# Patient Record
Sex: FEMALE | Race: WHITE | NOT HISPANIC OR LATINO | Employment: FULL TIME | URBAN - METROPOLITAN AREA
[De-identification: names, ages, dates, MRNs, and addresses within clinical notes are randomized per-mention and may not be internally consistent; named-entity substitution may affect disease eponyms.]

---

## 2017-01-26 ENCOUNTER — OFFICE VISIT (OUTPATIENT)
Dept: OBGYN | Facility: CLINIC | Age: 27
End: 2017-01-26
Payer: COMMERCIAL

## 2017-01-26 VITALS
BODY MASS INDEX: 32.01 KG/M2 | RESPIRATION RATE: 18 BRPM | HEART RATE: 95 BPM | OXYGEN SATURATION: 97 % | WEIGHT: 211.2 LBS | SYSTOLIC BLOOD PRESSURE: 116 MMHG | DIASTOLIC BLOOD PRESSURE: 80 MMHG | HEIGHT: 68 IN

## 2017-01-26 DIAGNOSIS — R39.15 URINARY URGENCY: ICD-10-CM

## 2017-01-26 DIAGNOSIS — Z83.438 FAMILY HISTORY OF HYPERLIPIDEMIA: ICD-10-CM

## 2017-01-26 DIAGNOSIS — E06.3 HYPOTHYROIDISM DUE TO HASHIMOTO'S THYROIDITIS: ICD-10-CM

## 2017-01-26 DIAGNOSIS — E03.8 OTHER SPECIFIED HYPOTHYROIDISM: ICD-10-CM

## 2017-01-26 DIAGNOSIS — E03.8 SECONDARY HYPOTHYROIDISM: Primary | ICD-10-CM

## 2017-01-26 DIAGNOSIS — Z30.40 ENCOUNTER FOR SURVEILLANCE OF CONTRACEPTIVES: ICD-10-CM

## 2017-01-26 DIAGNOSIS — Z83.42 FAMILY HISTORY OF HIGH CHOLESTEROL: ICD-10-CM

## 2017-01-26 DIAGNOSIS — R82.90 NONSPECIFIC FINDING ON EXAMINATION OF URINE: ICD-10-CM

## 2017-01-26 DIAGNOSIS — Z01.419 ENCOUNTER FOR GYNECOLOGICAL EXAMINATION WITHOUT ABNORMAL FINDING: Primary | ICD-10-CM

## 2017-01-26 DIAGNOSIS — R87.610 PAPANICOLAOU SMEAR OF CERVIX WITH ATYPICAL SQUAMOUS CELLS OF UNDETERMINED SIGNIFICANCE (ASC-US): ICD-10-CM

## 2017-01-26 LAB
ALBUMIN UR-MCNC: 30 MG/DL
APPEARANCE UR: ABNORMAL
BACTERIA #/AREA URNS HPF: ABNORMAL /HPF
BILIRUB UR QL STRIP: NEGATIVE
COLOR UR AUTO: YELLOW
GLUCOSE UR STRIP-MCNC: NEGATIVE MG/DL
HGB UR QL STRIP: ABNORMAL
KETONES UR STRIP-MCNC: NEGATIVE MG/DL
LEUKOCYTE ESTERASE UR QL STRIP: ABNORMAL
NITRATE UR QL: NEGATIVE
NON-SQ EPI CELLS #/AREA URNS LPF: ABNORMAL /LPF
PH UR STRIP: 5.5 PH (ref 5–7)
RBC #/AREA URNS AUTO: ABNORMAL /HPF (ref 0–2)
SP GR UR STRIP: 1.01 (ref 1–1.03)
URN SPEC COLLECT METH UR: ABNORMAL
UROBILINOGEN UR STRIP-MCNC: NORMAL MG/DL (ref 0–2)
WBC #/AREA URNS AUTO: ABNORMAL /HPF (ref 0–2)
YEAST #/AREA URNS HPF: ABNORMAL /HPF

## 2017-01-26 PROCEDURE — 81001 URINALYSIS AUTO W/SCOPE: CPT | Performed by: OBSTETRICS & GYNECOLOGY

## 2017-01-26 PROCEDURE — 88141 CYTOPATH C/V INTERPRET: CPT | Performed by: OBSTETRICS & GYNECOLOGY

## 2017-01-26 PROCEDURE — 99395 PREV VISIT EST AGE 18-39: CPT | Performed by: OBSTETRICS & GYNECOLOGY

## 2017-01-26 PROCEDURE — 87086 URINE CULTURE/COLONY COUNT: CPT | Performed by: OBSTETRICS & GYNECOLOGY

## 2017-01-26 PROCEDURE — 88175 CYTOPATH C/V AUTO FLUID REDO: CPT | Performed by: OBSTETRICS & GYNECOLOGY

## 2017-01-26 PROCEDURE — 87624 HPV HI-RISK TYP POOLED RSLT: CPT | Performed by: OBSTETRICS & GYNECOLOGY

## 2017-01-26 RX ORDER — LEVOTHYROXINE SODIUM 100 UG/1
100 TABLET ORAL DAILY
Qty: 90 TABLET | Refills: 4 | Status: SHIPPED | OUTPATIENT
Start: 2017-01-26 | End: 2018-03-30

## 2017-01-26 NOTE — MR AVS SNAPSHOT
After Visit Summary   1/26/2017    Analia Lyons    MRN: [id number]           Patient Information     Date Of Birth          1990        Visit Information        Provider Department      1/26/2017 10:00 AM Rupesh Winslow MD [facility]        Today's Diagnoses     Encounter for gynecological examination without abnormal finding    -  1     Encounter for surveillance of contraceptives         Urinary urgency         Nonspecific finding on examination of urine         Papanicolaou smear of cervix with atypical squamous cells of undetermined significance (ASC-US)         Hypothyroidism due to Hashimoto's thyroiditis         Other specified hypothyroidism         Family history of high cholesterol            Follow-ups after your visit        Who to contact     If you have questions or need follow up information about today's clinic visit or your schedule please contact [facility] directly at [phone].  Normal or non-critical lab and imaging results will be communicated to you by MyChart, letter or phone within 4 business days after the clinic has received the results. If you do not hear from us within 7 days, please contact the clinic through MyChart or phone. If you have a critical or abnormal lab result, we will notify you by phone as soon as possible.  Submit refill requests through [employer] or call your pharmacy and they will forward the refill request to us. Please allow 3 business days for your refill to be completed.          Additional Information About Your Visit        Urban [employer] Information     SW[employer] gives you secure access to your electronic health record. If you see a primary care provider, you can also send messages to your care team and make appointments. If you have questions, please call your primary care clinic.  If you do not have a primary care provider, please call [phone] and they will assist you.        Care EveryWhere ID      "This is your Care EveryWhere ID. This could be used by other organizations to access your [facility] medical records  [id number]        Your Vitals Were     Pulse Respirations Height BMI (Body Mass Index) Pulse Oximetry Breastfeeding?    95 18 5' 7.5\" (1.715 m) 32.57 kg/m2 97% No       Blood Pressure from Last 3 Encounters:   01/26/17 116/80   06/15/16 114/72   06/02/16 109/73    Weight from Last 3 Encounters:   01/26/17 211 lb 3.2 oz (95.8 kg)   06/15/16 198 lb 6.4 oz (89.994 kg)   06/02/16 192 lb (87.091 kg)              We Performed the Following     Glucose     HPV High Risk Types DNA Cervical     Lipid panel reflex to direct LDL     Pap imaged thin layer diagnostic with HPV (select HPV order below)     TSH with free T4 reflex     UA with Microscopic reflex to Culture ([place])     Urine Culture Aerobic Bacterial          Where to get your medicines      These medications were sent to [facility] [id number] [place] [address], [address]  [address] [address], [facility] [address]     Phone:  [phone]    - levothyroxine 100 MCG tablet  - norethindrone-ethinyl estradiol 1-35 MG-MCG per tablet       Primary Care Provider Office Phone # Fax #    Babs Malone [phone]       [facility] [address] [facility] [address]        Thank you!     Thank you for choosing [facility]  for your care. Our goal is always to provide you with excellent care. Hearing back from our patients is one way we can continue to improve our services. Please take a few minutes to complete the written survey that you may receive in the mail after your visit with us. Thank you!             Your Updated Medication List - Protect others around you: Learn how to safely use, store and throw away your medicines at www.disposemymeds.org.          This list is accurate as of: 1/26/17 10:54 AM.  Always use your most recent med list.                   Brand Name " Dispense Instructions for use    escitalopram 20 MG tablet    LEXAPRO    90 tablet    Take 1 tablet (20 mg) by mouth daily       levothyroxine 100 MCG tablet    SYNTHROID/LEVOTHROID    90 tablet    Take 1 tablet (100 mcg) by mouth daily       norethindrone-ethinyl estradiol 1-35 MG-MCG per tablet    ORTHO-NOVUM 1/35 (28)    112 tablet    Take one active tablet a day for 21 days.  Discard the inactive pills and start new pack on day 22.

## 2017-01-26 NOTE — LETTER
[address]  [facility] [address] [phone]      February 17, 2017    Analia Lyons  [address]     [facility] [address]    Dear Analia,      We are contacting you in writing because we have been unable to reach you by phone number: [phone], due to male voice on answering machine and no patient identifier.    We have recently received your PAP smear result. The result shows ASCUS or Atypical Squamous Cells of Undetermined Significance. This is now the second year you have had an ASCUS pap. Therefor, Dr. Winslow is recommending that we have you follow up in clinic for a procedure called a colposcopy.   The Colposcopy is a procedure that is similar to your pap smear and is done in the clinic. This test test allows the provider to take a closer look at the cells of the cervix. During this test a biopsy of the cervix may be taken for further lab testing. The exam and test will help determine the reason for your abnormal pap smear.  Please call [phone] to schedule this procedure. If you have additional questions regarding this result, please call me at [phone].    Sincerely,  Elaina Christian, RN, BSN, Pap Tracking Nurse on behalf of Dr. Winslow.

## 2017-01-26 NOTE — PROGRESS NOTES
Analia Lyons is a 26 year old female , who presents for annual exam.   No unusual bleeding, no incontinence, or unusual discharge.   She is currently using OCPs for contraception.  She does not have any apparent contraindications to use.  She has not had any apparent complications with it's use.  She has noticed that she has had some urinary urgency and cloudy urine.  No dysuria.      Last cholesterol:   Recent Labs   Lab Test  11   1010   CHOL  180   HDL  35*   LDL  116   TRIG  144   CHOLHDLRATIO  5.1*     Past Medical History   Diagnosis Date     Depression      Anxiety      Headache(784.0)      Hypothyroidism      Papanicolaou smear of cervix with atypical squamous cells of undetermined significance (ASC-US) 12/3/15       Past Surgical History   Procedure Laterality Date     No history of surgery       Colonoscopy N/A 2015     Procedure: COLONOSCOPY;  Surgeon: Duane, William Charles, MD;  Location: [facility] OR     Colonoscopy with co2 insufflation N/A 2015     Procedure: COLONOSCOPY WITH CO2 INSUFFLATION;  Surgeon: Duane, William Charles, MD;  Location: [facility] OR       Obstetric History       T0      TAB0   SAB0   E0   M0   L0           Gyn History:  Gynecological History         No LMP recorded. Patient is not currently having periods (Reason: Birth Control).     no STD/no PID/no IUD      history of abnormal pap smear:  Last pap ASCUS pap  Last pap: PAP   ASC-US   12/3/2015        Current Outpatient Prescriptions   Medication Sig Dispense Refill     norethindrone-ethinyl estradiol (ORTHO-NOVUM , ,) 1-35 MG-MCG per tablet Take one active tablet a day for 21 days.  Discard the inactive pills and start new pack on day 22. 56 tablet 0     escitalopram (LEXAPRO) 20 MG tablet Take 1 tablet (20 mg) by mouth daily 90 tablet 1     levothyroxine (SYNTHROID,LEVOTHROID) 100 MCG tablet Take 1 tablet (100 mcg) by mouth daily 90 tablet 3       Allergies   Allergen Reactions     Nkda [No Known  "Drug Allergies]        Social History     Social History     Marital Status: Single     Spouse Name: N/A     Number of Children: 0     Years of Education: 16     Occupational History           [employer]     Social History Main Topics     Smoking status: Never Smoker      Smokeless tobacco: Never Used     Alcohol Use: Yes      Comment: socially     Drug Use: No     Sexual Activity:     Partners: Male     Birth Control/ Protection: Pill     Other Topics Concern     Not on file     Social History Narrative    Parents declaring bankruptcy; losing house due to mother's credit card debt.        Brother deployed to [place] for 16 months startin 4/1/09        Parents  but ''.       Family History   Problem Relation Age of Onset     Depression Brother      depression     C.A.D. No family hx of      DIABETES No family hx of      Breast Cancer No family hx of      Cancer - colorectal No family hx of      Anesthesia Reaction No family hx of      Blood Disease No family hx of          ROS:  All negative except as above.      EXAM:  /80 mmHg  Pulse 95  Resp 18  Ht 5' 7.5\" (1.715 m)  Wt 211 lb 3.2 oz (95.8 kg)  BMI 32.57 kg/m2  SpO2 97%  Breastfeeding? No  General:  WNWD female, NAD  Alert  Oriented x 3  Gait:  Normal  Skin:  Normal skin turgor  Neurologic:  CN grossly intact, good sensation.    HEENT:  NC/AT, EOMI  Neck:  No masses palpated, symmetrical, carotids +2/4, no bruits heard  Heart:  RRR  Lungs:  Clear   Breasts:  Symmetrical, no dimpling noted, no masses palpated, no discharge expressed  Abdomen:  Non-tender, non-distended.  Vulva: No external lesions, normal hair distribution, no adenopathy  BUS:  Normal, no masses noted  Urethra:  No hypermobility noted  Urethral meatus:  No masses noted  Vagina: Moist, pink, no abnormal discharge, well rugated, no lesions  Cervix: Smooth, pink, no visible lesions  Uterus: Normal size, anteverted, non-tender, mobile  Ovaries: No mass, " non-tender, mobile  Perianal:  No masses noted.   Extremities:  No clubbing, cyanosis, or edema      ASSESSMENT/PLAN   Annual examination   Urinary urgency, U/A ordered  History of hypothyroidism associated with Hashimoto's.  TSH ordered and refill of synthroid ordered.    ASCUS pap smear.  Repeat pap smear and HPV ordered.   Lipids and glucose ordered  Low fat diet, weight bearing exercises and self breast exams on a monthly basis have been recommended.  I have discussed with patient the risks, benefits, medications, treatment options and modalities.   I have instructed the patient to call or schedule a follow-up appointment if any problems.

## 2017-01-26 NOTE — NURSING NOTE
".  Chief Complaint   Patient presents with     Physical     Annual Female       Initial /80 mmHg  Pulse 95  Resp 18  Ht 5' 7.5\" (1.715 m)  Wt 211 lb 3.2 oz (95.8 kg)  BMI 32.57 kg/m2  SpO2 97%  Breastfeeding? No Estimated body mass index is 32.57 kg/(m^2) as calculated from the following:    Height as of this encounter: 5' 7.5\" (1.715 m).    Weight as of this encounter: 211 lb 3.2 oz (95.8 kg).  BP completed using cuff size: viviana Colin MA 1/26/2017         "

## 2017-01-27 LAB
BACTERIA SPEC CULT: NO GROWTH
MICRO REPORT STATUS: NORMAL
SPECIMEN SOURCE: NORMAL

## 2017-01-27 ASSESSMENT — PATIENT HEALTH QUESTIONNAIRE - PHQ9: SUM OF ALL RESPONSES TO PHQ QUESTIONS 1-9: 4

## 2017-01-31 LAB
COPATH REPORT: ABNORMAL
PAP: ABNORMAL

## 2017-02-01 LAB
FINAL DIAGNOSIS: NORMAL
HPV HR 12 DNA CVX QL NAA+PROBE: NEGATIVE
HPV16 DNA SPEC QL NAA+PROBE: NEGATIVE
HPV18 DNA SPEC QL NAA+PROBE: NEGATIVE
SPECIMEN DESCRIPTION: NORMAL

## 2017-02-02 PROBLEM — R87.610 ASCUS OF CERVIX WITH NEGATIVE HIGH RISK HPV: Status: ACTIVE | Noted: 2017-01-26

## 2017-03-23 ENCOUNTER — OFFICE VISIT (OUTPATIENT)
Dept: OBGYN | Facility: CLINIC | Age: 27
End: 2017-03-23
Payer: COMMERCIAL

## 2017-03-23 VITALS
HEART RATE: 84 BPM | OXYGEN SATURATION: 97 % | DIASTOLIC BLOOD PRESSURE: 72 MMHG | SYSTOLIC BLOOD PRESSURE: 112 MMHG | BODY MASS INDEX: 30.97 KG/M2 | WEIGHT: 200.7 LBS

## 2017-03-23 DIAGNOSIS — R87.610 PAPANICOLAOU SMEAR OF CERVIX WITH ATYPICAL SQUAMOUS CELLS OF UNDETERMINED SIGNIFICANCE (ASC-US): Primary | ICD-10-CM

## 2017-03-23 PROCEDURE — 88305 TISSUE EXAM BY PATHOLOGIST: CPT | Performed by: OBSTETRICS & GYNECOLOGY

## 2017-03-23 PROCEDURE — 57454 BX/CURETT OF CERVIX W/SCOPE: CPT | Performed by: OBSTETRICS & GYNECOLOGY

## 2017-03-23 PROCEDURE — 99214 OFFICE O/P EST MOD 30 MIN: CPT | Mod: 25 | Performed by: OBSTETRICS & GYNECOLOGY

## 2017-03-23 NOTE — PROGRESS NOTES
Patient Name: Analia Lyons              Date: 3/23/2017   YOB: 1990                         Age: 26 year old   Phone: [phone] (home)   ________________________________________________________________________  Analia presents today  to discuss the pap smear, findings and possible further evaluation.  The patient's pap smear history is as noted:  4/1/11 (approx) normal - patient reported.   6/11/13 normal - patient reported  12/3/15 - ASCUS pap. Plan: per provider, repeat pap in 1 year.  1/26/17 ASCUS/neg HR HPV.  I attempted to ensure that the patient was educated regarding the nature of her findings and implications to date.  We reviewed the role of HPV, incidence in the population and the natural history of the infection, and its transmission.  We also reviewed ways to minimize her future risk, the effect of HPV on the cervix and treatment options available, should they be indicated.    The pathophysiology of the cervix, including a discussion of the squamous and columnar cells, metaplasia and dysplasia have been reviewed, drawings, sketches and the pamphlets were reviewed with her.      No LMP recorded. Patient is not currently having periods (Reason: Birth Control).  Current Birth Control Method: oral contraceptive  Age at first sexual intercourse: 15 years old  Number of sexual partners (lifetime): 4  History of veneral diseases: : No  History of genital warts:  No  Visible warts now?:  No  Family History of  Cervical, Uterine or Vaginal Cancer?: No    Past Medical History:   Diagnosis Date     Anxiety      ASCUS of cervix with negative high risk HPV 1/26/17    ASCUS/neg HR HPV.     Depression      Headache(784.0)      Hypothyroidism      Papanicolaou smear of cervix with atypical squamous cells of undetermined significance (ASC-US) 12/3/15       Past Surgical History:   Procedure Laterality Date     COLONOSCOPY N/A 12/16/2015    Procedure: COLONOSCOPY;  Surgeon: Duane, William Charles, MD;   Location: [facility] OR     COLONOSCOPY WITH CO2 INSUFFLATION N/A 12/16/2015    Procedure: COLONOSCOPY WITH CO2 INSUFFLATION;  Surgeon: Duane, William Charles, MD;  Location: [facility] OR     NO HISTORY OF SURGERY          Outpatient Encounter Prescriptions as of 3/23/2017   Medication Sig Dispense Refill     norethindrone-ethinyl estradiol (ORTHO-NOVUM 1/35, 28,) 1-35 MG-MCG per tablet Take one active tablet a day for 21 days.  Discard the inactive pills and start new pack on day 22. 112 tablet 4     levothyroxine (SYNTHROID/LEVOTHROID) 100 MCG tablet Take 1 tablet (100 mcg) by mouth daily 90 tablet 4     escitalopram (LEXAPRO) 20 MG tablet Take 1 tablet (20 mg) by mouth daily 90 tablet 1     No facility-administered encounter medications on file as of 3/23/2017.         Allergies as of 03/23/2017 - Carlos as Reviewed 03/23/2017   Allergen Reaction Noted     Nkda [no known drug allergies]  11/13/2009       Social History     Social History     Marital status: Single     Spouse name: N/A     Number of children: 0     Years of education: 16     Occupational History           [employer]     Social History Main Topics     Smoking status: Never Smoker     Smokeless tobacco: Never Used     Alcohol use Yes      Comment: socially     Drug use: No     Sexual activity: Yes     Partners: Male     Birth control/ protection: Pill     Other Topics Concern     None     Social History Narrative    Parents declaring bankruptcy; losing house due to mother's credit card debt.        Brother deployed to [place] for 16 months startin 4/1/09        Parents  but ''.        Family History   Problem Relation Age of Onset     Depression Brother      depression     C.A.D. No family hx of      DIABETES No family hx of      Breast Cancer No family hx of      Cancer - colorectal No family hx of      Anesthesia Reaction No family hx of      Blood Disease No family hx of      Hyperlipidemia Mother          Review Of Systems  Skin:  negative  Eyes: negative  Ears/Nose/Throat: negative  Respiratory: negative  Cardiovascular: negative  Gastrointestinal: negative  Genitourinary: as above  Musculoskeletal: negative  Neurologic: negative  Psychiatric: negative  Hematologic/Lymphatic/Immunologic: negative  Endocrine: negative     Exam:   /72 (BP Location: Left arm, Cuff Size: Adult Regular)  Pulse 84  Wt 200 lb 11.2 oz (91 kg)  SpO2 97%  Breastfeeding? No  BMI 30.97 kg/m2  GENERAL:  WNWD female NAD  HEENT: NC/AT, EOMI  SKIN: normal skin turgor  GAIT: Normal  NECK: Symmetrical, no masses noted   VULVA: Normal Genitalia  BUS: Normal  URETHRA:  No hypermobility noted  URETHRAL MEATUS:  No masses noted  VAGINA: Normal mucosa, no discharge  CERVIX: Closed, mobile, no discharge  PERIANAL:  No masses or lesions seen  EXTREMITIES: no clubbing, cyanosis, or edema    Assessment:  ASCUS pap smear    Plan:  Recommend to Proceed with Colpo  The details of the colposcopic procedure were reviewed, the risks of missed diagnoses, pain, infection, and bleeding.      Rupesh Winslow MD        Procedure:  Procedure for colposcopy and biopsy has been explained to the patient and consent obtained.    Before the procedure, it was ensured that the patient was educated regarding the nature of her findings and implications to date.  We reviewed the role of HPV and the natural history of the infection.  We also reviewed ways to minimize her future risk, the effect of HPV on the cervix and treatment options available, should they be indicated.    The pathophysiology of the cervix, including a discussion of the squamous and columnar cells, metaplasia and dysplasia have been reviewed, drawings, sketches and the pamphlets were reviewed with her.  The details of the colposcopic procedure were reviewed, the risks of missed diagnoses, pain, infection, and bleeding.  Questions seemed to be answered before proceeding and the patient then consented to the procedure.      Speculum placed in vagina and excellent visualization of cervix achieved, cervix swabbed  with acetic acid solution.    biopsies taken (including ECC): 2   Hemostasis effected with Silver Nitrate    Findings:  Cervix: acetowhitening noted between the 1 and 3 o'clock position  Vaginal inspection: normal without visible lesions.  Procedure Summary: Patient tolerated procedure well and colposcopy adequate.      Assessment:   ASCUS pap smear     Plan:  Specimens labelled and sent to pathology.  Will base further treatment on pathology findings.  Post biopsy instructions given to patient and call to discuss Pathology results.    Rupesh Winslow MD

## 2017-03-23 NOTE — MR AVS SNAPSHOT
After Visit Summary   3/23/2017    Analia Lyons    MRN: [id number]           Patient Information     Date Of Birth          1990        Visit Information        Provider Department      3/23/2017 1:00 PM Rupesh Winslow MD; PROC RM 1 WOMENS [facility]        Today's Diagnoses     Papanicolaou smear of cervix with atypical squamous cells of undetermined significance (ASC-US)    -  1       Follow-ups after your visit        Who to contact     If you have questions or need follow up information about today's clinic visit or your schedule please contact [facility] directly at [phone].  Normal or non-critical lab and imaging results will be communicated to you by MyChart, letter or phone within 4 business days after the clinic has received the results. If you do not hear from us within 7 days, please contact the clinic through Balluunhart or phone. If you have a critical or abnormal lab result, we will notify you by phone as soon as possible.  Submit refill requests through [employer] or call your pharmacy and they will forward the refill request to us. Please allow 3 business days for your refill to be completed.          Additional Information About Your Visit        MyChart Information     [employer] gives you secure access to your electronic health record. If you see a primary care provider, you can also send messages to your care team and make appointments. If you have questions, please call your primary care clinic.  If you do not have a primary care provider, please call [phone] and they will assist you.        Care EveryWhere ID     This is your Care EveryWhere ID. This could be used by other organizations to access your [facility] medical records  [id number]        Your Vitals Were     Pulse Pulse Oximetry Breastfeeding? BMI (Body Mass Index)          84 97% No 30.97 kg/m2         Blood Pressure from Last 3 Encounters:   03/23/17 112/72   01/26/17  116/80   06/15/16 114/72    Weight from Last 3 Encounters:   03/23/17 200 lb 11.2 oz (91 kg)   01/26/17 211 lb 3.2 oz (95.8 kg)   06/15/16 198 lb 6.4 oz (90 kg)              We Performed the Following     COLP CERVIX/UPPER VAGINA     Surgical pathology exam        Primary Care Provider Office Phone # Fax #    Babs Malone [phone]       [facility] [address] [facility] [address]        Thank you!     Thank you for choosing [facility]  for your care. Our goal is always to provide you with excellent care. Hearing back from our patients is one way we can continue to improve our services. Please take a few minutes to complete the written survey that you may receive in the mail after your visit with us. Thank you!             Your Updated Medication List - Protect others around you: Learn how to safely use, store and throw away your medicines at www.disposemymeds.org.          This list is accurate as of: 3/23/17  2:31 PM.  Always use your most recent med list.                   Brand Name Dispense Instructions for use    escitalopram 20 MG tablet    LEXAPRO    90 tablet    Take 1 tablet (20 mg) by mouth daily       levothyroxine 100 MCG tablet    SYNTHROID/LEVOTHROID    90 tablet    Take 1 tablet (100 mcg) by mouth daily       norethindrone-ethinyl estradiol 1-35 MG-MCG per tablet    ORTHO-NOVUM 1/35 (28)    112 tablet    Take one active tablet a day for 21 days.  Discard the inactive pills and start new pack on day 22.

## 2017-03-23 NOTE — NURSING NOTE
"Chief Complaint   Patient presents with     Colposcopy     ASC-US papsmear       Initial /72 (BP Location: Left arm, Cuff Size: Adult Regular)  Pulse 84  Wt 200 lb 11.2 oz (91 kg)  SpO2 97%  Breastfeeding? No  BMI 30.97 kg/m2 Estimated body mass index is 30.97 kg/(m^2) as calculated from the following:    Height as of 1/26/17: 5' 7.5\" (1.715 m).    Weight as of this encounter: 200 lb 11.2 oz (91 kg).  Medication Reconciliation: complete   BHARAT Colin 3/23/2017         "

## 2017-03-27 LAB — COPATH REPORT: NORMAL

## 2017-05-03 DIAGNOSIS — F41.8 DEPRESSION WITH ANXIETY: ICD-10-CM

## 2017-05-04 RX ORDER — ESCITALOPRAM OXALATE 20 MG/1
TABLET ORAL
Qty: 90 TABLET | Refills: 0 | Status: SHIPPED | OUTPATIENT
Start: 2017-05-04 | End: 2017-08-20

## 2017-06-26 ENCOUNTER — OFFICE VISIT (OUTPATIENT)
Dept: FAMILY MEDICINE | Facility: CLINIC | Age: 27
End: 2017-06-26
Payer: COMMERCIAL

## 2017-06-26 ENCOUNTER — RADIANT APPOINTMENT (OUTPATIENT)
Dept: GENERAL RADIOLOGY | Facility: CLINIC | Age: 27
End: 2017-06-26
Attending: PHYSICIAN ASSISTANT
Payer: COMMERCIAL

## 2017-06-26 VITALS
TEMPERATURE: 99.5 F | OXYGEN SATURATION: 99 % | HEART RATE: 95 BPM | BODY MASS INDEX: 31.98 KG/M2 | HEIGHT: 68 IN | DIASTOLIC BLOOD PRESSURE: 87 MMHG | SYSTOLIC BLOOD PRESSURE: 127 MMHG | WEIGHT: 211 LBS

## 2017-06-26 DIAGNOSIS — M54.50 BILATERAL LOW BACK PAIN WITHOUT SCIATICA, UNSPECIFIED CHRONICITY: ICD-10-CM

## 2017-06-26 DIAGNOSIS — M54.50 BILATERAL LOW BACK PAIN WITHOUT SCIATICA, UNSPECIFIED CHRONICITY: Primary | ICD-10-CM

## 2017-06-26 DIAGNOSIS — F41.8 DEPRESSION WITH ANXIETY: ICD-10-CM

## 2017-06-26 PROCEDURE — 72100 X-RAY EXAM L-S SPINE 2/3 VWS: CPT

## 2017-06-26 PROCEDURE — 99214 OFFICE O/P EST MOD 30 MIN: CPT | Performed by: PHYSICIAN ASSISTANT

## 2017-06-26 RX ORDER — CYCLOBENZAPRINE HCL 10 MG
5-10 TABLET ORAL 3 TIMES DAILY PRN
Qty: 30 TABLET | Refills: 1 | Status: SHIPPED | OUTPATIENT
Start: 2017-06-26 | End: 2017-12-21

## 2017-06-26 RX ORDER — IBUPROFEN 800 MG/1
800 TABLET, FILM COATED ORAL EVERY 8 HOURS PRN
Qty: 60 TABLET | Refills: 1 | Status: SHIPPED | OUTPATIENT
Start: 2017-06-26 | End: 2017-12-21

## 2017-06-26 ASSESSMENT — ANXIETY QUESTIONNAIRES
5. BEING SO RESTLESS THAT IT IS HARD TO SIT STILL: SEVERAL DAYS
2. NOT BEING ABLE TO STOP OR CONTROL WORRYING: MORE THAN HALF THE DAYS
3. WORRYING TOO MUCH ABOUT DIFFERENT THINGS: MORE THAN HALF THE DAYS
GAD7 TOTAL SCORE: 13
IF YOU CHECKED OFF ANY PROBLEMS ON THIS QUESTIONNAIRE, HOW DIFFICULT HAVE THESE PROBLEMS MADE IT FOR YOU TO DO YOUR WORK, TAKE CARE OF THINGS AT HOME, OR GET ALONG WITH OTHER PEOPLE: SOMEWHAT DIFFICULT
7. FEELING AFRAID AS IF SOMETHING AWFUL MIGHT HAPPEN: NOT AT ALL
1. FEELING NERVOUS, ANXIOUS, OR ON EDGE: MORE THAN HALF THE DAYS
6. BECOMING EASILY ANNOYED OR IRRITABLE: NEARLY EVERY DAY

## 2017-06-26 ASSESSMENT — PATIENT HEALTH QUESTIONNAIRE - PHQ9: 5. POOR APPETITE OR OVEREATING: NEARLY EVERY DAY

## 2017-06-26 NOTE — PROGRESS NOTES
"  SUBJECTIVE:                                                    Analia Lyons is a 26 year old female who presents to clinic today for the following health issues:        Back Pain       Duration: on and off since may        Specific cause: fall, slipped on mud    Description:   Location of pain: low back bilateral  Character of pain: sharp, dull ache, stabbing and burning  Pain radiation:none  New numbness or weakness in legs, not attributed to pain:  no     Intensity: moderate    History:   Pain interferes with job: YES  History of back problems: recurrent self limited episodes of low back pain in the past  Any previous MRI or X-rays: None  Sees a specialist for back pain:  No  Therapies tried without relief: cold    Alleviating factors:   Improved by: none      Precipitating factors:  Worsened by: Lifting, Bending, Standing, Sitting and Walking    Functional and Psychosocial Screen (Gregorio STarT Back):      Not performed today    During a rainfall in middle of may she fell on back (slipped on mud), had back pain right away then.  Was not seen as it got better quickly. then Yesterday then was getting up from bending over a tub and felt sharp pain, had to have  help move. This morning felt a little better.   Did not fall again yesterday.  Pain Does not shoot down either leg.  More a shooting pain in her low back only.  Feels like a \"band\" straight across her back.  Took some ibuprofen for the pain and ice.  Could not sleep from pain . Neither one seemed to help much. Today is the same not better or worse.     Before  Has had pain off and on. No recent imaging. Did do physical therapy for her back pain years ago which helped, still has these exercises.  This is too painful right now for her to do.     Has used muscle relaxants in the past, was helpful.     No loss of b/b continence or red flags.     Is obese. Nonsmoker.       Accompanying Signs & Symptoms:  Risk of Fracture:  None  Risk of Cauda Equina:  " None  Risk of Infection:  None  Risk of Cancer:  None  Risk of Ankylosing Spondylitis:  Onset at age <35, male, AND morning back stiffness. no           Depression history-has been stable on medications. Denies suicidal or homicidal thoughts.  Patient instructed to go to the emergency room or call 911 if these occur.        NO POSITION FEELS BETTER per patient.     [place] REGIST[place] F[place]S.        Problem list and histories reviewed & adjusted, as indicated.  Additional history: as documented    Patient Active Problem List   Diagnosis     Hashimoto's disease     Familial hematuria     CARDIOVASCULAR SCREENING; LDL GOAL LESS THAN 160     Hypothyroidism     Mechanical low back pain     Keloid scar     Depression with anxiety     ASCUS of cervix with negative high risk HPV     Common wart     Hypothyroidism due to Hashimoto's thyroiditis     Past Surgical History:   Procedure Laterality Date     COLONOSCOPY N/A 12/16/2015    Procedure: COLONOSCOPY;  Surgeon: Duane, William Charles, MD;  Location: [facility] OR     COLONOSCOPY WITH CO2 INSUFFLATION N/A 12/16/2015    Procedure: COLONOSCOPY WITH CO2 INSUFFLATION;  Surgeon: Duane, William Charles, MD;  Location: [facility] OR     NO HISTORY OF SURGERY         Social History   Substance Use Topics     Smoking status: Never Smoker     Smokeless tobacco: Never Used     Alcohol use Yes      Comment: socially     Family History   Problem Relation Age of Onset     Depression Brother      depression     Hyperlipidemia Mother      C.A.D. No family hx of      DIABETES No family hx of      Breast Cancer No family hx of      Cancer - colorectal No family hx of      Anesthesia Reaction No family hx of      Blood Disease No family hx of          Current Outpatient Prescriptions   Medication Sig Dispense Refill     cyclobenzaprine (FLEXERIL) 10 MG tablet Take 0.5-1 tablets (5-10 mg) by mouth 3 times daily as needed for muscle spasms 30 tablet 1     ibuprofen (ADVIL/MOTRIN) 800 MG tablet Take 1 tablet (800  "mg) by mouth every 8 hours as needed for moderate pain With food. Take for 3 days then as needed for pain. 60 tablet 1     escitalopram (LEXAPRO) 20 MG tablet TAKE ONE TABLET BY MOUTH EVERY DAY 90 tablet 0     norethindrone-ethinyl estradiol (ORTHO-NOVUM 1/35, 28,) 1-35 MG-MCG per tablet Take one active tablet a day for 21 days.  Discard the inactive pills and start new pack on day 22. 112 tablet 4     levothyroxine (SYNTHROID/LEVOTHROID) 100 MCG tablet Take 1 tablet (100 mcg) by mouth daily 90 tablet 4     Allergies   Allergen Reactions     Nkda [No Known Drug Allergies]        ROS:  Constitutional, HEENT, cardiovascular, pulmonary, GI, , musculoskeletal, neuro, skin, endocrine and psych systems are negative, except as otherwise noted.    OBJECTIVE:     /87  Pulse 95  Temp 99.5  F (37.5  C) (Oral)  Ht 5' 8\" (1.727 m)  Wt 211 lb (95.7 kg)  SpO2 99%  Breastfeeding? No  BMI 32.08 kg/m2  Body mass index is 32.08 kg/(m^2).  GENERAL:  No acute distress.  Interacts and answers questions appropriately.  Alert and oriented. Obese. Some kyphosis of upper shoulder/spine noted.   HEENT:   Oral mucosa moist.  Posterior pharynx non-erythematous.  NECK:  Soft and supple.  without tenderness.    Normal range of motion.    CARDIAC:   Regular rate and rhythm.  No murmurs, rubs, or gallops.   PULMONARY: Clear to auscultation bilaterally.  No  wheezes, crackles, or rhonchi.  Normal air exchange/breath sounds.   MUSCULOSKELETAL:  Low  over midline and in band-like distribution of low back.  Is tender over lumbar vertebrae.    No erythema, ecchymosis, edema, or warmth.  Range of motion decreased due to pain but per patient flexion and extension hurt the most.   Hip, knee, and ankle strength 5/5 and equal bilaterally.  Distal sensation and pulses intact.   Straight leg raise neg.  NEUROLOGICAL:  Alert and oriented.  Gait normal.  Cranial nerves II-XII grossly intact.  Patellar reflexes 2+ and equal " bilaterally.  PSYCH: Normal affect.  SKIN: No rashes.        LUMBAR SPINE TWO TO THREE VIEWS  2017 12:02 PM      HISTORY: Low back pain.     COMPARISON: 2014.         IMPRESSION: Lumbar vertebrae appear normally aligned. Trace scoliotic  curvature of the lumbar spine, apex left, likely positional and not  present on previous exam. Alignment otherwise intact. No compression  deformity or acute fracture.    ASSESSMENT/PLAN:     ASSESSMENT / PLAN:  (M54.5) Bilateral low back pain without sciatica, unspecified chronicity  (primary encounter diagnosis)  Comment: muscle spasm verus herniated disc, arthritis or other. No evidence of infection. No red flags. See patient instructions below for plan.  Consider physical therapy in future also. She can start her home exercises from previous physical therapy once feeling better.      Plan: XR Lumbar Spine 2/3 Views, cyclobenzaprine         (FLEXERIL) 10 MG tablet, ibuprofen         (ADVIL/MOTRIN) 800 MG tablet            (F41.8) Depression with anxiety  Comment: stable, continue with PCP  Plan:     Patient Instructions   Xray is negative. I will follow up with radiologist read and we will call you if anything else is seen on x-ray.     Do not mix flexeril with alcohol or drive after taking if it makes you sleepy    Let me know if symptoms worsen or do not improve over the next few days, then we will have you see spine specialist and consider MRI scan of lumbar spine      Billin min spent face-to-face with patient. 15 min on history, 5 on exam, 5 on discussing diagnosis and treatment plan.       Michelle Purcell PA-C  [facility]

## 2017-06-26 NOTE — PATIENT INSTRUCTIONS
Xray is negative. I will follow up with radiologist read and we will call you if anything else is seen on x-ray.     Do not mix flexeril with alcohol or drive after taking if it makes you sleepy    Let me know if symptoms worsen or do not improve over the next few days, then we will have you see spine specialist and consider MRI scan of lumbar spine

## 2017-06-26 NOTE — MR AVS SNAPSHOT
After Visit Summary   6/26/2017    Analia Lyons    MRN: [id number]           Patient Information     Date Of Birth          1990        Visit Information        Provider Department      6/26/2017 11:20 AM Michelle Purcell PA-C [facility]        Today's Diagnoses     Bilateral low back pain without sciatica, unspecified chronicity    -  1    Depression with anxiety          Care Instructions    Xray is negative. I will follow up with radiologist read and we will call you if anything else is seen on x-ray.     Do not mix flexeril with alcohol or drive after taking if it makes you sleepy    Let me know if symptoms worsen or do not improve over the next few days, then we will have you see spine specialist and consider MRI scan of lumbar spine            Follow-ups after your visit        Who to contact     If you have questions or need follow up information about today's clinic visit or your schedule please contact [facility] directly at [phone].  Normal or non-critical lab and imaging results will be communicated to you by Socialbakershart, letter or phone within 4 business days after the clinic has received the results. If you do not hear from us within 7 days, please contact the clinic through Goodoct or phone. If you have a critical or abnormal lab result, we will notify you by phone as soon as possible.  Submit refill requests through MixRank or call your pharmacy and they will forward the refill request to us. Please allow 3 business days for your refill to be completed.          Additional Information About Your Visit        MyChart Information     MixRank gives you secure access to your electronic health record. If you see a primary care provider, you can also send messages to your care team and make appointments. If you have questions, please call your primary care clinic.  If you do not have a primary care provider, please call [phone] and they will  "assist you.        Care EveryWhere ID     This is your Care EveryWhere ID. This could be used by other organizations to access your [facility] medical records  [id number]        Your Vitals Were     Pulse Temperature Height Pulse Oximetry Breastfeeding? BMI (Body Mass Index)    95 99.5  F (37.5  C) (Oral) 5' 8\" (1.727 m) 99% No 32.08 kg/m2       Blood Pressure from Last 3 Encounters:   06/26/17 127/87   03/23/17 112/72   01/26/17 116/80    Weight from Last 3 Encounters:   06/26/17 211 lb (95.7 kg)   03/23/17 200 lb 11.2 oz (91 kg)   01/26/17 211 lb 3.2 oz (95.8 kg)              We Performed the Following     DEPRESSION ACTION PLAN (DAP)          Today's Medication Changes          These changes are accurate as of: 6/26/17 12:20 PM.  If you have any questions, ask your nurse or doctor.               Start taking these medicines.        Dose/Directions    cyclobenzaprine 10 MG tablet   Commonly known as:  FLEXERIL   Used for:  Bilateral low back pain without sciatica, unspecified chronicity   Started by:  Michelle Purcell PA-C        Dose:  5-10 mg   Take 0.5-1 tablets (5-10 mg) by mouth 3 times daily as needed for muscle spasms   Quantity:  30 tablet   Refills:  1       ibuprofen 800 MG tablet   Commonly known as:  ADVIL/MOTRIN   Used for:  Bilateral low back pain without sciatica, unspecified chronicity   Started by:  Michelle Purcell PA-C        Dose:  800 mg   Take 1 tablet (800 mg) by mouth every 8 hours as needed for moderate pain With food. Take for 3 days then as needed for pain.   Quantity:  60 tablet   Refills:  1            Where to get your medicines      These medications were sent to [facility] [address] [facility], [facility] [address]  [address] [address] [address]     Phone:  [phone]     cyclobenzaprine 10 MG tablet    ibuprofen 800 MG tablet                Primary Care Provider Office Phone # Fax #    Babs Malone [phone] "       [facility] [address] [facility] [address]        Equal Access to Services     MEME BARBER : Hadii rossy evans hadmalinao Soniyaali, waaxda luqadaha, qaybta kaalmada adetaisha, casimiro rueda leonciolesly jamesstuart melaniearsalanalexander nicole. So [facility] [phone].    ATENCIÓN: Si habla español, tiene a gates disposición servicios gratuitos de asistencia lingüística. Llame al [phone].    We comply with applicable federal civil rights laws and [place] laws. We do not discriminate on the basis of race, color, national origin, age, disability sex, sexual orientation or gender identity.            Thank you!     Thank you for choosing [facility]  for your care. Our goal is always to provide you with excellent care. Hearing back from our patients is one way we can continue to improve our services. Please take a few minutes to complete the written survey that you may receive in the mail after your visit with us. Thank you!             Your Updated Medication List - Protect others around you: Learn how to safely use, store and throw away your medicines at www.disposemymeds.org.          This list is accurate as of: 6/26/17 12:20 PM.  Always use your most recent med list.                   Brand Name Dispense Instructions for use Diagnosis    cyclobenzaprine 10 MG tablet    FLEXERIL    30 tablet    Take 0.5-1 tablets (5-10 mg) by mouth 3 times daily as needed for muscle spasms    Bilateral low back pain without sciatica, unspecified chronicity       escitalopram 20 MG tablet    LEXAPRO    90 tablet    TAKE ONE TABLET BY MOUTH EVERY DAY    Depression with anxiety       ibuprofen 800 MG tablet    ADVIL/MOTRIN    60 tablet    Take 1 tablet (800 mg) by mouth every 8 hours as needed for moderate pain With food. Take for 3 days then as needed for pain.    Bilateral low back pain without sciatica, unspecified chronicity       levothyroxine 100 MCG tablet    SYNTHROID/LEVOTHROID    90 tablet    Take 1 tablet (100 mcg)  by mouth daily    Other specified hypothyroidism       norethindrone-ethinyl estradiol 1-35 MG-MCG per tablet    ORTHO-NOVUM 1/35 (28)    112 tablet    Take one active tablet a day for 21 days.  Discard the inactive pills and start new pack on day 22.    Encounter for surveillance of contraceptives

## 2017-06-26 NOTE — NURSING NOTE
"Chief Complaint   Patient presents with     Back Pain     back pain per pt x 1 month ago slipped on mud and landed on back.       Initial /87  Pulse 95  Temp 99.5  F (37.5  C) (Oral)  Ht 5' 8\" (1.727 m)  Wt 211 lb (95.7 kg)  SpO2 99%  Breastfeeding? No  BMI 32.08 kg/m2 Estimated body mass index is 32.08 kg/(m^2) as calculated from the following:    Height as of this encounter: 5' 8\" (1.727 m).    Weight as of this encounter: 211 lb (95.7 kg).  Medication Reconciliation: complete      Libby Souza MA    "

## 2017-06-27 ASSESSMENT — PATIENT HEALTH QUESTIONNAIRE - PHQ9: SUM OF ALL RESPONSES TO PHQ QUESTIONS 1-9: 7

## 2017-06-27 ASSESSMENT — ANXIETY QUESTIONNAIRES: GAD7 TOTAL SCORE: 13

## 2017-08-20 DIAGNOSIS — F41.8 DEPRESSION WITH ANXIETY: ICD-10-CM

## 2017-08-21 RX ORDER — ESCITALOPRAM OXALATE 20 MG/1
TABLET ORAL
Qty: 90 TABLET | Refills: 0 | Status: SHIPPED | OUTPATIENT
Start: 2017-08-21 | End: 2017-12-18

## 2017-12-09 DIAGNOSIS — F41.8 DEPRESSION WITH ANXIETY: ICD-10-CM

## 2017-12-11 NOTE — TELEPHONE ENCOUNTER
PHQ-9 SCORE 10/20/2016 1/26/2017 6/26/2017   Total Score - - -   Total Score MyChart 3 (Minimal depression) - -   Total Score - 4 7

## 2017-12-13 RX ORDER — ESCITALOPRAM OXALATE 20 MG/1
TABLET ORAL
Qty: 30 TABLET | Refills: 0 | OUTPATIENT
Start: 2017-12-13

## 2017-12-13 NOTE — TELEPHONE ENCOUNTER
"I have not seen this patient in over a year. It looks like the medication has been filled as an \"add on\" concern at her appointments.   She needs to be seen. I am fine with 30 day supply after an appointment is made. Kristen Kehr PA-C    "

## 2017-12-15 NOTE — PROGRESS NOTES
SUBJECTIVE:                                                    Analia Lyons is a 27 year old female who presents to clinic today for the following health issues:    Depression and Anxiety Follow-Up    Status since last visit: Worsened     Other associated symptoms: Hard time focusing, eating more and sleeping more than usual    Complicating factors:     Significant life event: No     Current substance abuse: None    PHQ-9 Score and MyChart F/U Questions 1/26/2017 6/26/2017 12/11/2017   Total Score 4 7 9   Q9: Suicide Ideation Not at all Not at all Not at all     MAXIM-7 SCORE 3/23/2016 10/20/2016 6/26/2017   Total Score - - -   Total Score - 9 (mild anxiety) -   Total Score 6 - 13       PHQ-9  English  PHQ-9   Any Language  GAD7  Suicide Assessment Five-step Evaluation and Treatment (SAFE-T)      Amount of exercise or physical activity: 2-3 days/week for an average of 45-60 minutes    Problems taking medications regularly: No    Medication side effects: none    Diet: regular (no restrictions)    Has been on lexapro for years.  Worked previously.  Feels like it hasnt been working well the past two months or more. Is on max dose.   Previously tried wellbutrin and celexa. Doesn't remember side effects.  No history of seizures.   Anxiety has been worse.  Denies suicidal or homicidal thoughts.  Patient instructed to go to the emergency room or call 911 if these occur.  Sees a therapist weekly.   No drug use/abuse.       Was on effexor previously, went fine previously. Was under 18 years old. No issues.  Could consider again in the future also.   Doesn't want something that causes weight gain as she is already overweight.          Problem list and histories reviewed & adjusted, as indicated.  Additional history: as documented    Patient Active Problem List   Diagnosis     Hashimoto's disease     Familial hematuria     CARDIOVASCULAR SCREENING; LDL GOAL LESS THAN 160     Hypothyroidism     Mechanical low back pain      Keloid scar     Depression with anxiety     ASCUS of cervix with negative high risk HPV     Common wart     Hypothyroidism due to Hashimoto's thyroiditis     Past Surgical History:   Procedure Laterality Date     COLONOSCOPY N/A 12/16/2015    Procedure: COLONOSCOPY;  Surgeon: Duane, William Charles, MD;  Location: [facility] OR     COLONOSCOPY WITH CO2 INSUFFLATION N/A 12/16/2015    Procedure: COLONOSCOPY WITH CO2 INSUFFLATION;  Surgeon: Duane, William Charles, MD;  Location: [facility] OR     NO HISTORY OF SURGERY         Social History   Substance Use Topics     Smoking status: Never Smoker     Smokeless tobacco: Never Used     Alcohol use Yes      Comment: socially     Family History   Problem Relation Age of Onset     Depression Brother      depression     Hyperlipidemia Mother      C.A.D. No family hx of      DIABETES No family hx of      Breast Cancer No family hx of      Cancer - colorectal No family hx of      Anesthesia Reaction No family hx of      Blood Disease No family hx of          Current Outpatient Prescriptions   Medication Sig Dispense Refill     escitalopram (LEXAPRO) 20 MG tablet Take 1 tablet (20 mg) by mouth daily 90 tablet 0     buPROPion (WELLBUTRIN XL) 150 MG 24 hr tablet Take 1 tablet (150 mg) by mouth every morning 30 tablet 1     cyclobenzaprine (FLEXERIL) 10 MG tablet Take 0.5-1 tablets (5-10 mg) by mouth 3 times daily as needed for muscle spasms 30 tablet 1     ibuprofen (ADVIL/MOTRIN) 800 MG tablet Take 1 tablet (800 mg) by mouth every 8 hours as needed for moderate pain With food. Take for 3 days then as needed for pain. 60 tablet 1     norethindrone-ethinyl estradiol (ORTHO-NOVUM 1/35, 28,) 1-35 MG-MCG per tablet Take one active tablet a day for 21 days.  Discard the inactive pills and start new pack on day 22. 112 tablet 4     levothyroxine (SYNTHROID/LEVOTHROID) 100 MCG tablet Take 1 tablet (100 mcg) by mouth daily 90 tablet 4     [DISCONTINUED] escitalopram (LEXAPRO) 20 MG tablet TAKE ONE TABLET  BY MOUTH EVERY DAY 90 tablet 0     Allergies   Allergen Reactions     Nkda [No Known Drug Allergies]          ROS:  Constitutional, HEENT, cardiovascular, pulmonary, GI, , musculoskeletal, neuro, skin, endocrine and psych systems are negative, except as otherwise noted.      OBJECTIVE:   /86  Pulse 90  Temp 98.5  F (36.9  C) (Oral)  Wt 220 lb (99.8 kg)  SpO2 96%  Breastfeeding? No  BMI 33.45 kg/m2  Body mass index is 33.45 kg/(m^2).  GENERAL: healthy, alert and no distress  RESP: lungs clear to auscultation - no rales, rhonchi or wheezes  CV: regular rate and rhythm, normal S1 S2, no S3 or S4, no murmur, click or rub, no peripheral edema and peripheral pulses strong  MS: no gross musculoskeletal defects noted, no edema  Psych: normal affect    ASSESSMENT / PLAN:  (F41.8) Depression with anxiety  Comment: worsening, See patient instructions below for more plan.    Plan: escitalopram (LEXAPRO) 20 MG tablet, buPROPion         (WELLBUTRIN XL) 150 MG 24 hr tablet          Next step if this does not work well would be to taper her off these medications and try effexor    Patient Instructions   Add wellbutrin in the morning. Continue lexapro and seeing therapist  Re-check with me in 3-4 weeks.  If doing really well, this can be via phone or mychart.  Otherwise re-check in clinic.     Call with side effects or concerns.     Medication should start to work within 1-2 weeks but will not have full effect for about 6 weeks.     If medication makes you feel worse, stop right away and recheck with me.     If suicidal thoughts or plan occur, call 911 or go to emergency room.               Michelle Purcell PA-C  [facility]

## 2017-12-18 ENCOUNTER — OFFICE VISIT (OUTPATIENT)
Dept: FAMILY MEDICINE | Facility: CLINIC | Age: 27
End: 2017-12-18
Payer: COMMERCIAL

## 2017-12-18 VITALS
TEMPERATURE: 98.5 F | BODY MASS INDEX: 33.45 KG/M2 | OXYGEN SATURATION: 96 % | HEART RATE: 90 BPM | DIASTOLIC BLOOD PRESSURE: 86 MMHG | SYSTOLIC BLOOD PRESSURE: 133 MMHG | WEIGHT: 220 LBS

## 2017-12-18 DIAGNOSIS — F41.8 DEPRESSION WITH ANXIETY: ICD-10-CM

## 2017-12-18 PROCEDURE — 99214 OFFICE O/P EST MOD 30 MIN: CPT | Performed by: PHYSICIAN ASSISTANT

## 2017-12-18 RX ORDER — ESCITALOPRAM OXALATE 20 MG/1
20 TABLET ORAL DAILY
Qty: 90 TABLET | Refills: 0 | Status: SHIPPED | OUTPATIENT
Start: 2017-12-18 | End: 2018-03-22

## 2017-12-18 RX ORDER — BUPROPION HYDROCHLORIDE 150 MG/1
150 TABLET ORAL EVERY MORNING
Qty: 30 TABLET | Refills: 1 | Status: SHIPPED | OUTPATIENT
Start: 2017-12-18 | End: 2018-01-31

## 2017-12-18 NOTE — MR AVS SNAPSHOT
After Visit Summary   12/18/2017    Analia Lyons    MRN: [id number]           Patient Information     Date Of Birth          1990        Visit Information        Provider Department      12/18/2017 2:00 PM Michelle Purcell PA-C [facility]        Today's Diagnoses     Depression with anxiety          Care Instructions    Add wellbutrin in the morning. Continue lexapro and seeing therapist  Re-check with me in 3-4 weeks.  If doing really well, this can be via phone or mychart.  Otherwise re-check in clinic.     Call with side effects or concerns.     Medication should start to work within 1-2 weeks but will not have full effect for about 6 weeks.     If medication makes you feel worse, stop right away and recheck with me.     If suicidal thoughts or plan occur, call 911 or go to emergency room.                 Follow-ups after your visit        Who to contact     If you have questions or need follow up information about today's clinic visit or your schedule please contact [facility] directly at [phone].  Normal or non-critical lab and imaging results will be communicated to you by MyChart, letter or phone within 4 business days after the clinic has received the results. If you do not hear from us within 7 days, please contact the clinic through [employer] or phone. If you have a critical or abnormal lab result, we will notify you by phone as soon as possible.  Submit refill requests through [employer] or call your pharmacy and they will forward the refill request to us. Please allow 3 business days for your refill to be completed.          Additional Information About Your Visit        MyChart Information     [employer] gives you secure access to your electronic health record. If you see a primary care provider, you can also send messages to your care team and make appointments. If you have questions, please call your primary care clinic.  If you do not have a  primary care provider, please call [phone] and they will assist you.        Care EveryWhere ID     This is your Care EveryWhere ID. This could be used by other organizations to access your [facility] medical records  [id number]        Your Vitals Were     Pulse Temperature Pulse Oximetry Breastfeeding? BMI (Body Mass Index)       109 98.5  F (36.9  C) (Oral) 96% No 33.45 kg/m2        Blood Pressure from Last 3 Encounters:   12/18/17 133/86   06/26/17 127/87   03/23/17 112/72    Weight from Last 3 Encounters:   12/18/17 220 lb (99.8 kg)   06/26/17 211 lb (95.7 kg)   03/23/17 200 lb 11.2 oz (91 kg)              Today, you had the following     No orders found for display         Today's Medication Changes          These changes are accurate as of: 12/18/17  2:57 PM.  If you have any questions, ask your nurse or doctor.               Start taking these medicines.        Dose/Directions    buPROPion 150 MG 24 hr tablet   Commonly known as:  WELLBUTRIN XL   Used for:  Depression with anxiety   Started by:  Michelle Purcell PA-C        Dose:  150 mg   Take 1 tablet (150 mg) by mouth every morning   Quantity:  30 tablet   Refills:  1         These medicines have changed or have updated prescriptions.        Dose/Directions    escitalopram 20 MG tablet   Commonly known as:  LEXAPRO   This may have changed:  See the new instructions.   Used for:  Depression with anxiety   Changed by:  Michelle Purcell PA-C        Dose:  20 mg   Take 1 tablet (20 mg) by mouth daily   Quantity:  90 tablet   Refills:  0            Where to get your medicines      These medications were sent to [facility] [address], [facility] [address]  [address] [place] [address]     Phone:  [phone]     buPROPion 150 MG 24 hr tablet    escitalopram 20 MG tablet                Primary Care Provider Office Phone # Fax #    Michelle Purcell PA-C [phone]        [address] [facility] [address]        Equal Access to Services     RADHAJOHN ELISABETH : Hadii rossy ku hadmicky Soniyaali, waaxda luqadaha, qaybta kaalmada jacobtaisha, waxhenny marybeth leonciolesly garcia jefersonalexander nicole. So [facility] [phone].    ATENCIÓN: Si habla español, tiene a gates disposición servicios gratuitos de asistencia lingüística. [facility] [phone].    We comply with applicable federal civil rights laws and [place] laws. We do not discriminate on the basis of race, color, national origin, age, disability, sex, sexual orientation, or gender identity.            Thank you!     Thank you for choosing [facility]  for your care. Our goal is always to provide you with excellent care. Hearing back from our patients is one way we can continue to improve our services. Please take a few minutes to complete the written survey that you may receive in the mail after your visit with us. Thank you!             Your Updated Medication List - Protect others around you: Learn how to safely use, store and throw away your medicines at www.disposemymeds.org.          This list is accurate as of: 12/18/17  2:57 PM.  Always use your most recent med list.                   Brand Name Dispense Instructions for use Diagnosis    buPROPion 150 MG 24 hr tablet    WELLBUTRIN XL    30 tablet    Take 1 tablet (150 mg) by mouth every morning    Depression with anxiety       cyclobenzaprine 10 MG tablet    FLEXERIL    30 tablet    Take 0.5-1 tablets (5-10 mg) by mouth 3 times daily as needed for muscle spasms    Bilateral low back pain without sciatica, unspecified chronicity       escitalopram 20 MG tablet    LEXAPRO    90 tablet    Take 1 tablet (20 mg) by mouth daily    Depression with anxiety       ibuprofen 800 MG tablet    ADVIL/MOTRIN    60 tablet    Take 1 tablet (800 mg) by mouth every 8 hours as needed for moderate pain With food. Take for 3 days then as needed for pain.    Bilateral low back pain without sciatica,  unspecified chronicity       levothyroxine 100 MCG tablet    SYNTHROID/LEVOTHROID    90 tablet    Take 1 tablet (100 mcg) by mouth daily    Other specified hypothyroidism       norethindrone-ethinyl estradiol 1-35 MG-MCG per tablet    ORTHO-NOVUM 1/35 (28)    112 tablet    Take one active tablet a day for 21 days.  Discard the inactive pills and start new pack on day 22.    Encounter for surveillance of contraceptives

## 2017-12-18 NOTE — NURSING NOTE
"Chief Complaint   Patient presents with     Depression     depression med check and refill        Initial /86  Pulse 109  Temp 98.5  F (36.9  C) (Oral)  Wt 220 lb (99.8 kg)  SpO2 96%  Breastfeeding? No  BMI 33.45 kg/m2 Estimated body mass index is 33.45 kg/(m^2) as calculated from the following:    Height as of 6/26/17: 5' 8\" (1.727 m).    Weight as of this encounter: 220 lb (99.8 kg).  Medication Reconciliation: complete      Libby Souza MA    "

## 2017-12-18 NOTE — PATIENT INSTRUCTIONS
Add wellbutrin in the morning. Continue lexapro and seeing therapist  Re-check with me in 3-4 weeks.  If doing really well, this can be via phone or mychart.  Otherwise re-check in clinic.     Call with side effects or concerns.     Medication should start to work within 1-2 weeks but will not have full effect for about 6 weeks.     If medication makes you feel worse, stop right away and recheck with me.     If suicidal thoughts or plan occur, call 911 or go to emergency room.

## 2017-12-20 NOTE — PROGRESS NOTES
SUBJECTIVE:                                                    Analia Lyons is a 27 year old female who presents to clinic today for the following health issues:    Back Pain       Duration: x 2-3 weeks        Specific cause: none    Description:   Location of pain: low back bilateral  Character of pain: dull ache and constant  Pain radiation:radiates into the right buttocks and radiates into the right leg  New numbness or weakness in legs, not attributed to pain:  no     Intensity: mild    History:   Pain interferes with job: No  History of back problems: recurrent self limited episodes of low back pain in the past  Any previous MRI or X-rays: None  Sees a specialist for back pain:  No  Therapies tried without relief: Ibuprofen, cold and heat    Alleviating factors:   Improved by: none      Precipitating factors:  Worsened by: Bending, Standing, Sitting and Walking    Functional and Psychosocial Screen (Gregorio STarT Back):      Not performed today        Accompanying Signs & Symptoms:  Risk of Fracture:  None  Risk of Cauda Equina:  None  Risk of Infection:  None  Risk of Cancer:  None  Risk of Ankylosing Spondylitis:  Onset at age <35, male, AND morning back stiffness. no     Hurt after playing hockey on 12-4.  Was mild. Took ibuprofen got better. Hard to get up and down out of a chair.  No injury in hockey.  Flexeril did not help this time.  Has had back pain on off and for years.  Usually better about 2 weeks after the flare. Now its getting worse. Sometimes bilateral. Tingling in right gluteal. Does not radiate below knee. Has does physical therapy for her back.  Has been doing these stretches now but it is too painful.     No red flags.     ibuprofen helps a little bit.   Has been resting and using heat on back, not really helping.     Admits she does not eat very well lately. Has gained weight. Could be contributing. Discussed diet and gave handout on portions. Consider referral in future.     No urinary  symptoms.         Problem list and histories reviewed & adjusted, as indicated.  Additional history: as documented    Patient Active Problem List   Diagnosis     Hashimoto's disease     Familial hematuria     CARDIOVASCULAR SCREENING; LDL GOAL LESS THAN 160     Hypothyroidism     Mechanical low back pain     Keloid scar     Depression with anxiety     ASCUS of cervix with negative high risk HPV     Common wart     Hypothyroidism due to Hashimoto's thyroiditis     BMI 33.0-33.9,adult     Past Surgical History:   Procedure Laterality Date     COLONOSCOPY N/A 12/16/2015    Procedure: COLONOSCOPY;  Surgeon: Duane, William Charles, MD;  Location: [facility] OR     COLONOSCOPY WITH CO2 INSUFFLATION N/A 12/16/2015    Procedure: COLONOSCOPY WITH CO2 INSUFFLATION;  Surgeon: Duane, William Charles, MD;  Location: [facility] OR     NO HISTORY OF SURGERY         Social History   Substance Use Topics     Smoking status: Never Smoker     Smokeless tobacco: Never Used     Alcohol use Yes      Comment: socially     Family History   Problem Relation Age of Onset     Depression Brother      depression     Hyperlipidemia Mother      C.A.D. No family hx of      DIABETES No family hx of      Breast Cancer No family hx of      Cancer - colorectal No family hx of      Anesthesia Reaction No family hx of      Blood Disease No family hx of          Current Outpatient Prescriptions   Medication Sig Dispense Refill     HYDROcodone-acetaminophen (NORCO) 5-325 MG per tablet Take 1 tablet by mouth every 6 hours as needed for pain 10 tablet 0     cyclobenzaprine (FLEXERIL) 10 MG tablet Take 0.5-1 tablets (5-10 mg) by mouth 3 times daily as needed for muscle spasms 30 tablet 1     ibuprofen (ADVIL/MOTRIN) 800 MG tablet Take 1 tablet (800 mg) by mouth every 8 hours as needed for moderate pain With food. Take for 3 days then as needed for pain. 60 tablet 1     escitalopram (LEXAPRO) 20 MG tablet Take 1 tablet (20 mg) by mouth daily 90 tablet 0     buPROPion  (WELLBUTRIN XL) 150 MG 24 hr tablet Take 1 tablet (150 mg) by mouth every morning 30 tablet 1     norethindrone-ethinyl estradiol (ORTHO-NOVUM 1/35, 28,) 1-35 MG-MCG per tablet Take one active tablet a day for 21 days.  Discard the inactive pills and start new pack on day 22. 112 tablet 4     levothyroxine (SYNTHROID/LEVOTHROID) 100 MCG tablet Take 1 tablet (100 mcg) by mouth daily 90 tablet 4     Allergies   Allergen Reactions     Nkda [No Known Drug Allergies]          ROS:  Constitutional, HEENT, cardiovascular, pulmonary, GI, , musculoskeletal, neuro, skin, endocrine and psych systems are negative, except as otherwise noted.      OBJECTIVE:   /87  Pulse 106  Temp 97.9  F (36.6  C) (Oral)  Wt 220 lb (99.8 kg)  SpO2 97%  Breastfeeding? No  BMI 33.45 kg/m2  Body mass index is 33.45 kg/(m^2).  GENERAL:  No acute distress.  Interacts and answers questions appropriately.  Alert and oriented.  HEENT:   Oral mucosa moist.  Posterior pharynx non-erythematous.  NECK:  Soft and supple.  without tenderness.    Normal range of motion.    CARDIAC:   Regular rate and rhythm.  No murmurs, rubs, or gallops.   PULMONARY: Clear to auscultation bilaterally.  No  wheezes, crackles, or rhonchi.  Normal air exchange/breath sounds.   MUSCULOSKELETAL:  Low  over right paraspinous and right upper gluteal region.  Not tender over lumbar vertebrae.    No erythema, ecchymosis, edema, or warmth.  Range of motion normal not able to be assessed due to pain.   Hip, knee, and ankle strength 5/5 and equal bilaterally.  Distal sensation and pulses intact.   Straight leg raise negative.  NEUROLOGICAL:  Alert and oriented.  Gait normal.  Cranial nerves II-XII grossly intact.  Patellar reflexes 2+ and equal bilaterally.  PSYCH: Normal affect.  SKIN: No rashes.          ASSESSMENT/PLAN:     ASSESSMENT / PLAN:  (M54.41) Bilateral low back pain with right-sided sciatica, unspecified chronicity  (primary encounter  diagnosis)  Comment:   Plan: HYDROcodone-acetaminophen (NORCO) 5-325 MG per         tablet        -Narcotic medications can be addicting and therefore are used for short term pain control only.  They are not intended for long-term use.    -Take them only for severe pain as needed.    -Never mix with alcohol.    -Do not drive after taking this medication.    -No refills without an office visit. No replacements will be given.    -Keep these medications kept in a safe location.  You are responsible for them.  -Do not give them to anyone else.  They are prescribed to you only.       (M54.5) Bilateral low back pain without sciatica, unspecified chronicity  Comment:   Plan: cyclobenzaprine (FLEXERIL) 10 MG tablet,         ibuprofen (ADVIL/MOTRIN) 800 MG tablet, ORTHO          REFERRAL          Schedule with specialist  Discussed MRI, she prefers to meet with them first  She has already done physical therapy   To emergency room with severe worsening symptoms    (Z68.33) BMI 33.0-33.9,adult  Comment:   Plan: see hpi, discussed and gave handout on plate/portion control          Michelle Purcell PA-C  [facility] [place]

## 2017-12-21 ENCOUNTER — OFFICE VISIT (OUTPATIENT)
Dept: FAMILY MEDICINE | Facility: CLINIC | Age: 27
End: 2017-12-21
Payer: COMMERCIAL

## 2017-12-21 VITALS
WEIGHT: 220 LBS | DIASTOLIC BLOOD PRESSURE: 87 MMHG | BODY MASS INDEX: 33.45 KG/M2 | TEMPERATURE: 97.9 F | SYSTOLIC BLOOD PRESSURE: 125 MMHG | OXYGEN SATURATION: 97 % | HEART RATE: 106 BPM

## 2017-12-21 DIAGNOSIS — M54.50 BILATERAL LOW BACK PAIN WITHOUT SCIATICA, UNSPECIFIED CHRONICITY: ICD-10-CM

## 2017-12-21 DIAGNOSIS — M54.41 BILATERAL LOW BACK PAIN WITH RIGHT-SIDED SCIATICA, UNSPECIFIED CHRONICITY: Primary | ICD-10-CM

## 2017-12-21 PROCEDURE — 99214 OFFICE O/P EST MOD 30 MIN: CPT | Performed by: PHYSICIAN ASSISTANT

## 2017-12-21 RX ORDER — HYDROCODONE BITARTRATE AND ACETAMINOPHEN 5; 325 MG/1; MG/1
1 TABLET ORAL EVERY 6 HOURS PRN
Qty: 10 TABLET | Refills: 0 | Status: SHIPPED | OUTPATIENT
Start: 2017-12-21 | End: 2018-08-21

## 2017-12-21 RX ORDER — IBUPROFEN 800 MG/1
800 TABLET, FILM COATED ORAL EVERY 8 HOURS PRN
Qty: 60 TABLET | Refills: 1 | Status: SHIPPED | OUTPATIENT
Start: 2017-12-21 | End: 2018-03-08

## 2017-12-21 RX ORDER — CYCLOBENZAPRINE HCL 10 MG
5-10 TABLET ORAL 3 TIMES DAILY PRN
Qty: 30 TABLET | Refills: 1 | Status: SHIPPED | OUTPATIENT
Start: 2017-12-21 | End: 2018-08-21

## 2017-12-21 NOTE — NURSING NOTE
"Chief Complaint   Patient presents with     Back Pain     back pain per pt x 2-3 weeks now no known injury       Initial /87  Pulse 106  Temp 97.9  F (36.6  C) (Oral)  Wt 220 lb (99.8 kg)  SpO2 97%  Breastfeeding? No  BMI 33.45 kg/m2 Estimated body mass index is 33.45 kg/(m^2) as calculated from the following:    Height as of 6/26/17: 5' 8\" (1.727 m).    Weight as of this encounter: 220 lb (99.8 kg).  Medication Reconciliation: complete      Libby Souza MA    "

## 2017-12-21 NOTE — MR AVS SNAPSHOT
After Visit Summary   12/21/2017    Analia Lyons    MRN: [id number]           Patient Information     Date Of Birth          1990        Visit Information        Provider Department      12/21/2017 1:20 PM Michelle Purcell PA-C [facility]        Today's Diagnoses     Bilateral low back pain with right-sided sciatica, unspecified chronicity    -  1    Bilateral low back pain without sciatica, unspecified chronicity           Follow-ups after your visit        Additional Services     ORTHO  REFERRAL       [facility] is referring you to the Orthopedic  Services at [facility].       The  Representative will assist you in the coordination of your Orthopedic and Musculoskeletal Care as prescribed by your physician.    The  Representative will call you within 1 business day to help schedule your appointment, or you may contact the  Representative at:    All areas ~ [phone]     Type of Referral : Spine: Lumbar  **Choose Medical Spine Specialist (unless patient was seen by a Medical Spine Specialist within the past 6 months).**  Surgical Evaluation is advised if the patient presents with one or more of the following red flags: Evidence of Spinal Tumor, Infection or Fracture, Cauda Equina Syndrome, Sudden or Progressive Weakness, Loss of Bowel or Bladder Control, or any other documented emergent neurological condition resulting from a Lumbar Spinal Condition. Medical Spine Specialist        Timeframe requested: Routine    Coverage of these services is subject to the terms and limitations of your health insurance plan.  Please call member services at your health plan with any benefit or coverage questions.      If X-rays, CT or MRI's have been performed, please contact the facility where they were done to arrange for , prior to your scheduled appointment.  Please bring this referral  request to your appointment and present it to your specialist.                  Who to contact     If you have questions or need follow up information about today's clinic visit or your schedule please contact [facility] directly at [phone].  Normal or non-critical lab and imaging results will be communicated to you by MyChart, letter or phone within 4 business days after the clinic has received the results. If you do not hear from us within 7 days, please contact the clinic through [employer] or phone. If you have a critical or abnormal lab result, we will notify you by phone as soon as possible.  Submit refill requests through [employer] or call your pharmacy and they will forward the refill request to us. Please allow 3 business days for your refill to be completed.          Additional Information About Your Visit        [employer] Information     [employer] gives you secure access to your electronic health record. If you see a primary care provider, you can also send messages to your care team and make appointments. If you have questions, please call your primary care clinic.  If you do not have a primary care provider, please call [phone] and they will assist you.        Care EveryWhere ID     This is your Care EveryWhere ID. This could be used by other organizations to access your [facility] records  [id number]        Your Vitals Were     Pulse Temperature Pulse Oximetry Breastfeeding? BMI (Body Mass Index)       106 97.9  F (36.6  C) (Oral) 97% No 33.45 kg/m2        Blood Pressure from Last 3 Encounters:   12/21/17 125/87   12/18/17 133/86   06/26/17 127/87    Weight from Last 3 Encounters:   12/21/17 220 lb (99.8 kg)   12/18/17 220 lb (99.8 kg)   06/26/17 211 lb (95.7 kg)              We Performed the Following     ORTHO  REFERRAL          Today's Medication Changes          These changes are accurate as of: 12/21/17  1:56 PM.  If you have any questions, ask your nurse or doctor.                Start taking these medicines.        Dose/Directions    HYDROcodone-acetaminophen 5-325 MG per tablet   Commonly known as:  NORCO   Used for:  Bilateral low back pain with right-sided sciatica, unspecified chronicity   Started by:  Michelle Purcell PA-C        Dose:  1 tablet   Take 1 tablet by mouth every 6 hours as needed for pain   Quantity:  10 tablet   Refills:  0            Where to get your medicines      These medications were sent to [facility] [address] [facility], [address]  [address] [facility] [address], [facility] [address]     Phone:  [phone]     cyclobenzaprine 10 MG tablet    ibuprofen 800 MG tablet         Some of these will need a paper prescription and others can be bought over the counter.  Ask your nurse if you have questions.     Bring a paper prescription for each of these medications     HYDROcodone-acetaminophen 5-325 MG per tablet                Primary Care Provider Office Phone # Fax #    Michelle Purcell PA-C [phone] [address] [facility] [address]        Equal Access to Services     JOHN [facility] : Hadii rossy evans hadasho Soomaali, waaxda luqadaha, qaybta kaalmada adeegyada, waxay zackeryin hayteodoro patel . So [facility] [phone].    ATENCIÓN: Si habla español, tiene a gates disposición servicios gratuitos de asistencia lingüística. Llame al [phone].    We comply with applicable federal civil rights laws and [place] laws. We do not discriminate on the basis of race, color, national origin, age, disability, sex, sexual orientation, or gender identity.            Thank you!     Thank you for choosing [facility]  for your care. Our goal is always to provide you with excellent care. Hearing back from our patients is one way we can continue to improve our services. Please take a few minutes to complete the written survey that you may receive in the mail after your visit with us.  Thank you!             Your Updated Medication List - Protect others around you: Learn how to safely use, store and throw away your medicines at www.disposemymeds.org.          This list is accurate as of: 12/21/17  1:56 PM.  Always use your most recent med list.                   Brand Name Dispense Instructions for use Diagnosis    buPROPion 150 MG 24 hr tablet    WELLBUTRIN XL    30 tablet    Take 1 tablet (150 mg) by mouth every morning    Depression with anxiety       cyclobenzaprine 10 MG tablet    FLEXERIL    30 tablet    Take 0.5-1 tablets (5-10 mg) by mouth 3 times daily as needed for muscle spasms    Bilateral low back pain without sciatica, unspecified chronicity       escitalopram 20 MG tablet    LEXAPRO    90 tablet    Take 1 tablet (20 mg) by mouth daily    Depression with anxiety       HYDROcodone-acetaminophen 5-325 MG per tablet    NORCO    10 tablet    Take 1 tablet by mouth every 6 hours as needed for pain    Bilateral low back pain with right-sided sciatica, unspecified chronicity       ibuprofen 800 MG tablet    ADVIL/MOTRIN    60 tablet    Take 1 tablet (800 mg) by mouth every 8 hours as needed for moderate pain With food. Take for 3 days then as needed for pain.    Bilateral low back pain without sciatica, unspecified chronicity       levothyroxine 100 MCG tablet    SYNTHROID/LEVOTHROID    90 tablet    Take 1 tablet (100 mcg) by mouth daily    Other specified hypothyroidism       norethindrone-ethinyl estradiol 1-35 MG-MCG per tablet    ORTHO-NOVUM 1/35 (28)    112 tablet    Take one active tablet a day for 21 days.  Discard the inactive pills and start new pack on day 22.    Encounter for surveillance of contraceptives

## 2018-01-03 ENCOUNTER — TELEPHONE (OUTPATIENT)
Dept: ORTHOPEDICS | Facility: CLINIC | Age: 28
End: 2018-01-03

## 2018-01-03 NOTE — TELEPHONE ENCOUNTER
Called patient and left VM to call back. Per provider schedule change pt will need to reschedule appointment.   Left call back number to scheduling line.

## 2018-01-29 ENCOUNTER — OFFICE VISIT (OUTPATIENT)
Dept: ORTHOPEDICS | Facility: CLINIC | Age: 28
End: 2018-01-29
Payer: COMMERCIAL

## 2018-01-29 VITALS
BODY MASS INDEX: 33.97 KG/M2 | HEIGHT: 67 IN | DIASTOLIC BLOOD PRESSURE: 79 MMHG | WEIGHT: 216.4 LBS | SYSTOLIC BLOOD PRESSURE: 122 MMHG | HEART RATE: 93 BPM | OXYGEN SATURATION: 98 %

## 2018-01-29 DIAGNOSIS — M54.16 LUMBAR RADICULOPATHY: Primary | ICD-10-CM

## 2018-01-29 PROCEDURE — 99213 OFFICE O/P EST LOW 20 MIN: CPT | Performed by: FAMILY MEDICINE

## 2018-01-29 ASSESSMENT — PAIN SCALES - GENERAL: PAINLEVEL: NO PAIN (0)

## 2018-01-29 NOTE — PATIENT INSTRUCTIONS
Thanks for coming today.  Ortho/Sports Medicine Clinic  [address] [place] [address]    To schedule future appointments in Ortho Clinic, you may call [phone].    To schedule ordered imaging by your provider:   Call [facility] [phone]    To schedule an injection ordered by your provider:  Call [facility] Injection scheduling line: [phone]  [employer] available online at:  [email or web address]    Please call if any further questions or concerns ([phone]).  Clinic hours 8 am to 5 pm.    Return to clinic (call) if symptoms worsen or fail to improve.

## 2018-01-29 NOTE — NURSING NOTE
"Analia Lyons's goals for this visit include: Evaluate for Bilateral low back pain  She requests these members of her care team be copied on today's visit information: Yes    PCP: Michelle Purcell    Referring Provider:  No referring provider defined for this encounter.    Chief Complaint   Patient presents with     Consult     Bilateral low back pain with right-sided sciatica. Pt stated that the pain has been bathering her for last few months. Pt stated having a fall last year and landed in her back.       Initial /79  Pulse 93  Ht 1.702 m (5' 7\")  Wt 98.2 kg (216 lb 6.4 oz)  SpO2 98%  BMI 33.89 kg/m2 Estimated body mass index is 33.89 kg/(m^2) as calculated from the following:    Height as of this encounter: 1.702 m (5' 7\").    Weight as of this encounter: 98.2 kg (216 lb 6.4 oz).  Medication Reconciliation: complete  "

## 2018-01-29 NOTE — PROGRESS NOTES
CHIEF COMPLAINT:  Consult (Bilateral low back pain with right-sided sciatica. Pt stated that the pain has been bathering her for last few months.)       HISTORY OF PRESENT ILLNESS  Ms. Lyons is a pleasant 27 year old year old female who presents to clinic today with low back pain with sciatic features.  Analia is seen at the request of Dr. Purcell.    Analia has had low back pain for the past few weeks.  This progressed to tightness and the inability to get out of her chair without extreme difficulty.  She reports low back issues on and off for years, usually her pain would flare and subside.  Unfortunately this episode hasn't gone away.  Described as a dull ache, constant, radiating to both buttocks and down her right proximal leg posteriorly.    Analia does report a couple of falls in the past few years, one on a set of stairs in 2013, one in 2014 on a flat surface.    She's tried NSAIDS, heat, physical therapy in the past.      Additional history: as documented    MEDICAL HISTORY  Patient Active Problem List   Diagnosis     Hashimoto's disease     Familial hematuria     CARDIOVASCULAR SCREENING; LDL GOAL LESS THAN 160     Hypothyroidism     Mechanical low back pain     Keloid scar     Depression with anxiety     ASCUS of cervix with negative high risk HPV     Common wart     Hypothyroidism due to Hashimoto's thyroiditis     BMI 33.0-33.9,adult       Current Outpatient Prescriptions   Medication Sig Dispense Refill     HYDROcodone-acetaminophen (NORCO) 5-325 MG per tablet Take 1 tablet by mouth every 6 hours as needed for pain 10 tablet 0     cyclobenzaprine (FLEXERIL) 10 MG tablet Take 0.5-1 tablets (5-10 mg) by mouth 3 times daily as needed for muscle spasms 30 tablet 1     ibuprofen (ADVIL/MOTRIN) 800 MG tablet Take 1 tablet (800 mg) by mouth every 8 hours as needed for moderate pain With food. Take for 3 days then as needed for pain. 60 tablet 1     escitalopram (LEXAPRO) 20 MG tablet Take 1 tablet (20 mg) by  "mouth daily 90 tablet 0     buPROPion (WELLBUTRIN XL) 150 MG 24 hr tablet Take 1 tablet (150 mg) by mouth every morning 30 tablet 1     norethindrone-ethinyl estradiol (ORTHO-NOVUM 1/35, 28,) 1-35 MG-MCG per tablet Take one active tablet a day for 21 days.  Discard the inactive pills and start new pack on day 22. 112 tablet 4     levothyroxine (SYNTHROID/LEVOTHROID) 100 MCG tablet Take 1 tablet (100 mcg) by mouth daily 90 tablet 4       Allergies   Allergen Reactions     Nkda [No Known Drug Allergies]        Family History   Problem Relation Age of Onset     Depression Brother      depression     Hyperlipidemia Mother      C.A.D. No family hx of      DIABETES No family hx of      Breast Cancer No family hx of      Cancer - colorectal No family hx of      Anesthesia Reaction No family hx of      Blood Disease No family hx of        Additional medical/Social/Surgical histories reviewed in [facility] and updated as appropriate.     REVIEW OF SYSTEMS (1/29/2018)  CONSTITUTIONAL: Denies fever and weight loss  EYES: Denies acute vision changes  ENT: Denies hearing changes or difficulty swallowing  CARDIAC: Denies chest pain or edema  RESPIRATORY: Denies dyspnea, cough or wheeze  GASTROINTESTINAL: Denies abdominal pain, nausea, vomiting  MUSCULOSKELETAL: See HPI  SKIN: Denies any recent rash or lesion  NEUROLOGICAL: Denies numbness or focal weakness  PSYCHIATRIC: No history of psychiatric symptoms or problems  ENDOCRINE: Denies current diagnosis of diabetes  HEMATOLOGY: Denies episodes of easy bleeding      PHYSICAL EXAM  Vitals:    01/29/18 1313   BP: 122/79   Pulse: 93   SpO2: 98%   Weight: 98.2 kg (216 lb 6.4 oz)   Height: 1.702 m (5' 7\")     General  - normal appearance, in no obvious distress  CV  - normal peripheral perfusion  Pulm  - normal respiratory pattern, non-labored  Musculoskeletal - lumbar spine  - stance: normal gait without limp  - inspection: normal bone and joint alignment, no obvious scoliosis  - palpation: " no paravertebral or bony tenderness  - ROM: flexion and extension exacerbate pain  - strength: lower extremities 5/5 in all planes  - special tests:  (-) straight leg raise although causes back pain  Neuro  - patellar and Achilles DTRs 2+ bilaterally, grossly normal coordination, normal muscle tone  Skin  - no ecchymosis, erythema, warmth, or induration, no obvious rash  Psych  - interactive, appropriate, normal mood and affect           ASSESSMENT & PLAN  Ms. Lyons is a 27 year old year old female who presents to clinic today with low back pain with radicular features.    I reviewed her xray in the room with her:    IMPRESSION: Lumbar vertebrae appear normally aligned. Trace scoliotic  curvature of the lumbar spine, apex left, likely positional and not  present on previous exam. Alignment otherwise intact. No compression  deformity or acute fracture.    I do believe her symptoms are radicular in nature.    I'm glad she's somewhat improved.  For now I do think it's of best value to continue conservative care with muscle relaxers as needed and home exercise.  We discussed the utility of advanced imaging, which we could perform in the future if she worsens.    Thank you for allowing me to participate in Analia's care.    Stewart Vazquez DO, CAQSM  Primary Care Sports Medicine

## 2018-01-29 NOTE — MR AVS SNAPSHOT
After Visit Summary   2018    Analia Lyons    MRN: [id number]           Patient Information     Date Of Birth          1990        Visit Information        Provider Department      2018 1:00 PM Stewart Vazquez, DO [facility]        Today's Diagnoses     Lumbar radiculopathy    -  1      Care Instructions    Thanks for coming today.  Ortho/Sports Medicine Clinic  [address] [place] [address]    To schedule future appointments in Ortho Clinic, you may call [phone].    To schedule ordered imaging by your provider:   Call Central Imaging [phone]    To schedule an injection ordered by your provider:  Call Central Imaging Injection scheduling line: [phone]  Coshared available online at:  [email or web address]    Please call if any further questions or concerns ([phone]).  Clinic hours 8 am to 5 pm.    Return to clinic (call) if symptoms worsen or fail to improve.            Follow-ups after your visit        Who to contact     If you have questions or need follow up information about today's clinic visit or your schedule please contact [facility] directly at [phone].  Normal or non-critical lab and imaging results will be communicated to you by Trovhart, letter or phone within 4 business days after the clinic has received the results. If you do not hear from us within 7 days, please contact the clinic through Trovhart or phone. If you have a critical or abnormal lab result, we will notify you by phone as soon as possible.  Submit refill requests through Coshared or call your pharmacy and they will forward the refill request to us. Please allow 3 business days for your refill to be completed.          Additional Information About Your Visit        MyChart Information     Coshared gives you secure access to your electronic health record. If you see a primary care provider, you can also send messages to your  "care team and make appointments. If you have questions, please call your primary care clinic.  If you do not have a primary care provider, please call [phone] and they will assist you.      Omnidrone is an electronic gateway that provides easy, online access to your medical records. With Omnidrone, you can request a clinic appointment, read your test results, renew a prescription or communicate with your care team.     To access your existing account, please contact your [facility] or call [phone] for assistance.        Care EveryWhere ID     This is your Care EveryWhere ID. This could be used by other organizations to access your [facility] medical records  [id number]        Your Vitals Were     Pulse Height Pulse Oximetry BMI (Body Mass Index)          93 1.702 m (5' 7\") 98% 33.89 kg/m2         Blood Pressure from Last 3 Encounters:   01/29/18 122/79   12/21/17 125/87   12/18/17 133/86    Weight from Last 3 Encounters:   01/29/18 98.2 kg (216 lb 6.4 oz)   12/21/17 99.8 kg (220 lb)   12/18/17 99.8 kg (220 lb)              Today, you had the following     No orders found for display       Primary Care Provider Office Phone # Fax #    Michelle Purcell PA-C [phone] [address] [facility] [address]        Equal Access to Services     [facility] : Hadii aad ku hadasho Soomaali, waaxda luqadaha, qaybta kaalmada adeegyada, casimiro patel . So [facility] [phone].    ATENCIÓN: Si habla español, tiene a gates disposición servicios gratuitos de asistencia lingüística. Llame al [phone].    We comply with applicable federal civil rights laws and [place] laws. We do not discriminate on the basis of race, color, national origin, age, disability, sex, sexual orientation, or gender identity.            Thank you!     Thank you for choosing [facility]  for your care. Our goal is always to provide you with " excellent care. Hearing back from our patients is one way we can continue to improve our services. Please take a few minutes to complete the written survey that you may receive in the mail after your visit with us. Thank you!             Your Updated Medication List - Protect others around you: Learn how to safely use, store and throw away your medicines at www.disposemymeds.org.          This list is accurate as of 1/29/18  1:43 PM.  Always use your most recent med list.                   Brand Name Dispense Instructions for use Diagnosis    buPROPion 150 MG 24 hr tablet    WELLBUTRIN XL    30 tablet    Take 1 tablet (150 mg) by mouth every morning    Depression with anxiety       cyclobenzaprine 10 MG tablet    FLEXERIL    30 tablet    Take 0.5-1 tablets (5-10 mg) by mouth 3 times daily as needed for muscle spasms    Bilateral low back pain without sciatica, unspecified chronicity       escitalopram 20 MG tablet    LEXAPRO    90 tablet    Take 1 tablet (20 mg) by mouth daily    Depression with anxiety       HYDROcodone-acetaminophen 5-325 MG per tablet    NORCO    10 tablet    Take 1 tablet by mouth every 6 hours as needed for pain    Bilateral low back pain with right-sided sciatica, unspecified chronicity       ibuprofen 800 MG tablet    ADVIL/MOTRIN    60 tablet    Take 1 tablet (800 mg) by mouth every 8 hours as needed for moderate pain With food. Take for 3 days then as needed for pain.    Bilateral low back pain without sciatica, unspecified chronicity       levothyroxine 100 MCG tablet    SYNTHROID/LEVOTHROID    90 tablet    Take 1 tablet (100 mcg) by mouth daily    Other specified hypothyroidism       norethindrone-ethinyl estradiol 1-35 MG-MCG per tablet    ORTHO-NOVUM 1/35 (28)    112 tablet    Take one active tablet a day for 21 days.  Discard the inactive pills and start new pack on day 22.    Encounter for surveillance of contraceptives

## 2018-01-29 NOTE — LETTER
1/29/2018         RE: Analia Lyons  [address] [place]   [facility] [address]        Dear Colleague,    Thank you for referring your patient, Analia Lyons, to the [facility]. Please see a copy of my visit note below.    CHIEF COMPLAINT:  Consult (Bilateral low back pain with right-sided sciatica. Pt stated that the pain has been bathering her for last few months.)       HISTORY OF PRESENT ILLNESS  Ms. Lyons is a pleasant 27 year old year old female who presents to clinic today with low back pain with sciatic features.  Analia is seen at the request of Dr. Purcell.    Analia has had low back pain for the past few weeks.  This progressed to tightness and the inability to get out of her chair without extreme difficulty.  She reports low back issues on and off for years, usually her pain would flare and subside.  Unfortunately this episode hasn't gone away.  Described as a dull ache, constant, radiating to both buttocks and down her right proximal leg posteriorly.    Analia does report a couple of falls in the past few years, one on a set of stairs in 2013, one in 2014 on a flat surface.    She's tried NSAIDS, heat, physical therapy in the past.      Additional history: as documented    MEDICAL HISTORY  Patient Active Problem List   Diagnosis     Hashimoto's disease     Familial hematuria     CARDIOVASCULAR SCREENING; LDL GOAL LESS THAN 160     Hypothyroidism     Mechanical low back pain     Keloid scar     Depression with anxiety     ASCUS of cervix with negative high risk HPV     Common wart     Hypothyroidism due to Hashimoto's thyroiditis     BMI 33.0-33.9,adult       Current Outpatient Prescriptions   Medication Sig Dispense Refill     HYDROcodone-acetaminophen (NORCO) 5-325 MG per tablet Take 1 tablet by mouth every 6 hours as needed for pain 10 tablet 0     cyclobenzaprine (FLEXERIL) 10 MG tablet Take 0.5-1 tablets (5-10 mg) by mouth 3 times daily as needed for muscle spasms 30  "tablet 1     ibuprofen (ADVIL/MOTRIN) 800 MG tablet Take 1 tablet (800 mg) by mouth every 8 hours as needed for moderate pain With food. Take for 3 days then as needed for pain. 60 tablet 1     escitalopram (LEXAPRO) 20 MG tablet Take 1 tablet (20 mg) by mouth daily 90 tablet 0     buPROPion (WELLBUTRIN XL) 150 MG 24 hr tablet Take 1 tablet (150 mg) by mouth every morning 30 tablet 1     norethindrone-ethinyl estradiol (ORTHO-NOVUM 1/35, 28,) 1-35 MG-MCG per tablet Take one active tablet a day for 21 days.  Discard the inactive pills and start new pack on day 22. 112 tablet 4     levothyroxine (SYNTHROID/LEVOTHROID) 100 MCG tablet Take 1 tablet (100 mcg) by mouth daily 90 tablet 4       Allergies   Allergen Reactions     Nkda [No Known Drug Allergies]        Family History   Problem Relation Age of Onset     Depression Brother      depression     Hyperlipidemia Mother      C.A.D. No family hx of      DIABETES No family hx of      Breast Cancer No family hx of      Cancer - colorectal No family hx of      Anesthesia Reaction No family hx of      Blood Disease No family hx of        Additional medical/Social/Surgical histories reviewed in [facility] and updated as appropriate.     REVIEW OF SYSTEMS (1/29/2018)  CONSTITUTIONAL: Denies fever and weight loss  EYES: Denies acute vision changes  ENT: Denies hearing changes or difficulty swallowing  CARDIAC: Denies chest pain or edema  RESPIRATORY: Denies dyspnea, cough or wheeze  GASTROINTESTINAL: Denies abdominal pain, nausea, vomiting  MUSCULOSKELETAL: See HPI  SKIN: Denies any recent rash or lesion  NEUROLOGICAL: Denies numbness or focal weakness  PSYCHIATRIC: No history of psychiatric symptoms or problems  ENDOCRINE: Denies current diagnosis of diabetes  HEMATOLOGY: Denies episodes of easy bleeding      PHYSICAL EXAM  Vitals:    01/29/18 1313   BP: 122/79   Pulse: 93   SpO2: 98%   Weight: 98.2 kg (216 lb 6.4 oz)   Height: 1.702 m (5' 7\")     General  - normal appearance, in " no obvious distress  CV  - normal peripheral perfusion  Pulm  - normal respiratory pattern, non-labored  Musculoskeletal - lumbar spine  - stance: normal gait without limp  - inspection: normal bone and joint alignment, no obvious scoliosis  - palpation: no paravertebral or bony tenderness  - ROM: flexion and extension exacerbate pain  - strength: lower extremities 5/5 in all planes  - special tests:  (-) straight leg raise although causes back pain  Neuro  - patellar and Achilles DTRs 2+ bilaterally, grossly normal coordination, normal muscle tone  Skin  - no ecchymosis, erythema, warmth, or induration, no obvious rash  Psych  - interactive, appropriate, normal mood and affect           ASSESSMENT & PLAN  Ms. Lyons is a 27 year old year old female who presents to clinic today with low back pain with radicular features.    I reviewed her xray in the room with her:    IMPRESSION: Lumbar vertebrae appear normally aligned. Trace scoliotic  curvature of the lumbar spine, apex left, likely positional and not  present on previous exam. Alignment otherwise intact. No compression  deformity or acute fracture.    I do believe her symptoms are radicular in nature.    I'm glad she's somewhat improved.  For now I do think it's of best value to continue conservative care with muscle relaxers as needed and home exercise.  We discussed the utility of advanced imaging, which we could perform in the future if she worsens.    Thank you for allowing me to participate in Analia's care.    Stewart Vazquez DO, [facility]  Primary Care Sports Medicine           Again, thank you for allowing me to participate in the care of your patient.        Sincerely,        Stewart Vazquez DO

## 2018-02-19 ENCOUNTER — VIRTUAL VISIT (OUTPATIENT)
Dept: FAMILY MEDICINE | Facility: OTHER | Age: 28
End: 2018-02-19

## 2018-02-19 NOTE — PROGRESS NOTES
"Date:   Clinician: Vira Luis  Clinician NPI: [id number]  Patient: Analia Lyons  Patient : 1990  Patient Address: [facility] [address], [place] [address]  Patient Phone: [phone]  Visit Protocol: UTI  Patient Summary:  Analia is a 27 year old ( : 1990 ) female who initiated a Visit for a presumed bladder infection. When asked the question \"Please sign me up to receive news, health information and promotions. \", Patient responded \"No\".    Her symptoms began yesterday and consist of urgency, foul smelling urine, urinary frequency, dysuria, and urinary incontinence.   Symptom Details   Urinary Frequency: Several times each hour    She denies hesitation, loss of appetite, chills, vaginal discharge, flank pain, feeling feverish, nausea, recent antibiotic use, abdominal pain, vomiting, and hematuria. Analia has never had kidney stones. She has not been hospitalized, been a patient in a nursing home, or had a catheter in the past two weeks. She denies risk factors for sexually transmitted infections.   Analia has not had any UTIs in the past 12 months. Her current symptoms are similar to the previous UTI symptoms. She took an antibiotic for her last infection but does not remember which one.    Analia typically gets yeast infections when she takes antibiotics.  She denies pregnancy and denies breastfeeding. She has menstruated in the past month.   She does NOT smoke or use smokeless tobacco.  MEDICATIONS:  No current medications , ALLERGIES:  NKDA   Clinician Response:  Dear Analia,  Based on the information you have provided, you likely have a bladder infection, also called an acute urinary tract infection (UTI).   To treat your infection, I am prescribing:   Bactrim DS. Swallow one (1) tablet twice a day for 3 days to treat your bladder infection. Continue taking the tablets even if you feel better before all the medication is gone. There is no refill with this prescription.   Some people " develop allergies to antibiotics. If you notice a new rash, significant swelling, or difficulty breathing, stop the medication immediately and go into a clinic for physical evaluation.   To help treat your current UTI and prevent future occurrences, remember to:     Drink 8-10, 8-ounce glasses of water daily.    Urinate after sexual intercourse.    Wipe front to back after using the bathroom.     Some women may develop a yeast infection as a side effect of taking antibiotics. Because you noted that you typically get yeast infections when you are taking antibiotics, I am also prescribing:   Fluconazole (Diflucan) 150 mg oral tablet. Take this medication only if you notice symptoms of a yeast infection (vaginal discharge that is white, thick, and odorless). There are no refills with this prescription.   You should visit a clinic for a follow-up visit if your symptoms do not improve in 1-2 days or if you experience another urinary tract infection soon after completing this treatment.  If you become pregnant during this course of treatment, stop taking the medication and contact your primary care provider.   Diagnosis: Acute Uncomplicated Bladder Infection  Diagnosis ICD: N39.0  Prescription: sulfamethoxazole-TMP DS (Bactrim DS) 800-160mg oral tablet 6 tablets, 3 days supply. Take one tablet by mouth two times a day for 3 days. Refills: 0, Refill as needed: no, Allow substitutions: yes  Prescription: fluconazole (Diflucan) 150mg oral tablet 1 tablet, 1 days supply. Take one tablet by mouth one time a day for 1 day. Refills: 0, Refill as needed: no, Allow substitutions: yes  Pharmacy: [id number] - [phone] - [address] [place] [address]

## 2018-02-20 ENCOUNTER — VIRTUAL VISIT (OUTPATIENT)
Dept: FAMILY MEDICINE | Facility: OTHER | Age: 28
End: 2018-02-20

## 2018-02-21 NOTE — PROGRESS NOTES
"Date:   Clinician: Krystal Simpson  Clinician NPI: [id number]  Patient: Analia Lyons  Patient : 1990  Patient Address: [address] [place], [place] [address], [place] [address]  Patient Phone: [phone]  Visit Protocol: UTI  Patient Summary:  Analia is a 27 year old ( : 1990 ) female who initiated a Visit for a presumed bladder infection. When asked the question \"Please sign me up to receive news, health information and promotions. \", Analia responded \"No\".    Her symptoms began yesterday and consist of hesitation, urgency, foul smelling urine, urinary frequency, dysuria, and urinary incontinence.   Symptom Details   Urinary Frequency: Several times each hour    She denies loss of appetite, chills, vaginal discharge, flank pain, feeling feverish, nausea, recent antibiotic use, abdominal pain, vomiting, and hematuria. Analia has never had kidney stones. She has not been hospitalized, been a patient in a nursing home, or had a catheter in the past two weeks. She denies risk factors for sexually transmitted infections.   Analia has had one (1) UTI in the past 12 months. Her most recent bladder infection was not within the last 4 weeks. Her current symptoms are similar to the previous UTI symptoms. She took TMP/Sulfa for her last infection and found it to NOT be effective. Antibiotics were not effective for her last UTI.    Analia typically gets yeast infections when she takes antibiotics.  She denies pregnancy and denies breastfeeding. She has menstruated in the past month.   She does NOT smoke or use smokeless tobacco.   Additional information as reported by the patient (free text): Bactrim does not work for my bladder infections.   MEDICATIONS:  No current medications , ALLERGIES:  NKDA   Clinician Response:  Dear Analia,  Based on the information you have provided, you likely have a bladder infection, also called an acute urinary tract infection (UTI).   To treat your infection, I am prescribing:   Nitrofurantoin " (Macrobid). Swallow one (1) tablet twice a day for 5 days. Take the tablet with food. Continue taking the tablets even if you feel better before all the medication is gone. There is no refill with this prescription.   Antibiotic selections by the provider are based on safety and effectiveness. You may or may not be prescribed the same medication that you took for your last bladder infection.   Some people develop allergies to antibiotics. If you notice a new rash, significant swelling, or difficulty breathing, stop the medication immediately and go into a clinic for physical evaluation.   To help treat your current UTI and prevent future occurrences, remember to:     Drink 8-10, 8-ounce glasses of water daily.    Urinate after sexual intercourse.    Wipe front to back after using the bathroom.     Some women may develop a yeast infection as a side effect of taking antibiotics. Because you noted that you typically get yeast infections when you are taking antibiotics, I am also prescribing:   Fluconazole (Diflucan) 150 mg oral tablet. Take this medication only if you notice symptoms of a yeast infection (vaginal discharge that is white, thick, and odorless). There are no refills with this prescription.   You should visit a clinic for a follow-up visit if your symptoms do not improve in 1-2 days or if you experience another urinary tract infection soon after completing this treatment.  If you become pregnant during this course of treatment, stop taking the medication and contact your primary care provider.   Diagnosis: Acute Uncomplicated Bladder Infection  Diagnosis ICD: N39.0  Prescription: nitrofurantoin (Macrobid) 100mg oral tablet 10 tablets, 5 days supply. Take one tablet by mouth two times a day for 5 days. Refills: 0, Refill as needed: no, Allow substitutions: yes  Prescription: fluconazole (Diflucan) 150mg oral tablet 1 tablet, 1 days supply. Take one tablet by mouth one time a day for 1 day. Refills: 0,  Refill as needed: no, Allow substitutions: yes  Pharmacy: [id number] - [phone] - [address] [place] [address]

## 2018-03-08 ENCOUNTER — OFFICE VISIT (OUTPATIENT)
Dept: OBGYN | Facility: CLINIC | Age: 28
End: 2018-03-08
Payer: COMMERCIAL

## 2018-03-08 VITALS
OXYGEN SATURATION: 98 % | WEIGHT: 217.6 LBS | HEART RATE: 97 BPM | DIASTOLIC BLOOD PRESSURE: 75 MMHG | SYSTOLIC BLOOD PRESSURE: 120 MMHG | HEIGHT: 68 IN | BODY MASS INDEX: 32.98 KG/M2

## 2018-03-08 DIAGNOSIS — Z13.1 SCREENING FOR DIABETES MELLITUS: ICD-10-CM

## 2018-03-08 DIAGNOSIS — R87.610 PAPANICOLAOU SMEAR OF CERVIX WITH ATYPICAL SQUAMOUS CELLS OF UNDETERMINED SIGNIFICANCE (ASC-US): Primary | ICD-10-CM

## 2018-03-08 DIAGNOSIS — E06.3 HASHIMOTO'S DISEASE: ICD-10-CM

## 2018-03-08 DIAGNOSIS — Z30.41 ENCOUNTER FOR SURVEILLANCE OF CONTRACEPTIVE PILLS: ICD-10-CM

## 2018-03-08 DIAGNOSIS — Z13.220 SCREENING CHOLESTEROL LEVEL: ICD-10-CM

## 2018-03-08 PROCEDURE — 99395 PREV VISIT EST AGE 18-39: CPT | Performed by: OBSTETRICS & GYNECOLOGY

## 2018-03-08 PROCEDURE — 87624 HPV HI-RISK TYP POOLED RSLT: CPT | Performed by: OBSTETRICS & GYNECOLOGY

## 2018-03-08 PROCEDURE — 88175 CYTOPATH C/V AUTO FLUID REDO: CPT | Performed by: OBSTETRICS & GYNECOLOGY

## 2018-03-08 PROCEDURE — 88141 CYTOPATH C/V INTERPRET: CPT | Performed by: OBSTETRICS & GYNECOLOGY

## 2018-03-08 NOTE — PROGRESS NOTES
Analia Lyons is a 27 year old female , who presents for annual exam.   No unusual bleeding, no incontinence, or unusual discharge.   She is currently using OCPs for contraception.  She does not have any apparent contraindications to use.  She has not had any apparent complications with it's use.  Last cholesterol:   Recent Labs   Lab Test  11   1010   CHOL  180   HDL  35*   LDL  116   TRIG  144   CHOLHDLRATIO  5.1*     Past Medical History:   Diagnosis Date     Anxiety      ASCUS of cervix with negative high risk HPV 17    ASCUS/neg HR HPV.     Depression      H/O colposcopy with cervical biopsy 2017    Bx & ECC - negative     Headache(784.0)      Hypothyroidism      Papanicolaou smear of cervix with atypical squamous cells of undetermined significance (ASC-US) 2015       Past Surgical History:   Procedure Laterality Date     COLONOSCOPY N/A 2015    Procedure: COLONOSCOPY;  Surgeon: Duane, William Charles, MD;  Location: [facility] OR     COLONOSCOPY WITH CO2 INSUFFLATION N/A 2015    Procedure: COLONOSCOPY WITH CO2 INSUFFLATION;  Surgeon: Duane, William Charles, MD;  Location: [facility] OR     NO HISTORY OF SURGERY         Obstetric History       T0      L0     SAB0   TAB0   Ectopic0   Multiple0   Live Births0           Gyn History:  Gynecological History         No LMP recorded. Patient is not currently having periods (Reason: Birth Control).     no STD /no PID/no IUD      history of abnormal pap smear:  no  Last pap:   Lab Results   Component Value Date    PAP ASC-US 2017           Current Outpatient Prescriptions   Medication Sig Dispense Refill     norethindrone-ethinyl estradiol (ORTHO-NOVUM , ,) 1-35 MG-MCG per tablet Take one active tablet a day for 21 days.  Discard the inactive pills and start new pack on day 22. 112 tablet 4     buPROPion (WELLBUTRIN XL) 150 MG 24 hr tablet Take 1 tablet (150 mg) by mouth every morning 90 tablet 0      "HYDROcodone-acetaminophen (NORCO) 5-325 MG per tablet Take 1 tablet by mouth every 6 hours as needed for pain 10 tablet 0     cyclobenzaprine (FLEXERIL) 10 MG tablet Take 0.5-1 tablets (5-10 mg) by mouth 3 times daily as needed for muscle spasms 30 tablet 1     escitalopram (LEXAPRO) 20 MG tablet Take 1 tablet (20 mg) by mouth daily 90 tablet 0     levothyroxine (SYNTHROID/LEVOTHROID) 100 MCG tablet Take 1 tablet (100 mcg) by mouth daily 90 tablet 4     [DISCONTINUED] norethindrone-ethinyl estradiol (ORTHO-NOVUM 1/35, 28,) 1-35 MG-MCG per tablet Take one active tablet a day for 21 days.  Discard the inactive pills and start new pack on day 22. 112 tablet 4       Allergies   Allergen Reactions     Nkda [No Known Drug Allergies]        Social History     Social History     Marital status: Single     Spouse name: N/A     Number of children: 0     Years of education: 16     Occupational History           [employer]     Social History Main Topics     Smoking status: Never Smoker     Smokeless tobacco: Never Used     Alcohol use Yes      Comment: socially     Drug use: No     Sexual activity: Yes     Partners: Male     Birth control/ protection: Pill     Other Topics Concern     Not on file     Social History Narrative    Parents declaring bankruptcy; losing house due to mother's credit card debt.        Brother deployed to [place] for 16 months startin 4/1/09        Parents  but ''.       Family History   Problem Relation Age of Onset     Depression Brother      depression     Hyperlipidemia Mother      C.A.D. No family hx of      DIABETES No family hx of      Breast Cancer No family hx of      Cancer - colorectal No family hx of      Anesthesia Reaction No family hx of      Blood Disease No family hx of          ROS:  All negative except as above.      EXAM:  /75 (BP Location: Right arm, Cuff Size: Adult Regular)  Pulse 97  Ht 5' 7.65\" (1.718 m)  Wt 217 lb 9.6 oz (98.7 kg)  SpO2 98% "  BMI 33.43 kg/m2  General:  WNWD female, NAD  Alert  Oriented x 3  Gait:  Normal  Skin:  Normal skin turgor  Neurologic:  CN grossly intact, good sensation.    HEENT:  NC/AT, EOMI  Neck:  No masses palpated, symmetrical, carotids +2/4, no bruits heard  Heart:  RRR  Lungs:  Clear   Breasts:  Symmetrical, no dimpling noted, no masses palpated, no discharge expressed  Abdomen:  Non-tender, non-distended.  Vulva: No external lesions, normal hair distribution, no adenopathy  BUS:  Normal, no masses noted  Urethra:  No hypermobility noted  Urethral meatus:  No masses noted  Vagina: Moist, pink, no abnormal discharge, well rugated, no lesions  Cervix: Smooth, pink, no visible lesions  Uterus: Normal size, anteverted, non-tender, mobile  Ovaries: No mass, non-tender, mobile  Perianal:  No masses noted.    Extremities:  No clubbing, cyanosis, or edema      ASSESSMENT/PLAN   Annual examination with pap smear  OCP prescription sent to pharmacy  Fasting labs advised.   Low fat diet, weight bearing exercises and self breast exams on a monthly basis have been recommended.  I have discussed with patient the risks, benefits, medications, treatment options and modalities.   I have instructed the patient to call or schedule a follow-up appointment if any problems.

## 2018-03-08 NOTE — NURSING NOTE
"Chief Complaint   Patient presents with     Physical     Annual Female       Initial /75 (BP Location: Right arm, Cuff Size: Adult Regular)  Pulse 97  Ht 5' 7.65\" (1.718 m)  Wt 217 lb 9.6 oz (98.7 kg)  SpO2 98%  BMI 33.43 kg/m2 Estimated body mass index is 33.43 kg/(m^2) as calculated from the following:    Height as of this encounter: 5' 7.65\" (1.718 m).    Weight as of this encounter: 217 lb 9.6 oz (98.7 kg).  Medication Reconciliation: complete   BHARAT Colin 3/8/2018         "

## 2018-03-08 NOTE — MR AVS SNAPSHOT
After Visit Summary   3/8/2018    Analia Lyons    MRN: [id number]           Patient Information     Date Of Birth          1990        Visit Information        Provider Department      3/8/2018 2:15 PM Rupesh Winslow MD [facility]        Today's Diagnoses     Papanicolaou smear of cervix with atypical squamous cells of undetermined significance (ASC-US)    -  1    Encounter for surveillance of contraceptive pills        Screening cholesterol level        Screening for diabetes mellitus        Hashimoto's disease           Follow-ups after your visit        Follow-up notes from your care team     Return in about 1 year (around 3/8/2019) for Physical Exam.      Future tests that were ordered for you today     Open Future Orders        Priority Expected Expires Ordered    Lipid panel reflex to direct LDL Fasting Routine  3/8/2019 3/8/2018    Glucose Routine  9/4/2018 3/8/2018    TSH with free T4 reflex Routine  6/6/2018 3/8/2018            Who to contact     If you have questions or need follow up information about today's clinic visit or your schedule please contact [facility] directly at [phone].  Normal or non-critical lab and imaging results will be communicated to you by [email or web address], letter or phone within 4 business days after the clinic has received the results. If you do not hear from us within 7 days, please contact the clinic through pushdt or phone. If you have a critical or abnormal lab result, we will notify you by phone as soon as possible.  Submit refill requests through [facility] or call your pharmacy and they will forward the refill request to us. Please allow 3 business days for your refill to be completed.          Additional Information About Your Visit        MyChart Information     [facility] gives you secure access to your electronic health record. If you see a primary care provider, you can also send messages to your care team and  "make appointments. If you have questions, please call your primary care clinic.  If you do not have a primary care provider, please call [phone] and they will assist you.        Care EveryWhere ID     This is your Care EveryWhere ID. This could be used by other organizations to access your [facility] medical records  [id number]        Your Vitals Were     Pulse Height Pulse Oximetry BMI (Body Mass Index)          97 5' 7.65\" (1.718 m) 98% 33.43 kg/m2         Blood Pressure from Last 3 Encounters:   03/08/18 120/75   01/29/18 122/79   12/21/17 125/87    Weight from Last 3 Encounters:   03/08/18 217 lb 9.6 oz (98.7 kg)   01/29/18 216 lb 6.4 oz (98.2 kg)   12/21/17 220 lb (99.8 kg)              We Performed the Following     HPV High Risk Types DNA Cervical     Pap imaged thin layer diagnostic with HPV (select HPV order below)          Where to get your medicines      These medications were sent to [id number] - [facility] [address]  [address] [facility] [address]     Phone:  [phone]     norethindrone-ethinyl estradiol 1-35 MG-MCG per tablet          Primary Care Provider Office Phone # Fax #    Michelle Purcell PA-C [phone] [address] [place] [facility] [address]        Equal Access to Services     MEME BARBER AH: Hadshameka Chávez, waaxda lulloyd, qaybta kaalgerard arrieta, casimior nicole. So [facility] [phone].    ATENCIÓN: Si habla kavithaañol, tiene a gates disposición servicios gratuitos de asistencia lingüística. Llame al [phone].    We comply with applicable federal civil rights laws and [place] laws. We do not discriminate on the basis of race, color, national origin, age, disability, sex, sexual orientation, or gender identity.            Thank you!     Thank you for choosing [facility]  for your care. Our goal is always to provide you with excellent care. Hearing back from our patients " is one way we can continue to improve our services. Please take a few minutes to complete the written survey that you may receive in the mail after your visit with us. Thank you!             Your Updated Medication List - Protect others around you: Learn how to safely use, store and throw away your medicines at www.disposemymeds.org.          This list is accurate as of 3/8/18  5:40 PM.  Always use your most recent med list.                   Brand Name Dispense Instructions for use Diagnosis    buPROPion 150 MG 24 hr tablet    WELLBUTRIN XL    90 tablet    Take 1 tablet (150 mg) by mouth every morning    Depression with anxiety       cyclobenzaprine 10 MG tablet    FLEXERIL    30 tablet    Take 0.5-1 tablets (5-10 mg) by mouth 3 times daily as needed for muscle spasms    Bilateral low back pain without sciatica, unspecified chronicity       escitalopram 20 MG tablet    LEXAPRO    90 tablet    Take 1 tablet (20 mg) by mouth daily    Depression with anxiety       HYDROcodone-acetaminophen 5-325 MG per tablet    NORCO    10 tablet    Take 1 tablet by mouth every 6 hours as needed for pain    Bilateral low back pain with right-sided sciatica, unspecified chronicity       levothyroxine 100 MCG tablet    SYNTHROID/LEVOTHROID    90 tablet    Take 1 tablet (100 mcg) by mouth daily    Other specified hypothyroidism       norethindrone-ethinyl estradiol 1-35 MG-MCG per tablet    ORTHO-NOVUM 1/35 (28)    112 tablet    Take one active tablet a day for 21 days.  Discard the inactive pills and start new pack on day 22.    Encounter for surveillance of contraceptive pills

## 2018-03-13 LAB
COPATH REPORT: ABNORMAL
PAP: ABNORMAL

## 2018-03-14 LAB
FINAL DIAGNOSIS: NORMAL
HPV HR 12 DNA CVX QL NAA+PROBE: NEGATIVE
HPV16 DNA SPEC QL NAA+PROBE: NEGATIVE
HPV18 DNA SPEC QL NAA+PROBE: NEGATIVE
SPECIMEN DESCRIPTION: NORMAL
SPECIMEN SOURCE CVX/VAG CYTO: NORMAL

## 2018-03-19 NOTE — PROGRESS NOTES
SUBJECTIVE:                                                    Analia Lyons is a 27 year old female who presents to clinic today for the following health issues:    Anxiety Follow-Up    Status since last visit: Improved     Other associated symptoms:None    Complicating factors:   Significant life event: No   Current substance abuse: None  Depression symptoms: No  MAXIM-7 SCORE 3/23/2016 10/20/2016 6/26/2017   Total Score - - -   Total Score - 9 (mild anxiety) -   Total Score 6 - 13         Likes wellbutrin. That was added at last appointment.  Still on lexapro also.   Doing well. Denies suicidal or homicidal thoughts.  Patient instructed to go to the emergency room or call 911 if these occur.      MAXIM-7    Hypothyroidism Follow-up      Since last visit, patient describes the following symptoms: weight gain, fatigue and hair loss      Amount of exercise or physical activity: 4-5 days/week for an average of 30-45 minutes    Problems taking medications regularly: No    Medication side effects: none    Diet: regular (no restrictions)        Hypothyroidism:   Just had labs through OBGYN yesterday.   TSH very elevated, T4 normal.  Has been feeling more tired, mood has been more down.  Having some hair loss also.   Denies suicidal or homicidal thoughts.  Patient instructed to go to the emergency room or call 911 if these occur.    Had TGs over 400 and HDL low also on labs. We discussed this today.  Also see plan.     No abdominal pain.     Problem list and histories reviewed & adjusted, as indicated.  Additional history: as documented    Patient Active Problem List   Diagnosis     Hashimoto's disease     Familial hematuria     CARDIOVASCULAR SCREENING; LDL GOAL LESS THAN 160     Hypothyroidism     Mechanical low back pain     Keloid scar     Depression with anxiety     ASCUS of cervix with negative high risk HPV     Common wart     Hypothyroidism due to Hashimoto's thyroiditis     BMI 33.0-33.9,adult     Past Surgical  History:   Procedure Laterality Date     COLONOSCOPY N/A 12/16/2015    Procedure: COLONOSCOPY;  Surgeon: Duane, William Charles, MD;  Location: [facility] OR     COLONOSCOPY WITH CO2 INSUFFLATION N/A 12/16/2015    Procedure: COLONOSCOPY WITH CO2 INSUFFLATION;  Surgeon: Duane, William Charles, MD;  Location: [facility] OR     NO HISTORY OF SURGERY         Social History   Substance Use Topics     Smoking status: Never Smoker     Smokeless tobacco: Never Used     Alcohol use Yes      Comment: socially     Family History   Problem Relation Age of Onset     Depression Brother      depression     Hyperlipidemia Mother      C.A.D. No family hx of      DIABETES No family hx of      Breast Cancer No family hx of      Cancer - colorectal No family hx of      Anesthesia Reaction No family hx of      Blood Disease No family hx of          Current Outpatient Prescriptions   Medication Sig Dispense Refill     escitalopram (LEXAPRO) 20 MG tablet Take 1 tablet (20 mg) by mouth daily 90 tablet 1     buPROPion (WELLBUTRIN XL) 150 MG 24 hr tablet Take 1 tablet (150 mg) by mouth every morning 90 tablet 1     levothyroxine (SYNTHROID/LEVOTHROID) 112 MCG tablet Take 1 tablet (112 mcg) by mouth daily Note increase in dose 90 tablet 1     norethindrone-ethinyl estradiol (ORTHO-NOVUM 1/35, 28,) 1-35 MG-MCG per tablet Take one active tablet a day for 21 days.  Discard the inactive pills and start new pack on day 22. 112 tablet 4     HYDROcodone-acetaminophen (NORCO) 5-325 MG per tablet Take 1 tablet by mouth every 6 hours as needed for pain 10 tablet 0     cyclobenzaprine (FLEXERIL) 10 MG tablet Take 0.5-1 tablets (5-10 mg) by mouth 3 times daily as needed for muscle spasms 30 tablet 1     [DISCONTINUED] escitalopram (LEXAPRO) 20 MG tablet Take 1 tablet (20 mg) by mouth daily 90 tablet 0     [DISCONTINUED] buPROPion (WELLBUTRIN XL) 150 MG 24 hr tablet Take 1 tablet (150 mg) by mouth every morning 90 tablet 0     [DISCONTINUED] levothyroxine  (SYNTHROID/LEVOTHROID) 100 MCG tablet Take 1 tablet (100 mcg) by mouth daily 90 tablet 4     Allergies   Allergen Reactions     Nkda [No Known Drug Allergies]        ROS:  Constitutional, HEENT, cardiovascular, pulmonary, GI, , musculoskeletal, neuro, skin, endocrine and psych systems are negative, except as otherwise noted.    OBJECTIVE:     /88  Pulse 114  Temp 97.8  F (36.6  C) (Oral)  Wt 216 lb (98 kg)  SpO2 99%  Breastfeeding? No  BMI 33.18 kg/m2  Body mass index is 33.18 kg/(m^2).  GENERAL: healthy, alert and no distress  NECK: no adenopathy, no asymmetry, masses, or scars and thyroid normal to palpation  RESP: lungs clear to auscultation - no rales, rhonchi or wheezes  CV: regular rate and rhythm, normal S1 S2, no S3 or S4, no murmur, click or rub, no peripheral edema and peripheral pulses strong  MS: no gross musculoskeletal defects noted, no edema  NEURO: Normal strength and tone, mentation intact and speech normal  BACK: no CVA tenderness, no paralumbar tenderness  PSYCH: mentation appears normal, affect normal/bright    TSH   Date Value Ref Range Status   03/26/2018 11.18 (H) 0.40 - 4.00 mU/L Final   ]      ASSESSMENT/PLAN:     ASSESSMENT / PLAN:  (E03.8,  E06.3) Hypothyroidism due to Hashimoto's thyroiditis  (primary encounter diagnosis)  Comment:   Plan: levothyroxine (SYNTHROID/LEVOTHROID) 112 MCG         tablet          Increase dose   See below    (F41.8) Depression with anxiety  Comment:   Plan: escitalopram (LEXAPRO) 20 MG tablet, buPROPion         (WELLBUTRIN XL) 150 MG 24 hr tablet            (E78.2) Elevated triglycerides with high cholesterol  Comment:   Plan: TSH with free T4 reflex, Lipid panel reflex to         direct LDL Fasting            Patient Instructions   Increase thyroid dose  Call with side effects  Recheck fasting labs in 2 months then schedule f/u at least 2 days after that  If triglycerides are still high we can talk about options  Consider fish oil 1,000 (omega 3  ) units twice daily  For triglycerides  Avoid alcohol in excess  To emergency room with severe left upper abdominal pain            Michelle Purcell PA-C  [facility]

## 2018-03-22 ENCOUNTER — MYC MEDICAL ADVICE (OUTPATIENT)
Dept: FAMILY MEDICINE | Facility: CLINIC | Age: 28
End: 2018-03-22

## 2018-03-22 DIAGNOSIS — F41.8 DEPRESSION WITH ANXIETY: ICD-10-CM

## 2018-03-22 RX ORDER — ESCITALOPRAM OXALATE 20 MG/1
20 TABLET ORAL DAILY
Qty: 90 TABLET | Refills: 0 | Status: SHIPPED | OUTPATIENT
Start: 2018-03-22 | End: 2018-03-30

## 2018-03-26 DIAGNOSIS — Z13.1 SCREENING FOR DIABETES MELLITUS: ICD-10-CM

## 2018-03-26 DIAGNOSIS — Z13.220 SCREENING CHOLESTEROL LEVEL: ICD-10-CM

## 2018-03-26 DIAGNOSIS — E06.3 HASHIMOTO'S DISEASE: ICD-10-CM

## 2018-03-26 LAB
CHOLEST SERPL-MCNC: 206 MG/DL
GLUCOSE SERPL-MCNC: 85 MG/DL (ref 70–99)
HDLC SERPL-MCNC: 38 MG/DL
LDLC SERPL CALC-MCNC: ABNORMAL MG/DL
LDLC SERPL DIRECT ASSAY-MCNC: 109 MG/DL
NONHDLC SERPL-MCNC: 168 MG/DL
T4 FREE SERPL-MCNC: 0.85 NG/DL (ref 0.76–1.46)
TRIGL SERPL-MCNC: 412 MG/DL
TSH SERPL DL<=0.005 MIU/L-ACNC: 11.18 MU/L (ref 0.4–4)

## 2018-03-26 PROCEDURE — 80061 LIPID PANEL: CPT | Performed by: OBSTETRICS & GYNECOLOGY

## 2018-03-26 PROCEDURE — 84443 ASSAY THYROID STIM HORMONE: CPT | Performed by: OBSTETRICS & GYNECOLOGY

## 2018-03-26 PROCEDURE — 82947 ASSAY GLUCOSE BLOOD QUANT: CPT | Performed by: OBSTETRICS & GYNECOLOGY

## 2018-03-26 PROCEDURE — 83721 ASSAY OF BLOOD LIPOPROTEIN: CPT | Performed by: OBSTETRICS & GYNECOLOGY

## 2018-03-26 PROCEDURE — 36415 COLL VENOUS BLD VENIPUNCTURE: CPT | Performed by: OBSTETRICS & GYNECOLOGY

## 2018-03-26 PROCEDURE — 84439 ASSAY OF FREE THYROXINE: CPT | Performed by: OBSTETRICS & GYNECOLOGY

## 2018-03-30 ENCOUNTER — OFFICE VISIT (OUTPATIENT)
Dept: FAMILY MEDICINE | Facility: CLINIC | Age: 28
End: 2018-03-30
Payer: COMMERCIAL

## 2018-03-30 VITALS
DIASTOLIC BLOOD PRESSURE: 88 MMHG | TEMPERATURE: 97.8 F | SYSTOLIC BLOOD PRESSURE: 126 MMHG | WEIGHT: 216 LBS | HEART RATE: 114 BPM | OXYGEN SATURATION: 99 % | BODY MASS INDEX: 33.18 KG/M2

## 2018-03-30 DIAGNOSIS — E06.3 HYPOTHYROIDISM DUE TO HASHIMOTO'S THYROIDITIS: Primary | ICD-10-CM

## 2018-03-30 DIAGNOSIS — F41.8 DEPRESSION WITH ANXIETY: ICD-10-CM

## 2018-03-30 DIAGNOSIS — E78.2 ELEVATED TRIGLYCERIDES WITH HIGH CHOLESTEROL: ICD-10-CM

## 2018-03-30 PROCEDURE — 99214 OFFICE O/P EST MOD 30 MIN: CPT | Performed by: PHYSICIAN ASSISTANT

## 2018-03-30 RX ORDER — BUPROPION HYDROCHLORIDE 150 MG/1
150 TABLET ORAL EVERY MORNING
Qty: 90 TABLET | Refills: 1 | Status: SHIPPED | OUTPATIENT
Start: 2018-03-30 | End: 2018-11-03

## 2018-03-30 RX ORDER — LEVOTHYROXINE SODIUM 112 UG/1
112 TABLET ORAL DAILY
Qty: 90 TABLET | Refills: 1 | Status: SHIPPED | OUTPATIENT
Start: 2018-03-30 | End: 2018-06-07

## 2018-03-30 RX ORDER — ESCITALOPRAM OXALATE 20 MG/1
20 TABLET ORAL DAILY
Qty: 90 TABLET | Refills: 1 | Status: SHIPPED | OUTPATIENT
Start: 2018-03-30 | End: 2018-12-15

## 2018-03-30 NOTE — PATIENT INSTRUCTIONS
Increase thyroid dose  Call with side effects  Recheck fasting labs in 2 months then schedule f/u at least 2 days after that  If triglycerides are still high we can talk about options  Consider fish oil 1,000 (omega 3 ) units twice daily  For triglycerides  Avoid alcohol in excess  To emergency room with severe left upper abdominal pain

## 2018-03-30 NOTE — NURSING NOTE
"Chief Complaint   Patient presents with     Thyroid Problem     thyroid and anxiety med check     Anxiety       Initial /88  Pulse 114  Temp 97.8  F (36.6  C) (Oral)  Wt 216 lb (98 kg)  SpO2 99%  Breastfeeding? No  BMI 33.18 kg/m2 Estimated body mass index is 33.18 kg/(m^2) as calculated from the following:    Height as of 3/8/18: 5' 7.65\" (1.718 m).    Weight as of this encounter: 216 lb (98 kg).  Medication Reconciliation: complete      Libby Souza MA    "

## 2018-03-30 NOTE — MR AVS SNAPSHOT
After Visit Summary   3/30/2018    Analia Lyons    MRN: [id number]           Patient Information     Date Of Birth          1990        Visit Information        Provider Department      3/30/2018 3:00 PM Michelle Purcell PA-C [facility]        Today's Diagnoses     Hypothyroidism due to Hashimoto's thyroiditis    -  1    Depression with anxiety        Elevated triglycerides with high cholesterol          Care Instructions    Increase thyroid dose  Call with side effects  Recheck fasting labs in 2 months then schedule f/u at least 2 days after that  If triglycerides are still high we can talk about options  Consider fish oil 1,000 (omega 3 ) units twice daily  For triglycerides  Avoid alcohol in excess  To emergency room with severe left upper abdominal pain              Follow-ups after your visit        Future tests that were ordered for you today     Open Future Orders        Priority Expected Expires Ordered    Lipid panel reflex to direct LDL Fasting Routine  3/30/2019 3/30/2018            Who to contact     If you have questions or need follow up information about today's clinic visit or your schedule please contact [facility] directly at [phone].  Normal or non-critical lab and imaging results will be communicated to you by [employer], letter or phone within 4 business days after the clinic has received the results. If you do not hear from us within 7 days, please contact the clinic through [employer] or phone. If you have a critical or abnormal lab result, we will notify you by phone as soon as possible.  Submit refill requests through [employer] or call your pharmacy and they will forward the refill request to us. Please allow 3 business days for your refill to be completed.          Additional Information About Your Visit        MyChart Information     Belly gives you secure access to your electronic health record. If you see a primary care provider,  you can also send messages to your care team and make appointments. If you have questions, please call your primary care clinic.  If you do not have a primary care provider, please call [phone] and they will assist you.        Care EveryWhere ID     This is your Care EveryWhere ID. This could be used by other organizations to access your [facility] medical records  [id number]        Your Vitals Were     Pulse Temperature Pulse Oximetry Breastfeeding? BMI (Body Mass Index)       114 97.8  F (36.6  C) (Oral) 99% No 33.18 kg/m2        Blood Pressure from Last 3 Encounters:   03/30/18 126/88   03/08/18 120/75   01/29/18 122/79    Weight from Last 3 Encounters:   03/30/18 216 lb (98 kg)   03/08/18 217 lb 9.6 oz (98.7 kg)   01/29/18 216 lb 6.4 oz (98.2 kg)              We Performed the Following     TSH with free T4 reflex          Today's Medication Changes          These changes are accurate as of 3/30/18  3:23 PM.  If you have any questions, ask your nurse or doctor.               These medicines have changed or have updated prescriptions.        Dose/Directions    levothyroxine 112 MCG tablet   Commonly known as:  SYNTHROID/LEVOTHROID   This may have changed:    - medication strength  - how much to take  - additional instructions   Used for:  Hypothyroidism due to Hashimoto's thyroiditis   Changed by:  Michelle Purcell PA-C        Dose:  112 mcg   Take 1 tablet (112 mcg) by mouth daily Note increase in dose   Quantity:  90 tablet   Refills:  1            Where to get your medicines      These medications were sent to [id number] - [place], [place] - [address]  [address] [facility] [address]     Phone:  [phone]     buPROPion 150 MG 24 hr tablet    escitalopram 20 MG tablet    levothyroxine 112 MCG tablet                Primary Care Provider Office Phone # Fax #    Michelle Purcell PA-C [phone] [address] [facility] [address]        Equal  Access to Services     [facility]: Hadii rossy evans lonnie Chávez, wasuzanneda luqadaha, qaybta kayvancasimiro robbins. So [facility] [phone].    ATENCIÓN: Si habla enma, tiene a gates disposición servicios gratuitos de asistencia lingüística. Llame al [phone].    We comply with applicable federal civil rights laws and [place] laws. We do not discriminate on the basis of race, color, national origin, age, disability, sex, sexual orientation, or gender identity.            Thank you!     Thank you for choosing [facility]  for your [facility]. Our goal is always to provide you with excellent care. Hearing back from our patients is one way we can continue to improve our services. Please take a few minutes to complete the written survey that you may receive in the mail after your visit with us. Thank you!             Your Updated Medication List - Protect others around you: Learn how to safely use, store and throw away your medicines at www.disposemymeds.org.          This list is accurate as of 3/30/18  3:23 PM.  Always use your most recent med list.                   Brand Name Dispense Instructions for use Diagnosis    buPROPion 150 MG 24 hr tablet    WELLBUTRIN XL    90 tablet    Take 1 tablet (150 mg) by mouth every morning    Depression with anxiety       cyclobenzaprine 10 MG tablet    FLEXERIL    30 tablet    Take 0.5-1 tablets (5-10 mg) by mouth 3 times daily as needed for muscle spasms    Bilateral low back pain without sciatica, unspecified chronicity       escitalopram 20 MG tablet    LEXAPRO    90 tablet    Take 1 tablet (20 mg) by mouth daily    Depression with anxiety       HYDROcodone-acetaminophen 5-325 MG per tablet    NORCO    10 tablet    Take 1 tablet by mouth every 6 hours as needed for pain    Bilateral low back pain with right-sided sciatica, unspecified chronicity       levothyroxine 112 MCG tablet    SYNTHROID/LEVOTHROID    90 tablet    Take 1  tablet (112 mcg) by mouth daily Note increase in dose    Hypothyroidism due to Hashimoto's thyroiditis       norethindrone-ethinyl estradiol 1-35 MG-MCG per tablet    ORTHO-NOVUM 1/35 (28)    112 tablet    Take one active tablet a day for 21 days.  Discard the inactive pills and start new pack on day 22.    Encounter for surveillance of contraceptive pills

## 2018-04-03 PROBLEM — E78.1 HIGH TRIGLYCERIDES: Status: ACTIVE | Noted: 2018-04-03

## 2018-04-24 ENCOUNTER — TELEPHONE (OUTPATIENT)
Dept: OBGYN | Facility: CLINIC | Age: 28
End: 2018-04-24

## 2018-04-25 NOTE — TELEPHONE ENCOUNTER
PT called back and an appointment was made. PT was informed nothing vaginally 24 hrs prior, and to take 400 mg IBU 1 hr prior to procedure.  Yaritza South CMA

## 2018-05-10 ENCOUNTER — OFFICE VISIT (OUTPATIENT)
Dept: OBGYN | Facility: CLINIC | Age: 28
End: 2018-05-10
Payer: COMMERCIAL

## 2018-05-10 VITALS
HEART RATE: 98 BPM | BODY MASS INDEX: 32.85 KG/M2 | SYSTOLIC BLOOD PRESSURE: 122 MMHG | WEIGHT: 213.8 LBS | DIASTOLIC BLOOD PRESSURE: 81 MMHG | OXYGEN SATURATION: 99 %

## 2018-05-10 DIAGNOSIS — R87.610 ASCUS OF CERVIX WITH NEGATIVE HIGH RISK HPV: Primary | ICD-10-CM

## 2018-05-10 PROCEDURE — 99213 OFFICE O/P EST LOW 20 MIN: CPT | Mod: 25 | Performed by: OBSTETRICS & GYNECOLOGY

## 2018-05-10 PROCEDURE — 57454 BX/CURETT OF CERVIX W/SCOPE: CPT | Performed by: OBSTETRICS & GYNECOLOGY

## 2018-05-10 PROCEDURE — 88305 TISSUE EXAM BY PATHOLOGIST: CPT | Performed by: OBSTETRICS & GYNECOLOGY

## 2018-05-10 NOTE — MR AVS SNAPSHOT
After Visit Summary   5/10/2018    Analia Lyons    MRN: [id number]           Patient Information     Date Of Birth          1990        Visit Information        Provider Department      5/10/2018 10:00 AM Rupesh Winslow MD; PROC [facility]        Today's Diagnoses     ASCUS of cervix with negative high risk HPV    -  1       Follow-ups after your visit        Your next 10 appointments already scheduled     Jun 04, 2018  8:40 AM CDT   Lor Newell with Michelle Purcell PA-C   [facility]    [address] [facility] [address] [phone]              Who to contact     If you have questions or need follow up information about today's clinic visit or your schedule please contact [facility] directly at [phone].  Normal or non-critical lab and imaging results will be communicated to you by MyChart, letter or phone within 4 business days after the clinic has received the results. If you do not hear from us within 7 days, please contact the clinic through [employer] or phone. If you have a critical or abnormal lab result, we will notify you by phone as soon as possible.  Submit refill requests through [employer] or call your pharmacy and they will forward the refill request to us. Please allow 3 business days for your refill to be completed.          Additional Information About Your Visit        MyChart Information     [employer] gives you secure access to your electronic health record. If you see a primary care provider, you can also send messages to your care team and make appointments. If you have questions, please call your primary care clinic.  If you do not have a primary care provider, please call [phone] and they will assist you.        Care EveryWhere ID     This is your Care EveryWhere ID. This could be used by other organizations to access your [facility]  records  [id number]        Your Vitals Were     Pulse Pulse Oximetry BMI (Body Mass Index)             98 99% 32.85 kg/m2          Blood Pressure from Last 3 Encounters:   05/10/18 122/81   03/30/18 126/88   03/08/18 120/75    Weight from Last 3 Encounters:   05/10/18 213 lb 12.8 oz (97 kg)   03/30/18 216 lb (98 kg)   03/08/18 217 lb 9.6 oz (98.7 kg)              We Performed the Following     COLP CERVIX/UPPER VAGINA     Surgical pathology exam        Primary Care Provider Office Phone # Fax #    Michelle Purcell PA-C [phone] [address] [facility] [address]        Equal Access to Services     [employer] : Hadii aad ku hadasho Soomaali, waaxda luqadaha, qaybta kaalmada adeegyada, casimiro patel . So [facility] [phone].    ATENCIÓN: Si habla español, tiene a gates disposición servicios gratuitos de asistencia lingüística. [facility] [phone].    We comply with applicable federal civil rights laws and [place] laws. We do not discriminate on the basis of race, color, national origin, age, disability, sex, sexual orientation, or gender identity.            Thank you!     Thank you for choosing [facility]  for your care. Our goal is always to provide you with excellent care. Hearing back from our patients is one way we can continue to improve our services. Please take a few minutes to complete the written survey that you may receive in the mail after your visit with us. Thank you!             Your Updated Medication List - Protect others around you: Learn how to safely use, store and throw away your medicines at www.disposemymeds.org.          This list is accurate as of 5/10/18 12:09 PM.  Always use your most recent med list.                   Brand Name Dispense Instructions for use Diagnosis    buPROPion 150 MG 24 hr tablet    WELLBUTRIN XL    90 tablet    Take 1 tablet (150 mg) by mouth every morning    Depression with anxiety        cyclobenzaprine 10 MG tablet    FLEXERIL    30 tablet    Take 0.5-1 tablets (5-10 mg) by mouth 3 times daily as needed for muscle spasms    Bilateral low back pain without sciatica, unspecified chronicity       escitalopram 20 MG tablet    LEXAPRO    90 tablet    Take 1 tablet (20 mg) by mouth daily    Depression with anxiety       HYDROcodone-acetaminophen 5-325 MG per tablet    NORCO    10 tablet    Take 1 tablet by mouth every 6 hours as needed for pain    Bilateral low back pain with right-sided sciatica, unspecified chronicity       levothyroxine 112 MCG tablet    SYNTHROID/LEVOTHROID    90 tablet    Take 1 tablet (112 mcg) by mouth daily Note increase in dose    Hypothyroidism due to Hashimoto's thyroiditis       norethindrone-ethinyl estradiol 1-35 MG-MCG per tablet    ORTHO-NOVUM 1/35 (28)    112 tablet    Take one active tablet a day for 21 days.  Discard the inactive pills and start new pack on day 22.    Encounter for surveillance of contraceptive pills

## 2018-05-10 NOTE — PROGRESS NOTES
Patient Name: Analia Lyons              Date: 5/10/2018   YOB: 1990                         Age: 27 year old   Phone: [phone] (home)   ________________________________________________________________________  Analia presents to discuss the pap smear, findings and possible further evaluation.  The patient's pap smear history is as noted:    4/1/11 (approx) normal - patient reported.   6/11/13 normal - patient reported  12/3/15 - ASCUS pap. Plan: per provider, repeat pap in 1 year.  1/26/17 ASCUS/neg HR HPV. Plan: colp bef 4/26/17  3/23/17: [place] Bx & ECC - negative. Plan cotest in 1 year.   3/8/18 ASCUS pap, neg HPV.  I attempted to ensure that the patient was educated regarding the nature of her findings and implications to date.  We reviewed the role of HPV, incidence in the population and the natural history of the infection, and its transmission.  We also reviewed ways to minimize her future risk, the effect of HPV on the cervix and treatment options available, should they be indicated.    The pathophysiology of the cervix, including a discussion of the squamous and columnar cells, metaplasia and dysplasia have been reviewed, drawings, sketches and the pamphlets were reviewed with her.      No LMP recorded. Patient is not currently having periods (Reason: Birth Control).      Past Medical History:   Diagnosis Date     Anxiety      ASCUS of cervix with negative high risk HPV 1/26/17    ASCUS/neg HR HPV.     Depression      H/O colposcopy with cervical biopsy 03/23/2017    Bx & ECC - negative     Headache(784.0)      Hypothyroidism      Papanicolaou smear of cervix with atypical squamous cells of undetermined significance (ASC-US) 12/03/2015       Past Surgical History:   Procedure Laterality Date     COLONOSCOPY N/A 12/16/2015    Procedure: COLONOSCOPY;  Surgeon: Duane, William Charles, MD;  Location: [facility] OR     COLONOSCOPY WITH CO2 INSUFFLATION N/A 12/16/2015    Procedure: COLONOSCOPY WITH CO2  INSUFFLATION;  Surgeon: Duane, William Charles, MD;  Location: [facility] OR     NO HISTORY OF SURGERY          Outpatient Encounter Prescriptions as of 5/10/2018   Medication Sig Dispense Refill     buPROPion (WELLBUTRIN XL) 150 MG 24 hr tablet Take 1 tablet (150 mg) by mouth every morning 90 tablet 1     cyclobenzaprine (FLEXERIL) 10 MG tablet Take 0.5-1 tablets (5-10 mg) by mouth 3 times daily as needed for muscle spasms 30 tablet 1     escitalopram (LEXAPRO) 20 MG tablet Take 1 tablet (20 mg) by mouth daily 90 tablet 1     HYDROcodone-acetaminophen (NORCO) 5-325 MG per tablet Take 1 tablet by mouth every 6 hours as needed for pain 10 tablet 0     levothyroxine (SYNTHROID/LEVOTHROID) 112 MCG tablet Take 1 tablet (112 mcg) by mouth daily Note increase in dose 90 tablet 1     norethindrone-ethinyl estradiol (ORTHO-NOVUM 1/35, 28,) 1-35 MG-MCG per tablet Take one active tablet a day for 21 days.  Discard the inactive pills and start new pack on day 22. 112 tablet 4     No facility-administered encounter medications on file as of 5/10/2018.         Allergies as of 05/10/2018 - Carlos as Reviewed 03/30/2018   Allergen Reaction Noted     Nkda [no known drug allergies]  11/13/2009       Social History     Social History     Marital status: Single     Spouse name: N/A     Number of children: 0     Years of education: 16     Occupational History           [employer]     Social History Main Topics     Smoking status: Never Smoker     Smokeless tobacco: Never Used     Alcohol use Yes      Comment: socially     Drug use: No     Sexual activity: Yes     Partners: Male     Birth control/ protection: Pill     Other Topics Concern     None     Social History Narrative    Parents declaring bankruptcy; losing house due to mother's credit card debt.        Brother deployed to [place] for 16 months startin 4/1/09        Parents  but ''.        Family History   Problem Relation Age of Onset     Depression Brother       depression     Hyperlipidemia Mother      C.A.D. No family hx of      DIABETES No family hx of      Breast Cancer No family hx of      Cancer - colorectal No family hx of      Anesthesia Reaction No family hx of      Blood Disease No family hx of          Review Of Systems  10 point ROS of systems including Constitutional, Eyes, Respiratory, Cardiovascular, Gastroenterology, Genitourinary, Integumentary, Muscularskeletal, Psychiatric were all negative except for pertinent positives noted in my HPI and in the PMH.      Exam:   /81 (BP Location: Left arm, Cuff Size: Adult Regular)  Pulse 98  Wt 213 lb 12.8 oz (97 kg)  SpO2 99%  BMI 32.85 kg/m2  GENERAL:  WNWD female NAD  HEENT: NC/AT, EOMI  Lungs:  Good respiratory effort   SKIN: normal skin turgor  GAIT: Normal  NECK: Symmetrical, no masses noted   VULVA: Normal Genitalia  BUS: Normal  URETHRA:  No hypermobility noted  URETHRAL MEATUS:  No masses noted  VAGINA: Normal mucosa, no discharge  CERVIX: Closed, mobile, no discharge  PERIANAL:  No masses or lesions seen  EXTREMITIES: no clubbing, cyanosis, or edema    Assessment:  ASCUS pap smear     Plan:  Recommend to Proceed with Colpo  The details of the colposcopic procedure were reviewed, the risks of missed diagnoses, pain, infection, and bleeding.    TT 20 min, in addition to the time for the procedure  CT greater than 50%, as noted above in the HPI and in the Plan.     Rupesh Winslow MD        Procedure:  Procedure for colposcopy and biopsy has been explained to the patient and consent obtained.    Before the procedure, it was ensured that the patient was educated regarding the nature of her findings and implications to date.  We reviewed the role of HPV and the natural history of the infection.  We also reviewed ways to minimize her future risk, the effect of HPV on the cervix and treatment options available, should they be indicated.    The pathophysiology of the cervix, including a discussion of  the squamous and columnar cells, metaplasia and dysplasia have been reviewed, drawings, sketches and the pamphlets were reviewed with her.  The details of the colposcopic procedure were reviewed, the risks of missed diagnoses, pain, infection, and bleeding.  Questions seemed to be answered before proceeding and the patient then consented to the procedure.     Speculum placed in vagina and excellent visualization of cervix achieved, cervix swabbed  with acetic acid solution.    biopsies taken (including ECC):  3  Hemostasis effected with Monsels solution      Findings:  Cervix: faint to mild AWE seen along the anterior TZ   Vaginal inspection: no visible lesions.  Procedure Summary: Patient tolerated procedure well and colposcopy adequate.      Assessment:   ASCUS pap smear     Plan:  Specimens labelled and sent to pathology.  Will base further treatment on pathology findings.  Post biopsy instructions given to patient and call to discuss Pathology results.    Rupesh Winslow MD

## 2018-05-14 ENCOUNTER — MYC MEDICAL ADVICE (OUTPATIENT)
Dept: FAMILY MEDICINE | Facility: CLINIC | Age: 28
End: 2018-05-14

## 2018-05-14 LAB — COPATH REPORT: NORMAL

## 2018-05-15 NOTE — PROGRESS NOTES
SUBJECTIVE:                                                    Analia Lyons is a 27 year old female who presents to clinic today for the following health issues:    Hypothyroidism Follow-up      Since last visit, patient describes the following symptoms: fatigue and hair loss      Amount of exercise or physical activity: None    Problems taking medications regularly: No    Medication side effects: none    Diet: regular (no restrictions)      Labs-TSH still high and T4 on lower end despite increase in levothyroxine 3 months ago.  Mom had graves and thyroid removed.  Will refer to endocrine and get ultrasound and also increase her dosage as she is not responding as expected.         Problem list and histories reviewed & adjusted, as indicated.  Additional history: as documented    Patient Active Problem List   Diagnosis     Hashimoto's disease     Familial hematuria     CARDIOVASCULAR SCREENING; LDL GOAL LESS THAN 160     Hypothyroidism     Mechanical low back pain     Keloid scar     Depression with anxiety     ASCUS of cervix with negative high risk HPV     Common wart     Hypothyroidism due to Hashimoto's thyroiditis     BMI 33.0-33.9,adult     High triglycerides     Past Surgical History:   Procedure Laterality Date     COLONOSCOPY N/A 12/16/2015    Procedure: COLONOSCOPY;  Surgeon: Duane, William Charles, MD;  Location: [facility] OR     COLONOSCOPY WITH CO2 INSUFFLATION N/A 12/16/2015    Procedure: COLONOSCOPY WITH CO2 INSUFFLATION;  Surgeon: Duane, William Charles, MD;  Location: [facility] OR     NO HISTORY OF SURGERY         Social History   Substance Use Topics     Smoking status: Never Smoker     Smokeless tobacco: Never Used     Alcohol use Yes      Comment: socially     Family History   Problem Relation Age of Onset     Depression Brother      depression     Hyperlipidemia Mother      Thyroid Disease Mother      Hyperlipidemia Father      Breast Cancer Cousin      Depression Brother      Substance Abuse Sister       Substance Abuse Sister      C.A.D. No family hx of      DIABETES No family hx of      Breast Cancer No family hx of      Cancer - colorectal No family hx of      Anesthesia Reaction No family hx of      Blood Disease No family hx of          Current Outpatient Prescriptions   Medication Sig Dispense Refill     buPROPion (WELLBUTRIN XL) 150 MG 24 hr tablet Take 1 tablet (150 mg) by mouth every morning 90 tablet 1     cyclobenzaprine (FLEXERIL) 10 MG tablet Take 0.5-1 tablets (5-10 mg) by mouth 3 times daily as needed for muscle spasms 30 tablet 1     escitalopram (LEXAPRO) 20 MG tablet Take 1 tablet (20 mg) by mouth daily 90 tablet 1     HYDROcodone-acetaminophen (NORCO) 5-325 MG per tablet Take 1 tablet by mouth every 6 hours as needed for pain 10 tablet 0     levothyroxine (SYNTHROID/LEVOTHROID) 137 MCG tablet Take 1 tablet (137 mcg) by mouth daily Note increase in dose 90 tablet 0     norethindrone-ethinyl estradiol (ORTHO-NOVUM 1/35, 28,) 1-35 MG-MCG per tablet Take one active tablet a day for 21 days.  Discard the inactive pills and start new pack on day 22. 112 tablet 4     [DISCONTINUED] levothyroxine (SYNTHROID/LEVOTHROID) 112 MCG tablet Take 1 tablet (112 mcg) by mouth daily Note increase in dose 90 tablet 1     Allergies   Allergen Reactions     Nkda [No Known Drug Allergies]        ROS:  Constitutional, HEENT, cardiovascular, pulmonary, gi and gu systems are negative, except as otherwise noted.    OBJECTIVE:     /89  Pulse 97  Temp 99.2  F (37.3  C) (Oral)  Resp 18  Wt 220 lb (99.8 kg)  SpO2 100%  Breastfeeding? No  BMI 33.8 kg/m2  Body mass index is 33.8 kg/(m^2).  GENERAL: healthy, alert and no distress  NECK: no adenopathy, no asymmetry, masses, or scars and thyroid normal to palpation  RESP: lungs clear to auscultation - no rales, rhonchi or wheezes  CV: regular rate and rhythm, normal S1 S2, no S3 or S4, no murmur, click or rub, no peripheral edema and peripheral pulses strong  MS: no  gross musculoskeletal defects noted, no edema  PSYCH: mentation appears normal, affect normal/bright      TSH   Date Value Ref Range Status   05/29/2018 12.12 (H) 0.40 - 4.00 mU/L Final   ]        ASSESSMENT/PLAN:     ASSESSMENT / PLAN:  (E06.3) Hashimoto's disease  (primary encounter diagnosis)  Comment:   Plan: US Thyroid, levothyroxine         (SYNTHROID/LEVOTHROID) 137 MCG tablet, TSH with        free T4 reflex, ENDOCRINOLOGY ADULT REFERRAL        See below      Patient Instructions   Recheck thyroid lab in about 10 weeks, this can be lab only  Schedule f/u with endocrine  Call [facility] to schedule ultrasound [phone]   Increase thyroid dose and monitor for side effects (insomnia, palpitations, anxiety)          Michelle Purcell PA-C  [facility]

## 2018-05-29 DIAGNOSIS — E78.2 ELEVATED TRIGLYCERIDES WITH HIGH CHOLESTEROL: ICD-10-CM

## 2018-05-29 LAB
CHOLEST SERPL-MCNC: 190 MG/DL
HDLC SERPL-MCNC: 38 MG/DL
LDLC SERPL CALC-MCNC: 89 MG/DL
NONHDLC SERPL-MCNC: 152 MG/DL
T4 FREE SERPL-MCNC: 0.84 NG/DL (ref 0.76–1.46)
TRIGL SERPL-MCNC: 314 MG/DL
TSH SERPL DL<=0.005 MIU/L-ACNC: 12.12 MU/L (ref 0.4–4)

## 2018-05-29 PROCEDURE — 36415 COLL VENOUS BLD VENIPUNCTURE: CPT | Performed by: PHYSICIAN ASSISTANT

## 2018-05-29 PROCEDURE — 80061 LIPID PANEL: CPT | Performed by: PHYSICIAN ASSISTANT

## 2018-05-29 PROCEDURE — 84443 ASSAY THYROID STIM HORMONE: CPT | Performed by: PHYSICIAN ASSISTANT

## 2018-05-29 PROCEDURE — 84439 ASSAY OF FREE THYROXINE: CPT | Performed by: PHYSICIAN ASSISTANT

## 2018-05-29 NOTE — PROGRESS NOTES
Alexa Helms,       Your recent test results are attached, if you have any questions or concerns please feel free to contact me via e-mail or call [phone].  We will discuss at your upcoming visit.     Sincerely,  Michelle Purcell PA-C

## 2018-06-07 ENCOUNTER — OFFICE VISIT (OUTPATIENT)
Dept: FAMILY MEDICINE | Facility: CLINIC | Age: 28
End: 2018-06-07
Payer: COMMERCIAL

## 2018-06-07 VITALS
WEIGHT: 220 LBS | TEMPERATURE: 99.2 F | SYSTOLIC BLOOD PRESSURE: 117 MMHG | RESPIRATION RATE: 18 BRPM | BODY MASS INDEX: 33.8 KG/M2 | OXYGEN SATURATION: 100 % | HEART RATE: 97 BPM | DIASTOLIC BLOOD PRESSURE: 89 MMHG

## 2018-06-07 DIAGNOSIS — E06.3 HASHIMOTO'S DISEASE: Primary | ICD-10-CM

## 2018-06-07 PROCEDURE — 99213 OFFICE O/P EST LOW 20 MIN: CPT | Performed by: PHYSICIAN ASSISTANT

## 2018-06-07 RX ORDER — LEVOTHYROXINE SODIUM 137 UG/1
137 TABLET ORAL DAILY
Qty: 90 TABLET | Refills: 0 | Status: SHIPPED | OUTPATIENT
Start: 2018-06-07 | End: 2018-09-15

## 2018-06-07 ASSESSMENT — ANXIETY QUESTIONNAIRES
5. BEING SO RESTLESS THAT IT IS HARD TO SIT STILL: MORE THAN HALF THE DAYS
6. BECOMING EASILY ANNOYED OR IRRITABLE: NEARLY EVERY DAY
GAD7 TOTAL SCORE: 11
3. WORRYING TOO MUCH ABOUT DIFFERENT THINGS: MORE THAN HALF THE DAYS
2. NOT BEING ABLE TO STOP OR CONTROL WORRYING: SEVERAL DAYS
1. FEELING NERVOUS, ANXIOUS, OR ON EDGE: SEVERAL DAYS
7. FEELING AFRAID AS IF SOMETHING AWFUL MIGHT HAPPEN: NOT AT ALL
IF YOU CHECKED OFF ANY PROBLEMS ON THIS QUESTIONNAIRE, HOW DIFFICULT HAVE THESE PROBLEMS MADE IT FOR YOU TO DO YOUR WORK, TAKE CARE OF THINGS AT HOME, OR GET ALONG WITH OTHER PEOPLE: SOMEWHAT DIFFICULT

## 2018-06-07 ASSESSMENT — PATIENT HEALTH QUESTIONNAIRE - PHQ9: 5. POOR APPETITE OR OVEREATING: MORE THAN HALF THE DAYS

## 2018-06-07 NOTE — MR AVS SNAPSHOT
After Visit Summary   6/7/2018    Analia Lyons    MRN: [id number]           Patient Information     Date Of Birth          1990        Visit Information        Provider Department      6/7/2018 8:00 AM Michelle Purcell PA-C [facility]        Today's Diagnoses     Hashimoto's disease    -  1      Care Instructions    Recheck thyroid lab in about 10 weeks, this can be lab only  Schedule f/u with endocrine  Call [facility] to schedule ultrasound [phone]   Increase thyroid dose and monitor for side effects (insomnia, palpitations, anxiety)            Follow-ups after your visit        Additional Services     ENDOCRINOLOGY ADULT REFERRAL       Your provider has referred you to: [facility]:  [facility] [phone]  [email or web address]  [place]: [facility] [place] [phone]   [email or web address]      Please be aware that coverage of these services is subject to the terms and limitations of your health insurance plan.  Call member services at your health plan with any benefit or coverage questions.      Please bring the following to your appointment:    >>   Any x-rays, CTs or MRIs which have been performed.  Contact the facility where they were done to arrange for  prior to your scheduled appointment.    >>   List of current medications   >>   This referral request   >>   Any documents/labs given to you for this referral                  Future tests that were ordered for you today     Open Future Orders        Priority Expected Expires Ordered    TSH with free T4 reflex Routine 8/16/2018 6/7/2019 6/7/2018    US Thyroid Routine  6/7/2019 6/7/2018            Who to contact     If you have questions or need follow up information about today's clinic visit or your schedule please contact [facility] directly at  [phone].  Normal or non-critical lab and imaging results will be communicated to you by Webcrunchhart, letter or phone within 4 business days after the clinic has received the results. If you do not hear from us within 7 days, please contact the clinic through HipGeot or phone. If you have a critical or abnormal lab result, we will notify you by phone as soon as possible.  Submit refill requests through [employer] or call your pharmacy and they will forward the refill request to us. Please allow 3 business days for your refill to be completed.          Additional Information About Your Visit        Webcrunchhart Information     [employer] gives you secure access to your electronic health record. If you see a primary care provider, you can also send messages to your care team and make appointments. If you have questions, please call your primary care clinic.  If you do not have a primary care provider, please call [phone] and they will assist you.        Care EveryWhere ID     This is your Care EveryWhere ID. This could be used by other organizations to access your [place] [facility] medical records  [id number]        Your Vitals Were     Pulse Temperature Respirations Pulse Oximetry Breastfeeding? BMI (Body Mass Index)    97 99.2  F (37.3  C) (Oral) 18 100% No 33.8 kg/m2       Blood Pressure from Last 3 Encounters:   06/07/18 117/89   05/10/18 122/81   03/30/18 126/88    Weight from Last 3 Encounters:   06/07/18 220 lb (99.8 kg)   05/10/18 213 lb 12.8 oz (97 kg)   03/30/18 216 lb (98 kg)              We Performed the Following     ENDOCRINOLOGY ADULT REFERRAL          Today's Medication Changes          These changes are accurate as of 6/7/18  8:43 AM.  If you have any questions, ask your nurse or doctor.               These medicines have changed or have updated prescriptions.        Dose/Directions    levothyroxine 137 MCG tablet   Commonly known as:  SYNTHROID/LEVOTHROID   This may have changed:    - medication strength  - how  much to take   Used for:  Hashimoto's disease   Changed by:  Michelle Purcell PA-C        Dose:  137 mcg   Take 1 tablet (137 mcg) by mouth daily Note increase in dose   Quantity:  90 tablet   Refills:  0            Where to get your medicines      These medications were sent to [id number] - [place], [place] - [address]  [address], [facility] [address]     Phone:  [phone]     levothyroxine 137 MCG tablet                Primary Care Provider Office Phone # Fax #    Michelle Purcell PA-C [phone]       [address] [facility] [address]        Equal Access to Services     MEME BARBER : John garciao [place], waaxda luqadaha, qaybta kaalmada adeegyada, casimiro nicole. So [facility] [phone].    ATENCIÓN: Si habla español, tiene a gates disposición servicios gratuitos de asistencia lingüística. Llame al [phone].    We comply with applicable federal civil rights laws and [place] laws. We do not discriminate on the basis of race, color, national origin, age, disability, sex, sexual orientation, or gender identity.            Thank you!     Thank you for choosing [facility]  for your care. Our goal is always to provide you with excellent care. Hearing back from our patients is one way we can continue to improve our services. Please take a few minutes to complete the written survey that you may receive in the mail after your visit with us. Thank you!             Your Updated Medication List - Protect others around you: Learn how to safely use, store and throw away your medicines at www.disposemymeds.org.          This list is accurate as of 6/7/18  8:43 AM.  Always use your most recent med list.                   Brand Name Dispense Instructions for use Diagnosis    buPROPion 150 MG 24 hr tablet    WELLBUTRIN XL    90 tablet    Take 1 tablet (150 mg) by mouth every morning    Depression with anxiety        cyclobenzaprine 10 MG tablet    FLEXERIL    30 tablet    Take 0.5-1 tablets (5-10 mg) by mouth 3 times daily as needed for muscle spasms    Bilateral low back pain without sciatica, unspecified chronicity       escitalopram 20 MG tablet    LEXAPRO    90 tablet    Take 1 tablet (20 mg) by mouth daily    Depression with anxiety       HYDROcodone-acetaminophen 5-325 MG per tablet    NORCO    10 tablet    Take 1 tablet by mouth every 6 hours as needed for pain    Bilateral low back pain with right-sided sciatica, unspecified chronicity       levothyroxine 137 MCG tablet    SYNTHROID/LEVOTHROID    90 tablet    Take 1 tablet (137 mcg) by mouth daily Note increase in dose    Hashimoto's disease       norethindrone-ethinyl estradiol 1-35 MG-MCG per tablet    ORTHO-NOVUM 1/35 (28)    112 tablet    Take one active tablet a day for 21 days.  Discard the inactive pills and start new pack on day 22.    Encounter for surveillance of contraceptive pills

## 2018-06-07 NOTE — PATIENT INSTRUCTIONS
Recheck thyroid lab in about 10 weeks, this can be lab only  Schedule f/u with endocrine  Call [facility] to schedule ultrasound [phone]   Increase thyroid dose and monitor for side effects (insomnia, palpitations, anxiety)

## 2018-06-08 ASSESSMENT — ANXIETY QUESTIONNAIRES: GAD7 TOTAL SCORE: 11

## 2018-06-08 ASSESSMENT — PATIENT HEALTH QUESTIONNAIRE - PHQ9: SUM OF ALL RESPONSES TO PHQ QUESTIONS 1-9: 10

## 2018-06-14 ENCOUNTER — RADIANT APPOINTMENT (OUTPATIENT)
Dept: ULTRASOUND IMAGING | Facility: CLINIC | Age: 28
End: 2018-06-14
Attending: PHYSICIAN ASSISTANT
Payer: COMMERCIAL

## 2018-06-14 DIAGNOSIS — E06.3 HASHIMOTO'S DISEASE: ICD-10-CM

## 2018-06-14 PROCEDURE — 76536 US EXAM OF HEAD AND NECK: CPT

## 2018-06-15 NOTE — PROGRESS NOTES
Alexa Helms,       Your recent test results are attached, if you have any questions or concerns please feel free to contact me via e-mail or call [phone].  Ultrasound of your thyroid is consistent with hashiomotos disease, no nodule or mass was seen which is good. I still would like you to see endocrine as you are not responding as I would like to treatment.        It was a pleasure to see you at your recent office visit.      Sincerely,  Michelle Purcell PA-C

## 2018-07-01 ENCOUNTER — VIRTUAL VISIT (OUTPATIENT)
Dept: FAMILY MEDICINE | Facility: OTHER | Age: 28
End: 2018-07-01

## 2018-07-01 NOTE — PROGRESS NOTES
"Date:   Clinician: Carlos Diaz  Clinician NPI: [id number]  Patient: Analia Lyons  Patient : 1990  Patient Address: [address] [place], [place] [address], [place] [address]  Patient Phone: [phone]  Visit Protocol: UTI  Patient Summary:  Analia is a 27 year old ( : 1990 ) female who initiated a Visit for a presumed bladder infection. When asked the question \"Please sign me up to receive news, health information and promotions from "[employer].\", Analia responded \"No\".    Her symptoms began yesterday and consist of urinary frequency, dysuria, foul smelling urine, hesitation, and urgency.   Symptom Details   Urinary Frequency: Several times each hour    She denies nausea, hematuria, urinary incontinence, vaginal discharge, abdominal pain, vomiting, loss of appetite, feeling feverish, flank pain, recent antibiotic use, and chills. Analia has never had kidney stones. She has not been hospitalized, been a patient in a nursing home, or had a catheter in the past two weeks. She denies risk factors for sexually transmitted infections.   Analia has not had any UTIs in the past 12 months. Her current symptoms are similar to the previous UTI symptoms. She took an antibiotic for her last infection but does not remember which one.   She has experienced side effects (upset stomach, vomiting, or diarrhea) from taking Bactrim DS (trimethoprim/sulfamethoxazole).  Analia typically gets yeast infections when she takes antibiotics.  She denies pregnancy and denies breastfeeding. She does not menstruate.   Additional information as reported by the patient (free text): Bactrim does not work for me.    MEDICATIONS: No current medications, ALLERGIES: NKDA  Clinician Response:  Dear Analia,  Based on the information you have provided, you likely have a bladder infection, also called acute urinary tract infection (UTI).   To treat your infection, I am prescribing:    Nitrofurantoin monohyd/m-cryst (Macrobid) 100 mg oral capsule. Take 1 " capsule by mouth every 12 hours for 5 days. Take the medication with food. Continue taking the capsules even if you feel better before all of the medication is gone. There are no refills with this prescription.  Some women may develop a yeast infection as a side effect of taking antibiotics. If you notice symptoms of a yeast infection, [facility] can help treat that condition as well. Simply log in and complete another Visit, which will cover all of the necessary questions to determine the best treatment for you.   Some people develop allergies to antibiotics. If you notice a new rash, significant swelling, or difficulty breathing, stop the medication immediately and go into a clinic for physical evaluation.   If you become pregnant during this course of treatment, stop taking the medication and contact your primary care provider.   To help treat your current UTI and prevent future occurrences, remember to:     Drink 8-10, 8-ounce glasses of water daily.    Urinate after sexual intercourse.    Wipe front to back after using the bathroom.     You should visit a clinic for a follow-up visit if your symptoms do not improve in 1-2 days or if you experience another urinary tract infection soon after completing this treatment.   Diagnosis: Acute uncomplicated bladder infection  Diagnosis ICD: N39.0  Prescription: nitrofurantoin monohyd/m-cryst (Macrobid) 100 mg oral capsule 10 capsule, 5 days supply. Take 1 capsule by mouth every 12 hours for 5 days. Refills: 0, Refill as needed: no, Allow substitutions: yes  Pharmacy: [facility]/pharmacy #[id number] - [phone] - [address] [address]

## 2018-08-21 ENCOUNTER — TELEPHONE (OUTPATIENT)
Dept: FAMILY MEDICINE | Facility: CLINIC | Age: 28
End: 2018-08-21

## 2018-08-21 ENCOUNTER — OFFICE VISIT (OUTPATIENT)
Dept: FAMILY MEDICINE | Facility: CLINIC | Age: 28
End: 2018-08-21
Payer: COMMERCIAL

## 2018-08-21 VITALS
WEIGHT: 228 LBS | SYSTOLIC BLOOD PRESSURE: 129 MMHG | HEART RATE: 121 BPM | BODY MASS INDEX: 35.79 KG/M2 | OXYGEN SATURATION: 99 % | TEMPERATURE: 98.8 F | DIASTOLIC BLOOD PRESSURE: 80 MMHG | HEIGHT: 67 IN

## 2018-08-21 DIAGNOSIS — R06.02 SOB (SHORTNESS OF BREATH): Primary | ICD-10-CM

## 2018-08-21 DIAGNOSIS — J30.2 SEASONAL ALLERGIC RHINITIS, UNSPECIFIED CHRONICITY, UNSPECIFIED TRIGGER: ICD-10-CM

## 2018-08-21 PROCEDURE — 99213 OFFICE O/P EST LOW 20 MIN: CPT | Performed by: INTERNAL MEDICINE

## 2018-08-21 RX ORDER — ALBUTEROL SULFATE 90 UG/1
2 AEROSOL, METERED RESPIRATORY (INHALATION) EVERY 6 HOURS PRN
Qty: 1 INHALER | Refills: 0 | Status: SHIPPED | OUTPATIENT
Start: 2018-08-21 | End: 2021-08-03

## 2018-08-21 NOTE — PROGRESS NOTES
SUBJECTIVE:  Analia Lyons is an 27 year old female who presents for allergies.  [facility] and told had [facility].  Is using zyrtec and flonase which help with ears and nose sxs.  Feels like breathing is a little hard on some days.  Sometimes breathing feels tight over the past month or two.   No fevers.  Sometimes if exerts herself will cough, non-productive.  tight breathing is worse when outside and sometimes when lies down.  No leg swelling.   No recent travel.  Non-smoker.         has a past medical history of Anxiety; ASCUS of cervix with negative high risk HPV (1/26/17); Depression; Depressive disorder; H/O colposcopy with cervical biopsy (03/23/2017); Hashimoto's disease; Headache(784.0); Hypothyroidism; Hypothyroidism due to Hashimoto's thyroiditis; and Papanicolaou smear of cervix with atypical squamous cells of undetermined significance (ASC-US) (12/03/2015).  Social History     Social History     Marital status: Single     Spouse name: N/A     Number of children: 0     Years of education: 16     Occupational History           [employer]     Social History Main Topics     Smoking status: Never Smoker     Smokeless tobacco: Never Used     Alcohol use Yes      Comment: socially     Drug use: No     Sexual activity: Yes     Partners: Male     Birth control/ protection: Pill     Other Topics Concern     Parent/Sibling W/ Cabg, Mi Or Angioplasty Before 65f 55m? No     Social History Narrative    Parents declaring bankruptcy; losing house due to mother's credit card debt.        Brother deployed to [place] for 16 months startin 4/1/09        Parents  but ''.     Family History   Problem Relation Age of Onset     Depression Brother      depression     Hyperlipidemia Mother      Thyroid Disease Mother      Hyperlipidemia Father      Breast Cancer Cousin      Depression Brother      Substance Abuse Sister      Substance Abuse Sister      C.A.D. No family hx of       "Diabetes No family hx of      Breast Cancer No family hx of      Cancer - colorectal No family hx of      Anesthesia Reaction No family hx of      Blood Disease No family hx of    brother with seasonal allegies.  No fh of asthma.      ALLERGIES:  Nkda [no known drug allergies]    Current Outpatient Prescriptions   Medication     buPROPion (WELLBUTRIN XL) 150 MG 24 hr tablet     escitalopram (LEXAPRO) 20 MG tablet     levothyroxine (SYNTHROID/LEVOTHROID) 137 MCG tablet     norethindrone-ethinyl estradiol (ORTHO-NOVUM 1/35, 28,) 1-35 MG-MCG per tablet     No current facility-administered medications for this visit.          ROS:  ROS is done and is negative for general/constitutional, eye, ENT, Respiratory, cardiovascular, GI, , Skin, musculoskeletal except as noted elsewhere.  All other review of systems negative except as noted elsewhere.      OBJECTIVE:  /80  Pulse 121  Temp 98.8  F (37.1  C) (Oral)  Ht 5' 6.75\" (1.695 m)  Wt 228 lb (103.4 kg)  SpO2 99%  BMI 35.98 kg/m2  GENERAL APPEARANCE: Alert, in no acute distress  EYES: normal  EARS: External ears normal. Canals clear. TM's normal.  NOSE:normal  OROPHARYNX:normal  NECK:No adenopathy,masses or thyromegaly  RESP: CTA.  Mild exp prolongation with forced expiration  CV:regular rate and rhythm and no murmurs, clicks, or gallops  ABDOMEN: Abdomen soft, non-tender. BS normal. No masses, organomegaly  SKIN: no ulcers, lesions or rash  MUSCULOSKELETAL:Musculoskeletal normal      RESULTS  .  No results found for this or any previous visit (from the past 48 hour(s)).    ASSESSMENT/PLAN:    ASSESSMENT / PLAN:  (R06.02) SOB (shortness of breath)  (primary encounter diagnosis)  Comment: suspect possible bronchospasm as cause based on pt's hx. Possibly mild viral infection or allergies.  May also be irritant trigger with recent smoke in area from [place]. Currently not c/w cardiac or PE or pneumonia etiologies  Plan: albuterol (PROAIR HFA/PROVENTIL " HFA/VENTOLIN         HFA) 108 (90 Base) MCG/ACT inhaler        Will trial albuterol inhaler.  Reviewed medication instructions and side effects. Follow up if experiences side effects. I reviewed supportive care, expected course, and signs of concern.  Follow up with pcp as needed if she does not improve within 7 day(s) or if worsens in any way.  Also f/u with pcp if still needing the inhaler in 3-4 weeks as if persists, may need assessment for asthma. Reviewed red flag symptoms and is to go to the ER if experiences any of these.    (J30.2) Seasonal allergic rhinitis, unspecified chronicity, unspecified trigger  Comment:   Plan: continue flonase and zyrtec as has been.  I reviewed supportive care, expected course, and signs of concern.  Follow up as needed or if worsens in any way.  Reviewed red flag symptoms and is to go to the ER if experiences any of these.      See [facility] for orders, medications, letters, patient instructions    Michelle Bridges M.D.

## 2018-08-21 NOTE — TELEPHONE ENCOUNTER
Left message on patient's voice mail need to be seen for prescription for inhaler, Michelle Purcell PA-C out of office and needs appointment for breathing issues.    Anastasia BECKMANN, RN, CPN

## 2018-08-21 NOTE — TELEPHONE ENCOUNTER
Patient is calling to ask pcp if she would send a script for an inhauler to pharmacy stated she did have one a year ago through a different provider but now is having some issues with breathing with seasonal allergies.    Please call to advice  Thank you

## 2018-08-21 NOTE — MR AVS SNAPSHOT
After Visit Summary   8/21/2018    Analia Lyons    MRN: [id number]           Patient Information     Date Of Birth          1990        Visit Information        Provider Department      8/21/2018 3:40 PM Michelle Bridges MD [facility]        Today's Diagnoses     SOB (shortness of breath)    -  1    Seasonal allergic rhinitis, unspecified chronicity, unspecified trigger           Follow-ups after your visit        Follow-up notes from your care team     Return in about 1 week (around 8/28/2018), or if symptoms worsen or fail to improve, for follow up with primary doctor.      Your next 10 appointments already scheduled     Sep 26, 2018  1:30 PM CDT   New Visit with Amish Schroeder MD   [facility]    [address] [facility] [address] [phone]              Who to contact     If you have questions or need follow up information about today's clinic visit or your schedule please contact [facility] directly at [phone].  Normal or non-critical lab and imaging results will be communicated to you by MyChart, letter or phone within 4 business days after the clinic has received the results. If you do not hear from us within 7 days, please contact the clinic through MyChart or phone. If you have a critical or abnormal lab result, we will notify you by phone as soon as possible.  Submit refill requests through [employer] or call your pharmacy and they will forward the refill request to us. Please allow 3 business days for your refill to be completed.          Additional Information About Your Visit        MyChart Information     [employer] gives you secure access to your electronic health record. If you see a primary care provider, you can also send messages to your care team and make appointments. If you have questions, please call your primary care clinic.  If you do not have a primary care provider, please call  "[phone] and they will assist you.        Care EveryWhere ID     This is your Care EveryWhere ID. This could be used by other organizations to access your [facility] medical records  [id number]        Your Vitals Were     Pulse Temperature Height Pulse Oximetry BMI (Body Mass Index)       121 98.8  F (37.1  C) (Oral) 5' 6.75\" (1.695 m) 99% 35.98 kg/m2        Blood Pressure from Last 3 Encounters:   08/21/18 129/80   06/07/18 117/89   05/10/18 122/81    Weight from Last 3 Encounters:   08/21/18 228 lb (103.4 kg)   06/07/18 220 lb (99.8 kg)   05/10/18 213 lb 12.8 oz (97 kg)              Today, you had the following     No orders found for display         Today's Medication Changes          These changes are accurate as of 8/21/18  4:49 PM.  If you have any questions, ask your nurse or doctor.               Start taking these medicines.        Dose/Directions    albuterol 108 (90 Base) MCG/ACT inhaler   Commonly known as:  PROAIR HFA/PROVENTIL HFA/VENTOLIN HFA   Used for:  SOB (shortness of breath)   Started by:  Michelle Bridges MD        Dose:  2 puff   Inhale 2 puffs into the lungs every 6 hours as needed for shortness of breath / dyspnea or wheezing   Quantity:  1 Inhaler   Refills:  0         Stop taking these medicines if you haven't already. Please contact your care team if you have questions.     cyclobenzaprine 10 MG tablet   Commonly known as:  FLEXERIL   Stopped by:  Michelle Bridges MD           HYDROcodone-acetaminophen 5-325 MG per tablet   Commonly known as:  NORCO   Stopped by:  Michelle Bridges MD                Where to get your medicines      These medications were sent to [facility] [address], [address]  [address] [place] [address] [address]     Phone:  [phone]     albuterol 108 (90 Base) MCG/ACT inhaler                Primary Care Provider Office Phone # Fax #    Michelle Purcell PA-C [phone] [address] [place] [facility] [address]        Equal Access to Services     [facility] ELISABETH : Hadii aad ku hadmalinaluis manuel Chávez, wasuzanneda luqtariqha, qaybta parthyvancasimiro robbins. So [facility] [phone].    ATENCIÓN: Si habla español, tiene a gates disposición servicios gratuitos de asistencia lingüística. [facility] [phone].    We comply with applicable federal civil rights laws and [place] laws. We do not discriminate on the basis of race, color, national origin, age, disability, sex, sexual orientation, or gender identity.            Thank you!     Thank you for choosing [facility]  for your care. Our goal is always to provide you with excellent care. Hearing back from our patients is one way we can continue to improve our services. Please take a few minutes to complete the written survey that you may receive in the mail after your visit with us. Thank you!             Your Updated Medication List - Protect others around you: Learn how to safely use, store and throw away your medicines at www.disposemymeds.org.          This list is accurate as of 8/21/18  4:49 PM.  Always use your most recent med list.                   Brand Name Dispense Instructions for use Diagnosis    albuterol 108 (90 Base) MCG/ACT inhaler    PROAIR HFA/PROVENTIL HFA/VENTOLIN HFA    1 Inhaler    Inhale 2 puffs into the lungs every 6 hours as needed for shortness of breath / dyspnea or wheezing    SOB (shortness of breath)       buPROPion 150 MG 24 hr tablet    WELLBUTRIN XL    90 tablet    Take 1 tablet (150 mg) by mouth every morning    Depression with anxiety       escitalopram 20 MG tablet    LEXAPRO    90 tablet    Take 1 tablet (20 mg) by mouth daily    Depression with anxiety       levothyroxine 137 MCG tablet    SYNTHROID/LEVOTHROID    90 tablet    Take 1 tablet (137 mcg) by mouth daily Note increase in dose    Hashimoto's disease       norethindrone-ethinyl estradiol 1-35 MG-MCG per tablet     ORTHO-NOVUM 1/35 (28)    112 tablet    Take one active tablet a day for 21 days.  Discard the inactive pills and start new pack on day 22.    Encounter for surveillance of contraceptive pills

## 2018-09-13 DIAGNOSIS — E06.3 HASHIMOTO'S DISEASE: ICD-10-CM

## 2018-09-13 LAB
T4 FREE SERPL-MCNC: 0.89 NG/DL (ref 0.76–1.46)
TSH SERPL DL<=0.005 MIU/L-ACNC: 5.75 MU/L (ref 0.4–4)

## 2018-09-13 PROCEDURE — 84439 ASSAY OF FREE THYROXINE: CPT | Performed by: PHYSICIAN ASSISTANT

## 2018-09-13 PROCEDURE — 36415 COLL VENOUS BLD VENIPUNCTURE: CPT | Performed by: PHYSICIAN ASSISTANT

## 2018-09-13 PROCEDURE — 84443 ASSAY THYROID STIM HORMONE: CPT | Performed by: PHYSICIAN ASSISTANT

## 2018-09-14 NOTE — PROGRESS NOTES
These results were reviewed and may wait for the return of the ordering provider on 9.17.18. Dr Caitlyn Pedraza MD.

## 2018-09-15 DIAGNOSIS — E06.3 HASHIMOTO'S DISEASE: ICD-10-CM

## 2018-09-17 RX ORDER — LEVOTHYROXINE SODIUM 137 UG/1
TABLET ORAL
Qty: 90 TABLET | Refills: 1 | Status: SHIPPED | OUTPATIENT
Start: 2018-09-17 | End: 2018-09-26

## 2018-09-17 NOTE — PROGRESS NOTES
Alexa Helms,       Your recent test results are attached, if you have any questions or concerns please feel free to contact me via e-mail or call [phone].  Thyroid looks better. How are you feeling?          Sincerely,  Michelle Purcell PA-C

## 2018-09-20 ENCOUNTER — MYC MEDICAL ADVICE (OUTPATIENT)
Dept: ORTHOPEDICS | Facility: CLINIC | Age: 28
End: 2018-09-20

## 2018-09-26 ENCOUNTER — OFFICE VISIT (OUTPATIENT)
Dept: ENDOCRINOLOGY | Facility: CLINIC | Age: 28
End: 2018-09-26
Attending: PHYSICIAN ASSISTANT
Payer: COMMERCIAL

## 2018-09-26 VITALS
HEART RATE: 96 BPM | WEIGHT: 215 LBS | SYSTOLIC BLOOD PRESSURE: 121 MMHG | BODY MASS INDEX: 33.74 KG/M2 | DIASTOLIC BLOOD PRESSURE: 84 MMHG | HEIGHT: 67 IN

## 2018-09-26 DIAGNOSIS — E06.3 HASHIMOTO'S DISEASE: ICD-10-CM

## 2018-09-26 DIAGNOSIS — E06.3 HYPOTHYROIDISM DUE TO HASHIMOTO'S THYROIDITIS: Primary | ICD-10-CM

## 2018-09-26 PROCEDURE — 99244 OFF/OP CNSLTJ NEW/EST MOD 40: CPT | Performed by: INTERNAL MEDICINE

## 2018-09-26 RX ORDER — LEVOTHYROXINE SODIUM 150 UG/1
TABLET ORAL
Qty: 30 TABLET | Refills: 11 | Status: SHIPPED | OUTPATIENT
Start: 2018-09-26 | End: 2018-11-11

## 2018-09-26 NOTE — PROGRESS NOTES
Name: Analia Lyons is a 27 year old woman, seen at the request of HIRA Rene for evaluation of    Chief Complaint   Patient presents with     Thyroid Problem     Hashimoto's Disease     HPI:  Recent issues:  Here for thyroid evaluation, with her fiance Luciano today  She has concerns about recent symptoms, overall thyroid management        2008. Concerns about wt gain, depression  Medical evaluation and lab testing reportedly showed low thyroid function  Started levothyroxine medication  Previous [facility] thyroid labs include:  Lab Results   Component Value Date    TSH 5.75 (H) 09/13/2018    TSH 12.12 (H) 05/29/2018    TSH 11.18 (H) 03/26/2018    TSH 3.17 03/23/2016    TSH 3.14 03/12/2015    T4 0.89 09/13/2018    T4 0.84 05/29/2018    T4 0.85 03/26/2018    T4 1.12 10/22/2014    T4 1.01 09/17/2012     (H) 04/01/2010     Recent [facility] labs include:  Lab Results   Component Value Date    TSH 5.75 (H) 09/13/2018    T4 0.89 09/13/2018     (H) 04/01/2010     Fam Hx thyroid disease   Hyperthyroidism and postablative hypothyroidism- mother  ~7/2018. Dose increase levothyroxine  Recent symptoms:     Wt gain nearly 100#/2 yrs- gradual, some fatigue- intermittent, some back pains- mid and lower back, daytime   No menses with use of continuous OCP med  Current dose:  Levothyroxine 0.137 mg daily      Single, engaged (Luciano), works for [employer]  Sees A. [facility] for general medicine evaluations.    PMH/PSH:  Past Medical History:   Diagnosis Date     Anxiety      ASCUS of cervix with negative high risk HPV 1/26/17    ASCUS/neg HR HPV.     Depressive disorder      H/O colposcopy with cervical biopsy 03/23/2017    Bx & ECC - negative     Hashimoto's disease      Headache(784.0)      Hypothyroidism due to Hashimoto's thyroiditis      Papanicolaou smear of cervix with atypical squamous cells of undetermined significance (ASC-US) 12/03/2015     Past Surgical History:   Procedure  Laterality Date     COLONOSCOPY N/A 12/16/2015    Procedure: COLONOSCOPY;  Surgeon: Duane, William Charles, MD;  Location: [facility] OR     COLONOSCOPY WITH CO2 INSUFFLATION N/A 12/16/2015    Procedure: COLONOSCOPY WITH CO2 INSUFFLATION;  Surgeon: Duane, William Charles, MD;  Location: [facility] OR     NO HISTORY OF SURGERY         Family Hx:  Family History   Problem Relation Age of Onset     Depression Brother      depression     Hyperlipidemia Mother      Thyroid Disease Mother      Hyperlipidemia Father      Breast Cancer Cousin      Depression Brother      Substance Abuse Sister      Substance Abuse Sister      C.A.D. No family hx of      Diabetes No family hx of      Breast Cancer No family hx of      Cancer - colorectal No family hx of      Anesthesia Reaction No family hx of      Blood Disease No family hx of          Social Hx:  Social History     Social History     Marital status: Single     Spouse name: N/A     Number of children: 0     Years of education: 16     Occupational History           [employer]     Social History Main Topics     Smoking status: Never Smoker     Smokeless tobacco: Never Used     Alcohol use Yes      Comment: socially     Drug use: No     Sexual activity: Yes     Partners: Male     Birth control/ protection: Pill     Other Topics Concern     Parent/Sibling W/ Cabg, Mi Or Angioplasty Before 65f 55m? No     Social History Narrative    Parents declaring bankruptcy; losing house due to mother's credit card debt.        Brother deployed to [place] for 16 months startin 4/1/09        Parents  but ''.          MEDICATIONS:  has a current medication list which includes the following prescription(s): albuterol, bupropion, escitalopram, levothyroxine, and norethindrone-ethinyl estradiol.    ROS:     ROS: 10 point ROS neg other than the symptoms noted above in the HPI.    GENERAL: mild fatigue, wt gain; denies fevers, chills, night sweats.   HEENT: no dysphagia, odonophagia,  "diplopia, neck pain  THYROID:  no apparent hyper or hypothyroid symptoms  CV: no chest pain, pressure, palpitations  LUNGS: no SOB, LOPEZ, cough, wheezing   ABDOMEN: no diarrhea, constipation, abdominal pain  EXTREMITIES: no rashes, ulcers, edema  NEUROLOGY: no headaches, denies changes in vision, tingling, extremitiy numbness   MSK:  Occasional back pains; denies muscle weakness  SKIN: no rashes or lesions  : no menses on continuous OCP medication  PSYCH:  stable mood, no significant anxiety or depression  ENDOCRINE: no heat or cold intolerance    Physical Exam   VS: /84  Pulse 96  Ht 1.695 m (5' 6.75\")  Wt 97.5 kg (215 lb)  BMI 33.93 kg/m2  GENERAL: AXOX3, NAD, well dressed, answering questions appropriately, appears stated age.  THYROID:  normal gland, no apparent nodules or goiter  HEENT: neck non-tender, no exopthalmous, no proptosis, EOMI  CV: RRR, no rubs, gallops, no murmurs   LUNGS: CTAB, no wheezes, rales, or ronchi  ABDOMEN: mildly obese, soft, nontender, nondistended  EXTREMITIES: no edema, +pedal pulses, no lesions  NEUROLOGY: CN grossly intact, no tremors  MSK: grossly intact  SKIN: no rashes, no lesions    LABS:    All pertinent notes, labs, and images personally reviewed by me.     A/P:  Encounter Diagnoses   Name Primary?     Hypothyroidism due to Hashimoto's thyroiditis Yes     Hashimoto's disease      Comments:  Reviewed health history and hypothyroidism issues.    Plan:  Discussed general issues with the hypothyroidism diagnosis and management  Reviewed thyroid gland anatomy and hormone physiology  Discussed lab tests used to assess patient thyroid hormone levels  We reviewed diagnosis and pathophysiology of Hashimoto's thyroiditis and hypothyroidism  Reviewed treatment options including levothyroxine medication, ideal dosing    Recommend:  Advised additional dose increase with levothyroxine medication   Raise dose as levothyroxine 0.15 mg daily, updated Rx sent to her pharmacy  Monitor " for symptom changes  Keep focus on diet, exercise, wt management  Plan lab appt in approx 6 weeks at her local  [facility]    Addressed patient questions today    Future labs ordered today:   Orders Placed This Encounter   Procedures     TSH     T4 free     Radiology/Consults ordered today: None    More than 50% of the time spent with Ms. Lyons on counseling / coordinating her care.  Total appointment time was 30 minutes.    Follow-up:  YAMILETH Schroeder MD  Endocrinology  [facility]  CC: Michelle Purcell

## 2018-11-03 DIAGNOSIS — F41.8 DEPRESSION WITH ANXIETY: ICD-10-CM

## 2018-11-05 ENCOUNTER — MYC MEDICAL ADVICE (OUTPATIENT)
Dept: NURSING | Facility: CLINIC | Age: 28
End: 2018-11-05

## 2018-11-05 RX ORDER — BUPROPION HYDROCHLORIDE 150 MG/1
TABLET ORAL
Qty: 90 TABLET | Refills: 1 | Status: SHIPPED | OUTPATIENT
Start: 2018-11-05 | End: 2019-04-09

## 2018-11-05 ASSESSMENT — PATIENT HEALTH QUESTIONNAIRE - PHQ9
SUM OF ALL RESPONSES TO PHQ QUESTIONS 1-9: 2
SUM OF ALL RESPONSES TO PHQ QUESTIONS 1-9: 2
10. IF YOU CHECKED OFF ANY PROBLEMS, HOW DIFFICULT HAVE THESE PROBLEMS MADE IT FOR YOU TO DO YOUR WORK, TAKE CARE OF THINGS AT HOME, OR GET ALONG WITH OTHER PEOPLE: NOT DIFFICULT AT ALL

## 2018-11-05 NOTE — TELEPHONE ENCOUNTER
[employer] message sent to pt to update PHQ-9.  Rosaura BECKMANN, RN

## 2018-11-06 DIAGNOSIS — E06.3 HASHIMOTO'S DISEASE: ICD-10-CM

## 2018-11-06 DIAGNOSIS — E06.3 HYPOTHYROIDISM DUE TO HASHIMOTO'S THYROIDITIS: ICD-10-CM

## 2018-11-06 LAB
T4 FREE SERPL-MCNC: 0.94 NG/DL (ref 0.76–1.46)
TSH SERPL DL<=0.005 MIU/L-ACNC: 6.34 MU/L (ref 0.4–4)

## 2018-11-06 PROCEDURE — 84439 ASSAY OF FREE THYROXINE: CPT | Performed by: INTERNAL MEDICINE

## 2018-11-06 PROCEDURE — 84443 ASSAY THYROID STIM HORMONE: CPT | Performed by: INTERNAL MEDICINE

## 2018-11-06 PROCEDURE — 36415 COLL VENOUS BLD VENIPUNCTURE: CPT | Performed by: INTERNAL MEDICINE

## 2018-11-06 ASSESSMENT — PATIENT HEALTH QUESTIONNAIRE - PHQ9: SUM OF ALL RESPONSES TO PHQ QUESTIONS 1-9: 2

## 2018-11-11 DIAGNOSIS — E06.3 HYPOTHYROIDISM DUE TO HASHIMOTO'S THYROIDITIS: ICD-10-CM

## 2018-11-11 RX ORDER — LEVOTHYROXINE SODIUM 175 UG/1
TABLET ORAL
Qty: 30 TABLET | Refills: 11 | Status: SHIPPED | OUTPATIENT
Start: 2018-11-11 | End: 2019-12-06

## 2018-12-15 DIAGNOSIS — F41.8 DEPRESSION WITH ANXIETY: ICD-10-CM

## 2018-12-19 RX ORDER — ESCITALOPRAM OXALATE 20 MG/1
TABLET ORAL
Qty: 90 TABLET | Refills: 0 | Status: SHIPPED | OUTPATIENT
Start: 2018-12-19 | End: 2019-03-16

## 2018-12-19 NOTE — TELEPHONE ENCOUNTER
PHQ-9 SCORE 6/7/2018 11/5/2018 12/18/2018   PHQ-9 Total Score - - -   PHQ-9 Total Score MyChart - 2 (Minimal depression) 2 (Minimal depression)   PHQ-9 Total Score 10 2 2     MAXIM-7 SCORE 6/26/2017 6/7/2018 12/18/2018   Total Score - - -   Total Score - - 2 (minimal anxiety)   Total Score 13 11 2     Prescription approved per [facility] Refill Protocol.    Noemi Biswas RN

## 2019-01-15 ENCOUNTER — DOCUMENTATION ONLY (OUTPATIENT)
Dept: LAB | Facility: CLINIC | Age: 29
End: 2019-01-15

## 2019-01-15 DIAGNOSIS — E03.9 HYPOTHYROIDISM, UNSPECIFIED TYPE: Primary | ICD-10-CM

## 2019-01-15 NOTE — PROGRESS NOTES
Patient has a lab appointment on 1/23/2019. Per the lab schedule scrubbing protocol they are not due for anything. Please review chart and send orders or let patient know appointment is not needed.    Thank you,  Kenisha Fuller

## 2019-01-15 NOTE — PROGRESS NOTES
Message from lab noted.  This patient has an evaluation appt with me on 1/30/19 (per plan).  She can have the thyroid blood tests done at the time of that appt... Or have a separate lab appt a few days beforehand if she wishes.  I have created the thyroid lab orders in her chart if she wishes the pre-appt lab testing option.  Please relay message to patient.    JABARI Schroeder MD  Endocrinology   [facility]

## 2019-01-23 DIAGNOSIS — E03.9 HYPOTHYROIDISM, UNSPECIFIED TYPE: ICD-10-CM

## 2019-01-23 LAB
T4 FREE SERPL-MCNC: 1.18 NG/DL (ref 0.76–1.46)
TSH SERPL DL<=0.005 MIU/L-ACNC: 0.4 MU/L (ref 0.4–4)

## 2019-01-23 PROCEDURE — 36415 COLL VENOUS BLD VENIPUNCTURE: CPT | Performed by: INTERNAL MEDICINE

## 2019-01-23 PROCEDURE — 84439 ASSAY OF FREE THYROXINE: CPT | Performed by: INTERNAL MEDICINE

## 2019-01-23 PROCEDURE — 84443 ASSAY THYROID STIM HORMONE: CPT | Performed by: INTERNAL MEDICINE

## 2019-01-28 ENCOUNTER — MYC MEDICAL ADVICE (OUTPATIENT)
Dept: ENDOCRINOLOGY | Facility: CLINIC | Age: 29
End: 2019-01-28

## 2019-01-28 DIAGNOSIS — E06.3 HYPOTHYROIDISM DUE TO HASHIMOTO'S THYROIDITIS: Primary | ICD-10-CM

## 2019-03-16 DIAGNOSIS — F41.8 DEPRESSION WITH ANXIETY: ICD-10-CM

## 2019-03-19 RX ORDER — ESCITALOPRAM OXALATE 20 MG/1
TABLET ORAL
Qty: 90 TABLET | Refills: 0 | Status: SHIPPED | OUTPATIENT
Start: 2019-03-19 | End: 2019-04-09

## 2019-03-19 NOTE — TELEPHONE ENCOUNTER
Haven't seen this patient for mood disorder in over a year. Will give 1 month then needs f/u.     sarmad

## 2019-03-21 ENCOUNTER — OFFICE VISIT (OUTPATIENT)
Dept: ORTHOPEDICS | Facility: CLINIC | Age: 29
End: 2019-03-21
Payer: COMMERCIAL

## 2019-03-21 VITALS
WEIGHT: 205 LBS | SYSTOLIC BLOOD PRESSURE: 126 MMHG | HEIGHT: 67 IN | DIASTOLIC BLOOD PRESSURE: 88 MMHG | HEART RATE: 98 BPM | BODY MASS INDEX: 32.18 KG/M2

## 2019-03-21 DIAGNOSIS — M54.42 CHRONIC BILATERAL LOW BACK PAIN WITH LEFT-SIDED SCIATICA: Primary | ICD-10-CM

## 2019-03-21 DIAGNOSIS — G89.29 CHRONIC BILATERAL LOW BACK PAIN WITH LEFT-SIDED SCIATICA: Primary | ICD-10-CM

## 2019-03-21 PROCEDURE — 99214 OFFICE O/P EST MOD 30 MIN: CPT | Performed by: FAMILY MEDICINE

## 2019-03-21 RX ORDER — CYCLOBENZAPRINE HCL 10 MG
10 TABLET ORAL 3 TIMES DAILY PRN
Qty: 30 TABLET | Refills: 1 | Status: SHIPPED | OUTPATIENT
Start: 2019-03-21 | End: 2021-12-22

## 2019-03-21 RX ORDER — PREDNISONE 20 MG/1
40 TABLET ORAL DAILY
Qty: 10 TABLET | Refills: 0 | Status: SHIPPED | OUTPATIENT
Start: 2019-03-21 | End: 2019-04-09

## 2019-03-21 ASSESSMENT — PAIN SCALES - GENERAL: PAINLEVEL: SEVERE PAIN (6)

## 2019-03-21 ASSESSMENT — MIFFLIN-ST. JEOR: SCORE: 1683.53

## 2019-03-21 NOTE — PROGRESS NOTES
"      [facility] FOLLOW-UP VISIT 3/21/2019    Analia Lyons's chief complaint for this visit includes:  Chief Complaint   Patient presents with     RECHECK     f/u back pain, no new injury      PCP: Michelle Purcell    Referring Provider:  No referring provider defined for this encounter.    Ht 1.695 m (5' 6.75\")   Wt 93 kg (205 lb)   BMI 32.35 kg/m    Severe Pain (6)       Interval History:     Follow up reason: increased back pain    Following Therapeutic Plan: Yes     Pain: Worsening    Function: Worsening      Medical History:    Any recent changes to your medical history? No    Any new medication prescribed since last visit? No    Review of Systems:    Do you have fever, chills, weight loss? No    Do you have any vision problems? No    Do you have any chest pain or edema? No    Do you have any shortness of breath or wheezing?  No    Do you have stomach problems? No    Do you have any numbness or focal weakness? No    Do you have diabetes? No    Do you have problems with bleeding or clotting? No    Do you have an rashes or other skin lesions? No    "

## 2019-03-21 NOTE — PATIENT INSTRUCTIONS
Thanks for coming today.  Ortho/Sports Medicine Clinic  [address] [place] [address]    To schedule future appointments in Ortho Clinic, you may call [phone].    To schedule ordered imaging by your Provider: Call [facility] at [phone]    [employer] available online at:   [email or web address]    Please call if any further questions or concerns [phone] and ask for the Orthopedic Department. Clinic hours 8 am to 5 pm.    Return to clinic if symptoms worsen.

## 2019-03-21 NOTE — PROGRESS NOTES
"HISTORY OF PRESENT ILLNESS  Ms. Lyons is a pleasant 28 year old year old female following up with back pain.  Analia initially saw me a couple months ago for intermittent low back pain.  This did resolve, unfortunately her symptoms returned about a week ago with no clear incident.  Left buttock and mid left thigh posteriorly.  This is worse in certain positions, worse the more she is on her feet, worse at the end of a long she does have some numbness and tingling in her thigh.  Additional history: as documented      REVIEW OF SYSTEMS (3/21/2019)  10 point ROS of systems including Constitutional, Eyes, Respiratory, Cardiovascular, Gastroenterology, Genitourinary, Integumentary, Musculoskeletal, Psychiatric were all negative except for pertinent positives noted in my HPI.     PHYSICAL EXAM  Vitals:    03/21/19 1445   BP: 126/88   BP Location: Right arm   Patient Position: Sitting   Pulse: 98   Weight: 93 kg (205 lb)   Height: 1.695 m (5' 6.75\")     General  - normal appearance, in no obvious distress  CV  - normal peripheral perfusion  Pulm  - normal respiratory pattern, non-labored  Musculoskeletal - lumbar spine  - stance: slow to rise and sit  - inspection: normal bone and joint alignment, no obvious scoliosis  - palpation: bilateral lumbar paravertebral spasm, left paraspinal tenderness  - ROM: pain with bilateral rotation, flexion past 30 deg, extension  - strength: lower extremities 5/5 in all planes  - special tests:  (+) slump test on left   Neuro  - patellar and Achilles DTRs 2+ bilaterally, no sensory or motor deficit, grossly normal coordination, normal muscle tone  Skin  - no ecchymosis, erythema, warmth, or induration, no obvious rash  Psych  - interactive, appropriate, normal mood and affect        ASSESSMENT & PLAN  Ms. Lyons is a 28 year old year old female following up with low back pain.    Analia has acute on chronic low back pain, now with radicular features.    After some discussion I did prescribe " her prednisone, she should start this in the morning.  I also prescribed her Flexeril for nighttime use.  In addition I am referring her to physical therapy.    We did discuss the utility of an MRI, this is something that we may perform in the future, if not resolving with medicine and physical therapy.    It was a pleasure seeing Analia.        Stewart Vazquez DO, [facility]

## 2019-03-21 NOTE — LETTER
"    3/21/2019         RE: Analia Lyons  [address]  [place] [id number]        Dear Colleague,    Thank you for referring your patient, Analia Lyons, to the [email or web address] Please see a copy of my visit note below.          [facility] FOLLOW-UP VISIT 3/21/2019    Analia Lyons's chief complaint for this visit includes:  Chief Complaint   Patient presents with     RECHECK     f/u back pain, no new injury      PCP: Michelle Purcell    Referring Provider:  No referring provider defined for this encounter.    Ht 1.695 m (5' 6.75\")   Wt 93 kg (205 lb)   BMI 32.35 kg/m     Severe Pain (6)       Interval History:     Follow up reason: increased back pain    Following Therapeutic Plan: Yes     Pain: Worsening    Function: Worsening      Medical History:    Any recent changes to your medical history? No    Any new medication prescribed since last visit? No    Review of Systems:    Do you have fever, chills, weight loss? No    Do you have any vision problems? No    Do you have any chest pain or edema? No    Do you have any shortness of breath or wheezing?  No    Do you have stomach problems? No    Do you have any numbness or focal weakness? No    Do you have diabetes? No    Do you have problems with bleeding or clotting? No    Do you have an rashes or other skin lesions? No      HISTORY OF PRESENT ILLNESS  Ms. Lyons is a pleasant 28 year old year old female following up with back pain.  Analia initially saw me a couple months ago for intermittent low back pain.  This did resolve, unfortunately her symptoms returned about a week ago with no clear incident.  Left buttock and mid left thigh posteriorly.  This is worse in certain positions, worse the more she is on her feet, worse at the end of a long she does have some numbness and tingling in her thigh.  Additional history: as documented      REVIEW OF SYSTEMS (3/21/2019)  10 point ROS of systems including " "Constitutional, Eyes, Respiratory, Cardiovascular, Gastroenterology, Genitourinary, Integumentary, Musculoskeletal, Psychiatric were all negative except for pertinent positives noted in my HPI.     PHYSICAL EXAM  Vitals:    03/21/19 1445   BP: 126/88   BP Location: Right arm   Patient Position: Sitting   Pulse: 98   Weight: 93 kg (205 lb)   Height: 1.695 m (5' 6.75\")     General  - normal appearance, in no obvious distress  CV  - normal peripheral perfusion  Pulm  - normal respiratory pattern, non-labored  Musculoskeletal - lumbar spine  - stance: slow to rise and sit  - inspection: normal bone and joint alignment, no obvious scoliosis  - palpation: bilateral lumbar paravertebral spasm, left paraspinal tenderness  - ROM: pain with bilateral rotation, flexion past 30 deg, extension  - strength: lower extremities 5/5 in all planes  - special tests:  (+) slump test on left   Neuro  - patellar and Achilles DTRs 2+ bilaterally, no sensory or motor deficit, grossly normal coordination, normal muscle tone  Skin  - no ecchymosis, erythema, warmth, or induration, no obvious rash  Psych  - interactive, appropriate, normal mood and affect        ASSESSMENT & PLAN  Ms. Lyons is a 28 year old year old female following up with low back pain.    Analia has acute on chronic low back pain, now with radicular features.    After some discussion I did prescribe her prednisone, she should start this in the morning.  I also prescribed her Flexeril for nighttime use.  In addition I am referring her to physical therapy.    We did discuss the utility of an MRI, this is something that we may perform in the future, if not resolving with medicine and physical therapy.    It was a pleasure seeing Analia.        Stewart Vazquez DO, CAQSM          Again, thank you for allowing me to participate in the care of your patient.        Sincerely,        Stewart Vazquez DO    "

## 2019-03-22 ENCOUNTER — THERAPY VISIT (OUTPATIENT)
Dept: PHYSICAL THERAPY | Facility: CLINIC | Age: 29
End: 2019-03-22
Payer: COMMERCIAL

## 2019-03-22 DIAGNOSIS — G89.29 CHRONIC BILATERAL LOW BACK PAIN WITH LEFT-SIDED SCIATICA: ICD-10-CM

## 2019-03-22 DIAGNOSIS — M54.42 CHRONIC BILATERAL LOW BACK PAIN WITH LEFT-SIDED SCIATICA: ICD-10-CM

## 2019-03-22 DIAGNOSIS — M54.42 ACUTE LEFT-SIDED LOW BACK PAIN WITH LEFT-SIDED SCIATICA: ICD-10-CM

## 2019-03-22 PROCEDURE — 97110 THERAPEUTIC EXERCISES: CPT | Mod: GP | Performed by: PHYSICAL THERAPIST

## 2019-03-22 PROCEDURE — 97112 NEUROMUSCULAR REEDUCATION: CPT | Mod: GP | Performed by: PHYSICAL THERAPIST

## 2019-03-22 PROCEDURE — 97161 PT EVAL LOW COMPLEX 20 MIN: CPT | Mod: GP | Performed by: PHYSICAL THERAPIST

## 2019-03-22 NOTE — PROGRESS NOTES
[facility] Initial Evaluation -- Lumbar    Date: March 22, 2019  Analia Lyons is a 28 year old female with a low left leg pain condition.   Referral: Dr. Vazquez  Work mechanical stresses:  Sitting, driving  Employment status:  Working with no restriction  Leisure mechanical stresses: emptying , cleaning, leaning forward when going to bathroom. Usually will go on daily walks with dogs.   Functional disability score (GUERLINE/STarT Back):  See chart  VAS score (0-10): 8/10  Patient goals/expectations:  Get exercises to help her get back to her normal level activity.    HISTORY:    Present symptoms: Left low back, gluteal down to mid thigh  Pain quality (sharp/shooting/stabbing/aching/burning/cramping):  Aching, shooting intermittent pain   Paresthesia (yes/no):  Yes, lateral thigh    Present since (onset date): 3/19/19.     Symptoms (improving/unchanging/worsening):  unchanging.     Symptoms commenced as a result of: unknown   Condition occurred in the following environment:   unknown     Symptoms at onset (back/thigh/leg): left low back  Constant symptoms (back/thigh/leg): left low back / thigh  Intermittent symptoms (back/thigh/leg): shooting pain into right low back.     Symptoms are made worse with the following: Always Sitting, Always Standing and Other - getting out of the [place] side of her car.    Symptoms are made better with the following: Sometimes Standing and Sometimes Lying    Disturbed sleep (yes/no):  yes Sleeping postures (prone/sup/side R/L): supine    Previous episodes (0/1-5/6-10/11+): 1-2 / year with them becoming more frequent. Year of first episode: 5 + years ago    Previous history: Physical therapy after having back pain when falling.   Previous treatments: prednisone started this morning so not sure.       Specific Questions:  Cough/Sneeze/Strain (pos/neg): neg  Bowel/Bladder (normal/abnormal): normal  Gait (normal/abnormal): normal  Medications  (nil/NSAIDS/analg/steroids/anticoag/other):  Steroids and Other - Thyroid and Anti-depressants  Medical allergies:  none  General health (excellent/good/fair/poor):  good  Pertinent medical history:  Asthma, Depression, Mental illness, Overweight and Thyroid problems  Imaging (None/Xray/MRI/Other):  X-ray in past, about a year ago.   Recent or major surgery (yes/no):  no  Night pain (yes/no): no  Accidents (yes/no): no  Unexplained weight loss (yes/no): no  Barriers at home: no  Other red flags: none    EXAMINATION    Posture:   Sitting (good/fair/poor): fair  Standing (good/fair/poor) good  Lordosis (red/acc/normal): normal  Correction of posture (better/worse/no effect): worse in left low back    Lateral Shift (right/left/nil): right  Relevant (yes/no):  yes  Other Observations: none    Neurological:    Motor deficit:  normal  Reflexes:  normal  Sensory deficit:  normal  Dural signs:  +slump on left.     Movement Loss:   Kingsley Mod Min Nil Pain   Flexion   x  Return to stand left low back   Extension  x   Left low back / gluteal   Side Gliding R    x none   Side Gliding L  x   Left low back / gluteal     Test Movements:   During: produces, abolishes, increases, decreases, no effect, centralizing, peripheralizing   After: better, worse, no better, no worse, no effect, centralized, peripheralized    Pretest symptoms standing: left low back / tingling down left leg. 4/10 PL   Symptoms During Symptoms After ROM increased ROM decreased No Effect   FIS Increases    No Worse         Rep FIS Increases    Worse         EIS Increases    Worse     x    Rep EIS Increases    Worse         Pretest symptoms lyin/10 low back / left leg pain    Symptoms During Symptoms After ROM increased ROM decreased No Effect   GARRETT           Rep GARRETT           EIL decrease No Better         Rep EIL Decreases    Better             Other Tests:     Provisional Classification:  Derangement - Asymmetrical, unilateral, symptoms above  knee    Principle of Management:  Education:  posture   Equipment provided:  Lumbar roll  Mechanical therapy (Y/N):  y   Extension principle:  pressup x 10 reps every 2 hours 6-8x /day.       ASSESSMENT/PLAN:    Patient is a 28 year old female with lumbar complaints.    Patient has the following significant findings with corresponding treatment plan.                Diagnosis 1:  Acute left low back pain  Pain -  hot/cold therapy, US, manual therapy, self management, education, directional preference exercise and home program  Decreased ROM/flexibility - manual therapy, therapeutic exercise, therapeutic activity and home program  Decreased function - therapeutic activities and home program  Impaired posture - neuro re-education, therapeutic activities and home program    Therapy Evaluation Codes:   1) History comprised of:   Personal factors that impact the plan of care:      None.    Comorbidity factors that impact the plan of care are:      Depression, Mental illness and Overweight.     Medications impacting care: Anti-depressant, Anti-inflammatory and Muscle relaxant.  2) Examination of Body Systems comprised of:   Body structures and functions that impact the plan of care:      Lumbar spine.   Activity limitations that impact the plan of care are:      Bending and Sitting.  3) Clinical presentation characteristics are:   Stable/Uncomplicated.  4) Decision-Making    Low complexity using standardized patient assessment instrument and/or measureable assessment of functional outcome.  Cumulative Therapy Evaluation is: Low complexity.    Previous and current functional limitations:  (See Goal Flow Sheet for this information)    Short term and Long term goals: (See Goal Flow Sheet for this information)     Communication ability:  Patient appears to be able to clearly communicate and understand verbal and written communication and follow directions correctly.  Treatment Explanation - The following has been discussed  with the patient:   RX ordered/plan of care  Anticipated outcomes  Possible risks and side effects  This patient would benefit from PT intervention to resume normal activities.   Rehab potential is good.    Frequency:  1 X week, once daily  Duration:  for 6 weeks  Discharge Plan:  Achieve all LTG.  Independent in home treatment program.  Reach maximal therapeutic benefit.    Please refer to the daily flowsheet for treatment today, total treatment time and time spent performing 1:1 timed codes.

## 2019-04-05 ENCOUNTER — TELEPHONE (OUTPATIENT)
Dept: OBGYN | Facility: CLINIC | Age: 29
End: 2019-04-05

## 2019-04-05 DIAGNOSIS — Z30.41 ENCOUNTER FOR SURVEILLANCE OF CONTRACEPTIVE PILLS: ICD-10-CM

## 2019-04-05 NOTE — TELEPHONE ENCOUNTER
[facility] Call Center    Phone Message    May a detailed message be left on voicemail: NA    Reason for Call: Pharmacy requesting a refill for norethindrone-ethinyl estradiol (ORTHO-NOVUM 1/35, 28,) 1-35 MG-MCG per tablet.  Patient will be out of medication today.  Thank you.    Action Taken: [id number]

## 2019-04-09 ENCOUNTER — OFFICE VISIT (OUTPATIENT)
Dept: FAMILY MEDICINE | Facility: CLINIC | Age: 29
End: 2019-04-09
Payer: COMMERCIAL

## 2019-04-09 VITALS
RESPIRATION RATE: 16 BRPM | OXYGEN SATURATION: 97 % | WEIGHT: 204 LBS | HEART RATE: 96 BPM | TEMPERATURE: 98.2 F | SYSTOLIC BLOOD PRESSURE: 112 MMHG | BODY MASS INDEX: 32.02 KG/M2 | HEIGHT: 67 IN | DIASTOLIC BLOOD PRESSURE: 80 MMHG

## 2019-04-09 DIAGNOSIS — F41.8 DEPRESSION WITH ANXIETY: ICD-10-CM

## 2019-04-09 PROCEDURE — 99213 OFFICE O/P EST LOW 20 MIN: CPT | Performed by: NURSE PRACTITIONER

## 2019-04-09 RX ORDER — BUPROPION HYDROCHLORIDE 150 MG/1
150 TABLET ORAL EVERY MORNING
Qty: 90 TABLET | Refills: 1 | Status: SHIPPED | OUTPATIENT
Start: 2019-04-09 | End: 2019-11-01

## 2019-04-09 RX ORDER — ESCITALOPRAM OXALATE 20 MG/1
20 TABLET ORAL DAILY
Qty: 90 TABLET | Refills: 1 | Status: SHIPPED | OUTPATIENT
Start: 2019-04-09 | End: 2019-12-17

## 2019-04-09 ASSESSMENT — ANXIETY QUESTIONNAIRES
3. WORRYING TOO MUCH ABOUT DIFFERENT THINGS: NOT AT ALL
6. BECOMING EASILY ANNOYED OR IRRITABLE: SEVERAL DAYS
GAD7 TOTAL SCORE: 1
1. FEELING NERVOUS, ANXIOUS, OR ON EDGE: NOT AT ALL
2. NOT BEING ABLE TO STOP OR CONTROL WORRYING: NOT AT ALL
5. BEING SO RESTLESS THAT IT IS HARD TO SIT STILL: NOT AT ALL
IF YOU CHECKED OFF ANY PROBLEMS ON THIS QUESTIONNAIRE, HOW DIFFICULT HAVE THESE PROBLEMS MADE IT FOR YOU TO DO YOUR WORK, TAKE CARE OF THINGS AT HOME, OR GET ALONG WITH OTHER PEOPLE: NOT DIFFICULT AT ALL
7. FEELING AFRAID AS IF SOMETHING AWFUL MIGHT HAPPEN: NOT AT ALL

## 2019-04-09 ASSESSMENT — MIFFLIN-ST. JEOR: SCORE: 1679

## 2019-04-09 ASSESSMENT — PATIENT HEALTH QUESTIONNAIRE - PHQ9
5. POOR APPETITE OR OVEREATING: NOT AT ALL
SUM OF ALL RESPONSES TO PHQ QUESTIONS 1-9: 2

## 2019-04-09 ASSESSMENT — PAIN SCALES - GENERAL: PAINLEVEL: NO PAIN (0)

## 2019-04-09 NOTE — PROGRESS NOTES
SUBJECTIVE:                                                    Analia Lyons is a 28 year old female who presents to clinic today for the following health issues:      New Patient/Transfer of Care  Depression Followup    Status since last visit: Stable     See PHQ-9 for current symptoms.  Other associated symptoms: None    Complicating factors:   Significant life event:  No   Current substance abuse:  None  Anxiety or Panic symptoms:  No    PHQ 11/5/2018 12/18/2018 4/9/2019   PHQ-9 Total Score 2 2 2   Q9: Thoughts of better off dead/self-harm past 2 weeks Not at all Not at all Not at all     In the past two weeks have you had thoughts of suicide or self-harm?  No.    Do you have concerns about your personal safety or the safety of others?   No  PHQ-9  English  PHQ-9   Any Language  Suicide Assessment Five-step Evaluation and Treatment (SAFE-T)    Amount of exercise or physical activity: 4-5 days/week for an average of 30-45 minutes    Problems taking medications regularly: No    Medication side effects: none    Diet: regular (no restrictions)    Doing well on wellbutrin and lexapro. Denies thoughts to harm self/others. Feels safe.    Has follow up with OBGYN for pap on 4/18.    Problem list and histories reviewed & adjusted, as indicated.  Additional history: as documented    Patient Active Problem List   Diagnosis     Hashimoto's disease     Familial hematuria     CARDIOVASCULAR SCREENING; LDL GOAL LESS THAN 160     Hypothyroidism     Mechanical low back pain     Keloid scar     Depression with anxiety     ASCUS of cervix with negative high risk HPV     Common wart     Hypothyroidism due to Hashimoto's thyroiditis     BMI 33.0-33.9,adult     High triglycerides     Acute left-sided low back pain with left-sided sciatica     Past Surgical History:   Procedure Laterality Date     COLONOSCOPY N/A 12/16/2015    Procedure: COLONOSCOPY;  Surgeon: Duane, William Charles, MD;  Location: [facility]     COLONOSCOPY WITH CO2  INSUFFLATION N/A 12/16/2015    Procedure: COLONOSCOPY WITH CO2 INSUFFLATION;  Surgeon: Duane, William Charles, MD;  Location: [facility]     NO HISTORY OF SURGERY         Social History     Tobacco Use     Smoking status: Never Smoker     Smokeless tobacco: Never Used   Substance Use Topics     Alcohol use: Yes     Comment: socially     Family History   Problem Relation Age of Onset     Depression Brother         depression     Hyperlipidemia Mother      Thyroid Disease Mother      Hyperlipidemia Father      Breast Cancer Cousin      Depression Brother      Substance Abuse Sister      Substance Abuse Sister      C.A.D. No family hx of      Diabetes No family hx of      Breast Cancer No family hx of      Cancer - colorectal No family hx of      Anesthesia Reaction No family hx of      Blood Disease No family hx of          Current Outpatient Medications   Medication Sig Dispense Refill     albuterol (PROAIR HFA/PROVENTIL HFA/VENTOLIN HFA) 108 (90 Base) MCG/ACT inhaler Inhale 2 puffs into the lungs every 6 hours as needed for shortness of breath / dyspnea or wheezing 1 Inhaler 0     buPROPion (WELLBUTRIN XL) 150 MG 24 hr tablet Take 1 tablet (150 mg) by mouth every morning 90 tablet 1     cyclobenzaprine (FLEXERIL) 10 MG tablet Take 1 tablet (10 mg) by mouth 3 times daily as needed for muscle spasms 30 tablet 1     escitalopram (LEXAPRO) 20 MG tablet Take 1 tablet (20 mg) by mouth daily 90 tablet 1     levothyroxine (SYNTHROID/LEVOTHROID) 175 MCG tablet Take 1 tablet by mouth daily as directed 30 tablet 11     norethindrone-ethinyl estradiol (ORTHO-NOVUM 1/35, 28,) 1-35 MG-MCG tablet Take one active tablet a day for 21 days.  Discard the inactive pills and start new pack on day 22. 112 tablet 0     Allergies   Allergen Reactions     Nkda [No Known Drug Allergies]      BP Readings from Last 3 Encounters:   04/09/19 112/80   03/21/19 126/88   09/26/18 121/84    Wt Readings from Last 3 Encounters:   04/09/19 92.5 kg (204 lb)  "  03/21/19 93 kg (205 lb)   09/26/18 97.5 kg (215 lb)                  Labs reviewed in EPIC    ROS:  Constitutional, HEENT, cardiovascular, pulmonary, GI, , musculoskeletal, neuro, skin, endocrine and psych systems are negative, except as otherwise noted.    OBJECTIVE:     /80 (BP Location: Right arm, Patient Position: Chair, Cuff Size: Adult Regular)   Pulse 96   Temp 98.2  F (36.8  C) (Oral)   Resp 16   Ht 1.695 m (5' 6.75\")   Wt 92.5 kg (204 lb)   LMP  (LMP Unknown)   SpO2 97%   Breastfeeding? No   BMI 32.19 kg/m    Body mass index is 32.19 kg/m .  GENERAL: healthy, alert and no distress  RESP: lungs clear to auscultation - no rales, rhonchi or wheezes  CV: regular rate and rhythm, normal S1 S2, no S3 or S4, no murmur, click or rub, no peripheral edema and peripheral pulses strong  MS: no gross musculoskeletal defects noted, no edema  PSYCH: mentation appears normal, affect normal/bright    Diagnostic Test Results:  none     ASSESSMENT/PLAN:     1. Depression with anxiety  Doing well on current dosing. Refilled.   - buPROPion (WELLBUTRIN XL) 150 MG 24 hr tablet; Take 1 tablet (150 mg) by mouth every morning  Dispense: 90 tablet; Refill: 1  - escitalopram (LEXAPRO) 20 MG tablet; Take 1 tablet (20 mg) by mouth daily  Dispense: 90 tablet; Refill: 1    See Patient Instructions    The benefits, risks and potential side effects were discussed in detail. Black box warnings discussed as relevant. All patient questions were answered. The patient was instructed to follow up immediately if any adverse reactions develop.    Return precautions discussed, including when to seek urgent/emergent care.    Patient verbalizes understanding and agrees with plan of care. Patient stable for discharge.      MERRY Phillips [facility]      "

## 2019-04-09 NOTE — PATIENT INSTRUCTIONS
At [facility], we strive to deliver an exceptional experience to you, every time we see you.  If you receive a survey in the mail, please send us back your thoughts. We really do value your feedback.    Based on your medical history, these are the current health maintenance/preventive care services that you are due for (some may have been done at this visit.)  Health Maintenance Due   Topic Date Due     HIV SCREEN (SYSTEM ASSIGNED)  12/06/2007     PREVENTIVE CARE VISIT  03/08/2019     HPV Q1 Year  03/08/2019         Suggested websites for health information:  Www.[employer].org : Up to date and easily searchable information on multiple topics.  Www.[employer].gov : medication info, interactive tutorials, watch real surgeries online  Www.familydoctor.org : good info from the Academy of Family Physicians  Www.cdc.gov : public health info, travel advisories, epidemics (H1N1)  Www.aap.org : children's health info, normal development, vaccinations  Www.health.[facility].[place].us : [place] dept of health, public health issues in [place], N1N1    Your care team:                            Family Medicine Internal Medicine   MD Fletcher Otero MD Shantel Branch-Fleming, MD Katya Georgiev PA-C Nam Ho, MD Pediatrics   CUONG See, MD Rosie Hidalgo CNP, MD Deborah Mielke, MD Kim Thein, APRN CNP      Clinic hours: Monday - Thursday 7 am-7 pm; Fridays 7 am-5 pm.   Urgent care: Monday - Friday 11 am-9 pm; Saturday and Sunday 9 am-5 pm.  Pharmacy : Monday -Thursday 8 am-8 pm; Friday 8 am-6 pm; Saturday and Sunday 9 am-5 pm.     Clinic: [phone]   Pharmacy: [phone]

## 2019-04-10 ASSESSMENT — ANXIETY QUESTIONNAIRES: GAD7 TOTAL SCORE: 1

## 2019-05-16 ENCOUNTER — HEALTH MAINTENANCE LETTER (OUTPATIENT)
Age: 29
End: 2019-05-16

## 2019-06-06 ENCOUNTER — OFFICE VISIT (OUTPATIENT)
Dept: OBGYN | Facility: CLINIC | Age: 29
End: 2019-06-06
Payer: COMMERCIAL

## 2019-06-06 VITALS
HEART RATE: 83 BPM | BODY MASS INDEX: 33.64 KG/M2 | OXYGEN SATURATION: 99 % | DIASTOLIC BLOOD PRESSURE: 80 MMHG | SYSTOLIC BLOOD PRESSURE: 118 MMHG | WEIGHT: 213.2 LBS

## 2019-06-06 DIAGNOSIS — Z01.419 ENCOUNTER FOR GYNECOLOGICAL EXAMINATION WITHOUT ABNORMAL FINDING: Primary | ICD-10-CM

## 2019-06-06 DIAGNOSIS — Z30.41 ENCOUNTER FOR SURVEILLANCE OF CONTRACEPTIVE PILLS: ICD-10-CM

## 2019-06-06 DIAGNOSIS — R87.610 ASCUS OF CERVIX WITH NEGATIVE HIGH RISK HPV: ICD-10-CM

## 2019-06-06 PROCEDURE — 99395 PREV VISIT EST AGE 18-39: CPT | Performed by: OBSTETRICS & GYNECOLOGY

## 2019-06-06 PROCEDURE — 87624 HPV HI-RISK TYP POOLED RSLT: CPT | Performed by: OBSTETRICS & GYNECOLOGY

## 2019-06-06 PROCEDURE — 88175 CYTOPATH C/V AUTO FLUID REDO: CPT | Performed by: OBSTETRICS & GYNECOLOGY

## 2019-06-06 NOTE — PROGRESS NOTES
Analia Lyons is a 28 year old female , who presents for annual exam.   No unusual bleeding, no incontinence, or unusual discharge.   She is currently using OCPs for menstrual regulation and for contraception.  She does not have any apparent contraindications to use.  She has not had any apparent complications with it's use.  Last cholesterol:   Recent Labs   Lab Test 18  0757 18  0944   CHOL 190 206*   HDL 38* 38*   LDL 89 Cannot estimate LDL when triglyceride exceeds 400 mg/dL  109*   TRIG 314* 412*     Past Medical History:   Diagnosis Date     Anxiety      ASCUS of cervix with negative high risk HPV 17    ASCUS/neg HR HPV.     Depressive disorder      H/O colposcopy with cervical biopsy 2017    Bx & ECC - negative     Hashimoto's disease      Headache(784.0)      Hypothyroidism due to Hashimoto's thyroiditis      Papanicolaou smear of cervix with atypical squamous cells of undetermined significance (ASC-US) 2015       Past Surgical History:   Procedure Laterality Date     COLONOSCOPY N/A 2015    Procedure: COLONOSCOPY;  Surgeon: Duane, William Charles, MD;  Location: [facility]     COLONOSCOPY WITH CO2 INSUFFLATION N/A 2015    Procedure: COLONOSCOPY WITH CO2 INSUFFLATION;  Surgeon: Duane, William Charles, MD;  Location: [facility]     NO HISTORY OF SURGERY         OB History    Para Term  AB Living   0 0 0 0 0 0   SAB TAB Ectopic Multiple Live Births   0 0 0 0 0       Gyn History:  Gynecological History         No LMP recorded. (Menstrual status: Birth Control).     STD HPV/no PID/no IUD      history of abnormal pap smear:  yes  Last pap:   Lab Results   Component Value Date    PAP ASC-US 2018           Current Outpatient Medications   Medication Sig Dispense Refill     buPROPion (WELLBUTRIN XL) 150 MG 24 hr tablet Take 1 tablet (150 mg) by mouth every morning 90 tablet 1     cyclobenzaprine (FLEXERIL) 10 MG tablet Take 1 tablet (10 mg) by mouth 3 times  daily as needed for muscle spasms 30 tablet 1     escitalopram (LEXAPRO) 20 MG tablet Take 1 tablet (20 mg) by mouth daily 90 tablet 1     levothyroxine (SYNTHROID/LEVOTHROID) 175 MCG tablet Take 1 tablet by mouth daily as directed 30 tablet 11     norethindrone-ethinyl estradiol (ORTHO-NOVUM 1/35, 28,) 1-35 MG-MCG tablet Take one active tablet a day for 21 days.  Discard the inactive pills and start new pack on day 22. 112 tablet 0     albuterol (PROAIR HFA/PROVENTIL HFA/VENTOLIN HFA) 108 (90 Base) MCG/ACT inhaler Inhale 2 puffs into the lungs every 6 hours as needed for shortness of breath / dyspnea or wheezing 1 Inhaler 0       Allergies   Allergen Reactions     Nkda [No Known Drug Allergies]        Social History     Socioeconomic History     Marital status: Single     Spouse name: Not on file     Number of children: 0     Years of education: 16     Highest education level: Not on file   Occupational History     Occupation:      Comment: [employer]   Social Needs     Financial resource strain: Not on file     Food insecurity:     Worry: Not on file     Inability: Not on file     Transportation needs:     Medical: Not on file     Non-medical: Not on file   Tobacco Use     Smoking status: Never Smoker     Smokeless tobacco: Never Used   Substance and Sexual Activity     Alcohol use: Yes     Comment: socially     Drug use: No     Sexual activity: Yes     Partners: Male     Birth control/protection: Pill   Lifestyle     Physical activity:     Days per week: Not on file     Minutes per session: Not on file     Stress: Not on file   Relationships     Social connections:     Talks on phone: Not on file     Gets together: Not on file     Attends Confucianist service: Not on file     Active member of club or organization: Not on file     Attends meetings of clubs or organizations: Not on file     Relationship status: Not on file     Intimate partner violence:     Fear of current or ex partner: Not on file      Emotionally abused: Not on file     Physically abused: Not on file     Forced sexual activity: Not on file   Other Topics Concern     Parent/sibling w/ CABG, MI or angioplasty before 65F 55M? No   Social History Narrative    Parents declaring bankruptcy; losing house due to mother's credit card debt.        Brother deployed to [place] for 16 months startin 4/1/09        Parents  but ''.       Family History   Problem Relation Age of Onset     Depression Brother         depression     Hyperlipidemia Mother      Thyroid Disease Mother      Hyperlipidemia Father      Breast Cancer Cousin      Depression Brother      Substance Abuse Sister      Substance Abuse Sister      C.A.D. No family hx of      Diabetes No family hx of      Breast Cancer No family hx of      Cancer - colorectal No family hx of      Anesthesia Reaction No family hx of      Blood Disease No family hx of          ROS:  All negative except as above.      EXAM:  /80 (BP Location: Right arm, Cuff Size: Adult Regular)   Pulse 83   Wt 96.7 kg (213 lb 3.2 oz)   SpO2 99%   BMI 33.64 kg/m    General:  WNWD female, NAD  Alert  Oriented x 3  Gait:  Normal  Skin:  Normal skin turgor  Neurologic:  CN grossly intact, good sensation.    HEENT:  NC/AT, EOMI  Neck:  No masses palpated, symmetrical, carotids +2/4, no bruits heard  Heart:  RRR  Lungs:  Clear   Breasts:  Symmetrical, no dimpling noted, no masses palpated, no discharge expressed  Abdomen:  Non-tender, non-distended.  Vulva: No external lesions, normal hair distribution, no adenopathy  BUS:  Normal, no masses noted  Urethra:  No hypermobility noted  Urethral meatus:  No masses noted  Vagina: Moist, pink, no abnormal discharge, well rugated, no lesions  Cervix: Smooth, pink, no visible lesions  Uterus: Normal size, anteverted, non-tender, mobile  Ovaries: No mass, non-tender, mobile  Perianal:  No masses noted.    Extremities:  No clubbing, cyanosis, or edema      ASSESSMENT/PLAN    Annual examination   ASCUS pap smear.  Repeat pap smear and HPV co-testing today.   Prescription for the OCPs in continuous fashion sent to pharmacy.   Low fat diet, weight bearing exercises and self breast exams on a monthly basis have been recommended.  I have discussed with patient the risks, benefits, medications, treatment options and modalities.   I have instructed the patient to call or schedule a follow-up appointment if any problems.

## 2019-06-11 LAB
COPATH REPORT: NORMAL
PAP: NORMAL

## 2019-07-09 PROBLEM — M54.42 ACUTE LEFT-SIDED LOW BACK PAIN WITH LEFT-SIDED SCIATICA: Status: RESOLVED | Noted: 2019-03-22 | Resolved: 2019-07-09

## 2019-07-10 DIAGNOSIS — E06.3 HYPOTHYROIDISM DUE TO HASHIMOTO'S THYROIDITIS: ICD-10-CM

## 2019-07-10 LAB
T4 FREE SERPL-MCNC: 0.94 NG/DL (ref 0.76–1.46)
TSH SERPL DL<=0.005 MIU/L-ACNC: 1.28 MU/L (ref 0.4–4)

## 2019-07-10 PROCEDURE — 84443 ASSAY THYROID STIM HORMONE: CPT | Performed by: INTERNAL MEDICINE

## 2019-07-10 PROCEDURE — 84439 ASSAY OF FREE THYROXINE: CPT | Performed by: INTERNAL MEDICINE

## 2019-07-10 PROCEDURE — 36415 COLL VENOUS BLD VENIPUNCTURE: CPT | Performed by: INTERNAL MEDICINE

## 2019-07-19 ENCOUNTER — VIRTUAL VISIT (OUTPATIENT)
Dept: FAMILY MEDICINE | Facility: OTHER | Age: 29
End: 2019-07-19

## 2019-07-20 NOTE — PROGRESS NOTES
"Date:   Clinician: Krystal Simpson  Clinician NPI: [id number]  Patient: Analia Lyons  Patient : 1990  Patient Address: [place] [address] [address]  Patient Phone: [phone]  Visit Protocol: Yeast infection  Patient Summary:  Analia is a 28 year old ( : 1990 ) female who initiated a Visit for a presumed vaginal yeast infection. When asked the question \"Please sign me up to receive news, health information and promotions. \", Analia responded \"No\".    Analia began noticing vaginal burning, vaginal irritation, vaginal discharge, and vaginal pruritus 1-3 days ago.   Symptom details   Vaginal discharge: She has a more than normal amount of white, chunky (like cottage cheese) discharge. The discharge does not have a fishy smell.    She denies having abdominal pain, blisters, and open sores. She also denies feeling feverish.   She has not tried to treat her current symptoms with any medication.   Precipitating events  Analia denies having a sexually transmitted disease.   Pertinent medical history  Analia has a history of vaginal yeast infections. She has had zero (0) occurrences in the past year and the current symptoms are similar to previous yeast infections. She has used fluconazole (Diflucan) to treat previous yeast infections. 2 doses of fluconazole (Diflucan) has typically been needed for symptoms to resolve in the past.  She prefers a pill. She denies taking antibiotics in the past 2 weeks.   She does NOT smoke or use smokeless tobacco.   She denies pregnancy and denies breastfeeding. She has menstruated in the past month.      MEDICATIONS: No current medications, ALLERGIES: NKDA  Clinician Response:  Dear Analia,  Based on the information you have provided, you likely have a vaginal yeast infection which is a common infection of the vagina caused by a fungus. Yeast are a type of fungus.  The most common symptom of a yeast infection is itching in and around the vagina. Other " signs and symptoms include burning, redness, or a thick, white vaginal discharge that looks like cottage cheese and does not have a bad smell.  Medication information  I am prescribing:     Fluconazole (Diflucan) 150 mg oral tablet. Take 1 tablet by mouth in a single dose. Repeat dose in 3 days if symptoms are still present. There are no refills with this prescription.   Self care  Steps you can take to prevent symptoms of future vaginal yeast infection:     Avoid irritants such as scented bath products, tampons, pads, or vaginal sprays and powders    Avoid douching    Wear cotton underwear and if you are comfortable doing so, do not wear underwear to bed    Avoid hot tubs and whirlpool spas     When to seek care  Most women notice improvement in their symptoms within 1-2 days after starting treatment with complete clearing in 5-7 days.  Please make an appointment to be seen in a clinic or urgent care if any of the following occur:     Your symptoms have not improved in 3 days or not resolved in 10 days    You develop new symptoms or your symptoms become worse    If you think you may be at risk for an STD      Diagnosis: Candida vulvovaginitis  Diagnosis ICD: B37.3  Prescription: fluconazole (Diflucan) 150 mg oral tablet 2 tablet, 4 days supply. Take 1 tablet by mouth in a single dose, repeat dose in 3 days if symptoms are still present. Refills: 0, Refill as needed: no, Allow substitutions: yes  Pharmacy: [id number] - [phone] - [address] [place] [address]

## 2019-08-05 ENCOUNTER — ANCILLARY PROCEDURE (OUTPATIENT)
Dept: GENERAL RADIOLOGY | Facility: CLINIC | Age: 29
End: 2019-08-05
Attending: NURSE PRACTITIONER
Payer: COMMERCIAL

## 2019-08-05 ENCOUNTER — OFFICE VISIT (OUTPATIENT)
Dept: FAMILY MEDICINE | Facility: CLINIC | Age: 29
End: 2019-08-05
Payer: COMMERCIAL

## 2019-08-05 VITALS
SYSTOLIC BLOOD PRESSURE: 123 MMHG | BODY MASS INDEX: 35.31 KG/M2 | TEMPERATURE: 98.2 F | OXYGEN SATURATION: 98 % | HEART RATE: 110 BPM | DIASTOLIC BLOOD PRESSURE: 81 MMHG | WEIGHT: 225 LBS | HEIGHT: 67 IN | RESPIRATION RATE: 16 BRPM

## 2019-08-05 DIAGNOSIS — M79.675 GREAT TOE PAIN, LEFT: Primary | ICD-10-CM

## 2019-08-05 DIAGNOSIS — E66.01 MORBID OBESITY (H): ICD-10-CM

## 2019-08-05 DIAGNOSIS — M79.675 GREAT TOE PAIN, LEFT: ICD-10-CM

## 2019-08-05 LAB
ERYTHROCYTE [DISTWIDTH] IN BLOOD BY AUTOMATED COUNT: 12.7 % (ref 10–15)
HCT VFR BLD AUTO: 34.1 % (ref 35–47)
HGB BLD-MCNC: 11.8 G/DL (ref 11.7–15.7)
MCH RBC QN AUTO: 31 PG (ref 26.5–33)
MCHC RBC AUTO-ENTMCNC: 34.6 G/DL (ref 31.5–36.5)
MCV RBC AUTO: 90 FL (ref 78–100)
PLATELET # BLD AUTO: 300 10E9/L (ref 150–450)
RBC # BLD AUTO: 3.81 10E12/L (ref 3.8–5.2)
URATE SERPL-MCNC: 7.1 MG/DL (ref 2.6–6)
WBC # BLD AUTO: 8.6 10E9/L (ref 4–11)

## 2019-08-05 PROCEDURE — 36415 COLL VENOUS BLD VENIPUNCTURE: CPT | Performed by: NURSE PRACTITIONER

## 2019-08-05 PROCEDURE — 85027 COMPLETE CBC AUTOMATED: CPT | Performed by: NURSE PRACTITIONER

## 2019-08-05 PROCEDURE — 84550 ASSAY OF BLOOD/URIC ACID: CPT | Performed by: NURSE PRACTITIONER

## 2019-08-05 PROCEDURE — 73630 X-RAY EXAM OF FOOT: CPT | Mod: LT

## 2019-08-05 PROCEDURE — 99213 OFFICE O/P EST LOW 20 MIN: CPT | Performed by: NURSE PRACTITIONER

## 2019-08-05 RX ORDER — PREDNISONE 10 MG/1
TABLET ORAL
Qty: 14 TABLET | Refills: 0 | Status: SHIPPED | OUTPATIENT
Start: 2019-08-05 | End: 2019-08-20

## 2019-08-05 ASSESSMENT — PAIN SCALES - GENERAL: PAINLEVEL: MILD PAIN (3)

## 2019-08-05 ASSESSMENT — MIFFLIN-ST. JEOR: SCORE: 1774.25

## 2019-08-05 NOTE — LETTER
August 5, 2019        Analia Lyons  [address]   [place] [address]        Analia,  Your uric acid is elevated which is diagnostic of gout. Please take the prednisone as we talked about. You can also take tylenol but no advil/aleve/ibuprofen. Hope you feel better soon!    Sincerely,    Kaylene Byrd, CNP/ymv        Resulted Orders   Uric acid   Result Value Ref Range    Uric Acid 7.1 (H) 2.6 - 6.0 mg/dL   CBC with platelets   Result Value Ref Range    WBC 8.6 4.0 - 11.0 10e9/L    RBC Count 3.81 3.8 - 5.2 10e12/L    Hemoglobin 11.8 11.7 - 15.7 g/dL    Hematocrit 34.1 (L) 35.0 - 47.0 %    MCV 90 78 - 100 fl    MCH 31.0 26.5 - 33.0 pg    MCHC 34.6 31.5 - 36.5 g/dL    RDW 12.7 10.0 - 15.0 %    Platelet Count 300 150 - 450 10e9/L

## 2019-08-05 NOTE — PROGRESS NOTES
Subjective     Analia Lyons is a 28 year old female who presents to clinic today for the following health issues:    HPI   Joint Pain    Onset: 1-2 weeks ago    Description:   Location: left foot  Character: throbbing    Intensity: moderate    Progression of Symptoms: better    Accompanying Signs & Symptoms:  Other symptoms: warmth and swelling    History:   Previous similar pain: no       Precipitating factors:   Trauma or overuse: no     Alleviating factors:  Improved by: NSAID - Advil    Therapies Tried and outcome: Ice, elevation, Advil and Tylenol: temporary relief      Trigger is cran-grape juice which she had last weekend. Not much alcohol and none in the last few weeks. No known injury. Foot is swollen and warm over great toe. Gait altered due to pain in toe. Taking advil 400-600 mg q4h for last few days. Not pregnant or breastfeeding. On lexapro.    Patient Active Problem List   Diagnosis     Hashimoto's disease     Familial hematuria     CARDIOVASCULAR SCREENING; LDL GOAL LESS THAN 160     Hypothyroidism     Mechanical low back pain     Keloid scar     Depression with anxiety     ASCUS of cervix with negative high risk HPV     Common wart     Hypothyroidism due to Hashimoto's thyroiditis     BMI 33.0-33.9,adult     High triglycerides     Obesity (BMI 35.0-39.9) with comorbidity (H)     Past Surgical History:   Procedure Laterality Date     COLONOSCOPY N/A 12/16/2015    Procedure: COLONOSCOPY;  Surgeon: Duane, William Charles, MD;  Location: [facility]     COLONOSCOPY WITH CO2 INSUFFLATION N/A 12/16/2015    Procedure: COLONOSCOPY WITH CO2 INSUFFLATION;  Surgeon: Duane, William Charles, MD;  Location: [facility]     NO HISTORY OF SURGERY         Social History     Tobacco Use     Smoking status: Never Smoker     Smokeless tobacco: Never Used   Substance Use Topics     Alcohol use: Yes     Comment: socially     Family History   Problem Relation Age of Onset     Depression Brother         depression     Hyperlipidemia  Mother      Thyroid Disease Mother      Hyperlipidemia Father      Breast Cancer Cousin      Depression Brother      Substance Abuse Sister      Substance Abuse Sister      C.A.D. No family hx of      Diabetes No family hx of      Breast Cancer No family hx of      Cancer - colorectal No family hx of      Anesthesia Reaction No family hx of      Blood Disease No family hx of          Current Outpatient Medications   Medication Sig Dispense Refill     albuterol (PROAIR HFA/PROVENTIL HFA/VENTOLIN HFA) 108 (90 Base) MCG/ACT inhaler Inhale 2 puffs into the lungs every 6 hours as needed for shortness of breath / dyspnea or wheezing 1 Inhaler 0     buPROPion (WELLBUTRIN XL) 150 MG 24 hr tablet Take 1 tablet (150 mg) by mouth every morning 90 tablet 1     cyclobenzaprine (FLEXERIL) 10 MG tablet Take 1 tablet (10 mg) by mouth 3 times daily as needed for muscle spasms 30 tablet 1     escitalopram (LEXAPRO) 20 MG tablet Take 1 tablet (20 mg) by mouth daily 90 tablet 1     levothyroxine (SYNTHROID/LEVOTHROID) 175 MCG tablet Take 1 tablet by mouth daily as directed 30 tablet 11     norethindrone-ethinyl estradiol (ORTHO-NOVUM 1/35, 28,) 1-35 MG-MCG tablet Take one active tablet a day for 21 days.  Discard the inactive pills and start new pack on day 22. 112 tablet 4     predniSONE (DELTASONE) 10 MG tablet Take 40 mg daily x2 days then 20 mg daily x2 days then 10 mg x2 14 tablet 0     Allergies   Allergen Reactions     Nkda [No Known Drug Allergies]      BP Readings from Last 3 Encounters:   08/05/19 123/81   06/06/19 118/80   04/09/19 112/80    Wt Readings from Last 3 Encounters:   08/05/19 102.1 kg (225 lb)   06/06/19 96.7 kg (213 lb 3.2 oz)   04/09/19 92.5 kg (204 lb)                      Reviewed and updated as needed this visit by Provider  Tobacco  Allergies  Meds  Problems  Med Hx  Surg Hx  Fam Hx         Review of Systems   ROS COMP: Constitutional, HEENT, cardiovascular, pulmonary, gi and gu systems are negative,  "except as otherwise noted.      Objective    /81 (BP Location: Right arm, Patient Position: Chair, Cuff Size: Adult Large)   Pulse 110   Temp 98.2  F (36.8  C) (Oral)   Resp 16   Ht 1.695 m (5' 6.75\")   Wt 102.1 kg (225 lb)   LMP  (LMP Unknown)   SpO2 98%   Breastfeeding? No   BMI 35.50 kg/m    Body mass index is 35.5 kg/m .  Physical Exam   GENERAL: healthy, alert and no distress  MS: no gross musculoskeletal defects noted. Pain with palpation to left great toe. No redness. Minimal swelling. DP and DP 2+. Cap refill <2 seconds. CMS intact.  SKIN: no suspicious lesions or rashes  PSYCH: mentation appears normal, affect normal/bright    Diagnostic Test Results:  Results for orders placed or performed in visit on 08/05/19 (from the past 24 hour(s))   CBC with platelets   Result Value Ref Range    WBC 8.6 4.0 - 11.0 10e9/L    RBC Count 3.81 3.8 - 5.2 10e12/L    Hemoglobin 11.8 11.7 - 15.7 g/dL    Hematocrit 34.1 (L) 35.0 - 47.0 %    MCV 90 78 - 100 fl    MCH 31.0 26.5 - 33.0 pg    MCHC 34.6 31.5 - 36.5 g/dL    RDW 12.7 10.0 - 15.0 %    Platelet Count 300 150 - 450 10e9/L       x-ray: normal    Assessment & Plan       ICD-10-CM    1. Great toe pain, left M79.675 Uric acid     CBC with platelets     XR Foot Left G/E 3 Views     predniSONE (DELTASONE) 10 MG tablet   2. Morbid obesity (H) E66.01      Will treat for suspected gout. X-ray and CBC normal. Uric acid pending. Will use prednisone as significant risk of bleeding with NSAIDs and lexapro according to [employer] and [employer].    BMI:   Estimated body mass index is 35.5 kg/m  as calculated from the following:    Height as of this encounter: 1.695 m (5' 6.75\").    Weight as of this encounter: 102.1 kg (225 lb).           See Patient Instructions    Return in about 1 week (around 8/12/2019), or if symptoms worsen or fail to improve.     The benefits, risks and potential side effects were discussed in detail. Black box warnings discussed as relevant. All " patient questions were answered. The patient was instructed to follow up immediately if any adverse reactions develop.    Return precautions discussed, including when to seek urgent/emergent care.    Patient verbalizes understanding and agrees with plan of care. Patient stable for discharge.      MERRY Phillips [facility]

## 2019-08-05 NOTE — RESULT ENCOUNTER NOTE
Analia,  Your x-ray was normal. I have sent the prednisone for suspected gout to the pharmacy. If your uric acid comes back normal I will have you stop it. You can also take acetaminophen but should avoid ibuprofen or aleve.  Please let me know if you have questions,  MERRY Phillips CNP

## 2019-08-05 NOTE — RESULT ENCOUNTER NOTE
Analia,  Your uric acid is elevated which is diagnostic of gout. Please take the prednisone as we talked about. You can also take tylenol but no advil/aleve/ibuprofen. Hope you feel better soon!  Kayelne

## 2019-08-09 ENCOUNTER — OFFICE VISIT (OUTPATIENT)
Dept: URGENT CARE | Facility: URGENT CARE | Age: 29
End: 2019-08-09
Payer: COMMERCIAL

## 2019-08-09 ENCOUNTER — ANCILLARY PROCEDURE (OUTPATIENT)
Dept: GENERAL RADIOLOGY | Facility: CLINIC | Age: 29
End: 2019-08-09
Attending: PHYSICIAN ASSISTANT
Payer: COMMERCIAL

## 2019-08-09 VITALS
HEART RATE: 105 BPM | WEIGHT: 226 LBS | SYSTOLIC BLOOD PRESSURE: 114 MMHG | BODY MASS INDEX: 35.47 KG/M2 | DIASTOLIC BLOOD PRESSURE: 84 MMHG | TEMPERATURE: 98.4 F | OXYGEN SATURATION: 97 % | HEIGHT: 67 IN | RESPIRATION RATE: 16 BRPM

## 2019-08-09 DIAGNOSIS — R07.9 CHEST PAIN, UNSPECIFIED TYPE: ICD-10-CM

## 2019-08-09 DIAGNOSIS — M94.0 COSTOCHONDRITIS: Primary | ICD-10-CM

## 2019-08-09 PROCEDURE — 71046 X-RAY EXAM CHEST 2 VIEWS: CPT

## 2019-08-09 PROCEDURE — 99214 OFFICE O/P EST MOD 30 MIN: CPT | Performed by: PHYSICIAN ASSISTANT

## 2019-08-09 ASSESSMENT — ENCOUNTER SYMPTOMS
VOMITING: 0
WOUND: 0
DIZZINESS: 0
HEADACHES: 0
PALPITATIONS: 0
NECK STIFFNESS: 0
LIGHT-HEADEDNESS: 0
ALLERGIC/IMMUNOLOGIC NEGATIVE: 1
NAUSEA: 0
CHILLS: 0
ARTHRALGIAS: 1
BRUISES/BLEEDS EASILY: 0
RESPIRATORY NEGATIVE: 1
SORE THROAT: 0
HEMATOLOGIC/LYMPHATIC NEGATIVE: 1
EYES NEGATIVE: 1
JOINT SWELLING: 0
DIARRHEA: 0
COUGH: 0
MYALGIAS: 0
ENDOCRINE NEGATIVE: 1
NECK PAIN: 0
WEAKNESS: 0
SHORTNESS OF BREATH: 0
RHINORRHEA: 0
FEVER: 0
BACK PAIN: 0

## 2019-08-09 ASSESSMENT — MIFFLIN-ST. JEOR: SCORE: 1778.79

## 2019-08-09 ASSESSMENT — PAIN SCALES - GENERAL: PAINLEVEL: MODERATE PAIN (4)

## 2019-08-09 NOTE — PROGRESS NOTES
"Chief Complaint:    Chief Complaint   Patient presents with     Shoulder Pain     left shoulder/chest pain when breathing, worse with deeper breaths x one week       HPI: Analia Lyons is an 28 year old female who presents for evaluation and treatment of L shoulder and chest pain.  Symptoms started 7 days ago and comes and goes.  The pain has been more constant in nature the past 24 hours.  Breathing in deeply makes the pain worse.  The pain is sharp in nature in the L side of the chest and is more achy in nature in the L shoulder area.  She has not tried anything for the pain.  She denies any shortness of breath. No nausea or vomiting.  No recent illness, fever, or cough.  She does not recall any recent injury.    Patient has a complicated medical Hx \"see problem list below.\"    ROS:      Review of Systems   Constitutional: Negative for chills and fever.   HENT: Negative for congestion, ear pain, rhinorrhea and sore throat.    Eyes: Negative.    Respiratory: Negative.  Negative for cough and shortness of breath.    Cardiovascular: Positive for chest pain. Negative for palpitations.   Gastrointestinal: Negative for diarrhea, nausea and vomiting.   Endocrine: Negative.    Genitourinary: Negative.    Musculoskeletal: Positive for arthralgias. Negative for back pain, joint swelling, myalgias, neck pain and neck stiffness.   Skin: Negative.  Negative for rash and wound.   Allergic/Immunologic: Negative.  Negative for immunocompromised state.   Neurological: Negative for dizziness, weakness, light-headedness and headaches.   Hematological: Negative.  Does not bruise/bleed easily.        Family History   Family History   Problem Relation Age of Onset     Depression Brother         depression     Hyperlipidemia Mother      Thyroid Disease Mother      Hyperlipidemia Father      Breast Cancer Cousin      Depression Brother      Substance Abuse Sister      Substance Abuse Sister      C.A.D. No family hx of      Diabetes No " family hx of      Breast Cancer No family hx of      Cancer - colorectal No family hx of      Anesthesia Reaction No family hx of      Blood Disease No family hx of        Social History  Social History     Socioeconomic History     Marital status: Single     Spouse name: Not on file     Number of children: 0     Years of education: 16     Highest education level: Not on file   Occupational History     Occupation:      Comment: [employer]   Social Needs     Financial resource strain: Not on file     Food insecurity:     Worry: Not on file     Inability: Not on file     Transportation needs:     Medical: Not on file     Non-medical: Not on file   Tobacco Use     Smoking status: Never Smoker     Smokeless tobacco: Never Used   Substance and Sexual Activity     Alcohol use: Yes     Comment: socially     Drug use: No     Sexual activity: Yes     Partners: Male     Birth control/protection: Pill   Lifestyle     Physical activity:     Days per week: Not on file     Minutes per session: Not on file     Stress: Not on file   Relationships     Social connections:     Talks on phone: Not on file     Gets together: Not on file     Attends [employer] service: Not on file     Active member of club or organization: Not on file     Attends meetings of clubs or organizations: Not on file     Relationship status: Not on file     Intimate partner violence:     Fear of current or ex partner: Not on file     Emotionally abused: Not on file     Physically abused: Not on file     Forced sexual activity: Not on file   Other Topics Concern     Parent/sibling w/ CABG, MI or angioplasty before 65F 55M? No   Social History Narrative    Parents declaring bankruptcy; losing house due to mother's credit card debt.        Brother deployed to [place] for 16 months startin 4/1/09        Parents  but ''.        Surgical History:  Past Surgical History:   Procedure Laterality Date     COLONOSCOPY N/A 12/16/2015     Procedure: COLONOSCOPY;  Surgeon: Duane, William Charles, MD;  Location: [facility]     COLONOSCOPY WITH CO2 INSUFFLATION N/A 12/16/2015    Procedure: COLONOSCOPY WITH CO2 INSUFFLATION;  Surgeon: Duane, William Charles, MD;  Location: [facility]     NO HISTORY OF SURGERY          Problem List:  Patient Active Problem List   Diagnosis     Hashimoto's disease     Familial hematuria     CARDIOVASCULAR SCREENING; LDL GOAL LESS THAN 160     Hypothyroidism     Mechanical low back pain     Keloid scar     Depression with anxiety     ASCUS of cervix with negative high risk HPV     Common wart     Hypothyroidism due to Hashimoto's thyroiditis     BMI 33.0-33.9,adult     High triglycerides     Obesity (BMI 35.0-39.9) with comorbidity (H)        Allergies:  Allergies   Allergen Reactions     Nkda [No Known Drug Allergies]         Current Meds:    Current Outpatient Medications:      albuterol (PROAIR HFA/PROVENTIL HFA/VENTOLIN HFA) 108 (90 Base) MCG/ACT inhaler, Inhale 2 puffs into the lungs every 6 hours as needed for shortness of breath / dyspnea or wheezing, Disp: 1 Inhaler, Rfl: 0     buPROPion (WELLBUTRIN XL) 150 MG 24 hr tablet, Take 1 tablet (150 mg) by mouth every morning, Disp: 90 tablet, Rfl: 1     cyclobenzaprine (FLEXERIL) 10 MG tablet, Take 1 tablet (10 mg) by mouth 3 times daily as needed for muscle spasms, Disp: 30 tablet, Rfl: 1     escitalopram (LEXAPRO) 20 MG tablet, Take 1 tablet (20 mg) by mouth daily, Disp: 90 tablet, Rfl: 1     levothyroxine (SYNTHROID/LEVOTHROID) 175 MCG tablet, Take 1 tablet by mouth daily as directed, Disp: 30 tablet, Rfl: 11     norethindrone-ethinyl estradiol (ORTHO-NOVUM 1/35, 28,) 1-35 MG-MCG tablet, Take one active tablet a day for 21 days.  Discard the inactive pills and start new pack on day 22., Disp: 112 tablet, Rfl: 4     predniSONE (DELTASONE) 10 MG tablet, Take 40 mg daily x2 days then 20 mg daily x2 days then 10 mg x2, Disp: 14 tablet, Rfl: 0     PHYSICAL EXAM:     Vital signs noted  "and reviewed by Narayan Bee  /84 (BP Location: Left arm, Patient Position: Sitting, Cuff Size: Adult Large)   Pulse 105   Temp 98.4  F (36.9  C) (Oral)   Resp 16   Ht 1.695 m (5' 6.75\")   Wt 102.5 kg (226 lb)   LMP  (LMP Unknown)   SpO2 97%   BMI 35.66 kg/m       PEFR:    Physical Exam   Constitutional: She is oriented to person, place, and time. She appears well-developed and well-nourished. She is cooperative.  Non-toxic appearance. She does not have a sickly appearance. She does not appear ill. No distress.   HENT:   Head: Normocephalic and atraumatic.   Right Ear: Hearing, tympanic membrane, external ear and ear canal normal. Tympanic membrane is not perforated, not erythematous, not retracted and not bulging.   Left Ear: Hearing, tympanic membrane, external ear and ear canal normal. Tympanic membrane is not perforated, not erythematous, not retracted and not bulging.   Nose: No mucosal edema or rhinorrhea. Right sinus exhibits no maxillary sinus tenderness and no frontal sinus tenderness. Left sinus exhibits no maxillary sinus tenderness and no frontal sinus tenderness.   Mouth/Throat: Mucous membranes are normal. No oropharyngeal exudate, posterior oropharyngeal edema, posterior oropharyngeal erythema or tonsillar abscesses. Tonsils are 0 on the right. Tonsils are 0 on the left. No tonsillar exudate.   Eyes: Pupils are equal, round, and reactive to light. EOM are normal. Right eye exhibits no discharge. Left eye exhibits no discharge.   Neck: Normal range of motion. Neck supple.   Cardiovascular: Normal rate, regular rhythm, normal heart sounds and intact distal pulses. Exam reveals no gallop and no friction rub.   No murmur heard.  Pulmonary/Chest: Effort normal and breath sounds normal. No stridor. No respiratory distress. She has no decreased breath sounds. She has no wheezes. She has no rhonchi. She has no rales. She exhibits no tenderness.   Pain with palpation just superior of the L " "breast \"see diagram for location.\"       Abdominal: Soft. Bowel sounds are normal. She exhibits no distension and no mass. There is no tenderness. There is no guarding.   Lymphadenopathy:     She has no cervical adenopathy.   Neurological: She is alert and oriented to person, place, and time. She has normal reflexes. No cranial nerve deficit.   Skin: Skin is warm and dry. She is not diaphoretic.   Psychiatric: She has a normal mood and affect. Her behavior is normal. Judgment and thought content normal.   Nursing note and vitals reviewed.       Labs:       Medical Decision Making:    Differential Diagnosis:  MI, Pneumonia, PE, costchondritis.     ASSESSMENT:     1. Costochondritis    2. Chest pain, unspecified type           PLAN:     Patient presents for 1 week of on and of chest and L shoulder pain.  Patient is in no acute distress.  She is afebrile with stable vital signs.  Lung sounds were clear and O2 sats at 97% on RA.  CXR was negative for any acute infiltrates, consolidations, or pneumothorax per my read.  Pain was able to be reproduced with palpation of the L upper chest area.  Patient instructed to follow up with PCP in 1 week if symptoms are not improving.  Sooner if symptoms worsen.  Worrisome symptoms discussed with instructions to go to the ED.  Patient verbalized understanding and agreed with this plan.     Narayan Bee  8/9/2019, 1:16 PM    "

## 2019-08-20 ENCOUNTER — OFFICE VISIT (OUTPATIENT)
Dept: FAMILY MEDICINE | Facility: CLINIC | Age: 29
End: 2019-08-20
Payer: COMMERCIAL

## 2019-08-20 VITALS
TEMPERATURE: 98.3 F | BODY MASS INDEX: 36.1 KG/M2 | RESPIRATION RATE: 16 BRPM | OXYGEN SATURATION: 96 % | HEIGHT: 67 IN | DIASTOLIC BLOOD PRESSURE: 76 MMHG | HEART RATE: 122 BPM | SYSTOLIC BLOOD PRESSURE: 108 MMHG | WEIGHT: 230 LBS

## 2019-08-20 DIAGNOSIS — M10.9 ACUTE GOUT INVOLVING TOE OF LEFT FOOT, UNSPECIFIED CAUSE: Primary | ICD-10-CM

## 2019-08-20 LAB
ANION GAP SERPL CALCULATED.3IONS-SCNC: 6 MMOL/L (ref 3–14)
BUN SERPL-MCNC: 15 MG/DL (ref 7–30)
CALCIUM SERPL-MCNC: 9.1 MG/DL (ref 8.5–10.1)
CHLORIDE SERPL-SCNC: 107 MMOL/L (ref 94–109)
CO2 SERPL-SCNC: 27 MMOL/L (ref 20–32)
CREAT SERPL-MCNC: 0.78 MG/DL (ref 0.52–1.04)
GFR SERPL CREATININE-BSD FRML MDRD: >90 ML/MIN/{1.73_M2}
GLUCOSE SERPL-MCNC: 91 MG/DL (ref 70–99)
POTASSIUM SERPL-SCNC: 4.2 MMOL/L (ref 3.4–5.3)
SODIUM SERPL-SCNC: 140 MMOL/L (ref 133–144)
URATE SERPL-MCNC: 8.3 MG/DL (ref 2.6–6)

## 2019-08-20 PROCEDURE — 84550 ASSAY OF BLOOD/URIC ACID: CPT | Performed by: NURSE PRACTITIONER

## 2019-08-20 PROCEDURE — 99213 OFFICE O/P EST LOW 20 MIN: CPT | Performed by: NURSE PRACTITIONER

## 2019-08-20 PROCEDURE — 80048 BASIC METABOLIC PNL TOTAL CA: CPT | Performed by: NURSE PRACTITIONER

## 2019-08-20 PROCEDURE — 36415 COLL VENOUS BLD VENIPUNCTURE: CPT | Performed by: NURSE PRACTITIONER

## 2019-08-20 RX ORDER — COLCHICINE 0.6 MG/1
TABLET ORAL
Qty: 3 TABLET | Refills: 0 | Status: SHIPPED | OUTPATIENT
Start: 2019-08-20 | End: 2019-10-18

## 2019-08-20 ASSESSMENT — MIFFLIN-ST. JEOR: SCORE: 1796.93

## 2019-08-20 ASSESSMENT — PAIN SCALES - GENERAL: PAINLEVEL: MILD PAIN (3)

## 2019-08-20 NOTE — RESULT ENCOUNTER NOTE
Analia,  Your kidney function is normal. Your uric acid is actually higher than before. Let me know how the colchicine works. If not better in a couple of days, let's do prednisone again.  MERRY Phillips CNP

## 2019-08-20 NOTE — PROGRESS NOTES
Subjective     Analia Lyons is a 28 year old female who presents to clinic today for the following health issues:    HPI   Gout/ single inflamed joint   Onset: 1 week     Description:   Location: big toe and foot - left  Joint Swelling: YES  Redness: no   Pain: YES    Intensity: mild    Progression of Symptoms:  worsening    Accompanying Signs & Symptoms:  Fevers: no     History:   Trauma to the area: no   Previous history of gout: YES   Recent illness:  no     Precipitating factors:   Diet-rich in purine: no  Alcohol use: YES  Diuretic use: no     Alleviating factors:  None     Therapies Tried and outcome: prednisone with some relief       Treated with prednisone on 8/9 because can't use NSAIDs due to lexapro. Left great toe swollen, red and painful. Took prednisone and felt good for couple of days and then symptoms returned. Both parents with gout and on prophylactic treatment.     Not pregnant or breastfeeding. On continuous OCP.    Patient Active Problem List   Diagnosis     Hashimoto's disease     Familial hematuria     CARDIOVASCULAR SCREENING; LDL GOAL LESS THAN 160     Hypothyroidism     Mechanical low back pain     Keloid scar     Depression with anxiety     ASCUS of cervix with negative high risk HPV     Common wart     Hypothyroidism due to Hashimoto's thyroiditis     BMI 33.0-33.9,adult     High triglycerides     Obesity (BMI 35.0-39.9) with comorbidity (H)     Past Surgical History:   Procedure Laterality Date     COLONOSCOPY N/A 12/16/2015    Procedure: COLONOSCOPY;  Surgeon: Duane, William Charles, MD;  Location: [facility]     COLONOSCOPY WITH CO2 INSUFFLATION N/A 12/16/2015    Procedure: COLONOSCOPY WITH CO2 INSUFFLATION;  Surgeon: Duane, William Charles, MD;  Location: [facility]     NO HISTORY OF SURGERY         Social History     Tobacco Use     Smoking status: Never Smoker     Smokeless tobacco: Never Used   Substance Use Topics     Alcohol use: Yes     Comment: socially     Family History   Problem  Relation Age of Onset     Depression Brother         depression     Hyperlipidemia Mother      Thyroid Disease Mother      Hyperlipidemia Father      Breast Cancer Cousin      Depression Brother      Substance Abuse Sister      Substance Abuse Sister      C.A.D. No family hx of      Diabetes No family hx of      Breast Cancer No family hx of      Cancer - colorectal No family hx of      Anesthesia Reaction No family hx of      Blood Disease No family hx of          Current Outpatient Medications   Medication Sig Dispense Refill     albuterol (PROAIR HFA/PROVENTIL HFA/VENTOLIN HFA) 108 (90 Base) MCG/ACT inhaler Inhale 2 puffs into the lungs every 6 hours as needed for shortness of breath / dyspnea or wheezing 1 Inhaler 0     buPROPion (WELLBUTRIN XL) 150 MG 24 hr tablet Take 1 tablet (150 mg) by mouth every morning 90 tablet 1     colchicine (COLCYRS) 0.6 MG tablet Take 1.2 mg (2 tablets) now and then 0.6 mg (1 tablet) in 1 hour 3 tablet 0     cyclobenzaprine (FLEXERIL) 10 MG tablet Take 1 tablet (10 mg) by mouth 3 times daily as needed for muscle spasms 30 tablet 1     escitalopram (LEXAPRO) 20 MG tablet Take 1 tablet (20 mg) by mouth daily 90 tablet 1     levothyroxine (SYNTHROID/LEVOTHROID) 175 MCG tablet Take 1 tablet by mouth daily as directed 30 tablet 11     norethindrone-ethinyl estradiol (ORTHO-NOVUM 1/35, 28,) 1-35 MG-MCG tablet Take one active tablet a day for 21 days.  Discard the inactive pills and start new pack on day 22. 112 tablet 4     predniSONE (DELTASONE) 10 MG tablet Take 40 mg daily x2 days then 20 mg daily x2 days then 10 mg x2 14 tablet 0     Allergies   Allergen Reactions     Nkda [No Known Drug Allergies]      BP Readings from Last 3 Encounters:   08/20/19 108/76   08/09/19 114/84   08/05/19 123/81    Wt Readings from Last 3 Encounters:   08/20/19 104.3 kg (230 lb)   08/09/19 102.5 kg (226 lb)   08/05/19 102.1 kg (225 lb)                      Reviewed and updated as needed this visit by  "Provider         Review of Systems   ROS COMP: Constitutional, HEENT, cardiovascular, pulmonary, gi and gu systems are negative, except as otherwise noted.      Objective    /76 (BP Location: Right arm, Patient Position: Chair, Cuff Size: Adult Regular)   Pulse 122   Temp 98.3  F (36.8  C) (Oral)   Resp 16   Ht 1.695 m (5' 6.75\")   Wt 104.3 kg (230 lb)   LMP  (LMP Unknown)   SpO2 96%   BMI 36.29 kg/m    Body mass index is 36.29 kg/m .  Physical Exam   GENERAL: healthy, alert and no distress  RESP: lungs clear to auscultation - no rales, rhonchi or wheezes  CV: regular rate and rhythm, normal S1 S2, no S3 or S4, no murmur, click or rub, no peripheral edema and peripheral pulses strong  MS: no gross musculoskeletal defects noted, no edema. DP and PD 2+, normal. Tenderness to left great toe  SKIN: no suspicious lesions or rashes  PSYCH: mentation appears normal, affect normal/bright    Diagnostic Test Results:  No results found for this or any previous visit (from the past 24 hour(s)).        Assessment & Plan     1. Acute gout involving toe of left foot, unspecified cause  Start colchicine - take 1.2 mg (2 tablets) x1 and then 0.6 mg (1 tablet) 1 hour later   Avoid alcohol and foods high in purine (red meat, turkey and wild game, organ meats, seafood) as well as drinks/foods with high fructose corn syrup. Could consider another course of prednisone if needed. Could also consider prophylactic treatment if needed in the future.   - Basic metabolic panel  (Ca, Cl, CO2, Creat, Gluc, K, Na, BUN)  - Uric acid  - colchicine (COLCYRS) 0.6 MG tablet; Take 1.2 mg (2 tablets) now and then 0.6 mg (1 tablet) in 1 hour  Dispense: 3 tablet; Refill: 0     BMI:   Estimated body mass index is 36.29 kg/m  as calculated from the following:    Height as of this encounter: 1.695 m (5' 6.75\").    Weight as of this encounter: 104.3 kg (230 lb).           See Patient Instructions    Return in about 1 week (around 8/27/2019), or " if symptoms worsen or fail to improve.    The benefits, risks and potential side effects were discussed in detail. Black box warnings discussed as relevant. All patient questions were answered. The patient was instructed to follow up immediately if any adverse reactions develop.    Return precautions discussed, including when to seek urgent/emergent care.    Patient verbalizes understanding and agrees with plan of care. Patient stable for discharge.      MERRY Phillips [facility]

## 2019-08-20 NOTE — PATIENT INSTRUCTIONS
Start colchicine - take 1.2 mg (2 tablets) x1 and then 0.6 mg (1 tablet) 1 hour later   Avoid alcohol and foods high in purine (red meat, turkey and wild game, organ meats, seafood) as well as drinks/foods with high fructose corn syrup.       Patient Education     Gout    Gout is an inflammation of a joint due to a build-up of gout crystals in the joint fluid. This occurs when there is an excess of uric acid (a normal waste product) in the body. Uric acid builds up in the body when the kidneys are unable to filter enough of it from the blood. This may occur with age. It is also associated with kidney disease. Gout occurs more often in people with obesity, diabetes, high blood pressure, or high levels of fats in the blood. It may run in families. Gout tends to come and go. A flare up of gout is called an attack. Drinking alcohol or eating certain foods (such as shellfish or foods with additives such as high-fructose corn syrup) may increase uric acid levels in the blood and cause a gout attack.  During a gout attack, the affected joint may become a hot, red, swollen and painful. If you have had one attack of gout, you are likely to have another. An attack of gout can be treated with medicine. If these attacks become frequent, a daily medicine may be prescribed to help the kidneys remove uric acid from the body.  Home care  During a gout attack:    Rest painful joints. If gout affects the joints of your foot or leg, you may want to use crutches for the first few days to keep from bearing weight on the affected joint.    When sitting or lying down, raise the painful joint to a level higher than your heart.    Apply an ice pack (ice cubes in a plastic bag wrapped in a thin towel) over the injured area for 20 minutes every 1 to 2 hours the first day for pain relief. Continue this 3 to 4 times a day for swelling and pain.    Avoid alcohol and foods listed below (see Preventing attacks) during a gout attack. Drink extra  fluid to help flush the uric acid through your kidneys.    If you were prescribed a medicine to treat gout, take it as your healthcare provider has instructed. Don't skip doses.    Take anti-inflammatory medicine as directed.     If pain medicines have been prescribed, take them exactly as directed.    Preventing attacks    Minimize or avoid alcohol use. Excess alcohol intake can cause a gout attack.    Limit these foods and beverages:  ? Organ meats, such as kidneys and liver  ? Certain seafoods (anchovies, sardines, shrimp, scallops, herring, mackerel)  ? Wild game, meat extracts and meat gravies  ? Foods and beverages sweetened with high-fructose corn syrup, such as sodas    Eat a healthy diet including low-fat and nonfat dairy, whole grains, and vegetables.    If you are overweight, talk to your healthcare provider about a weight reduction plan. Avoid fasting or extreme low calorie diets (less than 900 calories per day). This will increase uric acid levels in the body.    If you have diabetes or high blood pressure, work with your doctor to manage these conditions.    Protect the joint from injury. Trauma can trigger a gout attack.  Follow-up care  Follow up with your healthcare provider, or as advised.   When to seek medical advice  Call your healthcare provider if you have any of the following:    Fever over 100.4 F (38. C) with worsening joint pain    Increasing redness around the joint    Pain developing in another joint    Repeated vomiting, abdominal pain, or blood in the vomit or stool (black or red color)  Date Last Reviewed: 3/1/2017    6497-4691 [employer]. [address] [place] [address], [place] [address]. All rights reserved. This information is not intended as a substitute for professional medical care. Always follow your healthcare professional's instructions.           Patient Education     Treating Gout Attacks     Raising the joint above the level of your heart can help reduce gout  symptoms.     Gout is a disease that affects the joints. It is caused by excess uric acid in your blood that may lead to crystals forming in your joints. Left untreated, it can lead to painful foot and joint deformities and even kidney problems. But, by treating gout early, you can relieve pain and help prevent future problems. Gout can usually be treated with medicine and proper diet. In severe cases, surgery may be needed.  Gout attacks are painful and often happen more than once. Taking medicines may reduce pain and prevent attacks in the future. There are also some things you can do at home to relieve symptoms.  Medicines for gout  Your healthcare provider may prescribe a daily medicine to reduce levels of uric acid. Reducing your uric acid levels may help prevent gout attacks. Allopurinol is one commonly used medicine taken daily to reduce uric acid levels. Other daily medicines used to reduce uric acid levels include febuxostat, lesinurad, and probencid. Other medicines can help relieve pain and swelling during an acute attack. Medicines such as NSAIDs (nonsteroidal anti-inflammatory medicines), steroids, and colchicine may be prescribed for intermittent use to relieve an acute gout attack. Be sure to take your medicine as directed.  What you can do  Below are some things you can do at home to relieve gout symptoms. Your healthcare provider may have other tips.    Rest the painful joint as much as you can.    Raise the painful joint so it is at a level higher than your heart.    Use ice for 10 minutes every 1 to 2 hours as possible.  How can I prevent gout?  With a little effort, you may be able to prevent gout attacks in the future. Here are some things you can do:    Don't eat foods high in purines  ? Certain meats (red meat, processed meat, turkey)  ? Organ meats (kidney, liver, sweetbread)  ? Shellfish (lobster, crab, shrimp, scallop, mussel)  ? Certain fish (anchovy, sardine, herring, mackerel)    Take  any medicines prescribed by your healthcare provider.    Lose weight if you need to.    Reduce high fructose corn syrup in meals and drinks.    Reduce or cut out alcohol, particularly beer, but also red wine and spirits.    Control blood pressure, diabetes, and cholesterol.    Drink plenty of water to help flush uric acid from your body.  Date Last Reviewed: 4/1/2018 2000-2018 [employer]. [address] [place] [address], [place] [address]. All rights reserved. This information is not intended as a substitute for professional medical care. Always follow your healthcare professional's instructions.           Patient Education     Gout Diet  Gout is a painful condition caused by an excess of uric acid, a waste product made by the body. Uric acid forms crystals that collect in the joints. The immune response to these crystals brings on symptoms of joint pain and swelling. This is called a gout attack. Often, medications and diet changes are combined to manage gout. Below are some guidelines for changing your diet to help you manage gout and prevent attacks. Your healthcare provider will help you determine the best eating plan for you.  Eating to manage gout  Weight loss for those who are overweight may help reduce gout attacks.  Eat less of these foods  Eating too many foods containing purines may raise the levels of uric acid in your body. This raises your risk for a gout attack. Try to limit these foods and drinks:    Alcohol, such as beer and red wine. You may be told to avoid alcohol completely.    Soft drinks that contain sugar or high fructose corn syrup    Certain fish, including anchovies, sardines, fish eggs, and herring    Shellfish    Certain meats, such as red meat, hot dogs, luncheon meats, and turkey    Organ meats, such as liver, kidneys, and sweetbreads    Legumes, such as dried beans and peas    Other high fat foods such as gravy, whole milk, and high fat cheeses    Vegetables such as  asparagus, cauliflower, spinach, and mushrooms used to be thought to contribute to an increased risk for a gout attack, but recent studies show that high purine vegetables don't increase the risk for a gout attack.  Eat more of these foods  Other foods may be helpful for people with gout. Add some of these foods to your diet:    Cherries contain chemicals that may lower uric acid.    Omega fatty acids. These are found in some fatty fish such as salmon, certain oils (flax, olive, or nut), and nuts themselves. Omega fatty acids may help prevent inflammation due to gout.    Dairy products that are low-fat or fat-free, such as cheese and yogurt    Complex carbohydrate foods, including whole grains, brown rice, oats, and beans    Coffee, in moderation    Water, approximately 64 ounces per day  Follow-up care  Follow up with your healthcare provider as advised.  When to seek medical advice  Call your healthcare provider right away if any of these occur:    Return of gout symptoms, usually at night:    Severe pain, swelling, and heat in a joint, especially the base of the big toe    Affected joint is hard to move    Skin of the affected joint is purple or red    Fever of 100.4 F (38 C) or higher    Pain that doesn't get better even with prescribed medicine   Date Last Reviewed: 6/1/2018 2000-2018 [employer]. [address] [place] [address], [place] [address]. All rights reserved. This information is not intended as a substitute for professional medical care. Always follow your healthcare professional's instructions.           Patient Education     Eating to Prevent Gout  Gout is a painful form of arthritis caused by an excess of uric acid. This is a waste product made by the body. It builds up in the body and forms crystals that collect in the joints, causing a gout attack. Alcohol and certain foods can trigger a gout attack. Below are some guidelines for changing your diet to help you manage gout. Your healthcare  provider can work with you to determine the best eating plan for you. Know that diet is only one part of managing gout. Take your medicines as prescribed and follow the other guidelines your healthcare provider has given you.  Foods to limit  Eating too many foods containing purines may increase the levels of uric acid in your body and increase your risk for a gout attack. It may be best to limit these high-purine foods:    Alcohol (beer and red wine). You may be told to avoid alcohol completely.    Certain fish (anchovies, sardines, fish roes, herring, tuna, mussels, codfish, scallops, trout, and elvira)    Certain meats (red meat, processed meat, gonzalez, turkey, wild game, and goose)    Sauces and gravies made with meat    Organ meats (such as liver, kidneys, sweetbreads, and tripe)    Legumes (such as dried beans and peas)    Mushrooms, spinach, asparagus, and cauliflower    Yeast and yeast extract supplements  Foods to try  Some foods may be helpful for people with gout. You may want to try adding some of the following foods to your diet:    Dark berries. These include blueberries, blackberries, and cherries. These berries contain chemicals that may lower uric acid.    Tofu. Tofu, which is made from soy, is a good source of protein. Studies have shown that it may be a better choice than meat for people with gout.    Omega fatty acids. These acids are found in fatty fish (such as salmon), certain oils (such as flax, olive, or nut oils), or nuts. They may help prevent inflammation due to gout.  The following guidelines are recommended by the American Medical Association for people with gout. Your diet should be:    High in fiber, whole grains, fruits, and vegetables.    Low in protein (15% of calories should come from protein. Choose lean sources, such as soy, lean meats, and poultry).    Low in fat (no more than 30% of calories should come from fat, with only 10% coming from animal, or saturated, fat).   Date  Last Reviewed: 11/1/2017 2000-2018 [employer]. [address] [place] [address], [place] [address]. All rights reserved. This information is not intended as a substitute for professional medical care. Always follow your healthcare professional's instructions.           Patient Education     What is Gout?  Gout is a disease that affects the joints. Left untreated, it can lead to painful foot and joint deformities and even kidney problems. But, by treating gout early, you can relieve pain and help prevent future problems. Gout can usually be treated with medicine and proper diet. In severe cases, surgery may be needed.  What causes gout?  Gout is caused by an excess of uric acid (a waste product made by the body). Uric acid is excreted by the kidneys. If the uric acid level in your blood rises too high, the uric acid may form crystals that collect in the joints, bringing on a gout attack. If you have many gout attacks, crystals may form large deposits called tophi. Tophi can damage joints and cause deformity.  Who is at risk for gout?  Men are more likely to have gout than women. But women can also be affected, mostly after menopause. Some health problems, such as obesity and high cholesterol, make gout more likely. And some medicines, such as diuretics ( water pills ), can trigger a gout attack. People who drink a lot of alcohol are at high risk for gout. Certain foods can also trigger a gout attack.  Substances that may trigger a gout attack  To help prevent a gout attack, avoid these foods:    Alcohol (particularly beer, but also red wine and spirits)    Certain meats (red meat, processed meat, turkey)    Organ meats (kidney, liver, sweetbread)    Shellfish (lobster, crab, shrimp, scallop, mussel)    Certain fish (anchovy, sardine, herring, mackerel)   Treatment    Lifestyle changes, including weight loss, exercise, and quitting tobacco use    Reducing consumption of the food groups above as well as high  "fructose corn syrup, found in many foods including sodas and energy drinks    Changing non-essential medicines that may contribute to gout (such as thiazide diuretics--\"water pills\")    Medicines to reduce the amount of uric acid in the blood, such as allopurinol, probencid, febuxostat, and lesinurad.    Medicines to treat acute gout attacks, including NSAIDs (nonsteroidal anti-inflammatory medicines), steroids, and colchicine  Date Last Reviewed: 4/1/2018 2000-2018 [employer]. [address] [place] [address], [place] [id number]. All rights reserved. This information is not intended as a substitute for professional medical care. Always follow your healthcare professional's instructions.           "

## 2019-08-22 ENCOUNTER — MYC MEDICAL ADVICE (OUTPATIENT)
Dept: FAMILY MEDICINE | Facility: CLINIC | Age: 29
End: 2019-08-22

## 2019-08-22 DIAGNOSIS — M10.9 ACUTE GOUT INVOLVING TOE OF LEFT FOOT, UNSPECIFIED CAUSE: Primary | ICD-10-CM

## 2019-08-23 RX ORDER — PREDNISONE 10 MG/1
TABLET ORAL
Qty: 27 TABLET | Refills: 0 | Status: SHIPPED | OUTPATIENT
Start: 2019-08-23 | End: 2019-12-02

## 2019-10-18 ENCOUNTER — OFFICE VISIT (OUTPATIENT)
Dept: FAMILY MEDICINE | Facility: CLINIC | Age: 29
End: 2019-10-18
Payer: COMMERCIAL

## 2019-10-18 VITALS
SYSTOLIC BLOOD PRESSURE: 119 MMHG | WEIGHT: 211.7 LBS | TEMPERATURE: 98.5 F | BODY MASS INDEX: 33.23 KG/M2 | RESPIRATION RATE: 16 BRPM | OXYGEN SATURATION: 99 % | HEART RATE: 86 BPM | DIASTOLIC BLOOD PRESSURE: 81 MMHG | HEIGHT: 67 IN

## 2019-10-18 DIAGNOSIS — B37.31 YEAST INFECTION OF THE VAGINA: ICD-10-CM

## 2019-10-18 DIAGNOSIS — L60.0 INGROWN TOENAIL: Primary | ICD-10-CM

## 2019-10-18 PROCEDURE — 99213 OFFICE O/P EST LOW 20 MIN: CPT | Performed by: NURSE PRACTITIONER

## 2019-10-18 RX ORDER — CEPHALEXIN 500 MG/1
500 CAPSULE ORAL 2 TIMES DAILY
Qty: 20 CAPSULE | Refills: 0 | Status: SHIPPED | OUTPATIENT
Start: 2019-10-18 | End: 2019-12-02

## 2019-10-18 RX ORDER — FLUCONAZOLE 150 MG/1
150 TABLET ORAL ONCE
Qty: 1 TABLET | Refills: 0 | Status: SHIPPED | OUTPATIENT
Start: 2019-10-18 | End: 2019-12-02

## 2019-10-18 ASSESSMENT — PAIN SCALES - GENERAL: PAINLEVEL: MILD PAIN (2)

## 2019-10-18 ASSESSMENT — MIFFLIN-ST. JEOR: SCORE: 1713.92

## 2019-10-18 NOTE — PROGRESS NOTES
Subjective     Analia Lyons is a 28 year old female who presents to clinic today for the following health issues:    HPI     Concern - foot infection  Onset: 1 week    Description:   R big toe  Red, painful, pus, swollen  Pt unsure how it occurred     Intensity: 2/10    Progression of Symptoms:  worsening    Accompanying Signs & Symptoms:  None    Previous history of similar problem:   None    Precipitating factors:   Worsened by: touch    Alleviating factors:  Improved by: None    Therapies Tried and outcome: antibiotic cream/bandaid      Patient states the same thing is occurring on both big toes.  It is tender and not improving    Patient Active Problem List   Diagnosis     Hashimoto's disease     Familial hematuria     CARDIOVASCULAR SCREENING; LDL GOAL LESS THAN 160     Hypothyroidism     Mechanical low back pain     Keloid scar     Depression with anxiety     ASCUS of cervix with negative high risk HPV     Common wart     Hypothyroidism due to Hashimoto's thyroiditis     BMI 33.0-33.9,adult     High triglycerides     Obesity (BMI 35.0-39.9) with comorbidity (H)     Past Surgical History:   Procedure Laterality Date     COLONOSCOPY N/A 12/16/2015    Procedure: COLONOSCOPY;  Surgeon: Duane, William Charles, MD;  Location: [facility] OR     COLONOSCOPY WITH CO2 INSUFFLATION N/A 12/16/2015    Procedure: COLONOSCOPY WITH CO2 INSUFFLATION;  Surgeon: Duane, William Charles, MD;  Location: [facility] OR     NO HISTORY OF SURGERY         Social History     Tobacco Use     Smoking status: Never Smoker     Smokeless tobacco: Never Used   Substance Use Topics     Alcohol use: Yes     Comment: socially     Family History   Problem Relation Age of Onset     Depression Brother         depression     Hyperlipidemia Mother      Thyroid Disease Mother      Hyperlipidemia Father      Breast Cancer Cousin      Depression Brother      Substance Abuse Sister      Substance Abuse Sister      C.A.D. No family hx of      Diabetes No family hx of   "    Breast Cancer No family hx of      Cancer - colorectal No family hx of      Anesthesia Reaction No family hx of      Blood Disease No family hx of            Reviewed and updated as needed this visit by Provider  Tobacco  Allergies  Meds  Problems  Med Hx  Surg Hx  Fam Hx         Review of Systems   ROS COMP: Skin as above      Objective    /81 (BP Location: Right arm, Patient Position: Sitting, Cuff Size: Adult Regular)   Pulse 86   Temp 98.5  F (36.9  C) (Oral)   Resp 16   Ht 1.695 m (5' 6.75\")   Wt 96 kg (211 lb 11.2 oz)   SpO2 99%   BMI 33.41 kg/m    Body mass index is 33.41 kg/m .  Physical Exam   GENERAL: healthy, alert and no distress  SKIN: Right big toe nail on the lateral side appears tender with a little bit of dried blood and swollen and red    Diagnostic Test Results:  Labs reviewed in Epic        Assessment & Plan     1. Yeast infection of the vagina  Patient gets yeast infections with antibiotics, will send in case she does  - fluconazole (DIFLUCAN) 150 MG tablet; Take 1 tablet (150 mg) by mouth once for 1 dose  Dispense: 1 tablet; Refill: 0    2. Ingrown toenail  Start treatment if not improving may need to consider follow-up with podiatry and potential removal of nail  - cephALEXin (KEFLEX) 500 MG capsule; Take 1 capsule (500 mg) by mouth 2 times daily for 10 days  Dispense: 20 capsule; Refill: 0     BMI:   Estimated body mass index is 33.41 kg/m  as calculated from the following:    Height as of this encounter: 1.695 m (5' 6.75\").    Weight as of this encounter: 96 kg (211 lb 11.2 oz).     Return in about 1 week (around 10/25/2019), or if symptoms worsen or fail to improve.    MERRY Floyd, NP-C  [facility]    This chart was documented by provider using a voice activated software called Dragon in addition to manual typing. There may be vocabulary errors or other grammatical errors due to this.         "

## 2019-10-20 NOTE — NURSING NOTE
"Chief Complaint   Patient presents with     Lab Result Notice     F/U Labs and discuss med change     Health Maintenance     orders pended       Initial /89  Pulse 97  Temp 99.2  F (37.3  C) (Oral)  Resp 18  Wt 220 lb (99.8 kg)  SpO2 100%  Breastfeeding? No  BMI 33.8 kg/m2 Estimated body mass index is 33.8 kg/(m^2) as calculated from the following:    Height as of 3/8/18: 5' 7.65\" (1.718 m).    Weight as of this encounter: 220 lb (99.8 kg).  Medication Reconciliation: complete      Libby Souza MA    " 20-Oct-2019

## 2019-10-30 DIAGNOSIS — F41.8 DEPRESSION WITH ANXIETY: ICD-10-CM

## 2019-10-30 NOTE — TELEPHONE ENCOUNTER
"Requested Prescriptions   Pending Prescriptions Disp Refills     buPROPion (WELLBUTRIN XL) 150 MG 24 hr tablet  Last Written Prescription Date:  04/09/19  Last Fill Quantity: 90,  # refills: 1   Last Office Visit with G, P or [facility] prescribing provider:  10/18/19-Linda   Future Office Visit:    90 tablet 1     Sig: Take 1 tablet (150 mg) by mouth every morning       SSRIs Protocol Failed - 10/30/2019  8:35 AM        Failed - PHQ-9 score less than 5 in past 6 months     Please review last PHQ-9 score.           Passed - Medication is Bupropion     If the medication is Bupropion (Wellbutrin), and the patient is taking for smoking cessation; OK to refill.          Passed - Medication is active on med list        Passed - Patient is age 18 or older        Passed - No active pregnancy on record        Passed - No positive pregnancy test in last 12 months        Passed - Recent (6 mo) or future (30 days) visit within the authorizing provider's specialty     Patient had office visit in the last 6 months or has a visit in the next 30 days with authorizing provider or within the authorizing provider's specialty.  See \"Patient Info\" tab in inbasket, or \"Choose Columns\" in Meds & Orders section of the refill encounter.              "

## 2019-10-31 ENCOUNTER — OFFICE VISIT (OUTPATIENT)
Dept: PODIATRY | Facility: CLINIC | Age: 29
End: 2019-10-31
Payer: COMMERCIAL

## 2019-10-31 VITALS — DIASTOLIC BLOOD PRESSURE: 80 MMHG | SYSTOLIC BLOOD PRESSURE: 126 MMHG | HEART RATE: 84 BPM

## 2019-10-31 DIAGNOSIS — L60.0 INGROWING NAIL: Primary | ICD-10-CM

## 2019-10-31 PROCEDURE — 99243 OFF/OP CNSLTJ NEW/EST LOW 30: CPT | Mod: 25 | Performed by: PODIATRIST

## 2019-10-31 PROCEDURE — 11730 AVULSION NAIL PLATE SIMPLE 1: CPT | Mod: T5 | Performed by: PODIATRIST

## 2019-10-31 NOTE — PATIENT INSTRUCTIONS
Weight management plan: Patient was referred to their PCP to discuss a diet and exercise plan.  We wish you continued good healing. If you have any questions or concerns, please do not hesitate to contact us at [phone]    Please remember to call and schedule a follow up appointment if one was recommended at your earliest convenience.   PODIATRY CLINIC HOURS  TELEPHONE NUMBER    Dr. Charles Stapleton D.P.M [facility]    Clinics:  [facility]    [facility]   Tuesday 1PM-6PM  [place]  Wednesday 7AM-2PM  [facility]  Thursday 10AM-6PM  [place]  Friday 7AM-3PM  [place]  Specialty schedulers:   [phone] to make an appointment with any Specialty Provider.        Urgent Care locations:    [facility] Monday-Friday 5 pm - 9 pm. Saturday-Sunday 9 am -5pm    Monday-Friday 11 am - 9 pm Saturday 9 am - 5 pm     Monday-Sunday 12 noon-8PM [phone]     If you need a medication refill, please contact us you may need lab work and/or a follow up visit prior to your refill (i.e. Antifungal medications).    [employer] (secure e-mail communication and access to your chart) to send a message or to make an appointment.    If MRI needed please call [place] [facility] at [phone]

## 2019-10-31 NOTE — PROGRESS NOTES
Subjective:    Pt is seen today in consult from Ladonna Mckeon w/ the c/c of a painful ingrown right great nail lateral border.  This has been problematic for 1 month(s).  positivehistory of drainage from the site. This is slowly getting worse.  Aggravated by activity and relieved by rest.  Has tried soaking which has not helped.   denies history of trauma to the area.  Denies fever and chill, denies numbness and tingling, denies erythema on dorsum of foot.  Recently on Keflex which helped somewhat.  She has never had this before    ROS:  A 10-point review of systems was performed and is positive for that noted in the HPI and as seen below.  All other areas are negative.     Past Medical History:   Diagnosis Date     Anxiety      ASCUS of cervix with negative high risk HPV 1/26/17    ASCUS/neg HR HPV.     Depressive disorder      H/O colposcopy with cervical biopsy 03/23/2017    Bx & ECC - negative     Hashimoto's disease      Headache(784.0)      Hypothyroidism due to Hashimoto's thyroiditis      Papanicolaou smear of cervix with atypical squamous cells of undetermined significance (ASC-US) 12/03/2015       Past Surgical History:   Procedure Laterality Date     COLONOSCOPY N/A 12/16/2015    Procedure: COLONOSCOPY;  Surgeon: Duane, William Charles, MD;  Location: [facility]     COLONOSCOPY WITH CO2 INSUFFLATION N/A 12/16/2015    Procedure: COLONOSCOPY WITH CO2 INSUFFLATION;  Surgeon: Duane, William Charles, MD;  Location: [facility]     NO HISTORY OF SURGERY         Family History   Problem Relation Age of Onset     Depression Brother         depression     Hyperlipidemia Mother      Thyroid Disease Mother      Hyperlipidemia Father      Breast Cancer Cousin      Depression Brother      Substance Abuse Sister      Substance Abuse Sister      C.A.D. No family hx of      Diabetes No family hx of      Breast Cancer No family hx of      Cancer - colorectal No family hx of      Anesthesia Reaction No family hx of      Blood Disease No  family hx of        Social History     Tobacco Use     Smoking status: Never Smoker     Smokeless tobacco: Never Used   Substance Use Topics     Alcohol use: Yes     Comment: socially         Current Outpatient Medications:      albuterol (PROAIR HFA/PROVENTIL HFA/VENTOLIN HFA) 108 (90 Base) MCG/ACT inhaler, Inhale 2 puffs into the lungs every 6 hours as needed for shortness of breath / dyspnea or wheezing, Disp: 1 Inhaler, Rfl: 0     buPROPion (WELLBUTRIN XL) 150 MG 24 hr tablet, Take 1 tablet (150 mg) by mouth every morning, Disp: 90 tablet, Rfl: 1     cyclobenzaprine (FLEXERIL) 10 MG tablet, Take 1 tablet (10 mg) by mouth 3 times daily as needed for muscle spasms, Disp: 30 tablet, Rfl: 1     escitalopram (LEXAPRO) 20 MG tablet, Take 1 tablet (20 mg) by mouth daily, Disp: 90 tablet, Rfl: 1     levothyroxine (SYNTHROID/LEVOTHROID) 175 MCG tablet, Take 1 tablet by mouth daily as directed, Disp: 30 tablet, Rfl: 11     norethindrone-ethinyl estradiol (ORTHO-NOVUM 1/35, 28,) 1-35 MG-MCG tablet, Take one active tablet a day for 21 days.  Discard the inactive pills and start new pack on day 22., Disp: 112 tablet, Rfl: 4     predniSONE (DELTASONE) 10 MG tablet, Take 40 mg daily x3 days then 30 mg daily x3 days then 20 mg daily x2 days then 10 mg x2, Disp: 27 tablet, Rfl: 0       Allergies   Allergen Reactions     Nkda [No Known Drug Allergies]        /80   Pulse 84 .      Constitutional/ general:  Pt is in no apparent distress, appears well-nourished.  Cooperative with history and physical exam.     Psych:  The patient answered questions appropriately.  Normal affect.  Seems to have reasonable expectations, in terms of treatment.     Eyes:  Visual scanning/ tracking without deficit.     Ears:  Response to auditory stimuli is normal.  No  hearing aid devices.  Auricles in proper alignment.     Lymphatic:  Popliteal lymph nodes not enlarged.     Lungs:  Non labored breathing, non labored speech. No cough.  No audible  wheezing. Even, quiet breathing.       Vascular:  Pedal pulses are palpable bilaterally for both the DP and PT arteries.  CFT < 3 sec.  No ankle varicosities or edema.  Pedal hair growth noted.     Neuro:  Alert and oriented x 3. Coordinated gait.  Light touch sensation is intact to the L4, L5, S1 distributions. No obvious deficits.  No evidence of neurological-based weakness, spasticity, or contracture in the lower extremities.     Derm: Normal texture and turgor.  No ecchymosis, or cyanosis.  Hair growth noted.        Musculoskeletal:     Patient is ambulatory without an assistive device or brace.  No gross deformities.  Normal arch with weightbearing.  No forefoot or rear foot deformities noted.  MS 5/5 all compartments.  Normal ROM all fore foot and rearfoot joints.  No equinus.  right great toe nail lateral border shows soft tissue impingement with localized erythema.   negative active drainage/purulence at this time.  No sinus tracts.  No nailbed masses or exostosis.  No pain with range of motion of IPJ or MTPJ.  No ascending cellulitis.    ASSESSMENT:    Onychocryptosis with paronychia right toe.    Discussed etiology and treatment options in detail w/ the pt.  The potential causes and nature of an ingrown toenail were discussed with the patient.  We reviewed the natural history/prognosis of the condition and potential risks if no treatment is provided.      Treatment options discussed included conservative management (oral antibiotics, soaking of foot, adequate width shoes)  as well as surgical management (partial or total nail removal).  The pros and cons of both forms of treatment were reviewed.  Handout given to patient.      After thorough discussion and answering all questions, the patient elected to have nail avulsion.  Obtained consent, used 3cc of 1% lidocaine plain to block right first toe.  Sterile prep, then avulsed the affected border(s).  No evidence of deep abscess noted.  Pt tolerated  procedure well.  Sterile bandage placed, gave wound care instruction.  Discussed permanent removal if this becomes chronic in the future.  Return to clinic prn.  Thank you for allowing me participate in the care of this patient.        Charles Stapleton, MICHELLE DPM, FACFAS

## 2019-10-31 NOTE — LETTER
10/31/2019         RE: Analia Lyons  [address]  [facility] [address]        Dear Colleague,    Thank you for referring your patient, Analia Lyons, to the [facility]. Please see a copy of my visit note below.    Subjective:    Pt is seen today in consult from Ladonna Mckeon w/ the c/c of a painful ingrown right great nail lateral border.  This has been problematic for 1 month(s).  positivehistory of drainage from the site. This is slowly getting worse.  Aggravated by activity and relieved by rest.  Has tried soaking which has not helped.   denies history of trauma to the area.  Denies fever and chill, denies numbness and tingling, denies erythema on dorsum of foot.  Recently on Keflex which helped somewhat.  She has never had this before    ROS:  A 10-point review of systems was performed and is positive for that noted in the HPI and as seen below.  All other areas are negative.     Past Medical History:   Diagnosis Date     Anxiety      ASCUS of cervix with negative high risk HPV 1/26/17    ASCUS/neg HR HPV.     Depressive disorder      H/O colposcopy with cervical biopsy 03/23/2017    Bx & ECC - negative     Hashimoto's disease      Headache(784.0)      Hypothyroidism due to Hashimoto's thyroiditis      Papanicolaou smear of cervix with atypical squamous cells of undetermined significance (ASC-US) 12/03/2015       Past Surgical History:   Procedure Laterality Date     COLONOSCOPY N/A 12/16/2015    Procedure: COLONOSCOPY;  Surgeon: Duane, William Charles, MD;  Location: [facility]     COLONOSCOPY WITH CO2 INSUFFLATION N/A 12/16/2015    Procedure: COLONOSCOPY WITH CO2 INSUFFLATION;  Surgeon: Duane, William Charles, MD;  Location: [facility]     NO HISTORY OF SURGERY         Family History   Problem Relation Age of Onset     Depression Brother         depression     Hyperlipidemia Mother      Thyroid Disease Mother      Hyperlipidemia Father      Breast Cancer Cousin      Depression Brother       Substance Abuse Sister      Substance Abuse Sister      C.A.D. No family hx of      Diabetes No family hx of      Breast Cancer No family hx of      Cancer - colorectal No family hx of      Anesthesia Reaction No family hx of      Blood Disease No family hx of        Social History     Tobacco Use     Smoking status: Never Smoker     Smokeless tobacco: Never Used   Substance Use Topics     Alcohol use: Yes     Comment: socially         Current Outpatient Medications:      albuterol (PROAIR HFA/PROVENTIL HFA/VENTOLIN HFA) 108 (90 Base) MCG/ACT inhaler, Inhale 2 puffs into the lungs every 6 hours as needed for shortness of breath / dyspnea or wheezing, Disp: 1 Inhaler, Rfl: 0     buPROPion (WELLBUTRIN XL) 150 MG 24 hr tablet, Take 1 tablet (150 mg) by mouth every morning, Disp: 90 tablet, Rfl: 1     cyclobenzaprine (FLEXERIL) 10 MG tablet, Take 1 tablet (10 mg) by mouth 3 times daily as needed for muscle spasms, Disp: 30 tablet, Rfl: 1     escitalopram (LEXAPRO) 20 MG tablet, Take 1 tablet (20 mg) by mouth daily, Disp: 90 tablet, Rfl: 1     levothyroxine (SYNTHROID/LEVOTHROID) 175 MCG tablet, Take 1 tablet by mouth daily as directed, Disp: 30 tablet, Rfl: 11     norethindrone-ethinyl estradiol (ORTHO-NOVUM 1/35, 28,) 1-35 MG-MCG tablet, Take one active tablet a day for 21 days.  Discard the inactive pills and start new pack on day 22., Disp: 112 tablet, Rfl: 4     predniSONE (DELTASONE) 10 MG tablet, Take 40 mg daily x3 days then 30 mg daily x3 days then 20 mg daily x2 days then 10 mg x2, Disp: 27 tablet, Rfl: 0       Allergies   Allergen Reactions     Nkda [No Known Drug Allergies]        /80   Pulse 84 .      Constitutional/ general:  Pt is in no apparent distress, appears well-nourished.  Cooperative with history and physical exam.     Psych:  The patient answered questions appropriately.  Normal affect.  Seems to have reasonable expectations, in terms of treatment.     Eyes:  Visual scanning/ tracking  without deficit.     Ears:  Response to auditory stimuli is normal.  No  hearing aid devices.  Auricles in proper alignment.     Lymphatic:  Popliteal lymph nodes not enlarged.     Lungs:  Non labored breathing, non labored speech. No cough.  No audible wheezing. Even, quiet breathing.       Vascular:  Pedal pulses are palpable bilaterally for both the DP and PT arteries.  CFT < 3 sec.  No ankle varicosities or edema.  Pedal hair growth noted.     Neuro:  Alert and oriented x 3. Coordinated gait.  Light touch sensation is intact to the L4, L5, S1 distributions. No obvious deficits.  No evidence of neurological-based weakness, spasticity, or contracture in the lower extremities.     Derm: Normal texture and turgor.  No ecchymosis, or cyanosis.  Hair growth noted.        Musculoskeletal:     Patient is ambulatory without an assistive device or brace.  No gross deformities.  Normal arch with weightbearing.  No forefoot or rear foot deformities noted.  MS 5/5 all compartments.  Normal ROM all fore foot and rearfoot joints.  No equinus.  right great toe nail lateral border shows soft tissue impingement with localized erythema.   negative active drainage/purulence at this time.  No sinus tracts.  No nailbed masses or exostosis.  No pain with range of motion of IPJ or MTPJ.  No ascending cellulitis.    ASSESSMENT:    Onychocryptosis with paronychia right toe.    Discussed etiology and treatment options in detail w/ the pt.  The potential causes and nature of an ingrown toenail were discussed with the patient.  We reviewed the natural history/prognosis of the condition and potential risks if no treatment is provided.      Treatment options discussed included conservative management (oral antibiotics, soaking of foot, adequate width shoes)  as well as surgical management (partial or total nail removal).  The pros and cons of both forms of treatment were reviewed.  Handout given to patient.      After thorough discussion and  answering all questions, the patient elected to have nail avulsion.  Obtained consent, used 3cc of 1% lidocaine plain to block right first toe.  Sterile prep, then avulsed the affected border(s).  No evidence of deep abscess noted.  Pt tolerated procedure well.  Sterile bandage placed, gave wound care instruction.  Discussed permanent removal if this becomes chronic in the future.  Return to clinic prn.  Thank you for allowing me participate in the care of this patient.        Charles Stapleton DPM DPM, FACFAS      Again, thank you for allowing me to participate in the care of your patient.        Sincerely,        Charles Stapleton DPM

## 2019-11-01 RX ORDER — BUPROPION HYDROCHLORIDE 150 MG/1
150 TABLET ORAL EVERY MORNING
Qty: 90 TABLET | Refills: 1 | Status: SHIPPED | OUTPATIENT
Start: 2019-11-01 | End: 2020-06-04

## 2019-11-01 NOTE — TELEPHONE ENCOUNTER
Routing refill request to provider for review/approval because:  A break in medication    Katheryn Johns RN

## 2019-11-04 NOTE — TELEPHONE ENCOUNTER
Signed Prescriptions:                        Disp   Refills    buPROPion (WELLBUTRIN XL) 150 MG 24 hr tab*90 tab*1        Sig: Take 1 tablet (150 mg) by mouth every morning  Authorizing Provider: SERENE NGO    Sent my chart message to notify patient of refill and to schedule an appointment.  Smooth Concepcion,  For [facility] ([facility])

## 2019-11-07 ENCOUNTER — MYC MEDICAL ADVICE (OUTPATIENT)
Dept: PODIATRY | Facility: CLINIC | Age: 29
End: 2019-11-07

## 2019-11-07 ENCOUNTER — TELEPHONE (OUTPATIENT)
Dept: ENDOCRINOLOGY | Facility: CLINIC | Age: 29
End: 2019-11-07

## 2019-11-07 DIAGNOSIS — E06.3 HYPOTHYROIDISM DUE TO HASHIMOTO'S THYROIDITIS: Primary | ICD-10-CM

## 2019-11-07 NOTE — TELEPHONE ENCOUNTER
Reason for Call:  Other appointment and call back    Detailed comments: Does patient need to get blood work done before appointment. Pt states Dr. Schroeder made her do it before her appointments.    Phone Number Patient can be reached at: Home number on file [phone] (home)    Best Time: any    Can we leave a detailed message on this number? YES    Call taken on 11/7/2019 at 2:25 PM by Isac Souza

## 2019-11-08 NOTE — TELEPHONE ENCOUNTER
Patient has hypothyroidism due to hashimoto's thyroiditis. She last saw Dr. Schroeder on 9/26/18 and had her TSH checked on 7/10/19. Would you like her to get any blood work done prior to seeing you on 12/3/19?    Anibal Scott RN....11/8/2019 8:26 AM

## 2019-11-19 ENCOUNTER — MYC MEDICAL ADVICE (OUTPATIENT)
Dept: FAMILY MEDICINE | Facility: CLINIC | Age: 29
End: 2019-11-19

## 2019-11-21 ENCOUNTER — OFFICE VISIT (OUTPATIENT)
Dept: PODIATRY | Facility: CLINIC | Age: 29
End: 2019-11-21
Payer: COMMERCIAL

## 2019-11-21 VITALS
WEIGHT: 202.6 LBS | SYSTOLIC BLOOD PRESSURE: 127 MMHG | DIASTOLIC BLOOD PRESSURE: 77 MMHG | OXYGEN SATURATION: 97 % | HEIGHT: 67 IN | HEART RATE: 90 BPM | BODY MASS INDEX: 31.8 KG/M2

## 2019-11-21 DIAGNOSIS — L60.0 INGROWING NAIL: Primary | ICD-10-CM

## 2019-11-21 PROCEDURE — 11750 EXCISION NAIL&NAIL MATRIX: CPT | Mod: TA | Performed by: PODIATRIST

## 2019-11-21 PROCEDURE — 99213 OFFICE O/P EST LOW 20 MIN: CPT | Mod: 25 | Performed by: PODIATRIST

## 2019-11-21 ASSESSMENT — MIFFLIN-ST. JEOR: SCORE: 1672.65

## 2019-11-21 ASSESSMENT — PAIN SCALES - GENERAL: PAINLEVEL: NO PAIN (1)

## 2019-11-21 NOTE — LETTER
11/21/2019         RE: Analia Lyons  [address]  [facility] [address]        Dear Colleague,    Thank you for referring your patient, Analia Lyons, to the [facility]. Please see a copy of my visit note below.    Subjective:    Pt is seen today w/ the c/c of a painful ingrown left great nail both border.  This has been problematic for 3 week(s). negativehistory of drainage from the site. This is slowly getting worse.  Aggravated by activity and relieved by rest.  Has tried soaking which has not helped.   denies history of trauma to the area.    She would like permanent removal of both borders of her left hallux    ROS:  A 10-point review of systems was performed and is positive for that noted in the HPI and as seen above.  All other areas are negative.          Allergies   Allergen Reactions     Nkda [No Known Drug Allergies]        Current Outpatient Medications   Medication Sig Dispense Refill     albuterol (PROAIR HFA/PROVENTIL HFA/VENTOLIN HFA) 108 (90 Base) MCG/ACT inhaler Inhale 2 puffs into the lungs every 6 hours as needed for shortness of breath / dyspnea or wheezing 1 Inhaler 0     buPROPion (WELLBUTRIN XL) 150 MG 24 hr tablet Take 1 tablet (150 mg) by mouth every morning 90 tablet 1     cyclobenzaprine (FLEXERIL) 10 MG tablet Take 1 tablet (10 mg) by mouth 3 times daily as needed for muscle spasms 30 tablet 1     escitalopram (LEXAPRO) 20 MG tablet Take 1 tablet (20 mg) by mouth daily 90 tablet 1     levothyroxine (SYNTHROID/LEVOTHROID) 175 MCG tablet Take 1 tablet by mouth daily as directed 30 tablet 11     norethindrone-ethinyl estradiol (ORTHO-NOVUM 1/35, 28,) 1-35 MG-MCG tablet Take one active tablet a day for 21 days.  Discard the inactive pills and start new pack on day 22. 112 tablet 4     predniSONE (DELTASONE) 10 MG tablet Take 40 mg daily x3 days then 30 mg daily x3 days then 20 mg daily x2 days then 10 mg x2 27 tablet 0       Patient Active Problem List    Diagnosis     Hashimoto's disease     Familial hematuria     CARDIOVASCULAR SCREENING; LDL GOAL LESS THAN 160     Hypothyroidism     Mechanical low back pain     Keloid scar     Depression with anxiety     ASCUS of cervix with negative high risk HPV     Common wart     Hypothyroidism due to Hashimoto's thyroiditis     BMI 33.0-33.9,adult     High triglycerides     Obesity (BMI 35.0-39.9) with comorbidity (H)       Past Medical History:   Diagnosis Date     Anxiety      ASCUS of cervix with negative high risk HPV 1/26/17    ASCUS/neg HR HPV.     Depressive disorder      H/O colposcopy with cervical biopsy 03/23/2017    Bx & ECC - negative     Hashimoto's disease      Hashimoto's disease      Headache(784.0)      Hypothyroidism due to Hashimoto's thyroiditis      Hypothyroidism due to Hashimoto's thyroiditis      Papanicolaou smear of cervix with atypical squamous cells of undetermined significance (ASC-US) 12/03/2015       Past Surgical History:   Procedure Laterality Date     COLONOSCOPY N/A 12/16/2015    Procedure: COLONOSCOPY;  Surgeon: Duane, William Charles, MD;  Location: [facility]     COLONOSCOPY WITH CO2 INSUFFLATION N/A 12/16/2015    Procedure: COLONOSCOPY WITH CO2 INSUFFLATION;  Surgeon: Duane, William Charles, MD;  Location: [facility]     NO HISTORY OF SURGERY         Family History   Problem Relation Age of Onset     Depression Brother         depression     Hyperlipidemia Mother      Thyroid Disease Mother      Hyperlipidemia Father      Breast Cancer Cousin      Depression Brother      Substance Abuse Sister      Substance Abuse Sister      C.A.D. No family hx of      Diabetes No family hx of      Breast Cancer No family hx of      Cancer - colorectal No family hx of      Anesthesia Reaction No family hx of      Blood Disease No family hx of        Social History     Tobacco Use     Smoking status: Never Smoker     Smokeless tobacco: Never Used   Substance Use Topics     Alcohol use: Yes     Comment: socially  "        Exam:    Vitals: /77   Pulse 90   Ht 1.695 m (5' 6.75\")   Wt 91.9 kg (202 lb 9.6 oz)   SpO2 97%   BMI 31.97 kg/m     BMI: Body mass index is 31.97 kg/m .  Height: 5' 6.75\"    Constitutional/ general:  Pt is in no apparent distress, appears well-nourished.  Cooperative with history and physical exam.     Psych:  The patient answered questions appropriately.  Normal affect.  Seems to have reasonable expectations, in terms of treatment.     Eyes:  Visual scanning/ tracking without deficit.     Ears:  Response to auditory stimuli is normal.  negative hearing aid devices.  Auricles in proper alignment.     Lymphatic:  Popliteal lymph nodes not enlarged.     Lungs:  Non labored breathing, non labored speech. No cough.  No audible wheezing. Even, quiet breathing.       Vascular:  positive pedal pulses bilaterally for both the DP and PT arteries.  CFT < 3 sec.  negative ankle edema.  positive pedal hair growth.    Neuro:  Alert and oriented x 3. Coordinated gait.  Light touch sensation is intact to the L4, L5, S1 distributions. No obvious deficits.  No evidence of neurological-based weakness, spasticity, or contracture in the lower extremities.     Derm: Normal texture and turgor.  No erythema, ecchymosis, or cyanosis.      Musculoskeletal:    Lower extremity muscle strength is normal.  Patient is ambulatory without an assistive device or brace.  Normal arch with weightbearing.  No forefoot or rear foot deformities noted.   Normal ROM all fore foot and rearfoot joints.  No equinus.  left great toe nail both border shows soft tissue impingement with localized erythema.   negative active drainage/purulence at this time.  No sinus tracts.  No nailbed masses or exostosis.  No pain with range of motion of IPJ or MTPJ.  No ascending cellulitis.    ASSESSMENT:    Onychocryptosis with paronychia left great both border.    Discussed etiology and treatment options in detail w/ the pt.  The potential causes and " nature of an ingrown toenail were discussed with the patient.  We reviewed the natural history/prognosis of the condition and potential risks if no treatment is provided.      Treatment options discussed included conservative management (oral antibiotics, soaking of foot, adequate width shoes)  as well as surgical management (partial or total nail removal).  The pros and cons of both forms of treatment were reviewed.  Handout given to patient.      After thorough discussion and answering all questions, the patient elected to have permanent removal of affected nail border.  Risk complications and efficacy discussed with patient.  Obtained consent, used 3cc of 1% lidocaine plain to block left great toe.  Sterile prep, then avulsed the affected borders.  No evidence of deep abscess noted.  Phenol was applied times 3 at 3o second intervals with curettage in between and then alcohol rinse.  Pt tolerated procedure well.  Sterile bandage placed, gave wound care instruction.  Return to clinic prn    Charles Stapleton DPM DPM, FACFAS      Again, thank you for allowing me to participate in the care of your patient.        Sincerely,        Charles Stapleton DPM

## 2019-11-22 NOTE — PROGRESS NOTES
Subjective:    Pt is seen today w/ the c/c of a painful ingrown left great nail both border.  This has been problematic for 3 week(s). negativehistory of drainage from the site. This is slowly getting worse.  Aggravated by activity and relieved by rest.  Has tried soaking which has not helped.   denies history of trauma to the area.    She would like permanent removal of both borders of her left hallux    ROS:  A 10-point review of systems was performed and is positive for that noted in the HPI and as seen above.  All other areas are negative.          Allergies   Allergen Reactions     Nkda [No Known Drug Allergies]        Current Outpatient Medications   Medication Sig Dispense Refill     albuterol (PROAIR HFA/PROVENTIL HFA/VENTOLIN HFA) 108 (90 Base) MCG/ACT inhaler Inhale 2 puffs into the lungs every 6 hours as needed for shortness of breath / dyspnea or wheezing 1 Inhaler 0     buPROPion (WELLBUTRIN XL) 150 MG 24 hr tablet Take 1 tablet (150 mg) by mouth every morning 90 tablet 1     cyclobenzaprine (FLEXERIL) 10 MG tablet Take 1 tablet (10 mg) by mouth 3 times daily as needed for muscle spasms 30 tablet 1     escitalopram (LEXAPRO) 20 MG tablet Take 1 tablet (20 mg) by mouth daily 90 tablet 1     levothyroxine (SYNTHROID/LEVOTHROID) 175 MCG tablet Take 1 tablet by mouth daily as directed 30 tablet 11     norethindrone-ethinyl estradiol (ORTHO-NOVUM 1/35, 28,) 1-35 MG-MCG tablet Take one active tablet a day for 21 days.  Discard the inactive pills and start new pack on day 22. 112 tablet 4     predniSONE (DELTASONE) 10 MG tablet Take 40 mg daily x3 days then 30 mg daily x3 days then 20 mg daily x2 days then 10 mg x2 27 tablet 0       Patient Active Problem List   Diagnosis     Hashimoto's disease     Familial hematuria     CARDIOVASCULAR SCREENING; LDL GOAL LESS THAN 160     Hypothyroidism     Mechanical low back pain     Keloid scar     Depression with anxiety     ASCUS of cervix with negative high risk HPV  "    Common wart     Hypothyroidism due to Hashimoto's thyroiditis     BMI 33.0-33.9,adult     High triglycerides     Obesity (BMI 35.0-39.9) with comorbidity (H)       Past Medical History:   Diagnosis Date     Anxiety      ASCUS of cervix with negative high risk HPV 1/26/17    ASCUS/neg HR HPV.     Depressive disorder      H/O colposcopy with cervical biopsy 03/23/2017    Bx & ECC - negative     Hashimoto's disease      Hashimoto's disease      Headache(784.0)      Hypothyroidism due to Hashimoto's thyroiditis      Hypothyroidism due to Hashimoto's thyroiditis      Papanicolaou smear of cervix with atypical squamous cells of undetermined significance (ASC-US) 12/03/2015       Past Surgical History:   Procedure Laterality Date     COLONOSCOPY N/A 12/16/2015    Procedure: COLONOSCOPY;  Surgeon: Duane, William Charles, MD;  Location: [facility]     COLONOSCOPY WITH CO2 INSUFFLATION N/A 12/16/2015    Procedure: COLONOSCOPY WITH CO2 INSUFFLATION;  Surgeon: Duane, William Charles, MD;  Location: [facility] OR     NO HISTORY OF SURGERY         Family History   Problem Relation Age of Onset     Depression Brother         depression     Hyperlipidemia Mother      Thyroid Disease Mother      Hyperlipidemia Father      Breast Cancer Cousin      Depression Brother      Substance Abuse Sister      Substance Abuse Sister      C.A.D. No family hx of      Diabetes No family hx of      Breast Cancer No family hx of      Cancer - colorectal No family hx of      Anesthesia Reaction No family hx of      Blood Disease No family hx of        Social History     Tobacco Use     Smoking status: Never Smoker     Smokeless tobacco: Never Used   Substance Use Topics     Alcohol use: Yes     Comment: socially         Exam:    Vitals: /77   Pulse 90   Ht 1.695 m (5' 6.75\")   Wt 91.9 kg (202 lb 9.6 oz)   SpO2 97%   BMI 31.97 kg/m    BMI: Body mass index is 31.97 kg/m .  Height: 5' 6.75\"    Constitutional/ general:  Pt is in no apparent distress, " appears well-nourished.  Cooperative with history and physical exam.     Psych:  The patient answered questions appropriately.  Normal affect.  Seems to have reasonable expectations, in terms of treatment.     Eyes:  Visual scanning/ tracking without deficit.     Ears:  Response to auditory stimuli is normal.  negative hearing aid devices.  Auricles in proper alignment.     Lymphatic:  Popliteal lymph nodes not enlarged.     Lungs:  Non labored breathing, non labored speech. No cough.  No audible wheezing. Even, quiet breathing.       Vascular:  positive pedal pulses bilaterally for both the DP and PT arteries.  CFT < 3 sec.  negative ankle edema.  positive pedal hair growth.    Neuro:  Alert and oriented x 3. Coordinated gait.  Light touch sensation is intact to the L4, L5, S1 distributions. No obvious deficits.  No evidence of neurological-based weakness, spasticity, or contracture in the lower extremities.     Derm: Normal texture and turgor.  No erythema, ecchymosis, or cyanosis.      Musculoskeletal:    Lower extremity muscle strength is normal.  Patient is ambulatory without an assistive device or brace.  Normal arch with weightbearing.  No forefoot or rear foot deformities noted.   Normal ROM all fore foot and rearfoot joints.  No equinus.  left great toe nail both border shows soft tissue impingement with localized erythema.   negative active drainage/purulence at this time.  No sinus tracts.  No nailbed masses or exostosis.  No pain with range of motion of IPJ or MTPJ.  No ascending cellulitis.    ASSESSMENT:    Onychocryptosis with paronychia left great both border.    Discussed etiology and treatment options in detail w/ the pt.  The potential causes and nature of an ingrown toenail were discussed with the patient.  We reviewed the natural history/prognosis of the condition and potential risks if no treatment is provided.      Treatment options discussed included conservative management (oral antibiotics,  soaking of foot, adequate width shoes)  as well as surgical management (partial or total nail removal).  The pros and cons of both forms of treatment were reviewed.  Handout given to patient.      After thorough discussion and answering all questions, the patient elected to have permanent removal of affected nail border.  Risk complications and efficacy discussed with patient.  Obtained consent, used 3cc of 1% lidocaine plain to block left great toe.  Sterile prep, then avulsed the affected borders.  No evidence of deep abscess noted.  Phenol was applied times 3 at 3o second intervals with curettage in between and then alcohol rinse.  Pt tolerated procedure well.  Sterile bandage placed, gave wound care instruction.  Return to clinic prn    Charles Stapleton DPM DPM, FACFAS

## 2019-11-22 NOTE — PATIENT INSTRUCTIONS
Weight management plan: Patient was referred to their PCP to discuss a diet and exercise plan.     We wish you continued good healing. If you have any questions or concerns, please do not hesitate to contact us at [phone]    Please remember to call and schedule a follow up appointment if one was recommended at your earliest convenience.   PODIATRY CLINIC HOURS  TELEPHONE NUMBER    Dr. Charles Stapleton D.P.M [facility]    Clinics:  [facility]    [facility] Bowman [facility]   Tuesday 1PM-6PM  [place]  Wednesday 7AM-2PM  [facility]  Thursday 10AM-6PM  [place]  Friday 7AM-3PM  [place]  Specialty schedulers:   [phone] to make an appointment with any Specialty Provider.        Urgent Care locations:    [facility] Monday-Friday 5 pm - 9 pm. Saturday-Sunday 9 am -5pm    Monday-Friday 11 am - 9 pm Saturday 9 am - 5 pm     Monday-Sunday 12 noon-8PM [phone]     If you need a medication refill, please contact us you may need lab work and/or a follow up visit prior to your refill (i.e. Antifungal medications).    [employer] (secure e-mail communication and access to your chart) to send a message or to make an appointment.    If MRI needed please call [facility] Hardy at [phone]

## 2019-12-02 ENCOUNTER — OFFICE VISIT (OUTPATIENT)
Dept: FAMILY MEDICINE | Facility: CLINIC | Age: 29
End: 2019-12-02
Payer: COMMERCIAL

## 2019-12-02 VITALS
SYSTOLIC BLOOD PRESSURE: 113 MMHG | BODY MASS INDEX: 31.55 KG/M2 | HEART RATE: 91 BPM | RESPIRATION RATE: 18 BRPM | OXYGEN SATURATION: 99 % | DIASTOLIC BLOOD PRESSURE: 74 MMHG | TEMPERATURE: 98.3 F | WEIGHT: 201 LBS | HEIGHT: 67 IN

## 2019-12-02 DIAGNOSIS — E06.3 HYPOTHYROIDISM DUE TO HASHIMOTO'S THYROIDITIS: ICD-10-CM

## 2019-12-02 DIAGNOSIS — F41.8 DEPRESSION WITH ANXIETY: ICD-10-CM

## 2019-12-02 LAB
T4 FREE SERPL-MCNC: 1.35 NG/DL (ref 0.76–1.46)
TSH SERPL DL<=0.005 MIU/L-ACNC: 0.26 MU/L (ref 0.4–4)

## 2019-12-02 PROCEDURE — 99213 OFFICE O/P EST LOW 20 MIN: CPT | Performed by: NURSE PRACTITIONER

## 2019-12-02 PROCEDURE — 84439 ASSAY OF FREE THYROXINE: CPT | Performed by: INTERNAL MEDICINE

## 2019-12-02 PROCEDURE — 84443 ASSAY THYROID STIM HORMONE: CPT | Performed by: INTERNAL MEDICINE

## 2019-12-02 PROCEDURE — 36415 COLL VENOUS BLD VENIPUNCTURE: CPT | Performed by: INTERNAL MEDICINE

## 2019-12-02 RX ORDER — ESCITALOPRAM OXALATE 20 MG/1
20 TABLET ORAL DAILY
Qty: 90 TABLET | Refills: 1 | Status: CANCELLED | OUTPATIENT
Start: 2019-12-02

## 2019-12-02 RX ORDER — BUPROPION HYDROCHLORIDE 150 MG/1
150 TABLET ORAL EVERY MORNING
Qty: 90 TABLET | Refills: 1 | Status: CANCELLED | OUTPATIENT
Start: 2019-12-02

## 2019-12-02 ASSESSMENT — PAIN SCALES - GENERAL: PAINLEVEL: NO PAIN (0)

## 2019-12-02 ASSESSMENT — MIFFLIN-ST. JEOR: SCORE: 1665.39

## 2019-12-02 NOTE — PROGRESS NOTES
Subjective     Analia Lyons is a 28 year old female who presents to clinic today for the following health issues:    HPI   Anxiety Follow-Up    How are you doing with your anxiety since your last visit? No change    Are you having other symptoms that might be associated with anxiety? No    Have you had a significant life event? No     Are you feeling depressed? No    Do you have any concerns with your use of alcohol or other drugs? No    Social History     Tobacco Use     Smoking status: Never Smoker     Smokeless tobacco: Never Used   Substance Use Topics     Alcohol use: Yes     Comment: socially     Drug use: No     MAXIM-7 SCORE 6/7/2018 12/18/2018 4/9/2019   Total Score - - -   Total Score - 2 (minimal anxiety) -   Total Score 11 2 1     PHQ 11/5/2018 12/18/2018 4/9/2019   PHQ-9 Total Score 2 2 2   Q9: Thoughts of better off dead/self-harm past 2 weeks Not at all Not at all Not at all     Last PHQ-9 4/9/2019   1.  Little interest or pleasure in doing things 0   2.  Feeling down, depressed, or hopeless 0   3.  Trouble falling or staying asleep, or sleeping too much 1   4.  Feeling tired or having little energy 1   5.  Poor appetite or overeating 0   6.  Feeling bad about yourself 0   7.  Trouble concentrating 0   8.  Moving slowly or restless 0   Q9: Thoughts of better off dead/self-harm past 2 weeks 0   PHQ-9 Total Score 2   Difficulty at work, home, or with people Not difficult at all     MAXIM-7  4/9/2019   1. Feeling nervous, anxious, or on edge 0   2. Not being able to stop or control worrying 0   3. Worrying too much about different things 0   4. Trouble relaxing 0   5. Being so restless that it is hard to sit still 0   6. Becoming easily annoyed or irritable 1   7. Feeling afraid, as if something awful might happen 0   MAXIM-7 Total Score 1   If you checked any problems, how difficult have they made it for you to do your work, take care of things at home, or get along with other people? Not difficult at all          How many servings of fruits and vegetables do you eat daily?  4 or more    On average, how many sweetened beverages do you drink each day (Examples: soda, juice, sweet tea, etc.  Do NOT count diet or artificially sweetened beverages)?   1    How many days per week do you miss taking your medication? 0    Pleasant 28 year old female presents to discuss her medication for anxiety/depression. She has been taking wellbutrin since 2012 and citalopram and then lexapro since 2009. She does not have a history of bipolar. She wants to discuss a plan for discontinuing these medications. She feels like she's in a good place and has talked with her family ( with 2 children) and has good support from her . Denies thoughts to harm self or others. Feels safe.    Patient Active Problem List   Diagnosis     Hashimoto's disease     Familial hematuria     CARDIOVASCULAR SCREENING; LDL GOAL LESS THAN 160     Hypothyroidism     Mechanical low back pain     Keloid scar     Depression with anxiety     ASCUS of cervix with negative high risk HPV     Common wart     Hypothyroidism due to Hashimoto's thyroiditis     BMI 33.0-33.9,adult     High triglycerides     Obesity (BMI 35.0-39.9) with comorbidity (H)     Past Surgical History:   Procedure Laterality Date     COLONOSCOPY N/A 12/16/2015    Procedure: COLONOSCOPY;  Surgeon: Duane, William Charles, MD;  Location: [facility]     COLONOSCOPY WITH CO2 INSUFFLATION N/A 12/16/2015    Procedure: COLONOSCOPY WITH CO2 INSUFFLATION;  Surgeon: Duane, William Charles, MD;  Location: [facility]     NO HISTORY OF SURGERY         Social History     Tobacco Use     Smoking status: Never Smoker     Smokeless tobacco: Never Used   Substance Use Topics     Alcohol use: Yes     Comment: socially     Family History   Problem Relation Age of Onset     Depression Brother         depression     Hyperlipidemia Mother      Thyroid Disease Mother      Hyperlipidemia Father      Breast Cancer Cousin       "Depression Brother      Substance Abuse Sister      Substance Abuse Sister      C.A.D. No family hx of      Diabetes No family hx of      Breast Cancer No family hx of      Cancer - colorectal No family hx of      Anesthesia Reaction No family hx of      Blood Disease No family hx of          Current Outpatient Medications   Medication Sig Dispense Refill     albuterol (PROAIR HFA/PROVENTIL HFA/VENTOLIN HFA) 108 (90 Base) MCG/ACT inhaler Inhale 2 puffs into the lungs every 6 hours as needed for shortness of breath / dyspnea or wheezing 1 Inhaler 0     buPROPion (WELLBUTRIN XL) 150 MG 24 hr tablet Take 1 tablet (150 mg) by mouth every morning 90 tablet 1     cyclobenzaprine (FLEXERIL) 10 MG tablet Take 1 tablet (10 mg) by mouth 3 times daily as needed for muscle spasms 30 tablet 1     escitalopram (LEXAPRO) 20 MG tablet Take 1 tablet (20 mg) by mouth daily 90 tablet 1     levothyroxine (SYNTHROID/LEVOTHROID) 175 MCG tablet Take 1 tablet by mouth daily as directed 30 tablet 11     norethindrone-ethinyl estradiol (ORTHO-NOVUM 1/35, 28,) 1-35 MG-MCG tablet Take one active tablet a day for 21 days.  Discard the inactive pills and start new pack on day 22. 112 tablet 4     Allergies   Allergen Reactions     Nkda [No Known Drug Allergies]          Reviewed and updated as needed this visit by Provider  Tobacco  Allergies  Meds  Problems  Med Hx  Surg Hx  Fam Hx         Review of Systems   ROS COMP: Constitutional, HEENT, cardiovascular, pulmonary, gi and gu systems are negative, except as otherwise noted.      Objective    /74 (BP Location: Right arm, Patient Position: Sitting, Cuff Size: Adult Large)   Pulse 91   Temp 98.3  F (36.8  C) (Oral)   Resp 18   Ht 1.695 m (5' 6.75\")   Wt 91.2 kg (201 lb)   LMP  (LMP Unknown)   SpO2 99%   Breastfeeding No   BMI 31.72 kg/m    Body mass index is 31.72 kg/m .  Physical Exam   GENERAL: healthy, alert and no distress  PSYCH: mentation appears normal, affect " "normal/bright    Diagnostic Test Results:  none         Assessment & Plan     1. Depression with anxiety  Doing well. Wants to wean off medications. She wants to get off wellbutrin now and is agreeable to weaning off lexapro over the next couple of months. Discussed winter can be a difficult time for anxiety/depression. She verbalizes understanding and wishes to proceed. She will continue to exercise, sleep well and eat a well rounded diet. We also discussed avoiding sugary foods and alcohol.        BMI:   Estimated body mass index is 31.72 kg/m  as calculated from the following:    Height as of this encounter: 1.695 m (5' 6.75\").    Weight as of this encounter: 91.2 kg (201 lb).           See Patient Instructions    Stop wellbutrin now  In 4 weeks may consider decreasing lexapro to 10 mg  4 weeks after that may consider stopping lexapro  Continue to exercise, rest and eat good diet    Return in about 4 weeks (around 12/30/2019).     The benefits, risks and potential side effects were discussed in detail. Black box warnings discussed as relevant. All patient questions were answered. The patient was instructed to follow up immediately if any adverse reactions develop.    Return precautions discussed, including when to seek urgent/emergent care.    Patient verbalizes understanding and agrees with plan of care. Patient stable for discharge.      MERRY Phillips [facility]      "

## 2019-12-02 NOTE — PATIENT INSTRUCTIONS
Stop wellbutrin now  In 4 weeks may consider decreasing lexapro to 10 mg  4 weeks after that may consider stopping lexapro  Continue to exercise, rest and eat good diet

## 2019-12-03 ASSESSMENT — PATIENT HEALTH QUESTIONNAIRE - PHQ9
5. POOR APPETITE OR OVEREATING: NOT AT ALL
SUM OF ALL RESPONSES TO PHQ QUESTIONS 1-9: 2

## 2019-12-03 ASSESSMENT — ANXIETY QUESTIONNAIRES
7. FEELING AFRAID AS IF SOMETHING AWFUL MIGHT HAPPEN: NOT AT ALL
GAD7 TOTAL SCORE: 1
5. BEING SO RESTLESS THAT IT IS HARD TO SIT STILL: NOT AT ALL
3. WORRYING TOO MUCH ABOUT DIFFERENT THINGS: NOT AT ALL
1. FEELING NERVOUS, ANXIOUS, OR ON EDGE: SEVERAL DAYS
IF YOU CHECKED OFF ANY PROBLEMS ON THIS QUESTIONNAIRE, HOW DIFFICULT HAVE THESE PROBLEMS MADE IT FOR YOU TO DO YOUR WORK, TAKE CARE OF THINGS AT HOME, OR GET ALONG WITH OTHER PEOPLE: NOT DIFFICULT AT ALL
6. BECOMING EASILY ANNOYED OR IRRITABLE: NOT AT ALL
2. NOT BEING ABLE TO STOP OR CONTROL WORRYING: NOT AT ALL

## 2019-12-04 ASSESSMENT — ANXIETY QUESTIONNAIRES: GAD7 TOTAL SCORE: 1

## 2019-12-06 DIAGNOSIS — E06.3 HYPOTHYROIDISM DUE TO HASHIMOTO'S THYROIDITIS: ICD-10-CM

## 2019-12-06 NOTE — TELEPHONE ENCOUNTER
"levothyroxine (SYNTHROID/LEVOTHROID) 175 MCG tablet 30 tablet 11 11/11/2018     Last Written Prescription Date:  11/11/18  Last Fill Quantity: 30,  # refills: 11   Last office visit: 12/2/2019 with prescribing provider:  Alexandre   Future Office Visit:   Next 5 appointments (look out 90 days)    Dec 10, 2019 11:30 AM CST  Return Visit with Deuce Edward MD  [facility] ([facility] [address] [address]  [phone]         Requested Prescriptions   Pending Prescriptions Disp Refills     levothyroxine (SYNTHROID/LEVOTHROID) 175 MCG tablet [Pharmacy Med Name: Levothyroxine Sodium Oral Tablet 175 MCG] 30 tablet 10     Sig: TAKE ONE TABLET BY MOUTH ONE TIME DAILY AS DIRECTED       Thyroid Protocol Failed - 12/6/2019  7:01 AM        Failed - Recent (12 mo) or future (30 days) visit within the authorizing provider's specialty     Patient has had an office visit with the authorizing provider or a provider within the authorizing providers department within the previous 12 mos or has a future within next 30 days. See \"Patient Info\" tab in inbasket, or \"Choose Columns\" in Meds & Orders section of the refill encounter.              Failed - Normal TSH on file in past 12 months     Recent Labs   Lab Test 12/02/19  1519   TSH 0.26*              Passed - Patient is 12 years or older        Passed - Medication is active on med list        Passed - No active pregnancy on record     If patient is pregnant or has had a positive pregnancy test, please check TSH.          Passed - No positive pregnancy test in past 12 months     If patient is pregnant or has had a positive pregnancy test, please check TSH.          [facility] Follow-up to PHQ 12/18/2018 4/9/2019 12/3/2019   PHQ-9 9. Suicide Ideation past 2 weeks Not at all Not at all Not at all       "

## 2019-12-09 RX ORDER — LEVOTHYROXINE SODIUM 175 UG/1
TABLET ORAL
Qty: 90 TABLET | Refills: 3 | Status: SHIPPED | OUTPATIENT
Start: 2019-12-09 | End: 2020-06-16

## 2019-12-09 NOTE — TELEPHONE ENCOUNTER
Next 5 appointments (look out 90 days)    Dec 10, 2019 11:30 AM CST  Return Visit with Deuce Edward MD  [facility] ([facility]) [address]  [place] [address] [phone]      Routing refill request to provider for review/approval because:  Labs out of range:  TSH, patient has upcoming OV tomorrow     Rosaura LUCERO RN

## 2019-12-10 ENCOUNTER — OFFICE VISIT (OUTPATIENT)
Dept: ENDOCRINOLOGY | Facility: CLINIC | Age: 29
End: 2019-12-10
Payer: COMMERCIAL

## 2019-12-10 VITALS
BODY MASS INDEX: 31.24 KG/M2 | DIASTOLIC BLOOD PRESSURE: 79 MMHG | WEIGHT: 198 LBS | OXYGEN SATURATION: 99 % | HEART RATE: 96 BPM | RESPIRATION RATE: 16 BRPM | SYSTOLIC BLOOD PRESSURE: 110 MMHG

## 2019-12-10 DIAGNOSIS — E06.3 HYPOTHYROIDISM DUE TO HASHIMOTO'S THYROIDITIS: Primary | ICD-10-CM

## 2019-12-10 PROCEDURE — 99214 OFFICE O/P EST MOD 30 MIN: CPT | Performed by: INTERNAL MEDICINE

## 2019-12-10 NOTE — LETTER
"    12/10/2019         RE: Analia Lyons  [address]  [place] [address]        Dear Colleague,    Thank you for referring your patient, Analia Lyons, to the [facility]. Please see a copy of my visit note below.    S: Pt being seen in f/u for hypothyroidism.  Previously seen by Dr Schroeder:  \"2008. Concerns about wt gain, depression  Medical evaluation and lab testing reportedly showed low thyroid function  Started levothyroxine medication  Previous [facility] thyroid labs include:        Lab Results   Component Value Date     TSH 5.75 (H) 09/13/2018     TSH 12.12 (H) 05/29/2018     TSH 11.18 (H) 03/26/2018     TSH 3.17 03/23/2016     TSH 3.14 03/12/2015     T4 0.89 09/13/2018     T4 0.84 05/29/2018     T4 0.85 03/26/2018     T4 1.12 10/22/2014     T4 1.01 09/17/2012      (H) 04/01/2010      Recent [facility] labs include:        Lab Results   Component Value Date     TSH 5.75 (H) 09/13/2018     T4 0.89 09/13/2018      (H) 04/01/2010      Fam Hx thyroid disease              Hyperthyroidism and postablative hypothyroidism- mother\"    She is taking 175 mcg levothyroxine.   No recent change to dose.     She is on a continuous OCP as she had heavy bleeding with menses prior.     Recently more fatigued at work.   This coincides with starting [employer] in 9/2019.   Caffeine intake has not changed.   She has lost 32 pounds since the summer.     She did stop her wellbutrin last week. She also has a history of SAD.   She continues on lexapro.    ROS: 10 point ROS neg other than the symptoms noted above in the HPI.    O:  Vital signs:      BP: 110/79 Pulse: 96   Resp: 16 SpO2: 99 %       Weight: 89.8 kg (198 lb)  Estimated body mass index is 31.24 kg/m  as calculated from the following:    Height as of 12/2/19: 1.695 m (5' 6.75\").    Weight as of this encounter: 89.8 kg (198 lb).  Gen: In NAD.   HEENT: no proptosis or lid lag, EOMI, firm thyroid w/o clear nodule.  Card: S1 S2 RRR no " m/r/g.  Pulm: CTA b/l.   Ext: no LE edema.   MSK: no gross deformities.  Neuro: no tremor, +2 DTR's.     A/P:   Hypothyroidism - Extensive discussion of thyroid hormone and normal physiology. Included was discussion of thyroid in  relation to weight and energy. Low TSH and normal free T4 in 12/2019. I suspect this change in because of her weight loss. She is not having any signs or symptoms of hyperthyroidism.   -No change to levothyroxine dose.   -Labs in 3 months to follow.   -If you begin to develop racing heart, tremors, restlessness, diarrhea, sweats, irritability, call me and we can check labs earlier.   -Contact me if you become pregnant as we will need to adjust your levothyroxine.       I spent 25 minutes with the patient. Greater than 50% of the time spent in . Risks/benefits/alternatives reviewed.     Deuce Edward MD on 12/10/2019 at 12:29 PM          Again, thank you for allowing me to participate in the care of your patient.        Sincerely,        Deuce Edward MD

## 2019-12-10 NOTE — PROGRESS NOTES
"S: Pt being seen in f/u for hypothyroidism.  Previously seen by Dr Schroeder:  \"2008. Concerns about wt gain, depression  Medical evaluation and lab testing reportedly showed low thyroid function  Started levothyroxine medication  Previous [facility] thyroid labs include:        Lab Results   Component Value Date     TSH 5.75 (H) 09/13/2018     TSH 12.12 (H) 05/29/2018     TSH 11.18 (H) 03/26/2018     TSH 3.17 03/23/2016     TSH 3.14 03/12/2015     T4 0.89 09/13/2018     T4 0.84 05/29/2018     T4 0.85 03/26/2018     T4 1.12 10/22/2014     T4 1.01 09/17/2012      (H) 04/01/2010      Recent [facility] labs include:        Lab Results   Component Value Date     TSH 5.75 (H) 09/13/2018     T4 0.89 09/13/2018      (H) 04/01/2010      Fam Hx thyroid disease              Hyperthyroidism and postablative hypothyroidism- mother\"    She is taking 175 mcg levothyroxine.   No recent change to dose.     She is on a continuous OCP as she had heavy bleeding with menses prior.     Recently more fatigued at work.   This coincides with starting [employer] in 9/2019.   Caffeine intake has not changed.   She has lost 32 pounds since the summer.     She did stop her wellbutrin last week. She also has a history of SAD.   She continues on lexapro.    ROS: 10 point ROS neg other than the symptoms noted above in the HPI.    O:  Vital signs:      BP: 110/79 Pulse: 96   Resp: 16 SpO2: 99 %       Weight: 89.8 kg (198 lb)  Estimated body mass index is 31.24 kg/m  as calculated from the following:    Height as of 12/2/19: 1.695 m (5' 6.75\").    Weight as of this encounter: 89.8 kg (198 lb).  Gen: In NAD.   HEENT: no proptosis or lid lag, EOMI, firm thyroid w/o clear nodule.  Card: S1 S2 RRR no m/r/g.  Pulm: CTA b/l.   Ext: no LE edema.   MSK: no gross deformities.  Neuro: no tremor, +2 DTR's.     A/P:   Hypothyroidism - Extensive discussion of thyroid hormone and normal physiology. Included was discussion of thyroid in  relation to weight and " energy. Low TSH and normal free T4 in 12/2019. I suspect this change in because of her weight loss. She is not having any signs or symptoms of hyperthyroidism.   -No change to levothyroxine dose.   -Labs in 3 months to follow.   -If you begin to develop racing heart, tremors, restlessness, diarrhea, sweats, irritability, call me and we can check labs earlier.   -Contact me if you become pregnant as we will need to adjust your levothyroxine.       I spent 25 minutes with the patient. Greater than 50% of the time spent in . Risks/benefits/alternatives reviewed.     Deuce Edward MD on 12/10/2019 at 12:29 PM

## 2019-12-10 NOTE — PATIENT INSTRUCTIONS
-No change to levothyroxine dose.   -Labs in 3 months to follow.   -If you begin to develop racing heart, tremors, restlessness, diarrhea, sweats, irritability, call me and we can check labs earlier.   -Contact me if you become pregnant as we will need to adjust your levothyroxine.

## 2019-12-15 DIAGNOSIS — F41.8 DEPRESSION WITH ANXIETY: ICD-10-CM

## 2019-12-15 NOTE — TELEPHONE ENCOUNTER
"Requested Prescriptions   Pending Prescriptions Disp Refills     escitalopram (LEXAPRO) 20 MG tablet  Last Written Prescription Date:  4/9/19  Last Fill Quantity: 90,  # refills: 1   Last Office Visit with G, P or [facility] prescribing provider:  12/2/19   Future Office Visit:      90 tablet 1     Sig: Take 1 tablet (20 mg) by mouth daily       SSRIs Protocol Passed - 12/15/2019 11:14 AM        Passed - PHQ-9 score less than 5 in past 6 months     Please review last PHQ-9 score.           Passed - Medication is active on med list        Passed - Patient is age 18 or older        Passed - No active pregnancy on record        Passed - No positive pregnancy test in last 12 months        Passed - Recent (6 mo) or future (30 days) visit within the authorizing provider's specialty     Patient had office visit in the last 6 months or has a visit in the next 30 days with authorizing provider or within the authorizing provider's specialty.  See \"Patient Info\" tab in inbasket, or \"Choose Columns\" in Meds & Orders section of the refill encounter.              "

## 2019-12-16 ENCOUNTER — OFFICE VISIT (OUTPATIENT)
Dept: FAMILY MEDICINE | Facility: CLINIC | Age: 29
End: 2019-12-16
Payer: COMMERCIAL

## 2019-12-16 VITALS
HEART RATE: 88 BPM | SYSTOLIC BLOOD PRESSURE: 107 MMHG | TEMPERATURE: 98 F | DIASTOLIC BLOOD PRESSURE: 71 MMHG | HEIGHT: 67 IN | BODY MASS INDEX: 30.92 KG/M2 | RESPIRATION RATE: 16 BRPM | WEIGHT: 197 LBS | OXYGEN SATURATION: 96 %

## 2019-12-16 DIAGNOSIS — L42 PITYRIASIS ROSEA: Primary | ICD-10-CM

## 2019-12-16 PROCEDURE — 99213 OFFICE O/P EST LOW 20 MIN: CPT | Performed by: NURSE PRACTITIONER

## 2019-12-16 RX ORDER — TRIAMCINOLONE ACETONIDE 1 MG/G
CREAM TOPICAL 2 TIMES DAILY
Qty: 80 G | Refills: 0 | Status: SHIPPED | OUTPATIENT
Start: 2019-12-16 | End: 2019-12-23

## 2019-12-16 ASSESSMENT — MIFFLIN-ST. JEOR: SCORE: 1642.25

## 2019-12-16 ASSESSMENT — PAIN SCALES - GENERAL: PAINLEVEL: NO PAIN (0)

## 2019-12-16 NOTE — PROGRESS NOTES
Subjective     Analia Lyons is a 29 year old female who presents to clinic today for the following health issues:    HPI   Rash  Onset: 2 weeks ago    Description:   Location: abdomen, chest and left armpit  Character: raised, flakey, red  Itching (Pruritis): YES    Progression of Symptoms:  same    Accompanying Signs & Symptoms:  Fever: no   Body aches or joint pain: no   Sore throat symptoms: no   Recent cold symptoms: no     History:   Previous similar rash: no     Precipitating factors:   Exposure to similar rash: no   New exposures: None   Recent travel: no     Alleviating factors:  none    Therapies Tried and outcome: Benadryl and Hydrocortisone: no relief    Pleasant 29-year-old female presents with the above concerns.  She is tried hydrocortisone and Benadryl without relief.  Itches quite a bit.  Not pregnant or breast-feeding.    Patient Active Problem List   Diagnosis     Hashimoto's disease     Familial hematuria     CARDIOVASCULAR SCREENING; LDL GOAL LESS THAN 160     Hypothyroidism     Mechanical low back pain     Keloid scar     Depression with anxiety     ASCUS of cervix with negative high risk HPV     Common wart     Hypothyroidism due to Hashimoto's thyroiditis     BMI 33.0-33.9,adult     High triglycerides     Obesity (BMI 35.0-39.9) with comorbidity (H)     Past Surgical History:   Procedure Laterality Date     COLONOSCOPY N/A 12/16/2015    Procedure: COLONOSCOPY;  Surgeon: Duane, William Charles, MD;  Location: [facility] OR     COLONOSCOPY WITH CO2 INSUFFLATION N/A 12/16/2015    Procedure: COLONOSCOPY WITH CO2 INSUFFLATION;  Surgeon: Duane, William Charles, MD;  Location: [facility] OR     NO HISTORY OF SURGERY         Social History     Tobacco Use     Smoking status: Never Smoker     Smokeless tobacco: Never Used   Substance Use Topics     Alcohol use: Yes     Comment: socially     Family History   Problem Relation Age of Onset     Depression Brother         depression     Hyperlipidemia Mother      Thyroid  "Disease Mother      Hyperlipidemia Father      Breast Cancer Cousin      Depression Brother      Substance Abuse Sister      Substance Abuse Sister      C.AStevensonD. No family hx of      Diabetes No family hx of      Breast Cancer No family hx of      Cancer - colorectal No family hx of      Anesthesia Reaction No family hx of      Blood Disease No family hx of          Current Outpatient Medications   Medication Sig Dispense Refill     cyclobenzaprine (FLEXERIL) 10 MG tablet Take 1 tablet (10 mg) by mouth 3 times daily as needed for muscle spasms 30 tablet 1     levothyroxine (SYNTHROID/LEVOTHROID) 175 MCG tablet TAKE ONE TABLET BY MOUTH ONE TIME DAILY AS DIRECTED 90 tablet 3     norethindrone-ethinyl estradiol (ORTHO-NOVUM 1/35, 28,) 1-35 MG-MCG tablet Take one active tablet a day for 21 days.  Discard the inactive pills and start new pack on day 22. 112 tablet 4     triamcinolone (KENALOG) 0.1 % external cream Apply topically 2 times daily for 7 days 80 g 0     albuterol (PROAIR HFA/PROVENTIL HFA/VENTOLIN HFA) 108 (90 Base) MCG/ACT inhaler Inhale 2 puffs into the lungs every 6 hours as needed for shortness of breath / dyspnea or wheezing (Patient not taking: Reported on 12/16/2019) 1 Inhaler 0     buPROPion (WELLBUTRIN XL) 150 MG 24 hr tablet Take 1 tablet (150 mg) by mouth every morning (Patient not taking: Reported on 12/16/2019) 90 tablet 1     escitalopram (LEXAPRO) 20 MG tablet Take 1 tablet (20 mg) by mouth daily 90 tablet 1     Allergies   Allergen Reactions     Nkda [No Known Drug Allergies]          Reviewed and updated as needed this visit by Provider         Review of Systems   ROS COMP: Constitutional, HEENT, cardiovascular, pulmonary, gi and gu systems are negative, except as otherwise noted.      Objective    /71 (BP Location: Right arm, Patient Position: Chair, Cuff Size: Adult Large)   Pulse 88   Temp 98  F (36.7  C) (Oral)   Resp 16   Ht 1.695 m (5' 6.75\")   Wt 89.4 kg (197 lb)   LMP  (LMP " Unknown)   SpO2 96%   Breastfeeding No   BMI 31.09 kg/m    Body mass index is 31.09 kg/m .  Physical Exam   GENERAL: healthy, alert and no distress  RESP: lungs clear to auscultation - no rales, rhonchi or wheezes  CV: regular rate and rhythm, normal S1 S2, no S3 or S4, no murmur, click or rub, no peripheral edema and peripheral pulses strong  MS: no gross musculoskeletal defects noted, no edema  SKIN: herald patch to LLQ abdomen/inguinal area with smaller papulosquamous ovalular lesions to chest  PSYCH: mentation appears normal, affect normal/bright    Diagnostic Test Results:  Labs reviewed in Epic        Assessment & Plan     1. Pityriasis rosea  Trial cetirizine (Zyrtec) 10 mg daily  Trial triamcinolone cream  - triamcinolone (KENALOG) 0.1 % external cream; Apply topically 2 times daily for 7 days  Dispense: 80 g; Refill: 0       See Patient Instructions    Return in about 4 weeks (around 1/13/2020), or if symptoms worsen or fail to improve.     The benefits, risks and potential side effects were discussed in detail. Black box warnings discussed as relevant. All patient questions were answered. The patient was instructed to follow up immediately if any adverse reactions develop.    Return precautions discussed, including when to seek urgent/emergent care.    Patient verbalizes understanding and agrees with plan of care. Patient stable for discharge.        MERRY Phillips [facility]

## 2019-12-16 NOTE — PATIENT INSTRUCTIONS
Trial cetirizine (Zyrtec) 10 mg daily  Trial triamcinolone cream      Patient Education       At [facility], we strive to deliver an exceptional experience to you, every time we see you.  If you receive a survey in the mail, please send us back your thoughts. We really do value your feedback.    Based on your medical history, these are the current health maintenance/preventive care services that you are due for (some may have been done at this visit.)  Health Maintenance Due   Topic Date Due     HIV SCREENING  12/06/2004     HPV  06/06/2020         Suggested websites for health information:  Www.7digital.[employer] : Up to date and easily searchable information on multiple topics.  Www.TrashOut.gov : medication info, interactive tutorials, watch real surgeries online  Www.familydoctor.org : good info from the Academy of Family Physicians  Www.cdc.gov : public health info, travel advisories, epidemics (H1N1)  Www.aap.org : children's health info, normal development, vaccinations  Www.health.[facility].[place].us : [place] dept of health, public health issues in [place], N1N1    Your care team:                            Family Medicine Internal Medicine   MD Fletcher Otero MD Shantel Branch-Fleming, MD Katya Georgiev PA-C Nam Ho, MD Pediatrics   CUONG See, MD Rosie Hidalgo CNP, MD Deborah Mielke, MD Kim Thein, APRN [facility]      Clinic hours: Monday - Thursday 7 am-7 pm; Fridays 7 am-5 pm.   Urgent care: Monday - Friday 11 am-9 pm; Saturday and Sunday 9 am-5 pm.  Pharmacy : Monday -Thursday 8 am-8 pm; Friday 8 am-6 pm; Saturday and Sunday 9 am-5 pm.     Clinic: [phone]   Pharmacy: [phone]    Patient Education     Understanding Pityriasis Rosea    Pityriasis rosea is a type of skin rash. It starts with one large round or oval scaly patch called the herald patch, and then causes many more small patches. The rash most  often appears on the chest, back, and belly. It can take 1 to 2 months to go away. But once it s gone, it doesn t come back.  How to say it  [id number]-EYE-ah-sis RO-zee-ah   What causes pityriasis rosea?  The cause is not yet known but experts think it may be from a virus. The rash happens most often in people ages 10 to 35, and in pregnant women. If you are pregnant, make sure to tell your healthcare provider about your rash.  Symptoms of pityriasis rosea  In some people, the rash shows up 1 to 2 weeks after symptoms such as headache, sore throat, nausea, stuffy nose, and fever. The rash often starts with one large scaly patch in the shape of a [place] or oval. The patch may be pink or red if you have pale skin. It may be purple, brown, or gray if you have darker skin. It can be 1 to 2 inches wide or larger. It usually appears on the chest or back. This is called a herald or mother patch.  Smaller patches then show up in 1 to 2 weeks on the chest, back, belly, arms, and legs. It can also show up on the neck and face. The rash can form the shape of a Elmer tree on your back. The patches may itch, especially if your skin gets warmer during exercise or a hot shower. You may also feel tired and achy.  Treatment for pityriasis rosea  The rash should go away without treatment, but it can take 4 to 8 weeks or longer. Once the rash goes away, it doesn t come back.  You can treat your itching with any of these:    Corticosteroid cream or ointment. You can apply this medicine to the rash 2 to 3 times a day, for up to 3 weeks.    Calamine lotion. This is a pink, watery lotion that can help stop itching.    Antihistamine. This medicine can help reduce itching. You can put it on the skin as a cream or take it by mouth as a pill.    Other anti-itch lotion or cream. Ask your healthcare provider about other anti-itch lotion or cream that can help relieve itching. He or she may prescribe a stronger medicine if drugstore medicine  isn t helping you.  If you have severe symptoms, your healthcare provider may treat you with any of the below:    Prednisone. This is an oral steroid medicine. It can help relieve severe itching if needed.    Acyclovir. This is a type of anti-virus medicine. It may help the rash go away sooner in some people.    Ultraviolet light treatment. Exposing the skin to ultraviolet light in the first week can help lessen symptoms.  When to call your healthcare provider  Call your healthcare provider right away if you have any of these:    New symptoms    Rash that lasts for more than 3 months    Symptoms that don t get better in 1 to 2 months, or get worse   Date Last Reviewed: 5/1/2016 2000-2018 [employer]. [address] [place] [address], [place] [address]. All rights reserved. This information is not intended as a substitute for professional medical care. Always follow your healthcare professional's instructions.           Patient Education     Pityriasis Rosea  This is a harmless non-contagious rash. The exact cause is unknown. It is not an allergic reaction, and does not seem to be caused by a viral or fungal infection. Although anyone can get it, it is most often seen in children and young adults ages 10 to 35.  Pityriasis usually resolves on its own without treatment in 2 weeks.  In some people it may take 6 to 8 weeks to clear up.   Home care    For dry skin, use a moisturizing cream. For itchiness, use 1% hydrocortisone cream (no prescription needed) or calamine lotion 2 to 3 times a day.    Exposure to natural sunlight helps some people. It may help fade the rash, but doesn't seem to help the itching. Don't overdo it in the sun, as your skin may be more sensitive than usual. You don t want to burn yourself. Artificial sun lamps, sun beds, and tanning salons are not recommended.    This condition is not contagious. Your child may attend  or school while the rash is present.  Medicines  Talk with your  healthcare provider before using any medicines. All medicines have side effects.    Medicines will not get rid of the rash.     Moisturizing skin creams may help.    Antihistamines may help with itching, but they can make you sleepy.    Topical steroids are sometimes used.  Follow-up care  Follow up with your healthcare provider, or as advised. Call your provider if the itching is not controlled by the above suggestions, or if the rash lasts longer than 3 months.  When to seek medical advice  Call your healthcare provider right away if any of these occur:    You develop a rash and are pregnant    Severe itching    Signs of infection in the skin (increasing redness, drainage of pus, yellow-brown scabs)    Fever or other symptoms of a new illness  Date Last Reviewed: 8/1/2016 2000-2018 [employer]. [address] [place] [address], [place] [address]. All rights reserved. This information is not intended as a substitute for professional medical care. Always follow your healthcare professional's instructions.

## 2019-12-17 RX ORDER — ESCITALOPRAM OXALATE 20 MG/1
20 TABLET ORAL DAILY
Qty: 90 TABLET | Refills: 1 | Status: SHIPPED | OUTPATIENT
Start: 2019-12-17 | End: 2020-06-22

## 2019-12-18 NOTE — TELEPHONE ENCOUNTER
PHQ-9 SCORE 12/18/2018 4/9/2019 12/3/2019   PHQ-9 Total Score - - -   PHQ-9 Total Score MyChart 2 (Minimal depression) - -   PHQ-9 Total Score 2 2 2     Prescription approved per [facility] Refill Protocol.      Lalo Umana RN, BSN, PHN

## 2020-01-03 ENCOUNTER — OFFICE VISIT (OUTPATIENT)
Dept: FAMILY MEDICINE | Facility: CLINIC | Age: 30
End: 2020-01-03
Payer: COMMERCIAL

## 2020-01-03 VITALS
OXYGEN SATURATION: 97 % | BODY MASS INDEX: 31.28 KG/M2 | WEIGHT: 198.2 LBS | RESPIRATION RATE: 20 BRPM | DIASTOLIC BLOOD PRESSURE: 76 MMHG | TEMPERATURE: 97.9 F | HEART RATE: 113 BPM | SYSTOLIC BLOOD PRESSURE: 112 MMHG

## 2020-01-03 DIAGNOSIS — E03.9 HYPOTHYROIDISM, UNSPECIFIED TYPE: ICD-10-CM

## 2020-01-03 DIAGNOSIS — F41.8 DEPRESSION WITH ANXIETY: ICD-10-CM

## 2020-01-03 DIAGNOSIS — K85.10 ACUTE GALLSTONE PANCREATITIS: Primary | ICD-10-CM

## 2020-01-03 DIAGNOSIS — E66.01 MORBID OBESITY (H): ICD-10-CM

## 2020-01-03 PROBLEM — R74.8 ELEVATED LIVER ENZYMES: Status: ACTIVE | Noted: 2019-12-28

## 2020-01-03 PROCEDURE — 99214 OFFICE O/P EST MOD 30 MIN: CPT | Performed by: NURSE PRACTITIONER

## 2020-01-03 ASSESSMENT — PAIN SCALES - GENERAL: PAINLEVEL: NO PAIN (0)

## 2020-01-03 NOTE — PROGRESS NOTES
Subjective     Analia Jerry is a 29 year old female who presents to clinic today for the following health issues:    HPI         Hospital Follow-up Visit:    Hospital/Nursing Home/IP Rehab Facility: [facility]  Date of Admission: 12/28/2019  Date of Discharge: 12/30/2019  Reason(s) for Admission: ABD pain , patient diagnosed with gallstone pancreatitis, cholelithiasis, elevated LFT's, and is s/p lap olesya 12/30/19.            Problems taking medications regularly:  None       Medication changes since discharge: None       Problems adhering to non-medication therapy:  None    Summary of hospitalization:  CareEverywhere information obtained and reviewed  Diagnostic Tests/Treatments reviewed.  Follow up needed: none  Other Healthcare Providers Involved in Patient s Care:         None  Update since discharge: improved.     Post Discharge Medication Reconciliation: discharge medications reconciled, continue medications without change.  Plan of care communicated with patient     Coding guidelines for this visit:  Type of Medical   Decision Making Face-to-Face Visit       within 7 Days of discharge Face-to-Face Visit        within 14 days of discharge   Moderate Complexity [id number]   High Complexity [id number]            Depression and Anxiety Follow-Up    How are you doing with your depression since your last visit? Improved     How are you doing with your anxiety since your last visit?  No change    Are you having other symptoms that might be associated with depression or anxiety? No    Have you had a significant life event? No     Do you have any concerns with your use of alcohol or other drugs? No    Social History     Tobacco Use     Smoking status: Never Smoker     Smokeless tobacco: Never Used   Substance Use Topics     Alcohol use: Yes     Comment: socially     Drug use: No     PHQ 12/18/2018 4/9/2019 12/3/2019   PHQ-9 Total Score 2 2 2   Q9: Thoughts of better off dead/self-harm past 2 weeks  Not at all Not at all Not at all     MAXIM-7 SCORE 12/18/2018 4/9/2019 12/3/2019   Total Score - - -   Total Score 2 (minimal anxiety) - -   Total Score 2 1 1   In the past two weeks have you had thoughts of suicide or self-harm?  No.    Do you have concerns about your personal safety or the safety of others?   No    Suicide Assessment Five-step Evaluation and Treatment (SAFE-T)    Asthma Follow-Up    Was ACT completed today?  No      Do you have a cough?  No    Are you experiencing any wheezing in your chest?  No    Do you have any shortness of breath?  No     How often are you using a short-acting (rescue) inhaler or nebulizer, such as Albuterol?  usually uses in the Spring- seasonal allergies are primary trigger    How many days per week do you miss taking your asthma controller medication?  0    Please describe any recent triggers for your asthma: pollens    Have you had any Emergency Room Visits, Urgent Care Visits, or Hospital Admissions since your last office visit? No    Hypothyroidism Follow-up      Since last visit, patient describes the following symptoms: Weight stable, no hair loss, no skin changes, no constipation, no loose stools      Patient Active Problem List   Diagnosis     Hashimoto's disease     Familial hematuria     CARDIOVASCULAR SCREENING; LDL GOAL LESS THAN 160     Hypothyroidism     Mechanical low back pain     Keloid scar     Depression with anxiety     ASCUS of cervix with negative high risk HPV     Common wart     Hypothyroidism due to Hashimoto's thyroiditis     BMI 33.0-33.9,adult     High triglycerides     Obesity (BMI 35.0-39.9) with comorbidity (H)     Past Surgical History:   Procedure Laterality Date     COLONOSCOPY N/A 12/16/2015    Procedure: COLONOSCOPY;  Surgeon: Duane, William Charles, MD;  Location: [facility]     COLONOSCOPY WITH CO2 INSUFFLATION N/A 12/16/2015    Procedure: COLONOSCOPY WITH CO2 INSUFFLATION;  Surgeon: Duane, William Charles, MD;  Location: [facility] OR     NO HISTORY  OF SURGERY         Social History     Tobacco Use     Smoking status: Never Smoker     Smokeless tobacco: Never Used   Substance Use Topics     Alcohol use: Yes     Comment: socially     Family History   Problem Relation Age of Onset     Depression Brother         depression     Hyperlipidemia Mother      Thyroid Disease Mother      Hyperlipidemia Father      Breast Cancer Cousin      Depression Brother      Substance Abuse Sister      Substance Abuse Sister      C.ASURESH. No family hx of      Diabetes No family hx of      Breast Cancer No family hx of      Cancer - colorectal No family hx of      Anesthesia Reaction No family hx of      Blood Disease No family hx of          Current Outpatient Medications   Medication Sig Dispense Refill     albuterol (PROAIR HFA/PROVENTIL HFA/VENTOLIN HFA) 108 (90 Base) MCG/ACT inhaler Inhale 2 puffs into the lungs every 6 hours as needed for shortness of breath / dyspnea or wheezing 1 Inhaler 0     buPROPion (WELLBUTRIN XL) 150 MG 24 hr tablet Take 1 tablet (150 mg) by mouth every morning 90 tablet 1     cyclobenzaprine (FLEXERIL) 10 MG tablet Take 1 tablet (10 mg) by mouth 3 times daily as needed for muscle spasms 30 tablet 1     escitalopram (LEXAPRO) 20 MG tablet Take 1 tablet (20 mg) by mouth daily 90 tablet 1     levothyroxine (SYNTHROID/LEVOTHROID) 175 MCG tablet TAKE ONE TABLET BY MOUTH ONE TIME DAILY AS DIRECTED 90 tablet 3     norethindrone-ethinyl estradiol (ORTHO-NOVUM 1/35, 28,) 1-35 MG-MCG tablet Take one active tablet a day for 21 days.  Discard the inactive pills and start new pack on day 22. 112 tablet 4     BP Readings from Last 3 Encounters:   01/03/20 112/76   12/16/19 107/71   12/10/19 110/79    Wt Readings from Last 3 Encounters:   01/03/20 89.9 kg (198 lb 3.2 oz)   12/16/19 89.4 kg (197 lb)   12/10/19 89.8 kg (198 lb)            Reviewed and updated as needed this visit by Provider         Review of Systems   ROS COMP: Constitutional, HEENT, cardiovascular,  pulmonary, gi and gu systems are negative, except as otherwise noted.      Objective    /76 (BP Location: Left arm, Patient Position: Chair, Cuff Size: Adult Regular)   Pulse 113   Temp 97.9  F (36.6  C) (Oral)   Resp 20   Wt 89.9 kg (198 lb 3.2 oz)   SpO2 97%   BMI 31.28 kg/m    Body mass index is 31.28 kg/m .  Physical Exam   GENERAL: healthy, alert and no distress  EYES: Eyes grossly normal to inspection, PERRL and conjunctivae and sclerae normal  HENT: ear canals and TM's normal, nose and mouth without ulcers or lesions  NECK: no adenopathy, no asymmetry, masses, or scars and thyroid normal to palpation  RESP: lungs clear to auscultation - no rales, rhonchi or wheezes  CV: regular rate and rhythm, normal S1 S2, no S3 or S4, no murmur, click or rub, no peripheral edema and peripheral pulses strong  ABDOMEN: mild tenderness RUQ (incisional), bowel sounds normal and well-healed incisions, steri strips intact, no discharge  MS: no gross musculoskeletal defects noted, no edema  SKIN: no suspicious lesions or rashes  NEURO: Normal strength and tone, mentation intact and speech normal  BACK: no CVA tenderness, no paralumbar tenderness  PSYCH: mentation appears normal, affect normal/bright  LYMPH: normal ant/post cervical, supraclavicular nodes    Diagnostic Test Results:  Labs reviewed in Epic  none         Assessment & Plan     1. Acute gallstone pancreatitis  S/p lap olesya on 12/30/19 and doing well,  Incisions are healing nicely- dry, steristrips intact, slight incisional pain but not taking pain medication at this time.      2. Hypothyroidism, unspecified type  Followed by endocrinology.      3. Obesity (BMI 35.0-39.9) with comorbidity (H)  Benefits of weight loss reviewed in detail, encouraged her to cut back on the carbohydrates in the diet, consume more fruits and vegetables, drink plenty of water, avoid fruit juices, sodas, get 150 min moderate exercise/week.  Recheck weight in 6 months.      4.  Depression with anxiety  Continuing on Lexapro 20 mg daily.  Consider counseling for new/worsening symptoms.         Work on weight loss  Regular exercise  See Patient Instructions    Return in about 6 months (around 7/3/2020).    MERRY Romo [facility]

## 2020-01-03 NOTE — PATIENT INSTRUCTIONS
Patient Education     After Gallbladder Surgery    You can usually go home the same day as your surgery. In some cases, you may need to stay overnight. After open laparoscopic surgery, you will usually need to stay in the hospital for 2 to 5 days. Once you re at home, be sure to follow all your healthcare provider s instructions.  In the hospital  Bandages will cover your incisions and you may have special boots on your legs to prevent blood clots. To aid recovery, you ll be asked to get up and move as soon as possible. You may also be asked to use a device that helps promote deep breathing to keep your lungs clear.  At home  You can get back to your normal routine as soon as you feel able. To speed healing:    Take any prescribed pain medicines as directed.    Follow your healthcare provider s instructions about bathing and caring for your incisions.    Walk and move around as often as possible, but follow your healthcare provider's instructions on how much weight you can lift.     Ask your healthcare provider about driving and going back to work. This is often about 5 to 10 days after surgery.  Eating normally again  Removing the gallbladder doesn t mean you have to be on a special diet. But you may want to start with light meals. It can also take a few weeks for your digestion to adjust. You may have indigestion, loose stools, or diarrhea. This is common and should go away in time.  Following up  Keep follow-up appointments during your recovery. These allow your healthcare provider to check your progress and answer any questions. Be sure to mention if you have any new symptoms. Also mention if you have diarrhea that doesn t go away.  Call your healthcare provider  Contact your healthcare provider if you have any of the following:    Fever of 100.4  F (38.0 C) or higher, or as directed by your healthcare provider    Chills    Increasing pain, redness, or drainage at an incision site    Vomiting or nausea that  lasts more than 12 hours    Prolonged diarrhea    Belly pain    Leg swelling or trouble breathing    Difficulty urinating    Bleeding from the rectum   Date Last Reviewed: 7/1/2016 2000-2019 [employer] [facility] [employer]. [address] [place] [address], [place] [address]. All rights reserved. This information is not intended as a substitute for professional medical care. Always follow your healthcare professional's instructions.

## 2020-02-14 DIAGNOSIS — E06.3 HYPOTHYROIDISM DUE TO HASHIMOTO'S THYROIDITIS: ICD-10-CM

## 2020-02-14 DIAGNOSIS — E06.3 HYPOTHYROIDISM DUE TO HASHIMOTO'S THYROIDITIS: Primary | ICD-10-CM

## 2020-02-14 LAB — TSH SERPL DL<=0.005 MIU/L-ACNC: 3.35 MU/L (ref 0.4–4)

## 2020-02-14 PROCEDURE — 36415 COLL VENOUS BLD VENIPUNCTURE: CPT | Performed by: INTERNAL MEDICINE

## 2020-02-14 PROCEDURE — 84443 ASSAY THYROID STIM HORMONE: CPT | Performed by: INTERNAL MEDICINE

## 2020-06-02 DIAGNOSIS — F41.8 DEPRESSION WITH ANXIETY: ICD-10-CM

## 2020-06-04 RX ORDER — BUPROPION HYDROCHLORIDE 150 MG/1
150 TABLET ORAL EVERY MORNING
Qty: 90 TABLET | Refills: 0 | Status: SHIPPED | OUTPATIENT
Start: 2020-06-04 | End: 2020-07-08

## 2020-06-04 NOTE — TELEPHONE ENCOUNTER
This writer attempted to contact patient on 06/04/20    Reason for call rx sent, need to be seen and left detailed message.  My chart message sent as well.    If patient calls back:   Schedule video visit appointment within 1 week with PCP, document that pt called and close encounter     Criss Arvizu MA

## 2020-06-04 NOTE — TELEPHONE ENCOUNTER
Routing refill request to provider for review/approval because:  Fails PHQ-9    Anastasia Velazco RN, [facility] Triage

## 2020-06-09 DIAGNOSIS — E06.3 HYPOTHYROIDISM DUE TO HASHIMOTO'S THYROIDITIS: ICD-10-CM

## 2020-06-09 LAB — TSH SERPL DL<=0.005 MIU/L-ACNC: 1.27 MU/L (ref 0.4–4)

## 2020-06-09 PROCEDURE — 36415 COLL VENOUS BLD VENIPUNCTURE: CPT | Performed by: INTERNAL MEDICINE

## 2020-06-09 PROCEDURE — 84443 ASSAY THYROID STIM HORMONE: CPT | Performed by: INTERNAL MEDICINE

## 2020-06-15 NOTE — PROGRESS NOTES
"Analia Jerry is a 29 year old female who is being evaluated via a billable telephone visit.      The patient has been notified of following:     \"This telephone visit will be conducted via a call between you and your physician/provider. We have found that certain health care needs can be provided without the need for a physical exam.  This service lets us provide the care you need with a short phone conversation.  If a prescription is necessary we can send it directly to your pharmacy.  If lab work is needed we can place an order for that and you can then stop by our lab to have the test done at a later time.    Telephone visits are billed at different rates depending on your insurance coverage. During this emergency period, for some insurers they may be billed the same as an in-person visit.  Please reach out to your insurance provider with any questions.    If during the course of the call the physician/provider feels a telephone visit is not appropriate, you will not be charged for this service.\"    Patient has given verbal consent for Telephone visit?  Yes    What phone number would you like to be contacted at? [phone]    How would you like to obtain your AVS? Mail a copy  S: Pt being seen in f/u for hypothyroidism.  Previously seen by Dr Schroeder:  \"2008. Concerns about wt gain, depression  Medical evaluation and lab testing reportedly showed low thyroid function  Started levothyroxine medication  Previous [facility] thyroid labs include:            Lab Results   Component Value Date     TSH 5.75 (H) 09/13/2018     TSH 12.12 (H) 05/29/2018     TSH 11.18 (H) 03/26/2018     TSH 3.17 03/23/2016     TSH 3.14 03/12/2015     T4 0.89 09/13/2018     T4 0.84 05/29/2018     T4 0.85 03/26/2018     T4 1.12 10/22/2014     T4 1.01 09/17/2012      (H) 04/01/2010      Recent [facility] labs include:            Lab Results   Component Value Date     TSH 5.75 (H) 09/13/2018     T4 0.89 09/13/2018      (H) 04/01/2010      Fam " "Hx thyroid disease              Hyperthyroidism and postablative hypothyroidism- mother\"     She is taking 175 mcg levothyroxine. .      She is on a continuous OCP as she had heavy bleeding with menses prior.   No plans for pregnancy in the near future.     She has lost 32 pounds since the summer to 12/2019.   Gained 10 pounds back since 12/2019. Low motivation since began working from home.   Less activity and more snacking.      She has a history of SAD.   She continues on lexapro.  Energy unchanged from prior. Remains fatigued.   Sleeps 8 hours a night. Wakes tired. She is struggling to complete her work.   She snores in her sleep. No prior sleep study.     ROS: 10 point ROS neg other than the symptoms noted above in the HPI.    A/P:   Hypothyroidism - Extensive discussion of thyroid hormone and normal physiology. Included was discussion of thyroid in relation to weight and energy. Low TSH and normal free T4 in 12/2019. I suspect this change in because of her weight loss. She is not having any signs or symptoms of hyperthyroidism.   In 6/2020, mood and energy unchanged.   -No change to levothyroxine dose.   -Labs in 6 months.   -Contact me if you become pregnant as we will need to adjust your levothyroxine.   -Referral to sleep medicine. [phone].      Due to the COVID 19 pandemic this visit was a telephone/video visit in order to help prevent spread of infection in this high risk patient and the general population. The patient gave verbal consent for the visit today.    Start time 0828  Stop time 0844  Total time 16  This visit would have been billed as 20090 as an E & M code    Deuce Edward MD on 6/16/2020 at 8:44 AM        "

## 2020-06-16 ENCOUNTER — VIRTUAL VISIT (OUTPATIENT)
Dept: ENDOCRINOLOGY | Facility: CLINIC | Age: 30
End: 2020-06-16
Payer: COMMERCIAL

## 2020-06-16 DIAGNOSIS — E06.3 HYPOTHYROIDISM DUE TO HASHIMOTO'S THYROIDITIS: Primary | ICD-10-CM

## 2020-06-16 DIAGNOSIS — R06.83 SNORING: ICD-10-CM

## 2020-06-16 PROCEDURE — 99213 OFFICE O/P EST LOW 20 MIN: CPT | Mod: 95 | Performed by: INTERNAL MEDICINE

## 2020-06-16 RX ORDER — LEVOTHYROXINE SODIUM 175 UG/1
TABLET ORAL
Qty: 90 TABLET | Refills: 3 | Status: SHIPPED | OUTPATIENT
Start: 2020-06-16 | End: 2020-12-01

## 2020-06-16 NOTE — LETTER
"    6/16/2020         RE: Analia Jerry  [address]  [facility] [address]        Dear Colleague,    Thank you for referring your patient, [facility]. Please see a copy of my visit note below.    Analia Jerry is a 29 year old female who is being evaluated via a billable telephone visit.      The patient has been notified of following:     \"This telephone visit will be conducted via a call between you and your physician/provider. We have found that certain health care needs can be provided without the need for a physical exam.  This service lets us provide the care you need with a short phone conversation.  If a prescription is necessary we can send it directly to your pharmacy.  If lab work is needed we can place an order for that and you can then stop by our lab to have the test done at a later time.    Telephone visits are billed at different rates depending on your insurance coverage. During this emergency period, for some insurers they may be billed the same as an in-person visit.  Please reach out to your insurance provider with any questions.    If during the course of the call the physician/provider feels a telephone visit is not appropriate, you will not be charged for this service.\"    Patient has given verbal consent for Telephone visit?  Yes    What phone number would you like to be contacted at? [phone]    How would you like to obtain your AVS? Mail a copy  S: Pt being seen in f/u for hypothyroidism.  Previously seen by Dr Schroeder:  \"2008. Concerns about wt gain, depression  Medical evaluation and lab testing reportedly showed low thyroid function  Started levothyroxine medication  Previous [facility] thyroid labs include:            Lab Results   Component Value Date     TSH 5.75 (H) 09/13/2018     TSH 12.12 (H) 05/29/2018     TSH 11.18 (H) 03/26/2018     TSH 3.17 03/23/2016     TSH 3.14 03/12/2015     T4 0.89 09/13/2018     T4 0.84 05/29/2018     T4 " "0.85 03/26/2018     T4 1.12 10/22/2014     T4 1.01 09/17/2012      (H) 04/01/2010      Recent [facility] labs include:            Lab Results   Component Value Date     TSH 5.75 (H) 09/13/2018     T4 0.89 09/13/2018      (H) 04/01/2010      Fam Hx thyroid disease              Hyperthyroidism and postablative hypothyroidism- mother\"     She is taking 175 mcg levothyroxine. .      She is on a continuous OCP as she had heavy bleeding with menses prior.   No plans for pregnancy in the near future.     She has lost 32 pounds since the summer to 12/2019.   Gained 10 pounds back since 12/2019. Low motivation since began working from home.   Less activity and more snacking.      She has a history of SAD.   She continues on lexapro.  Energy unchanged from prior. Remains fatigued.   Sleeps 8 hours a night. Wakes tired. She is struggling to complete her work.   She snores in her sleep. No prior sleep study.     ROS: 10 point ROS neg other than the symptoms noted above in the HPI.    A/P:   Hypothyroidism - Extensive discussion of thyroid hormone and normal physiology. Included was discussion of thyroid in relation to weight and energy. Low TSH and normal free T4 in 12/2019. I suspect this change in because of her weight loss. She is not having any signs or symptoms of hyperthyroidism.   In 6/2020, mood and energy unchanged.   -No change to levothyroxine dose.   -Labs in 6 months.   -Contact me if you become pregnant as we will need to adjust your levothyroxine.   -Referral to sleep medicine. [phone].      Due to the COVID 19 pandemic this visit was a telephone/video visit in order to help prevent spread of infection in this high risk patient and the general population. The patient gave verbal consent for the visit today.    Start time 0828  Stop time 0844  Total time 16  This visit would have been billed as 60537 as an E & M code    Deuce Edward MD on 6/16/2020 at 8:44 AM          Again, thank you for " allowing me to participate in the care of your patient.        Sincerely,        Deuce Edward MD

## 2020-06-18 DIAGNOSIS — F41.8 DEPRESSION WITH ANXIETY: ICD-10-CM

## 2020-06-21 ENCOUNTER — MYC MEDICAL ADVICE (OUTPATIENT)
Dept: FAMILY MEDICINE | Facility: CLINIC | Age: 30
End: 2020-06-21

## 2020-06-22 RX ORDER — ESCITALOPRAM OXALATE 20 MG/1
20 TABLET ORAL DAILY
Qty: 90 TABLET | Refills: 0 | Status: SHIPPED | OUTPATIENT
Start: 2020-06-22 | End: 2020-07-08

## 2020-06-22 NOTE — TELEPHONE ENCOUNTER
Routing refill request to provider for review/approval because:  PHQ-9 is overdue        Lalo Umana RN, BSN, PHN

## 2020-06-22 NOTE — TELEPHONE ENCOUNTER
"Requested Prescriptions   Pending Prescriptions Disp Refills     escitalopram (LEXAPRO) 20 MG tablet 90 tablet 1     Sig: Take 1 tablet (20 mg) by mouth daily       SSRIs Protocol Failed - 6/18/2020  2:50 PM        Failed - PHQ-9 score less than 5 in past 6 months     Please review last PHQ-9 score.           Passed - Medication is active on med list        Passed - Patient is age 18 or older        Passed - No active pregnancy on record        Passed - No positive pregnancy test in last 12 months        Passed - Recent (6 mo) or future (30 days) visit within the authorizing provider's specialty     Patient had office visit in the last 6 months or has a visit in the next 30 days with authorizing provider or within the authorizing provider's specialty.  See \"Patient Info\" tab in inbasket, or \"Choose Columns\" in Meds & Orders section of the refill encounter.               PHQ 12/18/2018 4/9/2019 12/3/2019   PHQ-9 Total Score 2 2 2   Q9: Thoughts of better off dead/self-harm past 2 weeks Not at all Not at all Not at all             "

## 2020-06-22 NOTE — TELEPHONE ENCOUNTER
This writer attempted to contact patient on 06/22/20      Reason for call informed Rx sent to pharmacy and left detailed message.      If patient calls back:   Relay message, (read verbatim), document that pt called and close encounter        Andreea Rodriguez MA

## 2020-06-24 ENCOUNTER — TELEPHONE (OUTPATIENT)
Dept: SLEEP MEDICINE | Facility: CLINIC | Age: 30
End: 2020-06-24

## 2020-07-01 PROBLEM — K85.10 ACUTE GALLSTONE PANCREATITIS: Status: RESOLVED | Noted: 2019-12-28 | Resolved: 2020-07-01

## 2020-07-01 PROBLEM — E66.01 MORBID OBESITY (H): Chronic | Status: ACTIVE | Noted: 2019-08-05

## 2020-07-01 PROBLEM — E06.3 HYPOTHYROIDISM DUE TO HASHIMOTO'S THYROIDITIS: Chronic | Status: ACTIVE | Noted: 2017-01-26

## 2020-07-02 ENCOUNTER — VIRTUAL VISIT (OUTPATIENT)
Dept: SLEEP MEDICINE | Facility: CLINIC | Age: 30
End: 2020-07-02
Attending: INTERNAL MEDICINE
Payer: COMMERCIAL

## 2020-07-02 VITALS — WEIGHT: 216 LBS | HEIGHT: 68 IN | BODY MASS INDEX: 32.74 KG/M2

## 2020-07-02 DIAGNOSIS — R06.89 DYSPNEA AND RESPIRATORY ABNORMALITY: Primary | ICD-10-CM

## 2020-07-02 DIAGNOSIS — R06.00 DYSPNEA AND RESPIRATORY ABNORMALITY: Primary | ICD-10-CM

## 2020-07-02 DIAGNOSIS — E66.811 CLASS 1 OBESITY DUE TO EXCESS CALORIES WITH BODY MASS INDEX (BMI) OF 32.0 TO 32.9 IN ADULT, UNSPECIFIED WHETHER SERIOUS COMORBIDITY PRESENT: ICD-10-CM

## 2020-07-02 DIAGNOSIS — R53.81 MALAISE AND FATIGUE: ICD-10-CM

## 2020-07-02 DIAGNOSIS — E66.09 CLASS 1 OBESITY DUE TO EXCESS CALORIES WITH BODY MASS INDEX (BMI) OF 32.0 TO 32.9 IN ADULT, UNSPECIFIED WHETHER SERIOUS COMORBIDITY PRESENT: ICD-10-CM

## 2020-07-02 DIAGNOSIS — G25.81 RESTLESS LEGS SYNDROME (RLS): ICD-10-CM

## 2020-07-02 DIAGNOSIS — R53.83 MALAISE AND FATIGUE: ICD-10-CM

## 2020-07-02 DIAGNOSIS — G47.61 PERIODIC LIMB MOVEMENT DISORDER: ICD-10-CM

## 2020-07-02 DIAGNOSIS — Z72.820 LACK OF ADEQUATE SLEEP: ICD-10-CM

## 2020-07-02 DIAGNOSIS — R06.83 SNORING: ICD-10-CM

## 2020-07-02 PROCEDURE — 99214 OFFICE O/P EST MOD 30 MIN: CPT | Mod: 95 | Performed by: PHYSICIAN ASSISTANT

## 2020-07-02 ASSESSMENT — MIFFLIN-ST. JEOR: SCORE: 1748.27

## 2020-07-02 NOTE — PATIENT INSTRUCTIONS
Your BMI is There is no height or weight on file to calculate BMI.  Weight management is a personal decision.  If you are interested in exploring weight loss strategies, the following discussion covers the approaches that may be successful. Body mass index (BMI) is one way to tell whether you are at a healthy weight, overweight, or obese. It measures your weight in relation to your height.  A BMI of 18.5 to 24.9 is in the healthy range. A person with a BMI of 25 to 29.9 is considered overweight, and someone with a BMI of 30 or greater is considered obese. More than two-thirds of [place] adults are considered overweight or obese.  Being overweight or obese increases the risk for further weight gain. Excess weight may lead to heart disease and diabetes.  Creating and following plans for healthy eating and physical activity may help you improve your health.  Weight control is part of healthy lifestyle and includes exercise, emotional health, and healthy eating habits. Careful eating habits lifelong are the mainstay of weight control. Though there are significant health benefits from weight loss, long-term weight loss with diet alone may be very difficult to achieve- studies show long-term success with dietary management in less than 10% of people. Attaining a healthy weight may be especially difficult to achieve in those with severe obesity. In some cases, medications, devices and surgical management might be considered.  What can you do?  If you are overweight or obese and are interested in methods for weight loss, you should discuss this with your provider.     Consider reducing daily calorie intake by 500 calories.     Keep a food journal.     Avoiding skipping meals, consider cutting portions instead.    Diet combined with exercise helps maintain muscle while optimizing fat loss. Strength training is particularly important for building and maintaining muscle mass. Exercise helps reduce stress, increase energy,  and improves fitness. Increasing exercise without diet control, however, may not burn enough calories to loose weight.       Start walking three days a week 10-20 minutes at a time    Work towards walking thirty minutes five days a week     Eventually, increase the speed of your walking for 1-2 minutes at time    In addition, we recommend that you review healthy lifestyles and methods for weight loss available through the National Institutes of Health patient information sites:  http://win.niddk.nih.gov/publications/index.htm    And look into health and wellness programs that may be available through your health insurance provider, employer, local community center, or [employer].    Weight management plan: Patient was referred to their PCP to discuss a diet and exercise plan.    MY CONTACT NUMBERS ARE:   DOCTOR : JUJU Trivedi  SLEEP CENTER :   CPAP EQUIPMENT :    IF I HAVE SLEEP APNEA.....  WHERE CAN I FIND MORE INFORMATION?    American Academy of Sleep Medicine Patient information on sleep disorders:  http://yoursleep.aasmnet.org    THINGS TO REMEMBER  In most situations, sleep apnea is a lifelong disease that must be managed with daily therapy. Untreated disease, when severe, may result in an increased risk for an array of problems from heart disease to mood changes, car accidents and shorter lifespan.    CPAP -  WHY AND HOW?  Continuous positive airway pressure, or CPAP, is the most effective treatment for obstructive sleep apnea. A decision to use CPAP is a major step forward in the pursuit of a healthier life. The successful use of CPAP will help you breathe easier, sleep better and live healthier. Using CPAP can be a positive experience if you keep these diego points in mind:  1. Commitment  CPAP is not a quick fix for your problem. It involves a long-term commitment to improve your sleep and your health.    2. Communication  Stay in close communication with both your sleep doctor and your CPAP supplier.  "Ask lots of questions and seek help when you need it.    3. Consistency  Use CPAP all night, every night and for every nap. You will receive the maximum health benefits from CPAP when you use it every time that you sleep. This will also make it easier for your body to adjust to the treatment.    4. Correction  The first machine and mask that you try may not be the best ones for you. Work with your sleep doctor and your CPAP supplier to make corrections to your equipment selection. Ask about trying a different type of machine or mask if you have ongoing problems. Make sure that your mask is a good fit and learn to use your equipment properly.    5. Challenge  Tell a family member or close friend to ask you each morning if you used your CPAP the previous night. Have someone to challenge you to give it your best effort.    6. Connection   Your adjustment to CPAP will be easier if you are able to connect with others who use the same treatment. Ask your sleep doctor if there is a support group in your area for people who have sleep apnea, or look for one on the Internet.    7. Comfort   Increase your level of comfort by using a saline spray, decongestant or heated humidifier if CPAP irritates your nose, mouth or throat. Use your unit's \"ramp\" setting to slowly get used to the air pressure level. There may be soft pads you can buy that will fit over your mask straps. Look on www.CPAP.com for accessories such as these straps, a pillow contoured for side-sleeping with CPAP, longer hoses, hose covers to reduce condensation, or stands to keep the hose out of your way.                                 8. Cleaning   Clean your mask, tubing and headgear on a regular basis. Put this time in your schedule so that you don't forget to do it. Check and replace the filters for your CPAP unit and humidifier.    9. Completion   Although you are never finished with CPAP therapy, you should reward yourself by celebrating the completion of " your first month of treatment. Expect this first month to be your hardest period of adjustment. It will involve some trial and error as you find the machine, mask and pressure settings that are right for you.    Continuation  After your first month of treatment, continue to make a daily commitment to use your CPAP all night, every night and for every nap.    CPAP-Tips to starting with success:  Begin using your CPAP for short periods of time during the day while you watch TV or read.    Use CPAP every night and for every nap. Using it less often reduces the health benefits and makes it harder for your body to get used to it.    Newer CPAP models are virtually silent; however, if you find the sound of your CPAP machine to be bothersome, place the unit under your bed to dampen the sound.     Make small adjustments to your mask, tubing, straps and headgear until you get the right fit. Tightening the mask may actually worsen the leak.  If it leaves significant marks on your face or irritates the bridge of your nose, it may not be the best mask for you.  Speak with the person who supplied the mask and consider trying other masks.    Use a saline nasal spray to ease mild nasal congestion. Neti-Pot or saline nasal rinses may also help. Nasal gel sprays can help reduce nasal dryness.  Biotene mouthwash can be helpful to protect your teeth if you experience frequent dry mouth.  Dry mouth may be a sign of air escaping out of your mouth or out of the mask in the case of a full face mask.  Speak with your provider if you expect that is the case.     Take a nasal decongestant to relieve more severe nasal or sinus congestion.  Do not use Afrin (oxymetazoline) nasal spray more than 3 days in a row.  Speak with your sleep doctor if your nasal congestion is chronic.    Use a heated humidifier that fits your CPAP model to enhance your breathing comfort. Adjust the heat setting up if you get a dry nose or throat, down if you get  condensation in the hose or mask.  Position the CPAP lower than you so that any condensation in the hose drains back into the machine rather than towards the mask.    Try a system that uses nasal pillows if traditional masks give you problems.    Clean your mask, tubing and headgear once a week. Make sure the equipment dries fully.    Regularly check and replace the filters for your CPAP unit and humidifier.    Work closely with your sleep doctor and your CPAP supplier to make sure that you have the machine, mask and air pressure setting that works best for you.    BESIDES CPAP, WHAT OTHER THERAPIES ARE THERE?    Postioning devices if you only have the problem on your back    Dental devices if your condition is mild    Nasal valves may be effective though experience is limited    Tongue Retaining Device if missing teeth precludes the use of a dental device    Weight loss if you are overweight    Surgery in limited cases where devices are not acceptable or there are problems with structures in the nose and throat  If treated with one of these alternative options, further evaluation is necessary to ensure that the therapy is effective. This may require some form of testing.     Healthy Lifestyle:  Healthy diet, exercise and Limit alcohol: Not only will excessive alcohol increase your weight over time, but it irritates the throat tissues and make them swell, shrinking the airway and causing snoring. Drinking alcohol should be limited and stopped within 3-4 hours before going to bed.   Stop smoking: (Red swollen throat, heat, nicotine), also irritates and swells the airway, among numerous other negative health consequences.    Positioning Device  This example shows a pillow that straps around the waist. It may be appropriate for those whose sleep study shows milder sleep apnea that occurs primarily when lying flat on one's back. Preliminary studies have shown benefit but effectiveness at home should be  verified.    Nasal Valves              Nasal valves may not be effective if you have frequent nasal congestion or have difficulty breathing through your nose. They may be an option for mild apnea if other options are not well tolerated. The efficacy of these devices is generally less than CPAP or oral appliances.  Oral Appliance  These are examples of two of many custom-made devices that are more likely to work in mild sleep apnea  Oral appliances are dental mouth pieces that fit very much like a sports mouth guards or removable orthodontic retainers. They are used to treat snoring  And obstructive sleep apnea . The device prevents the airway from collapsing by either holding the tongue or supporting the jaw in a forward position. Since oral appliances are non-invasive and easy to use, they may be considered as an early treatment option. Oral appliance therapy (OAT) involves the customization, selection, fabrication, fitting, adjustments and long-term follow-up care of specially designed oral devices, worn during sleep, which reposition the lower jaw and tongue base forward to maintain an open airway.  Custom made oral appliances are proven to be more effective than over-the-counter devices. Therefore, the over-the-counter devices are recommended not to be used as a screening tool nor as a therapeutic option.  Who gets a dental device?  Oral appliance therapy can be used as an alternative to  CPAP therapy for the treatment of mild to moderate sleep apnea and for those patients who prefer OAT to CPAP. Oral appliance therapy is a first line therapy for the treatment of primary snoring. Additionally, OAT is an option for those that cannot tolerate CPAP as therapy or who have experienced insufficient surgical results.    Possible side effects?  Frequent but minor side effects include: excessive salivation, dry mouth, discomfort of teeth and jaw and temporary changes in the patient s bite.  Potential complications  include: jaw pain, permanent occlusal changes and TMJ symptoms.  The above mentioned side effects and complications can be recognized and managed by dentists trained in dental sleep medicine.    Finding a dentist that practices dental sleep medicine  Specific training is available through the American Academy of Dental Sleep Medicine for dentists interested in working in the field of sleep. To find a dentist who is educated in the field of sleep and the use of oral appliances, near you, visit the Web site of the American Academy of Dental Sleep Medicine; also see http://www.accpstorage.[email or web address]/newOrganization/patients/oralAppliances.pdf   To search for a dentist certified in these practices:  Http://aadsm.org/FindADentist.aspx?1  [email or web address]/patients/[email or web address]    Tongue Retaining Device               Tongue Retaining Devices are devices that generally  suction cup  onto the tongue preventing it from falling back into the back of the throat during sleep.  They may be an option for people missing teeth, but can be uncomfortable. This particular device can be purchased online, but similar devices made by dentists fit more precisely and may be tolerated better. In general, they are rarely effective and often not very well tolerated.    Weight Loss:  Some patients may experience reduction or elimination of sleep apnea with weight loss.  Though there are significant health benefits from weight loss, long-term weight loss is very difficult to achieve- studies show success with dietary management in less that 10% of people.     If you are interested in dietary weight loss, you should review the options discussed at the National Institutes of Health patient information site:     Http:/www.health.nih.gov/topic/WeightLossDieting    Bariatric programs offer counseling in all methods of weight  "loss:    Http:/[email or web address]    Surgery:  There are a number of surgeries that have been attempted to treat apnea. In general, surgical options are usually reserved for cases in which there is a physical abnormality contributing to obstruction or other treatment options are ineffective or not tolerated. Most surgical options are either unreliable or quite invasive. One of the more common procedures is:  Uvulopalatopharyngoplasty: In this procedure, the uvula (the finger-like tissue that hangs in the back of the throat), part of the soft palate (the tissue that the uvula is attached to), and sometimes the tonsils or adenoids are removed. The efficacy of this surgery is around 30-50% .  After surgery, complications may include:  Sleepiness and sleep apnea related to post-surgery medication   Swelling, infection and bleeding   A sore throat and/or difficulty swallowing   Drainage of secretions into the nose and a nasal quality to the voice. English language speech does not seem to be affected by this surgery.   Narrowing of the airway in the nose and throat (hence constricting breathing) snoring and even iatrogenically caused sleep apnea. By cutting the tissues, excess scar tissue can \"tighten\" the airway and make it even smaller than it was before UPPP.  Patients who have had the uvula removed will become unable to correctly speak [place] or any other language that has a uvular 'r' phoneme.    Surgeries to help resolve nasal congestion may help reduce the severity of apnea slightly. Nasal congestion does not cause apnea on its own, so these surgeries are usually not performed just for FRANK.  They may be worth considering if the nasal congestion is significantly bothersome independent of apnea.    "

## 2020-07-02 NOTE — PROGRESS NOTES
"Analia Jerry is a 29 year old female who is being evaluated via a billable video visit.      The patient has been notified of following:     \"This video visit will be conducted via a call between you and your physician/provider. We have found that certain health care needs can be provided without the need for an in-person physical exam.  This service lets us provide the care you need with a video conversation.  If a prescription is necessary we can send it directly to your pharmacy.  If lab work is needed we can place an order for that and you can then stop by our lab to have the test done at a later time.    Video visits are billed at different rates depending on your insurance coverage.  Please reach out to your insurance provider with any questions.    If during the course of the call the physician/provider feels a video visit is not appropriate, you will not be charged for this service.\"    Patient has given verbal consent for Video visit? Yes  How would you like to obtain your AVS? Lor  Patient would like the video invitation sent by: Text to cell phone: [phone]  Will anyone else be joining your video visit? No      Video-Visit Details    Type of service:  Video Visit    Video Start Time: 1:47 PM  Video End Time: 3:04 PM    Originating Location (pt. Location): Home    Distant Location (provider location):  [facility]     Platform used for Video Visit: Well      Sleep Consultation:    Date on this visit: 7/2/2020    Analia Jerry is sent by Deuce Edward for a sleep consultation regarding snoring and daytime sleepiness.    Primary Physician: Michelle Purcell     Chief Complaint   Patient presents with     Consult     Fatigue and not sleeping well.       Analia goes to sleep between 10 and 11 PM during the week. She wakes up between  8-9 AM with an alarm. She falls asleep in 15 minutes when she takes Melatonin 5 mg. When she does not take Melatonin it takes her about " 60 minutes to fall asleep.  She wakes up 2-3 times a night for 5-60 minutes before falling back to sleep.  Analia wakes up to go to the bathroom and external stimuli (dogs).  On weekends, Analia goes to sleep at 10:00 PM.  She wakes up at 9:00 AM with an alarm. She falls asleep in 15 minutes.  Patient gets an average of 7-8 hours of sleep per night. She does not feel refreshed.     Patient does not use electronics in bed and watch TV in bed.     Analia does not do shift work.     Analia does snore every night and snoring is loud. Patient does have a regular bed partner. There is report of kicking and frequent leg motion during sleep.  She does not have witnessed apneas. They never sleep separately.  Patient sleeps on her side. She has occasional symptoms of restless legs syndrome.  Analia denies any bruxism, sleep walking, sleep talking, dream enactment, sleep paralysis, cataplexy and hypnogogic/hypnopompic hallucinations.    Analia denies difficulty breathing through her nose, claustrophobia and reflux at night.      Analia has gained 30 pounds in 5 years.  Patient describes themself as a night person.  She would prefer to go to sleep at 12:00 AM and wake up at 10:00 AM.  Patient's Holland Sleepiness score 13/24 consistent with some daytime sleepiness.      Analia naps 1-2 times per week for  minutes, feels unrefreshed after naps. She takes some inadvertant naps.  She denies falling asleep while driving. Patient was counseled on the importance of driving while alert, to pull over if drowsy, or nap before getting into the vehicle if sleepy.  She uses 2 sodas/day. Last caffeine intake is usually before 5  PM.    Allergies:    Allergies   Allergen Reactions     Nkda [No Known Drug Allergies]        Medications:    Current Outpatient Medications   Medication Sig Dispense Refill     albuterol (PROAIR HFA/PROVENTIL HFA/VENTOLIN HFA) 108 (90 Base) MCG/ACT inhaler Inhale 2 puffs into the lungs every 6 hours as needed for  shortness of breath / dyspnea or wheezing 1 Inhaler 0     buPROPion (WELLBUTRIN XL) 150 MG 24 hr tablet Take 1 tablet (150 mg) by mouth every morning 90 tablet 0     cyclobenzaprine (FLEXERIL) 10 MG tablet Take 1 tablet (10 mg) by mouth 3 times daily as needed for muscle spasms 30 tablet 1     escitalopram (LEXAPRO) 20 MG tablet Take 1 tablet (20 mg) by mouth daily 90 tablet 0     levothyroxine (SYNTHROID/LEVOTHROID) 175 MCG tablet TAKE ONE TABLET BY MOUTH ONE TIME DAILY AS DIRECTED 90 tablet 3     norethindrone-ethinyl estradiol (ORTHO-NOVUM 1/35, 28,) 1-35 MG-MCG tablet Take one active tablet a day for 21 days.  Discard the inactive pills and start new pack on day 22. 112 tablet 4       Problem List:  Patient Active Problem List    Diagnosis Date Noted     Elevated liver enzymes 12/28/2019     Priority: Medium     Obesity (BMI 35.0-39.9) with comorbidity (H) 08/05/2019     Priority: Medium     High triglycerides 04/03/2018     Priority: Medium     BMI 33.0-33.9,adult 12/21/2017     Priority: Medium     ASCUS of cervix with negative high risk HPV 01/26/2017     Priority: Medium     4/1/11 (approx) normal - patient reported.   6/11/13 normal - patient reported  12/3/15 - ASCUS pap. Plan: per provider, repeat pap in 1 year.  1/26/17 ASCUS/neg HR HPV. Plan: colp bef 4/26/17  3/23/17: [place] Bx & ECC - negative. Plan cotest in 1 year.   3/8/18 ASCUS pap, neg HPV. Plan: colp.   5/10/18 [place] Bx and ECC: negative. Plan: cotest in 1 yr.   6/6/19 Pap: NIL/neg HR HPV. 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          Past Medical/Surgical History:  Past Medical History:   Diagnosis Date     Acute gallstone pancreatitis 12/28/2019     Anxiety      ASCUS of cervix with negative high risk HPV 1/26/17    ASCUS/neg HR HPV.     Depressive disorder      Gallstones 12/28/2019    [facility]     H/O colposcopy with cervical biopsy 03/23/2017    Bx & ECC - negative     Hashimoto's disease      Hashimoto's disease      Headache(784.0)      Hypothyroidism due to Hashimoto's thyroiditis      Hypothyroidism due to Hashimoto's thyroiditis      Papanicolaou smear of cervix with atypical squamous cells of undetermined significance (ASC-US) 12/03/2015     Past Surgical History:   Procedure Laterality Date     COLONOSCOPY N/A 12/16/2015    Procedure: COLONOSCOPY;  Surgeon: Duane, William Charles, MD;  Location: [facility] OR     COLONOSCOPY WITH CO2 INSUFFLATION N/A 12/16/2015    Procedure: COLONOSCOPY WITH CO2 INSUFFLATION;  Surgeon: Duane, William Charles, MD;  Location: [facility] OR     NO HISTORY OF SURGERY         Social History:  Social History     Socioeconomic History     Marital status: Single     Spouse name: Not on file     Number of children: 0     Years of education: 16     Highest education level: Not on file   Occupational History     Occupation:      Comment: [employer]   Social Needs     Financial resource strain: Not on file     Food insecurity     Worry: Not on file     Inability: Not on file     Transportation needs     Medical: Not on file     Non-medical: Not on file   Tobacco Use     Smoking status: Never Smoker     Smokeless tobacco: Never Used   Substance and Sexual Activity     Alcohol use: Yes     Comment: socially     Drug use: No     Sexual activity: Yes     Partners: Male     Birth control/protection: Pill   Lifestyle     Physical activity     Days per week: Not on file     Minutes per session: Not on file     Stress: Not on file   Relationships     Social connections     Talks on  "phone: Not on file     Gets together: Not on file     Attends [employer] service: Not on file     Active member of club or organization: Not on file     Attends meetings of clubs or organizations: Not on file     Relationship status: Not on file     Intimate partner violence     Fear of current or ex partner: Not on file     Emotionally abused: Not on file     Physically abused: Not on file     Forced sexual activity: Not on file   Other Topics Concern     Parent/sibling w/ CABG, MI or angioplasty before 65F 55M? No   Social History Narrative    Parents declaring bankruptcy; losing house due to mother's credit card debt.        Brother deployed to [place] for 16 months startin 4/1/09        Parents  but ''.       Family History:  Family History   Problem Relation Age of Onset     Depression Brother         depression     Hyperlipidemia Mother      Thyroid Disease Mother      Hyperlipidemia Father      Breast Cancer Cousin      Depression Brother      Substance Abuse Sister      Substance Abuse Sister      C.A.D. No family hx of      Diabetes No family hx of      Breast Cancer No family hx of      Cancer - colorectal No family hx of      Anesthesia Reaction No family hx of      Blood Disease No family hx of        Review of Systems:  A complete review of systems reviewed by me is negative with the exeption of what has been mentioned in the history of present illness.    Physical Examination:  Vitals: Ht 1.727 m (5' 8\")   Wt 98 kg (216 lb)   BMI 32.84 kg/m    BMI= Body mass index is 32.84 kg/m .         Stickney Total Score 7/1/2020   Total score - Stickney 13            GENERAL APPEARANCE: alert and no distress  EYES: Eyes grossly normal to inspection, PERRL and conjunctivae and sclerae normal  NEURO: mentation intact and speech normal  PSYCH: mentation appears normal and affect normal/bright      Impression:  1. Patient has features and risk factors for possible obstructive sleep apnea including: " obesity, loud snoring, non-refreshing sleep, daytime sleepiness (ESS 13), difficulty maintaining sleep.The STOP-BANG score is 2/8.  The pathophysiology, diagnosis and treatment of FRANK was discussed.   2. Restless legs syndrome (RLS)  3. Probable periodic limb movement  Plan:    1.Recommend Polysomnogram (using 4% desaturation/Medicare/2012 AASM 1B scoring rules) to evaluate for obstructive sleep apnea and PLMs. Patient is a poor candidate for Home Sleep Testing due to symptoms of FRANK but low pre-test probability of FRANK  (STOPBANG 0-2) and suspected PLMD.  2. Restless legs syndrome (RLS), we will check her ferritin and treat with supplemental iron if it is low. Consider gabapentin or a dopamine agonist if ferritin is normal.  3. Advised her against drowsy driving.    4. Recommend weight management.     Literature provided regarding sleep apnea.      She will follow up with me in approximately two weeks after her sleep study has been competed to review the results and discuss plan of care.       Polysomnography reviewed.  Obstructive sleep apnea reviewed.  Complications of untreated sleep apnea were reviewed.    Juan J Rouse PA-C    CC: Deuce Edward

## 2020-07-08 ENCOUNTER — VIRTUAL VISIT (OUTPATIENT)
Dept: FAMILY MEDICINE | Facility: CLINIC | Age: 30
End: 2020-07-08
Payer: COMMERCIAL

## 2020-07-08 DIAGNOSIS — F41.8 DEPRESSION WITH ANXIETY: ICD-10-CM

## 2020-07-08 PROCEDURE — 99213 OFFICE O/P EST LOW 20 MIN: CPT | Mod: 95 | Performed by: NURSE PRACTITIONER

## 2020-07-08 RX ORDER — ESCITALOPRAM OXALATE 20 MG/1
20 TABLET ORAL DAILY
Qty: 90 TABLET | Refills: 1 | Status: SHIPPED | OUTPATIENT
Start: 2020-07-08 | End: 2021-03-04

## 2020-07-08 RX ORDER — BUPROPION HYDROCHLORIDE 300 MG/1
300 TABLET ORAL EVERY MORNING
Qty: 30 TABLET | Refills: 1 | Status: SHIPPED | OUTPATIENT
Start: 2020-07-08 | End: 2020-08-03

## 2020-07-08 ASSESSMENT — ANXIETY QUESTIONNAIRES
5. BEING SO RESTLESS THAT IT IS HARD TO SIT STILL: NOT AT ALL
2. NOT BEING ABLE TO STOP OR CONTROL WORRYING: NOT AT ALL
6. BECOMING EASILY ANNOYED OR IRRITABLE: SEVERAL DAYS
7. FEELING AFRAID AS IF SOMETHING AWFUL MIGHT HAPPEN: NOT AT ALL
3. WORRYING TOO MUCH ABOUT DIFFERENT THINGS: NOT AT ALL
IF YOU CHECKED OFF ANY PROBLEMS ON THIS QUESTIONNAIRE, HOW DIFFICULT HAVE THESE PROBLEMS MADE IT FOR YOU TO DO YOUR WORK, TAKE CARE OF THINGS AT HOME, OR GET ALONG WITH OTHER PEOPLE: VERY DIFFICULT
GAD7 TOTAL SCORE: 3
1. FEELING NERVOUS, ANXIOUS, OR ON EDGE: SEVERAL DAYS

## 2020-07-08 ASSESSMENT — PATIENT HEALTH QUESTIONNAIRE - PHQ9
5. POOR APPETITE OR OVEREATING: SEVERAL DAYS
SUM OF ALL RESPONSES TO PHQ QUESTIONS 1-9: 12

## 2020-07-08 ASSESSMENT — PAIN SCALES - GENERAL: PAINLEVEL: NO PAIN (0)

## 2020-07-08 NOTE — PATIENT INSTRUCTIONS
Continue lexapro 20 mg daily  Increase wellbutrin XL to 300 mg daily  Continue therapy, walks and loving on your dogs :)  Follow up with Dr. Muñoz (psychiatry) or me in 3-4 weeks    Info on serotonin syndrome that we talked about:  Serotonin syndrome symptoms usually occur within several hours of taking a new drug or increasing the dose of a drug you're already taking.   Signs and symptoms include:  Agitation or restlessness  Confusion  Rapid heart rate and high blood pressure  Dilated pupils  Loss of muscle coordination or twitching muscles  Muscle rigidity  Heavy sweating  Diarrhea  Headache  Shivering  Goose bumps    Severe serotonin syndrome can be life-threatening. Signs and symptoms include:  High fever  Seizures  Irregular heartbeat  Unconsciousness    If you suspect you might have serotonin syndrome after starting a new drug or increasing the dose of a drug you're already taking, call your doctor right away or go to the emergency room. If you have severe or rapidly worsening symptoms, seek emergency treatment immediately.

## 2020-07-08 NOTE — PROGRESS NOTES
"Analia Jerry is a 29 year old female who is being evaluated via a billable video visit.      The patient has been notified of following:     \"This video visit will be conducted via a call between you and your physician/provider. We have found that certain health care needs can be provided without the need for an in-person physical exam.  This service lets us provide the care you need with a video conversation.  If a prescription is necessary we can send it directly to your pharmacy.  If lab work is needed we can place an order for that and you can then stop by our lab to have the test done at a later time.    Video visits are billed at different rates depending on your insurance coverage.  Please reach out to your insurance provider with any questions.    If during the course of the call the physician/provider feels a video visit is not appropriate, you will not be charged for this service.\"    Patient has given verbal consent for Video visit? Yes  How would you like to obtain your AVS? [place]  Patient would like the video invitation sent by: Text to cell phone: [phone]  Will anyone else be joining your video visit? No      Subjective     Analia Jerry is a 29 year old female who presents today via video visit for the following health issues:    HPI   Depression and Anxiety Follow-Up    How are you doing with your depression since your last visit? Worsened     How are you doing with your anxiety since your last visit?  Worsened     Are you having other symptoms that might be associated with depression or anxiety? Yes:  sad, having trouble getting out of bed, decreased motivation    Have you had a significant life event? Yes work changes- working from home    Do you have any concerns with your use of alcohol or other drugs? No    Social History     Tobacco Use     Smoking status: Never Smoker     Smokeless tobacco: Never Used   Substance Use Topics     Alcohol use: Yes     Comment: socially     Drug use: No "     PHQ 4/9/2019 12/3/2019 7/8/2020   PHQ-9 Total Score 2 2 12   Q9: Thoughts of better off dead/self-harm past 2 weeks Not at all Not at all Not at all     MAXIM-7 SCORE 4/9/2019 12/3/2019 7/8/2020   Total Score - - -   Total Score - - -   Total Score 1 1 3     Last PHQ-9 7/8/2020   1.  Little interest or pleasure in doing things 1   2.  Feeling down, depressed, or hopeless 3   3.  Trouble falling or staying asleep, or sleeping too much 3   4.  Feeling tired or having little energy 3   5.  Poor appetite or overeating 1   6.  Feeling bad about yourself 0   7.  Trouble concentrating 1   8.  Moving slowly or restless 0   Q9: Thoughts of better off dead/self-harm past 2 weeks 0   PHQ-9 Total Score 12   Difficulty at work, home, or with people Very difficult     MAXIM-7  7/8/2020   1. Feeling nervous, anxious, or on edge 1   2. Not being able to stop or control worrying 0   3. Worrying too much about different things 0   4. Trouble relaxing 1   5. Being so restless that it is hard to sit still 0   6. Becoming easily annoyed or irritable 1   7. Feeling afraid, as if something awful might happen 0   MAXIM-7 Total Score 3   If you checked any problems, how difficult have they made it for you to do your work, take care of things at home, or get along with other people? Very difficult     In the past two weeks have you had thoughts of suicide or self-harm?  No.    Do you have concerns about your personal safety or the safety of others?   No    Suicide Assessment Five-step Evaluation and Treatment (SAFE-T)    Hypothyroidism Follow-up      Since last visit, patient describes the following symptoms: Weight stable, no hair loss, no skin changes, no constipation, no loose stools      How many servings of fruits and vegetables do you eat daily?  2-3    On average, how many sweetened beverages do you drink each day (Examples: soda, juice, sweet tea, etc.  Do NOT count diet or artificially sweetened beverages)?   1    How many days per  week do you exercise enough to make your heart beat faster? 3 or less    How many minutes a day do you exercise enough to make your heart beat faster? 9 or less    How many days per week do you miss taking your medication? 0    No thoughts to harm self or others  Feels safe             Video Start Time: 4:19pm        Patient Active Problem List   Diagnosis     Familial hematuria     CARDIOVASCULAR SCREENING; LDL GOAL LESS THAN 160     Mechanical low back pain     Keloid scar     Depression with anxiety     ASCUS of cervix with negative high risk HPV     Common wart     Hypothyroidism due to Hashimoto's thyroiditis     High triglycerides     Elevated liver enzymes     Class 1 obesity due to excess calories with body mass index (BMI) of 32.0 to 32.9 in adult, unspecified whether serious comorbidity present     Past Surgical History:   Procedure Laterality Date     COLONOSCOPY N/A 12/16/2015    Procedure: COLONOSCOPY;  Surgeon: Duane, William Charles, MD;  Location: [facility] OR     COLONOSCOPY WITH CO2 INSUFFLATION N/A 12/16/2015    Procedure: COLONOSCOPY WITH CO2 INSUFFLATION;  Surgeon: Duane, William Charles, MD;  Location: [facility] OR     NO HISTORY OF SURGERY         Social History     Tobacco Use     Smoking status: Never Smoker     Smokeless tobacco: Never Used   Substance Use Topics     Alcohol use: Yes     Comment: socially     Family History   Problem Relation Age of Onset     Depression Brother         depression     Hyperlipidemia Mother      Thyroid Disease Mother      Hyperlipidemia Father      Breast Cancer Cousin      Depression Brother      Substance Abuse Sister      Substance Abuse Sister      C.A.D. No family hx of      Diabetes No family hx of      Breast Cancer No family hx of      Cancer - colorectal No family hx of      Anesthesia Reaction No family hx of      Blood Disease No family hx of          Current Outpatient Medications   Medication Sig Dispense Refill     albuterol (PROAIR HFA/PROVENTIL  HFA/VENTOLIN HFA) 108 (90 Base) MCG/ACT inhaler Inhale 2 puffs into the lungs every 6 hours as needed for shortness of breath / dyspnea or wheezing 1 Inhaler 0     buPROPion (WELLBUTRIN XL) 300 MG 24 hr tablet Take 1 tablet (300 mg) by mouth every morning 30 tablet 1     cyclobenzaprine (FLEXERIL) 10 MG tablet Take 1 tablet (10 mg) by mouth 3 times daily as needed for muscle spasms 30 tablet 1     escitalopram (LEXAPRO) 20 MG tablet Take 1 tablet (20 mg) by mouth daily 90 tablet 1     levothyroxine (SYNTHROID/LEVOTHROID) 175 MCG tablet TAKE ONE TABLET BY MOUTH ONE TIME DAILY AS DIRECTED 90 tablet 3     norethindrone-ethinyl estradiol (ORTHO-NOVUM 1/35, 28,) 1-35 MG-MCG tablet Take one active tablet a day for 21 days.  Discard the inactive pills and start new pack on day 22. 112 tablet 4     Allergies   Allergen Reactions     Nkda [No Known Drug Allergies]        Reviewed and updated as needed this visit by Provider         Review of Systems   Constitutional, HEENT, cardiovascular, pulmonary, gi and gu systems are negative, except as otherwise noted.      Objective             Physical Exam     GENERAL: Healthy, alert and no distress  EYES: Eyes grossly normal to inspection.  No discharge or erythema, or obvious scleral/conjunctival abnormalities.  RESP: No audible wheeze, cough, or visible cyanosis.  No visible retractions or increased work of breathing.    SKIN: Visible skin clear. No significant rash, abnormal pigmentation or lesions.  NEURO: Cranial nerves grossly intact.  Mentation and speech appropriate for age.  PSYCH: Mentation appears normal, affect normal/bright, judgement and insight intact, normal speech and appearance well-groomed.      Diagnostic Test Results:  Labs reviewed in Epic        Assessment & Plan     1. Depression with anxiety  Depression worsening since being at home due to COVID 19. She has been doing therapy every 1-2 weeks and trying to get out of her house with her dogs. Will increase  wellbutrin to 300. If not improving, she will schedule with psychiatry. Otherwise, she will follow up with family practice in 3-4 weeks, sooner as needed.  - MENTAL HEALTH REFERRAL  - Adult; Psychiatry; Psychiatry; [facility]: Collaborative Care Psychiatry Service/Bridge to Long-Term Psychiatry as indicated ([phone]); Yes; Chronic Mental Health without improvement; Yes; We will contact you to schedule ...  - escitalopram (LEXAPRO) 20 MG tablet; Take 1 tablet (20 mg) by mouth daily  Dispense: 90 tablet; Refill: 1  - buPROPion (WELLBUTRIN XL) 300 MG 24 hr tablet; Take 1 tablet (300 mg) by mouth every morning  Dispense: 30 tablet; Refill: 1       See Patient Instructions    Return in about 4 weeks (around 8/5/2020).    MERRY Phillips CNP  [facility]      Video-Visit Details    Type of service:  Video Visit    Video End Time:4:27pm    Originating Location (pt. Location): Home    Distant Location (provider location):  [facility]     Platform used for Video Visit: Doximity (unable to hear on AmWel)    Return in about 4 weeks (around 8/5/2020).     The benefits, risks and potential side effects were discussed in detail. Black box warnings discussed as relevant. All patient questions were answered. The patient was instructed to follow up immediately if any adverse reactions develop.    Return precautions discussed, including when to seek urgent/emergent care.    Patient verbalizes understanding and agrees with plan of care.        MERRY Phillips CNP

## 2020-07-09 ASSESSMENT — ANXIETY QUESTIONNAIRES: GAD7 TOTAL SCORE: 3

## 2020-07-10 ENCOUNTER — TELEPHONE (OUTPATIENT)
Dept: FAMILY MEDICINE | Facility: CLINIC | Age: 30
End: 2020-07-10

## 2020-07-10 NOTE — TELEPHONE ENCOUNTER
We did not reach Analia Jerry, we left a message with our contact number [phone].  If we do not hear from them within the next 3 business days we will mail a letter offering our scheduling services.    If they do call to schedule, in the future, we will inform you of the outcome.    Thank you for your referral,  [facility]

## 2020-07-13 ENCOUNTER — TELEPHONE (OUTPATIENT)
Dept: SLEEP MEDICINE | Facility: CLINIC | Age: 30
End: 2020-07-13

## 2020-07-13 DIAGNOSIS — R06.83 SNORING: Primary | ICD-10-CM

## 2020-07-13 NOTE — TELEPHONE ENCOUNTER
Covid-19 testing orders needed for PS[facility] 8/3/2020    Have you previously had COVID-19?  no     If yes: over 14 days without symptoms? Na      If no, order placed to sleep provider for testing for COVID-19 prior to PS[facility].    HARRY Jones

## 2020-08-03 ENCOUNTER — VIRTUAL VISIT (OUTPATIENT)
Dept: FAMILY MEDICINE | Facility: CLINIC | Age: 30
End: 2020-08-03
Payer: COMMERCIAL

## 2020-08-03 DIAGNOSIS — F41.8 DEPRESSION WITH ANXIETY: ICD-10-CM

## 2020-08-03 PROCEDURE — 99213 OFFICE O/P EST LOW 20 MIN: CPT | Mod: 95 | Performed by: NURSE PRACTITIONER

## 2020-08-03 RX ORDER — BUPROPION HYDROCHLORIDE 300 MG/1
300 TABLET ORAL EVERY MORNING
Qty: 90 TABLET | Refills: 1 | Status: SHIPPED | OUTPATIENT
Start: 2020-08-03 | End: 2021-01-29

## 2020-08-03 ASSESSMENT — ANXIETY QUESTIONNAIRES
GAD7 TOTAL SCORE: 2
7. FEELING AFRAID AS IF SOMETHING AWFUL MIGHT HAPPEN: NOT AT ALL
6. BECOMING EASILY ANNOYED OR IRRITABLE: SEVERAL DAYS
5. BEING SO RESTLESS THAT IT IS HARD TO SIT STILL: NOT AT ALL
IF YOU CHECKED OFF ANY PROBLEMS ON THIS QUESTIONNAIRE, HOW DIFFICULT HAVE THESE PROBLEMS MADE IT FOR YOU TO DO YOUR WORK, TAKE CARE OF THINGS AT HOME, OR GET ALONG WITH OTHER PEOPLE: NOT DIFFICULT AT ALL
3. WORRYING TOO MUCH ABOUT DIFFERENT THINGS: NOT AT ALL
1. FEELING NERVOUS, ANXIOUS, OR ON EDGE: NOT AT ALL
2. NOT BEING ABLE TO STOP OR CONTROL WORRYING: NOT AT ALL

## 2020-08-03 ASSESSMENT — PATIENT HEALTH QUESTIONNAIRE - PHQ9
5. POOR APPETITE OR OVEREATING: SEVERAL DAYS
SUM OF ALL RESPONSES TO PHQ QUESTIONS 1-9: 2

## 2020-08-03 ASSESSMENT — PAIN SCALES - GENERAL: PAINLEVEL: NO PAIN (0)

## 2020-08-03 NOTE — PROGRESS NOTES
"Analia Jerry is a 29 year old female who is being evaluated via a billable video visit.      The patient has been notified of following:     \"This video visit will be conducted via a call between you and your physician/provider. We have found that certain health care needs can be provided without the need for an in-person physical exam.  This service lets us provide the care you need with a video conversation.  If a prescription is necessary we can send it directly to your pharmacy.  If lab work is needed we can place an order for that and you can then stop by our lab to have the test done at a later time.    Video visits are billed at different rates depending on your insurance coverage.  Please reach out to your insurance provider with any questions.    If during the course of the call the physician/provider feels a video visit is not appropriate, you will not be charged for this service.\"    Patient has given verbal consent for Video visit? Yes  How would you like to obtain your AVS? MyChart  If you are dropped from the video visit, the video invite should be resent to: Text to cell phone: [phone]  Will anyone else be joining your video visit? No      Subjective     Analia Jerry is a 29 year old female who presents today via video visit for the following health issues:    HPI    Depression and Anxiety Follow-Up    How are you doing with your depression since your last visit? Improved     How are you doing with your anxiety since your last visit?  Improved     Are you having other symptoms that might be associated with depression or anxiety? No    Have you had a significant life event? No     Do you have any concerns with your use of alcohol or other drugs? No    Social History     Tobacco Use     Smoking status: Never Smoker     Smokeless tobacco: Never Used   Substance Use Topics     Alcohol use: Yes     Comment: socially     Drug use: No     PHQ 12/3/2019 7/8/2020 8/3/2020   PHQ-9 Total Score 2 12 2 "   Q9: Thoughts of better off dead/self-harm past 2 weeks Not at all Not at all Not at all     MAXIM-7 SCORE 12/3/2019 7/8/2020 8/3/2020   Total Score - - -   Total Score - - -   Total Score 1 3 2     Last PHQ-9 8/3/2020   1.  Little interest or pleasure in doing things 0   2.  Feeling down, depressed, or hopeless 0   3.  Trouble falling or staying asleep, or sleeping too much 1   4.  Feeling tired or having little energy 1   5.  Poor appetite or overeating 0   6.  Feeling bad about yourself 0   7.  Trouble concentrating 0   8.  Moving slowly or restless 0   Q9: Thoughts of better off dead/self-harm past 2 weeks 0   PHQ-9 Total Score 2   Difficulty at work, home, or with people Not difficult at all     MAXIM-7  8/3/2020   1. Feeling nervous, anxious, or on edge 0   2. Not being able to stop or control worrying 0   3. Worrying too much about different things 0   4. Trouble relaxing 1   5. Being so restless that it is hard to sit still 0   6. Becoming easily annoyed or irritable 1   7. Feeling afraid, as if something awful might happen 0   MAXIM-7 Total Score 2   If you checked any problems, how difficult have they made it for you to do your work, take care of things at home, or get along with other people? Not difficult at all       Suicide Assessment Five-step Evaluation and Treatment (SAFE-T)      How many servings of fruits and vegetables do you eat daily?  2-3    On average, how many sweetened beverages do you drink each day (Examples: soda, juice, sweet tea, etc.  Do NOT count diet or artificially sweetened beverages)?   2    How many days per week do you exercise enough to make your heart beat faster? 7    How many minutes a day do you exercise enough to make your heart beat faster? 30 - 60    How many days per week do you miss taking your medication? 0         Video Start Time: 1:37pm    Doing much better with increasing Wellbutrin  Still taking lexapro  No thoughts to harm self or others  Good relationship with  . Feels safe  More energy to get out of bed now and doing things she likes again  Has sleep study next week for snoring  Will sleep for 8-10 hours per night and wake up feeling unrested    Patient Active Problem List   Diagnosis     Familial hematuria     CARDIOVASCULAR SCREENING; LDL GOAL LESS THAN 160     Mechanical low back pain     Keloid scar     Depression with anxiety     ASCUS of cervix with negative high risk HPV     Common wart     Hypothyroidism due to Hashimoto's thyroiditis     High triglycerides     Elevated liver enzymes     Class 1 obesity due to excess calories with body mass index (BMI) of 32.0 to 32.9 in adult, unspecified whether serious comorbidity present     Past Surgical History:   Procedure Laterality Date     COLONOSCOPY N/A 12/16/2015    Procedure: COLONOSCOPY;  Surgeon: Duane, William Charles, MD;  Location: [facility] OR     COLONOSCOPY WITH CO2 INSUFFLATION N/A 12/16/2015    Procedure: COLONOSCOPY WITH CO2 INSUFFLATION;  Surgeon: Duane, William Charles, MD;  Location: [facility] OR     NO HISTORY OF SURGERY         Social History     Tobacco Use     Smoking status: Never Smoker     Smokeless tobacco: Never Used   Substance Use Topics     Alcohol use: Yes     Comment: socially     Family History   Problem Relation Age of Onset     Depression Brother         depression     Hyperlipidemia Mother      Thyroid Disease Mother      Hyperlipidemia Father      Breast Cancer Cousin      Depression Brother      Substance Abuse Sister      Substance Abuse Sister      C.A.D. No family hx of      Diabetes No family hx of      Breast Cancer No family hx of      Cancer - colorectal No family hx of      Anesthesia Reaction No family hx of      Blood Disease No family hx of          Current Outpatient Medications   Medication Sig Dispense Refill     albuterol (PROAIR HFA/PROVENTIL HFA/VENTOLIN HFA) 108 (90 Base) MCG/ACT inhaler Inhale 2 puffs into the lungs every 6 hours as needed for shortness of breath /  dyspnea or wheezing 1 Inhaler 0     buPROPion (WELLBUTRIN XL) 300 MG 24 hr tablet Take 1 tablet (300 mg) by mouth every morning 90 tablet 1     cyclobenzaprine (FLEXERIL) 10 MG tablet Take 1 tablet (10 mg) by mouth 3 times daily as needed for muscle spasms 30 tablet 1     escitalopram (LEXAPRO) 20 MG tablet Take 1 tablet (20 mg) by mouth daily 90 tablet 1     levothyroxine (SYNTHROID/LEVOTHROID) 175 MCG tablet TAKE ONE TABLET BY MOUTH ONE TIME DAILY AS DIRECTED 90 tablet 3     norethindrone-ethinyl estradiol (ORTHO-NOVUM 1/35, 28,) 1-35 MG-MCG tablet Take one active tablet a day for 21 days.  Discard the inactive pills and start new pack on day 22. 112 tablet 4     Allergies   Allergen Reactions     Nkda [No Known Drug Allergies]        Reviewed and updated as needed this visit by Provider         Review of Systems   Constitutional, HEENT, cardiovascular, pulmonary, gi and gu systems are negative, except as otherwise noted.      Objective    Vitals - Patient Reported  Pain Score: No Pain (0)        Physical Exam     GENERAL: Healthy, alert and no distress  EYES: Eyes grossly normal to inspection.  No discharge or erythema, or obvious scleral/conjunctival abnormalities.  RESP: No audible wheeze, cough, or visible cyanosis.  No visible retractions or increased work of breathing.    SKIN: Visible skin clear. No significant rash, abnormal pigmentation or lesions.  NEURO: Cranial nerves grossly intact.  Mentation and speech appropriate for age.  PSYCH: Mentation appears normal, affect normal/bright, judgement and insight intact, normal speech and appearance well-groomed.      Diagnostic Test Results:  Labs reviewed in Epic        Assessment & Plan     1. Depression with anxiety  Improved with increasing wellbutrin in addition to lexapro. No s/sx serotonin syndrome. Refilled wellbutrin.   - buPROPion (WELLBUTRIN XL) 300 MG 24 hr tablet; Take 1 tablet (300 mg) by mouth every morning  Dispense: 90 tablet; Refill: 1        See Patient Instructions    The benefits, risks and potential side effects were discussed in detail. Black box warnings discussed as relevant. All patient questions were answered. The patient was instructed to follow up immediately if any adverse reactions develop.    Return precautions discussed, including when to seek urgent/emergent care.    Patient verbalizes understanding and agrees with plan of care.    Return in about 6 months (around 2/3/2021).    MERRY Phillips CNP  [facility]      Video-Visit Details    Type of service:  Video Visit    Video End Time:1:42pm    Originating Location (pt. Location): Home    Distant Location (provider location):  [facility]     Platform used for Video Visit: Amy    Return in about 6 months (around 2/3/2021).       MERRY Phillips CNP

## 2020-08-03 NOTE — PATIENT INSTRUCTIONS
Serotonin syndrome symptoms usually occur within several hours of taking a new drug or increasing the dose of a drug you're already taking.   Signs and symptoms include:  Agitation or restlessness  Confusion  Rapid heart rate and high blood pressure  Dilated pupils  Loss of muscle coordination or twitching muscles  Muscle rigidity  Heavy sweating  Diarrhea  Headache  Shivering  Goose bumps    Severe serotonin syndrome can be life-threatening. Signs and symptoms include:  High fever  Seizures  Irregular heartbeat  Unconsciousness    If you suspect you might have serotonin syndrome after starting a new drug or increasing the dose of a drug you're already taking, call your doctor right away or go to the emergency room. If you have severe or rapidly worsening symptoms, seek emergency treatment immediately.

## 2020-08-04 DIAGNOSIS — Z30.41 ENCOUNTER FOR SURVEILLANCE OF CONTRACEPTIVE PILLS: ICD-10-CM

## 2020-08-04 ASSESSMENT — ANXIETY QUESTIONNAIRES: GAD7 TOTAL SCORE: 2

## 2020-08-04 NOTE — TELEPHONE ENCOUNTER
"Contraceptives Protocol Passed     Rerun Protocol  (8/4/2020 1:50 PM)       Patient is not a current smoker if age is 35 or older         Recent (12 mo) or future (30 days) visit within the authorizing provider's specialty     Patient has had an office visit with the authorizing provider or a provider within the authorizing providers department within the previous 12 mos or has a future within next 30 days. See \"Patient Info\" tab in inbasket, or \"Choose Columns\" in Meds & Orders section of the refill encounter.              Medication is active on med list         No active pregnancy on record         No positive pregnancy test in past 12 months         Hormone Replacement Therapy Passed     Rerun Protocol  (8/4/2020 1:50 PM)       Blood pressure under 140/90 in past 12 months         BP Readings from Last 3 Encounters:   01/03/20 112/76   12/16/19 107/71   12/10/19 110/79                Recent (12 mo) or future (30 days) visit within the authorizing provider's specialty     Patient has had an office visit with the authorizing provider or a provider within the authorizing providers department within the previous 12 mos or has a future within next 30 days. See \"Patient Info\" tab in inbasket, or \"Choose Columns\" in Meds & Orders section of the refill encounter.              Medication is active on med list         Patient is 18 years of age or older         No active pregnancy on record         No positive pregnancy test on record in past 12 months        Pt last seen 6/6/2019 for annual exam.    Pt has appt scheduled for 8/10/2020.    Prescription approved per [facility].    RN sending mayela refill.    Soila Karimi RN on 8/4/2020 at 1:57 PM    "

## 2020-08-10 ENCOUNTER — THERAPY VISIT (OUTPATIENT)
Dept: SLEEP MEDICINE | Facility: CLINIC | Age: 30
End: 2020-08-10
Payer: COMMERCIAL

## 2020-08-10 DIAGNOSIS — E66.811 CLASS 1 OBESITY DUE TO EXCESS CALORIES WITH BODY MASS INDEX (BMI) OF 32.0 TO 32.9 IN ADULT, UNSPECIFIED WHETHER SERIOUS COMORBIDITY PRESENT: ICD-10-CM

## 2020-08-10 DIAGNOSIS — R53.81 MALAISE AND FATIGUE: ICD-10-CM

## 2020-08-10 DIAGNOSIS — Z72.820 LACK OF ADEQUATE SLEEP: ICD-10-CM

## 2020-08-10 DIAGNOSIS — R06.00 DYSPNEA AND RESPIRATORY ABNORMALITY: ICD-10-CM

## 2020-08-10 DIAGNOSIS — R53.83 MALAISE AND FATIGUE: ICD-10-CM

## 2020-08-10 DIAGNOSIS — E66.09 CLASS 1 OBESITY DUE TO EXCESS CALORIES WITH BODY MASS INDEX (BMI) OF 32.0 TO 32.9 IN ADULT, UNSPECIFIED WHETHER SERIOUS COMORBIDITY PRESENT: ICD-10-CM

## 2020-08-10 DIAGNOSIS — R06.89 DYSPNEA AND RESPIRATORY ABNORMALITY: ICD-10-CM

## 2020-08-10 DIAGNOSIS — G47.33 OSA (OBSTRUCTIVE SLEEP APNEA): Chronic | ICD-10-CM

## 2020-08-10 DIAGNOSIS — R06.83 SNORING: ICD-10-CM

## 2020-08-10 PROCEDURE — 95810 POLYSOM 6/> YRS 4/> PARAM: CPT | Performed by: INTERNAL MEDICINE

## 2020-08-10 NOTE — Clinical Note
Do you want 3% rules? : It was difficult to discern RERAs from PLMS. apnea/hypopnea index was 0.5 events per hour (central apnea/hypopnea index was 0 events per hour). The REM AHI was 0 events per hour. The supine AHI was 1.1 events per hour. The RERA index was 12.8 events per hour.  The RDI was 13.2 events per hour. Supine RDI 47 (105 minutes sleep time). Time spent less than or equal to 88% was 0 minutes. Time spent less than or equal to 89% was 0 minutes.  PLM index was 30.7 movements per hour. The PLM Arousal Index was 9.0 per hour.

## 2020-08-11 NOTE — PATIENT INSTRUCTIONS
[place] [facility]    1. Your sleep study will be reviewed by a sleep physician within the next few days.     2. Please follow up in the sleep clinic as scheduled, or, make an appointment with your sleep provider to be seen within two weeks to discuss the results of the sleep study.    3. If you have any questions or problems with your treatment plan, please contact your sleep clinic provider at [phone] to further manage your condition.    4. Please review your attached medication list, and, at your follow-up appointment advise your sleep clinic provider about any changes.    5. Go to [email or web address] for more information about your sleep problems.    Dewey Barth, RPSGT  August 11, 2020

## 2020-08-13 ENCOUNTER — ALLIED HEALTH/NURSE VISIT (OUTPATIENT)
Dept: NURSING | Facility: CLINIC | Age: 30
End: 2020-08-13
Payer: COMMERCIAL

## 2020-08-13 DIAGNOSIS — Z23 NEED FOR VACCINATION: Primary | ICD-10-CM

## 2020-08-13 DIAGNOSIS — G47.61 PERIODIC LIMB MOVEMENT DISORDER: ICD-10-CM

## 2020-08-13 DIAGNOSIS — G25.81 RESTLESS LEGS SYNDROME (RLS): ICD-10-CM

## 2020-08-13 LAB — FERRITIN SERPL-MCNC: 10 NG/ML (ref 12–150)

## 2020-08-13 PROCEDURE — 82728 ASSAY OF FERRITIN: CPT | Performed by: PHYSICIAN ASSISTANT

## 2020-08-13 PROCEDURE — 90715 TDAP VACCINE 7 YRS/> IM: CPT

## 2020-08-13 PROCEDURE — 99207 ZZC NO CHARGE LOS: CPT

## 2020-08-13 PROCEDURE — 36415 COLL VENOUS BLD VENIPUNCTURE: CPT | Performed by: PHYSICIAN ASSISTANT

## 2020-08-13 PROCEDURE — 90471 IMMUNIZATION ADMIN: CPT

## 2020-08-13 NOTE — NURSING NOTE
Prior to immunization administration, verified patients identity using patient s name and date of birth. Please see Immunization Activity for additional information.     Screening Questionnaire for Adult Immunization    Are you sick today?   No   Do you have allergies to medications, food, a vaccine component or latex?   No   Have you ever had a serious reaction after receiving a vaccination?   No   Do you have a long-term health problem with heart, lung, kidney, or metabolic disease (e.g., diabetes), asthma, a blood disorder, no spleen, complement component deficiency, a cochlear implant, or a spinal fluid leak?  Are you on long-term aspirin therapy?   No   Do you have cancer, leukemia, HIV/AIDS, or any other immune system problem?   No   Do you have a parent, brother, or sister with an immune system problem?   No   In the past 3 months, have you taken medications that affect  your immune system, such as prednisone, other steroids, or anticancer drugs; drugs for the treatment of rheumatoid arthritis, Crohn s disease, or psoriasis; or have you had radiation treatments?   No   Have you had a seizure, or a brain or other nervous system problem?   No   During the past year, have you received a transfusion of blood or blood    products, or been given immune (gamma) globulin or antiviral drug?   No   For women: Are you pregnant or is there a chance you could become       pregnant during the next month?   No   Have you received any vaccinations in the past 4 weeks?   No     Immunization questionnaire answers were all negative.         Patient instructed to remain in clinic for 15 minutes afterwards, and to report any adverse reaction to me immediately.       Screening performed by Andreea Rodriguez MA on 8/13/2020 at 12:53 PM.

## 2020-08-20 PROBLEM — E78.1 HIGH TRIGLYCERIDES: Chronic | Status: ACTIVE | Noted: 2018-04-03

## 2020-08-24 ENCOUNTER — VIRTUAL VISIT (OUTPATIENT)
Dept: SLEEP MEDICINE | Facility: CLINIC | Age: 30
End: 2020-08-24
Payer: COMMERCIAL

## 2020-08-24 VITALS — WEIGHT: 210 LBS | HEIGHT: 68 IN | BODY MASS INDEX: 31.83 KG/M2

## 2020-08-24 DIAGNOSIS — Z72.820 LACK OF ADEQUATE SLEEP: ICD-10-CM

## 2020-08-24 DIAGNOSIS — G47.33 OSA (OBSTRUCTIVE SLEEP APNEA): ICD-10-CM

## 2020-08-24 DIAGNOSIS — G47.61 PERIODIC LIMB MOVEMENT DISORDER: ICD-10-CM

## 2020-08-24 LAB — SLPCOMP: NORMAL

## 2020-08-24 PROCEDURE — 99213 OFFICE O/P EST LOW 20 MIN: CPT | Mod: 95 | Performed by: PHYSICIAN ASSISTANT

## 2020-08-24 ASSESSMENT — MIFFLIN-ST. JEOR: SCORE: 1721.05

## 2020-08-24 NOTE — PROGRESS NOTES
"Analia Jerry is a 29 year old female who is being evaluated via a billable video visit.      The patient has been notified of following:     \"This video visit will be conducted via a call between you and your physician/provider. We have found that certain health care needs can be provided without the need for an in-person physical exam.  This service lets us provide the care you need with a video conversation.  If a prescription is necessary we can send it directly to your pharmacy.  If lab work is needed we can place an order for that and you can then stop by our lab to have the test done at a later time.    Video visits are billed at different rates depending on your insurance coverage.  Please reach out to your insurance provider with any questions.    If during the course of the call the physician/provider feels a video visit is not appropriate, you will not be charged for this service.\"    Patient has given verbal consent for Video visit? Yes  How would you like to obtain your AVS? MyChart  If you are dropped from the video visit, the video invite should be resent to: Text to cell phone: [phone]  Will anyone else be joining your video visit? No        Video-Visit Details    Type of service:  Video Visit    Video Start Time: 2: 31 PM  Video End Time: 2:46 PM    Originating Location (pt. Location): Home    Distant Location (provider location):  [facility]     Platform used for Video Visit: [facility]        Sleep Study Follow-Up Visit:    Date on this visit: 8/24/2020    Analia Jerry is  following-up of her sleep study done on 8/10/2020 at the [facility] for A diagnostic polysomnogram was performed to evaluate for obesity, loud snoring, non-refreshing sleep, daytime sleepiness (ESS 13), difficulty maintaining sleep.    Polysomnogram interpreted by Dr. Jones:  Sleep Architecture:   The total recording time of the polysomnogram was 449.0 minutes. The total sleep time was " 394.5 minutes. Sleep latency was normal at 10.8 minutes without the use of a sleep aid. REM latency was 325.5 minutes. Arousal index was increased at 39.2 arousals per hour. Sleep efficiency was normal at 87.9%. Wake after sleep onset was 43.0 minutes. The patient spent 7.7% of total sleep time in Stage N1, 64.5% in Stage N2, 20.2% in Stage N3, and 7.6% in REM. Time in REM supine was 0 minutes.   Respiration: It was difficult to discern RERAs from PLMS, scored with 3% rules       Events ? The polysomnogram revealed a presence of 1 obstructive, 0 central, and 0 mixed apneas resulting in an apnea index of 0.2 events per hour. There were 46 obstructive hypopneas and 0 central hypopneas resulting in an obstructive hypopnea index of 7.0 and central hypopnea index of 0 events per hour. The combined apnea/hypopnea index was 7.1 events per hour (central apnea/hypopnea index was 0 events per hour). The REM AHI was 0 events per hour. The supine AHI was 22.9 events per hour. The RERA index was 0 events per hour. The RDI was 7.1 events per hour.       Supine RDI 47 (105 minutes sleep time)       Snoring - was reported as moderate and intermittent.       Respiratory rate and pattern - was notable for normal respiratory rate and pattern.       Sustained Sleep Associated Hypoventilation - Transcutaneous carbon dioxide monitoring was not used, however significant hypoventilation was not suggested by oximetry       Sleep Associated Hypoxemia - (Greater than 5 minutes O2 sat at or below 88%) was not present. Baseline oxygen saturation was 97.0%. Lowest oxygen saturation was 93.0%. Time spent less than or equal to 88% was 0 minutes. Time spent less than or equal to 89% was 0 minutes.     Movement Activity:       Periodic Limb Activity - There were 202 PLMs during the entire study. The PLM index was 30.7 movements per hour. The PLM Arousal Index was 9.0 per hour.       REM EMG Activity - Excessive transient/sustained muscle activity  was not present.       Nocturnal Behavior - Abnormal sleep related behaviors were not noted       Bruxism - None apparent.     Cardiac Summary:   The average pulse rate was 83.8 bpm. The minimum pulse rate was 63.0 bpm while the maximum pulse rate was 129.1 bpm. Arrhythmias were not noted.        Past medical/surgical history, family history, social history, medications and allergies were reviewed.      Problem List:  Patient Active Problem List    Diagnosis Date Noted     FRANK (obstructive sleep apnea) - mild (AHI 7) 08/24/2020     Priority: Medium     8/10/2020 [facility] Study (216.0 lbs) - scored with 3% rules -  AHI 7.1, RDI 7.1, Supine AHI 22.9, REM AHI -, Low O2 91.2%, Time Spent ?88% 0 minutes / Time Spent ?89% 0 minutes.       Class 1 obesity due to excess calories with body mass index (BMI) of 32.0 to 32.9 in adult, unspecified whether serious comorbidity present 07/02/2020     Priority: Medium     Elevated liver enzymes 12/28/2019     Priority: Medium     High triglycerides 04/03/2018     Priority: Medium     ASCUS of cervix with negative high risk HPV 01/26/2017     Priority: Medium     4/1/11 (approx) normal - patient reported.   6/11/13 normal - patient reported  12/3/15 - ASCUS pap. Plan: per provider, repeat pap in 1 year.  1/26/17 ASCUS/neg HR HPV. Plan: colp bef 4/26/17  3/23/17: [place] Bx & ECC - negative. Plan cotest in 1 year.   3/8/18 ASCUS pap, neg HPV. Plan: colp.   5/10/18 [place] Bx and ECC: negative. Plan: cotest in 1 yr.   6/6/19 Pap: NIL/neg HR HPV. Plan cotest in 3 years           Hypothyroidism due to Hashimoto's thyroiditis 01/26/2017     Priority: Medium     Common wart 06/02/2016     Priority: Medium     Depression with anxiety 11/06/2015     Priority: Medium     Keloid scar 09/20/2012     Priority: Medium     Mechanical low back pain 06/16/2012     Priority: Medium     CARDIOVASCULAR SCREENING; LDL GOAL LESS THAN 160 10/31/2010     Priority: Medium     Familial hematuria  05/14/2010     Priority: Medium     Benign.          Impression/Plan:    1. Mild obstructive sleep apnea without sleep related hypoxemia. Events principally confined to supine sleep-  2. Periodic limb movements of sleep    Polysomnogram was reviewed in detail today with the patient.  Counseled the patient that mild sleep apnea is not felt to significantly increase long-term cardiovascular risk, but can contribute to excessive daytime sleepiness.    Treatment options discussed today including  auto-CPAP oral appliance therapy or polysomnography with full night PAP titration.  Elected treatment of sleep apnea with positional therapy.      Patient had elevated periodic limp movements during the study. The majority of these were not associated with cortical arousal. Patient denies wakeful motor restlessness,but  reports constant motion of her legs during sleep. Recent Ferritin was low at 10 ng/ml. She started taking iron a day ago. Will check ferritin in 3 months.      She will follow up with me in about 3 months.    Juan J Rouse PA-C

## 2020-08-24 NOTE — PATIENT INSTRUCTIONS
Your BMI is Body mass index is 31.93 kg/m .  Weight management is a personal decision.  If you are interested in exploring weight loss strategies, the following discussion covers the approaches that may be successful. Body mass index (BMI) is one way to tell whether you are at a healthy weight, overweight, or obese. It measures your weight in relation to your height.  A BMI of 18.5 to 24.9 is in the healthy range. A person with a BMI of 25 to 29.9 is considered overweight, and someone with a BMI of 30 or greater is considered obese. More than two-thirds of American adults are considered overweight or obese.  Being overweight or obese increases the risk for further weight gain. Excess weight may lead to heart disease and diabetes.  Creating and following plans for healthy eating and physical activity may help you improve your health.  Weight control is part of healthy lifestyle and includes exercise, emotional health, and healthy eating habits. Careful eating habits lifelong are the mainstay of weight control. Though there are significant health benefits from weight loss, long-term weight loss with diet alone may be very difficult to achieve- studies show long-term success with dietary management in less than 10% of people. Attaining a healthy weight may be especially difficult to achieve in those with severe obesity. In some cases, medications, devices and surgical management might be considered.  What can you do?  If you are overweight or obese and are interested in methods for weight loss, you should discuss this with your provider.     Consider reducing daily calorie intake by 500 calories.     Keep a food journal.     Avoiding skipping meals, consider cutting portions instead.    Diet combined with exercise helps maintain muscle while optimizing fat loss. Strength training is particularly important for building and maintaining muscle mass. Exercise helps reduce stress, increase energy, and improves fitness.  Increasing exercise without diet control, however, may not burn enough calories to loose weight.       Start walking three days a week 10-20 minutes at a time    Work towards walking thirty minutes five days a week     Eventually, increase the speed of your walking for 1-2 minutes at time    In addition, we recommend that you review healthy lifestyles and methods for weight loss available through the National Institutes of Health patient information sites:  http://win.niddk.nih.gov/publications/index.htm    And look into health and wellness programs that may be available through your health insurance provider, employer, local community center, or [employer].    Weight management plan: Patient was referred to their PCP to discuss a diet and exercise plan.    To prevent from sleeping on your back:  1. Go to Amazon.com  2. Search for SlumberBump Anti Snore Sleep Belt.

## 2020-08-24 NOTE — PROCEDURES
" SLEEP STUDY INTERPRETATION  DIAGNOSTIC POLYSOMNOGRAPHY REPORT      Patient: TAYO CHEN  YOB: 1990  Study Date: 8/10/2020  MRN: [id number]  Referring Provider: -  Ordering Provider: Karson Jones MD    Indications for Polysomnography: The patient is a 29 year old Female who is 5' 8\" and weighs 216.0 lbs. Her BMI is 33.1, [place] sleepiness scale 13 and neck circumference is 38 cm. A diagnostic polysomnogram was performed to evaluate for obesity, loud snoring, non-refreshing sleep, daytime sleepiness (ESS 13), difficulty maintaining sleep.       Polysomnogram Data: A full night polysomnogram recorded the standard physiologic parameters including EEG, EOG, EMG, ECG, nasal and oral airflow. Respiratory parameters of chest and abdominal movements were recorded with respiratory inductance plethysmography. Oxygen saturation was recorded by pulse oximetry. Hypopnea scoring rule used: 1A 3%.      Sleep Architecture:   The total recording time of the polysomnogram was 449.0 minutes. The total sleep time was 394.5 minutes. Sleep latency was normal at 10.8 minutes without the use of a sleep aid. REM latency was 325.5 minutes. Arousal index was increased at 39.2 arousals per hour. Sleep efficiency was normal at 87.9%. Wake after sleep onset was 43.0 minutes. The patient spent 7.7% of total sleep time in Stage N1, 64.5% in Stage N2, 20.2% in Stage N3, and 7.6% in REM. Time in REM supine was 0 minutes.      Respiration: It was difficult to discern RERAs from PLMS, scored with 3% rules    Events ? The polysomnogram revealed a presence of 1 obstructive, 0 central, and 0 mixed apneas resulting in an apnea index of 0.2 events per hour. There were 46 obstructive hypopneas and 0 central hypopneas resulting in an obstructive hypopnea index of 7.0 and central hypopnea index of 0 events per hour. The combined apnea/hypopnea index was 7.1 events per hour (central apnea/hypopnea index was 0 events per hour). The REM AHI was 0 " events per hour. The supine AHI was 22.9 events per hour. The RERA index was 0 events per hour.  The RDI was 7.1 events per hour.    Supine RDI 47 (105 minutes sleep time)    Snoring - was reported as moderate and intermittent.    Respiratory rate and pattern - was notable for normal respiratory rate and pattern.    Sustained Sleep Associated Hypoventilation - Transcutaneous carbon dioxide monitoring was not used, however significant hypoventilation was not suggested by oximetry    Sleep Associated Hypoxemia - (Greater than 5 minutes O2 sat at or below 88%) was not present. Baseline oxygen saturation was 97.0%. Lowest oxygen saturation was 93.0%. Time spent less than or equal to 88% was 0 minutes. Time spent less than or equal to 89% was 0 minutes.    Movement Activity:     Periodic Limb Activity - There were 202 PLMs during the entire study. The PLM index was 30.7 movements per hour. The PLM Arousal Index was 9.0 per hour.    REM EMG Activity - Excessive transient/sustained muscle activity was not present.    Nocturnal Behavior - Abnormal sleep related behaviors were not noted    Bruxism - None apparent.      Cardiac Summary:   The average pulse rate was 83.8 bpm. The minimum pulse rate was 63.0 bpm while the maximum pulse rate was 129.1 bpm.  Arrhythmias were not noted.      Assessment:     Mild obstructive sleep apnea, exclusively supine positional    Periodic limb movements of sleep    Recommendations:    Consider repeat polysomnography with full night titration of positive airway pressure therapy for the control of sleep disordered breathing.    Based on the presence of mild/moderate obstructive sleep apnea and excessive daytime sleepiness, treatment could be empirically initiated with Auto?titrating PAP therapy with a range of 5 to 15 cmH2O. Recommend clinical follow up with sleep management team.    Patient may be a candidate for dental appliance through referral to Sleep Dentistry for the treatment of  obstructive sleep apnea and/or socially disruptive snoring.    Positional therapy could be considered    If devices are not acceptable or effective, patient may benefit from evaluation of possible surgical options. If she is interested, would recommend referral to specialized ENT-Sleep provider.    Advice regarding the risks of drowsy driving.    Suggest optimizing sleep schedule and avoiding sleep deprivation.    Weight management (if BMI > 30).    Pharmacologic therapy should be used for management of restless legs syndrome only if present and clinically indicated and not based on the presence of periodic limb movements alone.    Diagnostic Codes:   Obstructive Sleep Apnea G47.33       _____________________________________   Electronically Signed By: Karson Jones MD 8/24/20             Range(%) Time in range (min)   0.0 - 89.0 -   0.0 - 88.0 -         Stage Min(mm Hg) Max(mm Hg)   Wake - -   NREM(1+2+3) - -   REM - -       Range(mmHg) Time in range (min)   55.0 - 100.0 -   Excluded data <20.0 & >70.0 449.5

## 2020-09-03 ENCOUNTER — TELEPHONE (OUTPATIENT)
Dept: SLEEP MEDICINE | Facility: CLINIC | Age: 30
End: 2020-09-03

## 2020-09-03 NOTE — TELEPHONE ENCOUNTER
Pt called to schedule 3 month follow up after recheck of ferratin levels in 3 months, message was left for pt to call back to schedule.    HARRY Jones

## 2020-09-04 ENCOUNTER — OFFICE VISIT (OUTPATIENT)
Dept: PODIATRY | Facility: CLINIC | Age: 30
End: 2020-09-04
Payer: COMMERCIAL

## 2020-09-04 VITALS — SYSTOLIC BLOOD PRESSURE: 140 MMHG | BODY MASS INDEX: 31.93 KG/M2 | DIASTOLIC BLOOD PRESSURE: 80 MMHG | WEIGHT: 210 LBS

## 2020-09-04 DIAGNOSIS — L60.0 INGROWING NAIL: Primary | ICD-10-CM

## 2020-09-04 PROCEDURE — 11730 AVULSION NAIL PLATE SIMPLE 1: CPT | Mod: T5 | Performed by: PODIATRIST

## 2020-09-04 PROCEDURE — 99213 OFFICE O/P EST LOW 20 MIN: CPT | Mod: 25 | Performed by: PODIATRIST

## 2020-09-04 NOTE — PROGRESS NOTES
Subjective:    Pt is seen today w/ the c/c of a painful ingrown right great nail lateral border.  This has been problematic for 1 month(s).  negativehistory of drainage from the site. This is slowly getting worse.  Aggravated by activity and relieved by rest.  Has tried soaking which has not helped.   denies history of trauma to the area.  Had temporary removal done here 1 year ago.  denies fever and chill, denies numbness and tingling, denies erythema on dorsum of foot.  Also slight pain left hallux lateral border.  Had permanent done here last year.    ROS:  A 10-point review of systems was performed and is positive for that noted in the HPI and as seen below.  All other areas are negative.     Past Medical History:   Diagnosis Date     Acute gallstone pancreatitis 12/28/2019     Anxiety      ASCUS of cervix with negative high risk HPV 1/26/17    ASCUS/neg HR HPV.     Depressive disorder      Gallstones 12/28/2019    [facility]     H/O colposcopy with cervical biopsy 03/23/2017    Bx & ECC - negative     Hashimoto's disease      Headache(784.0)      Hypothyroidism due to Hashimoto's thyroiditis      Papanicolaou smear of cervix with atypical squamous cells of undetermined significance (ASC-US) 12/03/2015       Past Surgical History:   Procedure Laterality Date     COLONOSCOPY N/A 12/16/2015    Procedure: COLONOSCOPY;  Surgeon: Duane, William Charles, MD;  Location: [facility] OR     COLONOSCOPY WITH CO2 INSUFFLATION N/A 12/16/2015    Procedure: COLONOSCOPY WITH CO2 INSUFFLATION;  Surgeon: Duane, William Charles, MD;  Location: [facility] OR     NO HISTORY OF SURGERY         Family History   Problem Relation Age of Onset     Depression Brother         depression     Hyperlipidemia Mother      Thyroid Disease Mother      Hyperlipidemia Father      Breast Cancer Cousin      Depression Brother      Substance Abuse Sister      Substance Abuse Sister      C.A.D. No family hx of      Diabetes No family hx of       Breast Cancer No family hx of      Cancer - colorectal No family hx of      Anesthesia Reaction No family hx of      Blood Disease No family hx of        Social History     Tobacco Use     Smoking status: Never Smoker     Smokeless tobacco: Never Used   Substance Use Topics     Alcohol use: Yes     Comment: socially         Current Outpatient Medications:      albuterol (PROAIR HFA/PROVENTIL HFA/VENTOLIN HFA) 108 (90 Base) MCG/ACT inhaler, Inhale 2 puffs into the lungs every 6 hours as needed for shortness of breath / dyspnea or wheezing, Disp: 1 Inhaler, Rfl: 0     buPROPion (WELLBUTRIN XL) 300 MG 24 hr tablet, Take 1 tablet (300 mg) by mouth every morning, Disp: 90 tablet, Rfl: 1     cyclobenzaprine (FLEXERIL) 10 MG tablet, Take 1 tablet (10 mg) by mouth 3 times daily as needed for muscle spasms, Disp: 30 tablet, Rfl: 1     escitalopram (LEXAPRO) 20 MG tablet, Take 1 tablet (20 mg) by mouth daily, Disp: 90 tablet, Rfl: 1     levothyroxine (SYNTHROID/LEVOTHROID) 175 MCG tablet, TAKE ONE TABLET BY MOUTH ONE TIME DAILY AS DIRECTED, Disp: 90 tablet, Rfl: 3     norethindrone-ethinyl estradiol (ORTHO-NOVUM 1/35, 28,) 1-35 MG-MCG tablet, Take one active tablet a day for 21 days.  Discard the inactive pills and start new pack on day 22., Disp: 112 tablet, Rfl: 0       Allergies   Allergen Reactions     Nkda [No Known Drug Allergies]        BP (!) 140/80   Wt 95.3 kg (210 lb)   BMI 31.93 kg/m  .      Constitutional/ general:  Pt is in no apparent distress, appears well-nourished.  Cooperative with history and physical exam.     Psych:  The patient answered questions appropriately.  Normal affect.  Seems to have reasonable expectations, in terms of treatment.     Eyes:  Visual scanning/ tracking without deficit.     Ears:  Response to auditory stimuli is normal.  No  hearing aid devices.  Auricles in proper alignment.     Lymphatic:  Popliteal lymph nodes not enlarged.     Lungs:  Non labored breathing, non labored  speech. No cough.  No audible wheezing. Even, quiet breathing.       Vascular:  Pedal pulses are palpable bilaterally for both the DP and PT arteries.  CFT < 3 sec.  No ankle varicosities or edema.  Pedal hair growth noted.     Neuro:  Alert and oriented x 3. Coordinated gait.  Light touch sensation is intact to the L4, L5, S1 distributions. No obvious deficits.  No evidence of neurological-based weakness, spasticity, or contracture in the lower extremities.     Derm: Normal texture and turgor.  No ecchymosis, or cyanosis.  Hair growth noted.        Musculoskeletal:     Patient is ambulatory without an assistive device or brace.  No gross deformities.  Normal arch with weightbearing.  No forefoot or rear foot deformities noted.  MS 5/5 all compartments.  Normal ROM all fore foot and rearfoot joints.  No equinus.  right great toe nail lateral border shows soft tissue impingement with localized erythema.   negative active drainage/purulence at this time.  No sinus tracts.  No nailbed masses or exostosis.  No pain with range of motion of IPJ or MTPJ.  No ascending cellulitis.  Debris left hallux lateral border.  We remove this and underlying border healthy    ASSESSMENT:    Onychocryptosis with paronychia right toe.    Discussed etiology and treatment options in detail w/ the pt.  The potential causes and nature of an ingrown toenail were discussed with the patient.  We reviewed the natural history/prognosis of the condition and potential risks if no treatment is provided.      Treatment options discussed included conservative management (oral antibiotics, soaking of foot, adequate width shoes)  as well as surgical management (partial or total nail removal).  The pros and cons of both forms of treatment were reviewed.  Handout given to patient.      After thorough discussion and answering all questions, the patient elected to have nail avulsion.  Obtained consent, used 3cc of 1% lidocaine plain to block right first  toe.  Sterile prep, then avulsed the affected border(s).  No evidence of deep abscess noted.  Pt tolerated procedure well.  Sterile bandage placed, gave wound care instruction.  Discussed permanent removal if this reoccurs.  Remove debris from left hallux lateral border.  She will watch this and keep this clean.  Return to clinic prn.    Charles Stapleton, MICHELLE [place], FACFAS

## 2020-09-04 NOTE — PATIENT INSTRUCTIONS
We wish you continued good healing. If you have any questions or concerns, please do not hesitate to contact us at [phone]    Please remember to call and schedule a follow up appointment if one was recommended at your earliest convenience.   PODIATRY CLINIC HOURS  TELEPHONE NUMBER    Dr. Charles Stapleton D.P.M [facility]    Clinics:  [facility]    [facility] Bow[facility]   Tuesday 1PM-6PM  [place]  Wednesday 7AM-2PM  [facility]  Thursday 10AM-6PM  [place]  Friday 7AM-3PM  [place]  Specialty schedulers:   [phone] to make an appointment with any Specialty Provider.        Urgent Care locations:    [facility] Monday-Friday 5 pm - 9 pm. Saturday-Sunday 9 am -5pm    Monday-Friday 11 am - 9 pm Saturday 9 am - 5 pm     Monday-Sunday 12 noon-8PM [phone]     If you need a medication refill, please contact us you may need lab work and/or a follow up visit prior to your refill (i.e. Antifungal medications).    KOWNt (secure e-mail communication and access to your chart) to send a message or to make an appointment.    If MRI needed please call [facility] at [phone]

## 2020-09-04 NOTE — NURSING NOTE
Weight management plan: Patient was referred to their PCP to discuss a diet and exercise plan. Analia to follow up with Primary Care provider regarding elevated blood pressure.

## 2020-10-27 DIAGNOSIS — Z30.41 ENCOUNTER FOR SURVEILLANCE OF CONTRACEPTIVE PILLS: ICD-10-CM

## 2020-10-27 NOTE — TELEPHONE ENCOUNTER
"Hormone Replacement Therapy Failed    Rerun Protocol (10/27/2020 9:23 AM)     Blood pressure under 140/90 in past 12 months        BP Readings from Last 3 Encounters:   09/04/20 (!) 140/80   01/03/20 112/76   12/16/19 107/71               Recent (12 mo) or future (30 days) visit within the authorizing provider's specialty    Patient has had an office visit with the authorizing provider or a provider within the authorizing providers department within the previous 12 mos or has a future within next 30 days. See \"Patient Info\" tab in inbasket, or \"Choose Columns\" in Meds & Orders section of the refill encounter.             Medication is active on med list       Patient is 18 years of age or older       No active pregnancy on record       No positive pregnancy test on record in past 12 months        Contraceptives Protocol Passed    Rerun Protocol (10/27/2020 9:23 AM)     Patient is not a current smoker if age is 35 or older       Recent (12 mo) or future (30 days) visit within the authorizing provider's specialty    Patient has had an office visit with the authorizing provider or a provider within the authorizing providers department within the previous 12 mos or has a future within next 30 days. See \"Patient Info\" tab in inbasket, or \"Choose Columns\" in Meds & Orders section of the refill encounter.             Medication is active on med list       No active pregnancy on record       No positive pregnancy test in past 12 months       Medication:  norethindrone-ethinyl estradiol (ORTHO-NOVUM 1/35, 28,) 1-35 MG-MCG tablet  Last Written Prescription Date:  8/4/2020  Last Fill Quantity:112  # refills: 0  Last Office Visit: 6/6/2019  Future Office visit:    Next 5 appointments (look out 90 days)    Oct 28, 2020 11:30 AM  PHYSICAL with Rupesh Winslow MD  [facility] ([address]  [place] [id number] [phone]         RN routing to provider to fill at " appointment tomorrow.     Lisa Reynaga RN on 10/27/2020 at 9:26 AM

## 2020-10-28 ENCOUNTER — OFFICE VISIT (OUTPATIENT)
Dept: OBGYN | Facility: CLINIC | Age: 30
End: 2020-10-28
Payer: COMMERCIAL

## 2020-10-28 VITALS
BODY MASS INDEX: 37.13 KG/M2 | SYSTOLIC BLOOD PRESSURE: 119 MMHG | HEART RATE: 108 BPM | DIASTOLIC BLOOD PRESSURE: 86 MMHG | OXYGEN SATURATION: 97 % | HEIGHT: 67 IN | WEIGHT: 236.6 LBS

## 2020-10-28 DIAGNOSIS — Z01.419 ENCOUNTER FOR GYNECOLOGICAL EXAMINATION WITHOUT ABNORMAL FINDING: Primary | ICD-10-CM

## 2020-10-28 DIAGNOSIS — E66.01 MORBID OBESITY (H): ICD-10-CM

## 2020-10-28 DIAGNOSIS — Z30.41 ENCOUNTER FOR SURVEILLANCE OF CONTRACEPTIVE PILLS: ICD-10-CM

## 2020-10-28 PROCEDURE — 99395 PREV VISIT EST AGE 18-39: CPT | Performed by: OBSTETRICS & GYNECOLOGY

## 2020-10-28 ASSESSMENT — MIFFLIN-ST. JEOR: SCORE: 1821.87

## 2020-10-28 NOTE — PROGRESS NOTES
Analia Jerry is a 29 year old female , who presents for annual exam.   No unusual bleeding, no incontinence, or unusual discharge.   She is currently using OCPs in continuous fashion for contraception and menstrual regulation.  She does not have any apparent contraindications to use.  She has not had any apparent complications with it's use.  Last cholesterol:   Recent Labs   Lab Test 18  0757 18  0944   CHOL 190 206*   HDL 38* 38*   LDL 89 Cannot estimate LDL when triglyceride exceeds 400 mg/dL  109*   TRIG 314* 412*     Past Medical History:   Diagnosis Date     Acute gallstone pancreatitis 2019     Anxiety      ASCUS of cervix with negative high risk HPV 17    ASCUS/neg HR HPV.     Depressive disorder      Gallstones 2019    [facility]     H/O colposcopy with cervical biopsy 2017    Bx & ECC - negative     Hashimoto's disease      Headache(784.0)      Hypothyroidism due to Hashimoto's thyroiditis      Papanicolaou smear of cervix with atypical squamous cells of undetermined significance (ASC-US) 2015       Past Surgical History:   Procedure Laterality Date     COLONOSCOPY N/A 2015    Procedure: COLONOSCOPY;  Surgeon: Duane, William Charles, MD;  Location: [facility] OR     COLONOSCOPY WITH CO2 INSUFFLATION N/A 2015    Procedure: COLONOSCOPY WITH CO2 INSUFFLATION;  Surgeon: Duane, William Charles, MD;  Location: [facility] OR     NO HISTORY OF SURGERY         OB History    Para Term  AB Living   0 0 0 0 0 0   SAB TAB Ectopic Multiple Live Births   0 0 0 0 0       Gyn History:  Gynecological History         No LMP recorded (lmp unknown). (Menstrual status: Birth Control).     no STD/no PID/no IUD      history of abnormal pap smear:  no  Last pap:   Lab Results   Component Value Date    PAP NIL 2019           Current Outpatient Medications   Medication Sig Dispense Refill     albuterol (PROAIR HFA/PROVENTIL HFA/VENTOLIN HFA) 108 (90  Base) MCG/ACT inhaler Inhale 2 puffs into the lungs every 6 hours as needed for shortness of breath / dyspnea or wheezing 1 Inhaler 0     buPROPion (WELLBUTRIN XL) 300 MG 24 hr tablet Take 1 tablet (300 mg) by mouth every morning 90 tablet 1     cyclobenzaprine (FLEXERIL) 10 MG tablet Take 1 tablet (10 mg) by mouth 3 times daily as needed for muscle spasms 30 tablet 1     escitalopram (LEXAPRO) 20 MG tablet Take 1 tablet (20 mg) by mouth daily 90 tablet 1     levothyroxine (SYNTHROID/LEVOTHROID) 175 MCG tablet TAKE ONE TABLET BY MOUTH ONE TIME DAILY AS DIRECTED 90 tablet 3     norethindrone-ethinyl estradiol (ORTHO-NOVUM 1/35, 28,) 1-35 MG-MCG tablet Take one active tablet a day for 21 days.  Discard the inactive pills and start new pack on day 22. 112 tablet 4       Allergies   Allergen Reactions     Nkda [No Known Drug Allergies]        Social History     Socioeconomic History     Marital status: Single     Spouse name: Not on file     Number of children: 0     Years of education: 16     Highest education level: Not on file   Occupational History     Occupation:      Comment: [employer]   Social Needs     Financial resource strain: Not on file     Food insecurity     Worry: Not on file     Inability: Not on file     Transportation needs     Medical: Not on file     Non-medical: Not on file   Tobacco Use     Smoking status: Never Smoker     Smokeless tobacco: Never Used   Substance and Sexual Activity     Alcohol use: Yes     Comment: socially     Drug use: No     Sexual activity: Yes     Partners: Male     Birth control/protection: Pill   Lifestyle     Physical activity     Days per week: Not on file     Minutes per session: Not on file     Stress: Not on file   Relationships     Social connections     Talks on phone: Not on file     Gets together: Not on file     Attends Yarsanism service: Not on file     Active member of club or organization: Not on file     Attends meetings of clubs or  "organizations: Not on file     Relationship status: Not on file     Intimate partner violence     Fear of current or ex partner: Not on file     Emotionally abused: Not on file     Physically abused: Not on file     Forced sexual activity: Not on file   Other Topics Concern     Parent/sibling w/ CABG, MI or angioplasty before 65F 55M? No   Social History Narrative    Parents declaring bankruptcy; losing house due to mother's credit card debt.        Brother deployed to [place] for 16 months startin 4/1/09        Parents  but ''.       Family History   Problem Relation Age of Onset     Depression Brother         depression     Hyperlipidemia Mother      Thyroid Disease Mother      Hyperlipidemia Father      Breast Cancer Cousin      Depression Brother      Substance Abuse Sister      Substance Abuse Sister      C.A.D. No family hx of      Diabetes No family hx of      Breast Cancer No family hx of      Cancer - colorectal No family hx of      Anesthesia Reaction No family hx of      Blood Disease No family hx of          ROS:  All negative except as above.      EXAM:  /86 (BP Location: Right arm, Cuff Size: Adult Large)   Pulse 108   Ht 1.695 m (5' 6.75\")   Wt 107.3 kg (236 lb 9.6 oz)   LMP  (LMP Unknown)   SpO2 97%   BMI 37.33 kg/m    General:  WNWD female, NAD  Alert  Oriented x 3  Gait:  Normal  Skin:  Normal skin turgor  Neurologic:  CN grossly intact, good sensation.    HEENT:  NC/AT, EOMI  Neck:  No masses palpated, symmetrical, carotids +2/4, no bruits heard  Heart:  RRR  Lungs:  Clear   Breasts:  Symmetrical, no dimpling noted, no masses palpated, no discharge expressed  Abdomen:  Non-tender, non-distended.  Vulva: No external lesions, normal hair distribution, no adenopathy  BUS:  Normal, no masses noted  Urethra:  No hypermobility noted  Urethral meatus:  No masses noted  Vagina: Moist, pink, no abnormal discharge, well rugated, no lesions  Cervix: Smooth, pink, no visible " lesions  Uterus: Normal size, anteverted, non-tender, mobile  Ovaries: No mass, non-tender, mobile  Perianal:  No masses noted.    Extremities:  No clubbing, cyanosis, or edema      ASSESSMENT/PLAN   Annual examination   Low fat diet, weight bearing exercises and self breast exams on a monthly basis have been recommended.  I have discussed with patient the risks, benefits, medications, treatment options and modalities.   I have instructed the patient to call or schedule a follow-up appointment if any problems.

## 2020-11-27 DIAGNOSIS — Z72.820 LACK OF ADEQUATE SLEEP: ICD-10-CM

## 2020-11-27 DIAGNOSIS — G47.61 PERIODIC LIMB MOVEMENT DISORDER: ICD-10-CM

## 2020-11-27 DIAGNOSIS — E06.3 HYPOTHYROIDISM DUE TO HASHIMOTO'S THYROIDITIS: ICD-10-CM

## 2020-11-27 LAB
FERRITIN SERPL-MCNC: 19 NG/ML (ref 12–150)
T4 FREE SERPL-MCNC: 0.66 NG/DL (ref 0.76–1.46)
TSH SERPL DL<=0.005 MIU/L-ACNC: 18.3 MU/L (ref 0.4–4)

## 2020-11-27 PROCEDURE — 84443 ASSAY THYROID STIM HORMONE: CPT | Performed by: PHYSICIAN ASSISTANT

## 2020-11-27 PROCEDURE — 84439 ASSAY OF FREE THYROXINE: CPT | Performed by: PHYSICIAN ASSISTANT

## 2020-11-27 PROCEDURE — 82728 ASSAY OF FERRITIN: CPT | Performed by: PHYSICIAN ASSISTANT

## 2020-11-27 PROCEDURE — 36415 COLL VENOUS BLD VENIPUNCTURE: CPT | Performed by: PHYSICIAN ASSISTANT

## 2020-12-01 ENCOUNTER — VIRTUAL VISIT (OUTPATIENT)
Dept: ENDOCRINOLOGY | Facility: CLINIC | Age: 30
End: 2020-12-01
Payer: COMMERCIAL

## 2020-12-01 DIAGNOSIS — E06.3 HYPOTHYROIDISM DUE TO HASHIMOTO'S THYROIDITIS: Primary | ICD-10-CM

## 2020-12-01 PROCEDURE — 99213 OFFICE O/P EST LOW 20 MIN: CPT | Mod: 95 | Performed by: INTERNAL MEDICINE

## 2020-12-01 RX ORDER — LEVOTHYROXINE SODIUM 200 UG/1
200 TABLET ORAL DAILY
Qty: 30 TABLET | Refills: 11 | Status: SHIPPED | OUTPATIENT
Start: 2020-12-01 | End: 2021-04-05

## 2020-12-01 NOTE — LETTER
"    12/1/2020         RE: Analia Jerry  [address]  [facility] [address]        Dear Colleague,    Thank you for referring your patient, Analia Jerry, to the [facility]. Please see a copy of my visit note below.    Analia Jerry is a 29 year old female who is being evaluated via a billable telephone visit.      The patient has been notified of following:     \"This telephone visit will be conducted via a call between you and your physician/provider. We have found that certain health care needs can be provided without the need for a physical exam.  This service lets us provide the care you need with a short phone conversation.  If a prescription is necessary we can send it directly to your pharmacy.  If lab work is needed we can place an order for that and you can then stop by our lab to have the test done at a later time.    Telephone visits are billed at different rates depending on your insurance coverage. During this emergency period, for some insurers they may be billed the same as an in-person visit.  Please reach out to your insurance provider with any questions.    If during the course of the call the physician/provider feels a telephone visit is not appropriate, you will not be charged for this service.\"    Patient has given verbal consent for Telephone visit?  Yes    What phone number would you like to be contacted at? [phone]    How would you like to obtain your AVS? Mail a copy     S:   Pt being seen in f/u for hypothyroidism.  Previously seen by Dr Schroeder:  \"2008. Concerns about wt gain, depression  Medical evaluation and lab testing reportedly showed low thyroid function  Started levothyroxine medication  Previous [facility] thyroid labs include:            Lab Results   Component Value Date     TSH 5.75 (H) 09/13/2018     TSH 12.12 (H) 05/29/2018     TSH 11.18 (H) 03/26/2018     TSH 3.17 03/23/2016     TSH 3.14 03/12/2015     T4 0.89 09/13/2018     T4 0.84 " "05/29/2018     T4 0.85 03/26/2018     T4 1.12 10/22/2014     T4 1.01 09/17/2012      (H) 04/01/2010      Recent [facility] labs include:            Lab Results   Component Value Date     TSH 5.75 (H) 09/13/2018     T4 0.89 09/13/2018      (H) 04/01/2010      Fam Hx thyroid disease              Hyperthyroidism and postablative hypothyroidism- mother\"     She is taking 175 mcg levothyroxine.      She is on a continuous OCP as she had heavy bleeding with menses prior.   No plans for pregnancy in the near future.     She has lost 32 pounds since the summer to 12/2019.   Gained 10 pounds back since 12/2019. Low motivation since began working from home.   Less activity and more snacking.   In 12/2020, notes she has gained more weight. Her brother passed away and she has been stress eating.      She has a history of SAD.   She continues on lexapro.    Continues to be fatigued during the day time.   Sleeps 8 hours a night. Wakes tired. She is struggling to complete her work but not as much as when we last spoke.   Sleep study revealed periodic limb movements and mild FRANK.   She has been started on an iron supplement and trying to sleep on her side more.     Just finished a five day course of prednisone for back pain.     ROS: 10 point ROS neg other than the symptoms noted above in the HPI.    A/P:   Hypothyroidism - Extensive discussion of thyroid hormone and normal physiology. Included was discussion of thyroid in relation to weight and energy. Low TSH and normal free T4 in 12/2019. I suspect this change in because of her weight loss. She is not having any signs or symptoms of hyperthyroidism.   In 6/2020, mood and energy unchanged.   In 12/2020, TSH is up and free T4 is down. She has gained weight since last check and she is taking an iron supplement. She confirms she is taking the iron 4 hours apart from the levothyroxine. However, for the first two weeks, she did take the iron with her levothyroxine.   -Increase " levothyroxine from 175 to 200 mcg daily.   -Lab in 4 weeks.   -Contact me if you become pregnant as we will need to adjust your levothyroxine.      Due to the COVID 19 pandemic this visit was a telephone/video visit in order to help prevent spread of infection in this high risk patient and the general population. The patient gave verbal consent for the visit today.    Start time 0902  Stop time 0917   Total time 15  This visit would have been billed as [id number] as an E & M code     Deuce Edward MD on 12/1/2020 at 9:24 AM                Again, thank you for allowing me to participate in the care of your patient.        Sincerely,        Deuce Edward MD

## 2020-12-01 NOTE — PROGRESS NOTES
"Analia Jerry is a 29 year old female who is being evaluated via a billable telephone visit.      The patient has been notified of following:     \"This telephone visit will be conducted via a call between you and your physician/provider. We have found that certain health care needs can be provided without the need for a physical exam.  This service lets us provide the care you need with a short phone conversation.  If a prescription is necessary we can send it directly to your pharmacy.  If lab work is needed we can place an order for that and you can then stop by our lab to have the test done at a later time.    Telephone visits are billed at different rates depending on your insurance coverage. During this emergency period, for some insurers they may be billed the same as an in-person visit.  Please reach out to your insurance provider with any questions.    If during the course of the call the physician/provider feels a telephone visit is not appropriate, you will not be charged for this service.\"    Patient has given verbal consent for Telephone visit?  Yes    What phone number would you like to be contacted at? [phone]    How would you like to obtain your AVS? Mail a copy     S:   Pt being seen in f/u for hypothyroidism.  Previously seen by Dr Schroeder:  \"2008. Concerns about wt gain, depression  Medical evaluation and lab testing reportedly showed low thyroid function  Started levothyroxine medication  Previous [facility] thyroid labs include:            Lab Results   Component Value Date     TSH 5.75 (H) 09/13/2018     TSH 12.12 (H) 05/29/2018     TSH 11.18 (H) 03/26/2018     TSH 3.17 03/23/2016     TSH 3.14 03/12/2015     T4 0.89 09/13/2018     T4 0.84 05/29/2018     T4 0.85 03/26/2018     T4 1.12 10/22/2014     T4 1.01 09/17/2012      (H) 04/01/2010      Recent [facility] labs include:            Lab Results   Component Value Date     TSH 5.75 (H) 09/13/2018     T4 0.89 09/13/2018      (H) 04/01/2010 " "     Fam Hx thyroid disease              Hyperthyroidism and postablative hypothyroidism- mother\"     She is taking 175 mcg levothyroxine.      She is on a continuous OCP as she had heavy bleeding with menses prior.   No plans for pregnancy in the near future.     She has lost 32 pounds since the summer to 12/2019.   Gained 10 pounds back since 12/2019. Low motivation since began working from home.   Less activity and more snacking.   In 12/2020, notes she has gained more weight. Her brother passed away and she has been stress eating.      She has a history of SAD.   She continues on lexapro.    Continues to be fatigued during the day time.   Sleeps 8 hours a night. Wakes tired. She is struggling to complete her work but not as much as when we last spoke.   Sleep study revealed periodic limb movements and mild FRANK.   She has been started on an iron supplement and trying to sleep on her side more.     Just finished a five day course of prednisone for back pain.     ROS: 10 point ROS neg other than the symptoms noted above in the HPI.    A/P:   Hypothyroidism - Extensive discussion of thyroid hormone and normal physiology. Included was discussion of thyroid in relation to weight and energy. Low TSH and normal free T4 in 12/2019. I suspect this change in because of her weight loss. She is not having any signs or symptoms of hyperthyroidism.   In 6/2020, mood and energy unchanged.   In 12/2020, TSH is up and free T4 is down. She has gained weight since last check and she is taking an iron supplement. She confirms she is taking the iron 4 hours apart from the levothyroxine. However, for the first two weeks, she did take the iron with her levothyroxine.   -Increase levothyroxine from 175 to 200 mcg daily.   -Lab in 4 weeks.   -Contact me if you become pregnant as we will need to adjust your levothyroxine.      Due to the COVID 19 pandemic this visit was a telephone/video visit in order to help prevent spread of infection " in this high risk patient and the general population. The patient gave verbal consent for the visit today.    Start time 0902  Stop time 0917   Total time 15  This visit would have been billed as [id number] as an E & M code     Deuce Edward MD on 12/1/2020 at 9:24 AM

## 2020-12-13 ENCOUNTER — HEALTH MAINTENANCE LETTER (OUTPATIENT)
Age: 30
End: 2020-12-13

## 2021-01-03 ENCOUNTER — VIRTUAL VISIT (OUTPATIENT)
Dept: FAMILY MEDICINE | Facility: OTHER | Age: 31
End: 2021-01-03
Payer: COMMERCIAL

## 2021-01-03 PROCEDURE — 99421 OL DIG E/M SVC 5-10 MIN: CPT | Performed by: FAMILY MEDICINE

## 2021-01-03 NOTE — PROGRESS NOTES
"Date: 2021 10:20:45  Clinician: Donaldo Rashid  Clinician NPI: [id number]  Patient: Analia Lyons  Patient : 1990  Patient Address: [facility] [address], [place] [address]  Patient Phone: [phone]  Visit Protocol: UTI  Patient Summary:  Analia is a 30 year old ( : 1990 ) female who initiated a [facility] Visit for a presumed bladder infection. When asked the question \"Please sign me up to receive news, health information and promotions from [facility].\", Analia responded \"No\".   Her symptoms started 1-3 days ago and consist of urinary frequency, urgency, dysuria, and feeling as if the bladder is never empty.   Symptom details   Urine color: The color of her urine is yellow.    Denied symptoms include flank pain, abdominal pain, chills, urinary incontinence, vomiting, vaginal itching, foul-smelling urine, nausea, and vaginal discharge. She does not feel feverish.   Analia has not used any over-the-counter medications or home remedies to relieve her current symptoms.  Precipitating events  Analia denies having a sexually transmitted disease.  Pertinent medical history  Analia has had a bladder infection before but has not had any in the past 12 months. Her current symptoms are similar to her previous bladder infection symptoms.   Nitrofurantoin (Macrobid) has been effective in treating her past bladder infections.   She has experienced problems or side effects with the following antibiotics in the past: nitrofurantoin (Macrobid).  Problems or side effects as reported by the patient (free text): Really bad yeast infection. Usually given medication for yeast infection when prescribed this medication.   Analia typically gets a yeast infection when she takes antibiotics. She has used fluconazole (Diflucan) to treat previous yeast infections. 2 doses of fluconazole (Diflucan) has typically been needed for symptoms to resolve in the past.  Analia has not been prescribed antibiotics to prevent frequent or repeated " bladder infections in the past.   Analia does not have a history of kidney stones. She does not have any other urologic conditions. Her provider has not told her she has advanced kidney disease. She has not used a catheter or been a patient in a hospital or nursing home in the past 2 weeks.  She denies having immunosuppressive conditions (e.g., chemotherapy, HIV, organ transplant, long-term use of steroids or other immunosuppressive medications, splenectomy). She does not have diabetes.   Analia does not smoke or use smokeless tobacco.   She denies pregnancy and denies breastfeeding. She does not menstruate.   Reason for repeat visit for the same protocol within 24 hours:  I selected the wrong answer about back pain. I don't not have any new back pain concerns.  See the History of referred by protocol and completed visits section for details on previous visits (visits currently in queue to be diagnosed will not appear in this section).    MEDICATIONS: Wellbutrin XL oral, Lexapro oral, ALLERGIES: NKDA  Clinician Response:  Dear Analia,  Based on the information you have provided, you likely have an acute urinary tract infection, also called a bladder infection. Bladder infections occur when bacteria from the outside of the body enters the urinary tract. Any part of the urinary system can be infected, but the bladder is the most common.  Medication information  I am prescribing:     Nitrofurantoin monohyd/m-cryst (Macrobid) 100 mg oral capsule. Take 1 capsule by mouth every 12 hours for 5 days. Take this medication with food. There are no refills with this prescription.   The medication I prescribed for your bladder infection is an antibiotic. Continue taking the medication until it is gone even if you feel better.   Yeast infections can be a common side effect of antibiotics. The most common symptom of a yeast infection is itchiness in and around the vagina. Other signs and symptoms include burning, redness, or a thick,  white vaginal discharge that looks like cottage cheese and does not have a bad smell.  Self care  Urination helps to flush bacteria from the urinary tract. For this reason, drinking water and urinating often helps relieve some urinary symptoms and can decrease your risk of getting bladder infections in the future.  Other steps you can take to prevent future bladder infections include:     Wipe front to back after using the bathroom    Urinate after sexual intercourse    Avoid using deodorant sprays, douches, or powders in the vaginal area     When to seek care  Please make an appointment to be seen in a clinic or urgent care if any of the following occur:     You develop new symptoms or your symptoms become worse    You have medication side effects that make it difficult to take them as prescribed    Your symptoms do not improve within 1-2 days of starting treatment    You have symptoms of a bladder infection that return shortly after completing treatment     It is possible to have an allergic reaction to an antibiotic even if you have not had one in the past. If you notice a new rash, significant swelling, or difficulty breathing, stop taking this medication immediately and go to a clinic or urgent care.   Diagnosis: Acute uncomplicated bladder infection  Diagnosis ICD: N39.0  Prescription: nitrofurantoin monohyd/m-cryst (Macrobid) 100 mg oral capsule 10 capsule, 5 days supply. Take 1 capsule by mouth every 12 hours for 5 days. Refills: 0, Refill as needed: no, Allow substitutions: yes  Pharmacy: [id number] - [phone] - [address] [place] [address]

## 2021-01-27 DIAGNOSIS — E06.3 HYPOTHYROIDISM DUE TO HASHIMOTO'S THYROIDITIS: ICD-10-CM

## 2021-01-27 LAB
T4 FREE SERPL-MCNC: 0.77 NG/DL (ref 0.76–1.46)
TSH SERPL DL<=0.005 MIU/L-ACNC: 8.47 MU/L (ref 0.4–4)

## 2021-01-27 PROCEDURE — 36415 COLL VENOUS BLD VENIPUNCTURE: CPT | Performed by: INTERNAL MEDICINE

## 2021-01-27 PROCEDURE — 84443 ASSAY THYROID STIM HORMONE: CPT | Performed by: INTERNAL MEDICINE

## 2021-01-27 PROCEDURE — 84439 ASSAY OF FREE THYROXINE: CPT | Performed by: INTERNAL MEDICINE

## 2021-01-28 DIAGNOSIS — E06.3 HYPOTHYROIDISM DUE TO HASHIMOTO'S THYROIDITIS: Primary | ICD-10-CM

## 2021-01-28 RX ORDER — LEVOTHYROXINE SODIUM 112 UG/1
224 TABLET ORAL DAILY
Qty: 60 TABLET | Refills: 11 | Status: SHIPPED | OUTPATIENT
Start: 2021-01-28 | End: 2021-08-09

## 2021-01-29 DIAGNOSIS — F41.8 DEPRESSION WITH ANXIETY: ICD-10-CM

## 2021-01-29 RX ORDER — BUPROPION HYDROCHLORIDE 300 MG/1
300 TABLET ORAL EVERY MORNING
Qty: 90 TABLET | Refills: 1 | Status: SHIPPED | OUTPATIENT
Start: 2021-01-29 | End: 2021-06-16

## 2021-02-01 ASSESSMENT — ANXIETY QUESTIONNAIRES
3. WORRYING TOO MUCH ABOUT DIFFERENT THINGS: MORE THAN HALF THE DAYS
GAD7 TOTAL SCORE: 12
7. FEELING AFRAID AS IF SOMETHING AWFUL MIGHT HAPPEN: NOT AT ALL
7. FEELING AFRAID AS IF SOMETHING AWFUL MIGHT HAPPEN: NOT AT ALL
6. BECOMING EASILY ANNOYED OR IRRITABLE: NEARLY EVERY DAY
5. BEING SO RESTLESS THAT IT IS HARD TO SIT STILL: SEVERAL DAYS
1. FEELING NERVOUS, ANXIOUS, OR ON EDGE: MORE THAN HALF THE DAYS
GAD7 TOTAL SCORE: 12
4. TROUBLE RELAXING: MORE THAN HALF THE DAYS
GAD7 TOTAL SCORE: 12
2. NOT BEING ABLE TO STOP OR CONTROL WORRYING: MORE THAN HALF THE DAYS

## 2021-02-01 ASSESSMENT — PATIENT HEALTH QUESTIONNAIRE - PHQ9
SUM OF ALL RESPONSES TO PHQ QUESTIONS 1-9: 15
SUM OF ALL RESPONSES TO PHQ QUESTIONS 1-9: 15
10. IF YOU CHECKED OFF ANY PROBLEMS, HOW DIFFICULT HAVE THESE PROBLEMS MADE IT FOR YOU TO DO YOUR WORK, TAKE CARE OF THINGS AT HOME, OR GET ALONG WITH OTHER PEOPLE: VERY DIFFICULT

## 2021-02-02 ASSESSMENT — PATIENT HEALTH QUESTIONNAIRE - PHQ9: SUM OF ALL RESPONSES TO PHQ QUESTIONS 1-9: 15

## 2021-02-02 ASSESSMENT — ANXIETY QUESTIONNAIRES: GAD7 TOTAL SCORE: 12

## 2021-02-04 ENCOUNTER — TELEPHONE (OUTPATIENT)
Dept: PSYCHIATRY | Facility: CLINIC | Age: 31
End: 2021-02-04

## 2021-02-04 ENCOUNTER — VIRTUAL VISIT (OUTPATIENT)
Dept: PSYCHOLOGY | Facility: CLINIC | Age: 31
End: 2021-02-04
Payer: COMMERCIAL

## 2021-02-04 DIAGNOSIS — F33.1 MODERATE EPISODE OF RECURRENT MAJOR DEPRESSIVE DISORDER (H): Primary | ICD-10-CM

## 2021-02-04 PROCEDURE — 99204 OFFICE O/P NEW MOD 45 MIN: CPT | Mod: 95 | Performed by: NURSE PRACTITIONER

## 2021-02-04 RX ORDER — VILAZODONE HYDROCHLORIDE 40 MG/1
TABLET ORAL
Qty: 30 TABLET | Refills: 0 | Status: SHIPPED | OUTPATIENT
Start: 2021-02-04 | End: 2021-03-04 | Stop reason: SINTOL

## 2021-02-04 RX ORDER — VILAZODONE HYDROCHLORIDE 20 MG/1
20 TABLET ORAL DAILY
Qty: 7 TABLET | Refills: 0 | Status: SHIPPED | OUTPATIENT
Start: 2021-02-04 | End: 2021-03-04 | Stop reason: SINTOL

## 2021-02-04 NOTE — TELEPHONE ENCOUNTER
Prior Authorization Specialty Medication Request    Medication/Dose: Viibryd 20 mg titrated to 40 mg  ICD code (if different than what is on RX):  Major depression recurrent  Previously Tried and Failed:  Escitalopram, bupropion, citalopram, sertraline    Important Lab Values: Not applicable  Rationale: Multiple failed medications and current side effects from Escitalopram in addition to not being effective    Insurance Name: [employer] [place]  Insurance ID: [id number]  Insurance Phone Number: Not known    Pharmacy Information (if different than what is on RX)

## 2021-02-04 NOTE — PROGRESS NOTES
"    PSYCHIATRIC DIAGNOSTIC ASSESSMENT ADULT     Name:  Analia Jerry  : 1990    Analia Jerry is a 30 year old female who is being evaluated via a billable Phone visit.      How would you like to obtain your AVS? MyChart  If the video visit is dropped, the invitation should be resent by: Text to cell phone: [phone]  Will anyone else be joining your video visit? No  Location of patient:     [address] [place] [address]   [facility] [id number]     Telemedicine Visit: The patient's condition can be safely assessed and treated via synchronous audio and visual telemedicine encounter.      Reason for Telemedicine Visit: COVID 19 pandemic and the social and physical recommendations by the CDC and MD.      Originating Site (Patient Location): Patient's home    Distant Site (Provider Location): Provider Remote Setting    Consent:  The patient/guardian has verbally consented to: the potential risks and benefits of telemedicine (video visit or phone) versus in person care; bill my insurance or make self-payment for services provided; and responsibility for payment of non-covered services.     Mode of Communication:  Video Conference via [employer]    As the provider I attest to compliance with applicable laws and regulations related to telemedicine.    IDENTIFICATION   Analia Jerry is a 30 year old female who prefers to be called: \"Analia\"  Therapist: Tammy  PCP: Michelle Purcell  Other Providers: None      History was provided by patient who was good historian(s).    Patient is a 30 year old,  White American female  who presents for initial psychiatric evaluation. Referred by  their Primary Care Provider: Kaylene Byrd to the [facility] ([facility]) for evaluation of depression.  Our psychiatry providers act as a specialty service for Primary Care Providers in the [facility] who seek to optimize medications for unstable patients.  Once medications have been optimized, our " providers discharge the patient back to the referring Primary Care Provider for ongoing medication management.  This type of system allows our providers to serve a high volume of patients.     Patient attended the session alone.     RECORDS AVAILABLE FOR REVIEW: EHR records through [facility]                                                  CHIEF COMPLAINT   Referral for psychiatric medication consult in the context of Depression per patient report.      HISTORY OF PRESENT ILLNESS   Reports past reported diagnosis of Depression and Anxiety    First sought treatment as an adolescent in the context of Depression and sertraline was trialed at that time.  Currently on Escitalopram and bupropion XL for 2 to 3 years each.  Initially it was effective and now with reemerging depressive symptoms that are causing impact on work.    Duration: Long standing history since Adolescence  Timing:  Worsening in the last 8 months  Context:Adderall psychosocial stressors including COVID-19 pandemic and the loss of her brother from a suicide in October 2020  Severity of MH sxs:  moderate    PSYCHIATRIC HISTORY:   Previous psychiatry: None    Current Outpatient Supports:   - Therapist/Psychologist: reports current in therapy.  [facility]  - : denies  - Community Resources: denies    History of Interventions:  counseling and medication(s) from physician / PCP    History of Psychiatric Hospitalizations:   - Inpatient: None  - IOP/PHP/Day treatment: denies  History of Suicidal Ideation: in high school, none currently  History of Suicide Attempts:  No    Self-injurious Behavior: Cutting remote history  PharmacogenomicTesting (such as [email or web address]): No     PSYCHIATRIC REVIEW OF SYSTEMS:   Sleep:  .         Waking three or four times a night.  Was taking iron and also dx RLS. If she gets up and walks it will go away, but as soon as she stops moving it returns.  Has never taken medication for this  Depression:  Rates  depression a 7-8/10 on a ten point worsening scale.  Morning are difficult to get up and starting her day. This happens even on days she has to go to the office.     ENDORSES:  Decreased Interest (anhedonia)  Depressed Mood  Decreased energy (anergia)  Decreased concentration   Psychomotor slowing   Suicide: No  Irritability: Increase   Ruminations: Increase   Isolation  No motivation (avolition)  PHQ-9 SCORE 7/8/2020 8/3/2020 2/1/2021   PHQ-9 Total Score - - -   PHQ-9 Total Score MyChart - - 15 (Moderately severe depression)   PHQ-9 Total Score 12 2 15     PHQ9 score is 15 indicating moderately severe depression.  Suicidal ideation:  Denies  Anxiety:    Feeling nervous, anxious, or on edge,    Uncontrolled worrying    Worrying too much about different things    Trouble relaxing  Restlessness  Easily annoyed or irritable     MAXIM-7 SCORE 7/8/2020 8/3/2020 2/1/2021   Total Score - - -   Total Score - - 12 (moderate anxiety)   Total Score 3 2 12     GAD7 score is is 12 indicating moderate anxiety.  Panic: Will experience becoming overwhelmed with things and gets hard to breath.  This happens a couple times a week. Lasting between 5-20 minutes.  Not always an identified trigger.  Thinking of her brother's death can trigger.   Social anxiety: Denies   PTSD: Denies experiencing or witnessing an event considered traumatic.    OCD: Denies hx of obsessions or compulsions irresistible urges to do things repeatedly such as counting, washing hands, checking, etc. Denies hoarding.  No current symptoms  Specific fears: denies  Mood lability:  Could not elicit true manic symptoms, extended periods of decreased need for sleep, extreme high level of energy, or grandiosity. Denies any symptoms consistent with hypomania.    Psychosis: Denies thought disturbance symptoms or hx of AH, VH, TH, or OH. and Denies having periods of feeling others were plotting to harm them, people reading their mind, reading others mind, receiving special  messages from TV, computer, etc.   Eating Disorder:  Denies concerns with weight or body image beyond normal concern.  Denies restricting or purging behaviors or excessive exercise for weight control.    All other ROS negative.     A 12-item WHODAS 2.0 assessment was completed by the patient today and recorded in EPIC.    WHODAS 2.0 Total Score 2/1/2021   Total Score 12   Total Score MyChart 12         FAMILY, MEDICAL, SURGICAL HISTORY REVIEWED.  MEDICATION HAVE BEEN REVIEWED AND ARE CURRENT TO THE BEST OF MY KNOWLEDGE AND ABILITY.  Relationship status: .   Patient is currently employed full time.  with [employer]  Current living situation: with family including  and step son every weekend*     MEDICATIONS                                                                                              Per EHR:   Current Outpatient Medications   Medication Sig     albuterol (PROAIR HFA/PROVENTIL HFA/VENTOLIN HFA) 108 (90 Base) MCG/ACT inhaler Inhale 2 puffs into the lungs every 6 hours as needed for shortness of breath / dyspnea or wheezing     buPROPion (WELLBUTRIN XL) 300 MG 24 hr tablet Take 1 tablet (300 mg) by mouth every morning     cyclobenzaprine (FLEXERIL) 10 MG tablet Take 1 tablet (10 mg) by mouth 3 times daily as needed for muscle spasms     escitalopram (LEXAPRO) 20 MG tablet Take 1 tablet (20 mg) by mouth daily     levothyroxine (SYNTHROID/LEVOTHROID) 112 MCG tablet Take 2 tablets (224 mcg) by mouth daily     levothyroxine (SYNTHROID/LEVOTHROID) 200 MCG tablet Take 1 tablet (200 mcg) by mouth daily     norethindrone-ethinyl estradiol (ORTHO-NOVUM 1/35, 28,) 1-35 MG-MCG tablet Take one active tablet a day for 21 days.  Discard the inactive pills and start new pack on day 22.     No current facility-administered medications for this visit.      I have reviewed this patient's current medications    CURRENT MEDICATION SIDE EFFECTS REPORTED:  Sexual dysfunction     NOTES ABOUT CURRENT  "PSYCHOTROPIC MEDICATIONS:   Escitalopram 20 mg, 2-3 years, remembers to have more effect initially, 8/2019, started to taper escitalopram with PCP, reemergence of the depression.  Bupropion  mg longer than escitalopram, last 6-9 months starting to have increase in depression    Currently taking any prescribed or over-the-counter medications/health supplements, particularly those with CV effects?   No    PAST PSYCHOTROPIC MEDICATIONS:  Citalopram  Sertraline in HS      VITALS   There were no vitals taken for this visit.   LAST DOCUMENTED VITAL SIGN:  DATE:  October 20, 2020 blood pressure of 119/86 and a heart rate of 128     DEVELOPMENTAL / BIRTH HISTORY:   Pregnancy & Delivery: Full Term, Vaginal, no complications reported  Exposure to substances: nicotine  Developmental Milestones: no reported delays    MEDICAL / SURGICAL HISTORY    Past Medical Hx:  Past Medical History:   Diagnosis Date     Acute gallstone pancreatitis 12/28/2019     Anxiety      ASCUS of cervix with negative high risk HPV 1/26/17    ASCUS/neg HR HPV.     Depressive disorder      Gallstones 12/28/2019    [facility]     H/O colposcopy with cervical biopsy 03/23/2017    Bx & ECC - negative     Hashimoto's disease      Headache(784.0)      Hypothyroidism due to Hashimoto's thyroiditis      Papanicolaou smear of cervix with atypical squamous cells of undetermined significance (ASC-US) 12/03/2015       HEIGHT/WEIGHT:  Ht Readings from Last 2 Encounters:   10/28/20 1.695 m (5' 6.75\")   08/24/20 1.727 m (5' 8\")      Wt Readings from Last 2 Encounters:   10/28/20 107.3 kg (236 lb 9.6 oz)   09/04/20 95.3 kg (210 lb)    Estimated body mass index is 37.33 kg/m  as calculated from the following:    Height as of 10/28/20: 1.695 m (5' 6.75\").    Weight as of 10/28/20: 107.3 kg (236 lb 9.6 oz).     Seizures or Head Injury: Denies history of head injury. Denies history of seizures.  History of cardiac disease, rheumatic fever, fainting " or dizziness, especially with exercise, seizures, chest pain or shortness of breath with exercise, unexplained change in exercise tolerance, palpitations, high blood pressure, or heart murmur?   No    Past Surgical Hx:  Past Surgical History:   Procedure Laterality Date     COLONOSCOPY N/A 12/16/2015    Procedure: COLONOSCOPY;  Surgeon: Duane, William Charles, MD;  Location: [facility] OR     COLONOSCOPY WITH CO2 INSUFFLATION N/A 12/16/2015    Procedure: COLONOSCOPY WITH CO2 INSUFFLATION;  Surgeon: Duane, William Charles, MD;  Location: [facility] OR     NO HISTORY OF SURGERY        Surgery:   Past Surgical History:   Procedure Laterality Date     COLONOSCOPY N/A 12/16/2015    Procedure: COLONOSCOPY;  Surgeon: Duane, William Charles, MD;  Location: [facility] OR     COLONOSCOPY WITH CO2 INSUFFLATION N/A 12/16/2015    Procedure: COLONOSCOPY WITH CO2 INSUFFLATION;  Surgeon: Duane, William Charles, MD;  Location: [facility] OR     NO HISTORY OF SURGERY         Medical Hospitalizations: None  Serious Medical Illnesses: None    Diet: No Restrictions, currently using WW  Exercise: working from home and walking dogs  Supplements: Reviewed per Electronic Medical Record Today    LABS & IMAGING                                                                                                                  Recent Labs   Lab Test 08/05/19  1214 11/17/15  1537   WBC 8.6 7.9   HGB 11.8 12.6   HCT 34.1* 35.9   MCV 90 87    248   ANEU  --  4.0     Recent Labs   Lab Test 08/20/19  0901 04/23/15  1640 04/23/15  1640     --   --    POTASSIUM 4.2  --   --    CHLORIDE 107  --   --    CO2 27  --   --    GLC 91   < >  --    KIKI 9.1  --   --    BUN 15  --   --    CR 0.78  --   --    GFRESTIMATED >90  --   --    ALBUMIN  --   --  3.6   PROTTOTAL  --   --  7.7   AST  --   --  8   ALT  --   --  14   ALKPHOS  --   --  43   BILITOTAL  --   --  0.4    < > = values in this interval not displayed.     Recent Labs   Lab Test 05/29/18  0757   CHOL 190   LDL 89   HDL  38*   TRIG 314*     Recent Labs   Lab Test 21  1149   TSH 8.47*   T4 0.77       ALLERGY & IMMUNIZATIONS       Allergies   Allergen Reactions     Nkda [No Known Drug Allergies]          FAMILY MEDICAL HISTORY:     Family History   Problem Relation Age of Onset     Depression Brother         depression     Hyperlipidemia Mother      Thyroid Disease Mother      Hyperlipidemia Father      Breast Cancer Cousin      Depression Brother      Substance Abuse Sister      Substance Abuse Sister      C.A.D. No family hx of      Diabetes No family hx of      Breast Cancer No family hx of      Cancer - colorectal No family hx of      Anesthesia Reaction No family hx of      Blood Disease No family hx of        Family history of sudden or unexplained death or an event requiring resuscitation in children or young adults, cardiac arrhythmias (eg, Tiburcio-Parkinson-White syndrome), long QT syndrome, catecholaminergic paroxysmal ventricular tachycardia, Brugada syndrome, arrhythmogenic right ventricular dysplasia, hypertrophic cardiomyopathy, dilated cardiomyopathy, or Marfan syndrome?  No    FAMILY PSYCHIATRIC HISTORY:   Maternal: First Generation:  not diagnosed, Second Generation:  Denies and Third Generation:  Denies  Paternal: First Generation:  Yes: dad with alcoholism, Second Generation:  Denies and Third Generation:  Denies  Siblings: Yes: substance use, sister with depression and anxiety and monitoring for bipolar, brother depression and placed on commitment and sister with depression and anxiety  Substance use history in family:  Yes: sister with methamphetamine, cocaine, and weed. Brother with weed, opioids  Family suicide history: brother in 2020  Medications family responded to: Unknown     SIGNIFICANT SOCIAL/FAMILY HISTORY:                                           Born in [place], [place] and raised in [place], [place].  Parents not  but still live together  Siblings:  Two half sisters, full biobrother   in  October.  Childhood: Yes intact home with all current basic needs being met.  Highest education level was college graduate.    Service: No  Relationship status: .   Children: one step son    Trauma history: Denies}  ACES (Adverse Childhood Experiences):  None.  Grew up in an intact home with all basic needs being met  ACES score is 0  Issues of possible neglect are not present.     Current stressors include: Occupational, Mental health symptoms, Relationship, Grief & Loss and COVID 19 Pandemic and the social and physical distancing recommendations by the CDC and [facility]  Supports: brother was (who recently ), , friend, and mother. Feels enought supports  Coping mechanisms and supports include: bad habit of sleeping.  Nivia painting and crafting  Enjoys:  With dogs, walks, reading, spending time with sons, used to mini golfing.      STRENGTHS AND OPPORTUNITIES:   Analia Jerry identified the following strengths or resources that may help she succeed in counseling: commitment to health and well being, community involvement, exercise routine, friends / good social support, family support, insight, intelligence, positive work environment and strong social skills. Things that may interfere with the patient's success include:  none noted at this time.    LEGAL:  Denies       SUBSTANCE USE HISTORY    Tobacco use:   History   Smoking Status     Never Smoker   Smokeless Tobacco     Never Used     Caffeine:  Yes  2 sodas/day  Current alcohol:  Yes occasionally   Current substances:  denies  Past use alcohol/substance use: No   Based on the clinical interview, there  are not indications of drug or alcohol abuse.     MEDICAL REVIEW OF SYSTEMS:   Ten system review was completed with pertinent positives noted above      MENTAL STATUS EXAM:   Comprehensive Examination (limited due to virtual visit format, phone):  General/Constitutional:  Appearance: unable to assess  Attitude:  cooperative   Eye Contact:   unable to assess  Musculoskeletal:  Muscle Strength and Tone: unable to assess  Psychomotor Behavior:  unable to assess  Gait and Station: unable to assess  Psychiatric:  Speech:  clear, coherent, regular rate, rhythm, and volume  Associations:  no loose associations  Thought Process:  logical, linear and goal oriented  Thought Content:  no evidence of suicidal ideation or homicidal ideation, no evidence of psychotic thought, no auditory hallucinations present and no visual hallucinations present  Mood:  sad  and depressed  Affect:  Consistent with mood based on tone  Insight:  good  Judgment:  intact, adequate for safety  Impulse Control:  intact  Neurological:  Oriented to:  person, place, time, and situation  Attention Span and Concentration:  normal  Language: intact  Recent and Remote Memory:  Intact to interview. Not formally assessed. No amnesia.  Fund of Knowledge: appropriate    SAFETY   Feels safe in home: Yes   Suicidal ideation: Denies  History of suicide attempts:  No   Hx of impulsivity: No   Hope for the future: present   Hx of Command hallucinations or current psychosis: No  History of Self-injurious behaviors: Yes Remote as an adolescent Current:  No  Family member  by suicide:  Yes Brother  by suicide 2020    SAFETY ASSESSMENT:   Based on all available evidence including the factors cited above, overall Risk for harm is low and is appropriate for outpatient level of care.   Recommended that patient call 911 or go to the local ED should there be a change in any of these risk factors.     LANGUAGE OR COMMUNICATION BARRIERS   Are there language or communication issues or need for modification in treatment? No   Are there ethnic, cultural or Druze factors that may be relevant for therapy? No  Client identified their preferred language to be fluent English in conversational context  Does the client need the assistance of an  or other support involved in therapy? No    DSM  5 DIAGNOSIS:   296.32 (F33.1) Major Depressive Disorder, Recurrent Episode, Moderate _ and With anxious distress    MEETS CRITERIA PER DSM 5 AS FOLLOWS:   Meets criteria for Major Depressive Disorder per DSM5 as follows:   A. Five (or more) of the following symptoms have been present during the same 2-week period and represent a change from previous functioning: at least one of the symptoms is either (1) depressed mood or (2) loss of interest or pleasure.     *Depressed mood most of the day, nearly every day, as indicated by either subjective report (e.g., feels sad, empty, hopeless) or observation made by others (e.g., appears tearful).   *Markedly diminished interest or pleasure in all, or almost all, activities most of the day, nearly every day (as indicated by either subjective account or observation).    *Significant weight loss when not dieting or weight gain (e.g., a change of more than 5% of body weight in a month), or decrease or increase in appetite nearly every day.     *Insomnia or hypersomnia nearly every day. Fatigue or loss of energy nearly every day.   *Feelings of worthlessness or excessive or inappropriate guilt which may be delusional) nearly every day (not merely self-reproach or guilt about being sick).   *Diminished ability to think or concentrate, or indecisiveness, nearly every day (either by subjective account or as observed by others)  *Recurrent thoughts of death (not just fear of dying), recurrent suicidal ideation without a specific plan, or a suicide attempt or a specific plan for committing suicide.     B. The symptoms cause clinically significant distress or impairment in social, occupational, or other important areas of functioning.    MEDICAL COMORBIDITY IMPACTING CLINICAL PICTURE: None noted    ASSESSMENT    Analia Jerry is a 30 year old White American female presenting for psychiatric evaluation and medication management through the [facility].   Information is obtained from patient and available records.  Denies prior psychiatric hospitalizations. Hx of suicidal ideation, no suicide attempts. Hx of self-injurious behaviors in the form of Cutting briefly as an adolescent. Genetically loaded for  Depression, anxiety, substance use. Grew up in an intact home with all basic needs being met.    Patient is currently on an SSRI and a NDRI, specifically esctalopram and bupropion.  They were essentially effective and now with reemergence of depressive symptoms causing functional impairment in work and relationships.  In addition the Escitalopram is having sexual side effects.  At this time will attempt to transition to Viibryd with a prior off through the insurance company if needed.  Continue the bupropion at current dosing    TREATMENT PLAN                                                                                                  1. MEDICATIONS: Continue bupropion at current dose  Week 1  Escitalopram 10 mg  Viibryd 20 mg with food  Week 2  Discontinue Escitalopram  Increase Viibryd to 40 mg with food    Drug Monitoring:  [place] Prescription Monitoring Program evaluating controlled substances in the last year in [place], [place], [place], [place], or [place]:  [place] Prescription Monitoring Program [] was not checked today:  not using controlled substances..     2. CONTINGENCY PLAN:  Consider Augmenting with lamotrigine, however would like to stay away from adding a third medication at this time    3. CONSULTS/REFERRALS:   Continue therapy with Tammy  Coordinate care with therapist as needed    4. MEDICAL:   None at this time  Imaging/studies: none  Coordinate care with PCP (Michelle Purcell) as needed    5. COMMUNITY/PSYCHOSOCIAL INTERVENTIONS/OTHER:   Coordinate care with other community providers as needed    6. OTHER RECOMMENDED ASSESSMENTS:   - None    7. FOLLOW UP: Schedule an appointment with me in four weeks or sooner as needed. Call [phone] to  schedule  Follow up with primary care provider as planned or for acute medical concerns.  Call the psychiatric nurse line with medication questions or concerns at [phone].    8. PSYCHOEDUCATION:  Medication side effects and alternatives reviewed. Health promotion activities recommended and reviewed today. All questions addressed. Education and counseling completed regarding risks and benefits of medications and psychotherapy options.  Call the psychiatric nurse line with medication questions or concerns at [phone].  [employer] may be used to communicate with your provider, but this is not intended to be used for emergencies.    COMMUNITY RESOURCES:    CRISIS NUMBERS: Provided in AVS 2021  Crisis Connection - 939.890.6881  CRISIS TEXT LINE: Text 956976 from anywhere in [place], anytime, any crisis ;  OR SEE www.crisistextline.org  National Suicide Prevention Lifeline: 484.576.3923 (TTY: 363.917.8383). Call anytime for help.  (www.suicidepreventionlifeline.org)  National [place] on Mental Illness (www.irasema.org): 727.944.3647 or 863-795-8361.   Mental Health Association (www.mentalhealth.org): 411.477.8303 or 177-092-1014.  [place] Crisis Text Line: Text [place] to 920421  Suicide LifeLine Chat: suicidepreKwaabline.org/chat    ADMINISTRATIVE BILLIN  min spent interviewing patient, reviewing referral documents, obtaining and reviewing outside records, communication with other health specialists, and preparing this report.    Video/Phone Start Time: 2021 2907  Video/Phone End Time: 2021 4:32 PM    Greater than 50% of time was spent in counseling and coordination of care regarding above diagnoses and treatment plan.     Patient Status:  Our psychiatry providers act as a specialty service for Primary Care Providers in the [facility] that seek to optimize medications for unstable patients.  Once medications have been optimized, our providers discharge the patient back to  the referring Primary Care Provider for ongoing medication management.  This type of system allows our providers to serve a high volume of patients. At this time  Patient will continue to be seen for ongoing consultation and stabilization.    Signed:   Aida Collins MSN, APRN, [facility]   Chart documentation done in part with Dragon Voice Recognition software.  Although reviewed after completion, some word and grammatical errors may remain.

## 2021-02-04 NOTE — PATIENT INSTRUCTIONS
TREATMENT PLAN                                                                                                  1. MEDICATIONS: Continue bupropion at current dose  Week 1  Escitalopram 10 mg  Viibryd 20 mg with food  Week 2  Discontinue Escitalopram  Increase Viibryd to 40 mg with food    Drug Monitoring:  [place] Prescription Monitoring Program evaluating controlled substances in the last year in [place], [place], [place], [place], or [place]:  [place] Pres[place] Monitoring [place] [] was not checked today:  not using controlled substances..     2. CONTINGENCY PLAN:  Consider Augmenting with lamotrigine, however would like to stay away from adding a third medication at this time    3. CONSULTS/REFERRALS:   Continue therapy with Tammy  Coordinate care with therapist as needed    4. MEDICAL:   None at this time  Imaging/studies: none  Coordinate care with PCP (Michelle Purcell) as needed    5. COMMUNITY/PSYCHOSOCIAL INTERVENTIONS/OTHER:   Coordinate care with other community providers as needed    6. OTHER RECOMMENDED ASSESSMENTS:   - None    7. FOLLOW UP: Schedule an appointment with me in four weeks or sooner as needed. Call [phone] to schedule  Follow up with primary care provider as planned or for acute medical concerns.  Call the psychiatric nurse line with medication questions or concerns at [phone].    8. PSYCHOEDUCATION:  Medication side effects and alternatives reviewed. Health promotion activities recommended and reviewed today. All questions addressed. Education and counseling completed regarding risks and benefits of medications and psychotherapy options.  Call the psychiatric nurse line with medication questions or concerns at [phone].  MyChart may be used to communicate with your provider, but this is not intended to be used for emergencies.    COMMUNITY RESOURCES:    CRISIS NUMBERS: Provided in [facility] 2/4/2021  Crisis Connection - [phone]  CRISIS TEXT LINE: Text 236670 from anywhere in [place], anytime,  any crisis 24/7;  OR SEE www.crisistextline.org  National Suicide Prevention Lifeline: 775.656.1451 (TTY: 569.541.6750). Call anytime for help.  (www.suicidepreventionlifeline.org)  National [place] on Mental Illness (www.irasema.org): 415.489.9186 or 980-894-9589.   Mental Health Association (www.mentalhealth.org): 959.510.1909 or 896-704-5047.  [place] Crisis [place]: Text [place] to 619473  Suicide LifeLine Chat: suicidepreventionlifeline.org/chat

## 2021-02-04 NOTE — Clinical Note
Thank you for the Psychiatry referral to the [facility] ([facility]). Our psychiatry providers act as a specialty service for Primary Care Providers in the [facility] who seek to optimize medications for unstable patients.  Once medications have been optimized, our providers discharge the patient back to the referring Primary Care Provider for ongoing medication management.  This type of system allows our providers to serve a high volume of patients.     Please see my Impression and Plan. I will continue to work with them in stabilizing their mood.  If you have any questions or concerns, please let me know. Thank you again!  - Aida

## 2021-02-15 ENCOUNTER — MYC MEDICAL ADVICE (OUTPATIENT)
Dept: PSYCHOLOGY | Facility: CLINIC | Age: 31
End: 2021-02-15

## 2021-02-17 NOTE — TELEPHONE ENCOUNTER
Analia,   Are you taking the Viibryd in the morning or at night?  If in the morning, try at night.  We can also go slower, by cutting the Viibryd in half and let your body get used to it before increasing.      Aida Collins, MSN, APRN, CNP, FMHNP-BC,   [facility]

## 2021-03-01 ENCOUNTER — TELEPHONE (OUTPATIENT)
Dept: PSYCHIATRY | Facility: CLINIC | Age: 31
End: 2021-03-01

## 2021-03-01 NOTE — TELEPHONE ENCOUNTER
Reason for call:  Patient reporting a symptom. The patient stated experiencing side affects from Viibryd     Symptom or request: irritable, crying, shaking, really warm and cold, and nausea      Phone Number patient can be reached at:  [phone]    Best Time:  Anytime     Can we leave a detailed message on this number:  Yes     Call taken on 3/1/2021 at 7:13 AM by [employer]

## 2021-03-01 NOTE — TELEPHONE ENCOUNTER
Let us stop the medication due to those side effects.  It looks like Analia has an appointment with me on March 4.  This will give us the opportunity to be off for 3 days and evaluate if the symptoms resolve.  We can then look at other options if she is interested.  Aida Collins, MSN, APRN, CNP, FMHNP-BC,   [facility]

## 2021-03-01 NOTE — TELEPHONE ENCOUNTER
"Called patient. Patient reports the symptoms below and headache. Patient said that she was told by Aida to take half of the dose 2 weeks ago when she started having side effects. She said that this did not make a difference and that she is still having significant side effects. Patient said, \"I would just like to be off of the this medication.\"  "

## 2021-03-04 ENCOUNTER — VIRTUAL VISIT (OUTPATIENT)
Dept: PSYCHIATRY | Facility: CLINIC | Age: 31
End: 2021-03-04
Payer: COMMERCIAL

## 2021-03-04 DIAGNOSIS — F33.1 MODERATE EPISODE OF RECURRENT MAJOR DEPRESSIVE DISORDER (H): ICD-10-CM

## 2021-03-04 DIAGNOSIS — R11.0 NAUSEA: Primary | ICD-10-CM

## 2021-03-04 PROCEDURE — 99213 OFFICE O/P EST LOW 20 MIN: CPT | Mod: 95 | Performed by: NURSE PRACTITIONER

## 2021-03-04 RX ORDER — ONDANSETRON 8 MG/1
8 TABLET, FILM COATED ORAL EVERY 8 HOURS PRN
Qty: 30 TABLET | Refills: 0 | Status: SHIPPED | OUTPATIENT
Start: 2021-03-04 | End: 2021-08-03

## 2021-03-04 RX ORDER — ESCITALOPRAM OXALATE 20 MG/1
20 TABLET ORAL DAILY
Qty: 90 TABLET | Refills: 1
Start: 2021-03-04 | End: 2021-03-16

## 2021-03-04 ASSESSMENT — ANXIETY QUESTIONNAIRES
3. WORRYING TOO MUCH ABOUT DIFFERENT THINGS: NOT AT ALL
5. BEING SO RESTLESS THAT IT IS HARD TO SIT STILL: NEARLY EVERY DAY
GAD7 TOTAL SCORE: 11
6. BECOMING EASILY ANNOYED OR IRRITABLE: NEARLY EVERY DAY
2. NOT BEING ABLE TO STOP OR CONTROL WORRYING: NOT AT ALL
1. FEELING NERVOUS, ANXIOUS, OR ON EDGE: MORE THAN HALF THE DAYS
7. FEELING AFRAID AS IF SOMETHING AWFUL MIGHT HAPPEN: NOT AT ALL

## 2021-03-04 ASSESSMENT — PATIENT HEALTH QUESTIONNAIRE - PHQ9
5. POOR APPETITE OR OVEREATING: NEARLY EVERY DAY
SUM OF ALL RESPONSES TO PHQ QUESTIONS 1-9: 15

## 2021-03-04 NOTE — PATIENT INSTRUCTIONS
TREATMENT PLAN                                                                                                  1. MEDICATIONS:   Escitalopram 10 mg for 1 week then increase to 20 mg thereafter.  She has enough supply of her own at home.  Continue the bupropion    2. CONTINGENCY PLAN:  Consider Lamotrigine to augment for depression or buspirone to augment for anxiety once back on the combination of Escitalopram and bupropion    3. CONSULTS/REFERRALS:   Continue therapy with Current therapist  - Coordinate care with therapist as needed    4. MEDICAL:   None at this time  - Imaging/studies: none  - Coordinate care with PCP (Michelle Purcell) as needed    5. COMMUNITY/PSYCHOSOCIAL INTERVENTIONS/OTHER:   - Coordinate care with other community providers as needed    6. OTHER RECOMMENDED ASSESSMENTS:   - None    7. FOLLOW UP: Schedule an appointment with me in four weeks or sooner as needed. Call [phone] to schedule  Follow up with primary care provider as planned or for acute medical concerns.  Call the psychiatric nurse line with medication questions or concerns at [phone] or [phone]    8. PSYCHOEDUCATION:  Medication side effects and alternatives reviewed. Health promotion activities recommended and reviewed today. All questions addressed. Education and counseling completed regarding risks and benefits of medications and psychotherapy options.  Call the psychiatric nurse line with medication questions or concerns at [phone].  Groups[employer] may be used to communicate with your provider, but this is not intended to be used for emergencies.    COMMUNITY RESOURCES:    CRISIS NUMBERS: Provided in [facility] 3/4/2021  Crisis Connection - 482.566.1304  CRISIS TEXT LINE: Text 039298 from anywhere in [place], anytime, any crisis 24/7;  OR SEE www.crisistextline.org  National Suicide Prevention Lifeline: 100.170.8167 (TTY: 720.593.6531). Call anytime for help.  (www.suicidepreventionlifeline.org)  National [place]  on Mental Illness (www.irasema.org): 203.894.4123 or 013-342-8544.   Mental Health Association (www.mentalhealth.org): 898.837.2285 or 954-380-9595.  [place] Crisis [place]: Text [place] to 713657  Suicide LifeLine Chat: suicidepreventionlifeline.org/chat

## 2021-03-04 NOTE — PROGRESS NOTES
"   PSYCHIATRIC MEDICATION FOLLOW UP APPT: ADULT     Name:  Analia Jerry  : 1990    Analia is a 30 year old who is being evaluated via a billable video visit.      How would you like to obtain your AVS? [employer]  If the video visit is dropped, the invitation should be resent by: Text to cell phone: [phone]  Will anyone else be joining your video visit? No     Telemedicine Visit: The patient's condition can be safely assessed and treated via synchronous audio and visual telemedicine encounter.      Reason for Telemedicine Visit: COVID 19 pandemic and the social and physical recommendations by the CDC and [place].      Originating Site (Patient Location): Patient's home    Distant Site (Provider Location): Provider Remote Setting    Consent:  The patient/guardian has verbally consented to: the potential risks and benefits of telemedicine (video visit or phone) versus in person care; bill my insurance or make self-payment for services provided; and responsibility for payment of non-covered services.     Mode of Communication:  Video Conference via [employer]    As the provider I attest to compliance with applicable laws and regulations related to telemedicine.    IDENTIFICATION   Analia Jerry is a 30 year old female who prefers to be called: \"Analia\"  Therapist: Tammy  PCP: Michelle Purcell PA-C  Primary Care Clinic:  [facility]      Last seen for outpatient psychiatry Consultation on 2021.      FOLLOWING PLAN PUT INTO PLACE: Continue bupropion at current dose  Week 1  Escitalopram 10 mg  Viibryd 20 mg with food  Week 2  Discontinue Escitalopram  Increase Viibryd to 40 mg with food    INTERIM HISTORY     COMMUNICATIONS FROM PATIENT VIA:  [employer]     RECORDS AVAILABLE FOR REVIEW: EHR records through [employer]  and  previous psychiatric progress note    HISTORY OF PRESENT ILLNESS     Reports past reported diagnosis of Depression and Anxiety.  Denies prior psychiatric " hospitalizations. Hx of suicidal ideation, no suicide attempts. Hx of self-injurious behaviors in the form of Cutting briefly as an adolescent. Genetically loaded for  Depression, anxiety, substance use. Grew up in an intact home with all basic needs being met.     Patient is currently on an SSRI and a NDRI, specifically esctalopram and bupropion.  They were essentially effective and now with reemergence of depressive symptoms causing functional impairment in work and relationships.  In addition the Escitalopram is having sexual side effects.  At this time will attempt to transition to Viibryd with a prior off through the insurance company if needed.  Continue the bupropion at current dosing     First sought treatment as an adolescent in the context of Depression and sertraline was trialed at that time.  Currently on Escitalopram and bupropion XL for 2 to 3 years each.  Initially it was effective and now with reemerging depressive symptoms that are causing impact on work.     Duration: Long standing history since Adolescence  Timing:  Worsening in the last 8 months  Context:Adderall psychosocial stressors including COVID-19 pandemic and the loss of her brother from a suicide in October 2020  Severity of MH sxs:  moderate    FAMILY, MEDICAL, SURGICAL HISTORY REVIEWED.  MEDICATION HAVE BEEN REVIEWED AND ARE CURRENT TO THE BEST OF MY KNOWLEDGE AND ABILITY.  Relationship status: .   Patient is currently employed full time.  with [employer]  Current living situation: with family including  and step son every weekend    MEDICATIONS                                                                                              I have reviewed this patient's current medications  Per EHR:    Current Outpatient Medications:      albuterol (PROAIR HFA/PROVENTIL HFA/VENTOLIN HFA) 108 (90 Base) MCG/ACT inhaler, Inhale 2 puffs into the lungs every 6 hours as needed for shortness of breath / dyspnea or wheezing,  Disp: 1 Inhaler, Rfl: 0     buPROPion (WELLBUTRIN XL) 300 MG 24 hr tablet, Take 1 tablet (300 mg) by mouth every morning, Disp: 90 tablet, Rfl: 1     cyclobenzaprine (FLEXERIL) 10 MG tablet, Take 1 tablet (10 mg) by mouth 3 times daily as needed for muscle spasms, Disp: 30 tablet, Rfl: 1     levothyroxine (SYNTHROID/LEVOTHROID) 112 MCG tablet, Take 2 tablets (224 mcg) by mouth daily, Disp: 60 tablet, Rfl: 11     levothyroxine (SYNTHROID/LEVOTHROID) 200 MCG tablet, Take 1 tablet (200 mcg) by mouth daily, Disp: 30 tablet, Rfl: 11     norethindrone-ethinyl estradiol (ORTHO-NOVUM 1/35, 28,) 1-35 MG-MCG tablet, Take one active tablet a day for 21 days.  Discard the inactive pills and start new pack on day 22., Disp: 112 tablet, Rfl: 4    CURRENT MEDICATION SIDE EFFECTS REPORTED:       NOTES ABOUT CURRENT PSYCHOTROPIC MEDICATIONS:   Escitalopram 20 mg, 2-3 years, remembers to have more effect initially, 8/2019, started to taper escitalopram with PCP, reemergence of the depression.  Bupropion  mg longer than escitalopram, last 6-9 months starting to have increase in depression     Currently taking any prescribed or over-the-counter medications/health supplements, particularly those with CV effects?   No     PAST PSYCHOTROPIC MEDICATIONS:  Citalopram  Sertraline in HS       TODAY PATIENT REPORTS THE FOLLOWING PSYCHIATRIC ROS:     PROBLEM: DEPRESSION: Worsening Off the Escitalopram and ViibrydOff the Escitalopram and Viibryd.  Reports mood has been More irritable and quick to react to minimal triggers and labile mood.  She currently is taking time off work due to the side effects she experienced with the Viibryd and her current mood.  She feels she felt better on the Escitalopram and bupropion together and is requesting to return to this.  Morning are difficult to get up and starting her day. This happens even on days she has to go to the office.       Last PHQ-9 3/4/2021   1.  Little interest or pleasure in doing  things 2   2.  Feeling down, depressed, or hopeless 3   3.  Trouble falling or staying asleep, or sleeping too much 2   4.  Feeling tired or having little energy 3   5.  Poor appetite or overeating 0   6.  Feeling bad about yourself 1   7.  Trouble concentrating 1   8.  Moving slowly or restless 3   Q9: Thoughts of better off dead/self-harm past 2 weeks 0   PHQ-9 Total Score 15   Difficulty at work, home, or with people Very difficult     PHQ-9 SCORE 8/3/2020 2/1/2021 3/4/2021   PHQ-9 Total Score - - -   PHQ-9 Total Score MyChart - 15 (Moderately severe depression) -   PHQ-9 Total Score 2 15 15     PHQ9 score is 15 indicating moderately severe depression.  Suicidal ideation:  No     PROBLEM: ANXIETY: Worsening   GAD7 score is is 11 indicating moderate anxiety.  MAXIM-7 SCORE 8/3/2020 2/1/2021 3/4/2021   Total Score - - -   Total Score - 12 (moderate anxiety) -   Total Score 2 12 11       PROBLEM: SLEEP/INSOMNIA: No change.  Continues to be fatigued during the day time. Sleeps 8 hours a night. Wakes tired. She is struggling to complete her work but not as much as when we last spoke. Sleep study revealed periodic limb movements and mild FRANK. Waking three or four times a night.  Was taking iron and also dx RLS. If she gets up and walks it will go away, but as soon as she stops moving it returns.  Has never taken medication for this     CURRENT STRESSORS: Occupational, Mental health symptoms, Relationship and COVID 19 Pandemic and the social and physical distancing recommendations by the CDC and [place]  COPING MECHANISMS AND SUPPORTS: Family,, Friends and Therapist  DIET:  Adequate  EXERCISE: Adequate  SIDE EFFECTS: Sexual dysfunction presumably from the Escitalopram, but she did not tolerate tapering and discontinuing and requested to retrial   SUBSTANCE USE: Not discussed  COMPLIANCE:  states Adherent to medication regimen  REPORTS THE FOLLOWING NEW MEDICAL ISSUES: see above regarding nausea    PERTINENT PAST MEDICAL AND  "SURGICAL HISTORY     Past Medical History:   Diagnosis Date     Acute gallstone pancreatitis 12/28/2019     Anxiety      ASCUS of cervix with negative high risk HPV 1/26/17    ASCUS/neg HR HPV.     Depressive disorder      Gallstones 12/28/2019    [facility]     H/O colposcopy with cervical biopsy 03/23/2017    Bx & ECC - negative     Hashimoto's disease      Headache(784.0)      Hypothyroidism due to Hashimoto's thyroiditis      Papanicolaou smear of cervix with atypical squamous cells of undetermined significance (ASC-US) 12/03/2015       VITALS   There were no vitals taken for this visit.     HEIGHT/WEIGHT:  Ht Readings from Last 2 Encounters:   10/28/20 1.695 m (5' 6.75\")   08/24/20 1.727 m (5' 8\")      Wt Readings from Last 2 Encounters:   10/28/20 107.3 kg (236 lb 9.6 oz)   09/04/20 95.3 kg (210 lb)    Estimated body mass index is 37.33 kg/m  as calculated from the following:    Height as of 10/28/20: 1.695 m (5' 6.75\").    Weight as of 10/28/20: 107.3 kg (236 lb 9.6 oz).     LABS & IMAGING                                                                                                                  Recent Labs   Lab Test 08/05/19  1214 11/17/15  1537   WBC 8.6 7.9   HGB 11.8 12.6   HCT 34.1* 35.9   MCV 90 87    248   ANEU  --  4.0     Recent Labs   Lab Test 08/20/19  0901 04/23/15  1640 04/23/15  1640     --   --    POTASSIUM 4.2  --   --    CHLORIDE 107  --   --    CO2 27  --   --    GLC 91   < >  --    KIKI 9.1  --   --    BUN 15  --   --    CR 0.78  --   --    GFRESTIMATED >90  --   --    ALBUMIN  --   --  3.6   PROTTOTAL  --   --  7.7   AST  --   --  8   ALT  --   --  14   ALKPHOS  --   --  43   BILITOTAL  --   --  0.4    < > = values in this interval not displayed.     Recent Labs   Lab Test 05/29/18  0757   CHOL 190   LDL 89   HDL 38*   TRIG 314*     Recent Labs   Lab Test 01/27/21  1149   TSH 8.47*   T4 0.77       ALLERGY & IMMUNIZATIONS       Allergies   Allergen " Reactions     Nkda [No Known Drug Allergies]        MEDICAL REVIEW OF SYSTEMS:   Ten system review was completed with pertinent positives noted     MENTAL STATUS EXAM:   General/Constitutional:  Appearance: unable to assess  Attitude:  cooperative   Eye Contact:  unable to assess  Musculoskeletal:  Muscle Strength and Tone: unable to assess  Psychomotor Behavior:  unable to assess  Gait and Station: unable to assess  Psychiatric:  Speech:  clear, coherent, regular rate, rhythm, and volume  Associations:  no loose associations  Thought Process:  logical, linear and goal oriented  Thought Content:  no evidence of suicidal ideation or homicidal ideation, no evidence of psychotic thought, no auditory hallucinations present and no visual hallucinations present  Mood:  sad  and depressed  Affect:  Consistent with mood based on tone  Insight:  good  Judgment:  intact, adequate for safety  Impulse Control:  intact  Neurological:  Oriented to:  person, place, time, and situation  Attention Span and Concentration:  normal  Language: intact  Recent and Remote Memory:  Intact to interview. Not formally assessed. No amnesia.  Fund of Knowledge: appropriate    SAFETY   Feels safe in home: Yes   Suicidal ideation: Denies  History of suicide attempts:  No   Hx of impulsivity: No   Hope for the future: present   Hx of Command hallucinations or current psychosis: No  History of Self-injurious behaviors: Yes Remote as an adolescent Current:  No  Family member  by suicide:  Yes Brother  by suicide 2020     SAFETY ASSESSMENT:   Based on all available evidence including the factors cited above, overall Risk for harm is low and is appropriate for outpatient level of care.   Recommended that patient call 911 or go to the local ED should there be a change in any of these risk factors.    DSM 5 DIAGNOSIS:   296.32 (F33.1) Major Depressive Disorder, Recurrent Episode, Moderate _ and With anxious distress     MEDICAL  COMORBIDITY IMPACTING CLINICAL PICTURE: None noted    ASSESSMENT    Patient did not tolerate the Viibryd as she experienced significant nausea and vomiting with the 40 mg and when tapered back down to 20 mg the symptoms continued.  She is now on bupropion monotherapy.  Does continue to have nausea at night as she just recently stopped the Viibryd.  Will provide Zofran to target this as needed.  She would like to restart the Escitalopram as she felt much better on that.  We will continue the bupropion and get the Escitalopram back to 20 mg by taking 10 mg for 1 week and increase to 20 mg thereafter.  We will meet again in approximately 1 month to reevaluate.  At that time may consider augmenting with lamotrigine for depressive symptoms or BuSpar for anxiety/to mitigate sexual side effects    TREATMENT PLAN                                                                                                  1. MEDICATIONS:   Escitalopram 10 mg for 1 week then increase to 20 mg thereafter.  She has enough supply of her own at home.  Continue the bupropion    2. CONTINGENCY PLAN:  Consider Lamotrigine to augment for depression or buspirone to augment for anxiety once back on the combination of Escitalopram and bupropion    3. CONSULTS/REFERRALS:   Continue therapy with Current therapist  - Coordinate care with therapist as needed    4. MEDICAL:   None at this time  - Imaging/studies: none  - Coordinate care with PCP (Michelle Purcell) as needed    5. COMMUNITY/PSYCHOSOCIAL INTERVENTIONS/OTHER:   - Coordinate care with other community providers as needed    6. OTHER RECOMMENDED ASSESSMENTS:   - None    7. FOLLOW UP: Schedule an appointment with me in four weeks or sooner as needed. Call [phone] to schedule  Follow up with primary care provider as planned or for acute medical concerns.  Call the psychiatric nurse line with medication questions or concerns at [phone] or [phone]    8.  PSYCHOEDUCATION:  Medication side effects and alternatives reviewed. Health promotion activities recommended and reviewed today. All questions addressed. Education and counseling completed regarding risks and benefits of medications and psychotherapy options.  Call the psychiatric nurse line with medication questions or concerns at [phone].  [employer] may be used to communicate with your provider, but this is not intended to be used for emergencies.    COMMUNITY RESOURCES:    CRISIS NUMBERS: Provided in [facility] 3/4/2021  Crisis Connection - [phone]  CRISIS TEXT LINE: Text 817871 from anywhere in [place], anytime, any crisis ;  OR SEE www.crisistextline.org  National Suicide Prevention Lifeline: 168.249.9913 (TTY: 227.417.9568). Call anytime for help.  (www.suicidepreventionlifeline.org)  National [place] on Mental Illness (www.irasema.org): 761.418.7726 or 819-671-7865.   Mental Health Association (www.mentalhealth.org): 912.675.4064 or 343-041-9929.  [place] Crisis Text [place]: Text [place] to 391081  Suicide LifeLine Chat: suicideprezeroboundline.org/chat    ADMINISTRATIVE BILLIN min spent interviewing patient, reviewing referral documents, preparing this report.    Video/Phone Start Time: 2021 1245  Video/Phone End Time: 2021 1:09 PM    Greater than 50% of time was spent in counseling and coordination of care regarding above diagnoses and treatment plan.    Patient Status:  Patient will continue to be seen for ongoing consultation and stabilization.    Signed:   Aida Collins, MSN, APRN, FMHNP-[facility] ([facility])   Chart documentation done in part with Dragon Voice Recognition software.  Although reviewed after completion, some word and grammatical errors may remain.

## 2021-03-05 ASSESSMENT — ANXIETY QUESTIONNAIRES: GAD7 TOTAL SCORE: 11

## 2021-03-16 ENCOUNTER — TELEPHONE (OUTPATIENT)
Dept: FAMILY MEDICINE | Facility: CLINIC | Age: 31
End: 2021-03-16

## 2021-03-16 DIAGNOSIS — F41.8 DEPRESSION WITH ANXIETY: Primary | ICD-10-CM

## 2021-03-16 RX ORDER — ESCITALOPRAM OXALATE 20 MG/1
20 TABLET ORAL DAILY
Qty: 90 TABLET | Refills: 1 | Status: SHIPPED | OUTPATIENT
Start: 2021-03-16 | End: 2021-06-16

## 2021-03-16 NOTE — TELEPHONE ENCOUNTER
Please resend escitalopram (LEXAPRO) 20 MG tablet from 3/4/21. Pharmacy did not receive this Rx.            Will Faarax  [facility]

## 2021-03-16 NOTE — TELEPHONE ENCOUNTER
Looks like they received it this time.  E-Prescribing Status: Receipt confirmed by pharmacy (3/16/2021 12:29 PM CDT).  If not can you call it into the pharmacy?    Aida Collins, MSN, APRN, CNP, [facility],   [facility]

## 2021-04-05 ENCOUNTER — VIRTUAL VISIT (OUTPATIENT)
Dept: PSYCHIATRY | Facility: CLINIC | Age: 31
End: 2021-04-05
Payer: COMMERCIAL

## 2021-04-05 DIAGNOSIS — E06.3 HYPOTHYROIDISM DUE TO HASHIMOTO'S THYROIDITIS: Chronic | ICD-10-CM

## 2021-04-05 DIAGNOSIS — F33.1 MODERATE EPISODE OF RECURRENT MAJOR DEPRESSIVE DISORDER (H): Primary | ICD-10-CM

## 2021-04-05 DIAGNOSIS — R53.83 FATIGUE DUE TO DEPRESSION: ICD-10-CM

## 2021-04-05 DIAGNOSIS — F32.A FATIGUE DUE TO DEPRESSION: ICD-10-CM

## 2021-04-05 DIAGNOSIS — G47.33 OSA (OBSTRUCTIVE SLEEP APNEA): Chronic | ICD-10-CM

## 2021-04-05 PROCEDURE — 99213 OFFICE O/P EST LOW 20 MIN: CPT | Mod: 95 | Performed by: NURSE PRACTITIONER

## 2021-04-05 RX ORDER — BUPROPION HYDROCHLORIDE 150 MG/1
150 TABLET ORAL EVERY MORNING
Qty: 30 TABLET | Refills: 1 | Status: SHIPPED | OUTPATIENT
Start: 2021-04-05 | End: 2021-06-02

## 2021-04-05 ASSESSMENT — ANXIETY QUESTIONNAIRES
4. TROUBLE RELAXING: MORE THAN HALF THE DAYS
6. BECOMING EASILY ANNOYED OR IRRITABLE: MORE THAN HALF THE DAYS
7. FEELING AFRAID AS IF SOMETHING AWFUL MIGHT HAPPEN: NOT AT ALL
3. WORRYING TOO MUCH ABOUT DIFFERENT THINGS: NOT AT ALL
5. BEING SO RESTLESS THAT IT IS HARD TO SIT STILL: NOT AT ALL
1. FEELING NERVOUS, ANXIOUS, OR ON EDGE: MORE THAN HALF THE DAYS
GAD7 TOTAL SCORE: 6
7. FEELING AFRAID AS IF SOMETHING AWFUL MIGHT HAPPEN: NOT AT ALL
2. NOT BEING ABLE TO STOP OR CONTROL WORRYING: NOT AT ALL

## 2021-04-05 ASSESSMENT — PATIENT HEALTH QUESTIONNAIRE - PHQ9
SUM OF ALL RESPONSES TO PHQ QUESTIONS 1-9: 5
10. IF YOU CHECKED OFF ANY PROBLEMS, HOW DIFFICULT HAVE THESE PROBLEMS MADE IT FOR YOU TO DO YOUR WORK, TAKE CARE OF THINGS AT HOME, OR GET ALONG WITH OTHER PEOPLE: SOMEWHAT DIFFICULT
SUM OF ALL RESPONSES TO PHQ QUESTIONS 1-9: 5

## 2021-04-05 NOTE — ASSESSMENT & PLAN NOTE
PCP monitoring thyroid function.  Will consider the following labs at next visit as last Complete blood count was noted in 2015  Erythrocyte sedimentation rate   Chemistry panel   Vitamin D   Iron   Inflammatory state   Liver disease,  renal failure,

## 2021-04-05 NOTE — PROGRESS NOTES
"    PSYCHIATRIC MEDICATION FOLLOW UP APPT: ADULT     Name:  Analia Jerry  : 1990    Analia is a 30 year old who is being evaluated via a billable video visit.      How would you like to obtain your AVS? ChatStat  If the video visit is dropped, the invitation should be resent by: Text to cell phone: [phone]  Will anyone else be joining your video visit? No  Location of patient:     [address] [place] [address]   [facility] [id number]     Telemedicine Visit: The patient's condition can be safely assessed and treated via synchronous audio and visual telemedicine encounter.      Reason for Telemedicine Visit: COVID 19 pandemic and the social and physical recommendations by the CDC and MD.      Originating Site (Patient Location): Patient's home    Distant Site (Provider Location): Provider Remote Setting    Consent:  The patient/guardian has verbally consented to: the potential risks and benefits of telemedicine (video visit or phone) versus in person care; bill my insurance or make self-payment for services provided; and responsibility for payment of non-covered services.     Mode of Communication:  Video Conference via Senseg    As the provider I attest to compliance with applicable laws and regulations related to telemedicine.    IDENTIFICATION   Analia Jerry is a 30 year old female who prefers to be called: \"Analia\"  Therapist: Tammy  PCP: Michelle Purcell PA-C  Primary Care Clinic:  [facility]      Last seen for outpatient psychiatry Return Visit on 2021.      FOLLOWING PLAN PUT INTO PLACE: Escitalopram 10 mg for 1 week then increase to 20 mg thereafter.  She has enough supply of her own at home.  Continue the bupropion    INTERIM HISTORY     COMMUNICATIONS FROM PATIENT VIA:  ChatStat     RECORDS AVAILABLE FOR REVIEW: EHR records through [employer]  and  previous psychiatric progress note    HISTORY OF PRESENT ILLNESS   [facility]PS referral for psychiatric medication consult in " February 2021.  Reports past reported diagnosis of Depression and Anxiety.  Denies prior psychiatric hospitalizations. Hx of suicidal ideation, no suicide attempts. Hx of self-injurious behaviors in the form of Cutting briefly as an adolescent. Genetically loaded for  Depression, anxiety, substance use. Grew up in an intact home with all basic needs being met.    First sought treatment as an adolescent in the context of Depression and sertraline was trialed at that time.  Currently on Escitalopram and bupropion XL for 2 to 3 years each.  Initially it was effective and now with reemerging depressive symptoms that are causing impact on work.     Duration: Long standing history since Adolescence  Timing:  Worsening in the last 8 months  Context:Adderall psychosocial stressors including COVID-19 pandemic and the loss of her brother from a suicide in October 2020  Severity of MH sxs:  moderate    FAMILY, MEDICAL, SURGICAL HISTORY REVIEWED.  MEDICATION HAVE BEEN REVIEWED AND ARE CURRENT TO THE BEST OF MY KNOWLEDGE AND ABILITY.  Relationship status: .   Patient is currently employed full time.  with [employer]  Current living situation: with family including  and step son every weekend    MEDICATIONS                                                                                              I have reviewed this patient's current medications  Per EHR:    Current Outpatient Medications:      albuterol (PROAIR HFA/PROVENTIL HFA/VENTOLIN HFA) 108 (90 Base) MCG/ACT inhaler, Inhale 2 puffs into the lungs every 6 hours as needed for shortness of breath / dyspnea or wheezing, Disp: 1 Inhaler, Rfl: 0     buPROPion (WELLBUTRIN XL) 300 MG 24 hr tablet, Take 1 tablet (300 mg) by mouth every morning, Disp: 90 tablet, Rfl: 1     escitalopram (LEXAPRO) 20 MG tablet, Take 1 tablet (20 mg) by mouth daily, Disp: 90 tablet, Rfl: 1     levothyroxine (SYNTHROID/LEVOTHROID) 112 MCG tablet, Take 2 tablets (224 mcg) by mouth  daily, Disp: 60 tablet, Rfl: 11     norethindrone-ethinyl estradiol (ORTHO-NOVUM 1/35, 28,) 1-35 MG-MCG tablet, Take one active tablet a day for 21 days.  Discard the inactive pills and start new pack on day 22., Disp: 112 tablet, Rfl: 4     ondansetron (ZOFRAN) 8 MG tablet, Take 1 tablet (8 mg) by mouth every 8 hours as needed for nausea, Disp: 30 tablet, Rfl: 0     cyclobenzaprine (FLEXERIL) 10 MG tablet, Take 1 tablet (10 mg) by mouth 3 times daily as needed for muscle spasms, Disp: 30 tablet, Rfl: 1    CURRENT MEDICATION SIDE EFFECTS REPORTED:       NOTES ABOUT CURRENT PSYCHOTROPIC MEDICATIONS:   Escitalopram 20 mg, 2-3 years, remembers to have more effect initially, 8/2019, started to taper escitalopram with PCP, reemergence of the depression.  Bupropion  mg longer than escitalopram, last 6-9 months starting to have increase in depression     PAST PSYCHOTROPIC MEDICATIONS:  Citalopram  Sertraline in AT BEDTIME  Viibryd        TODAY PATIENT REPORTS THE FOLLOWING PSYCHIATRIC ROS:     PROBLEM: DEPRESSION: Improving now that she is back on the escitalopram.  Primary symptoms include lack of energy, no motivation, difficulty getting up in the am, and fatigue.   Last PHQ-9 4/5/2021   1.  Little interest or pleasure in doing things 0   2.  Feeling down, depressed, or hopeless 1   3.  Trouble falling or staying asleep, or sleeping too much 2   4.  Feeling tired or having little energy 2   5.  Poor appetite or overeating 0   6.  Feeling bad about yourself 0   7.  Trouble concentrating 0   8.  Moving slowly or restless 0   Q9: Thoughts of better off dead/self-harm past 2 weeks 0   PHQ-9 Total Score 5   Difficulty at work, home, or with people -     PHQ-9 SCORE 2/1/2021 3/4/2021 4/5/2021   PHQ-9 Total Score - - -   PHQ-9 Total Score MyChart 15 (Moderately severe depression) - 5 (Mild depression)   PHQ-9 Total Score 15 15 5     PHQ9 score is 5 indicating minimal depression.  Suicidal ideation:  No     PROBLEM:  ANXIETY: Improving   GAD7 score is is 6 indicating mild anxiety.   MAXIM-7 SCORE 2/1/2021 3/4/2021 4/5/2021   Total Score - - -   Total Score 12 (moderate anxiety) - 6 (mild anxiety)   Total Score 12 11 6       PROBLEM: SLEEP/INSOMNIA: No change.  Continues to be fatigued during the day time. Sleeps 8 hours a night. Wakes tired. She is struggling to complete her work but not as much as when we last spoke. Sleep study revealed periodic limb movements and mild FRANK. Waking three or four times a night.  Continues taking iron and also dx RLS. If she gets up and walks it will go away, but as soon as she stops moving it returns.  Has never taken medication for this     CURRENT STRESSORS: Occupational, Mental health symptoms, Relationship and COVID 19 Pandemic and the social and physical distancing recommendations by the CDC and [facility]  COPING MECHANISMS AND SUPPORTS: Family,, Friends and Therapist  DIET:  Adequate  EXERCISE: Adequate  SIDE EFFECTS: Sexual dysfunction presumably from the Escitalopram, but she did not tolerate tapering and discontinuing and requested to retrial   SUBSTANCE USE: Not discussed  COMPLIANCE:  states Adherent to medication regimen  REPORTS THE FOLLOWING NEW MEDICAL ISSUES: see above regarding nausea    PERTINENT PAST MEDICAL AND SURGICAL HISTORY     Past Medical History:   Diagnosis Date     Acute gallstone pancreatitis 12/28/2019     Anxiety      ASCUS of cervix with negative high risk HPV 1/26/17    ASCUS/neg HR HPV.     Depressive disorder      Gallstones 12/28/2019    [facility]     H/O colposcopy with cervical biopsy 03/23/2017    Bx & ECC - negative     Hashimoto's disease      Headache(784.0)      Hypothyroidism due to Hashimoto's thyroiditis      Papanicolaou smear of cervix with atypical squamous cells of undetermined significance (ASC-US) 12/03/2015       VITALS   There were no vitals taken for this visit.   BP Readings from Last 1 Encounters:   10/28/20 119/86     Pulse  "Readings from Last 1 Encounters:   10/28/20 108     Wt Readings from Last 1 Encounters:   10/28/20 107.3 kg (236 lb 9.6 oz)     Ht Readings from Last 1 Encounters:   10/28/20 1.695 m (5' 6.75\")     Estimated body mass index is 37.33 kg/m  as calculated from the following:    Height as of 10/28/20: 1.695 m (5' 6.75\").    Weight as of 10/28/20: 107.3 kg (236 lb 9.6 oz).    Temp Readings from Last 1 Encounters:   01/03/20 97.9  F (36.6  C) (Oral)       LABS & IMAGING                                                                                                                  Recent Labs   Lab Test 08/05/19  1214 11/17/15  1537   WBC 8.6 7.9   HGB 11.8 12.6   HCT 34.1* 35.9   MCV 90 87    248   ANEU  --  4.0     Recent Labs   Lab Test 08/20/19  0901 04/23/15  1640 04/23/15  1640     --   --    POTASSIUM 4.2  --   --    CHLORIDE 107  --   --    CO2 27  --   --    GLC 91   < >  --    KIKI 9.1  --   --    BUN 15  --   --    CR 0.78  --   --    GFRESTIMATED >90  --   --    ALBUMIN  --   --  3.6   PROTTOTAL  --   --  7.7   AST  --   --  8   ALT  --   --  14   ALKPHOS  --   --  43   BILITOTAL  --   --  0.4    < > = values in this interval not displayed.     Recent Labs   Lab Test 05/29/18  0757   CHOL 190   LDL 89   HDL 38*   TRIG 314*     Recent Labs   Lab Test 01/27/21  1149   TSH 8.47*   T4 0.77       ALLERGY & IMMUNIZATIONS       Allergies   Allergen Reactions     Nkda [No Known Drug Allergies]        MEDICAL REVIEW OF SYSTEMS:   Ten system review was completed with pertinent positives noted     MENTAL STATUS EXAM:   General/Constitutional:  Appearance: unable to assess  Attitude:  cooperative   Eye Contact:  unable to assess  Musculoskeletal:  Muscle Strength and Tone: unable to assess  Psychomotor Behavior:  unable to assess  Gait and Station: unable to assess  Psychiatric:  Speech:  clear, coherent, regular rate, rhythm, and volume  Associations:  no loose associations  Thought Process:  logical, linear " and goal oriented  Thought Content:  no evidence of suicidal ideation or homicidal ideation, no evidence of psychotic thought, no auditory hallucinations present and no visual hallucinations present  Mood:  improving   Affect:  Consistent with mood based on tone  Insight:  good  Judgment:  intact, adequate for safety  Impulse Control:  intact  Neurological:  Oriented to:  person, place, time, and situation  Attention Span and Concentration:  normal  Language: intact  Recent and Remote Memory:  Intact to interview. Not formally assessed. No amnesia.  Fund of Knowledge: appropriate    SAFETY   Feels safe in home: Yes   Suicidal ideation: Denies  History of suicide attempts:  No   Hx of impulsivity: No   Hope for the future: present   Hx of Command hallucinations or current psychosis: No  History of Self-injurious behaviors: Yes Remote as an adolescent Current:  No  Family member  by suicide:  Yes Brother  by suicide 2020     SAFETY ASSESSMENT:   Based on all available evidence including the factors cited above, overall Risk for harm is low and is appropriate for outpatient level of care.   Recommended that patient call 911 or go to the local ED should there be a change in any of these risk factors.    DSM 5 DIAGNOSIS:   296.32 (F33.1) Major Depressive Disorder, Recurrent Episode, Moderate _ and With anxious distress     MEDICAL COMORBIDITY IMPACTING CLINICAL PICTURE: None noted    ASSESSMENT AND PLAN      Problem List Items Addressed This Visit     Behavioral    Moderate episode of recurrent major depressive disorder (H) - Primary     Now back on previous medication regimen of escitalopram and bupropion.  Feels the depression is improved but continues with residual symptoms of no motivation, anergia, fatigue and difficulty waking in the AM.  Has been diagnosed with RLS and taking iron (no improvement). Discussed increase the bupropion to 450 mg XL  Vs targeting sleep and the RLS.  She would like to  increase the bupropion at this time.           Relevant Medications    buPROPion (WELLBUTRIN XL) 150 MG 24 hr tablet       Other    Fatigue due to depression     PCP monitoring thyroid function.  Will consider the following labs at next visit as last Complete blood count was noted in 2015  Erythrocyte sedimentation rate   Chemistry panel   Vitamin D   Iron   Inflammatory state   Liver disease,  renal failure,             ORDERS PLACED TODAY:  - buPROPion (WELLBUTRIN XL) 150 MG 24 hr tablet; Take 1 tablet (150 mg) by mouth every morning Take with the bupropion  mg for total daily dose of 450 mg  Dispense: 30 tablet; Refill: 1     CONTINGENCY PLAN:  Consider Lamotrigine to augment for depression or buspirone to augment for anxiety once back on the combination of Escitalopram and bupropion    CONSULTS/REFERRALS:   Continue therapy with Current therapist  - Coordinate care with therapist as needed    MEDICAL:   None at this time  - Imaging/studies: none  - Coordinate care with PCP (Michelle Purcell) as needed    COMMUNITY/PSYCHOSOCIAL INTERVENTIONS/OTHER:   - Coordinate care with other community providers as needed    OTHER RECOMMENDED ASSESSMENTS:   - None    FOLLOW UP: Schedule an appointment with me in six weeks or sooner as needed. Call [phone] to schedule  Follow up with primary care provider as planned or for acute medical concerns.  Call the psychiatric nurse line with medication questions or concerns at [phone] or [phone]    PSYCHOEDUCATION:  Medication side effects and alternatives reviewed. Health promotion activities recommended and reviewed today. All questions addressed. Education and counseling completed regarding risks and benefits of medications and psychotherapy options.  Call the psychiatric nurse line with medication questions or concerns at [phone].  MyChart may be used to communicate with your provider, but this is not intended to be used for  emergencies.    COMMUNITY RESOURCES:    CRISIS NUMBERS: Provided in AVS 3/4/2021  Crisis Connection - 277.323.5637  CRISIS TEXT LINE: Text 069970 from anywhere in [place], anytime, any crisis ;  OR SEE www.crisistextline.org  National Suicide Prevention Lifeline: 514.182.6196 (TTY: 295.708.6752). Call anytime for help.  (www.suicidepreventionlifeline.org)  National [place] on Mental Illness (www.irasema.org): 324.693.1498 or 047-074-4503.   Mental Health Association (www.mentalhealth.org): 487.414.5765 or 544-076-7186.  [place] Crisis Text Line: Text [place] to 294712  Suicide LifeLine Chat: suicideSatarii.org/chat    ADMINISTRATIVE BILLIN min spent interviewing patient, reviewing referral documents, preparing this report.    Video/Phone Start Time: 1517  Video/Phone End Time: 1540    Greater than 50% of time was spent in counseling and coordination of care regarding above diagnoses and treatment plan.    Patient Status:  Patient will continue to be seen for ongoing consultation and stabilization.    Signed:   Aida Collins, MSN, APRN, HNP-[facility] ([facility])   Chart documentation done in part with Dragon Voice Recognition software.  Although reviewed after completion, some word and grammatical errors may remain.

## 2021-04-05 NOTE — ASSESSMENT & PLAN NOTE
Now back on previous medication regimen of escitalopram and bupropion.  Feels the depression is improved but continues with residual symptoms of no motivation, anergia, fatigue and difficulty waking in the AM.  Has been diagnosed with RLS and taking iron (no improvement). Discussed increase the bupropion to 450 mg XL  Vs targeting sleep and the RLS.  She would like to increase the bupropion at this time.

## 2021-04-06 ASSESSMENT — PATIENT HEALTH QUESTIONNAIRE - PHQ9: SUM OF ALL RESPONSES TO PHQ QUESTIONS 1-9: 5

## 2021-04-06 ASSESSMENT — ANXIETY QUESTIONNAIRES: GAD7 TOTAL SCORE: 6

## 2021-04-14 DIAGNOSIS — E06.3 HYPOTHYROIDISM DUE TO HASHIMOTO'S THYROIDITIS: ICD-10-CM

## 2021-04-14 LAB — TSH SERPL DL<=0.005 MIU/L-ACNC: 0.44 MU/L (ref 0.4–4)

## 2021-04-14 PROCEDURE — 84443 ASSAY THYROID STIM HORMONE: CPT | Performed by: INTERNAL MEDICINE

## 2021-04-14 PROCEDURE — 36415 COLL VENOUS BLD VENIPUNCTURE: CPT | Performed by: INTERNAL MEDICINE

## 2021-04-15 DIAGNOSIS — E06.3 HYPOTHYROIDISM DUE TO HASHIMOTO'S THYROIDITIS: Primary | ICD-10-CM

## 2021-04-21 ENCOUNTER — TELEPHONE (OUTPATIENT)
Dept: UROLOGY | Facility: CLINIC | Age: 31
End: 2021-04-21

## 2021-04-21 ENCOUNTER — OFFICE VISIT (OUTPATIENT)
Dept: OBGYN | Facility: CLINIC | Age: 31
End: 2021-04-21
Payer: COMMERCIAL

## 2021-04-21 VITALS
SYSTOLIC BLOOD PRESSURE: 137 MMHG | DIASTOLIC BLOOD PRESSURE: 92 MMHG | WEIGHT: 235.5 LBS | TEMPERATURE: 98.6 F | HEART RATE: 121 BPM | BODY MASS INDEX: 37.16 KG/M2 | OXYGEN SATURATION: 99 %

## 2021-04-21 DIAGNOSIS — R82.90 ABNORMAL URINE FINDINGS: ICD-10-CM

## 2021-04-21 DIAGNOSIS — R35.0 URINARY FREQUENCY: ICD-10-CM

## 2021-04-21 DIAGNOSIS — N30.01 ACUTE CYSTITIS WITH HEMATURIA: Primary | ICD-10-CM

## 2021-04-21 DIAGNOSIS — B37.31 YEAST INFECTION OF THE VAGINA: ICD-10-CM

## 2021-04-21 LAB
ALBUMIN UR-MCNC: 100 MG/DL
APPEARANCE UR: ABNORMAL
BACTERIA #/AREA URNS HPF: ABNORMAL /HPF
BILIRUB UR QL STRIP: NEGATIVE
COLOR UR AUTO: ABNORMAL
GLUCOSE UR STRIP-MCNC: NEGATIVE MG/DL
HGB UR QL STRIP: ABNORMAL
KETONES UR STRIP-MCNC: NEGATIVE MG/DL
LEUKOCYTE ESTERASE UR QL STRIP: ABNORMAL
MUCOUS THREADS #/AREA URNS LPF: PRESENT /LPF
NITRATE UR QL: NEGATIVE
NON-SQ EPI CELLS #/AREA URNS LPF: ABNORMAL /LPF
PH UR STRIP: 6.5 PH (ref 5–7)
RBC #/AREA URNS AUTO: >100 /HPF
SOURCE: ABNORMAL
SP GR UR STRIP: 1.02 (ref 1–1.03)
UROBILINOGEN UR STRIP-MCNC: NORMAL MG/DL (ref 0–2)
WBC #/AREA URNS AUTO: ABNORMAL /HPF

## 2021-04-21 PROCEDURE — 99213 OFFICE O/P EST LOW 20 MIN: CPT | Performed by: OBSTETRICS & GYNECOLOGY

## 2021-04-21 PROCEDURE — 81001 URINALYSIS AUTO W/SCOPE: CPT | Performed by: OBSTETRICS & GYNECOLOGY

## 2021-04-21 PROCEDURE — 87086 URINE CULTURE/COLONY COUNT: CPT | Performed by: OBSTETRICS & GYNECOLOGY

## 2021-04-21 RX ORDER — FLUCONAZOLE 150 MG/1
150 TABLET ORAL ONCE
Qty: 1 TABLET | Refills: 0 | Status: SHIPPED | OUTPATIENT
Start: 2021-04-21 | End: 2021-04-21

## 2021-04-21 RX ORDER — PHENAZOPYRIDINE HYDROCHLORIDE 200 MG/1
200 TABLET, FILM COATED ORAL 3 TIMES DAILY PRN
Qty: 9 TABLET | Refills: 0 | Status: SHIPPED | OUTPATIENT
Start: 2021-04-21 | End: 2021-08-03

## 2021-04-21 RX ORDER — SULFAMETHOXAZOLE/TRIMETHOPRIM 800-160 MG
1 TABLET ORAL 2 TIMES DAILY
Qty: 14 TABLET | Refills: 0 | Status: SHIPPED | OUTPATIENT
Start: 2021-04-21 | End: 2021-04-28

## 2021-04-21 ASSESSMENT — PAIN SCALES - GENERAL: PAINLEVEL: NO PAIN (0)

## 2021-04-21 NOTE — PATIENT INSTRUCTIONS
If you have any questions regarding your visit, Please contact your care team.    [employer] [facility]: [phone]      [facility] CLINIC HOURS TELEPHONE NUMBER   Mary Grullon DO.    Yamilet -  Lidia -     MAREK Zamora RN     Monday, Wednesday, Thursday and [facility]  8:30a.m-5:00 p.m   [facility]  [address] [place] [address]  [phone] ask for [facility]    Imaging Nyipfrymoc-[phone]       Urgent Care locations:    [facility] Saturday and Sunday   9 am - 5 pm    Monday-Friday   11 pm - 7 pm  Saturday and Sunday   9 am - 5 pm   [phone]     [facility] Labor and Delivery:  [phone]    If you need a medication refill, please contact your pharmacy. Please allow 3 business days for your refill to be completed.  As always, Thank you for trusting us with your healthcare needs!

## 2021-04-21 NOTE — TELEPHONE ENCOUNTER
[facility] Call Center    Phone Message    May a detailed message be left on voicemail: yes     Reason for Call: Appointment Intake    Referring Provider Name: Mary Grullon DO  Diagnosis and/or Symptoms: Acute cystitis with hematuria (not specified)    Action Taken: Message routed to:  Adult Clinics: Urology p [id number]    Travel Screening: Not Applicable

## 2021-04-21 NOTE — TELEPHONE ENCOUNTER
1st attempt. Left message for patient to return call.     Patient needs a new video visit with Barbara Hadley.  Ok to schedule in one of the open return slots on 4/23/21 per clinic staff.    Number to clinic, please assist in scheduling once patient returns clinic call.     Call Center OKAY TO SCHEDULE.    Thanks,   Janeth Duvall  Surgical Specialties Procedure   [employer] [place]  4/21/2021 1:20 PM

## 2021-04-21 NOTE — TELEPHONE ENCOUNTER
Barbara Beal LPN [facility] Procedure Coordinator   Caller: Unspecified (Today, 12:14 PM)             Hey guys!     Can you help patient schedule in one of the open slots for 4/23/21 with Barbara Aguirre New?     Thanks!!!

## 2021-04-21 NOTE — PROGRESS NOTES
"This 29 y/o female, , taking serial ON  for contraception, presents c/o \"the worst UTI\" that she has ever had.  Her first UTI was diagnosed and treated at age 13 and she began to experience approximately 5 UTIs/year about 6-7 years ago.  She often times will self treat her UTIs so these have not all been reported in her medical records.  She denies any fever/chills or flank pain today and has no hx of pyelonephritis.  Yesterday, she began to note dysuria, urgency, a strong urine odor, with blood on the toilet tissue when she wipes.  She last had intercourse 3 days ago and feels that this often triggers a UTI.  Menarche was at age 11-12.  BP (!) 137/92 (BP Location: Right arm, Patient Position: Chair, Cuff Size: Adult Large)   Pulse 121   Temp 98.6  F (37  C) (Tympanic)   Wt 106.8 kg (235 lb 8 oz)   LMP  (LMP Unknown)   SpO2 99%   Breastfeeding No   BMI 37.16 kg/m    ROS:  10 systems were reviewed and the positives were listed under problems.  No CVAT bilaterally  Assessment - Symptomatic UTI with hematuria, history of frequent UTIs (approx 5 per year)  Plan - Due to her history of frequent UTIs and the severity of her current UTI symptoms, I advised a referral to Urology since she has never had this evaluation.  In the interim, will start her on Bactrim DS and Pyridium since she was unable to sleep last night because of symptomatic discomfort.  A urine culture is pending and I will update her with this result when it becomes available.  She was also instructed to increase her water intake and to drink cranberry juice.  She would like a script for Diflucan since she typically develops a vaginal yeast infection with the use of any antibiotic.  20 minutes were spent today in chart review, the patient visit, review of tests, and documentation in regards to the issues noted above.  "

## 2021-04-22 LAB
BACTERIA SPEC CULT: NORMAL
Lab: NORMAL
SPECIMEN SOURCE: NORMAL

## 2021-06-02 DIAGNOSIS — F33.1 MODERATE EPISODE OF RECURRENT MAJOR DEPRESSIVE DISORDER (H): ICD-10-CM

## 2021-06-02 RX ORDER — BUPROPION HYDROCHLORIDE 150 MG/1
150 TABLET ORAL EVERY MORNING
Qty: 30 TABLET | Refills: 1 | Status: SHIPPED | OUTPATIENT
Start: 2021-06-02 | End: 2021-06-16

## 2021-06-02 NOTE — TELEPHONE ENCOUNTER
Date of Last Office Visit: 4/5/21  Date of Next Office Visit: None - schedulers will attempt to contact  No shows since last visit: none  Cancellations since last visit: none    Medication requested: bupropion 150mg Date last ordered: 4/5/21 Qty: 30 Refills: 1     Lapse in medication adherence greater than 5 days?: no  If yes, call patient and gather details:    Medication refill request verified as identical to current order?: yes  Result of Last DAM, VPA, Li+ Level, CBC, or Carbamazepine Level (at or since last visit): N/A    []Medication refilled per  Medication Refill in Ambulatory Care  policy.  [x]Medication unable to be refilled by RN due to criteria not met as indicated below:    []Eligibility - not seen in the last year   [x]Supervision - no future appointment   []Compliance - no shows, cancellations or lapse in therapy   []Verification - order discrepancy   []Controlled medication   []Medication not included in policy   []90-day supply request   []Other

## 2021-06-16 ENCOUNTER — VIRTUAL VISIT (OUTPATIENT)
Dept: PSYCHIATRY | Facility: CLINIC | Age: 31
End: 2021-06-16
Payer: COMMERCIAL

## 2021-06-16 DIAGNOSIS — F33.1 MODERATE EPISODE OF RECURRENT MAJOR DEPRESSIVE DISORDER (H): ICD-10-CM

## 2021-06-16 DIAGNOSIS — F41.8 DEPRESSION WITH ANXIETY: ICD-10-CM

## 2021-06-16 DIAGNOSIS — G25.81 RESTLESS LEGS SYNDROME (RLS): Primary | ICD-10-CM

## 2021-06-16 PROCEDURE — 99213 OFFICE O/P EST LOW 20 MIN: CPT | Mod: 95 | Performed by: NURSE PRACTITIONER

## 2021-06-16 RX ORDER — ROPINIROLE 0.5 MG/1
TABLET, FILM COATED ORAL
Qty: 180 TABLET | Refills: 0 | Status: SHIPPED | OUTPATIENT
Start: 2021-06-16 | End: 2021-11-23

## 2021-06-16 RX ORDER — BUPROPION HYDROCHLORIDE 150 MG/1
150 TABLET ORAL EVERY MORNING
Qty: 90 TABLET | Refills: 0 | Status: SHIPPED | OUTPATIENT
Start: 2021-06-16 | End: 2021-10-07

## 2021-06-16 RX ORDER — BUPROPION HYDROCHLORIDE 300 MG/1
300 TABLET ORAL EVERY MORNING
Qty: 90 TABLET | Refills: 0 | Status: SHIPPED | OUTPATIENT
Start: 2021-06-16 | End: 2021-08-03

## 2021-06-16 RX ORDER — ESCITALOPRAM OXALATE 20 MG/1
20 TABLET ORAL DAILY
Qty: 90 TABLET | Refills: 0 | Status: SHIPPED | OUTPATIENT
Start: 2021-06-16 | End: 2021-08-03

## 2021-06-16 ASSESSMENT — ANXIETY QUESTIONNAIRES
4. TROUBLE RELAXING: SEVERAL DAYS
2. NOT BEING ABLE TO STOP OR CONTROL WORRYING: NOT AT ALL
1. FEELING NERVOUS, ANXIOUS, OR ON EDGE: SEVERAL DAYS
7. FEELING AFRAID AS IF SOMETHING AWFUL MIGHT HAPPEN: NOT AT ALL
5. BEING SO RESTLESS THAT IT IS HARD TO SIT STILL: SEVERAL DAYS
GAD7 TOTAL SCORE: 4
6. BECOMING EASILY ANNOYED OR IRRITABLE: SEVERAL DAYS
GAD7 TOTAL SCORE: 4
GAD7 TOTAL SCORE: 4
7. FEELING AFRAID AS IF SOMETHING AWFUL MIGHT HAPPEN: NOT AT ALL
3. WORRYING TOO MUCH ABOUT DIFFERENT THINGS: NOT AT ALL

## 2021-06-16 ASSESSMENT — PATIENT HEALTH QUESTIONNAIRE - PHQ9
10. IF YOU CHECKED OFF ANY PROBLEMS, HOW DIFFICULT HAVE THESE PROBLEMS MADE IT FOR YOU TO DO YOUR WORK, TAKE CARE OF THINGS AT HOME, OR GET ALONG WITH OTHER PEOPLE: SOMEWHAT DIFFICULT
SUM OF ALL RESPONSES TO PHQ QUESTIONS 1-9: 6
SUM OF ALL RESPONSES TO PHQ QUESTIONS 1-9: 6

## 2021-06-16 NOTE — ASSESSMENT & PLAN NOTE
Mood is improved on the current regimen of escitalopram and bupropion.  Restless legs are impacting sleep and subsequently feeling tired and fatigue during the day.  Will start ropinirole at this time and titrate to effective dose as follows:    0.25 mg (1/2 tablet) once daily 1 to 3 hours before bedtime. Dose may be increased after 2 days to 0.5 mg once daily, and after 7 days to 1 mg once daily (2 tablets). Dose may be further increased in 0.5 mg increments every week until reaching 3 mg once daily during week 6.    Follow-up in two months and if continues to present stable will transition care back to PCP

## 2021-06-16 NOTE — PROGRESS NOTES
"    PSYCHIATRIC MEDICATION FOLLOW UP APPT: ADULT     Name:  Analia Jerry  : 1990    Analia is a 30 year old who is being evaluated via a billable video visit.      How would you like to obtain your AVS? MyChart  If the video visit is dropped, the invitation should be resent by: Text to cell phone: [phone]  Will anyone else be joining your video visit? No  Location of patient:     [address] [place] [address]   [facility] [address]     Telemedicine Visit: The patient's condition can be safely assessed and treated via synchronous audio and visual telemedicine encounter.      Reason for Telemedicine Visit: COVID 19 pandemic and the social and physical recommendations by the CDC and MD.      Originating Site (Patient Location): Patient's home    Distant Site (Provider Location): Provider Remote Setting    Consent:  The patient/guardian has verbally consented to: the potential risks and benefits of telemedicine (video visit or phone) versus in person care; bill my insurance or make self-payment for services provided; and responsibility for payment of non-covered services.     Mode of Communication:  Video Conference via [employer]    As the provider I attest to compliance with applicable laws and regulations related to telemedicine.    IDENTIFICATION   Analia Jerry is a 30 year old female who prefers to be called: \"Analia\"  Therapist: Tammy  PCP: Michelle Purcell PA-C  Primary Care Clinic:  [facility]      Last seen for outpatient psychiatry Return Visit on 2021.      FOLLOWING PLAN PUT INTO PLACE: Now back on previous medication regimen of escitalopram and bupropion. Feels the depression is improved but continues with residual symptoms of no motivation, anergia, fatigue and difficulty waking in the AM. Has been diagnosed with RLS and taking iron (no improvement). Discussed increase the bupropion to 450 mg XL Vs targeting sleep and the RLS. She would like to increase the " bupropion at this time.       INTERIM HISTORY     COMMUNICATIONS FROM PATIENT VIA:  redIT     RECORDS AVAILABLE FOR REVIEW: EHR records through [employer]  and  previous psychiatric progress note    HISTORY OF PRESENT ILLNESS   [facility]PS referral for psychiatric medication consult in February 2021.  Reports past reported diagnosis of Depression and Anxiety.  Denies prior psychiatric hospitalizations. Hx of suicidal ideation, no suicide attempts. Hx of self-injurious behaviors in the form of Cutting briefly as an adolescent. Genetically loaded for  Depression, anxiety, substance use. Grew up in an intact home with all basic needs being met.    First sought treatment as an adolescent in the context of Depression and sertraline was trialed at that time.  Currently on Escitalopram and bupropion XL for 2 to 3 years each.  Initially it was effective and now with reemerging depressive symptoms that are causing impact on work.     Duration: Long standing history since Adolescence  Timing:  Worsening in the last 8 months  Context:Adderall psychosocial stressors including COVID-19 pandemic and the loss of her brother from a suicide in October 2020  Severity of MH sxs:  moderate    FAMILY, MEDICAL, SURGICAL HISTORY REVIEWED.  MEDICATION HAVE BEEN REVIEWED AND ARE CURRENT TO THE BEST OF MY KNOWLEDGE AND ABILITY.  Relationship status: .   Patient is currently employed full time.  with [employer]  Current living situation: with family including  and step son every weekend    MEDICATIONS                                                                                              I have reviewed this patient's current medications  Per EHR:    Current Outpatient Medications:      albuterol (PROAIR HFA/PROVENTIL HFA/VENTOLIN HFA) 108 (90 Base) MCG/ACT inhaler, Inhale 2 puffs into the lungs every 6 hours as needed for shortness of breath / dyspnea or wheezing, Disp: 1 Inhaler, Rfl: 0     buPROPion (WELLBUTRIN XL) 150 MG 24  hr tablet, Take 1 tablet (150 mg) by mouth every morning Take with the bupropion  mg for total daily dose of 450 mg, Disp: 30 tablet, Rfl: 1     buPROPion (WELLBUTRIN XL) 300 MG 24 hr tablet, Take 1 tablet (300 mg) by mouth every morning, Disp: 90 tablet, Rfl: 1     cyclobenzaprine (FLEXERIL) 10 MG tablet, Take 1 tablet (10 mg) by mouth 3 times daily as needed for muscle spasms, Disp: 30 tablet, Rfl: 1     escitalopram (LEXAPRO) 20 MG tablet, Take 1 tablet (20 mg) by mouth daily, Disp: 90 tablet, Rfl: 1     levothyroxine (SYNTHROID/LEVOTHROID) 112 MCG tablet, Take 2 tablets (224 mcg) by mouth daily, Disp: 60 tablet, Rfl: 11     norethindrone-ethinyl estradiol (ORTHO-NOVUM 1/35, 28,) 1-35 MG-MCG tablet, Take one active tablet a day for 21 days.  Discard the inactive pills and start new pack on day 22., Disp: 112 tablet, Rfl: 4     ondansetron (ZOFRAN) 8 MG tablet, Take 1 tablet (8 mg) by mouth every 8 hours as needed for nausea, Disp: 30 tablet, Rfl: 0     phenazopyridine (PYRIDIUM) 200 MG tablet, Take 1 tablet (200 mg) by mouth 3 times daily as needed for pain or irritation, Disp: 9 tablet, Rfl: 0    CURRENT MEDICATION SIDE EFFECTS REPORTED:       NOTES ABOUT CURRENT PSYCHOTROPIC MEDICATIONS:   Escitalopram 20 mg, 2-3 years, remembers to have more effect initially, 8/2019, started to taper escitalopram with PCP, reemergence of the depression.  Bupropion  mg longer than escitalopram, last 6-9 months  Iron supplements for restless leg syndrome       PAST PSYCHOTROPIC MEDICATIONS:  Citalopram  Sertraline in AT BEDTIME  Viibryd      TODAY PATIENT REPORTS THE FOLLOWING PSYCHIATRIC ROS:   Mood is significantly improved.    PROBLEM: DEPRESSION: Improving now that she is back on the escitalopram.  Primary symptoms include lack of energy and fatigue. Continues with restless legs and this may be impacting sleep and subsequently it is nonrestorative   Last PHQ-9 6/16/2021   1.  Little interest or pleasure in doing  things 0   2.  Feeling down, depressed, or hopeless 0   3.  Trouble falling or staying asleep, or sleeping too much 3   4.  Feeling tired or having little energy 3   5.  Poor appetite or overeating 0   6.  Feeling bad about yourself 0   7.  Trouble concentrating 0   8.  Moving slowly or restless 0   Q9: Thoughts of better off dead/self-harm past 2 weeks 0   PHQ-9 Total Score 6   Difficulty at work, home, or with people -     PHQ-9 SCORE 3/4/2021 4/5/2021 6/16/2021   PHQ-9 Total Score - - -   PHQ-9 Total Score MyChart - 5 (Mild depression) 6 (Mild depression)   PHQ-9 Total Score 15 5 6     PHQ9 score is 6 indicating mild depression.   Suicidal ideation:  No     PROBLEM: ANXIETY: Improving   MAXIM-7   [employer], 2002; Used with Permission) 12/3/2019 7/8/2020 8/3/2020 2/1/2021 3/4/2021 4/5/2021 6/16/2021   1. Feeling nervous, anxious, or on edge 1 1 0 2 2 2 1   2. Not being able to stop or control worrying 0 0 0 2 0 0 0   3. Worrying too much about different things 0 0 0 2 0 0 0   4. Trouble relaxing 0 1 1 2 3 2 1   5. Being so restless that it is hard to sit still 0 0 0 1 3 0 1   6. Becoming easily annoyed or irritable 0 1 1 3 3 2 1   7. Feeling afraid, as if something awful might happen 0 0 0 0 0 0 0   MAXIM-7 Total Score 1 3 2 12 11 6 4   If you checked any problems, how difficult have they made it for you to do your work, take care of things at home, or get along with other people? Not difficult at all Very difficult Not difficult at all - - - -     MAXIM-7 SCORE 3/4/2021 4/5/2021 6/16/2021   Total Score - - -   Total Score - 6 (mild anxiety) 4 (minimal anxiety)   Total Score 11 6 4       PROBLEM: SLEEP/INSOMNIA: No change.  Continues to be fatigued during the day time. Sleeps 8 hours a night. Wakes tired. Sleep study revealed periodic limb movements and mild FRANK. Waking three or four times a night.  Continues taking iron without improvement. If she gets up and walks it will go away, but as soon as she stops moving it  "returns.  Has never taken medication for this.   is woken frequently from her restless legs      CURRENT STRESSORS: None endorsed  COPING MECHANISMS AND SUPPORTS: Family,, Friends and Therapist  DIET:  Adequate  EXERCISE: Adequate  SIDE EFFECTS: Sexual dysfunction presumably from the Escitalopram, but she did not tolerate tapering and discontinuing and requested to retrial   SUBSTANCE USE: denies   COMPLIANCE:  states Adherent to medication regimen  REPORTS THE FOLLOWING NEW MEDICAL ISSUES: none noted     PERTINENT PAST MEDICAL AND SURGICAL HISTORY     Past Medical History:   Diagnosis Date     Acute gallstone pancreatitis 12/28/2019     Anxiety      ASCUS of cervix with negative high risk HPV 1/26/17    ASCUS/neg HR HPV.     Depressive disorder      Gallstones 12/28/2019    [facility]     H/O colposcopy with cervical biopsy 03/23/2017    Bx & ECC - negative     Hashimoto's disease      Headache(784.0)      Hypothyroidism due to Hashimoto's thyroiditis      Papanicolaou smear of cervix with atypical squamous cells of undetermined significance (ASC-US) 12/03/2015       VITALS   There were no vitals taken for this visit.   BP Readings from Last 1 Encounters:   04/21/21 (!) 137/92     Pulse Readings from Last 1 Encounters:   04/21/21 121     Wt Readings from Last 1 Encounters:   04/21/21 106.8 kg (235 lb 8 oz)     Ht Readings from Last 1 Encounters:   10/28/20 1.695 m (5' 6.75\")     Estimated body mass index is 37.16 kg/m  as calculated from the following:    Height as of 10/28/20: 1.695 m (5' 6.75\").    Weight as of 4/21/21: 106.8 kg (235 lb 8 oz).    Temp Readings from Last 1 Encounters:   04/21/21 98.6  F (37  C) (Tympanic)       LABS & IMAGING                                                                                                                  Recent Labs   Lab Test 08/05/19  1214 11/17/15  1537   WBC 8.6 7.9   HGB 11.8 12.6   HCT 34.1* 35.9   MCV 90 87    248   ANEU  --  " 4.0     Recent Labs   Lab Test 08/20/19  0901 04/23/15  1640 04/23/15  1640     --   --    POTASSIUM 4.2  --   --    CHLORIDE 107  --   --    CO2 27  --   --    GLC 91   < >  --    KIKI 9.1  --   --    BUN 15  --   --    CR 0.78  --   --    GFRESTIMATED >90  --   --    ALBUMIN  --   --  3.6   PROTTOTAL  --   --  7.7   AST  --   --  8   ALT  --   --  14   ALKPHOS  --   --  43   BILITOTAL  --   --  0.4    < > = values in this interval not displayed.     Recent Labs   Lab Test 05/29/18  0757   CHOL 190   LDL 89   HDL 38*   TRIG 314*     Recent Labs   Lab Test 04/14/21  1133 01/27/21  1149   TSH 0.44 8.47*   T4  --  0.77       ALLERGY & IMMUNIZATIONS       Allergies   Allergen Reactions     Nkda [No Known Drug Allergies]        MEDICAL REVIEW OF SYSTEMS:   Ten system review was completed with pertinent positives noted     MENTAL STATUS EXAM: video   General/Constitutional:  Appearance:   awake, alert, adequately groomed, appeared stated age and no apparent distress  Attitude:    cooperative   Eye Contact:  good  Musculoskeletal:  Psychomotor Behavior:  no evidence of tardive dyskinesia, dystonia, or tics from the head up  Psychiatric:  Speech:  clear, coherent, regular rate, rhythm, and volume,  No pressure speech noted.  Associations:  no loose associations  Thought Process:  logical, linear and goal oriented  Thought Content:   No evidence of suicidal ideation or homicidal ideation, no evidence of psychotic thought, no auditory hallucinations present and no visual hallucinations present  Mood:  good  Affect:  appropriate and in normal range  Insight:  good  Judgment:  intact, adequate for safety  Impulse Control:  intact  Neurological:  Oriented to:  person, place, time, and situation  Attention Span and Concentration:  normal    Language: intact     Recent and Remote Memory:  Intact to interview. Not formally assessed. No amnesia.    Fund of Knowledge: appropriate         SAFETY   Feels safe in home: Yes    Suicidal ideation: Denies  History of suicide attempts:  No   Hx of impulsivity: No   Hope for the future: present   Hx of Command hallucinations or current psychosis: No  History of Self-injurious behaviors: Yes Remote as an adolescent Current:  No  Family member  by suicide:  Yes Brother  by suicide 2020     SAFETY ASSESSMENT:   Based on all available evidence including the factors cited above, overall Risk for harm is low and is appropriate for outpatient level of care.   Recommended that patient call 911 or go to the local ED should there be a change in any of these risk factors.    DSM 5 DIAGNOSIS:   296.32 (F33.1) Major Depressive Disorder, Recurrent Episode, Moderate _ and With anxious distress     MEDICAL COMORBIDITY IMPACTING CLINICAL PICTURE: None noted    ASSESSMENT AND PLAN      Problem List Items Addressed This Visit        Behavioral     Mood is improved on the current regimen of escitalopram and bupropion.  Restless legs are impacting sleep and subsequently feeling tired and fatigue during the day.  Will start ropinirole at this time and titrate to effective dose as follows:    0.25 mg (1/2 tablet) once daily 1 to 3 hours before bedtime. Dose may be increased after 2 days to 0.5 mg once daily, and after 7 days to 1 mg once daily (2 tablets). Dose may be further increased in 0.5 mg increments every week until reaching 3 mg once daily during week 6.    Follow-up in two months and if continues to present stable will transition care back to PCP          Moderate episode of recurrent major depressive disorder (H)    Relevant Medications    buPROPion (WELLBUTRIN XL) 300 MG 24 hr tablet    escitalopram (LEXAPRO) 20 MG tablet    buPROPion (WELLBUTRIN XL) 150 MG 24 hr tablet      Other Visit Diagnoses     Restless legs syndrome (RLS)    -  Primary    Relevant Medications    rOPINIRole (REQUIP) 0.5 MG tablet    Depression with anxiety        Relevant Medications    buPROPion (WELLBUTRIN XL)  300 MG 24 hr tablet    escitalopram (LEXAPRO) 20 MG tablet    buPROPion (WELLBUTRIN XL) 150 MG 24 hr tablet         CONTINGENCY PLAN:  Consider Lamotrigine to augment for depression or buspirone to augment for anxiety once back on the combination of Escitalopram and bupropion    CONSULTS/REFERRALS:   Continue therapy with Current therapist  - Coordinate care with therapist as needed    MEDICAL:   None at this time  - Imaging/studies: none  - Coordinate care with PCP (Michelle Purcell) as needed    COMMUNITY/PSYCHOSOCIAL INTERVENTIONS/OTHER:   - Coordinate care with other community providers as needed    OTHER RECOMMENDED ASSESSMENTS:   - None    FOLLOW UP: Schedule an appointment with me in six weeks or sooner as needed. Call [phone] to schedule  Follow up with primary care provider as planned or for acute medical concerns.  Call the psychiatric nurse line with medication questions or concerns at [phone] or [phone]    PSYCHOEDUCATION:  Medication side effects and alternatives reviewed. Health promotion activities recommended and reviewed today. All questions addressed. Education and counseling completed regarding risks and benefits of medications and psychotherapy options.  Call the psychiatric nurse line with medication questions or concerns at [phone].  [employer] may be used to communicate with your provider, but this is not intended to be used for emergencies.    COMMUNITY RESOURCES:    CRISIS NUMBERS: Provided in [facility] 3/4/2021  Crisis Connection - 836.929.6110  CRISIS TEXT LINE: Text 524608 from anywhere in [place], anytime, any crisis 24/7;  OR SEE www.crisistextline.org  National Suicide Prevention Lifeline: 646.404.8409 (TTY: 689.790.2576). Call anytime for help.  (www.suicidepreventionlifeline.org)  National [employer] on Mental Illness (www.irasema.org): 207.522.8910 or 114-281-9527.   Mental Health Association (www.mentalhealth.org): 463.628.4794 or 315-215-4597.  [place] Crisis Text  Line: Text [place] to 452820  Suicide LifeLine Chat: suicidepreventionlifeline.org/chat    ADMINISTRATIVE BILLIN min spent interviewing patient, reviewing referral documents, obtaining and reviewing outside records, communication with other health specialists, and preparing this report.    Video/Phone Start Time:  10:39 AM  Video/Phone End Time:  10:39 AM    Greater than 50% of time was spent in counseling and coordination of care regarding above diagnoses and treatment plan.    Patient Status:  Patient will continue to be seen for ongoing consultation and stabilization.    Signed:   Aida Collins, MSN, APRN, FMHNP-[facility] ([facility])   Chart documentation done in part with Dragon Voice Recognition software.  Although reviewed after completion, some word and grammatical errors may remain.

## 2021-06-17 ASSESSMENT — PATIENT HEALTH QUESTIONNAIRE - PHQ9: SUM OF ALL RESPONSES TO PHQ QUESTIONS 1-9: 6

## 2021-06-17 ASSESSMENT — ANXIETY QUESTIONNAIRES: GAD7 TOTAL SCORE: 4

## 2021-07-07 DIAGNOSIS — E06.3 HYPOTHYROIDISM DUE TO HASHIMOTO'S THYROIDITIS: ICD-10-CM

## 2021-07-07 LAB
T4 FREE SERPL-MCNC: 1.26 NG/DL (ref 0.76–1.46)
TSH SERPL DL<=0.005 MIU/L-ACNC: 0.03 MU/L (ref 0.4–4)

## 2021-07-07 PROCEDURE — 84439 ASSAY OF FREE THYROXINE: CPT | Performed by: INTERNAL MEDICINE

## 2021-07-07 PROCEDURE — 84443 ASSAY THYROID STIM HORMONE: CPT | Performed by: INTERNAL MEDICINE

## 2021-07-07 PROCEDURE — 36415 COLL VENOUS BLD VENIPUNCTURE: CPT | Performed by: INTERNAL MEDICINE

## 2021-07-07 NOTE — PROGRESS NOTES
"  Name: Analia Jerry    MRN: [id number]   YOB: 1990                 Chief Complaint:   Recurrent UTI         Assessment and Plan:   30 year old female with a history of recurrent UTI and microscopic hematuria. Also with baseline symptoms of urinary urgency and feeling of incomplete bladder emptying. We discussed possible etiologies as well as further workup to include upper tract imaging, cystoscopy, and PVR. She is amenable and would like to proceed.  -Schedule CT urogram.  -Cystoscopy with Dr. Bar next available. PVR at the time of cystoscopy.  -For now, work on staying well hydrated and practice meticulous perineal hygiene.  -Consider a cranberry supplement such a Ellura or TheraCran.  -Additional recommendations pending results of the above.    Barbara Hadley PA-C  2021          History of Present Illness:   Analia Jerry is a 30 year old female seen today via virtual visit in consultation from Dr. Grullon for evaluation of recurrent UTI. Recent visit with OB/GYN on 21 for urinary symptoms. Relevant history reviewed as follows:     This 29 y/o female, , taking serial ON  for contraception, presents c/o \"the worst UTI\" that she has ever had.  Her first UTI was diagnosed and treated at age 13 and she began to experience approximately 5 UTIs/year about 6-7 years ago.  She often times will self treat her UTIs so these have not all been reported in her medical records.  She denies any fever/chills or flank pain today and has no hx of pyelonephritis.  Yesterday, she began to note dysuria, urgency, a strong urine odor, with blood on the toilet tissue when she wipes.  She last had intercourse 3 days ago and feels that this often triggers a UTI.  Menarche was at age 11-12.    On that date, she was started on Bactrim for UA demonstrating large blood, large LE,  WBC, >100 RBC, though UC ultimately returned 10-50K mixed  yahaira. Also referred to urology for recurrent UTI " workup.    Today, she feels well without UTI symptoms. She has not identified any obvious triggers for her infections. She is  in a monogamous relationship. She has never had a kidney infection to her knowledge nor been hospitalized for UTI. No history of kidney stones. She is . She does have baseline symptoms of urinary urgency and feeling of incomplete bladder emptying. She denies gross hematuria but has had microscopic hematuria in the past. Relates that her mother and grandfather also have a history of hematuria. Her mother also has CKD.           Past Medical History:     Past Medical History:   Diagnosis Date     Acute gallstone pancreatitis 2019     Anxiety      ASCUS of cervix with negative high risk HPV 17    ASCUS/neg HR HPV.     Depressive disorder      Gallstones 2019    [facility]     H/O colposcopy with cervical biopsy 2017    Bx & ECC - negative     Hashimoto's disease      Headache(784.0)      Hypothyroidism due to Hashimoto's thyroiditis      Papanicolaou smear of cervix with atypical squamous cells of undetermined significance (ASC-US) 2015            Past Surgical History:     Past Surgical History:   Procedure Laterality Date     COLONOSCOPY N/A 2015    Procedure: COLONOSCOPY;  Surgeon: Duane, William Charles, MD;  Location: [facility] OR     COLONOSCOPY WITH CO2 INSUFFLATION N/A 2015    Procedure: COLONOSCOPY WITH CO2 INSUFFLATION;  Surgeon: Duane, William Charles, MD;  Location: [facility] OR     NO HISTORY OF SURGERY              Social History:     Social History     Tobacco Use     Smoking status: Never Smoker     Smokeless tobacco: Never Used   Substance Use Topics     Alcohol use: Yes     Comment: socially            Family History:     Family History   Problem Relation Age of Onset     Depression Brother         depression     Hyperlipidemia Mother      Thyroid Disease Mother      Hyperlipidemia Father      Breast Cancer Cousin       Depression Brother      Substance Abuse Sister      Substance Abuse Sister      C.A.D. No family hx of      Diabetes No family hx of      Breast Cancer No family hx of      Cancer - colorectal No family hx of      Anesthesia Reaction No family hx of      Blood Disease No family hx of               Allergies:     Allergies   Allergen Reactions     Nkda [No Known Drug Allergies]             Medications:     Current Outpatient Medications   Medication Sig     albuterol (PROAIR HFA/PROVENTIL HFA/VENTOLIN HFA) 108 (90 Base) MCG/ACT inhaler Inhale 2 puffs into the lungs every 6 hours as needed for shortness of breath / dyspnea or wheezing     buPROPion (WELLBUTRIN XL) 150 MG 24 hr tablet Take 1 tablet (150 mg) by mouth every morning Take with the bupropion  mg for total daily dose of 450 mg     buPROPion (WELLBUTRIN XL) 300 MG 24 hr tablet Take 1 tablet (300 mg) by mouth every morning     cyclobenzaprine (FLEXERIL) 10 MG tablet Take 1 tablet (10 mg) by mouth 3 times daily as needed for muscle spasms     escitalopram (LEXAPRO) 20 MG tablet Take 1 tablet (20 mg) by mouth daily     levothyroxine (SYNTHROID/LEVOTHROID) 112 MCG tablet Take 2 tablets (224 mcg) by mouth daily     norethindrone-ethinyl estradiol (ORTHO-NOVUM 1/35, 28,) 1-35 MG-MCG tablet Take one active tablet a day for 21 days.  Discard the inactive pills and start new pack on day 22.     ondansetron (ZOFRAN) 8 MG tablet Take 1 tablet (8 mg) by mouth every 8 hours as needed for nausea     phenazopyridine (PYRIDIUM) 200 MG tablet Take 1 tablet (200 mg) by mouth 3 times daily as needed for pain or irritation     rOPINIRole (REQUIP) 0.5 MG tablet 0.25 mg (1/2 tablet) once daily 1 to 3 hours before bedtime. Dose may be increased after 2 days to 0.5 mg once daily, and after 7 days to 1 mg once daily (2 tablets). Dose may be further increased in 0.5 mg increments every week until reaching 3 mg once daily during week 6.     No current facility-administered  medications for this visit.              Review of Systems:    ROS: 14 point ROS neg other than the symptoms noted above in the HPI and PMH.          Physical Exam:   GENERAL: Healthy, alert and no distress  EYES: Eyes grossly normal to inspection.  No discharge or erythema, or obvious scleral/conjunctival abnormalities.  RESP: No audible wheeze, cough, or visible cyanosis.  No visible retractions or increased work of breathing.    SKIN: Visible skin clear. No significant rash, abnormal pigmentation or lesions.  NEURO: Cranial nerves grossly intact.  Mentation and speech appropriate for age.  PSYCH: Mentation appears normal, affect normal/bright, judgement and insight intact, normal speech and appearance well-groomed.       Labs:    4/21/21 - UA: large blood, 100 protein, large LE,  WBC, >100 RBC, moderate bacteria --> UC: 10-50K mixed  yahaira      Imaging:    No recent  imaging.          Video-Visit Details    Type of service:  Video Visit    Video Start Time: 3:00 PM    Video End Time: 3:12 PM    Originating Location (pt. Location): Home    Distant Location (provider location):  [facility]     Platform used for Video Visit: [email or web address]

## 2021-07-09 ENCOUNTER — VIRTUAL VISIT (OUTPATIENT)
Dept: UROLOGY | Facility: CLINIC | Age: 31
End: 2021-07-09
Payer: COMMERCIAL

## 2021-07-09 DIAGNOSIS — R31.29 MICROSCOPIC HEMATURIA: ICD-10-CM

## 2021-07-09 DIAGNOSIS — N39.0 RECURRENT UTI: Primary | ICD-10-CM

## 2021-07-09 DIAGNOSIS — R39.15 URINARY URGENCY: ICD-10-CM

## 2021-07-09 DIAGNOSIS — R39.14 FEELING OF INCOMPLETE BLADDER EMPTYING: ICD-10-CM

## 2021-07-09 PROCEDURE — 99203 OFFICE O/P NEW LOW 30 MIN: CPT | Mod: 95 | Performed by: PHYSICIAN ASSISTANT

## 2021-07-09 NOTE — PATIENT INSTRUCTIONS
UROLOGY CLINIC VISIT PATIENT INSTRUCTIONS    We will plan for a CT scan of the urinary system (CT urogram) and cystoscopy with Dr. Tsering Bar next available.    Pending results of these tests, we can discuss further treatment options for UTI prevention.    In the meantime, make sure to stay well hydrated, wipe front to back, urinate after sexual activity, etc.     You could also consider a cranberry tablet such as Ellura or TheraCran to help with UTI prevention.     CYSTOSCOPY    What is a Cystoscopy?  This is a procedure done to check for problems inside the bladder.  Problems may include polyps (growths), tumors, inflammation (swelling and redness) and other concerns.    The doctor inserts a thin tube (called a cystoscope) into the bladder.  The tube is about the size of a pencil.  We will give you numbing medicine to reduce the pain or discomfort you may feel.    The tube allows the doctor to:  The doctor will be able to see inside the bladder by filling the bladder with water.  The water makes it easier to see any problems that may be present.    If needed, the doctor may use the tube to:  The doctor is able to take tissue samples (biopsies).  Samples are sent to the lab for testing.  The doctor can also burn off any small growths or tumors that are found.  This is call fulguration.    How should I get ready for the exam?  To prepare, stop taking any medications as instructed. Ask whether you should avoid eating or drinking anything after midnight before the procedure. Follow any other instructions your doctor gives you.    If you are having this procedure done at the clinic, you will be there for up to an hour.  You will receive care before and after the procedure.    Please tell your doctor if:  - You have a history of urinary tract infections.  - You know that you have a tumor in your bladder.  - You have bleeding problems.  - You have any allergies.  - You are or may be pregnant.      What happens after  the exam?  You may go back to your normal diet and activity as you feel ready, unless your doctor tells you not to.    For the next two days, you may notice:  - Some blood in your urine.  - Some burning when you urinate (use the toilet).  - An urge to urinate more often.  - Bladder spasms.    These are normal after the procedure. They should go away on their own after a day or two.      - You can help to relieve the above listed symptoms by:  - Drinking 6 to 8 large glasses of water each day (includes drinks at meals).  This will help clear the urine.  - Take warm baths to relieve pain and bladder spasms.  Do not add anything to the bath water.  - Your doctor may prescribe pain medicine.  You may also take Tylenol (acetaminophen) for pain.    When should I call my doctor?  - A fever over 100.0 F (38 C) for more than a day.  (Before you call the doctor, check your temperature under your tongue.)  - Chills.  - Failure to urinate: No urine comes out when you try to use the toilet.  (Try soaking in a bathtub full of warm water.  If still no urine, call your doctor.)  - A lot of blood in the urine or blood clots larger than a nickel.  - Pain in the back or abdomen (belly / stomach area).  - Pain or spasms that are not relieved by warm tub baths and pain medicine.  - Severe pain, burning or other problems while passing urine.  - Pain that gets worse after two days.        If you have any issues, questions or concerns in the meantime, do not hesitate to contact us at [phone] or via [employer].     It was a pleasure meeting with you today.  Thank you for allowing me and my team the privilege of caring for you today.  YOU are the reason we are here, and I truly hope we provided you with the excellent service you deserve.  Please let us know if there is anything else we can do for you so that we can be sure you are leaving completely satisfied with your care experience.

## 2021-07-09 NOTE — PROGRESS NOTES
Analia Jerry  who is being evaluated via a billable video visit.      How would you like to obtain your AVS? MyChart  If the video visit is dropped, the invitation should be resent by: Text to cell phone: [phone]  Will anyone else be joining your video visit? No

## 2021-07-12 ENCOUNTER — MYC MEDICAL ADVICE (OUTPATIENT)
Dept: DERMATOLOGY | Facility: CLINIC | Age: 31
End: 2021-07-12

## 2021-07-12 DIAGNOSIS — R31.9 HEMATURIA: Primary | ICD-10-CM

## 2021-07-19 NOTE — TELEPHONE ENCOUNTER
2nd attempt, called patient and left message to schedule as noted below.    Per the last visit with Barbara Hadley patient should schedule the following appointments:      CT scan, next availabe, please call [phone] to schedule.    Cystoscopy with Dr Bar, next availale, please call [phone] to schedule    Call center please assist patient with scheduling these appointments.      Janeth Duvall  Surgical Specialties Procedure   [employer] [place]  7/19/2021 9:06 AM

## 2021-07-23 ENCOUNTER — ANCILLARY PROCEDURE (OUTPATIENT)
Dept: CT IMAGING | Facility: CLINIC | Age: 31
End: 2021-07-23
Attending: PHYSICIAN ASSISTANT
Payer: COMMERCIAL

## 2021-07-23 DIAGNOSIS — R31.9 HEMATURIA: ICD-10-CM

## 2021-07-23 PROCEDURE — 74178 CT ABD&PLV WO CNTR FLWD CNTR: CPT | Performed by: RADIOLOGY

## 2021-07-23 RX ORDER — IOPAMIDOL 755 MG/ML
120 INJECTION, SOLUTION INTRAVASCULAR ONCE
Status: COMPLETED | OUTPATIENT
Start: 2021-07-23 | End: 2021-07-23

## 2021-07-23 RX ADMIN — IOPAMIDOL 120 ML: 755 INJECTION, SOLUTION INTRAVASCULAR at 12:35

## 2021-07-30 NOTE — PROGRESS NOTES
[facility] ([facility])  Aug 3, 2021    Behavioral Health Clinician Progress Note    Patient Name: Analia Jerry      Telemedicine Visit: The patient's condition can be safely assessed and treated via synchronous audio and visual telemedicine encounter.      Reason for Telemedicine Visit: Services only offered telehealth    Originating Site (Patient Location): Patient's home    Distant Site (Provider Location): Provider Remote Setting- Home Office    Consent:  The patient/guardian has verbally consented to: the potential risks and benefits of telemedicine (video visit) versus in person care; bill my insurance or make self-payment for services provided; and responsibility for payment of non-covered services.     Mode of Communication:  Video Conference via [employer]    As the provider I attest to compliance with applicable laws and regulations related to telemedicine.         Service Type:  Individual      Service Location:   [employer] / Email (patient reached)     Session Start Time: 1030am  Session End Time: 1047am      Session Length: 16 - 37      Attendees: Patient    Visit Activities (Refresh list every visit): [facility] Only    Diagnostic Assessment Date: 02/04/2021  See Flowsheets for today's PHQ-9 and MAXIM-7 results  Previous PHQ-9:   PHQ-9 SCORE 4/5/2021 6/16/2021 8/3/2021   PHQ-9 Total Score - - -   PHQ-9 Total Score MyChart 5 (Mild depression) 6 (Mild depression) -   PHQ-9 Total Score 5 6 5       Previous MAXIM-7:   MAXIM-7 SCORE 4/5/2021 6/16/2021 8/3/2021   Total Score - - -   Total Score 6 (mild anxiety) 4 (minimal anxiety) -   Total Score 6 4 1       WHODAS  WHODAS 2.0 Total Score 2/1/2021   Total Score 12   Total Score MyChart 12        AUDIT  No flowsheet data found.    DATA  Extended Session (60+ minutes): No  Interactive Complexity: No  Crisis: No    Medication Compliance:  Yes      Chemical Use Review:   Substance Use: Chemical use reviewed, no active concerns identified      Tobacco  Use: No current tobacco use.      Current Stressors / Issues:  MH update: Things are going well, meds same except for starting medication for RLS working well, no side effects. Denies any major life changes or stresses. Sees therapist bi-weekly since Nov 2020. Encouraged to continue.  ERICA: no  Preg: no  Most important: time to return to PCP?      Progress on Treatment Objective(s) / Homework:  Satisfactory progress - ACTION (Actively working towards change); Intervened by reinforcing change plan / affirming steps taken    Motivational Interviewing    MI Intervention: Co-Developed Goal: continue seeing therapist, Expressed Empathy/Understanding, Supported Autonomy, Collaboration, Evocation, Permission to raise concern or advise, Open-ended questions, Reflections: simple and complex, Change talk (evoked) and Reframe     Change Talk Expressed by the Patient: Desire to change Ability to change Reasons to change Need to change Committment to change Activation Taking steps    Provider Response to Change Talk: E - Evoked more info from patient about behavior change, A - Affirmed patient's thoughts, decisions, or attempts at behavior change, R - Reflected patient's change talk and S - Summarized patient's change talk statements        Review of Symptoms per patient report:  Depression: Feeling sad, down, or depressed but improved  Richa:  No Symptoms  Psychosis: No Symptoms  Anxiety: Excessive worry  Panic:  No symptoms  Post Traumatic Stress Disorder:  No Symptoms   Eating Disorder: No Symptoms  ADD / ADHD:  No symptoms  Conduct Disorder: No symptoms  Autism Spectrum Disorder: No symptoms  Obsessive Compulsive Disorder: No Symptoms      Changes in Health Issues:   None reported    Assessment: Current Emotional / Mental Status (status of significant symptoms):  Risk status (Self / Other harm or suicidal ideation)  Patient has had a history of suicidal ideation: in the remote past and self-injurious behavior: in the remote  past  Patient denies current fears or concerns for personal safety.  Patient denies current or recent suicidal ideation or behaviors.  Patient denies current or recent homicidal ideation or behaviors.  Patient denies current or recent self injurious behavior or ideation.  Patient denies other safety concerns.  A safety and risk management plan has not been developed at this time, however patient was encouraged to call [place] [phone] should there be a change in any of these risk factors.    Appearance:   Appropriate   Eye Contact:   Good   Psychomotor Behavior: Normal   Attitude:   Cooperative   Orientation:   All  Speech   Rate / Production: Normal    Volume:  Normal   Mood:    Normal  Affect:    Appropriate   Thought Content:  Clear   Thought Form:  Coherent  Logical   Insight:    Good     Diagnoses:  1. Depression, major, recurrent, mild (H)    2. Generalized anxiety disorder        Collateral Reports Completed:  communicated with Aida Collins, MSN, APRN, CNP, DONALDOP-BC, [facility]    Plan: (Homework, other):  Patient was given information about behavioral services and encouraged to schedule a follow up appointment with the clinic [facility] [place] in conjunction with next [facility] appointment.  She was also given information about mental health symptoms and treatment options .  CD Recommendations: No indications of CD issues.  Jenna RAMIREZ [facility]      RICHARD Murdock  Aug 3, 2021

## 2021-08-03 ENCOUNTER — VIRTUAL VISIT (OUTPATIENT)
Dept: BEHAVIORAL HEALTH | Facility: CLINIC | Age: 31
End: 2021-08-03
Payer: COMMERCIAL

## 2021-08-03 ENCOUNTER — VIRTUAL VISIT (OUTPATIENT)
Dept: PSYCHIATRY | Facility: CLINIC | Age: 31
End: 2021-08-03
Payer: COMMERCIAL

## 2021-08-03 DIAGNOSIS — F41.8 DEPRESSION WITH ANXIETY: ICD-10-CM

## 2021-08-03 DIAGNOSIS — F33.1 MODERATE EPISODE OF RECURRENT MAJOR DEPRESSIVE DISORDER (H): Primary | ICD-10-CM

## 2021-08-03 DIAGNOSIS — F33.0 DEPRESSION, MAJOR, RECURRENT, MILD (H): Primary | ICD-10-CM

## 2021-08-03 DIAGNOSIS — F41.1 GENERALIZED ANXIETY DISORDER: ICD-10-CM

## 2021-08-03 PROCEDURE — 90832 PSYTX W PT 30 MINUTES: CPT | Mod: 95 | Performed by: SOCIAL WORKER

## 2021-08-03 PROCEDURE — 99212 OFFICE O/P EST SF 10 MIN: CPT | Mod: 95 | Performed by: NURSE PRACTITIONER

## 2021-08-03 RX ORDER — ESCITALOPRAM OXALATE 20 MG/1
20 TABLET ORAL DAILY
Qty: 90 TABLET | Refills: 0 | Status: SHIPPED | OUTPATIENT
Start: 2021-08-03 | End: 2021-12-22

## 2021-08-03 RX ORDER — BUPROPION HYDROCHLORIDE 300 MG/1
300 TABLET ORAL EVERY MORNING
Qty: 90 TABLET | Refills: 0 | Status: SHIPPED | OUTPATIENT
Start: 2021-08-03 | End: 2021-12-22

## 2021-08-03 ASSESSMENT — ANXIETY QUESTIONNAIRES
2. NOT BEING ABLE TO STOP OR CONTROL WORRYING: NOT AT ALL
3. WORRYING TOO MUCH ABOUT DIFFERENT THINGS: NOT AT ALL
6. BECOMING EASILY ANNOYED OR IRRITABLE: NOT AT ALL
GAD7 TOTAL SCORE: 1
7. FEELING AFRAID AS IF SOMETHING AWFUL MIGHT HAPPEN: NOT AT ALL
5. BEING SO RESTLESS THAT IT IS HARD TO SIT STILL: NOT AT ALL
IF YOU CHECKED OFF ANY PROBLEMS ON THIS QUESTIONNAIRE, HOW DIFFICULT HAVE THESE PROBLEMS MADE IT FOR YOU TO DO YOUR WORK, TAKE CARE OF THINGS AT HOME, OR GET ALONG WITH OTHER PEOPLE: NOT DIFFICULT AT ALL
1. FEELING NERVOUS, ANXIOUS, OR ON EDGE: SEVERAL DAYS

## 2021-08-03 ASSESSMENT — PATIENT HEALTH QUESTIONNAIRE - PHQ9
SUM OF ALL RESPONSES TO PHQ QUESTIONS 1-9: 5
5. POOR APPETITE OR OVEREATING: NOT AT ALL

## 2021-08-03 NOTE — Clinical Note
Psychiatry Update/Consult. Patient has been stabilized and I am sending care back to you for ongoing care, medication prescribing and future medication refills.  I provided  3 months at most recent visit. I recommended that the patient follow up with you in about  3 months. More details about treatment plan are in my note. If you have any questions or concerns, please let me know. The patient can be re-referred back to this service for further consultation in the future if needed but a new referral will need to be placed.    Thanks for the referral! It was a pleasure working with Analia Jerry.       Aida Collins, MSN, APRN, CNP, FMHNP-BC,   [facility]

## 2021-08-03 NOTE — ASSESSMENT & PLAN NOTE
Patient has been stabilized and will be returned to PCP for ongoing care, medication prescribing and future medication refills.  3 months of medications fills provided.  Follow up with Kaylene Byrd in about months. Patient can be re-referred back to this service for further consultation in the future if needed but a new referral will need to be placed.

## 2021-08-03 NOTE — Clinical Note
The patient is being returned to the referring provider for ongoing care and medication prescribing.  The patient can be referred back to this service for further consultation as needed.  Please make a note should they call to schedule for follow-up.  Thank you!    Aida Collins, MSN, APRN, CNP, HNP-BC,   [facility]

## 2021-08-03 NOTE — PROGRESS NOTES
"    PSYCHIATRIC MEDICATION FOLLOW UP APPT: ADULT     Name:  Analia Jerry  : 1990    Analia Jerry is a 30 year old female who is being evaluated via a billable video visit.      How would you like to obtain your AVS? MyChart  If the video visit is dropped, the invitation should be resent by: Text to cell phone: [phone]  Will anyone else be joining your video visit? No    [place] If not at [place] address below, please ask where they are in case of an emergency situation arises during the appointment.  [address]   [place] [address]    Telemedicine Visit: The patient's condition can be safely assessed and treated via synchronous audio and visual telemedicine encounter.      Reason for Telemedicine Visit: COVID 19 pandemic and the social and physical recommendations by the CDC and MD.      Originating Site (Patient Location): Patient's home    Distant Site (Provider Location): Provider Remote Setting    Consent:  The patient/guardian has verbally consented to: the potential risks and benefits of telemedicine (video visit or phone) versus in person care; bill my insurance or make self-payment for services provided; and responsibility for payment of non-covered services.     Mode of Communication:  Video Conference via [employer]    As the provider I attest to compliance with applicable laws and regulations related to telemedicine.    IDENTIFICATION   Analia Jerry is a 30 year old female who prefers to be called: \"Analia\"  Therapist: Tammy  PCP: Michelle Purcell PA-C  Primary Care Clinic:  [facility]      Last seen for outpatient psychiatry Return Visit on 2021      FOLLOWING PLAN PUT INTO PLACE: Mood is improved on the current regimen of escitalopram and bupropion.  Restless legs are impacting sleep and subsequently feeling tired and fatigue during the day.  Will start ropinirole at this time and titrate to effective dose as follows:   "   0.25 mg (1/2 tablet) once daily 1 to 3 hours before bedtime. Dose may be increased after 2 days to 0.5 mg once daily, and after 7 days to 1 mg once daily (2 tablets). Dose may be further increased in 0.5 mg increments every week until reaching 3 mg once daily during week 6.     Follow-up in two months and if continues to present stable will transition care back to PCP     INTERIM HISTORY     COMMUNICATIONS FROM PATIENT VIA:  [employer]     RECORDS AVAILABLE FOR REVIEW: EHR records through [employer]  and  previous psychiatric progress note    HISTORY OF PRESENT ILLNESS   [facility]PS referral for psychiatric medication consult in February 2021.  Reports past reported diagnosis of Depression and Anxiety.  Denies prior psychiatric hospitalizations. Hx of suicidal ideation, no suicide attempts. Hx of self-injurious behaviors in the form of Cutting briefly as an adolescent. Genetically loaded for  Depression, anxiety, substance use. Grew up in an intact home with all basic needs being met.    First sought treatment as an adolescent in the context of Depression and sertraline was trialed at that time.  Currently on Escitalopram and bupropion XL for 2 to 3 years each.  Initially it was effective and now with reemerging depressive symptoms that are causing impact on work.     Duration: Long standing history since Adolescence  Timing:  Worsening in the last 8 months  Context:Adderall psychosocial stressors including COVID-19 pandemic and the loss of her brother from a suicide in October 2020  Severity of MH sxs:  moderate    FAMILY, MEDICAL, SURGICAL HISTORY REVIEWED.  MEDICATION HAVE BEEN REVIEWED AND ARE CURRENT TO THE BEST OF MY KNOWLEDGE AND ABILITY.  Relationship status: .   Patient is currently employed full time.  with [employer]  Current living situation: with family including  and step son every weekend    MEDICATIONS                                                                                               I have reviewed this patient's current medications  Per EHR:    Current Outpatient Medications:      buPROPion (WELLBUTRIN XL) 150 MG 24 hr tablet, Take 1 tablet (150 mg) by mouth every morning Take with the bupropion  mg for total daily dose of 450 mg, Disp: 90 tablet, Rfl: 0     buPROPion (WELLBUTRIN XL) 300 MG 24 hr tablet, Take 1 tablet (300 mg) by mouth every morning, Disp: 90 tablet, Rfl: 0     cyclobenzaprine (FLEXERIL) 10 MG tablet, Take 1 tablet (10 mg) by mouth 3 times daily as needed for muscle spasms, Disp: 30 tablet, Rfl: 1     escitalopram (LEXAPRO) 20 MG tablet, Take 1 tablet (20 mg) by mouth daily, Disp: 90 tablet, Rfl: 0     levothyroxine (SYNTHROID/LEVOTHROID) 112 MCG tablet, Take 2 tablets (224 mcg) by mouth daily, Disp: 60 tablet, Rfl: 11     norethindrone-ethinyl estradiol (ORTHO-NOVUM 1/35, 28,) 1-35 MG-MCG tablet, Take one active tablet a day for 21 days.  Discard the inactive pills and start new pack on day 22., Disp: 112 tablet, Rfl: 4     rOPINIRole (REQUIP) 0.5 MG tablet, 0.25 mg (1/2 tablet) once daily 1 to 3 hours before bedtime. Dose may be increased after 2 days to 0.5 mg once daily, and after 7 days to 1 mg once daily (2 tablets). Dose may be further increased in 0.5 mg increments every week until reaching 3 mg once daily during week 6., Disp: 180 tablet, Rfl: 0     albuterol (PROAIR HFA/PROVENTIL HFA/VENTOLIN HFA) 108 (90 Base) MCG/ACT inhaler, Inhale 2 puffs into the lungs every 6 hours as needed for shortness of breath / dyspnea or wheezing (Patient not taking: Reported on 8/3/2021), Disp: 1 Inhaler, Rfl: 0     ondansetron (ZOFRAN) 8 MG tablet, Take 1 tablet (8 mg) by mouth every 8 hours as needed for nausea, Disp: 30 tablet, Rfl: 0     phenazopyridine (PYRIDIUM) 200 MG tablet, Take 1 tablet (200 mg) by mouth 3 times daily as needed for pain or irritation (Patient not taking: Reported on 7/9/2021), Disp: 9 tablet, Rfl: 0       NOTES ABOUT CURRENT PSYCHOTROPIC MEDICATIONS:    Escitalopram 20 mg, 2-3 years, remembers to have more effect initially, 8/2019, started to taper escitalopram with PCP, reemergence of the depression.  Bupropion  mg longer than escitalopram, last 6-9 months  Iron supplements for restless leg syndrome   Ropinirole 0.5 mg has been robustly effective in targeting restless leg syndrome and now can fall asleep without issues     PAST PSYCHOTROPIC MEDICATIONS:  Citalopram  Sertraline   Viibryd      TODAY PATIENT REPORTS THE FOLLOWING PSYCHIATRIC ROS:   Mood is significantly improved.    PROBLEM: DEPRESSION: Feels the current medication regimen is effective.  Last PHQ-9 8/3/2021   1.  Little interest or pleasure in doing things 0   2.  Feeling down, depressed, or hopeless 1   3.  Trouble falling or staying asleep, or sleeping too much 0   4.  Feeling tired or having little energy 0   5.  Poor appetite or overeating 3   6.  Feeling bad about yourself 0   7.  Trouble concentrating 1   8.  Moving slowly or restless 0   Q9: Thoughts of better off dead/self-harm past 2 weeks 0   PHQ-9 Total Score 5   Difficulty at work, home, or with people Not difficult at all     PHQ-9 SCORE 4/5/2021 6/16/2021 8/3/2021   PHQ-9 Total Score - - -   PHQ-9 Total Score MyChart 5 (Mild depression) 6 (Mild depression) -   PHQ-9 Total Score 5 6 5     PHQ9 score is 5 indicating minimal depression.  Suicidal ideation:  No     PROBLEM: ANXIETY: Feels the current medication regimen is effective.    MAXIM-7 SCORE 4/5/2021 6/16/2021 8/3/2021   Total Score - - -   Total Score 6 (mild anxiety) 4 (minimal anxiety) -   Total Score 6 4 1     PROBLEM: SLEEP/INSOMNIA: Improving.  Sleep study revealed periodic limb movements and mild FRANK. Once the requip was started, robust improvement     CURRENT STRESSORS: None endorsed  COPING MECHANISMS AND SUPPORTS: Family,, Friends and Therapist  DIET:  Adequate  EXERCISE: Adequate  SIDE EFFECTS: Sexual dysfunction presumably from the Escitalopram, but she did not  "tolerate tapering and discontinuing and requested to retrial   SUBSTANCE USE: denies   COMPLIANCE:  states Adherent to medication regimen  REPORTS THE FOLLOWING NEW MEDICAL ISSUES: none noted     PERTINENT PAST MEDICAL AND SURGICAL HISTORY     Past Medical History:   Diagnosis Date     Acute gallstone pancreatitis 12/28/2019     Anxiety      ASCUS of cervix with negative high risk HPV 1/26/17    ASCUS/neg HR HPV.     Depressive disorder      Gallstones 12/28/2019    [facility]     H/O colposcopy with cervical biopsy 03/23/2017    Bx & ECC - negative     Hashimoto's disease      Headache(784.0)      Hypothyroidism due to Hashimoto's thyroiditis      Papanicolaou smear of cervix with atypical squamous cells of undetermined significance (ASC-US) 12/03/2015       VITALS   There were no vitals taken for this visit.   BP Readings from Last 1 Encounters:   04/21/21 (!) 137/92     Pulse Readings from Last 1 Encounters:   04/21/21 121     Wt Readings from Last 1 Encounters:   04/21/21 106.8 kg (235 lb 8 oz)     Ht Readings from Last 1 Encounters:   10/28/20 1.695 m (5' 6.75\")     Estimated body mass index is 37.16 kg/m  as calculated from the following:    Height as of 10/28/20: 1.695 m (5' 6.75\").    Weight as of 4/21/21: 106.8 kg (235 lb 8 oz).    Temp Readings from Last 1 Encounters:   04/21/21 98.6  F (37  C) (Tympanic)       LABS & IMAGING                                                                                                                  Recent Labs   Lab Test 08/05/19  1214 11/17/15  1537   WBC 8.6 7.9   HGB 11.8 12.6   HCT 34.1* 35.9   MCV 90 87    248   ANEU  --  4.0     Recent Labs   Lab Test 08/20/19  0901 04/23/15  1640     --    POTASSIUM 4.2  --    CHLORIDE 107  --    CO2 27  --    GLC 91  --    KIIK 9.1  --    BUN 15  --    CR 0.78  --    GFRESTIMATED >90  --    ALBUMIN  --  3.6   PROTTOTAL  --  7.7   AST  --  8   ALT  --  14   ALKPHOS  --  43   BILITOTAL  --  0.4 "     Recent Labs   Lab Test 18  0757   CHOL 190   LDL 89   HDL 38*   TRIG 314*     Recent Labs   Lab Test 21  1304   TSH 0.03*   T4 1.26       ALLERGY & IMMUNIZATIONS       Allergies   Allergen Reactions     Nkda [No Known Drug Allergies]        MEDICAL REVIEW OF SYSTEMS:   Ten system review was completed with pertinent positives noted     MENTAL STATUS EXAM: video   General/Constitutional:  Appearance:   awake, alert, adequately groomed, appeared stated age and no apparent distress  Attitude:    cooperative   Eye Contact:  good  Musculoskeletal:  Psychomotor Behavior:  no evidence of tardive dyskinesia, dystonia, or tics from the head up  Psychiatric:  Speech:  clear, coherent, regular rate, rhythm, and volume,  No pressure speech noted.  Associations:  no loose associations  Thought Process:  logical, linear and goal oriented  Thought Content:   No evidence of suicidal ideation or homicidal ideation, no evidence of psychotic thought, no auditory hallucinations present and no visual hallucinations present  Mood:  good  Affect:  appropriate and in normal range  Insight:  good  Judgment:  intact, adequate for safety  Impulse Control:  intact  Neurological:  Oriented to:  person, place, time, and situation  Attention Span and Concentration:  normal    Language: intact     Recent and Remote Memory:  Intact to interview. Not formally assessed. No amnesia.    Fund of Knowledge: appropriate         SAFETY   Feels safe in home: Yes   Suicidal ideation: Denies  History of suicide attempts:  No   Hx of impulsivity: No   Hope for the future: present   Hx of Command hallucinations or current psychosis: No  History of Self-injurious behaviors: Yes Remote as an adolescent Current:  No  Family member  by suicide:  Yes Brother  by suicide 2020     SAFETY ASSESSMENT:   Based on all available evidence including the factors cited above, overall Risk for harm is low and is appropriate for outpatient level of  care.   Recommended that patient call 911 or go to the local ED should there be a change in any of these risk factors.    DSM 5 DIAGNOSIS:   296.32 (F33.1) Major Depressive Disorder, Recurrent Episode, Moderate _ and With anxious distress     MEDICAL COMORBIDITY IMPACTING CLINICAL PICTURE: None noted    ASSESSMENT AND PLAN      Problem List Items Addressed This Visit        Behavioral     Patient has been stabilized and will be returned to PCP for ongoing care, medication prescribing and future medication refills.  3 months of medications fills provided.  Follow up with Kaylene Byrd in about months. Patient can be re-referred back to this service for further consultation in the future if needed but a new referral will need to be placed.         Moderate episode of recurrent major depressive disorder (H) - Primary    Relevant Medications    escitalopram (LEXAPRO) 20 MG tablet    buPROPion (WELLBUTRIN XL) 300 MG 24 hr tablet      Other Visit Diagnoses     Depression with anxiety        Relevant Medications    escitalopram (LEXAPRO) 20 MG tablet    buPROPion (WELLBUTRIN XL) 300 MG 24 hr tablet         CONSULTS/REFERRALS:   Continue therapy with Current therapist  - Coordinate care with therapist as needed    MEDICAL:   None at this time  - Imaging/studies: none  - Coordinate care with PCP (Michelle Purcell) as needed    COMMUNITY/PSYCHOSOCIAL INTERVENTIONS/OTHER:   - Coordinate care with other community providers as needed    OTHER RECOMMENDED ASSESSMENTS:   - None    FOLLOW UP: The patient is being returned to the referring provider for ongoing care and med   Follow up with primary care provider as planned or for acute medical concerns.  Call the psychiatric nurse line with medication questions or concerns at [phone] or [phone]    PSYCHOEDUCATION:  Medication side effects and alternatives reviewed. Health promotion activities recommended and reviewed today. All questions addressed. Education  and counseling completed regarding risks and benefits of medications and psychotherapy options.  Call the psychiatric nurse line with medication questions or concerns at [phone].  [employer] may be used to communicate with your provider, but this is not intended to be used for emergencies.    COMMUNITY RESOURCES:    CRISIS NUMBERS: Provided in AVS 3/4/2021  Crisis Connection - 265.316.6098  CRISIS TEXT LINE: Text 407411 from anywhere in [place], anytime, any crisis ;  OR SEE www.crisistextline.org  National Suicide Prevention Lifeline: 406.340.4941 (TTY: 435.595.3425). Call anytime for help.  (www.suicidepreventionlifeline.org)  National [place] on Mental Illness (www.irasema.org): 395.735.8852 or 123-235-0000.   Mental Health Association (www.mentalhealth.org): 497.595.9932 or 820-632-9198.  [place] Crisis Text Line: Text [place] to 103852  Suicide LifeLine Chat: suicidepreDinersGroupline.org/chat    ADMINISTRATIVE BILLIN min spent interviewing patient, reviewing referral documents, obtaining and reviewing outside records, communication with other health specialists, and preparing this report.    Video/Phone Start Time:  1049  Video/Phone End Time:  11:00 AM    Greater than 50% of time was spent in counseling and coordination of care regarding above diagnoses and treatment plan.    Patient Status:  Patient will continue to be seen for ongoing consultation and stabilization.    Signed:   Aida Collins, MSN, APRN, FMHNP-[facility] ([facility])   Chart documentation done in part with Dragon Voice Recognition software.  Although reviewed after completion, some word and grammatical errors may remain.

## 2021-08-04 ENCOUNTER — VIRTUAL VISIT (OUTPATIENT)
Dept: ENDOCRINOLOGY | Facility: CLINIC | Age: 31
End: 2021-08-04
Payer: COMMERCIAL

## 2021-08-04 DIAGNOSIS — E06.3 HYPOTHYROIDISM DUE TO HASHIMOTO'S THYROIDITIS: Primary | ICD-10-CM

## 2021-08-04 PROCEDURE — 99214 OFFICE O/P EST MOD 30 MIN: CPT | Mod: 95 | Performed by: INTERNAL MEDICINE

## 2021-08-04 ASSESSMENT — ANXIETY QUESTIONNAIRES: GAD7 TOTAL SCORE: 1

## 2021-08-04 NOTE — LETTER
"    8/4/2021         RE: Analia Jerry  [address]  [place] [address]        Dear Colleague,    Thank you for referring your patient, Analia Jerry, to the [facility]. Please see a copy of my visit note below.    Analia is a 30 year old who is being evaluated via a billable video visit.      How would you like to obtain your AVS? MyChart  If the video visit is dropped, the invitation should be resent by: Text to cell phone: [phone]  Will anyone else be joining your video visit? No      Video Start Time: 2:01 PM    S:   Pt being seen in f/u for hypothyroidism.  Previously seen by Dr Schroeder:  \"2008. Concerns about wt gain, depression  Medical evaluation and lab testing reportedly showed low thyroid function  Started levothyroxine medication  Previous [facility] thyroid labs include:            Lab Results   Component Value Date     TSH 5.75 (H) 09/13/2018     TSH 12.12 (H) 05/29/2018     TSH 11.18 (H) 03/26/2018     TSH 3.17 03/23/2016     TSH 3.14 03/12/2015     T4 0.89 09/13/2018     T4 0.84 05/29/2018     T4 0.85 03/26/2018     T4 1.12 10/22/2014     T4 1.01 09/17/2012      (H) 04/01/2010      Recent [facility] labs include:            Lab Results   Component Value Date     TSH 5.75 (H) 09/13/2018     T4 0.89 09/13/2018      (H) 04/01/2010      Fam Hx thyroid disease              Hyperthyroidism and postablative hypothyroidism- mother\"     She is taking 175 mcg levothyroxine.      She is on a continuous OCP as she had heavy bleeding with menses prior.   No plans for pregnancy in the near future.     She has lost 32 pounds since the summer to 12/2019.   Gained 10 pounds back since 12/2019. Low motivation since began working from home.   Less activity and more snacking.   In 12/2020, notes she has gained more weight. Her brother passed away and she has been stress eating.      She has a history of SAD.   She continues on lexapro.    Continues to be fatigued during the day " time.   Sleeps 8 hours a night. Wakes tired. She is struggling to complete her work but not as much as when we last spoke.   Sleep study revealed periodic limb movements and mild FRANK.   She has been started on an iron supplement and trying to sleep on her side more.     Just finished a five day course of prednisone for back pain.     In 8/2021, she is taking 224 mcg of levothyroxine daily.   Fatigue has improved. Sleep improved with starting requip to treat RLS.   Continues to take an iron supplement.   Mood has improved as well.   She has lost 25 pounds since we last spoke.   She is using No[employer] application. Goal weight 160 pounds.   No tremors, palpitations, anxiety, SOB.   Remains on continuous OCP.     ROS: 10 point ROS neg other than the symptoms noted above in the HPI.    Exam:  GENERAL: Healthy, alert and no distress  EYES: Eyes grossly normal to inspection.  No discharge or erythema, or obvious scleral/conjunctival abnormalities.  RESP: No audible wheeze, cough, or visible cyanosis.  No visible retractions or increased work of breathing.    SKIN: Visible skin clear. No significant rash, abnormal pigmentation or lesions.  NEURO: Cranial nerves grossly intact.  Mentation and speech appropriate for age.  PSYCH: Mentation appears normal, affect normal/bright, judgement and insight intact, normal speech and appearance well-groomed.     A/P:   Hypothyroidism - Extensive discussion of thyroid hormone and normal physiology. Included was discussion of thyroid in relation to weight and energy. Low TSH and normal free T4 in 12/2019. I suspect this change in because of her weight loss. She is not having any signs or symptoms of hyperthyroidism.   In 6/2020, mood and energy unchanged.   In 12/2020, TSH is up and free T4 is down. She has gained weight since last check and she is taking an iron supplement. She confirms she is taking the iron 4 hours apart from the levothyroxine. However, for the first two weeks, she did take  the iron with her levothyroxine.   In 8/2021, low TSH with normal free T4. Likely due to her recent weight loss. She is not having symptoms of hyperthyroidism.  -No change to medication today.   -Contact me if you develop increased heart rate, tremors, loose stools, heat intolerance, anxiety and we can check sooner.   -Labs in 3 months.   -Contact me if you become pregnant as we will need to adjust your levothyroxine.       Video-Visit Details    Type of service:  Video Visit    Video End Time:2:14 PM    Originating Location (pt. Location): Home    Distant Location (provider location):  [facility]     Platform used for Video Visit: [employer]      Again, thank you for allowing me to participate in the care of your patient.        Sincerely,        Deuce Edward MD

## 2021-08-04 NOTE — PROGRESS NOTES
"Analia is a 30 year old who is being evaluated via a billable video visit.      How would you like to obtain your AVS? MyChart  If the video visit is dropped, the invitation should be resent by: Text to cell phone: [phone]  Will anyone else be joining your video visit? No      Video Start Time: 2:01 PM    S:   Pt being seen in f/u for hypothyroidism.  Previously seen by Dr Schroeder:  \"2008. Concerns about wt gain, depression  Medical evaluation and lab testing reportedly showed low thyroid function  Started levothyroxine medication  Previous [facility] thyroid labs include:            Lab Results   Component Value Date     TSH 5.75 (H) 09/13/2018     TSH 12.12 (H) 05/29/2018     TSH 11.18 (H) 03/26/2018     TSH 3.17 03/23/2016     TSH 3.14 03/12/2015     T4 0.89 09/13/2018     T4 0.84 05/29/2018     T4 0.85 03/26/2018     T4 1.12 10/22/2014     T4 1.01 09/17/2012      (H) 04/01/2010      Recent [facility] labs include:            Lab Results   Component Value Date     TSH 5.75 (H) 09/13/2018     T4 0.89 09/13/2018      (H) 04/01/2010      Fam Hx thyroid disease              Hyperthyroidism and postablative hypothyroidism- mother\"     She is taking 175 mcg levothyroxine.      She is on a continuous OCP as she had heavy bleeding with menses prior.   No plans for pregnancy in the near future.     She has lost 32 pounds since the summer to 12/2019.   Gained 10 pounds back since 12/2019. Low motivation since began working from home.   Less activity and more snacking.   In 12/2020, notes she has gained more weight. Her brother passed away and she has been stress eating.      She has a history of SAD.   She continues on lexapro.    Continues to be fatigued during the day time.   Sleeps 8 hours a night. Wakes tired. She is struggling to complete her work but not as much as when we last spoke.   Sleep study revealed periodic limb movements and mild FRANK.   She has been started on an iron supplement and trying to sleep on her " side more.     Just finished a five day course of prednisone for back pain.     In 8/2021, she is taking 224 mcg of levothyroxine daily.   Fatigue has improved. Sleep improved with starting requip to treat RLS.   Continues to take an iron supplement.   Mood has improved as well.   She has lost 25 pounds since we last spoke.   She is using Noom application. Goal weight 160 pounds.   No tremors, palpitations, anxiety, SOB.   Remains on continuous OCP.     ROS: 10 point ROS neg other than the symptoms noted above in the HPI.    Exam:  GENERAL: Healthy, alert and no distress  EYES: Eyes grossly normal to inspection.  No discharge or erythema, or obvious scleral/conjunctival abnormalities.  RESP: No audible wheeze, cough, or visible cyanosis.  No visible retractions or increased work of breathing.    SKIN: Visible skin clear. No significant rash, abnormal pigmentation or lesions.  NEURO: Cranial nerves grossly intact.  Mentation and speech appropriate for age.  PSYCH: Mentation appears normal, affect normal/bright, judgement and insight intact, normal speech and appearance well-groomed.     A/P:   Hypothyroidism - Extensive discussion of thyroid hormone and normal physiology. Included was discussion of thyroid in relation to weight and energy. Low TSH and normal free T4 in 12/2019. I suspect this change in because of her weight loss. She is not having any signs or symptoms of hyperthyroidism.   In 6/2020, mood and energy unchanged.   In 12/2020, TSH is up and free T4 is down. She has gained weight since last check and she is taking an iron supplement. She confirms she is taking the iron 4 hours apart from the levothyroxine. However, for the first two weeks, she did take the iron with her levothyroxine.   In 8/2021, low TSH with normal free T4. Likely due to her recent weight loss. She is not having symptoms of hyperthyroidism.  -No change to medication today.   -Contact me if you develop increased heart rate, tremors,  loose stools, heat intolerance, anxiety and we can check sooner.   -Labs in 3 months.   -Contact me if you become pregnant as we will need to adjust your levothyroxine.       Video-Visit Details    Type of service:  Video Visit    Video End Time:2:14 PM    Originating Location (pt. Location): Home    Distant Location (provider location):  [facility]     Platform used for Video Visit: [employer]

## 2021-08-08 ENCOUNTER — MYC MEDICAL ADVICE (OUTPATIENT)
Dept: ENDOCRINOLOGY | Facility: CLINIC | Age: 31
End: 2021-08-08

## 2021-08-08 DIAGNOSIS — E06.3 HYPOTHYROIDISM DUE TO HASHIMOTO'S THYROIDITIS: ICD-10-CM

## 2021-08-09 RX ORDER — LEVOTHYROXINE SODIUM 200 UG/1
200 TABLET ORAL DAILY
Qty: 90 TABLET | Refills: 3 | Status: SHIPPED | OUTPATIENT
Start: 2021-08-09 | End: 2022-08-09

## 2021-08-09 NOTE — TELEPHONE ENCOUNTER
Per routing comment:    Please drop to 200 mcg levothyroxine daily then. TSH with reflex in 4-6 weeks.     Thank you,   Deuce Edward MD on 8/9/2021 at 11:56 AM

## 2021-09-21 ENCOUNTER — DOCUMENTATION ONLY (OUTPATIENT)
Dept: LAB | Facility: CLINIC | Age: 31
End: 2021-09-21
Payer: COMMERCIAL

## 2021-09-21 NOTE — PROGRESS NOTES
Analia Jerry has an upcoming lab appointment:    Future Appointments   Date Time Provider Department Center   9/23/2021  2:15 PM [facility]   10/11/2021 11:30 AM Tsering Bar MD [facility]     Patient is scheduled for the following lab(s): [facility]    Patient either has no future order, or has Health Maintenance labs due. Please review and place either future orders or HMPO (Review of Health Maintenance Protocol Orders), as appropriate.    Health Maintenance Due   Topic     ANNUAL REVIEW OF HM ORDERS      HIV SCREENING      HEPATITIS C SCREENING      Mandi Centeno

## 2021-09-21 NOTE — PROGRESS NOTES
Appears patient has TSH per endocrinology ordered. I have no additional labs I would like to add on at this time. I have not seen patient in >1 year

## 2021-09-26 ENCOUNTER — HEALTH MAINTENANCE LETTER (OUTPATIENT)
Age: 31
End: 2021-09-26

## 2021-10-02 ENCOUNTER — MYC REFILL (OUTPATIENT)
Dept: PSYCHIATRY | Facility: CLINIC | Age: 31
End: 2021-10-02

## 2021-10-02 DIAGNOSIS — F33.1 MODERATE EPISODE OF RECURRENT MAJOR DEPRESSIVE DISORDER (H): ICD-10-CM

## 2021-10-02 DIAGNOSIS — F41.8 DEPRESSION WITH ANXIETY: ICD-10-CM

## 2021-10-04 RX ORDER — BUPROPION HYDROCHLORIDE 150 MG/1
150 TABLET ORAL EVERY MORNING
Qty: 90 TABLET | Refills: 0 | OUTPATIENT
Start: 2021-10-04

## 2021-10-04 RX ORDER — ESCITALOPRAM OXALATE 20 MG/1
20 TABLET ORAL DAILY
Qty: 90 TABLET | Refills: 0 | OUTPATIENT
Start: 2021-10-04

## 2021-10-11 ENCOUNTER — TELEPHONE (OUTPATIENT)
Dept: UROLOGY | Facility: CLINIC | Age: 31
End: 2021-10-11

## 2021-10-11 ENCOUNTER — LAB (OUTPATIENT)
Dept: LAB | Facility: CLINIC | Age: 31
End: 2021-10-11
Payer: COMMERCIAL

## 2021-10-11 ENCOUNTER — OFFICE VISIT (OUTPATIENT)
Dept: UROLOGY | Facility: CLINIC | Age: 31
End: 2021-10-11
Payer: COMMERCIAL

## 2021-10-11 DIAGNOSIS — N30.01 ACUTE CYSTITIS WITH HEMATURIA: ICD-10-CM

## 2021-10-11 DIAGNOSIS — N39.0 RECURRENT UTI: Primary | ICD-10-CM

## 2021-10-11 DIAGNOSIS — E06.3 HYPOTHYROIDISM DUE TO HASHIMOTO'S THYROIDITIS: ICD-10-CM

## 2021-10-11 LAB
ALBUMIN UR-MCNC: 30 MG/DL
APPEARANCE UR: ABNORMAL
BACTERIA #/AREA URNS HPF: ABNORMAL /HPF
BILIRUB UR QL STRIP: NEGATIVE
COLOR UR AUTO: YELLOW
GLUCOSE UR STRIP-MCNC: NEGATIVE MG/DL
HGB UR QL STRIP: ABNORMAL
HOLD SPECIMEN: NORMAL
KETONES UR STRIP-MCNC: NEGATIVE MG/DL
LEUKOCYTE ESTERASE UR QL STRIP: ABNORMAL
NITRATE UR QL: NEGATIVE
PH UR STRIP: 5.5 [PH] (ref 5–7)
RBC #/AREA URNS AUTO: >100 /HPF
SKIP: ABNORMAL
SP GR UR STRIP: 1.02 (ref 1–1.03)
SQUAMOUS #/AREA URNS AUTO: ABNORMAL /LPF
TRANS CELLS #/AREA URNS HPF: ABNORMAL /HPF
TSH SERPL DL<=0.005 MIU/L-ACNC: 0.82 MU/L (ref 0.4–4)
UROBILINOGEN UR STRIP-MCNC: NORMAL MG/DL
WBC #/AREA URNS AUTO: ABNORMAL /HPF

## 2021-10-11 PROCEDURE — 36415 COLL VENOUS BLD VENIPUNCTURE: CPT

## 2021-10-11 PROCEDURE — 81001 URINALYSIS AUTO W/SCOPE: CPT | Performed by: UROLOGY

## 2021-10-11 PROCEDURE — 87086 URINE CULTURE/COLONY COUNT: CPT | Performed by: UROLOGY

## 2021-10-11 PROCEDURE — 84443 ASSAY THYROID STIM HORMONE: CPT

## 2021-10-11 PROCEDURE — 52000 CYSTOURETHROSCOPY: CPT | Performed by: UROLOGY

## 2021-10-11 RX ORDER — NITROFURANTOIN MACROCRYSTAL 100 MG
100 CAPSULE ORAL AT BEDTIME
Qty: 30 CAPSULE | Refills: 11 | Status: SHIPPED | OUTPATIENT
Start: 2021-10-11 | End: 2022-10-25

## 2021-10-11 RX ORDER — NITROFURANTOIN 25; 75 MG/1; MG/1
100 CAPSULE ORAL 2 TIMES DAILY
Qty: 14 CAPSULE | Refills: 0 | Status: SHIPPED | OUTPATIENT
Start: 2021-10-11 | End: 2021-12-22

## 2021-10-11 NOTE — NURSING NOTE
Analia Jerry's goals for this visit include:   Chief Complaint   Patient presents with     Cystoscopy     recurrent uti       She requests these members of her care team be copied on today's visit information:     PCP: Kaylene Byrd    Referring Provider:  No referring provider defined for this encounter.    There were no vitals taken for this visit.    Do you need any medication refills at today's visit?     Barbara Beal LPN on 10/11/2021 at 11:41 AM

## 2021-10-11 NOTE — TELEPHONE ENCOUNTER
Returned call to pharmacy and explained one script was for a current UTI and the other one is to be started after that course, and to be taken daily as a preventative.    Barbara Beal LPN on 10/11/2021 at 1:53 PM

## 2021-10-11 NOTE — PATIENT INSTRUCTIONS
"After Your Cystoscopy    What happens after the exam?    You may go back to your normal diet and activity as you feel ready, unless your doctor tells you not to.    For the next two days, you may notice:    Some blood in your urine  Some burning when you urinate (use the toilet)  An urge to urinate more often  Bladder spasms    These are normal after the procedure and should go away after a day or two.  To relieve these problems drink 6 to 8 large glasses of water each day (includes drinks at meals) as this will help clear the urine.  Take warm baths to relieve pain and bladder spasms.  Do not add anything to the bath water.  You may also take Tylenol (acetaminophen) for pain if needed.    When should I call my doctor?    A fever over 100F (38C) for more than a day. (Before you call the doctor, check your temperature under your tongue)  Chills  Failure to urinate: No urine comes out when you try to use the toilet. (Try soaking in a bathtub full of warm water. If still no urine, call your doctor)  A lot of blood in the urine, or blood clots larger than a nickel  Pain in the back or belly area (abdomen)  Pain or spasms that are not relieved by warm tub baths and pain medicine  Severe pain, burning or other problems while passing urine  Pain that gets worse after two days            AFTER YOUR CYSTOSCOPY        You have just completed a cystoscopy, or \"cysto\", which allowed your physician to learn more about your bladder (or to remove a stent placed after surgery). We suggest that you continue to avoid caffeine, fruit juice, and alcohol for the next 24 hours, however, you are encouraged to return to your normal activities.         A few things that are considered normal after your cystoscopy:     * Small amount of bleeding (or spotting) that clears within the next 24 hours     * Slight burning sensation with urination     * Sensation to of needing to avoid more frequently     * The feeling of \"air\" in your urine     * " Mild discomfort that is relieved with Tylenol        Please contact our office promptly if you:     * Develop a fever above 101 degrees     * Are unable to urinate     * Develop bright red blood that does not stop     * Severe pain or swelling         Please contact our office with any concerns or questions @[facility]    .-macrobid X 7 days  -nitrofurantoin 100 mg nightly for prophylaxis  -increase fluid intake  -f/u with Barbara in 3 months to reassess.  Thank you for allowing me to participate in the care of  Ms. Analia Jerry and I will keep you updated on her progress.

## 2021-10-11 NOTE — PROGRESS NOTES
Reason for Visit:  Cystoscopy    Clinical Data: Ms. Analia Jerry is a 30 year old female with a hx of recurrent uti's and microhemturia.  She underwent CT urogram and presents for Cystoscopy.    CT urogram 7/23/21:  No acute abnormalities of abdomen/pelvis  Hypodensity in the right hepatic lobe, indeterminate, possibly a hemangioma  Bulky uteruss suggestive of underlying fibroids.    Cystoscopy procedure:  Pt. Was consented and placed in the lithotomy position.  She was cleaned and preparred in the usual fashion.  Lidocain gel was inserted into the urethra and given time to take effect.  A 16 fr flexible cystoscope was then inserted through the urethra and into the bladder.  The urethra was wnl.  The bladder was with 1+ trabeculation.  No tumors, diverticulae, or stones.  There was some evidence of cystitis however.  Bilateral u/o's were effluxing clear urine.  The cystoscope was then withdrawn.  The pt. Tolerated the procedure well.    A/P:  30 year old female with recurrent ut's and microhematuria.  She also has evidence of some acute cystitis and feels like she has intermittent symptoms.  We discussed treatment and prophylaxis and she would like to proceed.  -macrobid X 7 days  -nitrofurantoin 100 mg nightly for prophylaxis  -increase fluid intake  -f/u with Barbara in 3 months to reassess.  Thank you for allowing me to participate in the care of  Ms. Analia Jerry and I will keep you updated on her progress.    Tsering Bar MD

## 2021-10-11 NOTE — TELEPHONE ENCOUNTER
[facility] Call Center    Phone Message    May a detailed message be left on voicemail: yes     Reason for Call: Medication Question or concern regarding medication   Prescription Clarification  Name of Medication: nitroFURantoin macrocrystal (MACRODANTIN) 100 MG capsule  Prescribing Provider: Dipika   [facility]   What on the order needs clarification? Please call Pharmacy back to clarify why we send over 2 prescriptions with 2 different sets of directions.  Thanks.    Action Taken: Message routed to:  Adult Clinics: Urology p [id number]    Travel Screening: Not Applicable

## 2021-10-12 LAB — BACTERIA UR CULT: NO GROWTH

## 2021-11-22 DIAGNOSIS — G25.81 RESTLESS LEGS SYNDROME (RLS): ICD-10-CM

## 2021-11-22 NOTE — TELEPHONE ENCOUNTER
Date of Last Office Visit: 8/3/2021  Date of Next Office Visit: none (scheduling, please connect with patient and schedule follow up appointment with provider)  No shows since last visit: none  Cancellations since last visit: none    Medication requested: Ropinirole 0.5 mg tablet Date last ordered: 6/16/2021 Qty: 180 Refills: 0     Review of [place] ?: n/a    Lapse in medication adherence greater than 5 days?: no  If yes, call patient and gather details: no  Medication refill request verified as identical to current order?: yes  Result of Last DAM, VPA, Li+ Level, CBC, or Carbamazepine Level (at or since last visit): N/A    Last visit treatment plan:         Behavioral       Patient has been stabilized and will be returned to PCP for ongoing care, medication prescribing and future medication refills.  3 months of medications fills provided.  Follow up with Kaylene Byrd in about months. Patient can be re-referred back to this service for further consultation in the future if needed but a new referral will need to be placed.           Moderate episode of recurrent major depressive disorder (H) - Primary     Relevant Medications     escitalopram (LEXAPRO) 20 MG tablet     buPROPion (WELLBUTRIN XL) 300 MG 24 hr tablet               Other Visit Diagnoses      Depression with anxiety         Relevant Medications     escitalopram (LEXAPRO) 20 MG tablet     buPROPion (WELLBUTRIN XL) 300 MG 24 hr tablet          CONSULTS/REFERRALS:   Continue therapy with Current therapist  - Coordinate care with therapist as needed     MEDICAL:   None at this time  - Imaging/studies: none  - Coordinate care with PCP (Michelle Purcell) as needed     COMMUNITY/PSYCHOSOCIAL INTERVENTIONS/OTHER:   - Coordinate care with other community providers as needed     OTHER RECOMMENDED ASSESSMENTS:   - None      []Medication refilled per  Medication Refill in Ambulatory Care  policy.  [x]Medication unable to be refilled by RN due to criteria  not met as indicated below:    []Eligibility - not seen in the last year   []Supervision - no future appointment   []Compliance - no shows, cancellations or lapse in therapy   []Verification - order discrepancy   []Controlled medication   [x]Medication not included in policy   []90-day supply request   []Other

## 2021-11-23 RX ORDER — ROPINIROLE 0.5 MG/1
TABLET, FILM COATED ORAL
Qty: 180 TABLET | Refills: 0 | Status: SHIPPED | OUTPATIENT
Start: 2021-11-23 | End: 2021-12-22

## 2021-11-23 NOTE — TELEPHONE ENCOUNTER
One refill provided.  Patient has been stabilized and returned to PCP for ongoing medication management as of 8/3/2021    Aida Collins, MSN, APRN, CNP, FMHNP-BC,

## 2021-11-23 NOTE — TELEPHONE ENCOUNTER
Patient has been referred back to PCP.  One refill sent.  Please update patient and remind of plan.  Thank you!     Aida Collins, MSN, APRN, CNP, FMHNP-BC,

## 2021-11-23 NOTE — TELEPHONE ENCOUNTER
Writer attempted to call patient but was unsuccessful. RN sent [employer] message regarding information.

## 2021-12-21 DIAGNOSIS — Z30.41 ENCOUNTER FOR SURVEILLANCE OF CONTRACEPTIVE PILLS: ICD-10-CM

## 2021-12-21 NOTE — TELEPHONE ENCOUNTER
Date last filled 10/1/21  Quantity 112    Last seen 10/28/20 for physical   No future appt.    BHARAT Colin 12/21/2021              labor/delivery

## 2021-12-21 NOTE — TELEPHONE ENCOUNTER
"Requested Prescriptions   Pending Prescriptions Disp Refills     norethindrone-ethinyl estradiol (ORTHO-NOVUM 1/35 (28)) 1-35 MG-MCG tablet 112 tablet 4     Sig: Take one active tablet a day for 21 days.  Discard the inactive pills and start new pack on day 22.       Hormone Replacement Therapy Failed - 12/21/2021  4:20 PM        Failed - Blood pressure under 140/90 in past 12 months     BP Readings from Last 3 Encounters:   04/21/21 (!) 137/92   10/28/20 119/86   09/04/20 (!) 140/80                 Passed - Recent (12 mo) or future (30 days) visit within the authorizing provider's specialty     Patient has had an office visit with the authorizing provider or a provider within the authorizing providers department within the previous 12 mos or has a future within next 30 days. See \"Patient Info\" tab in inbasket, or \"Choose Columns\" in Meds & Orders section of the refill encounter.              Passed - Medication is active on med list        Passed - Patient is 18 years of age or older        Passed - No active pregnancy on record        Passed - No positive pregnancy test on record in past 12 months       Contraceptives Protocol Passed - 12/21/2021  4:20 PM        Passed - Patient is not a current smoker if age is 35 or older        Passed - Recent (12 mo) or future (30 days) visit within the authorizing provider's specialty     Patient has had an office visit with the authorizing provider or a provider within the authorizing providers department within the previous 12 mos or has a future within next 30 days. See \"Patient Info\" tab in inbasket, or \"Choose Columns\" in Meds & Orders section of the refill encounter.              Passed - Medication is active on med list        Passed - No active pregnancy on record        Passed - No positive pregnancy test in past 12 months           Routing refill request to provider for review/approval because:  Last BP on file is >140/90.  Due for an annual physical, last one was " on 10/28/2020.    Sera Briseno RN

## 2021-12-22 ENCOUNTER — VIRTUAL VISIT (OUTPATIENT)
Dept: FAMILY MEDICINE | Facility: CLINIC | Age: 31
End: 2021-12-22
Payer: COMMERCIAL

## 2021-12-22 DIAGNOSIS — G25.81 RESTLESS LEGS SYNDROME (RLS): ICD-10-CM

## 2021-12-22 DIAGNOSIS — K21.00 GASTROESOPHAGEAL REFLUX DISEASE WITH ESOPHAGITIS, UNSPECIFIED WHETHER HEMORRHAGE: ICD-10-CM

## 2021-12-22 DIAGNOSIS — R39.9 UTI SYMPTOMS: Primary | ICD-10-CM

## 2021-12-22 DIAGNOSIS — F33.1 MODERATE EPISODE OF RECURRENT MAJOR DEPRESSIVE DISORDER (H): ICD-10-CM

## 2021-12-22 DIAGNOSIS — T36.95XA ANTIBIOTIC-INDUCED YEAST INFECTION: ICD-10-CM

## 2021-12-22 DIAGNOSIS — B37.9 ANTIBIOTIC-INDUCED YEAST INFECTION: ICD-10-CM

## 2021-12-22 DIAGNOSIS — E66.01 MORBID OBESITY (H): ICD-10-CM

## 2021-12-22 DIAGNOSIS — F41.8 DEPRESSION WITH ANXIETY: ICD-10-CM

## 2021-12-22 PROCEDURE — 99214 OFFICE O/P EST MOD 30 MIN: CPT | Mod: 95 | Performed by: NURSE PRACTITIONER

## 2021-12-22 RX ORDER — ROPINIROLE 0.5 MG/1
TABLET, FILM COATED ORAL
Qty: 180 TABLET | Refills: 1 | Status: SHIPPED | OUTPATIENT
Start: 2021-12-22 | End: 2022-05-02

## 2021-12-22 RX ORDER — BUPROPION HYDROCHLORIDE 300 MG/1
300 TABLET ORAL EVERY MORNING
Qty: 90 TABLET | Refills: 1 | Status: SHIPPED | OUTPATIENT
Start: 2021-12-22 | End: 2022-05-23

## 2021-12-22 RX ORDER — NITROFURANTOIN 25; 75 MG/1; MG/1
100 CAPSULE ORAL 2 TIMES DAILY
Qty: 10 CAPSULE | Refills: 0 | Status: SHIPPED | OUTPATIENT
Start: 2021-12-22 | End: 2021-12-27

## 2021-12-22 RX ORDER — ESCITALOPRAM OXALATE 20 MG/1
20 TABLET ORAL DAILY
Qty: 90 TABLET | Refills: 1 | Status: SHIPPED | OUTPATIENT
Start: 2021-12-22 | End: 2022-05-23

## 2021-12-22 RX ORDER — BUPROPION HYDROCHLORIDE 150 MG/1
150 TABLET ORAL EVERY MORNING
Qty: 90 TABLET | Refills: 1 | Status: SHIPPED | OUTPATIENT
Start: 2021-12-22 | End: 2022-05-23

## 2021-12-22 RX ORDER — FLUCONAZOLE 150 MG/1
150 TABLET ORAL ONCE
Qty: 2 TABLET | Refills: 0 | Status: SHIPPED | OUTPATIENT
Start: 2021-12-22 | End: 2021-12-22

## 2021-12-22 NOTE — PROGRESS NOTES
Analia is a 31 year old who is being evaluated via a billable video visit.      How would you like to obtain your AVS? MyChart  If the video visit is dropped, the invitation should be resent by: patient already online  Will anyone else be joining your video visit? No    Video Start Time: 11:03 AM    Assessment & Plan     UTI symptoms  Patient prefers to treat empirically. Push fluids.  - nitroFURantoin macrocrystal-monohydrate (MACROBID) 100 MG capsule; Take 1 capsule (100 mg) by mouth 2 times daily for 5 days    Antibiotic-induced yeast infection  Gets yeast infections with antibiotics.   - fluconazole (DIFLUCAN) 150 MG tablet; Take 1 tablet (150 mg) by mouth once for 1 dose May repeat in 3 days if needed    Moderate episode of recurrent major depressive disorder (H)  Doing well with current regimen. No thoughts of harm. Refilled.  - buPROPion (WELLBUTRIN XL) 150 MG 24 hr tablet; Take 1 tablet (150 mg) by mouth every morning Take with the bupropion  mg for total daily dose of 450 mg    Depression with anxiety  Refilled. As above.   - buPROPion (WELLBUTRIN XL) 300 MG 24 hr tablet; Take 1 tablet (300 mg) by mouth every morning  - escitalopram (LEXAPRO) 20 MG tablet; Take 1 tablet (20 mg) by mouth daily    Restless legs syndrome (RLS)  Doing well. Currently taking 2 tablets at bedtime. Refilled.  - rOPINIRole (REQUIP) 0.5 MG tablet; 0.25 mg (1/2 tablet) once daily 1 to 3 hours before bedtime. Dose may be increased after 2 days to 0.5 mg once daily, and after 7 days to 1 mg once daily (2 tablets). Dose may be further increased in 0.5 mg increments every week until reaching 3 mg once daily during week 6.    Gastroesophageal reflux disease with esophagitis, unspecified whether hemorrhage  Patient reports BM after each meal, bloating and constant reflux. Not currently taking medication.  - Helicobacter pylori Antigen Stool; Future    Morbid obesity (H)  Diet/exericse.         See Patient Instructions    Return in  about 6 months (around 6/22/2022).     The benefits, risks and potential side effects were discussed in detail. Black box warnings discussed as relevant. All patient questions were answered. The patient was instructed to follow up immediately if any adverse reactions develop.    Return precautions discussed, including when to seek urgent/emergent care.    Patient verbalizes understanding and agrees with plan of care. Patient stable for discharge.      [facility] ALEXANDRIA Helms is a 31 year old who presents for the following health issues     HPI     Depression and Anxiety Follow-Up    How are you doing with your depression since your last visit? No change    How are you doing with your anxiety since your last visit?  No change    Are you having other symptoms that might be associated with depression or anxiety? No    Have you had a significant life event? No     Do you have any concerns with your use of alcohol or other drugs? No    Social History     Tobacco Use     Smoking status: Never Smoker     Smokeless tobacco: Never Used   Substance Use Topics     Alcohol use: Yes     Comment: socially     Drug use: No     PHQ 6/16/2021 8/3/2021 12/22/2021   PHQ-9 Total Score 6 5 4   Q9: Thoughts of better off dead/self-harm past 2 weeks Not at all Not at all Not at all     MAXIM-7 SCORE 6/16/2021 8/3/2021 12/22/2021   Total Score - - -   Total Score 4 (minimal anxiety) - 6 (mild anxiety)   Total Score 4 1 6     Last PHQ-9 12/22/2021   1.  Little interest or pleasure in doing things 1   2.  Feeling down, depressed, or hopeless 1   3.  Trouble falling or staying asleep, or sleeping too much 1   4.  Feeling tired or having little energy 1   5.  Poor appetite or overeating 0   6.  Feeling bad about yourself 0   7.  Trouble concentrating 0   8.  Moving slowly or restless 0   Q9: Thoughts of better off dead/self-harm past 2 weeks 0   PHQ-9 Total Score 4   Difficulty at  work, home, or with people -     MAXIM-7  12/22/2021   1. Feeling nervous, anxious, or on edge 2   2. Not being able to stop or control worrying 1   3. Worrying too much about different things 1   4. Trouble relaxing 1   5. Being so restless that it is hard to sit still 0   6. Becoming easily annoyed or irritable 1   7. Feeling afraid, as if something awful might happen 0   MAXIM-7 Total Score 6   If you checked any problems, how difficult have they made it for you to do your work, take care of things at home, or get along with other people? -     No thoughts of harm. Feels safe.  Feels like current regimen is working.    Genitourinary - Female  Onset/Duration: 2 days  Description:   Painful urination (Dysuria): YES           Frequency: YES  Blood in urine (Hematuria): no  Delay in urine (Hesitency): no  Intensity: mild  Progression of Symptoms:  worsening  Accompanying Signs & Symptoms:  Fever/chills: no  Flank pain: no  Nausea and vomiting: no  Vaginal symptoms: none  Abdominal/Pelvic Pain: no  History:   History of frequent UTI s: YES  History of kidney stones: no  Sexually Active: YES  Possibility of pregnancy: No  Precipitating or alleviating factors: None  Therapies tried and outcome:  takes nightly macrobid for prophylaxis         Review of Systems   Constitutional, HEENT, cardiovascular, pulmonary, GI, , musculoskeletal, neuro, skin, endocrine and psych systems are negative, except as otherwise noted.      Objective           Vitals:  No vitals were obtained today due to virtual visit.    Physical Exam   GENERAL: Healthy, alert and no distress  EYES: Eyes grossly normal to inspection.  No discharge or erythema, or obvious scleral/conjunctival abnormalities.  RESP: No audible wheeze, cough, or visible cyanosis.  No visible retractions or increased work of breathing.    SKIN: Visible skin clear. No significant rash, abnormal pigmentation or lesions.  NEURO: Cranial nerves grossly intact.  Mentation and speech  appropriate for age.  PSYCH: Mentation appears normal, affect normal/bright, judgement and insight intact, normal speech and appearance well-groomed.                Video-Visit Details    Type of service:  Video Visit    Video End Time:11:12    Originating Location (pt. Location): Home    Distant Location (provider location):  [facility]     Platform used for Video Visit: [employer] [place]

## 2021-12-29 ENCOUNTER — LAB (OUTPATIENT)
Dept: LAB | Facility: CLINIC | Age: 31
End: 2021-12-29
Payer: COMMERCIAL

## 2021-12-29 DIAGNOSIS — R14.0 ABDOMINAL BLOATING: ICD-10-CM

## 2021-12-29 DIAGNOSIS — E06.3 HYPOTHYROIDISM DUE TO HASHIMOTO'S THYROIDITIS: ICD-10-CM

## 2021-12-29 LAB — TSH SERPL DL<=0.005 MIU/L-ACNC: 2.47 MU/L (ref 0.4–4)

## 2021-12-29 PROCEDURE — 84443 ASSAY THYROID STIM HORMONE: CPT

## 2021-12-29 PROCEDURE — 36415 COLL VENOUS BLD VENIPUNCTURE: CPT

## 2021-12-29 PROCEDURE — 83516 IMMUNOASSAY NONANTIBODY: CPT

## 2021-12-30 DIAGNOSIS — R14.0 ABDOMINAL BLOATING: Primary | ICD-10-CM

## 2021-12-30 LAB
TTG IGA SER-ACNC: <0.2 U/ML
TTG IGG SER-ACNC: <0.6 U/ML

## 2022-01-04 DIAGNOSIS — E06.3 HYPOTHYROIDISM DUE TO HASHIMOTO'S THYROIDITIS: Primary | ICD-10-CM

## 2022-01-16 ENCOUNTER — HEALTH MAINTENANCE LETTER (OUTPATIENT)
Age: 32
End: 2022-01-16

## 2022-02-06 ENCOUNTER — MYC MEDICAL ADVICE (OUTPATIENT)
Dept: FAMILY MEDICINE | Facility: CLINIC | Age: 32
End: 2022-02-06
Payer: COMMERCIAL

## 2022-02-06 DIAGNOSIS — F41.8 DEPRESSION WITH ANXIETY: ICD-10-CM

## 2022-02-06 DIAGNOSIS — F33.1 MODERATE EPISODE OF RECURRENT MAJOR DEPRESSIVE DISORDER (H): Primary | ICD-10-CM

## 2022-02-07 NOTE — TELEPHONE ENCOUNTER
Routing to provider to review and advise. Please seem mychart message below.     Yaritza Blackwell RN, BSN  [facility]

## 2022-02-10 NOTE — TELEPHONE ENCOUNTER
Routing to provider to review and advise.    She has not heard from psychiatry.    Zaria Gomez RN  [facility]

## 2022-03-14 ASSESSMENT — PATIENT HEALTH QUESTIONNAIRE - PHQ9
10. IF YOU CHECKED OFF ANY PROBLEMS, HOW DIFFICULT HAVE THESE PROBLEMS MADE IT FOR YOU TO DO YOUR WORK, TAKE CARE OF THINGS AT HOME, OR GET ALONG WITH OTHER PEOPLE: EXTREMELY DIFFICULT
SUM OF ALL RESPONSES TO PHQ QUESTIONS 1-9: 19
10. IF YOU CHECKED OFF ANY PROBLEMS, HOW DIFFICULT HAVE THESE PROBLEMS MADE IT FOR YOU TO DO YOUR WORK, TAKE CARE OF THINGS AT HOME, OR GET ALONG WITH OTHER PEOPLE: EXTREMELY DIFFICULT
SUM OF ALL RESPONSES TO PHQ QUESTIONS 1-9: 19

## 2022-03-15 ENCOUNTER — VIRTUAL VISIT (OUTPATIENT)
Dept: BEHAVIORAL HEALTH | Facility: CLINIC | Age: 32
End: 2022-03-15
Payer: COMMERCIAL

## 2022-03-15 ENCOUNTER — VIRTUAL VISIT (OUTPATIENT)
Dept: PSYCHIATRY | Facility: CLINIC | Age: 32
End: 2022-03-15
Payer: COMMERCIAL

## 2022-03-15 ENCOUNTER — MYC MEDICAL ADVICE (OUTPATIENT)
Dept: OBGYN | Facility: CLINIC | Age: 32
End: 2022-03-15
Payer: COMMERCIAL

## 2022-03-15 DIAGNOSIS — R53.83 FATIGUE, UNSPECIFIED TYPE: ICD-10-CM

## 2022-03-15 DIAGNOSIS — F33.0 DEPRESSION, MAJOR, RECURRENT, MILD (H): Primary | ICD-10-CM

## 2022-03-15 DIAGNOSIS — F41.1 GENERALIZED ANXIETY DISORDER: ICD-10-CM

## 2022-03-15 DIAGNOSIS — F33.2 SEVERE EPISODE OF RECURRENT MAJOR DEPRESSIVE DISORDER, WITHOUT PSYCHOTIC FEATURES (H): Primary | ICD-10-CM

## 2022-03-15 DIAGNOSIS — Z30.41 ENCOUNTER FOR SURVEILLANCE OF CONTRACEPTIVE PILLS: ICD-10-CM

## 2022-03-15 DIAGNOSIS — F43.10 POSTTRAUMATIC STRESS DISORDER: ICD-10-CM

## 2022-03-15 PROCEDURE — 90791 PSYCH DIAGNOSTIC EVALUATION: CPT | Mod: 52 | Performed by: SOCIAL WORKER

## 2022-03-15 PROCEDURE — 99215 OFFICE O/P EST HI 40 MIN: CPT | Mod: 95 | Performed by: PSYCHIATRY & NEUROLOGY

## 2022-03-15 PROCEDURE — 99417 PROLNG OP E/M EACH 15 MIN: CPT | Mod: 95 | Performed by: PSYCHIATRY & NEUROLOGY

## 2022-03-15 ASSESSMENT — COLUMBIA-SUICIDE SEVERITY RATING SCALE - C-SSRS
1. HAVE YOU WISHED YOU WERE DEAD OR WISHED YOU COULD GO TO SLEEP AND NOT WAKE UP?: YES
6. HAVE YOU EVER DONE ANYTHING, STARTED TO DO ANYTHING, OR PREPARED TO DO ANYTHING TO END YOUR LIFE?: YES
5. HAVE YOU STARTED TO WORK OUT OR WORKED OUT THE DETAILS OF HOW TO KILL YOURSELF? DO YOU INTEND TO CARRY OUT THIS PLAN?: NO
LETHALITY/MEDICAL DAMAGE CODE FIRST ACTUAL ATTEMPT: NO PHYSICAL DAMAGE OR VERY MINOR PHYSICAL DAMAGE
TOTAL  NUMBER OF INTERRUPTED ATTEMPTS LIFETIME: YES
ATTEMPT LIFETIME: YES
4. HAVE YOU HAD THESE THOUGHTS AND HAD SOME INTENTION OF ACTING ON THEM?: NO
LETHALITY/MEDICAL DAMAGE CODE MOST LETHAL ACTUAL ATTEMPT: NO PHYSICAL DAMAGE OR VERY MINOR PHYSICAL DAMAGE
TOTAL  NUMBER OF ACTUAL ATTEMPTS LIFETIME: 1
2. HAVE YOU ACTUALLY HAD ANY THOUGHTS OF KILLING YOURSELF?: YES
TOTAL  NUMBER OF INTERRUPTED ATTEMPTS PAST 3 MONTHS: NO
LETHALITY/MEDICAL DAMAGE CODE MOST RECENT ACTUAL ATTEMPT: NO PHYSICAL DAMAGE OR VERY MINOR PHYSICAL DAMAGE
1. IN THE PAST MONTH, HAVE YOU WISHED YOU WERE DEAD OR WISHED YOU COULD GO TO SLEEP AND NOT WAKE UP?: NO
6. HAVE YOU EVER DONE ANYTHING, STARTED TO DO ANYTHING, OR PREPARED TO DO ANYTHING TO END YOUR LIFE?: NO
4. HAVE YOU HAD THESE THOUGHTS AND HAD SOME INTENTION OF ACTING ON THEM?: YES
5. HAVE YOU STARTED TO WORK OUT OR WORKED OUT THE DETAILS OF HOW TO KILL YOURSELF? DO YOU INTEND TO CARRY OUT THIS PLAN?: YES
2. HAVE YOU ACTUALLY HAD ANY THOUGHTS OF KILLING YOURSELF?: NO
TOTAL  NUMBER OF ABORTED OR SELF INTERRUPTED ATTEMPTS LIFETIME: NO
REASONS FOR IDEATION LIFETIME: MOSTLY TO END OR STOP THE PAIN (YOU COULDN'T GO ON LIVING WITH THE PAIN OR HOW YOU WERE FEELING)
ATTEMPT PAST THREE MONTHS: NO
TOTAL  NUMBER OF INTERRUPTED ATTEMPTS LIFETIME: 1
3. HAVE YOU BEEN THINKING ABOUT HOW YOU MIGHT KILL YOURSELF?: YES

## 2022-03-15 ASSESSMENT — PATIENT HEALTH QUESTIONNAIRE - PHQ9
SUM OF ALL RESPONSES TO PHQ QUESTIONS 1-9: 19
SUM OF ALL RESPONSES TO PHQ QUESTIONS 1-9: 19

## 2022-03-15 NOTE — PROGRESS NOTES
"Collaborative Care Psychiatry Service  Provider Name: Azalea Moya, [facility]    PATIENT'S NAME: Analia Jerry  PREFERRED NAME: Analia  PREFERRED PRONOUNS:    MRN:   [id number]  :   1990   ACCT. NUMBER: [id number]  DATE OF SERVICE: 3/15/22  START TIME: 12:40p  END TIME: 1:19p    BRIEF ADULT DIAGNOSTIC ASSESSMENT    Telemedicine Visit: The patient's condition can be safely assessed and treated via synchronous audio and visual telemedicine encounter.      Reason for Telemedicine Visit: Services only offered telehealth    Originating Site (Patient Location): Patient's home    Distant Site (Provider Location): Provider Remote Setting- Home Office    Consent:  The patient/guardian has verbally consented to: the potential risks and benefits of telemedicine (video visit) versus in person care; bill my insurance or make self-payment for services provided; and responsibility for payment of non-covered services.     Mode of Communication:  Video Conference via [employer]    As the provider I attest to compliance with applicable laws and regulations related to telemedicine.    Identifying Information:  Patient is a 31 year old, .  The pronoun use throughout this assessment reflects the patient's chosen pronoun.  Patient was referred for an assessment by primary care provider.  Patient attended the session alone.     Chief Complaint:   The reason for seeking services at this time is: \" increased depression symptoms \"   The problem(s) began to worsen the past 1.5 years. Patient has attempted to resolve these concerns in the past through medication management and therapy.  Pt [facility] that [facility] ().  She transitioned back to her provider but continues to have symptoms despite being on medications.  She shares that if she is having anxiety and depression like this despite being on medications she would prefer to go off them.  Pt admits to depression off and on since she " was an adolescent.      Has noticed an increase over the past year or so.  Works as a [employer] worker which she reports is stressful.  Is working primarily remotely now which is hard in her role.  Pt shares that in Oct 2020 her brother committed suicide and pt was the one that found him.    Sees a therapist (Tammy) every other week and has been working through the trauma of her brothers death.  Lives at home with  and has 2 step-sons that are with them every weekend.   Pt reports primary symptoms she is notices include; low energy/motivation and irritability.  Finds it difficult to get up in the morning and that when she gets up and gets going feels that she often needs a nap.  Sleep is pretty good at night, although has restless legs.   Is now taking medications which has been somewhat helpful.  Wakes a couple of times and is usually able to fall back asleep.    Works primarily remotely and admits that she feels a little bit isolated without seeing co-workers to have conversations.  Believes that she will be working primarily remotely ongoing.    Denies any SI.  Has a hx of SI and one attempt about 10 years ago when she tried to cut her wrist but then passed out when she saw blood.  No history of in-pt admissions.  Very little alcohol use and no other substance use.      Does the client have any condition that is currently presenting as a potential to harm themselves or others (severe withdrawal, serious medical condition, severe emotional/behavioral problem)? No.  Proceed with assessment.    Review of Symptoms per patient report:  Depression: Change in sleep, Lack of interest, Excessive or inappropriate guilt, Change in energy level, Difficulties concentrating, Change in appetite, Feelings of hopelessness, Irritability and Feeling sad, down, or depressed  Richa:  No Symptoms.  When took vybrrid had rage  Psychosis: No Symptoms  Anxiety: Excessive worry, Nervousness and Irritability  Panic:  at times  hyperventilates a little  Post Traumatic Stress Disorder:  Experienced traumatic event found brother after he committed suicide   Eating Disorder: No Symptoms.  Has always struggled with weight.  Finds comfort in food.  ADD / ADHD:  No symptoms  Conduct Disorder: No symptoms  Autism Spectrum Disorder: No symptoms  Obsessive Compulsive Disorder: No Symptoms    Sleep:Falls asleep pretty good.  Wakes a couple of times a night and can often fall back asleep.    Caffeine: drinks some diet pop  Tobacco: none    Current alcohol use: rare since having gallbladder removed  Current drug use: n/a    Rating Scales:  PHQ-9:   [facility] [place] Follow-up to PHQ 3/14/2022 3/14/2022 3/14/2022   PHQ-9 9. Suicide Ideation past 2 weeks Not at all Not at all Not at all   Some encounter information is confidential and restricted. Go to Review TravelCLICK activity to see all data.      GAD7:    MAXIM-7 SCORE 8/3/2021 12/22/2021 3/14/2022   Total Score - - -   Total Score - 6 (mild anxiety) 11 (moderate anxiety)   Total Score 1 6 -   Some encounter information is confidential and restricted. Go to Review TravelCLICK activity to see all data.     CGI:  First:  No data recorded  Most recent:  No data recorded    WHODAS:   WHODAS 2.0 Total Score 3/14/2022 3/14/2022   Total Score 35 35   Total Score MyChart - 35   Some encounter information is confidential and restricted. Go to Review TravelCLICK activity to see all data.        CAGE:    CAGE-AID Flowsheet 3/14/2022 3/14/2022 3/14/2022 3/14/2022   Have you ever felt you should Cut down on your drinking or drug use? 0 0 0 0   Have people Annoyed you by criticizing your drinking or drug use? 0 0 0 0   Have you ever felt bad or Guilty about your drinking or drug use? 0 0 0 0   Have you ever had a drink or used drugs first thing in the morning to steady your nerves or to get rid of a hangover? (Eye opener) 0 0 0 0   CAGE-AID SCORE 0 0 0 0   1. Have you felt you ought to cut down on your drinking or drug use? - - -  No   2. Have people annoyed you by criticizing your drinking or drug use? - - - No   3. Have you felt bad or guilty about your drinking or drug use? - - - No   4. Have you ever had a drink or used drugs first thing in the morning to steady your nerves or to get rid of a hangover (eye opener)? - - - No   CAGE-AID SCORE - - - 0 (A total score of 2 or greater is considered clinically significant)         Personal Medical History:  Past Medical History:   Diagnosis Date     Acute gallstone pancreatitis 12/28/2019     Anxiety      ASCUS of cervix with negative high risk HPV 1/26/17    ASCUS/neg HR HPV.     Depressive disorder      Gallstones 12/28/2019    [facility]     H/O colposcopy with cervical biopsy 03/23/2017    Bx & ECC - negative     Hashimoto's disease      Headache(784.0)      Hypothyroidism due to Hashimoto's thyroiditis      Papanicolaou smear of cervix with atypical squamous cells of undetermined significance (ASC-US) 12/03/2015        Patient has received mental health services in the past: therapy with past providers and has [facility] .  Psychiatric Hospitalizations: None.  Patient denies a history of civil commitment. Currently, patient is receiving other mental health services.  These include psychotherapy with provider via telemedicie. can't recall name of the clinic.     Patient does not report a history of head injury / trauma / cognitive impairment / seizures.      Current Medications:  Current Outpatient Medications   Medication Sig Dispense Refill     buPROPion (WELLBUTRIN XL) 150 MG 24 hr tablet Take 1 tablet (150 mg) by mouth every morning Take with the bupropion  mg for total daily dose of 450 mg 90 tablet 1     buPROPion (WELLBUTRIN XL) 300 MG 24 hr tablet Take 1 tablet (300 mg) by mouth every morning 90 tablet 1     escitalopram (LEXAPRO) 20 MG tablet Take 1 tablet (20 mg) by mouth daily 90 tablet 1     levothyroxine  "(SYNTHROID/LEVOTHROID) 200 MCG tablet Take 1 tablet (200 mcg) by mouth daily 90 tablet 3     nitroFURantoin macrocrystal (MACRODANTIN) 100 MG capsule Take 1 capsule (100 mg) by mouth At Bedtime 30 capsule 11     norethindrone-ethinyl estradiol (ORTHO-NOVUM 1/35, 28,) 1-35 MG-MCG tablet Take one active tablet a day for 21 days.  Discard the inactive pills and start new pack on day 22. Please schedule for annual for further refills. 112 tablet 0     rOPINIRole (REQUIP) 0.5 MG tablet 0.25 mg (1/2 tablet) once daily 1 to 3 hours before bedtime. Dose may be increased after 2 days to 0.5 mg once daily, and after 7 days to 1 mg once daily (2 tablets). Dose may be further increased in 0.5 mg increments every week until reaching 3 mg once daily during week 6. 180 tablet 1        Allergies:  Allergies   Allergen Reactions     Nkda [No Known Drug Allergies]        Family Psychiatric History:  Patient did report a family history of mental health concerns.     Family History     Problem (# of Occurrences) Relation (Name,Age of Onset)    Breast Cancer (1) Cousin (Tati Liu)    Depression (2) Brother: depression, Brother (Josef Lyons)    Hyperlipidemia (2) Mother (Fatuma Julien), Father (Sharan Lyons)    Substance Abuse (2) Sister (Jessica Pepe), Sister (Candi Forrester)    Thyroid Disease (1) Mother (Fatuma Julien)       Negative family history of: C.A.D., Diabetes, Breast Cancer, Cancer - colorectal, Anesthesia Reaction, Blood Disease          Social/Family History:  Patient reported they grew up in [place], [place].  They were raised by biological parents. Patient reported that   childhood was good.  Patient denies experiencing childhood abuse/neglect. Patient described their current relationships with family of origin as good with parents.  Ok relationship with 1/2 siblings.      The patient has been  1 times and has 2 step-sons.   She described the relationship with   spouse as, \"supportive and helpful.\" " Patient reported having few good friends.     Cultural influences and impact on patient's life structure, values, norms, and healthcare: pt denies. Patient identified their preferred language to be English. Patient reported they does not need the assistance of an  or other support involved in treatment.       Educational/Occupational History:  Patient reported   highest education level was college graduate. The patient did not serve in the .  Patient is currently employed full time and reports they are able to function appropriately at work.. 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  Finds that she has a hard getting up and going most mornings.       Social History     Socioeconomic History     Marital status:      Spouse name: Not on file     Number of children: 0     Years of education: 16     Highest education level: Not on file   Occupational History     Occupation:      Comment: [employer]   Tobacco Use     Smoking status: Never Smoker     Smokeless tobacco: Never Used   Substance and Sexual Activity     Alcohol use: Yes     Comment: socially     Drug use: No     Sexual activity: Yes     Partners: Male     Birth control/protection: Pill   Other Topics Concern     Parent/sibling w/ CABG, MI or angioplasty before 65F 55M? No   Social History Narrative    Parents declaring bankruptcy; losing house due to mother's credit card debt.        Brother deployed to [place] for 16 months startin 4/1/09        Parents  but ''.     Social Determinants of Health     Financial Resource Strain: Not on file   Food Insecurity: Not on file   Transportation Needs: Not on file   Physical Activity: Not on file   Stress: Not on file   Social Connections: Not on file   Intimate Partner Violence: Not on file   Housing Stability: Not on file       Patient reported that they have not been involved with the legal system.   Patient denies being on probation / parole / under the jurisdiction of the  court.    Current Mental Status Exam:   Appearance:   Appropriate    Eye Contact:   Good   Psychomotor:   Normal   Attitude / Demeanor:  Cooperative   Speech      Rate / Production:  Normal/ Responsive      Volume:   Normal  volume      Language:   intact  Mood:    Normal  Affect:    Appropriate    Thought Content:  Clear   Thought Process:  Coherent       Associations:  No loosening of associations  Insight:    Good   Judgment:   Intact   Orientation:   All  Attention/concentration: Good      Safety Assessment:   Current Safety Concerns:  [place] Suicide Severity Rating Scale (Lifetime/Recent)  [place] Suicide Severity Rating (Lifetime/Recent) 3/15/2022   1. Wish to be Dead (Lifetime) 1   1. Wish to be Dead (Past 1 Month) 0   2. Non-Specific Active Suicidal Thoughts (Lifetime) 1   2. Non-Specific Active Suicidal Thoughts (Past 1 Month) 0   3. Active Suicidal Ideation with any Methods (Not Plan) Without Intent to Act (Lifetime) 1   3. Active Suicidal Ideation with any Methods (Not Plan) Without Intent to Act (Past 1 Month) 0   4. Active Suicidal Ideation with Some Intent to Act, Without Specific Plan (Lifetime) 1   4. Active Suicidal Ideation with Some Intent to Act, Without Specific Plan (Past 1 Month) 0   5. Active Suicidal Ideation with Specific Plan and Intent (Lifetime) 1   5. Active Suicidal Ideation with Specific Plan and Intent (Past 1 Month) 0   Most Severe Ideation Rating (Lifetime) 3   Description of Most Severe Ideation (Lifetime) In early 20's did have SI with thoughts of cutting her wrists and did attempt.   Frequency (Lifetime) 2   Duration (Lifetime) 3   Controllability (Lifetime) 2   Deterrents (Lifetime) 4   Reasons for Ideation (Lifetime) 4   Actual Attempt (Lifetime) 1   Total Number of Actual Attempts (Lifetime) 1   Actual Attempt Description (Lifetime) attempted to cut wrist about 10 years ago.  passed out when saw the blood   Actual Attempt (Past 3 Months) 0   Has subject engaged in  non-suicidal self-injurious behavior? (Lifetime) 0   Interrupted Attempts (Lifetime) 1   Total Number of Interrupted Attempts (Lifetime) 1   Interrupted Attempt Description (Lifetime) about 10 years ago attempted to cut wrist but passed out when saw blood   Interrupted Attempts (Past 3 Months) 0   Aborted or Self-Interrupted Attempt (Lifetime) 0   Preparatory Acts or Behavior (Lifetime) 1   Preparatory Acts or Behavior (Past 3 Months) 0   Actual Lethality/Medical Damage Code (Most Recent Attempt) 0   Actual Lethality/Medical Damage Code (Most Lethal Attempt) 0   Actual Lethality/Medical Damage Code (Initial/First Attempt) 0   Calculated C-SSRS Risk Score (Lifetime/Recent) Moderate Risk     Patient denies current homicidal ideation and behaviors.  Patient denies current self-injurious ideation and behaviors.    Patient denied risk behaviors associated with substance use.  Patient denies any high risk behaviors associated with mental health symptoms.  Patient reports the following current concerns for their personal safety: None.  Patient reports there no firearms in the house. n/a.     History of Safety Concerns:  Patient denied a history of homicidal ideation.     Patient denied a history of personal safety concerns.    Patient denied a history of assaultive behaviors.    Patient denied a history of sexual assault behaviors.     Patient denied a history of risk behaviors associated with substance use.  Patient denies any history of high risk behaviors associated with mental health symptoms.  Patient reports the following protective factors: positive relationships positive social network and positive family connections, forward/future oriented thinking, dedication to family/friends, safe and stable environment, adherence with prescribed medication, living with other people and daily obligations    Risk Plan:  See Preliminary Treatment Plan for Safety and Risk Management Plan    Diagnosis:  Diagnostic Criteria:    Generalized Anxiety Disorder  A. Excessive anxiety and worry about a number of events or activities (such as work or school performance).   B. The person finds it difficult to control the worry.  C. Select 3 or more symptoms (required for diagnosis). Only one item is required in children.   - Restlessness or feeling keyed up or on edge.    - Being easily fatigued.    - Difficulty concentrating or mind going blank.    - Irritability.   D. The focus of the anxiety and worry is not confined to features of an Axis I disorder.  E. The anxiety, worry, or physical symptoms cause clinically significant distress or impairment in social, occupational, or other important areas of functioning.   F. The disturbance is not due to the direct physiological effects of a substance (e.g., a drug of abuse, a medication) or a general medical condition (e.g., hyperthyroidism) and does not occur exclusively during a Mood Disorder, a Psychotic Disorder, or a Pervasive Developmental Disorder. Major Depressive Disorder  CRITERIA (A-C) REPRESENT A MAJOR DEPRESSIVE EPISODE - SELECT THESE CRITERIA  A) Recurrent episode(s) - symptoms have been present during the same 2-week period and represent a change from previous functioning 5 or more symptoms (required for diagnosis)   - Depressed mood. Note: In children and adolescents, can be irritable mood.     - Diminished interest or pleasure in all, or almost all, activities.    - Psychomotor activity retardation.    - Fatigue or loss of energy.    - Diminished ability to think or concentrate, or indecisiveness.   B) The symptoms cause clinically significant distress or impairment in social, occupational, or other important areas of functioning  C) The episode is not attributable to the physiological effects of a substance or to another medical condition  D) The occurence of major depressive episode is not better explained by other thought / psychotic disorders  E) There has never been a manic  episode or hypomanic episode    Diagnoses:  1. Depression, major, recurrent, mild (H)    2. Generalized anxiety disorder        Patient's Strengths and Limitations:  Patient identified the following strengths or resources that will help them succeed in treatment: friends / good social support, family support, insight and work ethic. Things that may interfere with the patient's success in treatment include: none identified.     Recommendations:     1. Plan for Safety and Risk Management:Recommended that patient call 911 or go to the local ED should there be a change in any of these risk factors..  Report to child / adult protection services was n/a.      2. Resources/Service Plan:       services are not indicated.     Modifications to assist communication are not indicated.     Additional disability accommodations are not indicated.      3. Collaboration:  Collaboration / coordination of treatment will be initiated with the following support professionals: psychiatry.      4.  Referrals:   The following referral(s) will be initiated: pt has an individual therapist..       Staff Name/Credentials:  JAMES Herrmann on 3/16/2022 at 3:01 PM    March 15, 2022

## 2022-03-15 NOTE — TELEPHONE ENCOUNTER
"Requested Prescriptions   Pending Prescriptions Disp Refills     norethindrone-ethinyl estradiol (ORTHO-NOVUM 1/35 (28)) 1-35 MG-MCG tablet 112 tablet 0     Sig: Take one active tablet a day for 21 days.  Discard the inactive pills and start new pack on day 22. Please schedule for annual for further refills.       Hormone Replacement Therapy Failed - 3/15/2022 10:38 AM        Failed - Blood pressure under 140/90 in past 12 months     BP Readings from Last 3 Encounters:   04/21/21 (!) 137/92   10/28/20 119/86   09/04/20 (!) 140/80                 Passed - Recent (12 mo) or future (30 days) visit within the authorizing provider's specialty     Patient has had an office visit with the authorizing provider or a provider within the authorizing providers department within the previous 12 mos or has a future within next 30 days. See \"Patient Info\" tab in inbasket, or \"Choose Columns\" in Meds & Orders section of the refill encounter.              Passed - Medication is active on med list        Passed - Patient is 18 years of age or older        Passed - No active pregnancy on record        Passed - No positive pregnancy test on record in past 12 months       Contraceptives Protocol Passed - 3/15/2022 10:38 AM        Passed - Patient is not a current smoker if age is 35 or older        Passed - Recent (12 mo) or future (30 days) visit within the authorizing provider's specialty     Patient has had an office visit with the authorizing provider or a provider within the authorizing providers department within the previous 12 mos or has a future within next 30 days. See \"Patient Info\" tab in inbasket, or \"Choose Columns\" in Meds & Orders section of the refill encounter.              Passed - Medication is active on med list        Passed - No active pregnancy on record        Passed - No positive pregnancy test in past 12 months           Pt's last yearly physical was on 10/28/2020. Next appt scheduled with Dr. Winslow on " 3/31.  Last BP on 4/21/21 was >140/90.    Routing to Dr. Winslow to approve a refill to get pt to her appt on 3/31.  RN is not able to as her BP is out of range    Sera Briseno RN
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
## 2022-03-15 NOTE — PATIENT INSTRUCTIONS
Continue bupropion  mg daily.    Continue Lexapro 20 mg daily.    Continue ropinirole 1 to 1.5 mg at bedtime as needed.    Follow-up with sleep medicine regarding periodic limb movements versus restless leg syndrome.    Please have labs done for evaluation of iron stores.  If they remain abnormal, may we may want to refer to a hematologist.    Continue all other medications per your primary care provider.    Schedule an appointment with me in 6 to 8 weeks or sooner as needed.  You may call [facility] at [phone] to schedule.    [place] Resources:      Go to the Emergency Department as needed or call after hours crisis line at [phone].      To schedule individual or family therapy, call [facility] at [phone].     Follow up with primary care provider as planned or sooner for acute medical concerns.    Call the psychiatric nurse line with medication questions or concerns at [phone].    Cryptopayhart may be used to communicate with your provider, but this is not intended to be used for emergencies.    Community Resources:      National Suicide Prevention Lifeline: 971.416.3962 (TTY: 823.517.3321). Call anytime for help.  (www.suicidepreventionlifeline.org)    National [place] on Mental Illness (www.irasema.org): 716.600.1778 or 247-405-1242.     Mental Health Association (www.mentalhealth.org): 401.640.5769 or 546-838-7788.    [place] Crisis Text Line: Text [place] to 683466    Suicide LifeLine Chat: suicideprePeap.[place]ine.org/chat

## 2022-03-16 NOTE — NURSING NOTE
Medications Phoned  to Pharmacy [] yes [x]no     Medications ordered this visit were e-scribed.  Verified by order class [] yes  [x] no  List medications sent to pharmacy:     Medication changes or discontinuations were communicated to patient's pharmacy: [] yes  [x] no     Dictation completed at time of chart check: [x] yes  [] no     I have checked the documentation for today s encounters and the above information has been reviewed and completed.        Inga Arvizu MA March 16, 2022 1:15 PM

## 2022-03-23 ENCOUNTER — LAB (OUTPATIENT)
Dept: LAB | Facility: CLINIC | Age: 32
End: 2022-03-23
Payer: COMMERCIAL

## 2022-03-23 DIAGNOSIS — E06.3 HYPOTHYROIDISM DUE TO HASHIMOTO'S THYROIDITIS: ICD-10-CM

## 2022-03-23 DIAGNOSIS — R53.83 FATIGUE, UNSPECIFIED TYPE: ICD-10-CM

## 2022-03-23 LAB
ERYTHROCYTE [DISTWIDTH] IN BLOOD BY AUTOMATED COUNT: 12.1 % (ref 10–15)
FERRITIN SERPL-MCNC: 19 NG/ML (ref 12–150)
HCT VFR BLD AUTO: 35.7 % (ref 35–47)
HGB BLD-MCNC: 12.4 G/DL (ref 11.7–15.7)
IRON SATN MFR SERPL: 32 % (ref 15–46)
IRON SERPL-MCNC: 131 UG/DL (ref 35–180)
MCH RBC QN AUTO: 31.3 PG (ref 26.5–33)
MCHC RBC AUTO-ENTMCNC: 34.7 G/DL (ref 31.5–36.5)
MCV RBC AUTO: 90 FL (ref 78–100)
PLATELET # BLD AUTO: 324 10E3/UL (ref 150–450)
RBC # BLD AUTO: 3.96 10E6/UL (ref 3.8–5.2)
TIBC SERPL-MCNC: 410 UG/DL (ref 240–430)
TSH SERPL DL<=0.005 MIU/L-ACNC: 3.68 MU/L (ref 0.4–4)
WBC # BLD AUTO: 7.5 10E3/UL (ref 4–11)

## 2022-03-23 PROCEDURE — 85027 COMPLETE CBC AUTOMATED: CPT

## 2022-03-23 PROCEDURE — 82728 ASSAY OF FERRITIN: CPT

## 2022-03-23 PROCEDURE — 84443 ASSAY THYROID STIM HORMONE: CPT

## 2022-03-23 PROCEDURE — 36415 COLL VENOUS BLD VENIPUNCTURE: CPT

## 2022-03-23 PROCEDURE — 83550 IRON BINDING TEST: CPT

## 2022-03-23 PROCEDURE — 82607 VITAMIN B-12: CPT

## 2022-03-24 PROBLEM — F33.1 MODERATE EPISODE OF RECURRENT MAJOR DEPRESSIVE DISORDER (H): Chronic | Status: ACTIVE | Noted: 2021-03-04

## 2022-03-24 LAB — VIT B12 SERPL-MCNC: 280 PG/ML (ref 193–986)

## 2022-03-25 ENCOUNTER — VIRTUAL VISIT (OUTPATIENT)
Dept: SLEEP MEDICINE | Facility: CLINIC | Age: 32
End: 2022-03-25
Payer: COMMERCIAL

## 2022-03-25 VITALS — WEIGHT: 235 LBS | BODY MASS INDEX: 36.88 KG/M2 | HEIGHT: 67 IN

## 2022-03-25 DIAGNOSIS — G47.61 PLMD (PERIODIC LIMB MOVEMENT DISORDER): ICD-10-CM

## 2022-03-25 DIAGNOSIS — G47.33 OSA (OBSTRUCTIVE SLEEP APNEA): Primary | ICD-10-CM

## 2022-03-25 DIAGNOSIS — G25.81 RESTLESS LEGS SYNDROME (RLS): ICD-10-CM

## 2022-03-25 PROCEDURE — 99214 OFFICE O/P EST MOD 30 MIN: CPT | Mod: 95 | Performed by: PHYSICIAN ASSISTANT

## 2022-03-25 ASSESSMENT — SLEEP AND FATIGUE QUESTIONNAIRES
HOW LIKELY ARE YOU TO NOD OFF OR FALL ASLEEP WHILE LYING DOWN TO REST IN THE AFTERNOON WHEN CIRCUMSTANCES PERMIT: HIGH CHANCE OF DOZING
HOW LIKELY ARE YOU TO NOD OFF OR FALL ASLEEP WHILE WATCHING TV: SLIGHT CHANCE OF DOZING
HOW LIKELY ARE YOU TO NOD OFF OR FALL ASLEEP WHILE SITTING INACTIVE IN A PUBLIC PLACE: WOULD NEVER DOZE
HOW LIKELY ARE YOU TO NOD OFF OR FALL ASLEEP WHILE SITTING AND TALKING TO SOMEONE: WOULD NEVER DOZE
HOW LIKELY ARE YOU TO NOD OFF OR FALL ASLEEP WHILE SITTING QUIETLY AFTER LUNCH WITHOUT ALCOHOL: SLIGHT CHANCE OF DOZING
HOW LIKELY ARE YOU TO NOD OFF OR FALL ASLEEP IN A CAR, WHILE STOPPED FOR A FEW MINUTES IN TRAFFIC: WOULD NEVER DOZE
HOW LIKELY ARE YOU TO NOD OFF OR FALL ASLEEP WHEN YOU ARE A PASSENGER IN A CAR FOR AN HOUR WITHOUT A BREAK: SLIGHT CHANCE OF DOZING
HOW LIKELY ARE YOU TO NOD OFF OR FALL ASLEEP WHILE SITTING AND READING: SLIGHT CHANCE OF DOZING

## 2022-03-25 ASSESSMENT — PATIENT HEALTH QUESTIONNAIRE - PHQ9
SUM OF ALL RESPONSES TO PHQ QUESTIONS 1-9: 16
SUM OF ALL RESPONSES TO PHQ QUESTIONS 1-9: 16
10. IF YOU CHECKED OFF ANY PROBLEMS, HOW DIFFICULT HAVE THESE PROBLEMS MADE IT FOR YOU TO DO YOUR WORK, TAKE CARE OF THINGS AT HOME, OR GET ALONG WITH OTHER PEOPLE: EXTREMELY DIFFICULT

## 2022-03-25 NOTE — PROGRESS NOTES
Analia is a 31 year old who is being evaluated via a billable video visit.      How would you like to obtain your AVS? MyChart  If the video visit is dropped, the invitation should be resent by: Send to e-mail at: [email or web address]  Will anyone else be joining your video visit? Donna Arvizu    Video Start Time: 10:31 AM  Video-Visit Details    Type of service:  Video Visit    Video End Time:10:49 AM    Originating Location (pt. Location): Home    Distant Location (provider location):  [facility]     Platform used for Video Visit: PocketMobile

## 2022-03-25 NOTE — PROGRESS NOTES
Obstructive Sleep Apnea - PAP Follow-Up Visit:    Chief Complaint   Patient presents with     Sleep Apnea     fatigue in the setting of depression       Analia Jerry comes in today for follow-up of their mild sleep apnea.    She initially presented with loud snoring, non-refreshing sleep, daytime sleepiness (ESS 13), difficulty maintaining sleep.    8/10/2020 [facility] [facility] Sleep Study (216.0 lbs) - scored with 3% rules - AHI 7.1, RDI 7.1, Supine AHI 22.9, REM AHI -, Low O2 91.2%, Time Spent less than 88% 0 minutes / Time Spent less than 89% 0 minutes. Periodic Limb Activity - There were 202 PLMs during the entire study. The PLM index was 30.7 movements per hour. The PLM Arousal Index was 9.0 per hour.     Elected treatment of sleep apnea with positional therapy.     Patient had elevated periodic limp movements during the study. The majority of these were not associated with cortical arousal. Patient denies wakeful motor restlessness, but  reported constant motion of her legs during sleep. Ferritin was low at 10 ng/ml.    Last Ferritin on 3/23/2022 was 19 ng/ml. She reports RLS symptoms now as well. She has been on Ropinirole 0.5 mg for about a year. She denies side effects.     Bedtime is typically 10:30 PM. Usually it takes about 30 minutes to fall asleep. Wake time is typically 9:30 AM.  The patient is usually getting 10 hours of sleep per night.    She does feel rested in the morning.    Total score - Elizabeth: 7 (3/25/2022 10:07 AM)    LEANNE Total Score: 23    She snores even though she wears a positional therapy device.     Weight now is 235 lbs.     Past medical/surgical history, family history, social history, medications and allergies were reviewed.      Problem List:  Patient Active Problem List    Diagnosis Date Noted     Posttraumatic stress disorder 03/15/2022     Priority: Medium     Fatigue, unspecified type 04/05/2021     Priority: Medium     Moderate episode of recurrent major depressive  "disorder (H) 03/04/2021     Priority: Medium     Morbid obesity (H) 10/28/2020     Priority: Medium     FRANK (obstructive sleep apnea) - mild (AHI 7) 08/24/2020     Priority: Medium     8/10/2020 [facility] Sleep Study (216.0 lbs) - scored with 3% rules -  AHI 7.1, RDI 7.1, Supine AHI 22.9, REM AHI -, Low O2 91.2%, Time Spent ?88% 0 minutes / Time Spent ?89% 0 minutes.       Class 1 obesity due to excess calories with body mass index (BMI) of 32.0 to 32.9 in adult, unspecified whether serious comorbidity present 07/02/2020     Priority: Medium     Elevated liver enzymes 12/28/2019     Priority: Medium     High triglycerides 04/03/2018     Priority: Medium     ASCUS of cervix with negative high risk HPV 01/26/2017     Priority: Medium     4/1/11 (approx) normal - patient reported.   6/11/13 normal - patient reported  12/3/15 - ASCUS pap. Plan: per provider, repeat pap in 1 year.  1/26/17 ASCUS/neg HR HPV. Plan: colp bef 4/26/17  3/23/17: Imbler Bx & ECC - negative. Plan cotest in 1 year.   3/8/18 ASCUS pap, neg HPV. Plan: colp.   5/10/18 Imbler Bx and ECC: negative. Plan: cotest in 1 yr.   6/6/19 Pap: NIL/neg HR HPV. Plan cotest in 3 years           Hypothyroidism due to Hashimoto's thyroiditis 01/26/2017     Priority: Medium     Common wart 06/02/2016     Priority: Medium     Keloid scar 09/20/2012     Priority: Medium     Mechanical low back pain 06/16/2012     Priority: Medium     CARDIOVASCULAR SCREENING; LDL GOAL LESS THAN 160 10/31/2010     Priority: Medium     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     Priority: Medium     Benign.          Ht 1.695 m (5' 6.75\")   Wt 106.6 kg (235 lb)   BMI 37.08 kg/m      Impression/Plan:    Mild obstructive sleep apnea (AHI of 7)  by a sleep study in 2020. Interim weight gain of ~19 lbs, so obstructive sleep apnea might be more severe.  She presents with snoring and fatigue even though she uses positional therapy device.  We reviewed treatment options.  She elected treatment " with a mandibular advancement device.  Sleep Dental referral placed.    Restless leg syndrome  periodic limb movement  --Continue iron supplementation and Ropinirole 0.5 mg at bedtime.     Analia Jerry will follow up as needed.     30 minutes spent on day of encounter doing chart review,  history and exam, counseling, coordinating plan of care, documentation and further activities as noted above.      Juan J Rouse PA-C

## 2022-03-26 ASSESSMENT — PATIENT HEALTH QUESTIONNAIRE - PHQ9: SUM OF ALL RESPONSES TO PHQ QUESTIONS 1-9: 16

## 2022-04-15 ENCOUNTER — OFFICE VISIT (OUTPATIENT)
Dept: OBGYN | Facility: CLINIC | Age: 32
End: 2022-04-15
Payer: COMMERCIAL

## 2022-04-15 VITALS
BODY MASS INDEX: 37.67 KG/M2 | DIASTOLIC BLOOD PRESSURE: 87 MMHG | WEIGHT: 240 LBS | SYSTOLIC BLOOD PRESSURE: 140 MMHG | OXYGEN SATURATION: 97 % | HEART RATE: 97 BPM | HEIGHT: 67 IN

## 2022-04-15 DIAGNOSIS — Z11.4 SCREENING FOR HIV (HUMAN IMMUNODEFICIENCY VIRUS): ICD-10-CM

## 2022-04-15 DIAGNOSIS — Z11.59 NEED FOR HEPATITIS C SCREENING TEST: ICD-10-CM

## 2022-04-15 DIAGNOSIS — Z30.41 ENCOUNTER FOR SURVEILLANCE OF CONTRACEPTIVE PILLS: ICD-10-CM

## 2022-04-15 DIAGNOSIS — Z01.419 ENCOUNTER FOR GYNECOLOGICAL EXAMINATION WITHOUT ABNORMAL FINDING: Primary | ICD-10-CM

## 2022-04-15 PROCEDURE — 36415 COLL VENOUS BLD VENIPUNCTURE: CPT | Performed by: NURSE PRACTITIONER

## 2022-04-15 PROCEDURE — 86803 HEPATITIS C AB TEST: CPT | Performed by: NURSE PRACTITIONER

## 2022-04-15 PROCEDURE — 99395 PREV VISIT EST AGE 18-39: CPT | Performed by: NURSE PRACTITIONER

## 2022-04-15 PROCEDURE — 87624 HPV HI-RISK TYP POOLED RSLT: CPT | Performed by: NURSE PRACTITIONER

## 2022-04-15 PROCEDURE — 87389 HIV-1 AG W/HIV-1&-2 AB AG IA: CPT | Performed by: NURSE PRACTITIONER

## 2022-04-15 PROCEDURE — G0145 SCR C/V CYTO,THINLAYER,RESCR: HCPCS | Performed by: NURSE PRACTITIONER

## 2022-04-15 ASSESSMENT — PAIN SCALES - GENERAL: PAINLEVEL: NO PAIN (0)

## 2022-04-15 NOTE — PATIENT INSTRUCTIONS
If you have any questions regarding your visit, Please contact your care team.     [employer]: [phone]  To Schedule an Appointment 24/7  Call: [phone] TELEPHONE NUM[facility]     Zuleyma Roberson-Medical Assistant    Soila-RN  Sera-RN  Kadie Woodard-  Lidia-         Monday 7:30 am-5:00 pm    Tuesday 8:00 am-4:00 pm    Wednesday 7:30 am-4:00 pm  [place]    Thursday 8:00 am-11:00 am    Friday 7:30 am-4:00 pm [facility]  [address] [place] [address] [phone] ask for [facility] [place]  [phone] Fax    Imaging Scheduling all locations  [phone]     [facility]  [address] [place] [address]   [place], [place] [id number] [phone]         Urgent Care locations:  [facility]   Monday-Friday  10 am - 8 pm  Saturday and Sunday   9 am - 5 pm     [phone]   If you need a medication refill, please contact your pharmacy. Please allow 3 business days for your refill to be completed.  As always, Thank you for trusting us with your healthcare needs!      see additional instructions from your care team below

## 2022-04-15 NOTE — PROGRESS NOTES
SUBJECTIVE:   CC: Analia Jerry is an 31 year old woman who presents for preventive health visit.     {Healthy Habits:     Getting at least 3 servings of Calcium per day:  NO    Bi-annual eye exam:  Yes    Dental care twice a year:  Yes    Sleep apnea or symptoms of sleep apnea:  Daytime drowsiness and Sleep apnea    Diet:  Regular (no restrictions) and Breakfast skipped    Frequency of exercise:  2-3 days/week    Duration of exercise:  15-30 minutes    Taking medications regularly:  Yes    Medication side effects:  None    PHQ-2 Total Score: 2    Additional concerns today:  No        Today's PHQ-2 Score:   PHQ-2 ( 1999 [employer]) 4/15/2022   Q1: Little interest or pleasure in doing things 1   Q2: Feeling down, depressed or hopeless 1   PHQ-2 Score 2   PHQ-2 Total Score (12-17 Years)- Positive if 3 or more points; Administer PHQ-A if positive -   Q1: Little interest or pleasure in doing things Several days   Q2: Feeling down, depressed or hopeless Several days   PHQ-2 Score 2       Abuse: Current or Past (Physical, Sexual or Emotional) - No  Do you feel safe in your environment? Yes        Social History     Tobacco Use     Smoking status: Never Smoker     Smokeless tobacco: Never Used   Substance Use Topics     Alcohol use: Yes     Comment: very rarely since having gallbladder out         Alcohol Use 4/15/2022   Prescreen: >3 drinks/day or >7 drinks/week? Not Applicable   No flowsheet data found.    Reviewed orders with patient.  Reviewed health maintenance and updated orders accordingly - Yes  Patient Active Problem List   Diagnosis     Familial hematuria     CARDIOVASCULAR SCREENING; LDL GOAL LESS THAN 160     Mechanical low back pain     Keloid scar     ASCUS of cervix with negative high risk HPV     Common wart     Hypothyroidism due to Hashimoto's thyroiditis     High triglycerides     Elevated liver enzymes     Class 1 obesity due to excess calories with body mass index (BMI) of 32.0 to 32.9 in adult,  unspecified whether serious comorbidity present     FRANK (obstructive sleep apnea) - mild (AHI 7)     Morbid obesity (H)     Moderate episode of recurrent major depressive disorder (H)     Fatigue, unspecified type     Posttraumatic stress disorder     Past Surgical History:   Procedure Laterality Date     COLONOSCOPY N/A 12/16/2015    Procedure: COLONOSCOPY;  Surgeon: Duane, William Charles, MD;  Location: [facility]     COLONOSCOPY WITH CO2 INSUFFLATION N/A 12/16/2015    Procedure: COLONOSCOPY WITH CO2 INSUFFLATION;  Surgeon: Duane, William Charles, MD;  Location: [facility]     NO HISTORY OF SURGERY         Social History     Tobacco Use     Smoking status: Never Smoker     Smokeless tobacco: Never Used   Substance Use Topics     Alcohol use: Yes     Comment: very rarely since having gallbladder out     Family History   Problem Relation Age of Onset     Depression Brother         depression     Hyperlipidemia Mother      Thyroid Disease Mother      Hyperlipidemia Father      Breast Cancer Cousin      Depression Brother      Substance Abuse Sister      Substance Abuse Sister      C.A.D. No family hx of      Diabetes No family hx of      Breast Cancer No family hx of      Cancer - colorectal No family hx of      Anesthesia Reaction No family hx of      Blood Disease No family hx of            Breast Cancer Screening:  Any new diagnosis of family breast, ovarian, or bowel cancer? No    Patient under 40 years of age: Routine Mammogram Screening not recommended.   Pertinent mammograms are reviewed under the imaging tab.    History of abnormal Pap smear: YES - updated in Problem List and Health Maintenance accordingly  PAP / HPV Latest Ref Rng & Units 6/6/2019 3/8/2018 1/26/2017   PAP (Historical) - NIL ASC-US(A) ASC-US(A)   HPV16 NEG:Negative Negative Negative Negative   HPV18 NEG:Negative Negative Negative Negative   HRHPV NEG:Negative Negative Negative Negative     Reviewed and updated as needed this visit by clinical staff    "Tobacco  Allergies  Meds   Med Hx  Surg Hx  Fam Hx  Soc Hx          Reviewed and updated as needed this visit by Provider                   Past Medical History:   Diagnosis Date     Acute gallstone pancreatitis 12/28/2019     Anxiety      ASCUS of cervix with negative high risk HPV 1/26/17    ASCUS/neg HR HPV.     Depressive disorder      Gallstones 12/28/2019    [facility]     H/O colposcopy with cervical biopsy 03/23/2017    Bx & ECC - negative     Hashimoto's disease      Headache(784.0)      Hypothyroidism due to Hashimoto's thyroiditis      Papanicolaou smear of cervix with atypical squamous cells of undetermined significance (ASC-US) 12/03/2015      Past Surgical History:   Procedure Laterality Date     COLONOSCOPY N/A 12/16/2015    Procedure: COLONOSCOPY;  Surgeon: Duane, William Charles, MD;  Location: [facility]     COLONOSCOPY WITH CO2 INSUFFLATION N/A 12/16/2015    Procedure: COLONOSCOPY WITH CO2 INSUFFLATION;  Surgeon: Duane, William Charles, MD;  Location: [facility]     NO HISTORY OF SURGERY         Review of Systems  CONSTITUTIONAL: NEGATIVE for fever, chills, change in weight  INTEGUMENTARU/SKIN: NEGATIVE for worrisome rashes, moles or lesions  EYES: NEGATIVE for vision changes or irritation  ENT: NEGATIVE for ear, mouth and throat problems  RESP: NEGATIVE for significant cough or SOB  BREAST: NEGATIVE for masses, tenderness or discharge  CV: NEGATIVE for chest pain, palpitations or peripheral edema  GI: NEGATIVE for nausea, abdominal pain, heartburn, or change in bowel habits  : NEGATIVE for unusual urinary or vaginal symptoms. Periods are regular.  MUSCULOSKELETAL: NEGATIVE for significant arthralgias or myalgia  NEURO: NEGATIVE for weakness, dizziness or paresthesias  PSYCHIATRIC: NEGATIVE for changes in mood or affect     OBJECTIVE:   BP (!) 140/87 (BP Location: Right arm, Patient Position: Sitting, Cuff Size: Adult Large)   Pulse 97   Ht 1.695 m (5' 6.75\")   Wt 108.9 kg (240 " lb)   SpO2 97%   BMI 37.87 kg/m    Physical Exam  GENERAL: healthy, alert and no distress  NECK: no adenopathy, no asymmetry, masses, or scars and thyroid normal to palpation  RESP: lungs clear to auscultation - no rales, rhonchi or wheezes  BREAST: normal without masses, tenderness or nipple discharge and no palpable axillary masses or adenopathy  CV: regular rate and rhythm, normal S1 S2, no S3 or S4, no murmur, click or rub, no peripheral edema and peripheral pulses strong  ABDOMEN: soft, nontender, no hepatosplenomegaly, no masses and bowel sounds normal   (female): normal female external genitalia, normal urethral meatus, vaginal mucosa pink, moist, well rugated, and normal cervix/adnexa/uterus without masses or discharge  MS: no gross musculoskeletal defects noted, no edema  SKIN: no suspicious lesions or rashes  NEURO: Normal strength and tone, mentation intact and speech normal  PSYCH: mentation appears normal, affect normal/bright    ASSESSMENT/PLAN:   (Z01.419) Encounter for gynecological examination without abnormal finding  (primary encounter diagnosis)  Comment: Follow up based on pap result  Plan: Pap imaged thin layer screen with HPV -         recommended age 30 - 65        (Z30.41) Encounter for surveillance of contraceptive pills  Comment: Continuously cycles, only takes break if she begins to have breakthrough bleeding. Refill x 1 year.  Plan: norethindrone-ethinyl estradiol (ORTHO-NOVUM         , ,) 1-35 MG-MCG tablet     (Z11.59) Need for hepatitis C screening test  Plan: Hepatitis C antibody         (Z11.4) Screening for HIV (human immunodeficiency virus)  Plan: HIV Antigen Antibody Combo         COUNSELING:  Reviewed preventive health counseling, as reflected in patient instructions  Special attention given to:        Regular exercise       Healthy diet/nutrition       Contraception       Consider Hep C screening for all patients one time for ages 18-79 years       HIV screeninx  "in teen years, 1x in adult years, and at intervals if high risk    Estimated body mass index is 37.87 kg/m  as calculated from the following:    Height as of this encounter: 1.695 m (5' 6.75\").    Weight as of this encounter: 108.9 kg (240 lb).    Weight management plan: Discussed healthy diet and exercise guidelines    She reports that she has never smoked. She has never used smokeless tobacco.      Counseling Resources:  ATP IV Guidelines  Pooled Cohorts Equation Calculator  Breast Cancer Risk Calculator  BRCA-Related Cancer Risk Assessment: FHS-7 Tool  FRAX Risk Assessment  ICSI Preventive Guidelines  Dietary Guidelines for Americans, 2010  USDA's MyPlate  ASA Prophylaxis  Lung CA Screening    MERRY Carrington CNP  [facility]  "

## 2022-04-18 LAB
HCV AB SERPL QL IA: NONREACTIVE
HIV 1+2 AB+HIV1 P24 AG SERPL QL IA: NONREACTIVE

## 2022-04-19 LAB
BKR LAB AP GYN ADEQUACY: NORMAL
BKR LAB AP GYN INTERPRETATION: NORMAL
BKR LAB AP HPV REFLEX: NORMAL
BKR LAB AP PREVIOUS ABNL DX: NORMAL
BKR LAB AP PREVIOUS ABNORMAL: NORMAL
PATH REPORT.COMMENTS IMP SPEC: NORMAL
PATH REPORT.COMMENTS IMP SPEC: NORMAL
PATH REPORT.RELEVANT HX SPEC: NORMAL

## 2022-04-21 LAB
HUMAN PAPILLOMA VIRUS 16 DNA: NEGATIVE
HUMAN PAPILLOMA VIRUS 18 DNA: NEGATIVE
HUMAN PAPILLOMA VIRUS FINAL DIAGNOSIS: NORMAL
HUMAN PAPILLOMA VIRUS OTHER HR: NEGATIVE

## 2022-04-30 DIAGNOSIS — G25.81 RESTLESS LEGS SYNDROME (RLS): ICD-10-CM

## 2022-05-02 RX ORDER — ROPINIROLE 0.5 MG/1
TABLET, FILM COATED ORAL
Qty: 180 TABLET | Refills: 1 | Status: SHIPPED | OUTPATIENT
Start: 2022-05-02 | End: 2022-10-25

## 2022-05-02 NOTE — TELEPHONE ENCOUNTER
"Requested Prescriptions   Pending Prescriptions Disp Refills    rOPINIRole (REQUIP) 0.5 MG tablet 180 tablet 1     Si.25 mg (1/2 tablet) once daily 1 to 3 hours before bedtime. Dose may be increased after 2 days to 0.5 mg once daily, and after 7 days to 1 mg once daily (2 tablets). Dose may be further increased in 0.5 mg increments every week until reaching 3 mg once daily during week 6.        Antiparkinson's Agents Protocol Failed - 2022  9:34 AM        Failed - Blood pressure under 140/90 in past 12 months       BP Readings from Last 3 Encounters:   04/15/22 (!) 140/87   21 (!) 137/92   10/28/20 119/86                 Failed - ALT on record in past 12 months         Recent Labs   Lab Test 04/23/15  1640   ALT 14               Failed - Serum Creatinine on file in past 12 months     Recent Labs   Lab Test 19  0901   CR 0.78       Ok to refill medication if creatinine is low          Passed - CBC on record in past 12 months     Recent Labs   Lab Test 22  1147   WBC 7.5   RBC 3.96   HGB 12.4   HCT 35.7                      Passed - Medication is active on med list        Passed - Patient is age 18 or older        Passed - No active pregnancy on record        Passed - No positive pregnancy test in the past 12 months        Passed - Recent (6 mo) or future (30 days) visit within the authorizing provider's specialty     Patient had office visit in the last 6 months or has a visit in the next 30 days with authorizing provider or within the authorizing provider's specialty.  See \"Patient Info\" tab in inbasket, or \"Choose Columns\" in Meds & Orders section of the refill encounter.                  "

## 2022-05-20 NOTE — TELEPHONE ENCOUNTER
Pt completed PHQ-9 with score of 9. To provider to advise.  Rosaura Lee RN     X Size Of Lesion In Cm (Optional): 0

## 2022-05-23 ENCOUNTER — MYC MEDICAL ADVICE (OUTPATIENT)
Dept: UROLOGY | Facility: CLINIC | Age: 32
End: 2022-05-23
Payer: COMMERCIAL

## 2022-05-23 ENCOUNTER — MYC MEDICAL ADVICE (OUTPATIENT)
Dept: SLEEP MEDICINE | Facility: CLINIC | Age: 32
End: 2022-05-23
Payer: COMMERCIAL

## 2022-05-23 ENCOUNTER — VIRTUAL VISIT (OUTPATIENT)
Dept: PSYCHIATRY | Facility: CLINIC | Age: 32
End: 2022-05-23
Payer: COMMERCIAL

## 2022-05-23 ENCOUNTER — VIRTUAL VISIT (OUTPATIENT)
Dept: BEHAVIORAL HEALTH | Facility: CLINIC | Age: 32
End: 2022-05-23
Payer: COMMERCIAL

## 2022-05-23 VITALS — BODY MASS INDEX: 37.87 KG/M2 | WEIGHT: 240 LBS

## 2022-05-23 DIAGNOSIS — F43.10 POSTTRAUMATIC STRESS DISORDER: ICD-10-CM

## 2022-05-23 DIAGNOSIS — F33.41 RECURRENT MAJOR DEPRESSION IN PARTIAL REMISSION (H): Primary | ICD-10-CM

## 2022-05-23 DIAGNOSIS — F33.0 DEPRESSION, MAJOR, RECURRENT, MILD (H): Primary | ICD-10-CM

## 2022-05-23 DIAGNOSIS — G47.33 OSA (OBSTRUCTIVE SLEEP APNEA): Primary | ICD-10-CM

## 2022-05-23 DIAGNOSIS — F41.1 GENERALIZED ANXIETY DISORDER: ICD-10-CM

## 2022-05-23 PROCEDURE — 99214 OFFICE O/P EST MOD 30 MIN: CPT | Mod: 95 | Performed by: PSYCHIATRY & NEUROLOGY

## 2022-05-23 PROCEDURE — 90832 PSYTX W PT 30 MINUTES: CPT | Mod: 95 | Performed by: SOCIAL WORKER

## 2022-05-23 RX ORDER — BUPROPION HYDROCHLORIDE 150 MG/1
150 TABLET ORAL EVERY MORNING
Qty: 90 TABLET | Refills: 1 | Status: SHIPPED | OUTPATIENT
Start: 2022-05-23 | End: 2022-12-13

## 2022-05-23 RX ORDER — ESCITALOPRAM OXALATE 10 MG/1
10 TABLET ORAL DAILY
Qty: 30 TABLET | Refills: 1 | Status: SHIPPED | OUTPATIENT
Start: 2022-05-23 | End: 2022-07-07

## 2022-05-23 RX ORDER — BUPROPION HYDROCHLORIDE 300 MG/1
300 TABLET ORAL EVERY MORNING
Qty: 90 TABLET | Refills: 1 | Status: SHIPPED | OUTPATIENT
Start: 2022-05-23 | End: 2022-12-13

## 2022-05-23 ASSESSMENT — ANXIETY QUESTIONNAIRES
GAD7 TOTAL SCORE: 7
7. FEELING AFRAID AS IF SOMETHING AWFUL MIGHT HAPPEN: NOT AT ALL
3. WORRYING TOO MUCH ABOUT DIFFERENT THINGS: SEVERAL DAYS
GAD7 TOTAL SCORE: 7
6. BECOMING EASILY ANNOYED OR IRRITABLE: MORE THAN HALF THE DAYS
2. NOT BEING ABLE TO STOP OR CONTROL WORRYING: SEVERAL DAYS
GAD7 TOTAL SCORE: 7
1. FEELING NERVOUS, ANXIOUS, OR ON EDGE: SEVERAL DAYS
8. IF YOU CHECKED OFF ANY PROBLEMS, HOW DIFFICULT HAVE THESE MADE IT FOR YOU TO DO YOUR WORK, TAKE CARE OF THINGS AT HOME, OR GET ALONG WITH OTHER PEOPLE?: SOMEWHAT DIFFICULT
7. FEELING AFRAID AS IF SOMETHING AWFUL MIGHT HAPPEN: NOT AT ALL
5. BEING SO RESTLESS THAT IT IS HARD TO SIT STILL: SEVERAL DAYS
4. TROUBLE RELAXING: SEVERAL DAYS

## 2022-05-23 ASSESSMENT — PATIENT HEALTH QUESTIONNAIRE - PHQ9
10. IF YOU CHECKED OFF ANY PROBLEMS, HOW DIFFICULT HAVE THESE PROBLEMS MADE IT FOR YOU TO DO YOUR WORK, TAKE CARE OF THINGS AT HOME, OR GET ALONG WITH OTHER PEOPLE: SOMEWHAT DIFFICULT
SUM OF ALL RESPONSES TO PHQ QUESTIONS 1-9: 10
SUM OF ALL RESPONSES TO PHQ QUESTIONS 1-9: 10

## 2022-05-23 NOTE — PROGRESS NOTES
Telemedicine Visit: The patient's condition can be safely assessed and treated via synchronous audio and visual telemedicine encounter.      Reason for Telemedicine Visit: Services only offered telehealth    Originating Site (Patient Location): Patient's home    Distant Site (Provider Location): Provider Remote Setting- Home Office    Consent:  The patient/guardian has verbally consented to: the potential risks and benefits of telemedicine (video visit) versus in person care; bill my insurance or make self-payment for services provided; and responsibility for payment of non-covered services.     Mode of Communication:  Video Conference via [employer]    As the provider I attest to compliance with applicable laws and regulations related to telemedicine.      Analia is a 31 year old who is being evaluated via a billable video visit.      How would you like to obtain your AVS? MyChart  If the video visit is dropped, the invitation should be resent by: Text to cell phone: [phone]  Will anyone else be joining your video visit? No         Outpatient Psychiatric Progress Note    Name: Analia Jerry   : 1990                    Primary Care Provider: MERRY Phillips CNP   Therapist: Tayler Munoz      PHQ-9 scores:  PHQ-9 SCORE 3/25/2022 2022 2022   PHQ-9 Total Score - - -   PHQ-9 Total Score MyChart 16 (Moderately severe depression) 5 (Mild depression) 10 (Moderate depression)   PHQ-9 Total Score 16 5 10   Some encounter information is confidential and restricted. Go to Review Flowsheets activity to see all data.       MAXIM-7 scores:  MAXIM-7 SCORE 2021 3/14/2022 2022   Total Score - - -   Total Score 6 (mild anxiety) 11 (moderate anxiety) 7 (mild anxiety)   Total Score 6 - 7   Some encounter information is confidential and restricted. Go to Review Flowsheets activity to see all data.       Patient Identification:  Analia is a 31 year old year old female  who presents for  return visit with me.  Patient attended the session alone.     Interim History:  Per Azalea Moya, [facility]:  Pt shares that she is doing ok. Has met with sleep specialist and that provider shares that Lexapro can cause restless legs and would like pt to talk with [facility] psychiatrist about it. Is looking into a possible mouth guard for sleep as well but is a little concerned as there is a possibility of her teeth shifting and she doesn't want to worry about that causing other issues.  Has switched jobs the past couple of months. 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  Things at home are going well and will have her step children much more over the summer months.  Is primarily working from home but goes out to do assessments and some training since she is in a new role.  Is unsure what her baseline is for mood given that she has been on medications for such a long time but feels she is in a better place and would be open to going off Lexapro to see if that helps her restless legs and sleep.  Continues to see a therapist regularly.    Analia and I reviewed her lab results as below.  She continues to take an iron supplement.  For the most part, her iron studies are in the normal range, although her iron binding capacity remains at the upper end of normal.  She did see her sleep specialist, who made adjustments to her ropinirole.  The sleep specialist recommended continuing with the iron supplement.  He was suspicious that Lexapro may be contributing to her restless legs.    Analia does not feel the Lexapro was particularly effective, we agreed to decrease it to 10 mg daily for 2 weeks then decrease it to 5 mg daily for 2 weeks, then discontinue it.  Risks and benefits of discontinuing the medication were reviewed.    Today, Analia rates her mood as 8 or 9 out of 10 with 10 being the best.  She changed positions at work and is  "enjoying her new job, although she has a lot to learn.  She reports that she is \"sleeping too much.\"  She presently goes to bed between 10:30 and 11 PM and wakes up around 7.  She naps both days on the weekends.  She also reports that she has a tendency to overeat in the evenings, but has been working on meal planning.    Vital Signs:   No vital signs taken for this encounter.    Labs:  TSH   Date Value Ref Range Status   03/23/2022 3.68 0.40 - 4.00 mU/L Final   07/07/2021 0.03 (L) 0.40 - 4.00 mU/L Final     Lab Results   Component Value Date    WBC 7.5 03/23/2022    WBC 8.6 08/05/2019     Lab Results   Component Value Date    RBC 3.96 03/23/2022    RBC 3.81 08/05/2019     Lab Results   Component Value Date    HGB 12.4 03/23/2022    HGB 11.8 08/05/2019     Lab Results   Component Value Date    HCT 35.7 03/23/2022    HCT 34.1 08/05/2019     No components found for: MCT  Lab Results   Component Value Date    MCV 90 03/23/2022    MCV 90 08/05/2019     Lab Results   Component Value Date    MCH 31.3 03/23/2022    MCH 31.0 08/05/2019     Lab Results   Component Value Date    MCHC 34.7 03/23/2022    MCHC 34.6 08/05/2019     Lab Results   Component Value Date    RDW 12.1 03/23/2022    RDW 12.7 08/05/2019     Lab Results   Component Value Date     03/23/2022     08/05/2019     Ref Range & Units 2 mo ago       Iron 35 - 180 ug/dL 131     Iron Binding Capacity 240 - 430 ug/dL 410     Iron Sat Index 15 - 46 % 32      Ref Range & Units 2 mo ago   (3/23/22) 1 yr ago   (11/27/20) 1 yr ago   (8/13/20)      Ferritin 12 - 150 ng/mL 19  19  10 Low           Current Medications:  Current Outpatient Medications   Medication     buPROPion (WELLBUTRIN XL) 150 MG 24 hr tablet     buPROPion (WELLBUTRIN XL) 300 MG 24 hr tablet     escitalopram (LEXAPRO) 10 MG tablet     levothyroxine (SYNTHROID/LEVOTHROID) 200 MCG tablet     nitroFURantoin macrocrystal (MACRODANTIN) 100 MG capsule     norethindrone-ethinyl estradiol " (ORTHO-NOVUM 1/35, 28,) 1-35 MG-MCG tablet     rOPINIRole (REQUIP) 0.5 MG tablet     No current facility-administered medications for this visit.      Mental Status Examination:  Analia is a 31-year-old woman in no acute distress.  She is neatly groomed in casual clothing.  Speech is clear and normal in rate and tone.  Eye contact is good over the video connection.  Affect is full.  Mood is good.  Thoughts are logical and spontaneous with no loose associations or flight of ideas.  Thought content shows no psychosis.  No suicidal thoughts.  She is alert and oriented x3.    Assessment and Plan:    ICD-10-CM    1. Recurrent major depression in partial remission (H)  F33.41 buPROPion (WELLBUTRIN XL) 150 MG 24 hr tablet   2. Posttraumatic stress disorder  F43.10        Medical comorbidities include:   Patient Active Problem List   Diagnosis     Familial hematuria     CARDIOVASCULAR SCREENING; LDL GOAL LESS THAN 160     Mechanical low back pain     Keloid scar     ASCUS of cervix with negative high risk HPV     Common wart     Hypothyroidism due to Hashimoto's thyroiditis     High triglycerides     Elevated liver enzymes     Class 1 obesity due to excess calories with body mass index (BMI) of 32.0 to 32.9 in adult, unspecified whether serious comorbidity present     FRANK (obstructive sleep apnea) - mild (AHI 7)     Morbid obesity (H)     Moderate episode of recurrent major depressive disorder (H)     Fatigue, unspecified type     Posttraumatic stress disorder       Treatment Plan:  Patient Instructions   Continue bupropion  mg daily.     Decrease Lexapro to 10 mg daily for two weeks, then decrease to 5 mg daily for two weeks, then stop.     Continue ropinirole per your sleep specialist.     Continue all other medications per your primary care provider.    Schedule an appointment with me in 8 weeks or sooner as needed.  You may call [facility] at [phone] to schedule.    [facility]  Resources:      Go to the Emergency Department as needed or call after hours crisis line at [phone].      To schedule individual or family therapy, call [facility] at [phone].     Follow up with primary care provider as planned or sooner for acute medical concerns.    Call the psychiatric nurse line with medication questions or concerns at [phone].    MyChart may be used to communicate with your provider, but this is not intended to be used for emergencies.    Community Resources:      National Suicide Prevention Lifeline: 908.114.2619 (TTY: 934.207.2909). Call anytime for help.  (www.suicidepreventionlifeline.org)    [place] on [place]ness (www.irasema.org): 534.248.7872 or 953-651-6322.     Mental Health Association (www.mentalhealth.org): 200.932.1268 or 945-584-0053.    [place] Crisis Text Line: Text [place] to 456776    Suicide LifeLine Chat: [employer].org/chat         Administrative Billing:   Video call duration: 19 minutes   Start: 2:17 PM   Stop: 2:35 PM  Total time spent, including chart review and documentation: 30 minutes    Patient Status:  Patient will continue to be seen for ongoing consultation and stabilization.

## 2022-05-23 NOTE — PROGRESS NOTES
[facility] Collaborative Care Psychiatry Service   May 23, 2022      Behavioral Health Clinician Progress Note    Patient Name: Analia Jerry           Service Type:  Individual      Service Location:   MyChart / Email (patient reached)     Session Start Time: 1:30p  Session End Time: 1:47p      Session Length: 16 - 37      Attendees: Client     Service Modality:  Video Visit:      Provider verified identity through the following two step process.  Patient provided:  Patient photo and Patient     Telemedicine Visit: The patient's condition can be safely assessed and treated via synchronous audio and visual telemedicine encounter.      Reason for Telemedicine Visit: Services only offered telehealth    Originating Site (Patient Location): Patient's home    Distant Site (Provider Location): Provider Remote Setting- Home Office    Consent:  The patient/guardian has verbally consented to: the potential risks and benefits of telemedicine (video visit) versus in person care; bill my insurance or make self-payment for services provided; and responsibility for payment of non-covered services.     Patient would like the video invitation sent by:  My Chart    Mode of Communication:  Video Conference via Amwell    As the provider I attest to compliance with applicable laws and regulations related to telemedicine.    Visit Activities (Refresh list every visit): [facility] Only    Diagnostic Assessment Date: 3/15/22  Treatment Plan Review Date: 22  See Flowsheets for today's PHQ-9 and MAXIM-7 results  Previous PHQ-9:   PHQ-9 SCORE 3/25/2022 2022 2022   PHQ-9 Total Score - - -   PHQ-9 Total Score [facility] 16 (Moderately severe depression) 5 (Mild depression) 10 (Moderate depression)   PHQ-9 Total Score 16 5 10   Some encounter information is confidential and restricted. Go to Review Flowsheets activity to see all data.     Previous MAXIM-7:   MAXIM-7 SCORE 2021 3/14/2022 2022   Total Score - - -   Total Score  6 (mild anxiety) 11 (moderate anxiety) 7 (mild anxiety)   Total Score 6 - 7   Some encounter information is confidential and restricted. Go to Review Flowsheets activity to see all data.       KADE LEVEL:  KADE Score (Last Two) 8/15/2011   KADE Raw Score 37   Activation Score 49.9   KADE Level 2       DATA  Extended Session (60+ minutes): No  Interactive Complexity: No  Crisis: No  [facility] Patient: No    Treatment Objective(s) Addressed in This Session:  Target Behavior(s): sleep, depression    Depressed Mood: Increase interest, engagement, and pleasure in doing things    Current Stressors / Issues:  Pt shares that she is doing ok.  Has met with sleep specialist and that provider shares that zoloft can cause restless legs and would like pt to talk with [facility] psychiatrist about it.  Is looking into a possible mouth guard for sleep as well but is a little concerned as there is a possibility of her teeth shifting and she doesn't want to worry about that causing other issues.    Has switched jobs the past couple of months.  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   Things at home are going well and will have her step children much more over the summer months.    Is primarily working from home but goes out to do assessments and some training since she is in a new role.  Is unsure what her baseline is for mood given that she has been on medications for such a long time but feels she is in a better place and would be open to going off zoloft to see if that helps her restless legs and sleep.    Continues to see a therapist regularly.      Progress on Treatment Objective(s) / Homework:  Minimal progress - ACTION (Actively working towards change); Intervened by reinforcing change plan / affirming steps taken    Motivational Interviewing    MI Intervention: Supported Autonomy, Collaboration, Evocation, Permission to raise concern or advise,  Open-ended questions and Reflections: simple and complex     Change Talk Expressed by the Patient: Activation Taking steps    Provider Response to Change Talk: E - Evoked more info from patient about behavior change, A - Affirmed patient's thoughts, decisions, or attempts at behavior change, R - Reflected patient's change talk and S - Summarized patient's change talk statements      Care Plan review completed: Yes    Medication Review:  Changes to psychiatric medications, see updated Medication List in EPIC.     Medication Compliance:  Yes    Changes in Health Issues:   None reported    Chemical Use Review:   Substance Use: Chemical use reviewed, no active concerns identified      Tobacco Use: No current tobacco use.      Assessment: Current Emotional / Mental Status (status of significant symptoms):  Risk status (Self / Other harm or suicidal ideation)  Patient has had a history of suicidal ideation: hx of SI several years ago.  denies current  Patient denies current fears or concerns for personal safety.  Patient denies current or recent suicidal ideation or behaviors.  Patient denies current or recent homicidal ideation or behaviors.  Patient denies current or recent self injurious behavior or ideation.  Patient denies other safety concerns.  A safety and risk management plan has not been developed at this time, however patient was encouraged to call [facility] should there be a change in any of these risk factors.    Appearance:   Appropriate   Eye Contact:   Good   Psychomotor Behavior: Normal   Attitude:   Cooperative   Orientation:   All  Speech   Rate / Production: Normal    Volume:  Normal   Mood:    Normal  Affect:    Appropriate   Thought Content:  Clear   Thought Form:  Coherent  Logical   Insight:    Good     Diagnoses:  1. Depression, major, recurrent, mild (H)    2. Generalized anxiety disorder        Collateral Reports Completed:  [facility]    Plan: (Homework,  other):  Patient was given information about behavioral services and encouraged to schedule a follow up appointment with the clinic [facility] as needed.  She was also given information about mental health symptoms and treatment options .  Has an individual therapist that sees regularly.  CD Recommendations: No indications of CD issues.  Azalea Moya, RICHARD, [facility]      ______________________________________________________________________    Integrated Primary Care Behavioral Health Treatment Plan    Patient's Name: Analia Jerry  YOB: 1990    Date of Creation: 5/23/22  Date Treatment Plan Last Reviewed/Revised: 5/23/22    DSM5 Diagnoses: 296.31 (F33.0) Major Depressive Disorder, Recurrent Episode, Mild _ and With mixed features or 300.02 (F41.1) Generalized Anxiety Disorder  Psychosocial / Contextual Factors: grief, job change  PROMIS (reviewed every 90 days):     Referral / Collaboration:  Referral to another professional/service is not indicated at this time. Pt has a long term therapist.    Anticipated number of session for this episode of care: 4-5  Anticipation frequency of session: Every 2 months  Anticipated Duration of each session: 16-37 minutes  Treatment plan will be reviewed in 90 days or when goals have been changed.       MeasurableTreatment Goal(s) related to diagnosis / functional impairment(s)  Goal 1: Patient will experience a reduction in depression symptom along with a corresponding increase in positive emotions and life satisfaction.      Objective #A (Patient Action)    Patient will Feel less tired and more energy during the day .  Status: New - Date: 5/23/22     Intervention(s)  Therapist will use cognitive-behavioral and acceptance and commitment therapy topics aimed to help reduce anxiety and depression.        Patient has reviewed and agreed to the above plan.      JAMES Herrmann, RICHARD, [facility]  May 23, 2022

## 2022-05-23 NOTE — PATIENT INSTRUCTIONS
Continue bupropion  mg daily.     Decrease Lexapro to 10 mg daily for two weeks, then decrease to 5 mg daily for two weeks, then stop.     Continue ropinirole per your sleep specialist.     Continue all other medications per your primary care provider.    Schedule an appointment with me in 8 weeks or sooner as needed.  You may call [facility] at [phone] to schedule.    [place] Resources:    Go to the Emergency Department as needed or call after hours crisis line at [phone].    To schedule individual or family therapy, call [facility] at [phone].   Follow up with primary care provider as planned or sooner for acute medical concerns.  Call the psychiatric nurse line with medication questions or concerns at [phone].  Movetis may be used to communicate with your provider, but this is not intended to be used for emergencies.    Community Resources:    National Suicide Prevention Lifeline: 182.492.5354 (TTY: 628.640.2634). Call anytime for help.  (www.suicidepreventionlifeline.org)  National [place] on Mental Illness (www.irasema.org): 846.783.4449 or 207-906-6039.   Mental Health Association (www.mentalhealth.org): 634.525.4772 or 812-108-6058.  [place] Crisis Text Line: Text [place] to 924510  Suicide LifeLine Chat: suicidelogtrustline.org/chat

## 2022-06-02 ENCOUNTER — TELEPHONE (OUTPATIENT)
Dept: SLEEP MEDICINE | Facility: CLINIC | Age: 32
End: 2022-06-02
Payer: COMMERCIAL

## 2022-06-02 DIAGNOSIS — G47.33 OSA (OBSTRUCTIVE SLEEP APNEA): Primary | ICD-10-CM

## 2022-06-02 NOTE — TELEPHONE ENCOUNTER
Patient called [facility]'s customer service line wanting to be put on the CPAP wait list to be setup in [facility] showroom.

## 2022-06-20 ENCOUNTER — TELEPHONE (OUTPATIENT)
Dept: TRANSPLANT | Facility: CLINIC | Age: 32
End: 2022-06-20

## 2022-06-20 NOTE — TELEPHONE ENCOUNTER
"SSN: [id number]  Voucher: Opted-In Registered As: Standard Voucher Donor  Donor Intake Start: 22 Donor Intake Complete: 22  Gender: Female Preferred Language: English  Full Name: Analia Jerry Is  Needed: [not answered]  E-mail: [email or web address] Phone Number: [phone]  Secondary Phone:  Contact Preference: [not answered] Best Contact Time: 10am - 4pm  Emergency Contact: Luciano Jerry Emergency Contact #: [phone]  Relationship to Contact: Contact is my spouse  : 88 Age: 31  Address[address]  City: [place]  State: [place] Postal Code: [id number]  Height: 5'8\" Weight: 230lbs  BMI: 35  Employment Status: Employed Has PTO for donation? Yes, using vacation  Occupation:  Requires Heavy Lifting? Yes  Education Level: Four Year Degree Marital Status:   Exercise Routine: 2-3/Week Health Insurance: Yes  Blood Type: Unknown Ethnicity/Race: White  Donor Type: Standard Voucher Donor  Prefer Remote Donation: [not answered]  Physician: Kaylene Byrd<br/>[place]  Donating for Recipient Transfer  Recipient's Name: Kristen Julien Recipient's : 54  Recipient's Status: Patient not on dialysis but  needs a transplant soon.  How Candidate Knows Recip: Child Of Patient  Candidate communicates w/  Recip: Every Day  Possible Interest In:  Motivation to donate:  I have always wanted to be considered to be a donor for my Mom and it is important for me to  help her in any way that I can. My supervisor from work talked about doing this for her   and she gave me the information to get started.  Living Donor Pre-Screening  Is In [place]  Will accept blood transfusions? Yes  Has been diagnosed with kidney disease? No  Has had a heart attack? No  Has Diabetes (High BGs)? No  Has had cancer? No  Has had kidney stones? No  Has ever been pregnant? No  Is Planning on Pregnancy? No  Is Taking Birth Control? Yes   - Birth Control Months? 108   - " BirthControlForm? pill daily   - Birth Control Complications? no   - Is Able To Stop Birth Control? Yes  Has Used Tobacco? No  Has HIV? No  Is Currently Incarcerated? No  Is Currently Residing in [place]  History Misc  Has Allergies? Yes  Allergy  seasonal allergies  Has had Surgeries? Yes  Surgery When  gall bladder removed December 2019  Takes Medication? No  Medical History  History of High BP? Never  History of CABG (bypass surgery)? No  History of blood clots? Never  History of coronary disease? Never  History of high cholesterol or triglycerides? Unknown  Has stents implanted? No  History of chest pain during exercise? No  History of chest pain at other times? No  Results of climbing 2 flights of stairs? No Problem  Had stress test in last year? No  Has had stroke? No  Has had leg bypass? No  History of lung disease? Never  History of COPD? Never  History of TB? Never  History of Pneumonia? Never  Has respiratory issues? No  Has HIV? No  Has Gastro Issues? No  History of Gallstones? Treated in past  History of Pancreatitis? Treated in past  History of Liver Disease? Never  History of Hepatitis B? Never  History of Hepatitis C? Never  History of bleeding problems? Never  History of UTIs? Yes   - UTI episodes: 5   - years since last UTI: 1 years  History of kidney damage? Never  History of Proteinuria? Never  History of Hematuria? Never  History of neuro disease? Never  History of seizure? Never  History of lupus? Never  History of paralysis? Never  History of arthritis? Never  History of neuropathy? Never  History of depression? Still being treated  History of anxiety? Still being treated  History of documented psychiatric illness? Never  History of Fibroid Uterus? Never  History of Endometriosis? Never  History of Polycystic Ovaries? Never  Has had Miscarriages? No  Has had abortions? No  Has had transfusions? No  History of Obesity? Yes  History of Fabry's Disease? No  History of Sickle Cell Disease?  No  History of Sickle Cell Trait? No  History of Sarcoidosis? No  Has auto-immune disease? No  Has had Physical Exam? Yes   - how many years ago: 1  Has had Mammogram? No  Has had Pap Smear? Yes   - how many years ago: 1  Colonoscopy? Yes   - how many years ago: 7  Medical history comments? [no comments]  Living Donor Family Medical History  Anyone with kidney disease? Yes   - which family members: Mom  Anyone with liver disease? No  Anyone with heart disease? No  Anyone with coronary artery disease? No  Anyone with high blood pressure? Unknown  Anyone with blood disorder? No  Anyone with cancer? No  Anyone with kidney cancer? No  Anyone with diabetes? No  Is mother alive? Yes  Mother's age? 67  Is father alive? Yes  Father's age? 67  How many siblings? 3  How many adult children? 0  How many children under 18? 0  Social History  Has Used Alcohol? Yes   - currently uses alcohol: No   - how much: 3/Yearly  Has Abused Alcohol? No  Has Used Drugs? Yes  Drugs Used Last Used Got Treatment?  Marijuana 9 years No  Has had legal issues w/ law enforcement? No  Traveled over 100 miles from home in last year? No  Has had suicidal thoughts or attempts in the last five years? No

## 2022-06-24 ENCOUNTER — TELEPHONE (OUTPATIENT)
Dept: TRANSPLANT | Facility: CLINIC | Age: 32
End: 2022-06-24

## 2022-06-24 NOTE — TELEPHONE ENCOUNTER
Left VM.  Pt was scheduled for 2 p.m. initial LEO call.  JAMES Liu, [facility]  Independent Living Donor Advocate  [facility], [facility], [facility]  Pager:  [phone]  Direct:  [phone] Cell Phone  E-Mail:  [email or web address]

## 2022-06-24 NOTE — TELEPHONE ENCOUNTER
Initial Independent Living Donor Advocate contact made with potential donor today.  I introduced myself and my role during the donation process, includin.  LEO ROLE   The federal government requires that all licensed transplant centers provide the living donor with an Independent Living Donor Advocate (LEO).  I do not meet recipients or attend meetings that discuss their care or decision to transplant them. My role is separate to avoid any conflict of interest.  My role is to ensure:  1) your rights are protected;  2) you get all the information you need from the transplant team to make a fully informed decision whether to donate;   3) that living donation is in your best interest.   4) that you have the right to decide NOT to go forward with living donation at any time during this process.  I am available to you throughout the workup, during surgery phase and follow-up at home.   2. WORKUP & PRIVACY     Your identity and workup are not shared with the recipient at any time.     There is a medical donor workup that consists of testing to determine if you are healthy enough to donate.  Workup tests include many blood draws, urine collection/ (kidney function testing), chest x-ray, EKG, CT scan. As you complete each step then you may move on to the next.  Workup can take as little or as long as you need and you can stop the process at any time.     Transplant is a treatment option, not a cure. A kidney from a living kidney donor can last 12-14 years.  Other treatment options are  donation and two types of dialysis.     This is major surgery and your estimated hospital stay is approximately 1-2 nights.  After surgery, there are driving and lifting restrictions - no driving for two weeks and no lifting over ten pounds for 6 - 8 weeks.  Donors are routinely off from work for 4 - 6 weeks after surgery, and potentially longer if they have a physical job.       If you anticipate lost wages due to donation,  donor wage reimbursement options may be available to you and will be reviewed with you during the evaluation process.      The recipient's insurance covers the medical expenses related to the donor evaluation and surgery.  However, it is important for you to carry your own health insurance to address any medical issues that are found and are NOT related to living donation.  3.  QUESTIONS  Have you received a packet from the transplant department?     Questions?    Have you discussed with anyone your potential decision to donate?   Yes.  Is anyone pressuring or coercing you to donate? No.  Have you discussed any financial arrangements with recipient around donating a kidney? No.  Are you aware that you can confidentially opt out at any time, up to and including day of donation? Yes.  At this time, would you like to proceed with the medical evaluation to see if you can be a kidney donor?  Yes.    If yes, the donor coordinator will be reaching out to you with next steps.     You can reach me or someone else on the LEO team by calling [phone] Option 3.    LEO NOTES: Analia wants to be a donor for her mom.  Analia is not sure that her mom wants to have a transplant.  Analia takes medications for her depression.  No psychiatric hospitalizations No suicidal ideations.    She is scheduled to talk with Emperatriz Hartman RN on 6/27/22 at 1:00 PM     Duration of call 35 minutes

## 2022-06-27 ENCOUNTER — TELEPHONE (OUTPATIENT)
Dept: TRANSPLANT | Facility: CLINIC | Age: 32
End: 2022-06-27

## 2022-06-27 NOTE — TELEPHONE ENCOUNTER
Contacted Analia Jerry to introduce myself and my role, review of medical/surgical/family history and next steps.     Analia Jerry  is aware She can stop donor evaluation at any time.     Analia Jerry is a 31 year old female  ABO unknown that would like to learn more about donation to her mom.     Concerns from medical/surgical/family history: BMI is 35 currently. Working on getting down to a BMI of 33 (217 pounds) before coming in for evaluation. Analia is also going to see a GI MD about possibly having IBS. She is going to call me once she has achieved her goal weight.      Reviewed NKR listing and transfer of care to KPD team if approved. Provided Analia with NKR website to review.     Briefly went over options if approved of NDD, SVP and FVP.     Confirmed that Analia reviewed Informed consent document and all questions answered.  Reviewed that they will receive Docusign to obtain electronic signature for the following: Informed consent, SRTR data, BERTRAND for medical information, Auth for Electronic communication and will need their signed consent back before proceeding with evaluation.      Encouraged sign up for MyChart and confirmed [facility] sign up.    Verified recipient status if not NDD.    Donor timeline: TBD

## 2022-07-01 ENCOUNTER — TELEPHONE (OUTPATIENT)
Dept: TRANSPLANT | Facility: CLINIC | Age: 32
End: 2022-07-01

## 2022-07-01 NOTE — TELEPHONE ENCOUNTER
Called Analia per request of donor coordinator, Emperatriz re: weight loss prior to coming in for eval. BMI last documented in chart was 37.9 (April 2022), wt of 240 lbs. Goal for BMI of 33 prior to coming in for full eval. LVM for Analia.

## 2022-07-06 ENCOUNTER — MYC MEDICAL ADVICE (OUTPATIENT)
Dept: PSYCHIATRY | Facility: CLINIC | Age: 32
End: 2022-07-06

## 2022-07-06 NOTE — TELEPHONE ENCOUNTER
I recommend visit for zofran request because we haven't seen her for this - virtual is ok. I don't have anything this week but I know there are still some spots open

## 2022-07-07 ENCOUNTER — OFFICE VISIT (OUTPATIENT)
Dept: GASTROENTEROLOGY | Facility: CLINIC | Age: 32
End: 2022-07-07
Attending: NURSE PRACTITIONER
Payer: COMMERCIAL

## 2022-07-07 VITALS
SYSTOLIC BLOOD PRESSURE: 131 MMHG | OXYGEN SATURATION: 98 % | HEART RATE: 103 BPM | WEIGHT: 237.3 LBS | BODY MASS INDEX: 37.45 KG/M2 | DIASTOLIC BLOOD PRESSURE: 84 MMHG

## 2022-07-07 DIAGNOSIS — R14.0 ABDOMINAL BLOATING: Primary | ICD-10-CM

## 2022-07-07 PROCEDURE — 99204 OFFICE O/P NEW MOD 45 MIN: CPT | Performed by: INTERNAL MEDICINE

## 2022-07-07 RX ORDER — METOCLOPRAMIDE 10 MG/1
10 TABLET ORAL
COMMUNITY
Start: 2022-07-06 | End: 2022-07-22

## 2022-07-07 NOTE — LETTER
7/7/2022         RE: Analia Jerry  [address]  [facility] [address]        Dear Colleague,    Thank you for referring your patient, Analia Jerry, to the [facility] [place]. Please see a copy of my visit note below.      GASTROENTEROLOGY CONSULTATION       Site of Visit:   [address] [facility]     Provider:   Charles Nix MD    PCP:   Kaylene Collier    Chief Complaint:    Loose stool     Assessment:    Recommendations:     History of Present Illness:   31 year old with chronic diarrhea, diarrhea comes quickly.  Recently weaned off lexapro 14 days now,  not sure what its doing but over all has had difficulty - severe headaches in the last 4 days.  .  She has taken things to not have as much loose stool.  Has oumbar puncture MRI showed empty sella, and some microvascular changes.     She has headaches off and on.  She can usually get them to stop.     Diarrhea occurs about 4-5 x a day minimum, the most she goes 12 x/day.    No change in her diet.   Abdominal pain across the umbilicus, cramping occasionally and somewhat sharp.  Goes away about 15 minutes after defecation.    Colonoscopy 2015 - for rectal bleeding, looked normal.  It was hemorrhoids after.     No bloody stool.     Ibuprofen consumption.  ETOH consumption- stomach cant handle it drinks less than a handful of times.     Gallbladder removed in 2019 had gallstone pancreatitis      Family History: No known GI illness, End stage renal disease in her mother, runs in the family.  Father has hypertension.                               Review of symptoms:  Dyspnea on exertion, sweats a lot, shaking chills frequent since stopping lexapro, no skin, no joints, blurred vision.    EXAMINATION: CT UROGRAM WO & W CONTRAST, 7/23/2021 1:07 PM     INDICATION: Hematuria, unknown cause; Hematuria     COMPARISON STUDY: none.     TECHNIQUE: CT scan of the abdomen and pelvis was performed on  multidetector CT scanner using  volumetric acquisition technique and  images were reconstructed in multiple planes with variable thickness  and reviewed on dedicated workstations.      CONTRAST: 120 ml isovue 370 injected IV with oral contrast     CT scan radiation dose is optimized to minimum requisite dose using  automated dose modulation techniques.     FINDINGS:     Lower thorax: Normal.     Liver: A 10 mm hypodensity in the right hepatic lobe shows a focus of  enhancement, though is indeterminate, possibly a hemangioma. No  intrahepatic biliary ductal dilation.     Biliary System: Gallbladder surgically absent.     Pancreas: No mass or pancreatic ductal dilation.     Adrenal glands: No mass or nodules     Spleen: Normal.     Kidneys: No suspicious mass, obstructing calculus or hydronephrosis.     Gastrointestinal tract :Normal appendix. Normal caliber small bowel.   Small hiatal hernia.    Mesentery/peritoneum/retroperitoneum: No mass. No free fluid or air.     Lymph nodes: No significant lymphadenopathy.     Vasculature: Patent major abdominal vasculature.     Pelvis: Urinary bladder is normal.  Bulky uterus suggests underlying  fibroids. No adnexal masses.     Osseous structures: No aggressive or acute osseous lesion.      Soft tissues: Small fat containing ventral/umbilical hernia.                                                                      IMPRESSION:   1.  No acute abnormalities of the abdomen and pelvis.  2.  Hypodensity in the right hepatic lobe is indeterminate, possibly a  hemangioma.  3.  Bulky uterus suggests underlying fibroids.     I have personally reviewed the examination and initial interpretation  and I agree with the findings.      COLONOSCOPY 12/16/2015 10:34 AM Mynor Rslts   [facility]   Endoscopy Department-[place]   _______________________________________________________________________________   Patient Name: Analia Lyons             Procedure Date: 12/16/2015 10:34 AM   MRN:  [id number]                       YOB: 1990   Admit Type: Outpatient                Age: 25   Gender: Female                        Note Status: Finalized   Attending MD: William C. Duane,         _______________________________________________________________________________       Procedure:                Colonoscopy   Indications:              Anal bleeding which has remitted in the past few                             weeks, Constipation   Providers:                William C. Duane, Jenna Richard, MAREK   Referring MD:             Michelle Purcell   Medicines:                Fentanyl 150 micrograms IV, Midazolam 3 mg IV   Complications:            No immediate complications.   _______________________________________________________________________________   Procedure:                Pre-Anesthesia Assessment:                             - Prior to the procedure, a History and Physical                             was performed, and patient medications and                             allergies were reviewed. The patient is competent.                             The risks and benefits of the procedure and the                             sedation options and risks were discussed with the                             patient. All questions were answered and informed                             consent was obtained. Patient identification and                             proposed procedure were verified by the physician                             and the nurse in the procedure room. Mental Status                             Examination: alert and oriented. Airway                             Examination: normal oropharyngeal airway and neck                             mobility. Respiratory Examination: clear to                             auscultation. CV Examination: normal. Prophylactic                             Antibiotics: The patient does not require                             prophylactic  antibiotics. Prior Anticoagulants: The                             patient has taken no previous anticoagulant or                             antiplatelet agents. ASA Grade Assessment: I - A                             normal, healthy patient. After reviewing the risks                             and benefits, the patient was deemed in                             satisfactory condition to undergo the procedure.                             The anesthesia plan was to use moderate sedation /                             analgesia (conscious sedation). Immediately prior                             to administration of medications, the patient was                             re-assessed for adequacy to receive sedatives. The                             heart rate, respiratory rate, oxygen saturations,                             blood pressure, adequacy of pulmonary ventilation,                             and response to care were monitored throughout the                             procedure. The physical status of the patient was                             re-assessed after the procedure.                             After obtaining informed consent, the colonoscope                             was passed under direct vision. Throughout the                             procedure, the patient's blood pressure, pulse, and                             oxygen saturations were monitored continuously. The                             Colonoscope was introduced through the anus and                             advanced to the cecum, identified by appendiceal                             orifice and ileocecal valve. The colonoscopy was                             somewhat difficult due to significant looping and a                             tortuous colon. The patient tolerated the procedure                             well. The quality of the bowel preparation was good.                                                                                      Findings:        The entire examined colon appeared normal.                                                                                     Impression:               - The entire examined colon is normal.                             - Bleeding may have been a result of anal                             irritation or a fissure that has healed.   Recommendation:           - Return to primary care physician.            Current Outpatient Medications   Medication Sig Dispense Refill     buPROPion (WELLBUTRIN XL) 150 MG 24 hr tablet Take 1 tablet (150 mg) by mouth every morning Take with the bupropion  mg for total daily dose of 450 mg 90 tablet 1     buPROPion (WELLBUTRIN XL) 300 MG 24 hr tablet Take 1 tablet (300 mg) by mouth every morning 90 tablet 1     levothyroxine (SYNTHROID/LEVOTHROID) 200 MCG tablet Take 1 tablet (200 mcg) by mouth daily 90 tablet 3     metoclopramide (REGLAN) 10 MG tablet Take 10 mg by mouth       nitroFURantoin macrocrystal (MACRODANTIN) 100 MG capsule Take 1 capsule (100 mg) by mouth At Bedtime 30 capsule 11     norethindrone-ethinyl estradiol (ORTHO-NOVUM 1/35, 28,) 1-35 MG-MCG tablet Continuously by skipping placebo pills 112 tablet 3     rOPINIRole (REQUIP) 0.5 MG tablet 0.25 mg (1/2 tablet) once daily 1 to 3 hours before bedtime. Dose may be increased after 2 days to 0.5 mg once daily, and after 7 days to 1 mg once daily (2 tablets). Dose may be further increased in 0.5 mg increments every week until reaching 3 mg once daily during week 6. 180 tablet 1       Past Surgical History:   Procedure Laterality Date     COLONOSCOPY N/A 12/16/2015    Procedure: COLONOSCOPY;  Surgeon: Duane, William Charles, MD;  Location: [facility] OR     COLONOSCOPY WITH CO2 INSUFFLATION N/A 12/16/2015    Procedure: COLONOSCOPY WITH CO2 INSUFFLATION;  Surgeon: Duane, William Charles, MD;  Location: [facility] OR       Past Medical History:   Diagnosis Date     Acute gallstone pancreatitis  12/28/2019     Anxiety      ASCUS of cervix with negative high risk HPV 1/26/17    ASCUS/neg HR HPV.     Depressive disorder      Gallstones 12/28/2019    [facility]     H/O colposcopy with cervical biopsy 03/23/2017    Bx & ECC - negative     Hashimoto's disease      Headache(784.0)      Hypothyroidism due to Hashimoto's thyroiditis      Papanicolaou smear of cervix with atypical squamous cells of undetermined significance (ASC-US) 12/03/2015       Family History   Problem Relation Age of Onset     Hyperlipidemia Mother      Thyroid Disease Mother      Kidney Disease Mother      Hyperlipidemia Father      Substance Abuse Sister      Substance Abuse Sister      Depression Brother      Myocardial Infarction Maternal Grandfather      No Known Problems Paternal Grandmother      No Known Problems Paternal Grandfather      Breast Cancer Cousin      C.A.D. No family hx of      Diabetes No family hx of      Breast Cancer No family hx of      Cancer - colorectal No family hx of      Anesthesia Reaction No family hx of      Blood Disease No family hx of         reports that she has never smoked. She has never used smokeless tobacco. She reports current alcohol use. She reports that she does not use drugs.         Physical Exam:   /84   Pulse 103   Wt 107.6 kg (237 lb 4.8 oz)   LMP  (LMP Unknown)   SpO2 98%   BMI 37.45 kg/m    Wt:   Wt Readings from Last 2 Encounters:   07/07/22 107.6 kg (237 lb 4.8 oz)   05/23/22 108.9 kg (240 lb)      Constitutional: cooperative, pleasant, not dyspneic/diaphoretic, no acute distress  Eyes: Sclera anicteric/injected  Ears/nose/mouth/throat: Normal oropharynx without ulcers or exudate, mucus membranes moist, hearing intact  Neck: supple, thyroid normal size  CV: No edema  Respiratory: Unlabored breathing  Lymph: No axillary, submandibular, supraclavicular or inguinal lymphadenopathy  Abd:  Nondistended, +bs, no hepatosplenomegaly, nontender, no peritoneal  signs  Skin: warm, perfused, no jaundice  Neuro: AAO x 3, No asterixis  Psych: Normal affect  MSK: Normal gait    Wt Readings from Last 2 Encounters:   07/07/22 107.6 kg (237 lb 4.8 oz)   05/23/22 108.9 kg (240 lb)             Again, thank you for allowing me to participate in the care of your patient.        Sincerely,        Charles Nix MD

## 2022-07-07 NOTE — PROGRESS NOTES
GASTROENTEROLOGY CONSULTATION       Site of Visit:   [address] [facility]     Provider:   Charles Nix MD    PCP:   Kaylene Collier    Chief Complaint:    Loose stool     Assessment:  This is a 31-year-old with chronic diarrhea   Periumbilical abdominal pain  Worsening symptoms after weaning off Lexapro    Recommendations:   Assessment for chronic diarrhea via laboratory and stool testing see orders  EGD and colonoscopy   CT enterography  Follow-up after tests      History of Present Illness:   31 year old with chronic diarrhea, diarrhea comes quickly.  Recently weaned off lexapro 14 days now,  not sure what its doing but over all has had difficulty - severe headaches in the last 4 days.  .  She has taken things to not have as much loose stool.  Has oumbar puncture MRI showed empty sella, and some microvascular changes.     She has headaches off and on.  She can usually get them to stop.     Diarrhea occurs about 4-5 x a day minimum, the most she goes 12 x/day.    No change in her diet.   Abdominal pain across the umbilicus, cramping occasionally and somewhat sharp.  Goes away about 15 minutes after defecation.    Colonoscopy 2015 - for rectal bleeding, looked normal.  It was hemorrhoids after.     No bloody stool.     Ibuprofen consumption.  ETOH consumption- stomach cant handle it drinks less than a handful of times.     Gallbladder removed in 2019 had gallstone pancreatitis      Family History: No known GI illness, End stage renal disease in her mother, runs in the family.  Father has hypertension.                               Review of symptoms:  Dyspnea on exertion, sweats a lot, shaking chills frequent since stopping lexapro, no skin, no joints, blurred vision.    EXAMINATION: CT UROGRAM WO & W CONTRAST, 7/23/2021 1:07 PM     INDICATION: Hematuria, unknown cause; Hematuria     COMPARISON STUDY: none.     TECHNIQUE: CT scan of the abdomen and pelvis was performed on  multidetector CT scanner using  volumetric acquisition technique and  images were reconstructed in multiple planes with variable thickness  and reviewed on dedicated workstations.      CONTRAST: 120 ml isovue 370 injected IV with oral contrast     CT scan radiation dose is optimized to minimum requisite dose using  automated dose modulation techniques.     FINDINGS:     Lower thorax: Normal.     Liver: A 10 mm hypodensity in the right hepatic lobe shows a focus of  enhancement, though is indeterminate, possibly a hemangioma. No  intrahepatic biliary ductal dilation.     Biliary System: Gallbladder surgically absent.     Pancreas: No mass or pancreatic ductal dilation.     Adrenal glands: No mass or nodules     Spleen: Normal.     Kidneys: No suspicious mass, obstructing calculus or hydronephrosis.     Gastrointestinal tract :Normal appendix. Normal caliber small bowel.   Small hiatal hernia.    Mesentery/peritoneum/retroperitoneum: No mass. No free fluid or air.     Lymph nodes: No significant lymphadenopathy.     Vasculature: Patent major abdominal vasculature.     Pelvis: Urinary bladder is normal.  Bulky uterus suggests underlying  fibroids. No adnexal masses.     Osseous structures: No aggressive or acute osseous lesion.      Soft tissues: Small fat containing ventral/umbilical hernia.                                                                      IMPRESSION:   1.  No acute abnormalities of the abdomen and pelvis.  2.  Hypodensity in the right hepatic lobe is indeterminate, possibly a  hemangioma.  3.  Bulky uterus suggests underlying fibroids.     I have personally reviewed the examination and initial interpretation  and I agree with the findings.      COLONOSCOPY 12/16/2015 10:34 AM Mynor Rslts   [facility]   Endoscopy Department-[place]   _______________________________________________________________________________   Patient Name: Analia Lyons             Procedure Date: 12/16/2015 10:34 AM   MRN:  [id number]                       YOB: 1990   Admit Type: Outpatient                Age: 25   Gender: Female                        Note Status: Finalized   Attending MD: William C. Duane,         _______________________________________________________________________________       Procedure:                Colonoscopy   Indications:              Anal bleeding which has remitted in the past few                             weeks, Constipation   Providers:                William C. Duane, Jenna Ricahrd, MAREK   Referring MD:             Michelle Purcell   Medicines:                Fentanyl 150 micrograms IV, Midazolam 3 mg IV   Complications:            No immediate complications.   _______________________________________________________________________________   Procedure:                Pre-Anesthesia Assessment:                             - Prior to the procedure, a History and Physical                             was performed, and patient medications and                             allergies were reviewed. The patient is competent.                             The risks and benefits of the procedure and the                             sedation options and risks were discussed with the                             patient. All questions were answered and informed                             consent was obtained. Patient identification and                             proposed procedure were verified by the physician                             and the nurse in the procedure room. Mental Status                             Examination: alert and oriented. Airway                             Examination: normal oropharyngeal airway and neck                             mobility. Respiratory Examination: clear to                             auscultation. CV Examination: normal. Prophylactic                             Antibiotics: The patient does not require                             prophylactic  antibiotics. Prior Anticoagulants: The                             patient has taken no previous anticoagulant or                             antiplatelet agents. ASA Grade Assessment: I - A                             normal, healthy patient. After reviewing the risks                             and benefits, the patient was deemed in                             satisfactory condition to undergo the procedure.                             The anesthesia plan was to use moderate sedation /                             analgesia (conscious sedation). Immediately prior                             to administration of medications, the patient was                             re-assessed for adequacy to receive sedatives. The                             heart rate, respiratory rate, oxygen saturations,                             blood pressure, adequacy of pulmonary ventilation,                             and response to care were monitored throughout the                             procedure. The physical status of the patient was                             re-assessed after the procedure.                             After obtaining informed consent, the colonoscope                             was passed under direct vision. Throughout the                             procedure, the patient's blood pressure, pulse, and                             oxygen saturations were monitored continuously. The                             Colonoscope was introduced through the anus and                             advanced to the cecum, identified by appendiceal                             orifice and ileocecal valve. The colonoscopy was                             somewhat difficult due to significant looping and a                             tortuous colon. The patient tolerated the procedure                             well. The quality of the bowel preparation was good.                                                                                      Findings:        The entire examined colon appeared normal.                                                                                     Impression:               - The entire examined colon is normal.                             - Bleeding may have been a result of anal                             irritation or a fissure that has healed.   Recommendation:           - Return to primary care physician.            Current Outpatient Medications   Medication Sig Dispense Refill     buPROPion (WELLBUTRIN XL) 150 MG 24 hr tablet Take 1 tablet (150 mg) by mouth every morning Take with the bupropion  mg for total daily dose of 450 mg 90 tablet 1     buPROPion (WELLBUTRIN XL) 300 MG 24 hr tablet Take 1 tablet (300 mg) by mouth every morning 90 tablet 1     levothyroxine (SYNTHROID/LEVOTHROID) 200 MCG tablet Take 1 tablet (200 mcg) by mouth daily 90 tablet 3     metoclopramide (REGLAN) 10 MG tablet Take 10 mg by mouth       nitroFURantoin macrocrystal (MACRODANTIN) 100 MG capsule Take 1 capsule (100 mg) by mouth At Bedtime 30 capsule 11     norethindrone-ethinyl estradiol (ORTHO-NOVUM 1/35, 28,) 1-35 MG-MCG tablet Continuously by skipping placebo pills 112 tablet 3     rOPINIRole (REQUIP) 0.5 MG tablet 0.25 mg (1/2 tablet) once daily 1 to 3 hours before bedtime. Dose may be increased after 2 days to 0.5 mg once daily, and after 7 days to 1 mg once daily (2 tablets). Dose may be further increased in 0.5 mg increments every week until reaching 3 mg once daily during week 6. 180 tablet 1       Past Surgical History:   Procedure Laterality Date     COLONOSCOPY N/A 12/16/2015    Procedure: COLONOSCOPY;  Surgeon: Duane, William Charles, MD;  Location: [facility] OR     COLONOSCOPY WITH CO2 INSUFFLATION N/A 12/16/2015    Procedure: COLONOSCOPY WITH CO2 INSUFFLATION;  Surgeon: Duane, William Charles, MD;  Location: [facility] OR       Past Medical History:   Diagnosis Date     Acute gallstone pancreatitis  12/28/2019     Anxiety      ASCUS of cervix with negative high risk HPV 1/26/17    ASCUS/neg HR HPV.     Depressive disorder      Gallstones 12/28/2019    [facility]     H/O colposcopy with cervical biopsy 03/23/2017    Bx & ECC - negative     Hashimoto's disease      Headache(784.0)      Hypothyroidism due to Hashimoto's thyroiditis      Papanicolaou smear of cervix with atypical squamous cells of undetermined significance (ASC-US) 12/03/2015       Family History   Problem Relation Age of Onset     Hyperlipidemia Mother      Thyroid Disease Mother      Kidney Disease Mother      Hyperlipidemia Father      Substance Abuse Sister      Substance Abuse Sister      Depression Brother      Myocardial Infarction Maternal Grandfather      No Known Problems Paternal Grandmother      No Known Problems Paternal Grandfather      Breast Cancer Cousin      C.A.D. No family hx of      Diabetes No family hx of      Breast Cancer No family hx of      Cancer - colorectal No family hx of      Anesthesia Reaction No family hx of      Blood Disease No family hx of         reports that she has never smoked. She has never used smokeless tobacco. She reports current alcohol use. She reports that she does not use drugs.         Physical Exam:   /84   Pulse 103   Wt 107.6 kg (237 lb 4.8 oz)   LMP  (LMP Unknown)   SpO2 98%   BMI 37.45 kg/m    Wt:   Wt Readings from Last 2 Encounters:   07/07/22 107.6 kg (237 lb 4.8 oz)   05/23/22 108.9 kg (240 lb)      Constitutional: cooperative, pleasant, not dyspneic/diaphoretic, no acute distress  Eyes: Sclera anicteric/injected  Ears/nose/mouth/throat: Normal oropharynx without ulcers or exudate, mucus membranes moist, hearing intact  Neck: supple, thyroid normal size  CV: No edema  Respiratory: Unlabored breathing  Lymph: No axillary, submandibular, supraclavicular or inguinal lymphadenopathy  Abd:  Nondistended, +bs, no hepatosplenomegaly, nontender, no peritoneal  signs  Skin: warm, perfused, no jaundice  Neuro: AAO x 3, No asterixis  Psych: Normal affect  MSK: Normal gait    Wt Readings from Last 2 Encounters:   07/07/22 107.6 kg (237 lb 4.8 oz)   05/23/22 108.9 kg (240 lb)

## 2022-07-07 NOTE — PATIENT INSTRUCTIONS
It was a pleasure taking care of you today. I've included a brief summary of our discussion and care plan from today's visit below.  Please review this information with your primary care provider.  _______________________________________________________________________    My recommendations are summarized as follows:  Upper Endoscopy and  colonoscopy  CT enterography  Blood work  Stool tests  Urine collection      Return to GI Clinic in 6 weeks to review your progress.     If you need any follow-up appointments, please use the following phone numbers below.    To schedule or reschedule a follow-up GI appointment, call [phone] option 1    To schedule your endoscopy procedure, call [phone] option 2    To schedule imaging, please call [phone]     To schedule your lab appointment at [address] floor lab call [phone]. Call your [place] lab directly if it is not [facility]. If you use a non-[place] lab, please let us know where to fax your lab order (call [facility] at [phone]).      _______________________________________________________________________    Please be in touch if there are any further questions that arise following today's visit.  There are multiple ways to contact your gastroenterology care team.      During business hours, you may reach your gastroenterology RN Care Coordinator, Flaca Garcia, at [phone].      You can always send a secure message through semanticlabs. semanticlabs messages are answered by your nurse or doctor typically within 24 hours. Please allow extra time on weekends and holidays.     What is semanticlabs?  semanticlabs is a secure way for you to access all of your healthcare records from the [facility].  It is a web based computer program, so you can sign on to it from any location.  It also allows you to send secure messages to your care team.  I recommend signing up for semanticlabs access if you have not already done so and are comfortable  with using a computer.     For urgent/emergent questions after business hours, you may reach the on-call GI Fellow by contacting the [facility]  at [phone].     How will I get the results of any tests ordered?    You will receive all of your results.  If you have signed up for [facility], any tests ordered at your visit will be available to you after your physician reviews them.  Typically this takes 1-2 weeks.  If there are urgent results that require a change in your care plan, your physician or nurse will call you to discuss the next steps.      Thank you for choosing [facility]!       Sincerely,    Charles Nix MD  [facility]  Division of Gastroenterology

## 2022-07-07 NOTE — PROGRESS NOTES
Chief Complaint   Patient presents with     Gastrointestinal Problem     Upset stomach, after 20 minutes of each meal.   Bloating is not consistent,usually comes and goes, Main concern of pt is having Diarrhea for about 5 months, most of the time the diarrhea is liquid pt stated.        Vitals:    07/07/22 1518   BP: 131/84   Pulse: 103   SpO2: 98%   Weight: 107.6 kg (237 lb 4.8 oz)       Body mass index is 37.45 kg/m .         Fredy Mosqueda MA

## 2022-07-11 ENCOUNTER — TELEPHONE (OUTPATIENT)
Dept: GASTROENTEROLOGY | Facility: CLINIC | Age: 32
End: 2022-07-11

## 2022-07-11 ASSESSMENT — PATIENT HEALTH QUESTIONNAIRE - PHQ9
10. IF YOU CHECKED OFF ANY PROBLEMS, HOW DIFFICULT HAVE THESE PROBLEMS MADE IT FOR YOU TO DO YOUR WORK, TAKE CARE OF THINGS AT HOME, OR GET ALONG WITH OTHER PEOPLE: NOT DIFFICULT AT ALL
SUM OF ALL RESPONSES TO PHQ QUESTIONS 1-9: 2
SUM OF ALL RESPONSES TO PHQ QUESTIONS 1-9: 2

## 2022-07-11 NOTE — TELEPHONE ENCOUNTER
Analia had GI visit with Dr. Nix on 7/7/22. He orders for her:    Upper Endoscopy and  colonoscopy  CT enterography  Blood work  Stool tests  Urine collection  Return to GI Clinic in 6 weeks     Sent patient a MyChart about scheduling of the above.

## 2022-07-11 NOTE — TELEPHONE ENCOUNTER
Screening Questions    BlueKIND OF PREP RedLOCATION [review exclusion criteria] GreenSEDATION TYPE      1. Are you active on mychart? y    2. What insurance is in the chart? BXBS     3.   Ordering/Referring Provider: Boyd    4. BMI   (If greater than 40 review exclusion criteria [PAC APPT IF [MAC] @ [facility])  37.4  [If yes, BMI OVER 40-EXTENDED PREP]      **(Sedation review/consideration needed)**  Do you have a legal guardian or Medical Power of    and/or are you able to give consent for your medical care?     y    5. Have you had a positive covid test in the last 90 days?   n - n    6.  Are you currently on dialysis?   n [ If yes, G-PREP & HOSPITAL setting ONLY]     7.  Do you have chronic kidney disease?  n [ If yes, G-PREP ]    8.   Do you have a diagnosis of diabetes?   n   [ If yes, G-PREP ]    9.  On a regular basis do you go 3-5 days between bowel movements?   n   [ If yes, EXTENDED PREP]    10.  Are you taking any prescription pain medications on a routine schedule?    n - n [ If yes, EXTENDED PREP] [If yes, MAC]      11.   Do you have any chemical dependencies such as alcohol, street drugs, or methadone?    n [If yes, MAC]    12.   Do you have any history of post-traumatic stress syndrome, severe anxiety or history of psychosis?    y  [If yes, MAC]    13.  [FEMALES] Are you currently pregnant? n    If yes, how many weeks?       Respiratory/Heart Screening:  [If yes to any of the following HOSPITAL setting only]     14. Do you have Pulmonary Hypertension [Lungs]?   n       15. Do you have UNCONTROLLED asthma?   n     16.  Do you use daily home oxygen?  n      17. Do you have mod to severe Obstructive Sleep Apnea?         (OKAY @ [facility]  [facility]  SH  PH  RI  [facility] - if pt is not on OXYGEN)  n      18.   Have you had a heart or lung transplant?   n      19.   Have you had a stroke or Transient ischemic attack (TIA - aka  mini stroke ) within 6 months?  (If yes, please review exclusion criteria)  n     20.    In the past 6 months, have you had any heart related issues including cardiomyopathy or heart attack?   n           If yes, did it require cardiac stenting or other implantable device?   n      21.   Do you have any implantable devices in your body (pacemaker, defib, LVAD)? (If yes, please review exclusion criteria)  n     22. Do you take nitroglycerin?   n           If yes, how often? n  (if yes, HOSPITAL setting ONLY)    23.  Are you currently taking any blood thinners?    [If yes, INFORM patient to follw up w/ ORDERING PROVIDER FOR BRIDGING INSTRUCTIONS]     n    24.   Do you transfer independently?                (If NO, please HOSPITAL setting ONLY)  y    25.   Preferred LOCAL Pharmacy for Pre Prescription:      [id number] [place], [place] - [address]    Scheduling Details  (Please ask for phone number if not scheduled by patient)      Caller : Analia Jerry    Date of Procedure: 8/11  Surgeon: Philippe  Location:         Sedation Type: CS l   Conscious Sedation- Needs  for 6 hours after the procedure  MAC/General-Needs  for 24 hours after procedure    n :[Pre-op Required] at [facility]    MG and OR for MAC sedation   (advise patient they will need a pre-op WITH IN 30 DAYS of procedure date)     Type of Procedure Scheduled:   Upper and Lower Endoscopy [EGD and Colonoscopy]    Which Colonoscopy Prep was Sent?:    Qing ORR CF PATIENTS & GROEN'S PATIENTS NEEDS EXTENDED PREP       Informed patient they will need an adult  y  Cannot take any type of public or medical transportation alone    Pre-Procedure Covid test to be completed at [facility] or Externally: y  **INFORMED OF HOME TESTING & LAB OPTION**        Confirmed Nurse will call to complete assessment y    Additional comments:

## 2022-07-13 NOTE — TELEPHONE ENCOUNTER
Recommended follow-up has been scheduled appropriately. Will follow-up, if needed, based on results of testing at direction of provider.

## 2022-07-17 PROCEDURE — 83993 ASSAY FOR CALPROTECTIN FECAL: CPT

## 2022-07-17 PROCEDURE — 82653 EL-1 FECAL QUANTITATIVE: CPT

## 2022-07-18 ENCOUNTER — VIRTUAL VISIT (OUTPATIENT)
Dept: FAMILY MEDICINE | Facility: CLINIC | Age: 32
End: 2022-07-18
Payer: COMMERCIAL

## 2022-07-18 DIAGNOSIS — G44.52 NEW DAILY PERSISTENT HEADACHE: Primary | ICD-10-CM

## 2022-07-18 PROCEDURE — 99213 OFFICE O/P EST LOW 20 MIN: CPT | Mod: 95 | Performed by: NURSE PRACTITIONER

## 2022-07-18 ASSESSMENT — PATIENT HEALTH QUESTIONNAIRE - PHQ9
10. IF YOU CHECKED OFF ANY PROBLEMS, HOW DIFFICULT HAVE THESE PROBLEMS MADE IT FOR YOU TO DO YOUR WORK, TAKE CARE OF THINGS AT HOME, OR GET ALONG WITH OTHER PEOPLE: NOT DIFFICULT AT ALL
SUM OF ALL RESPONSES TO PHQ QUESTIONS 1-9: 2

## 2022-07-18 NOTE — PROGRESS NOTES
"Analia is a 31 year old who is being evaluated via a billable video visit.      How would you like to obtain your AVS? Lor  If the video visit is dropped, the invitation should be resent by: Lor  Will anyone else be joining your video visit? No        Assessment & Plan     New daily persistent headache  Headaches for some time now but worsened when off lexapro. Requesting referral for daily persistent headaches.  - Adult Neurology  Referral; Future       See Patient Instructions    Return in about 4 weeks (around 8/15/2022), or if symptoms worsen or fail to improve.     Return precautions discussed, including when to seek urgent/emergent care.    Patient verbalizes understanding and agrees with plan of care.     MERRY WELLS [facility]    Betzy Helms is a 31 year old, presenting for the following health issues:  Headache (Referral for neurology )      History of Present Illness       Headaches:   Since the patient's last clinic visit, headaches are: worsened  The patient is getting headaches:  Weekly  She is not able to do normal daily activities when she has a migraine.  The patient is taking the following rescue/relief medications:  Ibuprofen (Advil, Motrin)   Patient states \"The relief is inconsistent\" from the rescue/relief medications.   The patient is taking the following medications to prevent migraines:  No medications to prevent migraines  In the past 4 weeks, the patient has gone to an Urgent Care or Emergency Room 2 times times due to headaches.    She eats 0-1 servings of fruits and vegetables daily.She consumes 2 sweetened beverage(s) daily.She exercises with enough effort to increase her heart rate 10 to 19 minutes per day.  She exercises with enough effort to increase her heart rate 4 days per week.   She is taking medications regularly.    Today's PHQ-9         PHQ-9 Total Score: 2    PHQ-9 Q9 Thoughts of better off dead/self-harm " past 2 weeks :   Not at all    How difficult have these problems made it for you to do your work, take care of things at home, or get along with other people: Not difficult at all         Went to emergency department 7/6 and had MRI and LP  Started tapering off lexapro and had headaches but had headaches prior to that as well  Prior to stopping lexapro, was getting headaches at least every other day. Sometimes would get one and wouldn't go away for couple of days  Trying not to take too much ibuprofen  Takes bath or cold wash cloth but comes back within couple hours  Has had some vision changes with headaches  Wears glasses for computer use  Nausea with headaches  Headaches don't wake her up at night  No N/T/W    Review of Systems   Constitutional, HEENT, cardiovascular, pulmonary, gi and gu systems are negative, except as otherwise noted.      Objective           Vitals:  No vitals were obtained today due to virtual visit.    Physical Exam   GENERAL: Healthy, alert and no distress  PSYCH: Mentation appears normal, affect normal/bright, judgement and insight intact, normal speech                 Switched to telephone visit: 6 minutes    Originating Location (pt. Location): switched to telephone    Distant Location (provider location):  [facility]     Platform used for Video Visit: switched to telephone    .  ..

## 2022-07-18 NOTE — PATIENT INSTRUCTIONS
At [facility], we strive to deliver an exceptional experience to you, every time we see you. If you receive a survey, please complete it as we do value your feedback.  If you have MyChart, you can expect to receive results automatically within 24 hours of their completion.  Your provider will send a note interpreting your results as well.   If you do not have MyChart, you should receive your results in about a week by mail.    Your care team:                            Family Medicine Internal Medicine   MD Fletcher Otero MD Shantel Branch-Fleming, MD Srinivasa Vaka, MD Katya Belousova, MERRY Clemons CNP, MD ([facility]) [facility]   Matthias Jenkins, MD Miley Dan MD Amelia Massimini APRN CNP Kim Thein, APRN CNP Bethany Templen, MD             Clinic hours: Monday - Thursday 7 am-6 pm; Fridays 7 am-5 pm.   Urgent care: Monday - Friday 10 am- 8 pm; Saturday and Sunday 9 am-5 pm.    Clinic: [phone]       [facility]: Monday - Thursday 8 am - 7 pm; Friday 8 am - 6 pm  [facility]: [phone]

## 2022-07-21 ENCOUNTER — LAB (OUTPATIENT)
Dept: LAB | Facility: CLINIC | Age: 32
End: 2022-07-21
Payer: COMMERCIAL

## 2022-07-21 ENCOUNTER — ANCILLARY PROCEDURE (OUTPATIENT)
Dept: CT IMAGING | Facility: CLINIC | Age: 32
End: 2022-07-21
Attending: INTERNAL MEDICINE
Payer: COMMERCIAL

## 2022-07-21 DIAGNOSIS — R14.0 ABDOMINAL BLOATING: ICD-10-CM

## 2022-07-21 LAB
ALBUMIN SERPL-MCNC: 3.6 G/DL (ref 3.4–5)
ALP SERPL-CCNC: 66 U/L (ref 40–150)
ALT SERPL W P-5'-P-CCNC: 23 U/L (ref 0–50)
ANION GAP SERPL CALCULATED.3IONS-SCNC: 7 MMOL/L (ref 3–14)
AST SERPL W P-5'-P-CCNC: 13 U/L (ref 0–45)
BASOPHILS # BLD AUTO: 0.1 10E3/UL (ref 0–0.2)
BASOPHILS NFR BLD AUTO: 1 %
BILIRUB SERPL-MCNC: 0.4 MG/DL (ref 0.2–1.3)
BUN SERPL-MCNC: 12 MG/DL (ref 7–30)
CALCIUM SERPL-MCNC: 9.1 MG/DL (ref 8.5–10.1)
CHLORIDE BLD-SCNC: 112 MMOL/L (ref 94–109)
CO2 SERPL-SCNC: 23 MMOL/L (ref 20–32)
CREAT SERPL-MCNC: 0.85 MG/DL (ref 0.52–1.04)
CRP SERPL-MCNC: 8 MG/L (ref 0–8)
EOSINOPHIL # BLD AUTO: 0.2 10E3/UL (ref 0–0.7)
EOSINOPHIL NFR BLD AUTO: 3 %
ERYTHROCYTE [DISTWIDTH] IN BLOOD BY AUTOMATED COUNT: 12.1 % (ref 10–15)
GFR SERPL CREATININE-BSD FRML MDRD: >90 ML/MIN/1.73M2
GLUCOSE BLD-MCNC: 99 MG/DL (ref 70–99)
HCT VFR BLD AUTO: 35 % (ref 35–47)
HGB BLD-MCNC: 12.2 G/DL (ref 11.7–15.7)
IMM GRANULOCYTES # BLD: 0 10E3/UL
IMM GRANULOCYTES NFR BLD: 0 %
LDH SERPL L TO P-CCNC: 149 U/L (ref 81–234)
LYMPHOCYTES # BLD AUTO: 2 10E3/UL (ref 0.8–5.3)
LYMPHOCYTES NFR BLD AUTO: 27 %
MCH RBC QN AUTO: 30.8 PG (ref 26.5–33)
MCHC RBC AUTO-ENTMCNC: 34.9 G/DL (ref 31.5–36.5)
MCV RBC AUTO: 88 FL (ref 78–100)
MONOCYTES # BLD AUTO: 0.5 10E3/UL (ref 0–1.3)
MONOCYTES NFR BLD AUTO: 7 %
NEUTROPHILS # BLD AUTO: 4.6 10E3/UL (ref 1.6–8.3)
NEUTROPHILS NFR BLD AUTO: 62 %
NRBC # BLD AUTO: 0 10E3/UL
NRBC BLD AUTO-RTO: 0 /100
PLATELET # BLD AUTO: 325 10E3/UL (ref 150–450)
POTASSIUM BLD-SCNC: 3.8 MMOL/L (ref 3.4–5.3)
PROT SERPL-MCNC: 7.5 G/DL (ref 6.8–8.8)
RBC # BLD AUTO: 3.96 10E6/UL (ref 3.8–5.2)
RETICS # AUTO: 0.11 10E6/UL (ref 0.03–0.1)
RETICS/RBC NFR AUTO: 2.7 % (ref 0.5–2)
SODIUM SERPL-SCNC: 142 MMOL/L (ref 133–144)
TOTAL PROTEIN SERUM FOR ELP: 6.7 G/DL (ref 6.4–8.3)
WBC # BLD AUTO: 7.4 10E3/UL (ref 4–11)

## 2022-07-21 PROCEDURE — 85060 BLOOD SMEAR INTERPRETATION: CPT | Performed by: PATHOLOGY

## 2022-07-21 PROCEDURE — 74177 CT ABD & PELVIS W/CONTRAST: CPT | Mod: GC | Performed by: RADIOLOGY

## 2022-07-21 PROCEDURE — 84165 PROTEIN E-PHORESIS SERUM: CPT | Performed by: STUDENT IN AN ORGANIZED HEALTH CARE EDUCATION/TRAINING PROGRAM

## 2022-07-21 PROCEDURE — 86140 C-REACTIVE PROTEIN: CPT

## 2022-07-21 PROCEDURE — 83615 LACTATE (LD) (LDH) ENZYME: CPT

## 2022-07-21 PROCEDURE — 82784 ASSAY IGA/IGD/IGG/IGM EACH: CPT

## 2022-07-21 PROCEDURE — 85025 COMPLETE CBC W/AUTO DIFF WBC: CPT

## 2022-07-21 PROCEDURE — 80053 COMPREHEN METABOLIC PANEL: CPT

## 2022-07-21 PROCEDURE — 85045 AUTOMATED RETICULOCYTE COUNT: CPT

## 2022-07-21 PROCEDURE — 86258 DGP ANTIBODY EACH IG CLASS: CPT

## 2022-07-21 PROCEDURE — 84155 ASSAY OF PROTEIN SERUM: CPT | Mod: 59

## 2022-07-21 PROCEDURE — 83520 IMMUNOASSAY QUANT NOS NONAB: CPT

## 2022-07-21 PROCEDURE — 86036 ANCA SCREEN EACH ANTIBODY: CPT

## 2022-07-21 PROCEDURE — 36415 COLL VENOUS BLD VENIPUNCTURE: CPT

## 2022-07-21 PROCEDURE — 86231 EMA EACH IG CLASS: CPT | Mod: 90

## 2022-07-21 PROCEDURE — 86256 FLUORESCENT ANTIBODY TITER: CPT

## 2022-07-21 RX ORDER — IOPAMIDOL 755 MG/ML
125 INJECTION, SOLUTION INTRAVASCULAR ONCE
Status: COMPLETED | OUTPATIENT
Start: 2022-07-21 | End: 2022-07-21

## 2022-07-21 RX ADMIN — IOPAMIDOL 125 ML: 755 INJECTION, SOLUTION INTRAVASCULAR at 12:29

## 2022-07-21 ASSESSMENT — HEADACHE IMPACT TEST (HIT 6)
HOW OFTEN DO HEADACHES LIMIT YOUR DAILY ACTIVITIES: VERY OFTEN
HOW OFTEN HAVE YOU FELT TOO TIRED TO WORK BECAUSE OF YOUR HEADACHES: VERY OFTEN
HOW OFTEN HAVE YOU FELT FED UP OR IRRITATED BECAUSE OF YOUR HEADACHES: VERY OFTEN
HIT6 TOTAL SCORE: 68
WHEN YOU HAVE A HEADACHE HOW OFTEN DO YOU WISH YOU COULD LIE DOWN: ALWAYS
WHEN YOU HAVE HEADACHES HOW OFTEN IS THE PAIN SEVERE: VERY OFTEN
HOW OFTEN DID HEADACHS LIMIT CONCENTRATION ON WORK OR DAILY ACTIVITY: VERY OFTEN

## 2022-07-22 ENCOUNTER — PRE VISIT (OUTPATIENT)
Dept: NEUROLOGY | Facility: CLINIC | Age: 32
End: 2022-07-22

## 2022-07-22 ENCOUNTER — VIRTUAL VISIT (OUTPATIENT)
Dept: NEUROLOGY | Facility: CLINIC | Age: 32
End: 2022-07-22
Attending: NURSE PRACTITIONER
Payer: COMMERCIAL

## 2022-07-22 DIAGNOSIS — G43.709 CHRONIC MIGRAINE WITHOUT AURA WITHOUT STATUS MIGRAINOSUS, NOT INTRACTABLE: Primary | ICD-10-CM

## 2022-07-22 DIAGNOSIS — G44.52 NEW DAILY PERSISTENT HEADACHE: ICD-10-CM

## 2022-07-22 LAB
ALBUMIN SERPL ELPH-MCNC: 3.9 G/DL (ref 3.7–5.1)
ALPHA1 GLOB SERPL ELPH-MCNC: 0.3 G/DL (ref 0.2–0.4)
ALPHA2 GLOB SERPL ELPH-MCNC: 0.8 G/DL (ref 0.5–0.9)
ANCA AB PATTERN SER IF-IMP: NORMAL
B-GLOBULIN SERPL ELPH-MCNC: 0.9 G/DL (ref 0.6–1)
C-ANCA TITR SER IF: NORMAL {TITER}
GAMMA GLOB SERPL ELPH-MCNC: 0.7 G/DL (ref 0.7–1.6)
GLIADIN IGA SER-ACNC: 0.4 U/ML
GLIADIN IGG SER-ACNC: <0.6 U/ML
IGA SERPL-MCNC: 99 MG/DL (ref 84–499)
IGG SERPL-MCNC: 869 MG/DL (ref 610–1616)
IGM SERPL-MCNC: 20 MG/DL (ref 35–242)
M PROTEIN SERPL ELPH-MCNC: 0 G/DL
PATH REPORT.COMMENTS IMP SPEC: NORMAL
PATH REPORT.FINAL DX SPEC: NORMAL
PATH REPORT.MICROSCOPIC SPEC OTHER STN: NORMAL
PATH REPORT.MICROSCOPIC SPEC OTHER STN: NORMAL
PATH REPORT.RELEVANT HX SPEC: NORMAL
PROT PATTERN SERPL ELPH-IMP: NORMAL
TRYPTASE SERPL-MCNC: 5.4 UG/L

## 2022-07-22 PROCEDURE — 99204 OFFICE O/P NEW MOD 45 MIN: CPT | Mod: 95 | Performed by: PSYCHIATRY & NEUROLOGY

## 2022-07-22 RX ORDER — SUMATRIPTAN 100 MG/1
100 TABLET, FILM COATED ORAL
Qty: 18 TABLET | Refills: 11 | Status: SHIPPED | OUTPATIENT
Start: 2022-07-22 | End: 2022-11-16

## 2022-07-22 RX ORDER — TOPIRAMATE 25 MG/1
100 TABLET, FILM COATED ORAL EVERY EVENING
Qty: 120 TABLET | Refills: 3 | Status: SHIPPED | OUTPATIENT
Start: 2022-07-22 | End: 2022-11-16

## 2022-07-22 RX ORDER — METOCLOPRAMIDE 10 MG/1
10 TABLET ORAL 3 TIMES DAILY PRN
Qty: 20 TABLET | Refills: 11 | Status: SHIPPED | OUTPATIENT
Start: 2022-07-22 | End: 2022-11-16

## 2022-07-22 ASSESSMENT — MIGRAINE DISABILITY ASSESSMENT (MIDAS)
ON A SCALE FROM 0-10 ON AVERAGE HOW PAINFUL WERE HEADACHES: 5
HOW MANY DAYS IN THE PAST 3 MONTHS HAVE YOU HAD A HEADACHE: 45
TOTAL SCORE: 27
HOW MANY DAYS WAS HOUSEWORK PRODUCTIVITY CUT IN HALF DUE TO HEADACHES: 7
HOW MANY DAYS WAS YOUR PRODUCTIVITY CUT IN HALF BECAUSE OF HEADACHES: 5
HOW MANY DAYS DID YOU MISS WORK OR SCHOOL BECAUSE OF HEADACHES: 5
HOW OFTEN WERE SOCIAL ACTIVITIES MISSED DUE TO HEADACHES: 3
HOW MANY DAYS DID YOU NOT DO HOUSEWORK BECAUSE OF HEADACHES: 7

## 2022-07-22 ASSESSMENT — HEADACHE IMPACT TEST (HIT 6)
HOW OFTEN DO HEADACHES LIMIT YOUR DAILY ACTIVITIES: VERY OFTEN
WHEN YOU HAVE A HEADACHE HOW OFTEN DO YOU WISH YOU COULD LIE DOWN: ALWAYS
HIT6 TOTAL SCORE: 68
HIT6 TOTAL SCORE: 68
HOW OFTEN HAVE YOU FELT FED UP OR IRRITATED BECAUSE OF YOUR HEADACHES: VERY OFTEN
WHEN YOU HAVE HEADACHES HOW OFTEN IS THE PAIN SEVERE: VERY OFTEN
HOW OFTEN DID HEADACHS LIMIT CONCENTRATION ON WORK OR DAILY ACTIVITY: VERY OFTEN
WHEN YOU HAVE A HEADACHE HOW OFTEN DO YOU WISH YOU COULD LIE DOWN: ALWAYS
HOW OFTEN HAVE YOU FELT TOO TIRED TO WORK BECAUSE OF YOUR HEADACHES: VERY OFTEN
HOW OFTEN HAVE YOU FELT FED UP OR IRRITATED BECAUSE OF YOUR HEADACHES: VERY OFTEN
WHEN YOU HAVE HEADACHES HOW OFTEN IS THE PAIN SEVERE: VERY OFTEN
HOW OFTEN DID HEADACHS LIMIT CONCENTRATION ON WORK OR DAILY ACTIVITY: VERY OFTEN
HOW OFTEN HAVE YOU FELT TOO TIRED TO WORK BECAUSE OF YOUR HEADACHES: VERY OFTEN
HOW OFTEN DO HEADACHES LIMIT YOUR DAILY ACTIVITIES: VERY OFTEN

## 2022-07-22 NOTE — LETTER
7/22/2022         RE: Analia Jerry  [address]  [place] [address]        Dear Colleague,    Thank you for referring your patient, Analia Jerry, to the [facility] NEUROLOG[facility]. Please see a copy of my visit note below.    Analia is a 31 year old who is being evaluated via a billable video visit.      How would you like to obtain your AVS? MyChart  If the video visit is dropped, the invitation should be resent by: Text to cell phone: [phone]  Will anyone else be joining your video visit? No        Video-Visit Details    Video Start Time: 2:18 PM    Type of service:  Video Visit    Video End Time:2:54 PM    Originating Location (pt. Location): Home    Distant Location (provider location):  [facility] NEUROLOGY [facility]     Platform used for Video Visit: [facility]  Virtual Neurology Consult     Analia Jerry MRN# [id number]   YOB: 1990 Age: 31 year old     Requesting physician: Kaylene Collier CNP         Assessment and Recommendations:     Analia Jerry is a 31 year old female who presents for further evaluation of headache.    Her headache presentation meets criteria for:  Chronic migraine without aura  Post lumbar puncture headache, now resolved    This is complicated by recent changes in her antidepressant regimen, which may have triggered worsened headaches.  She has been evaluated for secondary causes of headache, which was unrevealing.  Her virtual neurologic examination is intact today.  I was able to review report of outside MRI of the brain, which showed T2 hyperintensities that were nonspecific as well as a partially empty sella.  The patient also underwent lumbar puncture, with reportedly normal opening pressure.  Unfortunately, she had post LP headache, now resolved after blood patch.    Going forward, we discussed a symptomatic treatment strategy, focusing on  both acute and preventative treatment for chronic migraine.  -For acute treatment of mild headache, she will continue ibuprofen as needed, not to exceed more than 14 days/month to avoid medication overuse.  - For acute treatment of moderate to severe headache, I recommend optimize dose of sumatriptan 100 mg taken at the onset of headache, with a repeat dose in 2 hours if needed.  This should not exceed more than 9 days/month to avoid medication overuse.  - For headache related nausea, I recommend metoclopramide 10 mg as needed.    Her headache frequency and severity warrant prevention.  We discussed topiramate or atenolol as potential options.  - I recommend a trial of topiramate 25 mg nightly, titrating up by 25 mg each week, as needed and tolerated, to a goal dose of 100 mg at night.  Potential side effects were discussed.  I recommend a 3-month trial prior to determining effectiveness.  -If topiramate is not tolerated or not effective after an adequate trial, atenolol, botulinum toxin injections using a chronic migraine protocol every 12 weeks, or a CGRP inhibitor could be considered.  With her currently changing antidepressant regimen, we will avoid adding a tricyclic antidepressant; this may also be contraindicated due to her elevated pulse rate.    I will plan to see her back in 3 months to monitor her progress.    Karissa Gonzalez MD  Neurology            Chief Complaint:     Chief Complaint   Patient presents with     Video Visit     New headache            History is obtained from the patient and medical record.  Patient was seen via a virtual visit in their home due to the Covid 19 global pandemic.      Analia Jerry is a 31 year old female who has been living with headaches since age 14. OTC medications, cold wash cloth, laying down was helpful, but they worsened about a 1 year ago.     After tapering Lexapro, headache worsened again about a month ago. She missed work for a week.     Headaches are frontal or  posterior. It is a throbbing hard pain, sometimes sharp. They rate 3-4/30 daily and increase to 7-8/10 when severe. Severe headaches last at least 4-5 hours. She has 30/30 headache days per month, with 8-12 severe headache days per month.     She has associated photophobia, phonophobia, nausea, no vomiting.    She also noticed vision changes more recently, a blurry of vision with difficulty focusing. No typical aura. She denies a positional component. She denies unilateral autonomic features. She denies fevers or other illness associated.     She takes ibuprofen and lays down for treatment. She has Reglan for nausea. She also has sumatriptan 25 mg available.    She has taken bupropion.             Past Medical History:     Past Medical History:   Diagnosis Date     Acute gallstone pancreatitis 12/28/2019     Anxiety      ASCUS of cervix with negative high risk HPV 1/26/17    ASCUS/neg HR HPV.     Depressive disorder      Gallstones 12/28/2019    [facility]     H/O colposcopy with cervical biopsy 03/23/2017    Bx & ECC - negative     Hashimoto's disease      Headache(784.0)      Hypothyroidism due to Hashimoto's thyroiditis      Papanicolaou smear of cervix with atypical squamous cells of undetermined significance (ASC-US) 12/03/2015    GI issues - diarrhea           Past Surgical History:     Past Surgical History:   Procedure Laterality Date     COLONOSCOPY N/A 12/16/2015    Procedure: COLONOSCOPY;  Surgeon: Duane, William Charles, MD;  Location: [facility] OR     COLONOSCOPY WITH CO2 INSUFFLATION N/A 12/16/2015    Procedure: COLONOSCOPY WITH CO2 INSUFFLATION;  Surgeon: Duane, William Charles, MD;  Location: [facility] OR   gall bladder removed          Social History:   She has 2 step-sons.     She drinks caffeine, 2 drinks per daily.     Social History     Socioeconomic History     Marital status:      Spouse name: Not on file     Number of children: 0     Years of education: 16     Highest education  level: Not on file   Occupational History     Occupation:      Comment: [employer]   Tobacco Use     Smoking status: Never Smoker     Smokeless tobacco: Never Used   Substance and Sexual Activity     Alcohol use: Yes     Comment: couple times a year     Drug use: No     Sexual activity: Yes     Partners: Male     Birth control/protection: Pill   Other Topics Concern     Parent/sibling w/ CABG, MI or angioplasty before 65F 55M? No   Social History Narrative    Parents declaring bankruptcy; losing house due to mother's credit card debt.        Brother deployed to [place] for 16 months startin 4/1/09        Parents  but ''.     Social Determinants of Health     Financial Resource Strain: Not on file   Food Insecurity: Not on file   Transportation Needs: Not on file   Physical Activity: Not on file   Stress: Not on file   Social Connections: Not on file   Intimate Partner Violence: Not on file   Housing Stability: Not on file             Family History:   There is a sister (half-sister) with headaches.    Family History   Problem Relation Age of Onset     Hyperlipidemia Mother      Thyroid Disease Mother      Kidney Disease Mother      Hyperlipidemia Father      Substance Abuse Sister      Substance Abuse Sister      Depression Brother      Myocardial Infarction Maternal Grandfather      No Known Problems Paternal Grandmother      No Known Problems Paternal Grandfather      Breast Cancer Cousin      C.A.D. No family hx of      Diabetes No family hx of      Breast Cancer No family hx of      Cancer - colorectal No family hx of      Anesthesia Reaction No family hx of      Blood Disease No family hx of              Allergies:      Allergies   Allergen Reactions     Nkda [No Known Drug Allergies]              Medications:     Current Outpatient Medications:      buPROPion (WELLBUTRIN XL) 150 MG 24 hr tablet, Take 1 tablet (150 mg) by mouth every morning Take with the bupropion  mg for  total daily dose of 450 mg, Disp: 90 tablet, Rfl: 1     buPROPion (WELLBUTRIN XL) 300 MG 24 hr tablet, Take 1 tablet (300 mg) by mouth every morning, Disp: 90 tablet, Rfl: 1     levothyroxine (SYNTHROID/LEVOTHROID) 200 MCG tablet, Take 1 tablet (200 mcg) by mouth daily, Disp: 90 tablet, Rfl: 3     metoclopramide (REGLAN) 10 MG tablet, Take 10 mg by mouth, Disp: , Rfl:      nitroFURantoin macrocrystal (MACRODANTIN) 100 MG capsule, Take 1 capsule (100 mg) by mouth At Bedtime, Disp: 30 capsule, Rfl: 11     norethindrone-ethinyl estradiol (ORTHO-NOVUM 1/35, 28,) 1-35 MG-MCG tablet, Continuously by skipping placebo pills, Disp: 112 tablet, Rfl: 3     rOPINIRole (REQUIP) 0.5 MG tablet, 0.25 mg (1/2 tablet) once daily 1 to 3 hours before bedtime. Dose may be increased after 2 days to 0.5 mg once daily, and after 7 days to 1 mg once daily (2 tablets). Dose may be further increased in 0.5 mg increments every week until reaching 3 mg once daily during week 6., Disp: 180 tablet, Rfl: 1          Physical Exam:   LMP  (LMP Unknown)    Physical Exam:   General: NAD  Neurologic:   Mental Status Exam: Alert, awake and oriented to situation. No dysarthria. Speech of normal fluency.   Cranial Nerves: Pupils equal, EOMs intact, no nystagmus, facial movements symmetric, hearing intact to conversation, tongue midline and fully mobile. No tongue atrophy or fasciculations.    Motor: No drift in upper extremities. Able to stand from a seated position without use of arms. No tremors or abnormal movements noted.   Coordination: With arms outstretched, able to touch nose using index finger accurately bilaterally.  Normal finger tapping bilaterally.     Gait: Normal stance and casual gait.    Neck: Normal range of motion with lateral head movements and neck flexion.  Eyes: No conjunctival injection, no scleral icterus.           Data:     MRI brain (7/6/2022):  1. No acute infarction, mass effect, or intracranial hemorrhage.   2. Two punctate  T2 FLAIR hyperintensities in the supratentorial white matter are nonspecific, though typical for sequelae of minimal chronic microvascular ischemic changes or migraine headaches.   3. Partially empty sella may represent an anatomic variant, though raises the possibility of idiopathic intracranial hypertension in an appropriate clinical setting.     LP  OP 19 per patient      Again, thank you for allowing me to participate in the care of your patient.        Sincerely,        Karissa Gonzalez MD

## 2022-07-22 NOTE — TELEPHONE ENCOUNTER
7/22/2022-Request for images faxed to [facility] @ 815am    7/25/2022-[facility] Images now in PACS-MR @ 413am        FUTURE VISIT INFORMATION      FUTURE VISIT INFORMATION:    Date: 7/22/2022    Time: 2pm    Location: [facility]  REFERRAL INFORMATION:    Referring provider:  Kaylene SOUSA CNP     Referring providers clinic:  [facility]     Reason for visit/diagnosis  Headaches     RECORDS REQUESTED FROM:       Clinic name Comments Records Status Imaging Status   Internal [facility]-7/18/2022 Epic No Images          [facility] Brain-7/6/2022 Care Everywhere Requested

## 2022-07-22 NOTE — PROGRESS NOTES
Analia is a 31 year old who is being evaluated via a billable video visit.      How would you like to obtain your AVS? MyChart  If the video visit is dropped, the invitation should be resent by: Text to cell phone: [phone]  Will anyone else be joining your video visit? No        Video-Visit Details    Video Start Time: 2:18 PM    Type of service:  Video Visit    Video End Time:2:54 PM    Originating Location (pt. Location): Home    Distant Location (provider location):  [facility]     Platform used for Video Visit: Gnammo     [facility]  Virtual Neurology Consult     Analia Jerry MRN# [id number]   YOB: 1990 Age: 31 year old     Requesting physician: Kaylene Collier CNP         Assessment and Recommendations:     Analia Jerry is a 31 year old female who presents for further evaluation of headache.    Her headache presentation meets criteria for:  Chronic migraine without aura  Post lumbar puncture headache, now resolved    This is complicated by recent changes in her antidepressant regimen, which may have triggered worsened headaches.  She has been evaluated for secondary causes of headache, which was unrevealing.  Her virtual neurologic examination is intact today.  I was able to review report of outside MRI of the brain, which showed T2 hyperintensities that were nonspecific as well as a partially empty sella.  The patient also underwent lumbar puncture, with reportedly normal opening pressure.  Unfortunately, she had post LP headache, now resolved after blood patch.    Going forward, we discussed a symptomatic treatment strategy, focusing on both acute and preventative treatment for chronic migraine.  -For acute treatment of mild headache, she will continue ibuprofen as needed, not to exceed more than 14 days/month to avoid medication overuse.  - For acute treatment of moderate to severe headache, I  recommend optimize dose of sumatriptan 100 mg taken at the onset of headache, with a repeat dose in 2 hours if needed.  This should not exceed more than 9 days/month to avoid medication overuse.  - For headache related nausea, I recommend metoclopramide 10 mg as needed.    Her headache frequency and severity warrant prevention.  We discussed topiramate or atenolol as potential options.  - I recommend a trial of topiramate 25 mg nightly, titrating up by 25 mg each week, as needed and tolerated, to a goal dose of 100 mg at night.  Potential side effects were discussed.  I recommend a 3-month trial prior to determining effectiveness.  -If topiramate is not tolerated or not effective after an adequate trial, atenolol, botulinum toxin injections using a chronic migraine protocol every 12 weeks, or a CGRP inhibitor could be considered.  With her currently changing antidepressant regimen, we will avoid adding a tricyclic antidepressant; this may also be contraindicated due to her elevated pulse rate.    I will plan to see her back in 3 months to monitor her progress.    Karissa Gonzalez MD  Neurology            Chief Complaint:     Chief Complaint   Patient presents with     Video Visit     New headache            History is obtained from the patient and medical record.  Patient was seen via a virtual visit in their home due to the Covid 19 global pandemic.      Analia Jerry is a 31 year old female who has been living with headaches since age 14. OTC medications, cold wash cloth, laying down was helpful, but they worsened about a 1 year ago.     After tapering Lexapro, headache worsened again about a month ago. She missed work for a week.     Headaches are frontal or posterior. It is a throbbing hard pain, sometimes sharp. They rate 3-4/30 daily and increase to 7-8/10 when severe. Severe headaches last at least 4-5 hours. She has 30/30 headache days per month, with 8-12 severe headache days per month.     She has associated  photophobia, phonophobia, nausea, no vomiting.    She also noticed vision changes more recently, a blurry of vision with difficulty focusing. No typical aura. She denies a positional component. She denies unilateral autonomic features. She denies fevers or other illness associated.     She takes ibuprofen and lays down for treatment. She has Reglan for nausea. She also has sumatriptan 25 mg available.    She has taken bupropion.             Past Medical History:     Past Medical History:   Diagnosis Date     Acute gallstone pancreatitis 12/28/2019     Anxiety      ASCUS of cervix with negative high risk HPV 1/26/17    ASCUS/neg HR HPV.     Depressive disorder      Gallstones 12/28/2019    [facility]     H/O colposcopy with cervical biopsy 03/23/2017    Bx & ECC - negative     Hashimoto's disease      Headache(784.0)      Hypothyroidism due to Hashimoto's thyroiditis      Papanicolaou smear of cervix with atypical squamous cells of undetermined significance (ASC-US) 12/03/2015    GI issues - diarrhea           Past Surgical History:     Past Surgical History:   Procedure Laterality Date     COLONOSCOPY N/A 12/16/2015    Procedure: COLONOSCOPY;  Surgeon: Duane, William Charles, MD;  Location: [facility] OR     COLONOSCOPY WITH CO2 INSUFFLATION N/A 12/16/2015    Procedure: COLONOSCOPY WITH CO2 INSUFFLATION;  Surgeon: Duane, William Charles, MD;  Location: [facility] OR   gall bladder removed          Social History:   She has 2 step-sons.     She drinks caffeine, 2 drinks per daily.     Social History     Socioeconomic History     Marital status:      Spouse name: Not on file     Number of children: 0     Years of education: 16     Highest education level: Not on file   Occupational History     Occupation:      Comment: [employer]   Tobacco Use     Smoking status: Never Smoker     Smokeless tobacco: Never Used   Substance and Sexual Activity     Alcohol use: Yes     Comment: couple times a year      Drug use: No     Sexual activity: Yes     Partners: Male     Birth control/protection: Pill   Other Topics Concern     Parent/sibling w/ CABG, MI or angioplasty before 65F 55M? No   Social History Narrative    Parents declaring bankruptcy; losing house due to mother's credit card debt.        Brother deployed to [place] for 16 months startin 4/1/09        Parents  but ''.     Social Determinants of Health     Financial Resource Strain: Not on file   Food Insecurity: Not on file   Transportation Needs: Not on file   Physical Activity: Not on file   Stress: Not on file   Social Connections: Not on file   Intimate Partner Violence: Not on file   Housing Stability: Not on file             Family History:   There is a sister (half-sister) with headaches.    Family History   Problem Relation Age of Onset     Hyperlipidemia Mother      Thyroid Disease Mother      Kidney Disease Mother      Hyperlipidemia Father      Substance Abuse Sister      Substance Abuse Sister      Depression Brother      Myocardial Infarction Maternal Grandfather      No Known Problems Paternal Grandmother      No Known Problems Paternal Grandfather      Breast Cancer Cousin      C.A.D. No family hx of      Diabetes No family hx of      Breast Cancer No family hx of      Cancer - colorectal No family hx of      Anesthesia Reaction No family hx of      Blood Disease No family hx of              Allergies:      Allergies   Allergen Reactions     Nkda [No Known Drug Allergies]              Medications:     Current Outpatient Medications:      buPROPion (WELLBUTRIN XL) 150 MG 24 hr tablet, Take 1 tablet (150 mg) by mouth every morning Take with the bupropion  mg for total daily dose of 450 mg, Disp: 90 tablet, Rfl: 1     buPROPion (WELLBUTRIN XL) 300 MG 24 hr tablet, Take 1 tablet (300 mg) by mouth every morning, Disp: 90 tablet, Rfl: 1     levothyroxine (SYNTHROID/LEVOTHROID) 200 MCG tablet, Take 1 tablet (200 mcg) by mouth  daily, Disp: 90 tablet, Rfl: 3     metoclopramide (REGLAN) 10 MG tablet, Take 10 mg by mouth, Disp: , Rfl:      nitroFURantoin macrocrystal (MACRODANTIN) 100 MG capsule, Take 1 capsule (100 mg) by mouth At Bedtime, Disp: 30 capsule, Rfl: 11     norethindrone-ethinyl estradiol (ORTHO-NOVUM 1/35, 28,) 1-35 MG-MCG tablet, Continuously by skipping placebo pills, Disp: 112 tablet, Rfl: 3     rOPINIRole (REQUIP) 0.5 MG tablet, 0.25 mg (1/2 tablet) once daily 1 to 3 hours before bedtime. Dose may be increased after 2 days to 0.5 mg once daily, and after 7 days to 1 mg once daily (2 tablets). Dose may be further increased in 0.5 mg increments every week until reaching 3 mg once daily during week 6., Disp: 180 tablet, Rfl: 1          Physical Exam:   LMP  (LMP Unknown)    Physical Exam:   General: NAD  Neurologic:   Mental Status Exam: Alert, awake and oriented to situation. No dysarthria. Speech of normal fluency.   Cranial Nerves: Pupils equal, EOMs intact, no nystagmus, facial movements symmetric, hearing intact to conversation, tongue midline and fully mobile. No tongue atrophy or fasciculations.    Motor: No drift in upper extremities. Able to stand from a seated position without use of arms. No tremors or abnormal movements noted.   Coordination: With arms outstretched, able to touch nose using index finger accurately bilaterally.  Normal finger tapping bilaterally.     Gait: Normal stance and casual gait.    Neck: Normal range of motion with lateral head movements and neck flexion.  Eyes: No conjunctival injection, no scleral icterus.           Data:     MRI brain (7/6/2022):  1. No acute infarction, mass effect, or intracranial hemorrhage.   2. Two punctate T2 FLAIR hyperintensities in the supratentorial white matter are nonspecific, though typical for sequelae of minimal chronic microvascular ischemic changes or migraine headaches.   3. Partially empty sella may represent an anatomic variant, though raises the  possibility of idiopathic intracranial hypertension in an appropriate clinical setting.     LP  OP 19 per patient

## 2022-07-23 LAB — ENDOMYSIUM IGA TITR SER IF: NORMAL {TITER}

## 2022-07-24 PROCEDURE — 82705 FATS/LIPIDS FECES QUAL: CPT

## 2022-07-24 PROCEDURE — 87177 OVA AND PARASITES SMEARS: CPT

## 2022-07-24 PROCEDURE — 87209 SMEAR COMPLEX STAIN: CPT

## 2022-07-24 ASSESSMENT — PATIENT HEALTH QUESTIONNAIRE - PHQ9
SUM OF ALL RESPONSES TO PHQ QUESTIONS 1-9: 3
SUM OF ALL RESPONSES TO PHQ QUESTIONS 1-9: 3
10. IF YOU CHECKED OFF ANY PROBLEMS, HOW DIFFICULT HAVE THESE PROBLEMS MADE IT FOR YOU TO DO YOUR WORK, TAKE CARE OF THINGS AT HOME, OR GET ALONG WITH OTHER PEOPLE: SOMEWHAT DIFFICULT

## 2022-07-25 ENCOUNTER — VIRTUAL VISIT (OUTPATIENT)
Dept: BEHAVIORAL HEALTH | Facility: CLINIC | Age: 32
End: 2022-07-25
Payer: COMMERCIAL

## 2022-07-25 ENCOUNTER — VIRTUAL VISIT (OUTPATIENT)
Dept: PSYCHIATRY | Facility: CLINIC | Age: 32
End: 2022-07-25
Payer: COMMERCIAL

## 2022-07-25 ENCOUNTER — LAB (OUTPATIENT)
Dept: LAB | Facility: CLINIC | Age: 32
End: 2022-07-25

## 2022-07-25 ENCOUNTER — TELEPHONE (OUTPATIENT)
Dept: LAB | Facility: CLINIC | Age: 32
End: 2022-07-25

## 2022-07-25 VITALS — BODY MASS INDEX: 37.4 KG/M2 | WEIGHT: 237 LBS

## 2022-07-25 DIAGNOSIS — R14.0 ABDOMINAL BLOATING: ICD-10-CM

## 2022-07-25 DIAGNOSIS — F33.41 RECURRENT MAJOR DEPRESSION IN PARTIAL REMISSION (H): Primary | ICD-10-CM

## 2022-07-25 DIAGNOSIS — F43.10 POSTTRAUMATIC STRESS DISORDER: ICD-10-CM

## 2022-07-25 DIAGNOSIS — F33.0 DEPRESSION, MAJOR, RECURRENT, MILD (H): Primary | ICD-10-CM

## 2022-07-25 DIAGNOSIS — F41.1 GENERALIZED ANXIETY DISORDER: ICD-10-CM

## 2022-07-25 PROCEDURE — 99214 OFFICE O/P EST MOD 30 MIN: CPT | Mod: 95 | Performed by: PSYCHIATRY & NEUROLOGY

## 2022-07-25 PROCEDURE — 82384 ASSAY THREE CATECHOLAMINES: CPT

## 2022-07-25 PROCEDURE — 83497 ASSAY OF 5-HIAA: CPT

## 2022-07-25 PROCEDURE — 90832 PSYTX W PT 30 MINUTES: CPT | Mod: 95 | Performed by: SOCIAL WORKER

## 2022-07-25 PROCEDURE — 83835 ASSAY OF METANEPHRINES: CPT

## 2022-07-25 ASSESSMENT — ANXIETY QUESTIONNAIRES
2. NOT BEING ABLE TO STOP OR CONTROL WORRYING: NOT AT ALL
GAD7 TOTAL SCORE: 2
7. FEELING AFRAID AS IF SOMETHING AWFUL MIGHT HAPPEN: NOT AT ALL
6. BECOMING EASILY ANNOYED OR IRRITABLE: SEVERAL DAYS
5. BEING SO RESTLESS THAT IT IS HARD TO SIT STILL: NOT AT ALL
7. FEELING AFRAID AS IF SOMETHING AWFUL MIGHT HAPPEN: NOT AT ALL
1. FEELING NERVOUS, ANXIOUS, OR ON EDGE: NOT AT ALL
IF YOU CHECKED OFF ANY PROBLEMS ON THIS QUESTIONNAIRE, HOW DIFFICULT HAVE THESE PROBLEMS MADE IT FOR YOU TO DO YOUR WORK, TAKE CARE OF THINGS AT HOME, OR GET ALONG WITH OTHER PEOPLE: SOMEWHAT DIFFICULT
GAD7 TOTAL SCORE: 2
3. WORRYING TOO MUCH ABOUT DIFFERENT THINGS: NOT AT ALL
4. TROUBLE RELAXING: SEVERAL DAYS
8. IF YOU CHECKED OFF ANY PROBLEMS, HOW DIFFICULT HAVE THESE MADE IT FOR YOU TO DO YOUR WORK, TAKE CARE OF THINGS AT HOME, OR GET ALONG WITH OTHER PEOPLE?: SOMEWHAT DIFFICULT

## 2022-07-25 NOTE — PROGRESS NOTES
Called Analia and KARLA asking her to call the [facility] back in regards to her labs.     Lab note: Patient needs to recollect her CRYGIAG specimen in correct formalin container.     Viktoria Murrell MLT(ASCP)

## 2022-07-25 NOTE — PROGRESS NOTES
Telemedicine Visit: The patient's condition can be safely assessed and treated via synchronous audio and visual telemedicine encounter.      Reason for Telemedicine Visit: Services only offered telehealth    Originating Site (Patient Location): Patient's home    Distant Site (Provider Location): Provider Remote Setting- Home Office    Consent:  The patient/guardian has verbally consented to: the potential risks and benefits of telemedicine (video visit) versus in person care; bill my insurance or make self-payment for services provided; and responsibility for payment of non-covered services.     Mode of Communication:  Video Conference via [employer]    As the provider I attest to compliance with applicable laws and regulations related to telemedicine.      Analia is a 31 year old who is being evaluated via a billable video visit.      How would you like to obtain your AVS? MyChart  If the video visit is dropped, the invitation should be resent by: Text to cell phone: [phone]  Will anyone else be joining your video visit? No           Outpatient Psychiatric Progress Note    Name: Analia Jerry   : 1990                    Primary Care Provider: MERRY WELLS CNP   Therapist: Tayler Munoz       PHQ-9 scores:  PHQ-9 SCORE 2022   PHQ-9 Total Score - - -   PHQ-9 Total Score MyChart 10 (Moderate depression) 2 (Minimal depression) 3 (Minimal depression)   PHQ-9 Total Score 10 2 3   Some encounter information is confidential and restricted. Go to Review Flowsheets activity to see all data.       MAXIM-7 scores:  MAXIM-7 SCORE 3/14/2022 2022 2022   Total Score - - -   Total Score 11 (moderate anxiety) 7 (mild anxiety) 2 (minimal anxiety)   Total Score - 7 2   Some encounter information is confidential and restricted. Go to Review Flowsheets activity to see all data.       Patient Identification:  Analia is a 31 year old year old female  who presents for  return visit with me.  Patient attended the session alone     Interim History:  Per Azalea Moya, [facility]:  Shares that since going off the lexapro completely she struggled a lot with extreme headaches and vision changes.  Had 2 ED visits due to headaches and had an MRI and a spinal tap.  Was told in ED that lexapro can be very difficult to go off of and pt shares that she wishes she would have been more prepared as she might have waited to go off if it since she had just started a new job.  Feels ok emotionally other than some irritability with how she has been feeling.  Doesn't think she wants to get on another anti-depressant at this time.  Did get in with a neurologist and will be starting on a medication for migraines.  Sleep has been pretty good and is on a waitlist for a cpap but there is a shortage at this time.  Feels that restless legs have improved a bit as well.  Has been trying to be more active when she is feeling better and has been able to get some things done around the house recently.  Seeing therapist every 3 weeks now.  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      Analia reports that she has a history of chronic headaches, but after stopping her Lexapro, they intensified significantly.  She tapered off as we had discussed, reducing her dose from 20 mg daily to 10 mg daily for 2 weeks, then decreasing again to 5 mg daily for 2 weeks, then discontinuing it.  The headaches did not increase until she had discontinued it altogether.    She was seen in the emergency department a couple of times, and saw a neurologist late last week.  She had an MRI of her head and spinal tap to rule out other causes of the headaches.  She has now been started on Topamax as a prophylaxis.  She was told that the headaches could last up to 6 to 8 weeks, and its now  been 4 weeks that she stopped the medication.    Her mood has been pretty good.  She feels she is more outgoing.  She also reports that she has not been as bothered by restless legs and her sleep.  She is still waiting to get a CPAP machine, as there is a shortage after the recall last summer.    Vital Signs:   Wt 107.5 kg (237 lb)   BMI 37.40 kg/m      Current Medications:  Current Outpatient Medications   Medication     buPROPion (WELLBUTRIN XL) 150 MG 24 hr tablet     buPROPion (WELLBUTRIN XL) 300 MG 24 hr tablet     levothyroxine (SYNTHROID/LEVOTHROID) 200 MCG tablet     metoclopramide (REGLAN) 10 MG tablet     nitroFURantoin macrocrystal (MACRODANTIN) 100 MG capsule     norethindrone-ethinyl estradiol (ORTHO-NOVUM 1/35, 28,) 1-35 MG-MCG tablet     rOPINIRole (REQUIP) 0.5 MG tablet     SUMAtriptan (IMITREX) 100 MG tablet     topiramate (TOPAMAX) 25 MG tablet     No current facility-administered medications for this visit.        Mental Status Examination:  Analia is a 31-year-old woman in no acute distress.  She is neatly groomed in casual clothing.  Speech is clear and normal in rate and tone.  Eye contact is good over the video connection.  Affect is blunted.  Mood is good.  Thoughts are logical and spontaneous with no loose associations or flight of ideas.  Thought content shows no psychosis.  No suicidal thoughts.  She is alert and oriented x3.    Assessment and Plan:    ICD-10-CM    1. Recurrent major depression in partial remission (H)  F33.41    2. Posttraumatic stress disorder  F43.10        Medical comorbidities include:   Patient Active Problem List   Diagnosis     Familial hematuria     CARDIOVASCULAR SCREENING; LDL GOAL LESS THAN 160     Mechanical low back pain     Keloid scar     ASCUS of cervix with negative high risk HPV     Common wart     Hypothyroidism due to Hashimoto's thyroiditis     High triglycerides     Elevated liver enzymes     Class 1 obesity due to excess calories with body mass  index (BMI) of 32.0 to 32.9 in adult, unspecified whether serious comorbidity present     FRANK (obstructive sleep apnea) - mild (AHI 7)     Morbid obesity (H)     Moderate episode of recurrent major depressive disorder (H)     Fatigue, unspecified type     Posttraumatic stress disorder     Recurrent major depression in partial remission (H)       Treatment Plan:  Patient Instructions   Continue bupropion  mg daily.    Continue all other medications per your primary care and specialty providers.    Schedule an appointment with me in 3 months or sooner as needed.  You may call [facility] at [phone] to schedule.    [place] Resources:      Go to the Emergency Department as needed or call after hours crisis line at [phone].      To schedule individual or family therapy, call [facility] at [phone].     Follow up with primary care provider as planned or sooner for acute medical concerns.    Call the psychiatric nurse line with medication questions or concerns at [phone].    [employer] may be used to communicate with your provider, but this is not intended to be used for emergencies.    Community Resources:      National Suicide Prevention Lifeline: 408.183.6664 (TTY: 460.650.6366). Call anytime for help.  (www.suicidepreventionlifeline.org)    National [place] [employer] Mental Illness (www.irasema.org): 671.995.4577 or 198-877-7503.     Mental Health Association (www.mentalhealth.org): 863.484.2612 or 418-569-1150.    [place] Crisis Text Line: Text [place] to 901762    Suicide LifeLine Chat: [employer]Aragon Pharmaceuticals.org/chat         Administrative Billing:   Video call duration: 15 minutes   Start: 11:25 AM   Stop: 11:40 AM  Additional telephone call duration: 6 minutes  Total time spent, including chart review and documentation: 32 minutes    Patient Status:  Patient will continue to be seen for ongoing consultation and stabilization.

## 2022-07-25 NOTE — PROGRESS NOTES
[facility] Collaborative Care Psychiatry Service   2022      Behavioral Health Clinician Progress Note    Patient Name: Analia Jerry           Service Type:  Individual      Service Location:   MyChart / Email (patient reached)     Session Start Time: 10:32a  Session End Time: 10:53a      Session Length: 16 - 37      Attendees: Client     Service Modality:  Video Visit:      Provider verified identity through the following two step process.  Patient provided:  Patient photo and Patient     Telemedicine Visit: The patient's condition can be safely assessed and treated via synchronous audio and visual telemedicine encounter.      Reason for Telemedicine Visit: Services only offered telehealth    Originating Site (Patient Location): Patient's home    Distant Site (Provider Location): Provider Remote Setting- Home Office    Consent:  The patient/guardian has verbally consented to: the potential risks and benefits of telemedicine (video visit) versus in person care; bill my insurance or make self-payment for services provided; and responsibility for payment of non-covered services.     Patient would like the video invitation sent by:  My Chart    Mode of Communication:  Video Conference via Amwell    As the provider I attest to compliance with applicable laws and regulations related to telemedicine.    Visit Activities (Refresh list every visit): [facility] Only    Diagnostic Assessment Date: 3/15/22  Treatment Plan Review Date: 22  See Flowsheets for today's PHQ-9 and MAXIM-7 results  Previous PHQ-9:   PHQ-9 SCORE 2022   PHQ-9 Total Score - - -   PHQ-9 Total Score [place] 10 (Moderate depression) 2 (Minimal depression) 3 (Minimal depression)   PHQ-9 Total Score 10 2 3   Some encounter information is confidential and restricted. Go to Review Flowsheets activity to see all data.     Previous MAXIM-7:   MAXIM-7 SCORE 3/14/2022 2022 2022   Total Score - - -   Total Score 11  (moderate anxiety) 7 (mild anxiety) 2 (minimal anxiety)   Total Score - 7 2   Some encounter information is confidential and restricted. Go to Review Flowsheets activity to see all data.       KADE LEVEL:  KADE Score (Last Two) 8/15/2011   KADE Raw Score 37   Activation Score 49.9   KADE Level 2       DATA  Extended Session (60+ minutes): No  Interactive Complexity: No  Crisis: No  [facility] Patient: No    Treatment Objective(s) Addressed in This Session:  Target Behavior(s): sleep, depression    Depressed Mood: Increase interest, engagement, and pleasure in doing things    Current Stressors / Issues:  Shares that since going off the lexapro completely she struggled a lot with extreme headaches and vision changes.  Had 2 ED visits due to headaches and had an MRI and a spinal tap.  Was told in ED that lexapro can be very difficult to go off of and pt shares that she wishes she would have been more prepared as she might have waited to go off if it since she had just started a new job.  Feels ok emotionally other than some irritability with how she has been feeling.  Doesn't think she wants to get on another anti-depressant at this time.  Did get in with a neurologist and will be starting on a medication for migraines.  Sleep has been pretty good and is on a waitlist for a cpap but there is a shortage at this time.  Feels that restless legs have improved a bit as well.  Has been trying to be more active when she is feeling better and has been able to get some things done around the house recently.  Seeing therapist every 3 weeks now.    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       Progress on Treatment Objective(s) / Homework:  Satisfactory progress - MAINTENANCE (Working to maintain change, with risk of relapse); Intervened by continuing to positively reinforce healthy  behavior choice     Motivational Interviewing    MI Intervention: Supported Autonomy, Collaboration, Evocation, Permission to raise concern or advise, Open-ended questions and Reflections: simple and complex     Change Talk Expressed by the Patient: Activation Taking steps    Provider Response to Change Talk: E - Evoked more info from patient about behavior change, A - Affirmed patient's thoughts, decisions, or attempts at behavior change, R - Reflected patient's change talk and S - Summarized patient's change talk statements      Care Plan review completed: Yes    Medication Review:  No changes to current psychiatric medication(s)    Medication Compliance:  Yes    Changes in Health Issues:   None reported    Chemical Use Review:   Substance Use: Chemical use reviewed, no active concerns identified      Tobacco Use: No current tobacco use.      Assessment: Current Emotional / Mental Status (status of significant symptoms):  Risk status (Self / Other harm or suicidal ideation)  Patient has had a history of suicidal ideation: hx of SI several years ago.  denies current  Patient denies current fears or concerns for personal safety.  Patient denies current or recent suicidal ideation or behaviors.  Patient denies current or recent homicidal ideation or behaviors.  Patient denies current or recent self injurious behavior or ideation.  Patient denies other safety concerns.  A safety and risk management plan has not been developed at this time, however patient was encouraged to call [place] [phone] should there be a change in any of these risk factors.    Appearance:   Appropriate   Eye Contact:   Good   Psychomotor Behavior: Normal   Attitude:   Cooperative   Orientation:   All  Speech   Rate / Production: Normal    Volume:  Normal   Mood:    Normal  Affect:    Appropriate   Thought Content:  Clear   Thought Form:  Coherent  Logical   Insight:    Good     Diagnoses:  1. Depression, major, recurrent, mild (H)    2.  Generalized anxiety disorder        Collateral Reports Completed:  Communicated with: [facility] Psychiatrist    Plan: (Homework, other):  Patient was given information about behavioral services and encouraged to schedule a follow up appointment with the clinic [facility] as needed.  She was also given information about mental health symptoms and treatment options .  Has an individual therapist that sees regularly.  CD Recommendations: No indications of CD issues.  Azalea Moya, [facility] [employer], [facility]      ______________________________________________________________________    Integrated Primary Care Behavioral Health Treatment Plan    Patient's Name: Analia Jerry  YOB: 1990    Date of Creation: 5/23/22  Date Treatment Plan Last Reviewed/Revised: 5/23/22    DSM5 Diagnoses: 296.31 (F33.0) Major Depressive Disorder, Recurrent Episode, Mild _ and With mixed features or 300.02 (F41.1) Generalized Anxiety Disorder  Psychosocial / Contextual Factors: grief, job change  PROMIS (reviewed every 90 days): 25    Referral / Collaboration:  Referral to another professional/service is not indicated at this time. Pt has a long term therapist.    Anticipated number of session for this episode of care: 4-5  Anticipation frequency of session: Every 2 months  Anticipated Duration of each session: 16-37 minutes  Treatment plan will be reviewed in 90 days or when goals have been changed.       MeasurableTreatment Goal(s) related to diagnosis / functional impairment(s)  Goal 1: Patient will experience a reduction in depression symptom along with a corresponding increase in positive emotions and life satisfaction.      Objective #A (Patient Action)    Patient will Feel less tired and more energy during the day .  Status: New - Date: 5/23/22     Intervention(s)  Therapist will use cognitive-behavioral and acceptance and commitment therapy topics aimed to help reduce anxiety and depression.        Patient has reviewed and agreed to the above  plan.      Azalea Moya, JAMES, [facility], [facility]  July 25, 2022

## 2022-07-25 NOTE — PATIENT INSTRUCTIONS
Continue bupropion  mg daily.    Continue all other medications per your primary care and specialty providers.    Schedule an appointment with me in 3 months or sooner as needed.  You may call [facility] at [phone] to schedule.    [place] Resources:    Go to the Emergency Department as needed or call after hours crisis line at [phone].    To schedule individual or family therapy, call [facility] at [phone].   Follow up with primary care provider as planned or sooner for acute medical concerns.  Call the psychiatric nurse line with medication questions or concerns at [phone].  [employer] may be used to communicate with your provider, but this is not intended to be used for emergencies.    Community Resources:    National Suicide Prevention Lifeline: 956.392.5287 (TTY: 541.299.6063). Call anytime for help.  (www.suicidepreventionlifeline.org)  National Lyles on Mental Illness (www.irasema.org): 985.499.5067 or 937-938-2658.   Mental Health Association (www.mentalhealth.org): 569.802.9349 or 219-551-6234.  [place] Crisis Text Line: Text [place] to 139910  Suicide LifeLine Chat: suicidepreventionlifeline.org/chat

## 2022-07-26 LAB
FAT STL QL: NORMAL
NEUTRAL FAT STL QL: NORMAL
O+P STL MICRO: NEGATIVE

## 2022-07-27 LAB
CALPROTECTIN STL-MCNT: 8 MG/KG (ref 0–49.9)
CATECHOLS UR-IMP: ABNORMAL
CREAT 24H UR-MRATE: 1690 MG/D
CREAT UR-MCNC: 147 MG/DL
DOPAMINE 24H UR-MRATE: 233 UG/D
DOPAMINE UR-MCNC: 203 UG/L
DOPAMINE/CREAT UR: 138 UG/G CRT
ELASTASE PANC STL-MCNT: >800 UG/G
EPINEPH 24H UR-MRATE: 2 UG/D
EPINEPH UR-MCNC: 2 UG/L
EPINEPH/CREAT UR: 1 UG/G CRT
NOREPINEPH 24H UR-MRATE: 25 UG/D
NOREPINEPH UR-MCNC: 22 UG/L
NOREPINEPH/CREAT UR: 15 UG/G CRT

## 2022-07-29 LAB
5HIAA & CREATININE UR-IMP: ABNORMAL
5OH-INDOLEACETATE 24H UR-MCNC: 2.6 MG/L
5OH-INDOLEACETATE 24H UR-MRATE: 3 MG/D
5OH-INDOLEACETATE/CREAT 24H UR: 2 MG/GCR
CREAT 24H UR-MRATE: 1690 MG/D
CREAT UR-MCNC: 147 MG/DL

## 2022-07-30 LAB
CREAT 24H UR-MRATE: 1690 MG/D
CREAT UR-MCNC: 147 MG/DL
METANEPH 24H UR-MCNC: 82 UG/L
METANEPH 24H UR-MRATE: 94 UG/D
METANEPH+NORMETANEPH UR-IMP: ABNORMAL
METANEPH/CREAT 24H UR: 56 UG/G CRT
NORMETANEPHRINE 24H UR-MCNC: 200 UG/L
NORMETANEPHRINE 24H UR-MRATE: 230 UG/D
NORMETANEPHRINE/CREAT 24H UR: 136 UG/G CRT

## 2022-07-31 LAB — HEMOCCULT STL QL IA: NEGATIVE

## 2022-07-31 PROCEDURE — 82274 ASSAY TEST FOR BLOOD FECAL: CPT

## 2022-08-03 ENCOUNTER — MYC MEDICAL ADVICE (OUTPATIENT)
Dept: GASTROENTEROLOGY | Facility: CLINIC | Age: 32
End: 2022-08-03

## 2022-08-06 DIAGNOSIS — E06.3 HYPOTHYROIDISM DUE TO HASHIMOTO'S THYROIDITIS: ICD-10-CM

## 2022-08-09 RX ORDER — LEVOTHYROXINE SODIUM 200 UG/1
200 TABLET ORAL DAILY
Qty: 90 TABLET | Refills: 0 | Status: SHIPPED | OUTPATIENT
Start: 2022-08-09 | End: 2022-10-25

## 2022-08-09 NOTE — TELEPHONE ENCOUNTER
levothyroxine (SYNTHROID/LEVOTHROID) 200 MCG tablet     Last Written Prescription Date: 8/9/21  Last Fill Quantity: 90,   # refills: 3  Last Office Visit : 8/4/21  Future Office visit:  None      Routing refill request to provider for review/approval because:TRessler gone.

## 2022-08-11 ENCOUNTER — HOSPITAL ENCOUNTER (OUTPATIENT)
Facility: AMBULATORY SURGERY CENTER | Age: 32
Discharge: HOME | End: 2022-08-11
Attending: INTERNAL MEDICINE | Admitting: INTERNAL MEDICINE
Payer: COMMERCIAL

## 2022-08-11 VITALS
OXYGEN SATURATION: 99 % | DIASTOLIC BLOOD PRESSURE: 78 MMHG | RESPIRATION RATE: 16 BRPM | TEMPERATURE: 98 F | SYSTOLIC BLOOD PRESSURE: 110 MMHG

## 2022-08-11 LAB
COLONOSCOPY: NORMAL
UPPER GI ENDOSCOPY: NORMAL

## 2022-08-11 PROCEDURE — G8907 PT DOC NO EVENTS ON DISCHARG: HCPCS

## 2022-08-11 PROCEDURE — 45380 COLONOSCOPY AND BIOPSY: CPT

## 2022-08-11 PROCEDURE — 88305 TISSUE EXAM BY PATHOLOGIST: CPT | Performed by: PATHOLOGY

## 2022-08-11 PROCEDURE — G8918 PT W/O PREOP ORDER IV AB PRO: HCPCS

## 2022-08-11 PROCEDURE — 43239 EGD BIOPSY SINGLE/MULTIPLE: CPT

## 2022-08-11 RX ORDER — NALOXONE HYDROCHLORIDE 0.4 MG/ML
0.2 INJECTION, SOLUTION INTRAMUSCULAR; INTRAVENOUS; SUBCUTANEOUS
Status: DISCONTINUED | OUTPATIENT
Start: 2022-08-11 | End: 2022-08-12 | Stop reason: HOSPADM

## 2022-08-11 RX ORDER — ONDANSETRON 2 MG/ML
4 INJECTION INTRAMUSCULAR; INTRAVENOUS
Status: DISCONTINUED | OUTPATIENT
Start: 2022-08-11 | End: 2022-08-12 | Stop reason: HOSPADM

## 2022-08-11 RX ORDER — FLUMAZENIL 0.1 MG/ML
0.2 INJECTION, SOLUTION INTRAVENOUS
Status: DISCONTINUED | OUTPATIENT
Start: 2022-08-11 | End: 2022-08-12 | Stop reason: HOSPADM

## 2022-08-11 RX ORDER — DIPHENHYDRAMINE HYDROCHLORIDE 50 MG/ML
INJECTION INTRAMUSCULAR; INTRAVENOUS PRN
Status: DISCONTINUED | OUTPATIENT
Start: 2022-08-11 | End: 2022-08-11 | Stop reason: HOSPADM

## 2022-08-11 RX ORDER — NALOXONE HYDROCHLORIDE 0.4 MG/ML
0.4 INJECTION, SOLUTION INTRAMUSCULAR; INTRAVENOUS; SUBCUTANEOUS
Status: DISCONTINUED | OUTPATIENT
Start: 2022-08-11 | End: 2022-08-12 | Stop reason: HOSPADM

## 2022-08-11 RX ORDER — ONDANSETRON 4 MG/1
4 TABLET, ORALLY DISINTEGRATING ORAL EVERY 6 HOURS PRN
Status: DISCONTINUED | OUTPATIENT
Start: 2022-08-11 | End: 2022-08-12 | Stop reason: HOSPADM

## 2022-08-11 RX ORDER — FENTANYL CITRATE 50 UG/ML
INJECTION, SOLUTION INTRAMUSCULAR; INTRAVENOUS PRN
Status: DISCONTINUED | OUTPATIENT
Start: 2022-08-11 | End: 2022-08-11 | Stop reason: HOSPADM

## 2022-08-11 RX ORDER — LIDOCAINE 40 MG/G
CREAM TOPICAL
Status: DISCONTINUED | OUTPATIENT
Start: 2022-08-11 | End: 2022-08-12 | Stop reason: HOSPADM

## 2022-08-11 RX ORDER — PROCHLORPERAZINE MALEATE 10 MG
10 TABLET ORAL EVERY 6 HOURS PRN
Status: DISCONTINUED | OUTPATIENT
Start: 2022-08-11 | End: 2022-08-12 | Stop reason: HOSPADM

## 2022-08-11 RX ORDER — SODIUM CHLORIDE, SODIUM LACTATE, POTASSIUM CHLORIDE, CALCIUM CHLORIDE 600; 310; 30; 20 MG/100ML; MG/100ML; MG/100ML; MG/100ML
INJECTION, SOLUTION INTRAVENOUS CONTINUOUS PRN
Status: COMPLETED | OUTPATIENT
Start: 2022-08-11 | End: 2022-08-11

## 2022-08-11 RX ORDER — ONDANSETRON 2 MG/ML
4 INJECTION INTRAMUSCULAR; INTRAVENOUS EVERY 6 HOURS PRN
Status: DISCONTINUED | OUTPATIENT
Start: 2022-08-11 | End: 2022-08-12 | Stop reason: HOSPADM

## 2022-08-11 NOTE — H&P
[facility] Anesthesia Pre-op History and Physical    Analia Jerry MRN# [id number]   Age: 31 year old YOB: 1990            Date of Exam 8/11/2022           Primary care provider: Kaylene Collier         Chief Complaint and/or Reason for Procedure:     Diarrhea. Bloating.  Abdominal pain         Active problem list:     Patient Active Problem List    Diagnosis Date Noted     Recurrent major depression in partial remission (H) 07/25/2022     Priority: Medium     Posttraumatic stress disorder 03/15/2022     Priority: Medium     Fatigue, unspecified type 04/05/2021     Priority: Medium     Moderate episode of recurrent major depressive disorder (H) 03/04/2021     Priority: Medium     Morbid obesity (H) 10/28/2020     Priority: Medium     FRANK (obstructive sleep apnea) - mild (AHI 7) 08/24/2020     Priority: Medium     8/10/2020 [facility] Sleep Study (216.0 lbs) - scored with 3% rules -  AHI 7.1, RDI 7.1, Supine AHI 22.9, REM AHI -, Low O2 91.2%, Time Spent ?88% 0 minutes / Time Spent ?89% 0 minutes.       Class 1 obesity due to excess calories with body mass index (BMI) of 32.0 to 32.9 in adult, unspecified whether serious comorbidity present 07/02/2020     Priority: Medium     Elevated liver enzymes 12/28/2019     Priority: Medium     High triglycerides 04/03/2018     Priority: Medium     ASCUS of cervix with negative high risk HPV 01/26/2017     Priority: Medium     4/1/11 (approx) normal - patient reported.   6/11/13 normal - patient reported  12/3/15 ASCUS pap. Plan: per provider, repeat pap in 1 year.  1/26/17 ASCUS/neg HR HPV. Plan: colp bef 4/26/17  3/23/17 [place] Bx & ECC - negative. Plan cotest in 1 year.   3/8/18 ASCUS pap, neg HPV. Plan: colp.   5/10/18 [place] Bx and ECC: negative. Plan: cotest in 1 yr.   6/6/19 NIL/neg HR HPV. Plan cotest in 3 years  4/15/22 NIL pap, neg HPV. 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              Medications (include herbals and vitamins):   Any Plavix use in the last 7 days? No     Current Outpatient Medications   Medication Sig     buPROPion (WELLBUTRIN XL) 150 MG 24 hr tablet Take 1 tablet (150 mg) by mouth every morning Take with the bupropion  mg for total daily dose of 450 mg     buPROPion (WELLBUTRIN XL) 300 MG 24 hr tablet Take 1 tablet (300 mg) by mouth every morning     levothyroxine (SYNTHROID/LEVOTHROID) 200 MCG tablet Take 1 tablet (200 mcg) by mouth daily Call PCP clinic to schedule follow up appointment.     metoclopramide (REGLAN) 10 MG tablet Take 1 tablet (10 mg) by mouth 3 times daily as needed (nausea)     nitroFURantoin macrocrystal (MACRODANTIN) 100 MG capsule Take 1 capsule (100 mg) by mouth At Bedtime     norethindrone-ethinyl estradiol (ORTHO-NOVUM 1/35, 28,) 1-35 MG-MCG tablet Continuously by skipping placebo pills     rOPINIRole (REQUIP) 0.5 MG tablet 0.25 mg (1/2 tablet) once daily 1 to 3 hours before bedtime. Dose may be increased after 2 days to 0.5 mg once daily, and after 7 days to 1 mg once daily (2 tablets). Dose may be further increased in 0.5 mg increments every week until reaching 3 mg once daily during week 6.     SUMAtriptan (IMITREX) 100 MG tablet Take 1 tablet (100 mg) by mouth at onset of headache for migraine (repeat in 2 hours if needed)     topiramate (TOPAMAX) 25 MG tablet Take 4 tablets (100 mg) by mouth every evening     Current Facility-Administered Medications   Medication     flumazenil (ROMAZICON) injection 0.2 mg     lidocaine (LMX4) kit     lidocaine 1 % 0.1-1 mL     naloxone (NARCAN) injection 0.2 mg     naloxone (NARCAN)  injection 0.2 mg     naloxone (NARCAN) injection 0.4 mg     naloxone (NARCAN) injection 0.4 mg     ondansetron (ZOFRAN ODT) ODT tab 4 mg    Or     ondansetron (ZOFRAN) injection 4 mg     ondansetron (ZOFRAN) injection 4 mg     prochlorperazine (COMPAZINE) injection 10 mg    Or     prochlorperazine (COMPAZINE) tablet 10 mg     sodium chloride (PF) 0.9% PF flush 3 mL     sodium chloride (PF) 0.9% PF flush 3 mL             Allergies:      Allergies   Allergen Reactions     Nkda [No Known Drug Allergies]      Allergy to Latex? No  Allergy to tape?   No  Intolerances:             Physical Exam:   All vitals have been reviewed  Patient Vitals for the past 8 hrs:   BP Temp Temp src Resp SpO2   08/11/22 1200 117/86 98  F (36.7  C) Temporal 16 97 %     No intake/output data recorded.  Lungs:   No increased work of breathing, good air exchange, clear to auscultation bilaterally, no crackles or wheezing     Cardiovascular:   normal S1 and S2             Lab / Radiology Results:            Anesthetic risk and/or ASA classification:     2  Aurora Paez DO

## 2022-08-15 LAB
PATH REPORT.COMMENTS IMP SPEC: NORMAL
PATH REPORT.COMMENTS IMP SPEC: NORMAL
PATH REPORT.FINAL DX SPEC: NORMAL
PATH REPORT.GROSS SPEC: NORMAL
PATH REPORT.MICROSCOPIC SPEC OTHER STN: NORMAL
PATH REPORT.RELEVANT HX SPEC: NORMAL
PHOTO IMAGE: NORMAL

## 2022-08-17 ENCOUNTER — VIRTUAL VISIT (OUTPATIENT)
Dept: FAMILY MEDICINE | Facility: CLINIC | Age: 32
End: 2022-08-17
Payer: COMMERCIAL

## 2022-08-17 DIAGNOSIS — E66.01 MORBID OBESITY (H): ICD-10-CM

## 2022-08-17 DIAGNOSIS — K22.70 BARRETT'S ESOPHAGUS DETERMINED BY ENDOSCOPY: Primary | ICD-10-CM

## 2022-08-17 DIAGNOSIS — Z71.2 ENCOUNTER TO DISCUSS TEST RESULTS: Primary | ICD-10-CM

## 2022-08-17 DIAGNOSIS — D50.9 IRON DEFICIENCY ANEMIA, UNSPECIFIED IRON DEFICIENCY ANEMIA TYPE: ICD-10-CM

## 2022-08-17 PROCEDURE — 99213 OFFICE O/P EST LOW 20 MIN: CPT | Mod: 95 | Performed by: NURSE PRACTITIONER

## 2022-08-17 NOTE — PATIENT INSTRUCTIONS
At [facility], we strive to deliver an exceptional experience to you, every time we see you. If you receive a survey, please complete it as we do value your feedback.  If you have MyChart, you can expect to receive results automatically within 24 hours of their completion.  Your provider will send a note interpreting your results as well.   If you do not have MyChart, you should receive your results in about a week by mail.    Your care team:                            Family Medicine Internal Medicine   MD Fletcher Otero MD Shantel Branch-Fleming, MD Srinivasa Vaka, MD Katya Belousova, MERRY Clemons CNP, MD ([facility]   Matthias Jenkins, MD Miley Dan MD Amelia Massimini APRN CNP Kim Thein, APRN CNP Bethany Templen, MD             Clinic hours: Monday - Thursday 7 am-6 pm; Fridays 7 am-5 pm.   Urgent care: Monday - Friday 10 am- 8 pm; Saturday and Sunday 9 am-5 pm.    Clinic: [phone]       [facility]: Monday - Thursday 8 am - 7 pm; Friday 8 am - 6 pm  [facility]: [phone]

## 2022-08-17 NOTE — PROGRESS NOTES
Analia is a 31 year old who is being evaluated via a billable video visit.      How would you like to obtain your AVS? MyChart  If the video visit is dropped, the invitation should be resent by: Text to cell phone: [phone]   Will anyone else be joining your video visit? No        Assessment & Plan     Encounter to discuss test results  Discuss CT results from recent GI visit    Iron deficiency anemia, unspecified iron deficiency anemia type  Iron 3 times per week. Recheck in 3 months.  - CBC with platelets; Future  - Ferritin; Future    Morbid obesity (H)  Diet/exercise. Likely d/t excess calories/inadequate exercise.         See Patient Instructions    Return in about 3 months (around 11/17/2022) for Lab Work.     The benefits, risks and potential side effects were discussed in detail. Black box warnings discussed as relevant. All patient questions were answered. The patient was instructed to follow up immediately if any adverse reactions develop.    Return precautions discussed, including when to seek urgent/emergent care.    Patient verbalizes understanding and agrees with plan of care. Patient stable for discharge.      [facility]   Analia is a 31 year old, presenting for the following health issues:  Results (CT and Labs regarding anemia.)      History of Present Illness       Reason for visit:  Labs (anemia) & CT results (liver)    She eats 0-1 servings of fruits and vegetables daily.She consumes 1 sweetened beverage(s) daily.She exercises with enough effort to increase her heart rate 9 or less minutes per day.  She exercises with enough effort to increase her heart rate 3 or less days per week.   She is taking medications regularly.       Study Result    Narrative & Impression   EXAMINATION: CT ENTEROGRAPHY WITH CONTRAST, 7/21/2022 12:37 PM     TECHNIQUE:  Helical CT images from the lung bases through the  symphysis pubis were obtained  with contrast.  Coronal and sagittal  reformatted images were generated at a workstation for further  assessment.     CONTRAST:  125 ml Isovue 370.     COMPARISON: CT urogram 7/23/2021     HISTORY: Abdominal bloating. Per chart, chronic diarrhea.     FINDINGS:     Liver: Hepatomegaly. Hypoattenuating parenchyma. 14 mm enhancing focus  in segment 2 (series 6 image 38) with mild peripheral hypodensity,  faintly seen in retrospect on 7/23/2021. Similar additional 12 mm  enhancing focus in segment 8 with similar features (series 6 image  39). Portal veins appear patent.  Gallbladder: Cholecystectomy.  Spleen: Unremarkable.  Pancreas: Focal fatty invagination in the distal body/proximal tail.  Adrenal glands: No discrete nodules.  Kidneys: No hydronephrosis. No nephrolithiasis. No suspicious mass.  Bladder / Pelvic organs: Bladder is within normal limits. Similar  bulky probable leiomyomatous uterus.  Bowel: No evidence of obstruction or other focal abnormality. The  appendix is unremarkable. Liquid stool in the ascending colon.  Lymph nodes: No suspicious lymphadenopathy.  Peritoneum / Retroperitoneum: No pneumoperitoneum. No organized fluid  collections. Small fat-containing umbilical hernia.  Abdominal vasculature: Major vascular structures of the abdomen are  patent and normal in caliber.     Bones and soft tissues: Degenerative changes of the visualized spine  with multilevel lumbar disc bulges. No acute osseous abnormalities or  suspicious bony lesions.     Lower thorax: No focal consolidation or pleural effusion. Heart size  is within normal limits. No pericardial effusion.                                                                      IMPRESSION:   1. No evidence for acute or suspicious bowel pathology.  2. Liquid stool in the ascending colon, consistent with known rapid  transit/diarrhea.  3. Hepatomegaly with probable hepatic steatosis.   4. Small enhancing hepatic lesions most consistent with  benign  hemangiomas in the absence of underlying fibrotic disease.  5. The remainder of the exam is not significantly changed since  7/23/2021.     I have personally reviewed the examination and initial interpretation  and I agree with the findings.     OKSANA GENTILE MD         SYSTEM ID:  [id number]           Sleep - had sleep study - mild FRANK. rec CPAP but there is a shortage - still waiting for machine  Still taking iron. rec 3x/week and recheck CBC/ferritin in 3 months  Sees Dr. Babb with psychiatry  Eliminated lactose and helping      Review of Systems   Constitutional, HEENT, cardiovascular, pulmonary, gi and gu systems are negative, except as otherwise noted.      Objective           Vitals:  No vitals were obtained today due to virtual visit.    Physical Exam   GENERAL: Healthy, alert and no distress  EYES: Eyes grossly normal to inspection.  No discharge or erythema, or obvious scleral/conjunctival abnormalities.  RESP: No audible wheeze, cough, or visible cyanosis.  No visible retractions or increased work of breathing.    SKIN: Visible skin clear. No significant rash, abnormal pigmentation or lesions.  NEURO: Cranial nerves grossly intact.  Mentation and speech appropriate for age.  PSYCH: Mentation appears normal, affect normal/bright, judgement and insight intact, normal speech and appearance well-groomed.    No results found for any visits on 08/17/22.            Video-Visit Details    Video Start Time: 5:13 pm    Type of service:  Video Visit    Video End Time:5:30 PM    Originating Location (pt. Location): Home    Distant Location (provider location):  [facility]     Platform used for Video Visit: Perceptive Pixel    .  ..

## 2022-08-22 ENCOUNTER — OFFICE VISIT (OUTPATIENT)
Dept: GASTROENTEROLOGY | Facility: CLINIC | Age: 32
End: 2022-08-22
Payer: COMMERCIAL

## 2022-08-22 VITALS
BODY MASS INDEX: 33.6 KG/M2 | HEIGHT: 68 IN | WEIGHT: 221.7 LBS | DIASTOLIC BLOOD PRESSURE: 80 MMHG | OXYGEN SATURATION: 100 % | SYSTOLIC BLOOD PRESSURE: 111 MMHG | HEART RATE: 99 BPM

## 2022-08-22 DIAGNOSIS — R19.7 DIARRHEA, UNSPECIFIED TYPE: Primary | ICD-10-CM

## 2022-08-22 PROCEDURE — 99214 OFFICE O/P EST MOD 30 MIN: CPT | Performed by: INTERNAL MEDICINE

## 2022-08-22 ASSESSMENT — PAIN SCALES - GENERAL: PAINLEVEL: NO PAIN (0)

## 2022-08-22 NOTE — PROGRESS NOTES
GASTROENTEROLOGY  Return Visit       Site of Visit:   [address]    Provider:   Charles Nix MD    PCP:   Kaylene Collier    Reason for return visit:  Chronic diarrhea    Assessment:  31-year-old with new onset diarrhea this year.  Underwent initial testing which revealed Juarez's esophagus but no colitis including no microscopic colitis with a normal fecal calprotectin.    Recommendations:   Rule out enteric pathogens including C. Difficile  MR enterography  Hydrogen breath test      Interim Subjective Notes:   This is a 31-year-old with migraine headaches who also has had chronic diarrhea mostly in the afternoon usually has 2-3 postprandial bowel movements are watery.  No intentional weight loss, no fevers, no rash no eye problems no liver disease.  CT enterography did reveal possible enteritis because of fluid-filled small bowel.  Otherwise exam was unremarkable.  Upper endoscopy colonoscopy were unremarkable with exception of  Juarez's change in the distal esophagus 1 cm.  Biopsy-proven Juarez's esophagus.  Random colon biopsies were unremarkable.  Duodenal biopsies were unremarkable.     Current Outpatient Medications   Medication Sig Dispense Refill     buPROPion (WELLBUTRIN XL) 150 MG 24 hr tablet Take 1 tablet (150 mg) by mouth every morning Take with the bupropion  mg for total daily dose of 450 mg 90 tablet 1     buPROPion (WELLBUTRIN XL) 300 MG 24 hr tablet Take 1 tablet (300 mg) by mouth every morning 90 tablet 1     levothyroxine (SYNTHROID/LEVOTHROID) 200 MCG tablet Take 1 tablet (200 mcg) by mouth daily Call PCP clinic to schedule follow up appointment. 90 tablet 0     metoclopramide (REGLAN) 10 MG tablet Take 1 tablet (10 mg) by mouth 3 times daily as needed (nausea) 20 tablet 11     nitroFURantoin macrocrystal (MACRODANTIN) 100 MG capsule Take 1 capsule (100 mg) by mouth At Bedtime 30 capsule 11     norethindrone-ethinyl estradiol (ORTHO-NOVUM 1/35, 28,) 1-35 MG-MCG tablet  Continuously by skipping placebo pills 112 tablet 3     omeprazole (PRILOSEC) 20 MG DR capsule Take 1 capsule (20 mg) by mouth daily 30 capsule 11     rOPINIRole (REQUIP) 0.5 MG tablet 0.25 mg (1/2 tablet) once daily 1 to 3 hours before bedtime. Dose may be increased after 2 days to 0.5 mg once daily, and after 7 days to 1 mg once daily (2 tablets). Dose may be further increased in 0.5 mg increments every week until reaching 3 mg once daily during week 6. 180 tablet 1     SUMAtriptan (IMITREX) 100 MG tablet Take 1 tablet (100 mg) by mouth at onset of headache for migraine (repeat in 2 hours if needed) 18 tablet 11     topiramate (TOPAMAX) 25 MG tablet Take 4 tablets (100 mg) by mouth every evening 120 tablet 3       Past Surgical History:   Procedure Laterality Date     COLONOSCOPY N/A 12/16/2015    Procedure: COLONOSCOPY;  Surgeon: Duane, William Charles, MD;  Location: [facility]     COLONOSCOPY N/A 8/11/2022    Procedure: COLONOSCOPY, WITH POLYPECTOMY AND BIOPSY;  Surgeon: Aurora Paez DO;  Location: [facility]     COLONOSCOPY WITH CO2 INSUFFLATION N/A 12/16/2015    Procedure: COLONOSCOPY WITH CO2 INSUFFLATION;  Surgeon: Duane, William Charles, MD;  [facility]: [facility]     COLONOSCOPY WITH CO2 INSUFFLATION N/A 8/11/2022    Procedure: COLONOSCOPY, WITH CO2 INSUFFLATION;  Surgeon: Aurora Paez DO;  Location[facility]     COMBINED ESOPHAGOSCOPY, GASTROSCOPY, DUODENOSCOPY (EGD) WITH CO2 INSUFFLATION N/A 8/11/2022    Procedure: ESOPHAGOGASTRODUODENOSCOPY, WITH CO2 INSUFFLATION;  Surgeon: Aurora Paez DO;  Location: [facility]     ESOPHAGOSCOPY, GASTROSCOPY, DUODENOSCOPY (EGD), COMBINED N/A 8/11/2022    Procedure: ESOPHAGOGASTRODUODENOSCOPY, WITH BIOPSY;  Surgeon: Aurora Paez DO;  Location: [facility]       Past Medical History:   Diagnosis Date     Acute gallstone pancreatitis 12/28/2019     Anxiety      ASCUS of cervix with negative high risk HPV 1/26/17    ASCUS/neg HR HPV.     Depressive disorder      Gallstones  "12/28/2019    [facility]     H/O colposcopy with cervical biopsy 03/23/2017    Bx & ECC - negative     Hashimoto's disease      Headache(784.0)      Hypothyroidism due to Hashimoto's thyroiditis      Papanicolaou smear of cervix with atypical squamous cells of undetermined significance (ASC-US) 12/03/2015       Family History   Problem Relation Age of Onset     Hyperlipidemia Mother      Thyroid Disease Mother      Kidney Disease Mother      Hyperlipidemia Father      Substance Abuse Sister      Substance Abuse Sister      Depression Brother      Myocardial Infarction Maternal Grandfather      No Known Problems Paternal Grandmother      No Known Problems Paternal Grandfather      Breast Cancer Cousin      C.A.D. No family hx of      Diabetes No family hx of      Breast Cancer No family hx of      Cancer - colorectal No family hx of      Anesthesia Reaction No family hx of      Blood Disease No family hx of         reports that she has never smoked. She has never used smokeless tobacco. She reports current alcohol use. She reports that she does not use drugs.         Physical Exam:   /80 (BP Location: Left arm, Patient Position: Sitting, Cuff Size: Adult Large)   Pulse 99   Ht 1.727 m (5' 8\")   Wt 100.6 kg (221 lb 11.2 oz)   SpO2 100%   BMI 33.71 kg/m    Wt:   Wt Readings from Last 2 Encounters:   08/22/22 100.6 kg (221 lb 11.2 oz)   07/25/22 107.5 kg (237 lb)      Constitutional: cooperative, pleasant, not dyspneic/diaphoretic, no acute distress      Wt Readings from Last 2 Encounters:   08/22/22 100.6 kg (221 lb 11.2 oz)   07/25/22 107.5 kg (237 lb)       "

## 2022-08-22 NOTE — NURSING NOTE
"Chief Complaint   Patient presents with     Follow Up     Abdominal bloating       Vitals:    08/22/22 1121   BP: 111/80   BP Location: Left arm   Patient Position: Sitting   Cuff Size: Adult Large   Pulse: 99   SpO2: 100%   Weight: 100.6 kg (221 lb 11.2 oz)   Height: 1.727 m (5' 8\")       Body mass index is 33.71 kg/m .      RANDI Guthrie    "

## 2022-08-22 NOTE — PATIENT INSTRUCTIONS
It was a pleasure taking care of you today. I've included a brief summary of our discussion and care plan from today's visit below.  Please review this information with your primary care provider.  _______________________________________________________________________    My recommendations are summarized as follows:  Enteric pathogens  C. difficile PCR  MR enterography  Hydrogen breath    Return to GI Clinic in in 6 weeks to review your progress.     If you need any follow-up appointments, please use the following phone numbers below.    To schedule or reschedule a follow-up GI appointment, call [phone] option 1    To schedule your endoscopy procedure, call [phone] option 2    To schedule imaging, please call [phone]     To schedule your lab appointment at [address] floor lab call [phone]. Call your [facility] lab directly if it is not [facility]. If you use a non-[facility] lab, please let us know where to fax your lab order (call [facility] at [phone]).      _______________________________________________________________________    Please be in touch if there are any further questions that arise following today's visit.  There are multiple ways to contact your gastroenterology care team.      During business hours, you may reach your gastroenterology RN Care Coordinator, Flaca Garcia, at [phone].      You can always send a secure message through [employer]. [employer] messages are answered by your nurse or doctor typically within 24 hours. Please allow extra time on weekends and holidays.     What is [employer]?  [employer] is a secure way for you to access all of your healthcare records from the [facility].  It is a web based computer program, so you can sign on to it from any location.  It also allows you to send secure messages to your care team.  I recommend signing up for [employer] access if you have not already done so and are comfortable with using a computer.      For urgent/emergent questions after business hours, you may reach the on-call GI Fellow by contacting the [facility]  at [phone].     How will I get the results of any tests ordered?    You will receive all of your results.  If you have signed up for [email or web address], any tests ordered at your visit will be available to you after your physician reviews them.  Typically this takes 1-2 weeks.  If there are urgent results that require a change in your care plan, your physician or nurse will call you to discuss the next steps.      Thank you for choosing [facility]!       Sincerely,    Charles Nix MD  [facility]  Division of Gastroenterology

## 2022-08-22 NOTE — LETTER
8/22/2022         RE: Analia Jerry  [address]  [place] [address]        Dear Colleague,    Thank you for referring your patient, Analia Jerry, to the [facility] [place]. Please see a copy of my visit note below.      GASTROENTEROLOGY  Return Visit       Site of Visit:   [address]    Provider:   Charles Nix MD    PCP:   Kaylene Collier    Reason for return visit:  Chronic diarrhea    Assessment:  31-year-old with new onset diarrhea this year.  Underwent initial testing which revealed Juarez's esophagus but no colitis including no microscopic colitis with a normal fecal calprotectin.    Recommendations:   Rule out enteric pathogens including C. Difficile  MR enterography  Hydrogen breath test      Interim Subjective Notes:   This is a 31-year-old with migraine headaches who also has had chronic diarrhea mostly in the afternoon usually has 2-3 postprandial bowel movements are watery.  No intentional weight loss, no fevers, no rash no eye problems no liver disease.  CT enterography did reveal possible enteritis because of fluid-filled small bowel.  Otherwise exam was unremarkable.  Upper endoscopy colonoscopy were unremarkable with exception of  Juarez's change in the distal esophagus 1 cm.  Biopsy-proven Juarez's esophagus.  Random colon biopsies were unremarkable.  Duodenal biopsies were unremarkable.     Current Outpatient Medications   Medication Sig Dispense Refill     buPROPion (WELLBUTRIN XL) 150 MG 24 hr tablet Take 1 tablet (150 mg) by mouth every morning Take with the bupropion  mg for total daily dose of 450 mg 90 tablet 1     buPROPion (WELLBUTRIN XL) 300 MG 24 hr tablet Take 1 tablet (300 mg) by mouth every morning 90 tablet 1     levothyroxine (SYNTHROID/LEVOTHROID) 200 MCG tablet Take 1 tablet (200 mcg) by mouth daily Call PCP clinic to schedule follow up appointment. 90 tablet 0     metoclopramide (REGLAN) 10 MG tablet Take 1  tablet (10 mg) by mouth 3 times daily as needed (nausea) 20 tablet 11     nitroFURantoin macrocrystal (MACRODANTIN) 100 MG capsule Take 1 capsule (100 mg) by mouth At Bedtime 30 capsule 11     norethindrone-ethinyl estradiol (ORTHO-NOVUM 1/35, 28,) 1-35 MG-MCG tablet Continuously by skipping placebo pills 112 tablet 3     omeprazole (PRILOSEC) 20 MG DR capsule Take 1 capsule (20 mg) by mouth daily 30 capsule 11     rOPINIRole (REQUIP) 0.5 MG tablet 0.25 mg (1/2 tablet) once daily 1 to 3 hours before bedtime. Dose may be increased after 2 days to 0.5 mg once daily, and after 7 days to 1 mg once daily (2 tablets). Dose may be further increased in 0.5 mg increments every week until reaching 3 mg once daily during week 6. 180 tablet 1     SUMAtriptan (IMITREX) 100 MG tablet Take 1 tablet (100 mg) by mouth at onset of headache for migraine (repeat in 2 hours if needed) 18 tablet 11     topiramate (TOPAMAX) 25 MG tablet Take 4 tablets (100 mg) by mouth every evening 120 tablet 3       Past Surgical History:   Procedure Laterality Date     COLONOSCOPY N/A 12/16/2015    Procedure: COLONOSCOPY;  Surgeon: Duane, William Charles, MD;  [facility]: [facility]     COLONOSCOPY N/A 8/11/2022    Procedure: COLONOSCOPY, WITH POLYPECTOMY AND BIOPSY;  Surgeon: Aurora Paez DO;  [facility]: [facility]     COLONOSCOPY WITH CO2 INSUFFLATION N/A 12/16/2015    Procedure: COLONOSCOPY WITH CO2 INSUFFLATION;  Surgeon: Duane, William Charles, MD;  [facility]     COLONOSCOPY WITH CO2 INSUFFLATION N/A 8/11/2022    Procedure: COLONOSCOPY, WITH CO2 INSUFFLATION;  Surgeon: Aurora Paez DO;  [facility]     COMBINED ESOPHAGOSCOPY, GASTROSCOPY, DUODENOSCOPY (EGD) WITH CO2 INSUFFLATION N/A 8/11/2022    Procedure: ESOPHAGOGASTRODUODENOSCOPY, WITH CO2 INSUFFLATION;  Surgeon: Aurora Paez DO;  [facility]: [facility]     ESOPHAGOSCOPY, GASTROSCOPY, DUODENOSCOPY (EGD), COMBINED N/A 8/11/2022    Procedure: ESOPHAGOGASTRODUODENOSCOPY, WITH BIOPSY;   "Surgeon: Aurora Paez DO;  Location:  OR       Past Medical History:   Diagnosis Date     Acute gallstone pancreatitis 12/28/2019     Anxiety      ASCUS of cervix with negative high risk HPV 1/26/17    ASCUS/neg HR HPV.     Depressive disorder      Gallstones 12/28/2019    [facility]     H/O colposcopy with cervical biopsy 03/23/2017    Bx & ECC - negative     Hashimoto's disease      Headache(784.0)      Hypothyroidism due to Hashimoto's thyroiditis      Papanicolaou smear of cervix with atypical squamous cells of undetermined significance (ASC-US) 12/03/2015       Family History   Problem Relation Age of Onset     Hyperlipidemia Mother      Thyroid Disease Mother      Kidney Disease Mother      Hyperlipidemia Father      Substance Abuse Sister      Substance Abuse Sister      Depression Brother      Myocardial Infarction Maternal Grandfather      No Known Problems Paternal Grandmother      No Known Problems Paternal Grandfather      Breast Cancer Cousin      C.A.D. No family hx of      Diabetes No family hx of      Breast Cancer No family hx of      Cancer - colorectal No family hx of      Anesthesia Reaction No family hx of      Blood Disease No family hx of         reports that she has never smoked. She has never used smokeless tobacco. She reports current alcohol use. She reports that she does not use drugs.         Physical Exam:   /80 (BP Location: Left arm, Patient Position: Sitting, Cuff Size: Adult Large)   Pulse 99   Ht 1.727 m (5' 8\")   Wt 100.6 kg (221 lb 11.2 oz)   SpO2 100%   BMI 33.71 kg/m    Wt:   Wt Readings from Last 2 Encounters:   08/22/22 100.6 kg (221 lb 11.2 oz)   07/25/22 107.5 kg (237 lb)      Constitutional: cooperative, pleasant, not dyspneic/diaphoretic, no acute distress      Wt Readings from Last 2 Encounters:   08/22/22 100.6 kg (221 lb 11.2 oz)   07/25/22 107.5 kg (237 lb)           Again, thank you for allowing me to participate in the care of " your patient.        Sincerely,        Charles Nix MD

## 2022-08-23 ENCOUNTER — TELEPHONE (OUTPATIENT)
Dept: GASTROENTEROLOGY | Facility: CLINIC | Age: 32
End: 2022-08-23

## 2022-08-23 NOTE — TELEPHONE ENCOUNTER
Analia had GI consult with Dr. Nix on 8/22. He orders for Analia:    Labs  MR enterography  Hydrogen breath test  6 week return consult    Will contact patient with scheduling information.

## 2022-09-14 ENCOUNTER — TELEPHONE (OUTPATIENT)
Dept: TRANSPLANT | Facility: CLINIC | Age: 32
End: 2022-09-14

## 2022-09-14 DIAGNOSIS — Z00.5 TRANSPLANT DONOR EVALUATION: Primary | ICD-10-CM

## 2022-09-14 RX ORDER — MEPERIDINE HYDROCHLORIDE 25 MG/ML
25 INJECTION INTRAMUSCULAR; INTRAVENOUS; SUBCUTANEOUS EVERY 30 MIN PRN
Status: CANCELLED | OUTPATIENT
Start: 2022-10-06

## 2022-09-14 RX ORDER — EPINEPHRINE 1 MG/ML
0.3 INJECTION, SOLUTION, CONCENTRATE INTRAVENOUS EVERY 5 MIN PRN
Status: CANCELLED | OUTPATIENT
Start: 2022-10-06

## 2022-09-14 RX ORDER — ALBUTEROL SULFATE 0.83 MG/ML
2.5 SOLUTION RESPIRATORY (INHALATION)
Status: CANCELLED | OUTPATIENT
Start: 2022-10-06

## 2022-09-14 RX ORDER — METHYLPREDNISOLONE SODIUM SUCCINATE 125 MG/2ML
125 INJECTION, POWDER, LYOPHILIZED, FOR SOLUTION INTRAMUSCULAR; INTRAVENOUS
Status: CANCELLED
Start: 2022-10-06

## 2022-09-14 RX ORDER — DIPHENHYDRAMINE HYDROCHLORIDE 50 MG/ML
50 INJECTION INTRAMUSCULAR; INTRAVENOUS
Status: CANCELLED
Start: 2022-10-06

## 2022-09-14 RX ORDER — ALBUTEROL SULFATE 90 UG/1
1-2 AEROSOL, METERED RESPIRATORY (INHALATION)
Status: CANCELLED
Start: 2022-10-06

## 2022-09-14 NOTE — TELEPHONE ENCOUNTER
Contacted Analia Jerry to introduce myself and my role, review of medical/surgical/family history and next steps.     Analia Jerry  is aware She can stop donor evaluation at any time.    Have you ever been positive for COVID 19? yes    Have you received the COVID vaccination series? Yes      Reviewed risk of COVID-19 to living donors.    Regular blood donor? No Last blood donation date N/A (Notified donor to avoid blood donation at this time to get accurate blood counts if going through evaluation)     Analia Jerry is a 31 year old female  ABO unknown that would like to learn more about donation to her mom.     Concerns from medical/surgical/family history: BMI of 33 (217 pounds). Analia got worked up for possible IBS and turns out she has barillas's Esophagus     Current medications and NSAID use: Just started Topamax for migraines. Will complete MRI on 9/29/22, breath test in Oct and follow up with Neurology in Nov.    Reviewed any history of travel in endemic areas: [place], yes  Strongyloides- [place], [place] and [place].  Trypanosoma cruzi ([place])- [place]  West Nile Virus- [place], [place], [place], [place] and [place].     Per our Phase 1 algorithm, does not meet criteria to do preliminary testing.     Reviewed evaluation testing: Iohexol, Lab work, CXR, EKG, Provider visits and functions, CT Angiogram.     Reviewed operations of selection committee and angio review meetings and the need for multidisciplinary input.     Reviewed [facility] listing and transfer of care to [facility] team if approved. Provided Analia with [facility] website to review.     Briefly went over options if approved of NDD and voucher donation.     Analia would like to proceed with next steps: evaluation      Confirmed that Analia reviewed Informed consent document and all questions answered.  Reviewed that they will receive [employer] to obtain electronic signature for the following: Informed consent, SRTR data, BERTRAND for medical  information, Auth for Electronic communication and will need their signed consent back before proceeding with evaluation.      Encouraged sign up for MyChart and reviewed importance of watching teaching videos prior to evaluation.    Verified recipient status if not NDD.    Donor timeline: TBD     Will send orders to scheduling team to set up for eval testing.

## 2022-09-29 ENCOUNTER — ANCILLARY PROCEDURE (OUTPATIENT)
Dept: MRI IMAGING | Facility: CLINIC | Age: 32
End: 2022-09-29
Attending: INTERNAL MEDICINE
Payer: COMMERCIAL

## 2022-09-29 DIAGNOSIS — R19.7 DIARRHEA, UNSPECIFIED TYPE: ICD-10-CM

## 2022-09-29 PROCEDURE — 72197 MRI PELVIS W/O & W/DYE: CPT | Performed by: RADIOLOGY

## 2022-09-29 PROCEDURE — 74183 MRI ABD W/O CNTR FLWD CNTR: CPT | Performed by: RADIOLOGY

## 2022-09-29 PROCEDURE — A9585 GADOBUTROL INJECTION: HCPCS | Performed by: RADIOLOGY

## 2022-09-29 RX ORDER — GADOBUTROL 604.72 MG/ML
10 INJECTION INTRAVENOUS ONCE
Status: COMPLETED | OUTPATIENT
Start: 2022-09-29 | End: 2022-09-29

## 2022-09-29 RX ADMIN — GADOBUTROL 10 ML: 604.72 INJECTION INTRAVENOUS at 16:03

## 2022-09-30 ENCOUNTER — TELEPHONE (OUTPATIENT)
Dept: INFUSION THERAPY | Facility: CLINIC | Age: 32
End: 2022-09-30

## 2022-09-30 NOTE — TELEPHONE ENCOUNTER
LVM with registrations # to update registration information for appts on 10/6. Pt has multiple Kidney Donor appts on 10/6.

## 2022-10-05 LAB
A1 AG RBC QL: NEGATIVE
ABO/RH(D): NORMAL
ANTIBODY SCREEN: NEGATIVE
SPECIMEN EXPIRATION DATE: NORMAL
SPECIMEN EXPIRATION DATE: NORMAL

## 2022-10-06 ENCOUNTER — APPOINTMENT (OUTPATIENT)
Dept: TRANSPLANT | Facility: CLINIC | Age: 32
End: 2022-10-06
Attending: INTERNAL MEDICINE

## 2022-10-06 ENCOUNTER — OFFICE VISIT (OUTPATIENT)
Dept: NEPHROLOGY | Facility: CLINIC | Age: 32
End: 2022-10-06
Attending: INTERNAL MEDICINE

## 2022-10-06 ENCOUNTER — ALLIED HEALTH/NURSE VISIT (OUTPATIENT)
Dept: TRANSPLANT | Facility: CLINIC | Age: 32
End: 2022-10-06
Attending: INTERNAL MEDICINE

## 2022-10-06 ENCOUNTER — OFFICE VISIT (OUTPATIENT)
Dept: INFUSION THERAPY | Facility: CLINIC | Age: 32
End: 2022-10-06
Attending: INTERNAL MEDICINE
Payer: COMMERCIAL

## 2022-10-06 ENCOUNTER — LAB (OUTPATIENT)
Dept: LAB | Facility: CLINIC | Age: 32
End: 2022-10-06
Attending: INTERNAL MEDICINE

## 2022-10-06 ENCOUNTER — OFFICE VISIT (OUTPATIENT)
Dept: TRANSPLANT | Facility: CLINIC | Age: 32
End: 2022-10-06
Attending: INTERNAL MEDICINE

## 2022-10-06 ENCOUNTER — LAB (OUTPATIENT)
Dept: LAB | Facility: CLINIC | Age: 32
End: 2022-10-06

## 2022-10-06 ENCOUNTER — ALLIED HEALTH/NURSE VISIT (OUTPATIENT)
Dept: TRANSPLANT | Facility: CLINIC | Age: 32
End: 2022-10-06
Attending: SURGERY

## 2022-10-06 ENCOUNTER — ALLIED HEALTH/NURSE VISIT (OUTPATIENT)
Dept: TRANSPLANT | Facility: CLINIC | Age: 32
End: 2022-10-06

## 2022-10-06 VITALS
OXYGEN SATURATION: 100 % | SYSTOLIC BLOOD PRESSURE: 114 MMHG | DIASTOLIC BLOOD PRESSURE: 82 MMHG | WEIGHT: 219 LBS | HEART RATE: 89 BPM | RESPIRATION RATE: 18 BRPM | BODY MASS INDEX: 33.19 KG/M2 | TEMPERATURE: 97.9 F | HEIGHT: 68 IN

## 2022-10-06 VITALS
WEIGHT: 218.92 LBS | DIASTOLIC BLOOD PRESSURE: 81 MMHG | HEIGHT: 68 IN | HEART RATE: 92 BPM | SYSTOLIC BLOOD PRESSURE: 117 MMHG | BODY MASS INDEX: 33.18 KG/M2

## 2022-10-06 VITALS
SYSTOLIC BLOOD PRESSURE: 119 MMHG | HEIGHT: 68 IN | WEIGHT: 218.92 LBS | DIASTOLIC BLOOD PRESSURE: 86 MMHG | OXYGEN SATURATION: 98 % | BODY MASS INDEX: 33.18 KG/M2 | HEART RATE: 92 BPM

## 2022-10-06 DIAGNOSIS — Z00.5 TRANSPLANT DONOR EVALUATION: ICD-10-CM

## 2022-10-06 DIAGNOSIS — Z00.5 TRANSPLANT DONOR EVALUATION: Primary | ICD-10-CM

## 2022-10-06 LAB
ABO/RH(D): NORMAL
ALBUMIN MFR UR ELPH: 12.4 MG/DL
ALBUMIN SERPL BCG-MCNC: 4.4 G/DL (ref 3.5–5.2)
ALBUMIN UR-MCNC: NEGATIVE MG/DL
ALP SERPL-CCNC: 63 U/L (ref 35–104)
ALT SERPL W P-5'-P-CCNC: 26 U/L (ref 10–35)
ANION GAP SERPL CALCULATED.3IONS-SCNC: 13 MMOL/L (ref 7–15)
APPEARANCE UR: ABNORMAL
APTT PPP: 24 SECONDS (ref 22–38)
AST SERPL W P-5'-P-CCNC: 18 U/L (ref 10–35)
BILIRUB SERPL-MCNC: 0.3 MG/DL
BILIRUB UR QL STRIP: NEGATIVE
BLOOD BANK CHART COMMENT: NORMAL
BUN SERPL-MCNC: 16.3 MG/DL (ref 6–20)
CALCIUM SERPL-MCNC: 9.4 MG/DL (ref 8.6–10)
CHLORIDE SERPL-SCNC: 105 MMOL/L (ref 98–107)
CHOLEST SERPL-MCNC: 189 MG/DL
CMV IGG SERPL IA-ACNC: 5.2 U/ML
CMV IGG SERPL IA-ACNC: ABNORMAL
COLOR UR AUTO: YELLOW
CREAT SERPL-MCNC: 1.11 MG/DL (ref 0.51–0.95)
CREAT UR-MCNC: 147 MG/DL
CREAT UR-MCNC: 148 MG/DL
DEPRECATED HCO3 PLAS-SCNC: 21 MMOL/L (ref 22–29)
EBV VCA IGG SER IA-ACNC: >750 U/ML
EBV VCA IGG SER IA-ACNC: POSITIVE
ERYTHROCYTE [DISTWIDTH] IN BLOOD BY AUTOMATED COUNT: 11.9 % (ref 10–15)
GFR SERPL CREATININE-BSD FRML MDRD: 67 ML/MIN/1.73M2
GLUCOSE SERPL-MCNC: 95 MG/DL (ref 70–99)
GLUCOSE UR STRIP-MCNC: NEGATIVE MG/DL
HBA1C MFR BLD: 5 %
HBV CORE AB SERPL QL IA: NONREACTIVE
HBV SURFACE AG SERPL QL IA: NONREACTIVE
HCG INTACT+B SERPL-ACNC: <1 MIU/ML
HCT VFR BLD AUTO: 36.9 % (ref 35–47)
HCV AB SERPL QL IA: NONREACTIVE
HDLC SERPL-MCNC: 38 MG/DL
HGB BLD-MCNC: 12.6 G/DL (ref 11.7–15.7)
HGB UR QL STRIP: ABNORMAL
HIV 1+2 AB+HIV1 P24 AG SERPL QL IA: NONREACTIVE
INR PPP: 0.93 (ref 0.85–1.15)
KETONES UR STRIP-MCNC: NEGATIVE MG/DL
LDLC SERPL CALC-MCNC: 89 MG/DL
LEUKOCYTE ESTERASE UR QL STRIP: NEGATIVE
MCH RBC QN AUTO: 30.5 PG (ref 26.5–33)
MCHC RBC AUTO-ENTMCNC: 34.1 G/DL (ref 31.5–36.5)
MCV RBC AUTO: 89 FL (ref 78–100)
MICROALBUMIN UR-MCNC: 21.2 MG/L
MICROALBUMIN/CREAT UR: 14.42 MG/G CR (ref 0–25)
NITRATE UR QL: NEGATIVE
NONHDLC SERPL-MCNC: 151 MG/DL
PH UR STRIP: 6.5 [PH] (ref 5–7)
PHOSPHATE SERPL-MCNC: 3 MG/DL (ref 2.5–4.5)
PLATELET # BLD AUTO: 326 10E3/UL (ref 150–450)
POTASSIUM SERPL-SCNC: 3.5 MMOL/L (ref 3.4–5.3)
PROT SERPL-MCNC: 7.4 G/DL (ref 6.4–8.3)
PROT/CREAT 24H UR: 0.08 MG/MG CR (ref 0–0.2)
RBC # BLD AUTO: 4.13 10E6/UL (ref 3.8–5.2)
RBC URINE: 98 /HPF
SODIUM SERPL-SCNC: 139 MMOL/L (ref 136–145)
SP GR UR STRIP: 1.01 (ref 1–1.03)
SPECIMEN EXPIRATION DATE: NORMAL
SPECIMEN EXPIRATION DATE: NORMAL
SQUAMOUS EPITHELIAL: <1 /HPF
T PALLIDUM AB SER QL: NONREACTIVE
TRIGL SERPL-MCNC: 312 MG/DL
URATE SERPL-MCNC: 8.3 MG/DL (ref 2.4–5.7)
UROBILINOGEN UR STRIP-MCNC: NORMAL MG/DL
WBC # BLD AUTO: 6.2 10E3/UL (ref 4–11)
WBC URINE: 1 /HPF

## 2022-10-06 PROCEDURE — 36415 COLL VENOUS BLD VENIPUNCTURE: CPT

## 2022-10-06 PROCEDURE — 84156 ASSAY OF PROTEIN URINE: CPT

## 2022-10-06 PROCEDURE — 86905 BLOOD TYPING RBC ANTIGENS: CPT

## 2022-10-06 PROCEDURE — 99205 OFFICE O/P NEW HI 60 MIN: CPT | Mod: 25 | Performed by: INTERNAL MEDICINE

## 2022-10-06 PROCEDURE — 87340 HEPATITIS B SURFACE AG IA: CPT

## 2022-10-06 PROCEDURE — 85027 COMPLETE CBC AUTOMATED: CPT

## 2022-10-06 PROCEDURE — 86481 TB AG RESPONSE T-CELL SUSP: CPT

## 2022-10-06 PROCEDURE — 84702 CHORIONIC GONADOTROPIN TEST: CPT

## 2022-10-06 PROCEDURE — 83036 HEMOGLOBIN GLYCOSYLATED A1C: CPT

## 2022-10-06 PROCEDURE — 86706 HEP B SURFACE ANTIBODY: CPT

## 2022-10-06 PROCEDURE — 86665 EPSTEIN-BARR CAPSID VCA: CPT

## 2022-10-06 PROCEDURE — 85610 PROTHROMBIN TIME: CPT

## 2022-10-06 PROCEDURE — 81378 HLA I & II TYPING HR: CPT

## 2022-10-06 PROCEDURE — 86704 HEP B CORE ANTIBODY TOTAL: CPT

## 2022-10-06 PROCEDURE — 80061 LIPID PANEL: CPT

## 2022-10-06 PROCEDURE — 82542 COL CHROMOTOGRAPHY QUAL/QUAN: CPT | Performed by: INTERNAL MEDICINE

## 2022-10-06 PROCEDURE — 86901 BLOOD TYPING SEROLOGIC RH(D): CPT

## 2022-10-06 PROCEDURE — 86780 TREPONEMA PALLIDUM: CPT

## 2022-10-06 PROCEDURE — 255N000002 HC RX 255 OP 636: Performed by: INTERNAL MEDICINE

## 2022-10-06 PROCEDURE — 80053 COMPREHEN METABOLIC PANEL: CPT

## 2022-10-06 PROCEDURE — 36415 COLL VENOUS BLD VENIPUNCTURE: CPT | Performed by: INTERNAL MEDICINE

## 2022-10-06 PROCEDURE — 84100 ASSAY OF PHOSPHORUS: CPT

## 2022-10-06 PROCEDURE — 86789 WEST NILE VIRUS ANTIBODY: CPT

## 2022-10-06 PROCEDURE — 85730 THROMBOPLASTIN TIME PARTIAL: CPT

## 2022-10-06 PROCEDURE — 86644 CMV ANTIBODY: CPT

## 2022-10-06 PROCEDURE — 84550 ASSAY OF BLOOD/URIC ACID: CPT

## 2022-10-06 PROCEDURE — 99203 OFFICE O/P NEW LOW 30 MIN: CPT | Performed by: SURGERY

## 2022-10-06 PROCEDURE — 82043 UR ALBUMIN QUANTITATIVE: CPT

## 2022-10-06 PROCEDURE — 81001 URINALYSIS AUTO W/SCOPE: CPT

## 2022-10-06 PROCEDURE — 86803 HEPATITIS C AB TEST: CPT

## 2022-10-06 RX ORDER — EPINEPHRINE 1 MG/ML
0.3 INJECTION, SOLUTION INTRAMUSCULAR; SUBCUTANEOUS EVERY 5 MIN PRN
Status: CANCELLED | OUTPATIENT
Start: 2022-10-06

## 2022-10-06 RX ORDER — ALBUTEROL SULFATE 90 UG/1
1-2 AEROSOL, METERED RESPIRATORY (INHALATION)
Status: CANCELLED
Start: 2022-10-06

## 2022-10-06 RX ORDER — DIPHENHYDRAMINE HYDROCHLORIDE 50 MG/ML
50 INJECTION INTRAMUSCULAR; INTRAVENOUS
Status: CANCELLED
Start: 2022-10-06

## 2022-10-06 RX ORDER — ALBUTEROL SULFATE 0.83 MG/ML
2.5 SOLUTION RESPIRATORY (INHALATION)
Status: CANCELLED | OUTPATIENT
Start: 2022-10-06

## 2022-10-06 RX ORDER — METHYLPREDNISOLONE SODIUM SUCCINATE 125 MG/2ML
125 INJECTION, POWDER, LYOPHILIZED, FOR SOLUTION INTRAMUSCULAR; INTRAVENOUS
Status: CANCELLED
Start: 2022-10-06

## 2022-10-06 RX ORDER — MEPERIDINE HYDROCHLORIDE 25 MG/ML
25 INJECTION INTRAMUSCULAR; INTRAVENOUS; SUBCUTANEOUS EVERY 30 MIN PRN
Status: CANCELLED | OUTPATIENT
Start: 2022-10-06

## 2022-10-06 RX ADMIN — IOHEXOL 4 ML: 350 INJECTION, SOLUTION INTRAVENOUS at 07:24

## 2022-10-06 ASSESSMENT — PAIN SCALES - GENERAL: PAINLEVEL: NO PAIN (0)

## 2022-10-06 NOTE — PROGRESS NOTES
Transplant Surgery Consult Note    Medical record number: [id number]  YOB: 1990,   Consult requested by the patient for evaluation of kidney donation candidacy.    Assessment and Recommendations: Ms. Jerry appears to be a fair candidate for kidney donation at this point in the evaluation. The following issues will need to be addressed prior to formal review:    Iohexol ordered for today for assessment of adequate kidney function for donation. Will be reviewed when resulted  Donor labs ordered for today and reviewed to include: CBC, CMP, Mg, PO4, hemoglobin A1c, lipid panel, blood type x 2, hemoglobin A1c, UA with microscopic, urine culture, urine protein/cr, INR/PTT, Quantiferon gold, hepatitis C (antibody), hepatitis B panel, HIV, treponemia, West Nile (antibody panel), CMV (antibody panel), EBV (antibody panel), pregnancy screen (for females of childbearing age), PSA (males >50yrs), HLA tissue typing, buccal swab for eplet typing  Social work consult ordered  Dietician consult ordered  Transplant nephrology consult ordered  Transplant coordinator consult ordered  LEO (independent living donor advovate) consult ordered  EKG ordered for today and will be reviewed when resulted. Suitable to proceed today with this testing today:  Yes   Chest x-ray ordered for today and will be reviewed when resulted. Suitable to proceed with this testing today:  Yes   CT angio of abdomen and pelvis for anatomical assessment. Images and report to be reviewed when resulted.  Suitable to proceed with this testing today:  No- question of AVIS today with several contributors to renal impairment- see below  After review of the above, additional testing/concerns include:      BMI: currently 33, doing well with weight loss. Would likely be a candidate at BMI 30.     Renal reserve: several possible factors that would diminish her long term kidney function.  -recurrent UTIs. 4X yearly previously. Underwent extensive urologic  workup with scope and imaging that was unrevealing. Now on suppressive macrobid with improvement.  -diarrhea: previously daily after all meals, now once weekly with one large episode after a meal. Underwent GI workup with imaging/scope/biopsy that was unrevealing. Made dietary modification to stop lactose that resulted in this improvement. Unclear if she has had recurrent AKIs from this pattern  -hematuria: longstanding. Not erin but present on dipstick. No proteinuria. Her mother's kidney disease had a similar start. Urologic workup not revealing. Appreciate Nephrology input.    The majority of our visit today was spent in counseling regarding the medical and surgical risks of kidney donation, the typical layne-and post-operative experience and recovery/return to work pattern.  We also talked about post-op visits and longer term health care maintenance, as well as the implications of having one remaining kidney. This discussion included, but was not limited to rates of complications such as bleeding, infection, need for transfusion, reoperation, other organ injury, future bowel obstruction, incisional hernia, port site pain, varicocele, venous thrombosis, pulmonary embolism, renal failure, and death (3 per 10,000). The patient understands that if end stage renal failure happens that dialysis or transplant would be required.   At the conclusion of the visit, all questions had been answered and Ms. Jerry's candidacy for donation will be reviewed at our Multidisciplinary Donor Selection Committee. She will stay in contact with the nurse coordinator with any concerns.      Total time: 60 minutes        Josh Dee MD  ---------------------------------------------------------------------------------------------------    HPI: Analia Jerry is a 31 year old year old female who presents for a kidney donor evaluation.  Patient would like to donate to her mother.    Analia is a 30 yo F who is generally healthy apart  from kidney concerns noted above. She is  with two sons (14 and 8) and two Pomeranians (one of whom needs ACL surgery). 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 Outside of work, she and her family are avid golfers and hockey fans, and enjoy outdoor activities.       Personal history of:   No    Yes  Cancer:    [x]      []             Comment:     Diabetes   [x]      []  Comment:    Thombosis   [x]      []  Comment:       Hepatitis   [x]      []  Comment:    Tuberculosis   [x]      []  Comment:   Back or neck pain:  [x]      []  Comment:      Kidney stones   [x]      []  Comment:                  Kidney infections  [x]      []  Comment:           Urinary retention  [x]      []            Comment:   Regular NSAID use:  [x]      []            Comment:      Constipation:   [x]      []            Comment:      Episcopal  [x]      []            Comment:      Other:    [x]      []            Comment:         Past Medical History:   Diagnosis Date     Acute gallstone pancreatitis 12/28/2019     Anxiety      ASCUS of cervix with negative high risk HPV 01/26/2017    ASCUS/neg HR HPV.     Juarez's esophagus determined by biopsy 2022     Depressive disorder      Gallstones 12/28/2019    [facility]     H/O colposcopy with cervical biopsy 03/23/2017    Bx & ECC - negative     Hashimoto's disease      Headache(784.0)      Hypothyroidism due to Hashimoto's thyroiditis      Papanicolaou smear of cervix with atypical squamous cells of undetermined significance (ASC-US) 12/03/2015     Past Surgical History:   Procedure Laterality Date     CHOLECYSTECTOMY  12/2019     COLONOSCOPY N/A 12/16/2015    Procedure: COLONOSCOPY;  Surgeon: Duane, William Charles, MD;  Location: [facility] OR     COLONOSCOPY N/A 08/11/2022    Procedure: COLONOSCOPY, WITH POLYPECTOMY AND BIOPSY;  Surgeon: Aurora Paez DO;  Location:  OR      COLONOSCOPY WITH CO2 INSUFFLATION N/A 12/16/2015    Procedure: COLONOSCOPY WITH CO2 INSUFFLATION;  Surgeon: Duane, William Charles, MD;  Location: [facility] OR     COLONOSCOPY WITH CO2 INSUFFLATION N/A 08/11/2022    Procedure: COLONOSCOPY, WITH CO2 INSUFFLATION;  Surgeon: Aurora Paez DO;  Location: [facility]     COMBINED ESOPHAGOSCOPY, GASTROSCOPY, DUODENOSCOPY (EGD) WITH CO2 INSUFFLATION N/A 08/11/2022    Procedure: ESOPHAGOGASTRODUODENOSCOPY, WITH CO2 INSUFFLATION;  Surgeon: Aurora Paez DO;  Location: [facility]     ESOPHAGOSCOPY, GASTROSCOPY, DUODENOSCOPY (EGD), COMBINED N/A 08/11/2022    Procedure: ESOPHAGOGASTRODUODENOSCOPY, WITH BIOPSY;  Surgeon: Aurora Paez DO;  Location: [facility] OR     Family History   Problem Relation Age of Onset     Hyperlipidemia Mother      Thyroid Disease Mother      Kidney Disease Mother      Hyperlipidemia Father      Substance Abuse Sister      Substance Abuse Sister      Depression Brother      No Known Problems Maternal Grandmother      Myocardial Infarction Maternal Grandfather      No Known Problems Paternal Grandmother      No Known Problems Paternal Grandfather      Breast Cancer Cousin      C.A.D. No family hx of      Diabetes No family hx of      Breast Cancer No family hx of      Cancer - colorectal No family hx of      Anesthesia Reaction No family hx of      Blood Disease No family hx of      Social History     Socioeconomic History     Marital status:      Spouse name: Not on file     Number of children: 0     Years of education: 16     Highest education level: Not on file   Occupational History     Occupation:      Comment: [employer]   Tobacco Use     Smoking status: Never Smoker     Smokeless tobacco: Never Used   Substance and Sexual Activity     Alcohol use: Yes     Comment: couple times a year     Drug use: No     Sexual activity: Yes     Partners: Male     Birth control/protection: Pill   Other Topics Concern     Parent/sibling w/ CABG, MI or  angioplasty before 65F 55M? No   Social History Narrative    Parents declaring bankruptcy; losing house due to mother's credit card debt.        Brother deployed to [place] for 16 months startin 4/1/09        Parents  but ''.     Social Determinants of Health     Financial Resource Strain: Not on file   Food Insecurity: Not on file   Transportation Needs: Not on file   Physical Activity: Not on file   Stress: Not on file   Social Connections: Not on file   Intimate Partner Violence: Not on file   Housing Stability: Not on file       ROS:   CONSTITUTIONAL:  No fevers or chills  EYES: negative for icterus  ENT:  negative for hearing loss, tinnitus and sore throat  RESPIRATORY:  negative for cough, sputum, dyspnea  CARDIOVASCULAR:  negative for chest pain  GASTROINTESTINAL:  negative for nausea, vomiting, diarrhea or constipation  GENITOURINARY:  negative for incontinence, dysuria, bladder emptying problems  HEME:  No easy bruising  INTEGUMENT:  negative for rash and pruritus  NEURO:  Negative for headache, seizure disorder    Allergies:   Allergies   Allergen Reactions     Nkda [No Known Drug Allergies]        Medications:  Prescription Medications as of 10/6/2022       Rx Number Disp Refills Start End Last Dispensed Date Next Fill Date Owning Pharmacy    buPROPion (WELLBUTRIN XL) 150 MG 24 hr tablet  90 tablet 1 5/23/2022    [facility] #[facility] MICHELLE Gray    Sig: Take 1 tablet (150 mg) by mouth every morning Take with the bupropion  mg for total daily dose of 450 mg    Class: E-Prescribe    Route: Oral    buPROPion (WELLBUTRIN XL) 300 MG 24 hr tablet  90 tablet 1 5/23/2022    [facility] #MICHELLE Condon    Sig: Take 1 tablet (300 mg) by mouth every morning    Class: E-Prescribe    Route: Oral    levothyroxine (SYNTHROID/LEVOTHROID) 200 MCG tablet  90 tablet 0 8/9/2022    [facility] #MICHELLE Condon    Sig: Take 1 tablet  (200 mcg) by mouth daily Call PCP clinic to schedule follow up appointment.    Class: E-Prescribe    Route: Oral    metoclopramide (REGLAN) 10 MG tablet  20 tablet 11 2022    [facility] #[address] [place] [address] [place] [facility] FRAN Ward    Sig: Take 1 tablet (10 mg) by mouth 3 times daily as needed (nausea)    Class: E-Prescribe    Route: Oral    nitroFURantoin macrocrystal (MACRODANTIN) 100 MG capsule  30 capsule 11 10/11/2021    [facility] #[facility] [place] Court[place] Ward    Sig: Take 1 capsule (100 mg) by mouth At Bedtime    Class: E-Prescribe    Route: Oral    norethindrone-ethinyl estradiol (ORTHO-NOVUM , ,) 1-35 MG-MCG tablet  112 tablet 3 4/15/2022    [facility] #[address] [place] [address] [place] Ward    Sig: Continuously by skipping placebo pills    Class: E-Prescribe    omeprazole (PRILOSEC) 20 MG DR capsule  30 capsule 11 2022    [facility] #[address] [place] [address] [place] Ward    Sig: Take 1 capsule (20 mg) by mouth daily    Class: E-Prescribe    Route: Oral    rOPINIRole (REQUIP) 0.5 MG tablet  180 tablet 1 2022    [facility] #[address] [place] [address] [place] [address] FRAN Ward    Si.25 mg (1/2 tablet) once daily 1 to 3 hours before bedtime. Dose may be increased after 2 days to 0.5 mg once daily, and after 7 days to 1 mg once daily (2 tablets). Dose may be further increased in 0.5 mg increments every week until reaching 3 mg once daily during week 6.    Class: E-Prescribe    SUMAtriptan (IMITREX) 100 MG tablet  18 tablet 11 2022    [facility] #[facility] [place] Ward    Sig: Take 1 tablet (100 mg) by mouth at onset of headache for migraine (repeat in 2 hours if needed)    Class: E-Prescribe    Route: Oral    topiramate (TOPAMAX) 25 MG tablet  120 tablet 3 2022    [facility] #[facility] [place] Courtney Ward    Sig: Take 4 tablets (100 mg) by mouth every evening    Class: E-Prescribe    Route: Oral      Clinic-Administered Medications as  of 10/6/2022       Dose Frequency Start End    iohexol (OMNIPAQUE) 350 MG/ML injectable solution 4 mL (Completed) 4 mL ONCE 10/6/2022 10/6/2022    Route: Intravenous          Exam:   Temp:  [97.9  F (36.6  C)] 97.9  F (36.6  C)  Pulse:  [89-92] 92  Resp:  [18] 18  BP: (110-119)/(80-86) 117/81  SpO2:  [98 %-100 %] 98 %  Body mass index is 33.29 kg/m .  Temp:  [97.9  F (36.6  C)] 97.9  F (36.6  C)  Pulse:  [89-92] 92  Resp:  [18] 18  BP: (110-119)/(80-86) 117/81  SpO2:  [98 %-100 %] 98 %  Appearance: in no apparent distress.   Skin: normal, warm, dry  Head and Neck: Normal, no rashes or jaundice  Respiratory: normal respiratory excursions, no audible wheeze  Cardiovascular: RRR  Abdomen: soft, rounded. Nontender. No hernia or masses  Extremeties: Edema, none  Neuro: without deficit, cranial nerves intact       Diagnostics:   Recent Results (from the past 336 hour(s))   Blood Group A Subtype    Collection Time: 10/06/22  7:10 AM   Result Value Ref Range    A1 Antigen Type Negative     SPECIMEN EXPIRATION DATE [id number]    HCG quantitative pregnancy    Collection Time: 10/06/22  7:10 AM   Result Value Ref Range    hCG Quantitative <1 <5 mIU/mL   Protein  random urine    Collection Time: 10/06/22  7:10 AM   Result Value Ref Range    Total Protein Urine mg/dL 12.4 mg/dL    Total Protein UR MG/MG CR 0.08 0.00 - 0.20 mg/mg Cr    Creatinine Urine mg/dL 148.0 mg/dL   Albumin Random Urine Quantitative with Creat Ratio    Collection Time: 10/06/22  7:10 AM   Result Value Ref Range    Albumin Urine mg/L 21.2 mg/L    Albumin Urine mg/g Cr 14.42 0.00 - 25.00 mg/g Cr    Creatinine Urine mg/dL 147.0 mg/dL   Routine UA with microscopic    Collection Time: 10/06/22  7:10 AM   Result Value Ref Range    Color Urine Yellow Colorless, Straw, Light Yellow, Yellow    Appearance Urine Slightly Cloudy (A) Clear    Glucose Urine Negative Negative mg/dL    Bilirubin Urine Negative Negative    Ketones Urine Negative Negative mg/dL     Specific Gravity Urine 1.015 1.003 - 1.035    Blood Urine Large (A) Negative    pH Urine 6.5 5.0 - 7.0    Protein Albumin Urine Negative Negative mg/dL    Urobilinogen Urine Normal Normal, 2.0 mg/dL    Nitrite Urine Negative Negative    Leukocyte Esterase Urine Negative Negative    RBC Urine 98 (H) <=2 /HPF    WBC Urine 1 <=5 /HPF    Squamous Epithelials Urine <1 <=1 /HPF   CBC with platelets    Collection Time: 10/06/22  7:10 AM   Result Value Ref Range    WBC Count 6.2 4.0 - 11.0 10e3/uL    RBC Count 4.13 3.80 - 5.20 10e6/uL    Hemoglobin 12.6 11.7 - 15.7 g/dL    Hematocrit 36.9 35.0 - 47.0 %    MCV 89 78 - 100 fL    MCH 30.5 26.5 - 33.0 pg    MCHC 34.1 31.5 - 36.5 g/dL    RDW 11.9 10.0 - 15.0 %    Platelet Count 326 150 - 450 10e3/uL   Partial thromboplastin time    Collection Time: 10/06/22  7:10 AM   Result Value Ref Range    aPTT 24 22 - 38 Seconds   INR    Collection Time: 10/06/22  7:10 AM   Result Value Ref Range    INR 0.93 0.85 - 1.15   Hemoglobin A1c    Collection Time: 10/06/22  7:10 AM   Result Value Ref Range    Hemoglobin A1C 5.0 <5.7 %   Phosphorus    Collection Time: 10/06/22  7:10 AM   Result Value Ref Range    Phosphorus 3.0 2.5 - 4.5 mg/dL   Uric acid    Collection Time: 10/06/22  7:10 AM   Result Value Ref Range    Uric Acid 8.3 (H) 2.4 - 5.7 mg/dL   Lipid Profile    Collection Time: 10/06/22  7:10 AM   Result Value Ref Range    Cholesterol 189 <200 mg/dL    Triglycerides 312 (H) <150 mg/dL    Direct Measure HDL 38 (L) >=50 mg/dL    LDL Cholesterol Calculated 89 <=100 mg/dL    Non HDL Cholesterol 151 (H) <130 mg/dL   Comprehensive metabolic panel    Collection Time: 10/06/22  7:10 AM   Result Value Ref Range    Sodium 139 136 - 145 mmol/L    Potassium 3.5 3.4 - 5.3 mmol/L    Chloride 105 98 - 107 mmol/L    Carbon Dioxide (CO2) 21 (L) 22 - 29 mmol/L    Anion Gap 13 7 - 15 mmol/L    Urea Nitrogen 16.3 6.0 - 20.0 mg/dL    Creatinine 1.11 (H) 0.51 - 0.95 mg/dL    Calcium 9.4 8.6 - 10.0 mg/dL     Glucose 95 70 - 99 mg/dL    Alkaline Phosphatase 63 35 - 104 U/L    AST 18 10 - 35 U/L    ALT 26 10 - 35 U/L    Protein Total 7.4 6.4 - 8.3 g/dL    Albumin 4.4 3.5 - 5.2 g/dL    Bilirubin Total 0.3 <=1.2 mg/dL    GFR Estimate 67 >60 mL/min/1.73m2   Adult Type and Screen    Collection Time: 10/06/22  7:10 AM   Result Value Ref Range    ABO/RH(D) B POS     Antibody Screen Negative Negative    SPECIMEN EXPIRATION DATE [id number]    Blood Bank Chart Comment    Collection Time: 10/06/22  7:10 AM   Result Value Ref Range    Blood Bank Chart Comment BB Chart Comment     SPECIMEN EXPIRATION DATE [id number]

## 2022-10-06 NOTE — NURSING NOTE
Saw Analia in clinic on 10/6/22 for Living Kidney Donor Evaluation.     Analia is interested in donation for to her mother.    I provided a folder which included copies of the followin. Living Kidney Donor Evaluation Consent  2. Paired Exchange Consent  3. Donor Shield Pamphlet  4. Living Donor Collective Study information  5. Kidney for Life pamphlet  6. Kidney Donors are Heroes! Study synopsis  7. Most current SRTR data.      I also provided a parking pass and food voucher.    I reviewed the Living Kidney Donor Evaluation Consent, dated 2020 and Paired Exchange/ NDD consent dated 2018.  I answered any question.    Evaluation Notes:  1. Tissue typing collected and sent   2. Will need to repeat UA   3. Creat slightly elevated at 1.11   4. Uric acid 8.3

## 2022-10-06 NOTE — PROGRESS NOTES
Living Kidney Donor Consent per OPTN Policy 14.2 for Independent Living Donor Advocate (LEO)    Organ Type: related, kidney  Presenting Information:  Ms. Analia Jerry presents to [facility] to complete a living donor evaluation since she is interested in becoming a kidney donor for her mother.      Written assurance has been obtained from the potential donor that he/she:   Is willing to donate  Is free from inducement and coercion  Has been informed that the he/she may decline to donate at any time  Has been informed that transplant centers must:   A) Offer donors an opportunity to discontinue the donor consent or evaluation process in a way that is protected and confidential  B) Provide an independent living donor advocate (LEO) to assist the potential donor during this process    The following was presented to the potential donor in a language in which the potential donor is able to engage in meaningful dialogue:   Education and instruction about all phases of the living donation process including:   Consent  Medical and psychosocial evaluation  Information about the surgical procedure  Pre and post operative care  Benefits of post operative follow up  Disclosure that the recovery hospital will take all reasonable precautions to provide confidentiality for the donor/recipient  Disclosure that it is a federal crime for any person to knowingly acquire, obtain or otherwise transfer any human organ for valuable consideration  Disclosure that the [facility] hospital must provide an independent living donor advocate (LEO)  Disclosure that health information obtained during the evaluation is subject to the same regulations as all records and could reveal conditions that must be reported to local, state, or federal public health authorities  Disclosure that the [facility] hospital is required to report living donor follow up information at 6  months, 1 year, and 2 years, and that the potential donor must commit to post operative follow up testing coordinated by the [facility]    Disclosure has been provided that these risks may be transient or permanent & include but are not limited to:  Potential psychosocial risks:  Problems with body image  Post-surgery depression or anxiety  Feelings of emotional distress or bereavement if recipient experiences any recurrent disease or in the event of the recipient s death  Impact of donation on the donor s lifestyle, such as limited ability to exercise in the short term post operative recovery period, no driving for the first 2 weeks post op or until the donor is no longer needing pain medications that impair the ability to drive.      Potential financial impacts:  Personal expenses of travel, housing, , lost wages related to donation might not be reimbursed. However, resources may be available to defray some donation-related expenses.   Need for life-long follow up at the donor s expense  Loss of employment or income  Negative impact on the ability to obtain future employment  Negative impact on the ability to obtain, maintain, or afford health, disability, and life insurance  Future health problems experienced by living donors following donation may not be covered by the recipient s insurance      PREPARATION FOR DONATION, RECOVERY, AND POTENTIAL SHORT-LONG-TERM OUTCOMES:  Understanding of the Living Donation Process:  We discussed the role of Independent Living Donor Advocate.  Short and long term medical and psychosocial risks to both, donor and recipient were reviewed and she is able to teach back to me these risks.  Post surgical restrictions (2 weeks no driving, 6 weeks no lifting over 10 lbs) were reviewed and she appears capable of adhering to the post surgical requirements. The need for a caregiver was discussed and she has a plan in place to meet her care needs post surgery .  The risk of  poor psychosocial outcome including problems with body image, post-surgery depression or anxiety, or feelings of emotional distress or bereavement if recipient experiences any recurrent disease, poor outcome or death was reviewed.  Additionally, potential financial implications, including the risk of having difficulty obtaining health care insurance, life insurance, disability insurance, or long term care insurance were reviewed, as were available donor grants to assist with donor related expenses.      IMPRESSIONS/RECOMMENDATIONS:  Ms. Analia Jerry appears highly motivated to donate a kidney to her mother.  She appears capable of understanding this information and making an informed medical decision.  As LEO, no concerns were identified today.  She has my contact information and is aware that I am available thoughout the donation process.  Analia has a variety of medical issues that were discussed with her today by our nephrologist.  The CT scan will be cancelled for today.  She and I discussed this issue, and she is aware that she may not be able to be a donor.  However, that decision will not be reached until she is presented at selection committee meeting on 10/12/22.    Contact Information:  JAMES Liu, [facility]  Independent Living Donor Advocate  [facility]  Pager:  [phone]  Direct:  [phone] Cell Phone  E-Mail:  [email or web address]      Time Spent: 35 minutes

## 2022-10-06 NOTE — PROGRESS NOTES
"Psychosocial Evaluation  Living Organ Donation per OPTN Policy 14.1.A  Organ Type: Kidney  Presenting Information:  Analia presents to the [facility] to complete a psychosocial evaluation since she is interested in becoming a kidney donor for her mother Fatuma.    PERSONAL BACKGROUND:  Current Living Situation: Analia lives in [place] with her  Luciano. She reports Luciano has two children from previous relationships, Yair (14) and Christneftalier (8). Yair and Nate spend weekends with them.    Education/Employment/Financial Situation: Analia is a  through [employer]. 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 She reports this job change has been positive overall. Analia reports she has access to STD/PTO/FMLA and her job is aware of her hopes to donate. They are supportive. Analia reports her supervisor is currently on leave as she is in the process of donating a kidney as well. Analia has no concerns about being off of work.     Health Insurance Status: [facility] through work.     Family History: Analia's parents, Fatuma and Sharan, live in [place]. She has 3 siblings; Jessica (half sister) who lives in [place], Candi (half sister) who lives in [place], and a brother (full biological sibling) Josef who passed two years ago (10/5/2020). Analia reports he  by suicide and she was the one who found him. Analia reports she was very close to her brother. She reports she is \"distant\" with her sisters.     General Health: Analia describes her general health as \"good\". She reports she has a primary care physician through the [facility]. Analia does not have a healthcare directive but reports she and her  have been talking about it since the passing of her brother. Sw provided her with " a blank copy today.     Mental Health: Analia reports diagnoses of depression, anxiety, and PTSD (following her brothers passing). She reports she has been on and off medications since she was 12 years old. Analia is currently prescribed Wellbutrin for anxiety and depression. She reports she weaned off Lexapro over the summer which came with medical complications (headaches, [place], and subsequent trips to the ED for pain). Analia sought therapy following her brothers passing and did this for a little over a year (viturally). Analia identifies that she may have stopped earlier than she should have. She does not currently have a therapist but sees her psychiatrist regularly (every 3 months). Analia reports her psychiatrist plans to transition her back to her PCP for medication management.     Analia reports her mental health feels well managed at this time. Per chart review, Analia's most recent PHQ-9 and MAXIM-7 assessments completed on 7/24/22 reflect minimal depression (scored a 3) and minimal anxiety (scored a 2) which were significantly reduced from previous scores.     Analia denies any recent SI/attempts/self harm in the last 10 years.     Alcohol and Drug Use/Abuse/Dependency: Analia reports she does not drink as she had her Gallbladder removed in 2019 and drinking makes her feel sick.     Analia denies any past history of abuse or dependency on alcohol or illicit drugs. She denies any current use of street drugs, including marijuana, vaping, edible marijuana, or other mood altering substances. She denies any past history of negative consequences of use of alcohol or drugs such as a DUI, relationship problems, problems with fulfilling parenting or other care giving responsibilities, or problems with work performance.       Cigarette Use: None reported.     Legal: No legal issues reported.     Coping with major surgery/associated stress: Analia reports coping with stress by talking to her , spending time with  friends/family, watching hockey, playing golf, and being active.     Support System: Analia reports her  Luciano would be her caregiver post donation. She reports they have discussed this and Luciano has already spoken to his work about this. Analia reports Luciano is supportive.     DONOR SPECIFIC INFORMATION:  Relationship to Recipient: Daughter. Analia reports her mother was initially hesitant but has not come to a place of being supportive.     Decision Process/Motivation to Donate: Analia reports her mother is a very important person in her life who has done a lot for her. She hopes to have her around as long as possible. Analia has reflected on post donation outcomes and feels she would be able to cope should the donation not go as she had planned.    High risk behaviors as defined by [place] Public Health Services (PHS) that have potential to increase risk of disease transmission were reviewed and assessed.     PREPARATION FOR DONATION, RECOVERY, AND POTENTIAL SHORT-LONG-TERM OUTCOMES:  Understanding of the Living Donation Process:  We discussed the role of living donor .  Short and long term medical and psychosocial risks to both, donor and recipient were reviewed and assessed.  Post surgical restrictions (2 weeks no driving, 6 weeks no lifting over 10 lbs) were reviewed and she appears capable of adhering to the post surgical requirements. The need for a caregiver was discussed and she has plans for care giving post donation .  The risk of poor psychosocial outcome including problems with body image, post-surgery depression or anxiety, or feelings of emotional distress or bereavement if recipient experiences any recurrent disease, poor outcome or death was reviewed.  Additionally, potential financial implications, including the risk of having difficulty obtaining health care insurance, life insurance, disability insurance, or long term care insurance were reviewed, as were available donor grants to assist  with donor related expenses.      We also discussed some unique issues that arise with paired kidney donation, which include the uncertainty of the timing and the importance of having a employment situation and support system that is able to provide sustained support and flexibility.    She appears capable of understanding this information and making an informed medical decision.    Impressions/Recommendations:   Analia is highly motivated to donate a kidney to her mother.  Her decision to donate is free of inducement, coercion, or other undue pressure.  Her housing, finances and employment are stable.  The need for a caregiver was reviewed and she is able to identify a plan to meet her post operative care needs.  She appears capable of making an informed medical decision.  No psychosocial contraindications to living organ donation were identified and  I support Analia s desire to donate a kidney to her mother.      Should Analia be an appropriate candidate for donation, jessica recommends that she re-establish care with a psychotherapist to support her pre and post donation MH needs.       Contact Information:   VERENICE Abreu  SOT/BMT/ANUP Aponte      Time Spent: 30 minutes

## 2022-10-06 NOTE — NURSING NOTE
Chief Complaint   Patient presents with     Labs Only     Venipuncture, vitals checked, PIV placed     Ellen Bob RN on 10/6/2022 at 7:14 AM

## 2022-10-06 NOTE — PROGRESS NOTES
Donor Iohexol test    Analia Jerry presents today to [facility] for a Donor Iohexol test.      Progress note:  ID verified by name and .     The following information was verified with the patient:  Female Patients is there any possibility of being pregnant No  Is there a history of allergy (skin rash, swelling, ect) to:   A.  Iodine (except skin reactions to betadine): No   B. Intravenous radio-contrast agents: No   C. Seafood No     present during visit today: Not Applicable.    R.N. provided patient with educational handout regarding timed test. Yes    24 gauge placed in L AC and Iohexol administered over 2 minutes.  Positive blood return verified before and after injection. PIV removed.  20 gauge PIV placed in R arm by second floor lab staff  for blood draws and CT this afternoon.    Medication administered:  Iohexol (Omnipaque 300mg iodine/ml concentration) 5 mls.    Start time: 724  Stop time: 726    Drug Waste Record      Administrations This Visit     iohexol (OMNIPAQUE) 350 MG/ML injectable solution 4 mL     Admin Date  10/06/2022 Action  Given Dose  4 mL Route  Intravenous Administered By  Emperatriz Lozoya RN                    Evaluation nurse in transplant to draw labs at 2 and 4 hours post iohexol administration.  Patient given a slip with the times to get labs drawn and verbalized understanding of the plan.    Patient tolerated the procedure:  Yes    After the infusion patient was discharged to the next appointment.    Emperatriz Lozoya RN

## 2022-10-06 NOTE — NURSING NOTE
"Chief Complaint   Patient presents with     Transplant Donor Evaluation     KIDNEY     Blood pressure 117/81, pulse 92, height 1.727 m (5' 7.99\"), weight 99.3 kg (218 lb 14.7 oz), not currently breastfeeding.    Pretty Vallejo, AN    "

## 2022-10-06 NOTE — NURSING NOTE
"Chief Complaint   Patient presents with     Transplant Donor Evaluation     Kidney      Blood pressure 119/86, pulse 92, height 1.727 m (5' 7.99\"), weight 99.3 kg (218 lb 14.7 oz), SpO2 98 %, not currently breastfeeding.    Pretty Vallejo CMA    "

## 2022-10-06 NOTE — PROGRESS NOTES
TRANSPLANT NEPHROLOGY DONOR EVALUATION    Assessment and Plan:  # Prospective Kidney Transplant Donor: major concern during donor evaluation is AVIS with elevated Cr up to 1.1 mg/dl (eGFR per CKD epi-Cr: 67 ml/min) - prior b/l Cr-0.7-0.8 two months ago, recent gastroenteritis as well as chronic diarrhea, recurrent UTIs, long standing history of microscopic hematuria and family hx of hematuria in mother -her potential kidney recipient, with biopsy proven FSGS, maternal grandfather, raising suspicion for possible genetic FSGS though no prior genetic testing in mother. Iohexol GFR- 93 ml/min (raw), 73 ml/min/1.73 m2 (std). Recommend f/up with PCP after hydration, repeating labs, monitoring renal function, proteinuria, as well as consideration for serologic w/up and kidney biopsy if no improvement in renal function. CTA canceled due to concerns about AVIS and risks for underlying kidney disease.    # Hematuria: reports that she has had reports of microscopic hematuria since high school, no proteinuria. She has discussed this with urology, who did not pursue further evaluation. Family history of FSGS in mother and multiple family members with hematuria as well. May need to consider serologic workup +_kidney biopsy if proteinuria or change in renal function.     # AVIS: reports recent food poisoning vs gastroenteritis. Reports nausea and vomiting earlier this week. Limited PO intake. This has improved in the past couple days, but AVIS and mild acidosis may be reflecting recent dehydration.     # Chronic diarrhea: recent upper and lower scope showing evidence of Juarez's esophagus.  Started on PPI.  Upcoming hydrogen breath test.  Symptoms of diarrhea have improved since dietary elimination, however continue to happen approximately once a week.    # Headaches: Was started on topiramate late this summer and reports significant improvement in headaches.  She has Reglan and sumatriptan as needed, but is not needed to utilize  these.  She does not have a history of kidney stones.  She does not take NSAIDs for headache management    # Recurrent UTIs: This has been a problem for several years.  No specific trigger that precipitates her UTIs.  Approximates 4X per year.  She has seen urology for this and had a CT urogram, urodynamic studies which was unremarkable. Started on prophylactic Macrobid daily and has not had a UTI over the past year.    # Depression: Currently on treatment with Wellbutrin. Recently tapered off of lexapro because of improvement in mood symptoms. Plan is going to be to transfer her care from psychiatry to her primary physician.    # Hypothyroidism: Continues on synthroid    # Restless leg syndrome: Continues on ropinarole    Discussed the risks of donating a kidney, including the surgical risk and the possible risks of living with one kidney.    Education about expected post-donation kidney function and how chronic kidney disease (CKD) and end stage kidney disease (ESKD) might potentially impact the donor in the future, include, but not limited to:       - On average, donors will have 25-35% permanent loss of kidney function at donation.       - Baseline risk of ESKD may slightly exceed that of members of the general population with the same demographic profile.       - Donor risks must be interpreted in light of known epidemiology of both CKD or ESKD, such as that CKD generally develops in midlife (40-50 years old) and ESKD generally develops after age 60.       - Medical evaluation of young potential donors cannot predict lifetime risk of CKD or ESKD.       - Donors may be at higher risk for CKD if they sustain damage to the remaining kidney.       - Development of CKD and progression of ESKD may be more rapid with only 1 kidney.       - Some type of kidney replacement therapy, either kidney transplant or dialysis, is required when reaching ESKD.    Potential medical or surgical risks include, but not limited to:        - Death.       - Scars, pain, fatigue, and other consequences typical of any surgical procedure.       - Decreased kidney function.       - Abdominal or bowel symptoms, such as bloating and nausea, and developing bowel obstruction.       - Kidney failure (ESKD) and the need for a kidney transplant or dialysis for the donor.       - Impact of obesity, hypertension, or other donor-specific medical conditions on morbidity and mortality of the potential donor.    Patients overall evaluation will be discussed with the transplant team and a final recommendation on the patients' suitability to be a kidney transplant donor will be made at that time.    Consult:  Analia Jerry was seen in consultation at the request of Dr. Josh Dee for evaluation as a potential kidney transplant donor.    Reason for Visit:  Analia Jerry is a 31 year old female who presents for a kidney donor evaluation.  Patient would like to donate to mother.    Present Condition and Donor-Related Medical History:    Hx of hematuria  Headaches  Restless legs  Chronic diarrhea  Hulbert ts esophagus  Recurrent UTI - 4 a year - now on prophylactic macrobid without recurrance  Recent MRI showing cyst on kidney         Kidney Disease Hx:       h/o Kidney Problems: No  Family h/o Genetic Kidney Disease: No per record review: mother with biopsy proven FSGS       h/o Hypertension: No    Usual Blood Pressure: Not checked       h/o Protein in Urine: No  h/o Blood in Urine: Yes        h/o Kidney Stones: No  h/o Kidney Injury: No       h/o Recurrent UTI: Yes: on suppressive antibiotics (macrobid)  h/o Genitourinary Problems: No       h/o Chronic NSAID Use: No         Other Medical Hx:       h/o Diabetes: No             h/o Gastrointestinal, Pancreas or Liver Problems: Yes: history of barretts       h/o Lung or Heart Problems: No       h/o Hematologic Problems: No  h/o Bleeding or Clotting Problems: No       h/o Cancer: No       h/o Infection Problems: No        h/o Gestational DM: No      h/o Preeclampsia: No         Skin Cancer Risk:       h/o more than 50 moles: No       h/o extensive sun exposure: No       h/o melanoma: No       Family h/o melanoma: Does not know:           Mental Health Assessment:       h/o Depression: Yes: well managed on wellbutrin       h/o Psychiatric Illness: Yes: history of PTSD       h/o Suicidal Attempt(s): self-harm, not attempt    COVID Status:  Vaccination Up To Date: Yes  H/o COVID Infection: Yes - January 2022    Review Of Systems:   A comprehensive review of systems was obtained and negative, except as noted in the HPI or PMH.    Past Medical History:   History was taken from the patient as noted below.  Past Medical History:   Diagnosis Date     Acute gallstone pancreatitis 12/28/2019     Anxiety      ASCUS of cervix with negative high risk HPV 01/26/2017    ASCUS/neg HR HPV.     Juarez's esophagus determined by biopsy 2022     Depressive disorder      Gallstones 12/28/2019    [facility]     H/O colposcopy with cervical biopsy 03/23/2017    Bx & ECC - negative     Hashimoto's disease      Headache(784.0)      Hypothyroidism due to Hashimoto's thyroiditis      Papanicolaou smear of cervix with atypical squamous cells of undetermined significance (ASC-US) 12/03/2015       Past Social History:   Past Surgical History:   Procedure Laterality Date     COLONOSCOPY N/A 12/16/2015    Procedure: COLONOSCOPY;  Surgeon: Duane, William Charles, MD;  Location: [facility]     COLONOSCOPY N/A 8/11/2022    Procedure: COLONOSCOPY, WITH POLYPECTOMY AND BIOPSY;  Surgeon: Aurora Paez DO;  Location: [facility]     COLONOSCOPY WITH CO2 INSUFFLATION N/A 12/16/2015    Procedure: COLONOSCOPY WITH CO2 INSUFFLATION;  Surgeon: Duane, William Charles, MD;  Location: [facility]     COLONOSCOPY WITH CO2 INSUFFLATION N/A 8/11/2022    Procedure: COLONOSCOPY, WITH CO2 INSUFFLATION;  Surgeon: Aurora Paez DO;  Location: [facility]     COMBINED ESOPHAGOSCOPY,  GASTROSCOPY, DUODENOSCOPY (EGD) WITH CO2 INSUFFLATION N/A 8/11/2022    Procedure: ESOPHAGOGASTRODUODENOSCOPY, WITH CO2 INSUFFLATION;  Surgeon: Aurora Paez DO;  Location: [facility]     ESOPHAGOSCOPY, GASTROSCOPY, DUODENOSCOPY (EGD), COMBINED N/A 8/11/2022    Procedure: ESOPHAGOGASTRODUODENOSCOPY, WITH BIOPSY;  Surgeon: Aurora Paez DO;  Location: [facility]     Personal or family history of anesthesia problems: No    Family History:   Family History   Problem Relation Age of Onset     Hyperlipidemia Mother      Thyroid Disease Mother      Kidney Disease Mother      Hyperlipidemia Father      Substance Abuse Sister      Substance Abuse Sister      Depression Brother      Myocardial Infarction Maternal Grandfather      No Known Problems Paternal Grandmother      No Known Problems Paternal Grandfather      Breast Cancer Cousin      C.A.D. No family hx of      Diabetes No family hx of      Breast Cancer No family hx of      Cancer - colorectal No family hx of      Anesthesia Reaction No family hx of      Blood Disease No family hx of           Specific Family History in First Degree Relatives:       FH of Kidney Dz: Yes - mother FH of Diabetes: No       FH of Hypertension: Yes   FH of CAD: No       FH of Cancer: No  FH of Kidney Cancer: No    Personal History:   Social History     Socioeconomic History     Marital status:      Spouse name: Not on file     Number of children: 0     Years of education: 16     Highest education level: Not on file   Occupational History     Occupation:      Comment: [employer]   Tobacco Use     Smoking status: Never Smoker     Smokeless tobacco: Never Used   Substance and Sexual Activity     Alcohol use: Yes     Comment: couple times a year     Drug use: No     Sexual activity: Yes     Partners: Male     Birth control/protection: Pill   Other Topics Concern     Parent/sibling w/ CABG, MI or angioplasty before 65F 55M? No   Social History Narrative    Parents declaring  bankruptcy; losing house due to mother's credit card debt.        Brother deployed to [place] for 16 months startin 4/1/09        Parents  but ''.     Social Determinants of Health     Financial Resource Strain: Not on file   Food Insecurity: Not on file   Transportation Needs: Not on file   Physical Activity: Not on file   Stress: Not on file   Social Connections: Not on file   Intimate Partner Violence: Not on file   Housing Stability: Not on file          Specific Social History:       Health Insurance Status: Yes       Employment Status: Full time  Occupation:                       Living Arrangements: lives with their spouse       Social Support: Yes -        Presence of increased risk for disease transmission behaviors as defined by [facility] guidelines: No        Allergies:  Allergies   Allergen Reactions     Nkda [No Known Drug Allergies]        Medications:  Current Outpatient Medications   Medication Sig     buPROPion (WELLBUTRIN XL) 150 MG 24 hr tablet Take 1 tablet (150 mg) by mouth every morning Take with the bupropion  mg for total daily dose of 450 mg     buPROPion (WELLBUTRIN XL) 300 MG 24 hr tablet Take 1 tablet (300 mg) by mouth every morning     levothyroxine (SYNTHROID/LEVOTHROID) 200 MCG tablet Take 1 tablet (200 mcg) by mouth daily Call PCP clinic to schedule follow up appointment.     metoclopramide (REGLAN) 10 MG tablet Take 1 tablet (10 mg) by mouth 3 times daily as needed (nausea)     nitroFURantoin macrocrystal (MACRODANTIN) 100 MG capsule Take 1 capsule (100 mg) by mouth At Bedtime     norethindrone-ethinyl estradiol (ORTHO-NOVUM 1/35, 28,) 1-35 MG-MCG tablet Continuously by skipping placebo pills     omeprazole (PRILOSEC) 20 MG DR capsule Take 1 capsule (20 mg) by mouth daily     rOPINIRole (REQUIP) 0.5 MG tablet 0.25 mg (1/2 tablet) once daily 1 to 3 hours before bedtime. Dose may be increased after 2 days to 0.5 mg once daily, and after 7 days to 1 mg  "once daily (2 tablets). Dose may be further increased in 0.5 mg increments every week until reaching 3 mg once daily during week 6.     SUMAtriptan (IMITREX) 100 MG tablet Take 1 tablet (100 mg) by mouth at onset of headache for migraine (repeat in 2 hours if needed)     topiramate (TOPAMAX) 25 MG tablet Take 4 tablets (100 mg) by mouth every evening     No current facility-administered medications for this visit.     There are no discontinued medications.      Vitals:  Vital Signs 10/6/2022 10/6/2022 10/6/2022   Systolic 110 119 117   Diastolic 80 86 81   Pulse - - 92   Temperature - - -   Respirations - - -   Weight (LB) - - 218 lb 14.7 oz   Height - - 5' 7.992\"   BMI (Calculated) - - 33.29   Pain Score - - -   O2 - - -   Systolic (Patient Reported) - - -   Weight (Patient Reported) - - -   Height (Patient Reported) - - -   BMI (Based on Pt Reported Ht/Wt) - - -       Exam:   GENERAL APPEARANCE: alert and no distress  HENT: mouth without ulcers or lesions  LYMPHATICS: no cervical or supraclavicular nodes  RESP: lungs clear to auscultation - no rales, rhonchi or wheezes  CV: regular rhythm, normal rate, no murmur  EDEMA: no LE edema bilaterally  ABDOMEN: soft, nondistended, nontender  MS: extremities normal - no gross deformities noted  SKIN: no rash    Results:   Labs and imaging were ordered for this visit and reviewed by me.  Recent Results (from the past 336 hour(s))   HCG quantitative pregnancy    Collection Time: 10/06/22  7:10 AM   Result Value Ref Range    hCG Quantitative <1 <5 mIU/mL   Protein  random urine    Collection Time: 10/06/22  7:10 AM   Result Value Ref Range    Total Protein Urine mg/dL 12.4 mg/dL    Total Protein UR MG/MG CR 0.08 0.00 - 0.20 mg/mg Cr    Creatinine Urine mg/dL 148.0 mg/dL   Routine UA with microscopic    Collection Time: 10/06/22  7:10 AM   Result Value Ref Range    Color Urine Yellow Colorless, Straw, Light Yellow, Yellow    Appearance Urine Slightly Cloudy (A) Clear    " Glucose Urine Negative Negative mg/dL    Bilirubin Urine Negative Negative    Ketones Urine Negative Negative mg/dL    Specific Gravity Urine 1.015 1.003 - 1.035    Blood Urine Large (A) Negative    pH Urine 6.5 5.0 - 7.0    Protein Albumin Urine Negative Negative mg/dL    Urobilinogen Urine Normal Normal, 2.0 mg/dL    Nitrite Urine Negative Negative    Leukocyte Esterase Urine Negative Negative    RBC Urine 98 (H) <=2 /HPF    WBC Urine 1 <=5 /HPF    Squamous Epithelials Urine <1 <=1 /HPF   CBC with platelets    Collection Time: 10/06/22  7:10 AM   Result Value Ref Range    WBC Count 6.2 4.0 - 11.0 10e3/uL    RBC Count 4.13 3.80 - 5.20 10e6/uL    Hemoglobin 12.6 11.7 - 15.7 g/dL    Hematocrit 36.9 35.0 - 47.0 %    MCV 89 78 - 100 fL    MCH 30.5 26.5 - 33.0 pg    MCHC 34.1 31.5 - 36.5 g/dL    RDW 11.9 10.0 - 15.0 %    Platelet Count 326 150 - 450 10e3/uL   Partial thromboplastin time    Collection Time: 10/06/22  7:10 AM   Result Value Ref Range    aPTT 24 22 - 38 Seconds   INR    Collection Time: 10/06/22  7:10 AM   Result Value Ref Range    INR 0.93 0.85 - 1.15   Phosphorus    Collection Time: 10/06/22  7:10 AM   Result Value Ref Range    Phosphorus 3.0 2.5 - 4.5 mg/dL   Comprehensive metabolic panel    Collection Time: 10/06/22  7:10 AM   Result Value Ref Range    Sodium 139 136 - 145 mmol/L    Potassium 3.5 3.4 - 5.3 mmol/L    Chloride 105 98 - 107 mmol/L    Carbon Dioxide (CO2) 21 (L) 22 - 29 mmol/L    Anion Gap 13 7 - 15 mmol/L    Urea Nitrogen 16.3 6.0 - 20.0 mg/dL    Creatinine 1.11 (H) 0.51 - 0.95 mg/dL    Calcium 9.4 8.6 - 10.0 mg/dL    Glucose 95 70 - 99 mg/dL    Alkaline Phosphatase 63 35 - 104 U/L    AST 18 10 - 35 U/L    ALT 26 10 - 35 U/L    Protein Total 7.4 6.4 - 8.3 g/dL    Albumin 4.4 3.5 - 5.2 g/dL    Bilirubin Total 0.3 <=1.2 mg/dL    GFR Estimate 67 >60 mL/min/1.73m2     This patient has been seen and evaluated by me, Mir Durbin MD.  I have reviewed the note and agree with plan of care as  documented by the fellow.

## 2022-10-06 NOTE — LETTER
10/6/2022         RE: Analia Jerry  [address]  [facility] [address]        Dear Colleague,    Thank you for referring your patient, Analia Jerry, to the [facility]. Please see a copy of my visit note below.    Transplant Surgery Consult Note    Medical record number: [id number]  YOB: 1990,   Consult requested by the patient for evaluation of kidney donation candidacy.    Assessment and Recommendations: Ms. Jerry appears to be a fair candidate for kidney donation at this point in the evaluation. The following issues will need to be addressed prior to formal review:    Iohexol ordered for today for assessment of adequate kidney function for donation. Will be reviewed when resulted  Donor labs ordered for today and reviewed to include: CBC, CMP, Mg, PO4, hemoglobin A1c, lipid panel, blood type x 2, hemoglobin A1c, UA with microscopic, urine culture, urine protein/cr, INR/PTT, Quantiferon gold, hepatitis C (antibody), hepatitis B panel, HIV, treponemia, West Nile (antibody panel), CMV (antibody panel), EBV (antibody panel), pregnancy screen (for females of childbearing age), PSA (males >50yrs), HLA tissue typing, buccal swab for eplet typing  Social work consult ordered  Dietician consult ordered  Transplant nephrology consult ordered  Transplant coordinator consult ordered  LEO (independent living donor advovate) consult ordered  EKG ordered for today and will be reviewed when resulted. Suitable to proceed today with this testing today:  Yes   Chest x-ray ordered for today and will be reviewed when resulted. Suitable to proceed with this testing today:  Yes   CT angio of abdomen and pelvis for anatomical assessment. Images and report to be reviewed when resulted.  Suitable to proceed with this testing today:  No- question of AVIS today with several contributors to renal impairment- see below  After review of the above, additional testing/concerns include:       BMI: currently 33, doing well with weight loss. Would likely be a candidate at BMI 30.     Renal reserve: several possible factors that would diminish her long term kidney function.  -recurrent UTIs. 4X yearly previously. Underwent extensive urologic workup with scope and imaging that was unrevealing. Now on suppressive macrobid with improvement.  -diarrhea: previously daily after all meals, now once weekly with one large episode after a meal. Underwent GI workup with imaging/scope/biopsy that was unrevealing. Made dietary modification to stop lactose that resulted in this improvement. Unclear if she has had recurrent AKIs from this pattern  -hematuria: longstanding. Not erin but present on dipstick. No proteinuria. Her mother's kidney disease had a similar start. Urologic workup not revealing. Appreciate Nephrology input.    The majority of our visit today was spent in counseling regarding the medical and surgical risks of kidney donation, the typical layne-and post-operative experience and recovery/return to work pattern.  We also talked about post-op visits and longer term health care maintenance, as well as the implications of having one remaining kidney. This discussion included, but was not limited to rates of complications such as bleeding, infection, need for transfusion, reoperation, other organ injury, future bowel obstruction, incisional hernia, port site pain, varicocele, venous thrombosis, pulmonary embolism, renal failure, and death (3 per 10,000). The patient understands that if end stage renal failure happens that dialysis or transplant would be required.   At the conclusion of the visit, all questions had been answered and Ms. Jerry's candidacy for donation will be reviewed at our Multidisciplinary Donor Selection Committee. She will stay in contact with the nurse coordinator with any concerns.      Total time: 60 minutes        Josh Dee  MD  ---------------------------------------------------------------------------------------------------    HPI: Analia Jerry is a 31 year old year old female who presents for a kidney donor evaluation.  Patient would like to donate to her mother.    Analia is a 30 yo F who is generally healthy apart from kidney concerns noted above. She is  with two sons (14 and 8) and two Pomeranians (one of whom needs ACL surgery). 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 Outside of work, she and her family are avid golfers and hockey fans, and enjoy outdoor activities.       Personal history of:   No    Yes  Cancer:    [x]      []             Comment:     Diabetes   [x]      []  Comment:    Nader   [x]      []  Comment:       Hepatitis   [x]      []  Comment:    Tuberculosis   [x]      []  Comment:   Back or neck pain:  [x]      []  Comment:      Kidney stones   [x]      []  Comment:                  Kidney infections  [x]      []  Comment:           Urinary retention  [x]      []            Comment:   Regular NSAID use:  [x]      []            Comment:      Constipation:   [x]      []            Comment:      Spiritism  [x]      []            Comment:      Other:    [x]      []            Comment:         Past Medical History:   Diagnosis Date     Acute gallstone pancreatitis 12/28/2019     Anxiety      ASCUS of cervix with negative high risk HPV 01/26/2017    ASCUS/neg HR HPV.     Juarez's esophagus determined by biopsy 2022     Depressive disorder      Gallstones 12/28/2019    [facility]     H/O colposcopy with cervical biopsy 03/23/2017    Bx & ECC - negative     Hashimoto's disease      Headache(784.0)      Hypothyroidism due to Hashimoto's thyroiditis      Papanicolaou smear of cervix with atypical squamous cells of undetermined significance (ASC-US) 12/03/2015     Past Surgical History:   Procedure  Laterality Date     CHOLECYSTECTOMY  12/2019     COLONOSCOPY N/A 12/16/2015    Procedure: COLONOSCOPY;  Surgeon: Duane, William Charles, MD;  Location: [facility]     COLONOSCOPY N/A 08/11/2022    Procedure: COLONOSCOPY, WITH POLYPECTOMY AND BIOPSY;  Surgeon: Aurora Paez DO;  Location: [facility]     COLONOSCOPY WITH CO2 INSUFFLATION N/A 12/16/2015    Procedure: COLONOSCOPY WITH CO2 INSUFFLATION;  Surgeon: Duane, William Charles, MD;  Location: [facility]     COLONOSCOPY WITH CO2 INSUFFLATION N/A 08/11/2022    Procedure: COLONOSCOPY, WITH CO2 INSUFFLATION;  Surgeon: Aurora Paez DO;  Location: [facility]     COMBINED ESOPHAGOSCOPY, GASTROSCOPY, DUODENOSCOPY (EGD) WITH CO2 INSUFFLATION N/A 08/11/2022    Procedure: ESOPHAGOGASTRODUODENOSCOPY, WITH CO2 INSUFFLATION;  Surgeon: Aurora Paez DO;  Location: [facility]     ESOPHAGOSCOPY, GASTROSCOPY, DUODENOSCOPY (EGD), COMBINED N/A 08/11/2022    Procedure: ESOPHAGOGASTRODUODENOSCOPY, WITH BIOPSY;  Surgeon: Aurora Paez DO;  Location: [facility]     Family History   Problem Relation Age of Onset     Hyperlipidemia Mother      Thyroid Disease Mother      Kidney Disease Mother      Hyperlipidemia Father      Substance Abuse Sister      Substance Abuse Sister      Depression Brother      No Known Problems Maternal Grandmother      Myocardial Infarction Maternal Grandfather      No Known Problems Paternal Grandmother      No Known Problems Paternal Grandfather      Breast Cancer Cousin      C.A.D. No family hx of      Diabetes No family hx of      Breast Cancer No family hx of      Cancer - colorectal No family hx of      Anesthesia Reaction No family hx of      Blood Disease No family hx of      Social History     Socioeconomic History     Marital status:      Spouse name: Not on file     Number of children: 0     Years of education: 16     Highest education level: Not on file   Occupational History     Occupation:      Comment: [employer]   Tobacco Use      Smoking status: Never Smoker     Smokeless tobacco: Never Used   Substance and Sexual Activity     Alcohol use: Yes     Comment: couple times a year     Drug use: No     Sexual activity: Yes     Partners: Male     Birth control/protection: Pill   Other Topics Concern     Parent/sibling w/ CABG, MI or angioplasty before 65F 55M? No   Social History Narrative    Parents declaring bankruptcy; losing house due to mother's credit card debt.        Brother deployed to [place] for 16 months startin 4/1/09        Parents  but ''.     Social Determinants of Health     Financial Resource Strain: Not on file   Food Insecurity: Not on file   Transportation Needs: Not on file   Physical Activity: Not on file   Stress: Not on file   Social Connections: Not on file   Intimate Partner Violence: Not on file   Housing Stability: Not on file       ROS:   CONSTITUTIONAL:  No fevers or chills  EYES: negative for icterus  ENT:  negative for hearing loss, tinnitus and sore throat  RESPIRATORY:  negative for cough, sputum, dyspnea  CARDIOVASCULAR:  negative for chest pain  GASTROINTESTINAL:  negative for nausea, vomiting, diarrhea or constipation  GENITOURINARY:  negative for incontinence, dysuria, bladder emptying problems  HEME:  No easy bruising  INTEGUMENT:  negative for rash and pruritus  NEURO:  Negative for headache, seizure disorder    Allergies:   Allergies   Allergen Reactions     Nkda [No Known Drug Allergies]        Medications:  Prescription Medications as of 10/6/2022       Rx Number Disp Refills Start End Last Dispensed Date Next Fill Date Owning Pharmacy    buPROPion (WELLBUTRIN XL) 150 MG 24 hr tablet  90 tablet 1 5/23/2022    [id number] - [place], [place] - [address]    Sig: Take 1 tablet (150 mg) by mouth every morning Take with the bupropion  mg for total daily dose of 450 mg    Class: E-Prescribe    Route: Oral    buPROPion (WELLBUTRIN XL) 300 MG 24 hr tablet  90 tablet 1 5/23/2022     [facility] [address] [place] [address] [place] [id number]    Sig: Take 1 tablet (300 mg) by mouth every morning    Class: E-Prescribe    Route: Oral    levothyroxine (SYNTHROID/LEVOTHROID) 200 MCG tablet  90 tablet 0 2022    [facility] [address] [place] [address] [place] [id number]    Sig: Take 1 tablet (200 mcg) by mouth daily Call PCP clinic to schedule follow up appointment.    Class: E-Prescribe    Route: Oral    metoclopramide (REGLAN) 10 MG tablet  20 tablet 11 2022    [facility] [place] [id number]    Sig: Take 1 tablet (10 mg) by mouth 3 times daily as needed (nausea)    Class: E-Prescribe    Route: Oral    nitroFURantoin macrocrystal (MACRODANTIN) 100 MG capsule  30 capsule 11 10/11/2021    [facility] [address] [place] [address] [place] [id number]    Sig: Take 1 capsule (100 mg) by mouth At Bedtime    Class: E-Prescribe    Route: Oral    norethindrone-ethinyl estradiol (ORTHO-NOVUM , ,) 1-35 MG-MCG tablet  112 tablet 3 4/15/2022    [facility] [place] [facility] [address]    Sig: Continuously by skipping placebo pills    Class: E-Prescribe    omeprazole (PRILOSEC) 20 MG DR capsule  30 capsule 11 2022    [facility] [place] [address] [address]    Sig: Take 1 capsule (20 mg) by mouth daily    Class: E-Prescribe    Route: Oral    rOPINIRole (REQUIP) 0.5 MG tablet  180 tablet 1 2022    [facility] [place] [place] [address] [address]    Si.25 mg (1/2 tablet) once daily 1 to 3 hours before bedtime. Dose may be increased after 2 days to 0.5 mg once daily, and after 7 days to 1 mg once daily (2 tablets). Dose may be further increased in 0.5 mg increments every week until reaching 3 mg once daily during week 6.    Class: E-Prescribe    SUMAtriptan (IMITREX) 100 MG tablet  18 tablet 11 2022    [id number] - [place], [place] - [address]    Sig: Take 1 tablet (100 mg) by mouth at onset of headache for migraine (repeat in 2  hours if needed)    Class: E-Prescribe    Route: Oral    topiramate (TOPAMAX) 25 MG tablet  120 tablet 3 7/22/2022    [facility] #[address] [place] [address] - [facility] [address]    Sig: Take 4 tablets (100 mg) by mouth every evening    Class: E-Prescribe    Route: Oral      Clinic-Administered Medications as of 10/6/2022       Dose Frequency Start End    iohexol (OMNIPAQUE) 350 MG/ML injectable solution 4 mL (Completed) 4 mL ONCE 10/6/2022 10/6/2022    Route: Intravenous          Exam:   Temp:  [97.9  F (36.6  C)] 97.9  F (36.6  C)  Pulse:  [89-92] 92  Resp:  [18] 18  BP: (110-119)/(80-86) 117/81  SpO2:  [98 %-100 %] 98 %  Body mass index is 33.29 kg/m .  Temp:  [97.9  F (36.6  C)] 97.9  F (36.6  C)  Pulse:  [89-92] 92  Resp:  [18] 18  BP: (110-119)/(80-86) 117/81  SpO2:  [98 %-100 %] 98 %  Appearance: in no apparent distress.   Skin: normal, warm, dry  Head and Neck: Normal, no rashes or jaundice  Respiratory: normal respiratory excursions, no audible wheeze  Cardiovascular: RRR  Abdomen: soft, rounded. Nontender. No hernia or masses  Extremeties: Edema, none  Neuro: without deficit, cranial nerves intact       Diagnostics:   Recent Results (from the past 336 hour(s))   Blood Group A Subtype    Collection Time: 10/06/22  7:10 AM   Result Value Ref Range    A1 Antigen Type Negative     SPECIMEN EXPIRATION DATE 20221009235900    HCG quantitative pregnancy    Collection Time: 10/06/22  7:10 AM   Result Value Ref Range    hCG Quantitative <1 <5 mIU/mL   Protein  random urine    Collection Time: 10/06/22  7:10 AM   Result Value Ref Range    Total Protein Urine mg/dL 12.4 mg/dL    Total Protein UR MG/MG CR 0.08 0.00 - 0.20 mg/mg Cr    Creatinine Urine mg/dL 148.0 mg/dL   Albumin Random Urine Quantitative with Creat Ratio    Collection Time: 10/06/22  7:10 AM   Result Value Ref Range    Albumin Urine mg/L 21.2 mg/L    Albumin Urine mg/g Cr 14.42 0.00 - 25.00 mg/g Cr    Creatinine Urine mg/dL 147.0 mg/dL   Routine UA with  microscopic    Collection Time: 10/06/22  7:10 AM   Result Value Ref Range    Color Urine Yellow Colorless, Straw, Light Yellow, Yellow    Appearance Urine Slightly Cloudy (A) Clear    Glucose Urine Negative Negative mg/dL    Bilirubin Urine Negative Negative    Ketones Urine Negative Negative mg/dL    Specific Gravity Urine 1.015 1.003 - 1.035    Blood Urine Large (A) Negative    pH Urine 6.5 5.0 - 7.0    Protein Albumin Urine Negative Negative mg/dL    Urobilinogen Urine Normal Normal, 2.0 mg/dL    Nitrite Urine Negative Negative    Leukocyte Esterase Urine Negative Negative    RBC Urine 98 (H) <=2 /HPF    WBC Urine 1 <=5 /HPF    Squamous Epithelials Urine <1 <=1 /HPF   CBC with platelets    Collection Time: 10/06/22  7:10 AM   Result Value Ref Range    WBC Count 6.2 4.0 - 11.0 10e3/uL    RBC Count 4.13 3.80 - 5.20 10e6/uL    Hemoglobin 12.6 11.7 - 15.7 g/dL    Hematocrit 36.9 35.0 - 47.0 %    MCV 89 78 - 100 fL    MCH 30.5 26.5 - 33.0 pg    MCHC 34.1 31.5 - 36.5 g/dL    RDW 11.9 10.0 - 15.0 %    Platelet Count 326 150 - 450 10e3/uL   Partial thromboplastin time    Collection Time: 10/06/22  7:10 AM   Result Value Ref Range    aPTT 24 22 - 38 Seconds   INR    Collection Time: 10/06/22  7:10 AM   Result Value Ref Range    INR 0.93 0.85 - 1.15   Hemoglobin A1c    Collection Time: 10/06/22  7:10 AM   Result Value Ref Range    Hemoglobin A1C 5.0 <5.7 %   Phosphorus    Collection Time: 10/06/22  7:10 AM   Result Value Ref Range    Phosphorus 3.0 2.5 - 4.5 mg/dL   Uric acid    Collection Time: 10/06/22  7:10 AM   Result Value Ref Range    Uric Acid 8.3 (H) 2.4 - 5.7 mg/dL   Lipid Profile    Collection Time: 10/06/22  7:10 AM   Result Value Ref Range    Cholesterol 189 <200 mg/dL    Triglycerides 312 (H) <150 mg/dL    Direct Measure HDL 38 (L) >=50 mg/dL    LDL Cholesterol Calculated 89 <=100 mg/dL    Non HDL Cholesterol 151 (H) <130 mg/dL   Comprehensive metabolic panel    Collection Time: 10/06/22  7:10 AM   Result  Value Ref Range    Sodium 139 136 - 145 mmol/L    Potassium 3.5 3.4 - 5.3 mmol/L    Chloride 105 98 - 107 mmol/L    Carbon Dioxide (CO2) 21 (L) 22 - 29 mmol/L    Anion Gap 13 7 - 15 mmol/L    Urea Nitrogen 16.3 6.0 - 20.0 mg/dL    Creatinine 1.11 (H) 0.51 - 0.95 mg/dL    Calcium 9.4 8.6 - 10.0 mg/dL    Glucose 95 70 - 99 mg/dL    Alkaline Phosphatase 63 35 - 104 U/L    AST 18 10 - 35 U/L    ALT 26 10 - 35 U/L    Protein Total 7.4 6.4 - 8.3 g/dL    Albumin 4.4 3.5 - 5.2 g/dL    Bilirubin Total 0.3 <=1.2 mg/dL    GFR Estimate 67 >60 mL/min/1.73m2   Adult Type and Screen    Collection Time: 10/06/22  7:10 AM   Result Value Ref Range    ABO/RH(D) B POS     Antibody Screen Negative Negative    SPECIMEN EXPIRATION DATE 20221009235900    Blood Bank Chart Comment    Collection Time: 10/06/22  7:10 AM   Result Value Ref Range    Blood Bank Chart Comment BB Chart Comment     SPECIMEN EXPIRATION DATE [id number]          Again, thank you for allowing me to participate in the care of your patient.        Sincerely,        Josh Dee MD

## 2022-10-06 NOTE — PROGRESS NOTES
[facility]  Outpatient MNT: Kidney Donor Evaluation    Current BMI: 33.3 (HT 68 in,  lbs/99 kg)  Weight loss pending surgeon discretion  A BMI of 30 would = 196 lbs    8 Year Estimated Risk of T2DM  5%- BMI, low HDL, high TG     Time Spent: 15 minutes  Visit Type: Initial  Referring Physician: Luiz   Pt accompanied by: self     Nutrition Assessment  Pt reports having various GI issues, ?lactose intolerance. She has eliminated most lactose and does report feeling better. She historically would have diarrhea. However, she is struggling to find options that do not contain much lactose. She previously would make meatloaf but hasn't b/c it calls for a little bit of milk. She tried lactose free Fair life and didn't love it, but is thinking now it would be ok if in something.     She has also been working on intentional weight loss for donation. She has reduced her portion size in general. Her  likes to get take out most of the time, yet pt is trying to cook for herself more at home and find a balance. She reports he is open to eating home cooked meals, but pt's variety has decreased w/ lactose elimination, so  is wanting to get more take out. She also reports having 2 step sons and they have unhealthier snacks around, so she is trying to limit intake of these also.     Vitamins, Supplements, Pertinent Meds: none   Herbal Medicines/Supplements: none     Weight hx: intentional wt loss of 20 lbs since July    Food Security: any concerns about having enough money to buy food or access to grocery stores? No     Diet Recall  Breakfast Yoplait yogurt (cut out granola)- tolerates yogurt ok, but wants something quick/easy or otherwise will skip    Lunch [employer] (turkey); prev full, now 1/2 + fruit OR out w/ work (1x/week)   Dinner  likes take out such as [employer] (pt eats take out foods 3 nights/week w/ him), but pt is making something separate the other nights-- chow  yue hot dish (watching portion size); spaghetti   Snacks Has tried to replace snacks w/ carrots, celery, but some pretzels as well    Beverages 1 diet pop/day (prev 3-4/day), increasing water (100 oz/day), carbonated water or crystal light, Gatorade zero (a few times/week)    Alcohol None    Dining out 50% of meals      Physical Activity  Some golf  Walks dogs daily 30 min-most of the year      Labs  Recent Labs   Lab Test 10/06/22  0710 05/29/18  0757   CHOL 189 190   HDL 38* 38*   LDL 89 89   TRIG 312* 314*     FBG = 95  A1c = 5  BP = wnl     Prediction of Incident Diabetes Mellitus in Middle-aged Adults: The [place] Offspring Study  Joni Bernal MD; James B. Meigs, MD, MPH; Keely Bhandari, PhD; Ying Robledo MD, MPH; Stewart Leon MD; Sean Elaine Sr,   PhD  Pt's estimated risk for T2DM (per Table 6 above)  Pt received points for the following criteria: BMI>30, low HDL, high TG   Total points: 13  8-Year estimated risk of T2DM: 5%    Nutrition Diagnosis  No nutrition diagnosis identified at this time.    Nutrition Intervention  Nutrition education provided:  Reviewed overall healthy diet guidelines for pre and post kidney donation. Discussed maintenance of a healthy weight and Na+ intake <3000 mg/day (<2000 mg/day if HTN).  Reviewed weight loss thus far and continued weight loss for donation. Recognized the struggles with new diet restrictions and dealing with external stressors such as  not 100% on board with cooking at home, pull to dine out sometimes with kids, etc.   Consider Greek yogurt (also lower lactose), but higher in protein. Consider adding fresh fruit or chopped/nuts eri for texture. Consider low cho bread or tortilla as options for lunch time.   Reviewed emphasis on protein + veggies for dinner, with less emphasis on cho (don't make cho main focus of meal all the time, smaller portion, etc). Cook up pack of chicken and add to salad, Mexican bowls, etc. Consider  frozen/steamed veggies, roasting up a tray of veggies, etc. Consider finding a recipe with her  for 1 meal/week to involve him.     Avoid the following post op d/t unknown effects on the organs:  - Herbal, Chinese, holistic, chiropractic, natural, alternative medicines and supplements  - Detoxes and cleanses  - Weight loss pills  - Protein powders or other products with extracts or herbs (ie green tea extract)    Patient Understanding: Pt verbalized understanding of education provided.  Expected Engagement: Good  Follow-Up Plans: PRN     Nutrition Goals  Weight loss per surgeon discretion     Ashlee Umaña, RD, LD, CCTD

## 2022-10-06 NOTE — LETTER
10/6/2022       RE: Analia Jerry  [address]  [facility] [address]     Dear Colleague,    Thank you for referring your patient, Analia Jerry, to the [facility] [place] at [facility]. Please see a copy of my visit note below.    TRANSPLANT NEPHROLOGY DONOR EVALUATION    Assessment and Plan:  # Prospective Kidney Transplant Donor: major concern during donor evaluation is AVIS with elevated Cr up to 1.1 mg/dl (eGFR per CKD epi-Cr: 67 ml/min) - prior b/l Cr-0.7-0.8 two months ago, recent gastroenteritis as well as chronic diarrhea, recurrent UTIs, long standing history of microscopic hematuria and family hx of hematuria in mother -her potential kidney recipient, with biopsy proven FSGS, maternal grandfather, raising suspicion for possible genetic FSGS though no prior genetic testing in mother. Iohexol GFR- 93 ml/min (raw), 73 ml/min/1.73 m2 (std). Recommend f/up with PCP after hydration, repeating labs, monitoring renal function, proteinuria, as well as consideration for serologic w/up and kidney biopsy if no improvement in renal function. CTA canceled due to concerns about AVIS and risks for underlying kidney disease.    # Hematuria: reports that she has had reports of microscopic hematuria since high school, no proteinuria. She has discussed this with urology, who did not pursue further evaluation. Family history of FSGS in mother and multiple family members with hematuria as well. May need to consider serologic workup +_kidney biopsy if proteinuria or change in renal function.     # AVIS: reports recent food poisoning vs gastroenteritis. Reports nausea and vomiting earlier this week. Limited PO intake. This has improved in the past couple days, but AVIS and mild acidosis may be reflecting recent dehydration.     # Chronic diarrhea: recent upper and lower scope showing evidence of Juarez's esophagus.  Started on PPI.  Upcoming  hydrogen breath test.  Symptoms of diarrhea have improved since dietary elimination, however continue to happen approximately once a week.    # Headaches: Was started on topiramate late this summer and reports significant improvement in headaches.  She has Reglan and sumatriptan as needed, but is not needed to utilize these.  She does not have a history of kidney stones.  She does not take NSAIDs for headache management    # Recurrent UTIs: This has been a problem for several years.  No specific trigger that precipitates her UTIs.  Approximates 4X per year.  She has seen urology for this and had a CT urogram, urodynamic studies which was unremarkable. Started on prophylactic Macrobid daily and has not had a UTI over the past year.    # Depression: Currently on treatment with Wellbutrin. Recently tapered off of lexapro because of improvement in mood symptoms. Plan is going to be to transfer her care from psychiatry to her primary physician.    # Hypothyroidism: Continues on synthroid    # Restless leg syndrome: Continues on ropinarole    Discussed the risks of donating a kidney, including the surgical risk and the possible risks of living with one kidney.    Education about expected post-donation kidney function and how chronic kidney disease (CKD) and end stage kidney disease (ESKD) might potentially impact the donor in the future, include, but not limited to:       - On average, donors will have 25-35% permanent loss of kidney function at donation.       - Baseline risk of ESKD may slightly exceed that of members of the general population with the same demographic profile.       - Donor risks must be interpreted in light of known epidemiology of both CKD or ESKD, such as that CKD generally develops in midlife (40-50 years old) and ESKD generally develops after age 60.       - Medical evaluation of young potential donors cannot predict lifetime risk of CKD or ESKD.       - Donors may be at higher risk for CKD if  they sustain damage to the remaining kidney.       - Development of CKD and progression of ESKD may be more rapid with only 1 kidney.       - Some type of kidney replacement therapy, either kidney transplant or dialysis, is required when reaching ESKD.    Potential medical or surgical risks include, but not limited to:       - Death.       - Scars, pain, fatigue, and other consequences typical of any surgical procedure.       - Decreased kidney function.       - Abdominal or bowel symptoms, such as bloating and nausea, and developing bowel obstruction.       - Kidney failure (ESKD) and the need for a kidney transplant or dialysis for the donor.       - Impact of obesity, hypertension, or other donor-specific medical conditions on morbidity and mortality of the potential donor.    Patients overall evaluation will be discussed with the transplant team and a final recommendation on the patients' suitability to be a kidney transplant donor will be made at that time.    Consult:  Analia Jerry was seen in consultation at the request of Dr. Josh Dee for evaluation as a potential kidney transplant donor.    Reason for Visit:  Analia Jerry is a 31 year old female who presents for a kidney donor evaluation.  Patient would like to donate to mother.    Present Condition and Donor-Related Medical History:    Hx of hematuria  Headaches  Restless legs  Chronic diarrhea  [place] ts esophagus  Recurrent UTI - 4 a year - now on prophylactic macrobid without recurrance  Recent MRI showing cyst on kidney         Kidney Disease Hx:       h/o Kidney Problems: No  Family h/o Genetic Kidney Disease: No per record review: mother with biopsy proven FSGS       h/o Hypertension: No    Usual Blood Pressure: Not checked       h/o Protein in Urine: No  h/o Blood in Urine: Yes        h/o Kidney Stones: No  h/o Kidney Injury: No       h/o Recurrent UTI: Yes: on suppressive antibiotics (macrobid)  h/o Genitourinary Problems: No       h/o  Chronic NSAID Use: No         Other Medical Hx:       h/o Diabetes: No             h/o Gastrointestinal, Pancreas or Liver Problems: Yes: history of barretts       h/o Lung or Heart Problems: No       h/o Hematologic Problems: No  h/o Bleeding or Clotting Problems: No       h/o Cancer: No       h/o Infection Problems: No       h/o Gestational DM: No      h/o Preeclampsia: No         Skin Cancer Risk:       h/o more than 50 moles: No       h/o extensive sun exposure: No       h/o melanoma: No       Family h/o melanoma: Does not know:           Mental Health Assessment:       h/o Depression: Yes: well managed on wellbutrin       h/o Psychiatric Illness: Yes: history of PTSD       h/o Suicidal Attempt(s): self-harm, not attempt    COVID Status:  Vaccination Up To Date: Yes  H/o COVID Infection: Yes - January 2022    Review Of Systems:   A comprehensive review of systems was obtained and negative, except as noted in the HPI or PMH.    Past Medical History:   History was taken from the patient as noted below.  Past Medical History:   Diagnosis Date     Acute gallstone pancreatitis 12/28/2019     Anxiety      ASCUS of cervix with negative high risk HPV 01/26/2017    ASCUS/neg HR HPV.     Juarez's esophagus determined by biopsy 2022     Depressive disorder      Gallstones 12/28/2019    [facility]     H/O colposcopy with cervical biopsy 03/23/2017    Bx & ECC - negative     Hashimoto's disease      Headache(784.0)      Hypothyroidism due to Hashimoto's thyroiditis      Papanicolaou smear of cervix with atypical squamous cells of undetermined significance (ASC-US) 12/03/2015       Past Social History:   Past Surgical History:   Procedure Laterality Date     COLONOSCOPY N/A 12/16/2015    Procedure: COLONOSCOPY;  Surgeon: Duane, William Charles, MD;  Location:  OR     COLONOSCOPY N/A 8/11/2022    Procedure: COLONOSCOPY, WITH POLYPECTOMY AND BIOPSY;  Surgeon: Aurora Paez DO;  Location:  OR      COLONOSCOPY WITH CO2 INSUFFLATION N/A 12/16/2015    Procedure: COLONOSCOPY WITH CO2 INSUFFLATION;  Surgeon: Duane, William Charles, MD;  Location: [facility] OR     COLONOSCOPY WITH CO2 INSUFFLATION N/A 8/11/2022    Procedure: COLONOSCOPY, WITH CO2 INSUFFLATION;  Surgeon: Aurora Paez DO;  Location: [facility] OR     COMBINED ESOPHAGOSCOPY, GASTROSCOPY, DUODENOSCOPY (EGD) WITH CO2 INSUFFLATION N/A 8/11/2022    Procedure: ESOPHAGOGASTRODUODENOSCOPY, WITH CO2 INSUFFLATION;  Surgeon: Aurora Paez DO;  Location: [facility] OR     ESOPHAGOSCOPY, GASTROSCOPY, DUODENOSCOPY (EGD), COMBINED N/A 8/11/2022    Procedure: ESOPHAGOGASTRODUODENOSCOPY, WITH BIOPSY;  Surgeon: Aurora Paez DO;  Location: [facility] OR     Personal or family history of anesthesia problems: No    Family History:   Family History   Problem Relation Age of Onset     Hyperlipidemia Mother      Thyroid Disease Mother      Kidney Disease Mother      Hyperlipidemia Father      Substance Abuse Sister      Substance Abuse Sister      Depression Brother      Myocardial Infarction Maternal Grandfather      No Known Problems Paternal Grandmother      No Known Problems Paternal Grandfather      Breast Cancer Cousin      C.A.D. No family hx of      Diabetes No family hx of      Breast Cancer No family hx of      Cancer - colorectal No family hx of      Anesthesia Reaction No family hx of      Blood Disease No family hx of           Specific Family History in First Degree Relatives:       FH of Kidney Dz: Yes - mother FH of Diabetes: No       FH of Hypertension: Yes   FH of CAD: No       FH of Cancer: No  FH of Kidney Cancer: No    Personal History:   Social History     Socioeconomic History     Marital status:      Spouse name: Not on file     Number of children: 0     Years of education: 16     Highest education level: Not on file   Occupational History     Occupation:      Comment: [employer]   Tobacco Use     Smoking status: Never Smoker     Smokeless  tobacco: Never Used   Substance and Sexual Activity     Alcohol use: Yes     Comment: couple times a year     Drug use: No     Sexual activity: Yes     Partners: Male     Birth control/protection: Pill   Other Topics Concern     Parent/sibling w/ CABG, MI or angioplasty before 65F 55M? No   Social History Narrative    Parents declaring bankruptcy; losing house due to mother's credit card debt.        Brother deployed to [place] for 16 months startin 4/1/09        Parents  but ''.     Social Determinants of Health     Financial Resource Strain: Not on file   Food Insecurity: Not on file   Transportation Needs: Not on file   Physical Activity: Not on file   Stress: Not on file   Social Connections: Not on file   Intimate Partner Violence: Not on file   Housing Stability: Not on file          Specific Social History:       Health Insurance Status: Yes       Employment Status: Full time  Occupation:                       Living Arrangements: lives with their spouse       Social Support: Yes -        Presence of increased risk for disease transmission behaviors as defined by PHS guidelines: No        Allergies:  Allergies   Allergen Reactions     Nkda [No Known Drug Allergies]        Medications:  Current Outpatient Medications   Medication Sig     buPROPion (WELLBUTRIN XL) 150 MG 24 hr tablet Take 1 tablet (150 mg) by mouth every morning Take with the bupropion  mg for total daily dose of 450 mg     buPROPion (WELLBUTRIN XL) 300 MG 24 hr tablet Take 1 tablet (300 mg) by mouth every morning     levothyroxine (SYNTHROID/LEVOTHROID) 200 MCG tablet Take 1 tablet (200 mcg) by mouth daily Call PCP clinic to schedule follow up appointment.     metoclopramide (REGLAN) 10 MG tablet Take 1 tablet (10 mg) by mouth 3 times daily as needed (nausea)     nitroFURantoin macrocrystal (MACRODANTIN) 100 MG capsule Take 1 capsule (100 mg) by mouth At Bedtime     norethindrone-ethinyl estradiol  "([facility] InstituteNOVUM 1/35, 28,) 1-35 MG-MCG tablet Continuously by skipping placebo pills     omeprazole (PRILOSEC) 20 MG DR capsule Take 1 capsule (20 mg) by mouth daily     rOPINIRole (REQUIP) 0.5 MG tablet 0.25 mg (1/2 tablet) once daily 1 to 3 hours before bedtime. Dose may be increased after 2 days to 0.5 mg once daily, and after 7 days to 1 mg once daily (2 tablets). Dose may be further increased in 0.5 mg increments every week until reaching 3 mg once daily during week 6.     SUMAtriptan (IMITREX) 100 MG tablet Take 1 tablet (100 mg) by mouth at onset of headache for migraine (repeat in 2 hours if needed)     topiramate (TOPAMAX) 25 MG tablet Take 4 tablets (100 mg) by mouth every evening     No current facility-administered medications for this visit.     There are no discontinued medications.      Vitals:  Vital Signs 10/6/2022 10/6/2022 10/6/2022   Systolic 110 119 117   Diastolic 80 86 81   Pulse - - 92   Temperature - - -   Respirations - - -   Weight (LB) - - 218 lb 14.7 oz   Height - - 5' 7.992\"   BMI (Calculated) - - 33.29   Pain Score - - -   O2 - - -   Systolic (Patient Reported) - - -   Weight (Patient Reported) - - -   Height (Patient Reported) - - -   BMI (Based on Pt Reported Ht/Wt) - - -       Exam:   GENERAL APPEARANCE: alert and no distress  HENT: mouth without ulcers or lesions  LYMPHATICS: no cervical or supraclavicular nodes  RESP: lungs clear to auscultation - no rales, rhonchi or wheezes  CV: regular rhythm, normal rate, no murmur  EDEMA: no LE edema bilaterally  ABDOMEN: soft, nondistended, nontender  MS: extremities normal - no gross deformities noted  SKIN: no rash    Results:   Labs and imaging were ordered for this visit and reviewed by me.  Recent Results (from the past 336 hour(s))   HCG quantitative pregnancy    Collection Time: 10/06/22  7:10 AM   Result Value Ref Range    hCG Quantitative <1 <5 mIU/mL   Protein  random urine    Collection Time: 10/06/22  7:10 AM   Result Value Ref " Range    Total Protein Urine mg/dL 12.4 mg/dL    Total Protein UR MG/MG CR 0.08 0.00 - 0.20 mg/mg Cr    Creatinine Urine mg/dL 148.0 mg/dL   Routine UA with microscopic    Collection Time: 10/06/22  7:10 AM   Result Value Ref Range    Color Urine Yellow Colorless, Straw, Light Yellow, Yellow    Appearance Urine Slightly Cloudy (A) Clear    Glucose Urine Negative Negative mg/dL    Bilirubin Urine Negative Negative    Ketones Urine Negative Negative mg/dL    Specific Gravity Urine 1.015 1.003 - 1.035    Blood Urine Large (A) Negative    pH Urine 6.5 5.0 - 7.0    Protein Albumin Urine Negative Negative mg/dL    Urobilinogen Urine Normal Normal, 2.0 mg/dL    Nitrite Urine Negative Negative    Leukocyte Esterase Urine Negative Negative    RBC Urine 98 (H) <=2 /HPF    WBC Urine 1 <=5 /HPF    Squamous Epithelials Urine <1 <=1 /HPF   CBC with platelets    Collection Time: 10/06/22  7:10 AM   Result Value Ref Range    WBC Count 6.2 4.0 - 11.0 10e3/uL    RBC Count 4.13 3.80 - 5.20 10e6/uL    Hemoglobin 12.6 11.7 - 15.7 g/dL    Hematocrit 36.9 35.0 - 47.0 %    MCV 89 78 - 100 fL    MCH 30.5 26.5 - 33.0 pg    MCHC 34.1 31.5 - 36.5 g/dL    RDW 11.9 10.0 - 15.0 %    Platelet Count 326 150 - 450 10e3/uL   Partial thromboplastin time    Collection Time: 10/06/22  7:10 AM   Result Value Ref Range    aPTT 24 22 - 38 Seconds   INR    Collection Time: 10/06/22  7:10 AM   Result Value Ref Range    INR 0.93 0.85 - 1.15   Phosphorus    Collection Time: 10/06/22  7:10 AM   Result Value Ref Range    Phosphorus 3.0 2.5 - 4.5 mg/dL   Comprehensive metabolic panel    Collection Time: 10/06/22  7:10 AM   Result Value Ref Range    Sodium 139 136 - 145 mmol/L    Potassium 3.5 3.4 - 5.3 mmol/L    Chloride 105 98 - 107 mmol/L    Carbon Dioxide (CO2) 21 (L) 22 - 29 mmol/L    Anion Gap 13 7 - 15 mmol/L    Urea Nitrogen 16.3 6.0 - 20.0 mg/dL    Creatinine 1.11 (H) 0.51 - 0.95 mg/dL    Calcium 9.4 8.6 - 10.0 mg/dL    Glucose 95 70 - 99 mg/dL     Alkaline Phosphatase 63 35 - 104 U/L    AST 18 10 - 35 U/L    ALT 26 10 - 35 U/L    Protein Total 7.4 6.4 - 8.3 g/dL    Albumin 4.4 3.5 - 5.2 g/dL    Bilirubin Total 0.3 <=1.2 mg/dL    GFR Estimate 67 >60 mL/min/1.73m2     This patient has been seen and evaluated by me, Mir Durbin MD.  I have reviewed the note and agree with plan of care as documented by the fellow.      Again, thank you for allowing me to participate in the care of your patient.      Sincerely,    Mir Durbin MD

## 2022-10-06 NOTE — PROGRESS NOTES
Living Kidney Donor Consent per OPTN Policy 14.2 for Independent Living Donor Advocate (LEO)    Organ Type: related, kidney  Presenting Information:  Ms. Analia Jerry presents to [facility] to complete a living donor evaluation since she is interested in becoming a kidney donor for her mother.      Written assurance has been obtained from the potential donor that he/she:   Is willing to donate  Is free from inducement and coercion  Has been informed that the he/she may decline to donate at any time  Has been informed that transplant centers must:   A) Offer donors an opportunity to discontinue the donor consent or evaluation process in a way that is protected and confidential  B) Provide an independent living donor advocate (LEO) to assist the potential donor during this process    The following was presented to the potential donor in a language in which the potential donor is able to engage in meaningful dialogue:   Education and instruction about all phases of the living donation process including:   Consent  Medical and psychosocial evaluation  Information about the surgical procedure  Pre and post operative care  Benefits of post operative follow up  Disclosure that the recovery hospital will take all reasonable precautions to provide confidentiality for the donor/recipient  Disclosure that it is a federal crime for any person to knowingly acquire, obtain or otherwise transfer any human organ for valuable consideration  Disclosure that the [facility] hospital must provide an independent living donor advocate (LEO)  Disclosure that health information obtained during the evaluation is subject to the same regulations as all records and could reveal conditions that must be reported to local, state, or federal public health authorities  Disclosure that the [facility] hospital is required to report living donor follow up information at 6  months, 1 year, and 2 years, and that the potential donor must commit to post operative follow up testing coordinated by the [facility]    Disclosure has been provided that these risks may be transient or permanent & include but are not limited to:  Potential psychosocial risks:  Problems with body image  Post-surgery depression or anxiety  Feelings of emotional distress or bereavement if recipient experiences any recurrent disease or in the event of the recipient s death  Impact of donation on the donor s lifestyle, such as limited ability to exercise in the short term post operative recovery period, no driving for the first 2 weeks post op or until the donor is no longer needing pain medications that impair the ability to drive.      Potential financial impacts:  Personal expenses of travel, housing, , lost wages related to donation might not be reimbursed. However, resources may be available to defray some donation-related expenses.   Need for life-long follow up at the donor s expense  Loss of employment or income  Negative impact on the ability to obtain future employment  Negative impact on the ability to obtain, maintain, or afford health, disability, and life insurance  Future health problems experienced by living donors following donation may not be covered by the recipient s insurance      PREPARATION FOR DONATION, RECOVERY, AND POTENTIAL SHORT-LONG-TERM OUTCOMES:  Understanding of the Living Donation Process:  We discussed the role of Independent Living Donor Advocate.  Short and long term medical and psychosocial risks to both, donor and recipient were reviewed and she is able to teach back to me these risks.  Post surgical restrictions (2 weeks no driving, 6 weeks no lifting over 10 lbs) were reviewed and she appears capable of adhering to the post surgical requirements. The need for a caregiver was discussed and she has family who will care for her post surgery .  The risk of poor  psychosocial outcome including problems with body image, post-surgery depression or anxiety, or feelings of emotional distress or bereavement if recipient experiences any recurrent disease, poor outcome or death was reviewed.  Additionally, potential financial implications, including the risk of having difficulty obtaining health care insurance, life insurance, disability insurance, or long term care insurance were reviewed, as were available donor grants to assist with donor related expenses.      IMPRESSIONS/RECOMMENDATIONS:  Ms. Analia Jerry appears highly motivated to donate a kidney to her mother.  Analia appears capable of understanding this information and making an informed medical decision.  As LEO, no concerns were identified today.  Analia has my contact information and is aware that I am available thoughout the donation process.    Unfortunately, Analia may not be able to be a donor due to several medical issues that were discussed with me at the time of evaluation by our nephrologist and surgeon.  The decision to hold the CT was made by them today.  Analia is understandably emotional about this news.  I addressed this with her and she understands that no final decision will be made until she is presented to the living kidney donor selection committee on 10/12/22.    Contact Information:  JAMES Liu, [facility]  Independent Living Donor Advocate  [facility], [facility]  Pager:  [phone]  Direct:  [phone] Cell Phone  E-Mail:  [email or web address]      Time Spent: 35 minutes

## 2022-10-06 NOTE — LETTER
10/6/2022         RE: Analia Jerry  [address]  [facility] [place] [address]        Dear Colleague,    Thank you for referring your patient, Analia Jerry, to the [facility]. Please see a copy of my visit note below.    Donor Iohexol test    Analia Jerry presents today to [facility] for a Donor Iohexol test.      Progress note:  ID verified by name and .     The following information was verified with the patient:  Female Patients is there any possibility of being pregnant No  Is there a history of allergy (skin rash, swelling, ect) to:   A.  Iodine (except skin reactions to betadine): No   B. Intravenous radio-contrast agents: No   C. Seafood No     present during visit today: Not Applicable.    R.N. provided patient with educational handout regarding timed test. Yes    24 gauge placed in L AC and Iohexol administered over 2 minutes.  Positive blood return verified before and after injection. PIV removed.  20 gauge PIV placed in R arm by second floor lab staff  for blood draws and CT this afternoon.    Medication administered:  Iohexol (Omnipaque 300mg iodine/ml concentration) 5 mls.    Start time: 724  Stop time: 726    Drug Waste Record      Administrations This Visit     iohexol (OMNIPAQUE) 350 MG/ML injectable solution 4 mL     Admin Date  10/06/2022 Action  Given Dose  4 mL Route  Intravenous Administered By  Emperatriz Lozoya RN                    Evaluation nurse in transplant to draw labs at 2 and 4 hours post iohexol administration.  Patient given a slip with the times to get labs drawn and verbalized understanding of the plan.    Patient tolerated the procedure:  Yes    After the infusion patient was discharged to the next appointment.    Emperatriz Lozoya RN          Sincerely,    [facility]

## 2022-10-07 LAB
EBV VCA IGM SER IA-ACNC: <10 U/ML
EBV VCA IGM SER IA-ACNC: NORMAL
GAMMA INTERFERON BACKGROUND BLD IA-ACNC: 0.03 IU/ML
HBV SURFACE AB SERPL IA-ACNC: 9.89 M[IU]/ML
HBV SURFACE AB SERPL IA-ACNC: NORMAL M[IU]/ML
M TB IFN-G BLD-IMP: NEGATIVE
M TB IFN-G CD4+ BCKGRND COR BLD-ACNC: 9.97 IU/ML
MITOGEN IGNF BCKGRD COR BLD-ACNC: -0.01 IU/ML
MITOGEN IGNF BCKGRD COR BLD-ACNC: 0 IU/ML
QUANTIFERON MITOGEN: 10 IU/ML
QUANTIFERON NIL TUBE: 0.03 IU/ML
QUANTIFERON TB1 TUBE: 0.02 IU/ML
QUANTIFERON TB2 TUBE: 0.03

## 2022-10-11 LAB
BSA: 2.22 M2
IOHEXOL CL UR+SERPL-VRATE: 4.08 MG/DL
IOHEXOL CL UR+SERPL-VRATE: 7.99 MG/DL
IOHEXOL CL UR+SERPL-VRATE: 73 /1.73 M2
IOHEXOL CL UR+SERPL-VRATE: 93 ML/MIN

## 2022-10-12 ENCOUNTER — COMMITTEE REVIEW (OUTPATIENT)
Dept: TRANSPLANT | Facility: CLINIC | Age: 32
End: 2022-10-12

## 2022-10-12 LAB
WNV IGG SER IA-ACNC: 0.32 IV
WNV IGM SER IA-ACNC: 0 IV

## 2022-10-12 NOTE — COMMITTEE REVIEW
Living Donor Committee Review Note Evaluation Date: 10/6/2022  Committee Review Date: 10/12/2022    Donor being evaluated for: Kidney    Transplant Phase: Evaluation  Transplant Status: Active    Transplant Coordinator: Emperatriz Hartman  Transplant Surgeon:       Committee Review Members:  Immunology Irish Rosen   Independent Living Donor Advocate Tawana Painting, [facility]   Nephrology Josh Lopez MD, Mir Durbin MD   Nutrition Ashlee Umaña, CLEO   Pharmacist Mavis Castro, [facility]    - Clinical Luzma Bhardwaj, [facility]   Transplant Shwetha Kina Mathews, RN, Elena Robledo, RN, Ernesto Diop, RN, Shanique Rick LPN, Diana Ro, MAREK, Barbara Claudio, MAREK, Emperatriz Ortiz, RN   Transplant Surgery Josh Dee MD       Transplant Eligibility: Acceptable Mental Health, Acceptable Physical Health    Committee Review Decision: Declined    Relative Contraindications: None    Absolute Contraindications: None    Committee Chair Josh Dee MD verbally attested to the committee's decision.    Committee Discussion Details:   1. Needs to follow up with PCP after hydration and repeat urine, labs, possible serologic workup and kidney biopsy is renal function is still lower.  2. Too risky with all of her risk factors and mothers kidney failure due to FSGS.

## 2022-10-13 LAB
A*: NORMAL
A*LOCUS SEROLOGIC EQUIVALENT: 1
A*LOCUS: NORMAL
A*SEROLOGIC EQUIVALENT: 2
ABTEST METHOD: NORMAL
B*: NORMAL
B*LOCUS SEROLOGIC EQUIVALENT: 60
B*LOCUS: NORMAL
B*SEROLOGIC EQUIVALENT: 48
BW-1: NORMAL
C*: NORMAL
C*LOCUS SEROLOGIC EQUIVALENT: 10
C*LOCUS: NORMAL
C*SEROLOGIC EQUIVALENT: 8
DPA1*: NORMAL
DPB1*: NORMAL
DPB1*LOCUS: NORMAL
DQA1*: NORMAL
DQA1*LOCUS: NORMAL
DQB1*: NORMAL
DQB1*LOCUS SEROLOGIC EQUIVALENT: 2
DQB1*LOCUS: NORMAL
DQB1*SEROLOGIC EQUIVALENT: 6
DRB1*: NORMAL
DRB1*LOCUS SEROLOGIC EQUIVALENT: 7
DRB1*LOCUS: NORMAL
DRB1*SEROLOGIC EQUIVALENT: 13
DRB3*LOCUS SEROLOGIC EQUIVALENT: 52
DRB3*LOCUS: NORMAL
DRB4*: NORMAL
DRB4*SEROLOGIC EQUIVALENT: 53
DRSSO TEST METHOD: NORMAL

## 2022-10-19 ENCOUNTER — MYC MEDICAL ADVICE (OUTPATIENT)
Dept: GASTROENTEROLOGY | Facility: CLINIC | Age: 32
End: 2022-10-19

## 2022-10-21 ENCOUNTER — MYC MEDICAL ADVICE (OUTPATIENT)
Dept: FAMILY MEDICINE | Facility: CLINIC | Age: 32
End: 2022-10-21

## 2022-10-21 DIAGNOSIS — N39.0 RECURRENT UTI: ICD-10-CM

## 2022-10-21 DIAGNOSIS — G25.81 RESTLESS LEGS SYNDROME (RLS): ICD-10-CM

## 2022-10-21 DIAGNOSIS — E06.3 HYPOTHYROIDISM DUE TO HASHIMOTO'S THYROIDITIS: ICD-10-CM

## 2022-10-25 RX ORDER — NITROFURANTOIN MACROCRYSTAL 100 MG
100 CAPSULE ORAL AT BEDTIME
Qty: 90 CAPSULE | Refills: 3 | Status: SHIPPED | OUTPATIENT
Start: 2022-10-25 | End: 2023-07-24

## 2022-10-25 RX ORDER — ROPINIROLE 0.5 MG/1
TABLET, FILM COATED ORAL
Qty: 180 TABLET | Refills: 1 | Status: SHIPPED | OUTPATIENT
Start: 2022-10-25 | End: 2023-03-07

## 2022-10-25 RX ORDER — LEVOTHYROXINE SODIUM 200 UG/1
200 TABLET ORAL DAILY
Qty: 90 TABLET | Refills: 1 | Status: SHIPPED | OUTPATIENT
Start: 2022-10-25 | End: 2023-05-01

## 2022-10-25 NOTE — TELEPHONE ENCOUNTER
See MyChart encounter. Routed to provider to review and advise.       Med pended with patient's current stated dose, please review prior to signing.        KARUNA GarciaN, RN  [facility]

## 2022-10-28 ENCOUNTER — OFFICE VISIT (OUTPATIENT)
Dept: GASTROENTEROLOGY | Facility: CLINIC | Age: 32
End: 2022-10-28
Payer: COMMERCIAL

## 2022-10-28 VITALS
TEMPERATURE: 99.1 F | RESPIRATION RATE: 16 BRPM | HEART RATE: 111 BPM | WEIGHT: 208 LBS | HEIGHT: 68 IN | OXYGEN SATURATION: 99 % | BODY MASS INDEX: 31.52 KG/M2 | SYSTOLIC BLOOD PRESSURE: 114 MMHG | DIASTOLIC BLOOD PRESSURE: 82 MMHG

## 2022-10-28 DIAGNOSIS — R19.7 DIARRHEA, UNSPECIFIED TYPE: ICD-10-CM

## 2022-10-28 PROCEDURE — 91065 BREATH HYDROGEN/METHANE TEST: CPT

## 2022-10-28 ASSESSMENT — PAIN SCALES - GENERAL: PAINLEVEL: NO PAIN (0)

## 2022-10-28 NOTE — PROGRESS NOTES
"Non-Invasive GI Procedure Visit    Analia Jerry is a 31 year old female with history of Diarrhea, unspecified type.   Patient stated reason for procedure: Diarrhea  COVID-19 Test Performed within 48-72hrs of the procedure:  Yes. COVID-19 result was NEGATIVE.    COVID-19 PCR Results    COVID-19 PCR Results   No data to display.         COVID-19 Antibody Results, Testing for Immunity    COVID-19 Antibody Results, Testing for Immunity   No data to display.             Pre-Procedure Assessment  Patient presents to clinic today for Hydrogen Breath Test    Referring Provider: Dr Charles Nix    Previous HBT: No  Is patient a smoker: No    Does patient report having a colonoscopy, barium study, barium enema or taking antibiotics within the last 2 weeks? Yes / No: No.  Does patient report taking a stool softener, fiber supplement, laxative or anti-diarrheal in the last 3 - 4 days? Yes / No: No.  Does the patient report taking a probiotic in the last 1 - 2 days? Yes / No: No.  Does the patient report taking any medications today? no    Does the patient report following the pre-procedure bland diet? Yes  Does patient report being NPO for a minimum of 12 hours before the test? Yes.     Patient reported symptoms:Diarrhea  How long has patient had these symptoms? Over a year        .    Patient Hx  Patient's history, medications and allergies were reviewed.     Height: 5' 8\"   Weight: 208 lbs 0 oz    Patient Active Problem List    Diagnosis Date Noted     Transplant donor evaluation 09/14/2022     Priority: Medium     Recurrent major depression in partial remission (H) 07/25/2022     Priority: Medium     Posttraumatic stress disorder 03/15/2022     Priority: Medium     Fatigue, unspecified type 04/05/2021     Priority: Medium     Moderate episode of recurrent major depressive disorder (H) 03/04/2021     Priority: Medium     Morbid obesity (H) 10/28/2020     Priority: Medium     FRANK (obstructive sleep apnea) - mild (AHI 7) " 08/24/2020     Priority: Medium     8/10/2020 [facility] Sleep Study (216.0 lbs) - scored with 3% rules -  AHI 7.1, RDI 7.1, Supine AHI 22.9, REM AHI -, Low O2 91.2%, Time Spent ?88% 0 minutes / Time Spent ?89% 0 minutes.       Class 1 obesity due to excess calories with body mass index (BMI) of 32.0 to 32.9 in adult, unspecified whether serious comorbidity present 07/02/2020     Priority: Medium     Elevated liver enzymes 12/28/2019     Priority: Medium     High triglycerides 04/03/2018     Priority: Medium     ASCUS of cervix with negative high risk HPV 01/26/2017     Priority: Medium     4/1/11 (approx) normal - patient reported.   6/11/13 normal - patient reported  12/3/15 ASCUS pap. Plan: per provider, repeat pap in 1 year.  1/26/17 ASCUS/neg HR HPV. Plan: colp bef 4/26/17  3/23/17 [place] Bx & ECC - negative. Plan cotest in 1 year.   3/8/18 ASCUS pap, neg HPV. Plan: colp.   5/10/18 [place] Bx and ECC: negative. Plan: cotest in 1 yr.   6/6/19 NIL/neg HR HPV. Plan cotest in 3 years  4/15/22 NIL pap, neg HPV. 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        Prior to Admission medications    Medication Sig Start Date End Date Taking? Authorizing Provider   buPROPion (WELLBUTRIN XL) 150 MG 24 hr tablet Take 1 tablet (150 mg) by mouth every morning Take with the bupropion  mg for total daily dose of 450 mg 5/23/22   Belia Babb MD   buPROPion (WELLBUTRIN XL) 300 MG 24 hr tablet Take 1 tablet (300 mg) by mouth every morning 5/23/22   Belia Babb MD   levothyroxine (SYNTHROID/LEVOTHROID) 200 MCG tablet Take 1 tablet  (200 mcg) by mouth daily 10/25/22   Kaylene Collier APRN CNP   metoclopramide (REGLAN) 10 MG tablet Take 1 tablet (10 mg) by mouth 3 times daily as needed (nausea) 7/22/22   Karissa Gonzalez MD   nitroFURantoin macrocrystal (MACRODANTIN) 100 MG capsule Take 1 capsule (100 mg) by mouth At Bedtime 10/25/22   Kaylene Collier APRN CNP   norethindrone-ethinyl estradiol (ORTHO-NOVUM 1/35, 28,) 1-35 MG-MCG tablet Continuously by skipping placebo pills 4/15/22   Zuleyma Gross APRN CNP   omeprazole (PRILOSEC) 20 MG DR capsule Take 1 capsule (20 mg) by mouth daily 8/17/22   Aurora Paez DO   rOPINIRole (REQUIP) 0.5 MG tablet Take 3 tablets (total of 1.5 mg) daily 1 to 3 hours before bedtime 10/25/22   Kaylene Collier APRN CNP   SUMAtriptan (IMITREX) 100 MG tablet Take 1 tablet (100 mg) by mouth at onset of headache for migraine (repeat in 2 hours if needed) 7/22/22   Karissa Gonzalez MD   topiramate (TOPAMAX) 25 MG tablet Take 4 tablets (100 mg) by mouth every evening 7/22/22   Karissa Gonzalez MD     Allergies   Allergen Reactions     Nkda [No Known Drug Allergies]      Past Medical History:   Diagnosis Date     Acute gallstone pancreatitis 12/28/2019     Anxiety      ASCUS of cervix with negative high risk HPV 01/26/2017    ASCUS/neg HR HPV.     Juarez's esophagus determined by biopsy 2022     Depressive disorder      Gallstones 12/28/2019    [facility]     H/O colposcopy with cervical biopsy 03/23/2017    Bx & ECC - negative     Hashimoto's disease      Headache(784.0)      Hypothyroidism due to Hashimoto's thyroiditis      Papanicolaou smear of cervix with atypical squamous cells of undetermined significance (ASC-US) 12/03/2015     Past Surgical History:   Procedure Laterality Date     CHOLECYSTECTOMY  12/2019     COLONOSCOPY N/A 12/16/2015    Procedure: COLONOSCOPY;  Surgeon: Duane, William Charles, MD;  Location: [facility] OR     COLONOSCOPY N/A 08/11/2022     Procedure: COLONOSCOPY, WITH POLYPECTOMY AND BIOPSY;  Surgeon: Aurora Paez DO;  Location: [facility]     COLONOSCOPY WITH CO2 INSUFFLATION N/A 12/16/2015    Procedure: COLONOSCOPY WITH CO2 INSUFFLATION;  Surgeon: Duane, William Charles, MD;  Location: [facility]     COLONOSCOPY WITH CO2 INSUFFLATION N/A 08/11/2022    Procedure: COLONOSCOPY, WITH CO2 INSUFFLATION;  Surgeon: Aurora Paez DO;  Location: [facility]     COMBINED ESOPHAGOSCOPY, GASTROSCOPY, DUODENOSCOPY (EGD) WITH CO2 INSUFFLATION N/A 08/11/2022    Procedure: ESOPHAGOGASTRODUODENOSCOPY, WITH CO2 INSUFFLATION;  Surgeon: Aurora Paez DO;  Location: [facility]     ESOPHAGOSCOPY, GASTROSCOPY, DUODENOSCOPY (EGD), COMBINED N/A 08/11/2022    Procedure: ESOPHAGOGASTRODUODENOSCOPY, WITH BIOPSY;  Surgeon: Aurora Paez DO;  Location: [facility] OR     Family History   Problem Relation Age of Onset     Hyperlipidemia Mother      Thyroid Disease Mother      Kidney Disease Mother      Hyperlipidemia Father      Substance Abuse Sister      Substance Abuse Sister      Depression Brother      No Known Problems Maternal Grandmother      Myocardial Infarction Maternal Grandfather      No Known Problems Paternal Grandmother      No Known Problems Paternal Grandfather      Breast Cancer Cousin      C.A.D. No family hx of      Diabetes No family hx of      Breast Cancer No family hx of      Cancer - colorectal No family hx of      Anesthesia Reaction No family hx of      Blood Disease No family hx of      Social History     Tobacco Use     Smoking status: Never     Smokeless tobacco: Never   Substance Use Topics     Alcohol use: Yes     Comment: couple times a year        Pre-Procedure Education & Consent  Procedure education was provided to: Patient  Teaching method: Explanation  Barriers to learning: No Barrier    Patient indicated understanding of pre-procedure instruction and appropriate consent was obtained and  documented.    ____________________________________________________________________    Post-Procedure Documentation: Hydrogen Breath Test     Baseline breath obtained prior to patient drinking D-xylose.     Patient tolerated test with diarrhea.    HBT Results    Sample Clock Times Ppm H2 Ppm CH4 CO2% Comments   Baseline 1048 0 2 3.0     Challenge Dose Given 1050 - - - -   #1 1110 0 2 3.1     #2 1130 0 2 3.1     #3 1150 2 2 2.7     #4 1210 1 3 3.7     #5 1230 2 2 3.0 Diarrhea   #6 1250 2 2 3.0     #7 1310 2 2 3.1     #8 1330 2 2 2.8     #9 1350 8 4 2.7       #  B   Patient given discharge instructions.    Notification of pending test results sent to provider for interpretation. Please reference scanned document for final interpretation of results. Patient will follow up with referring provider for test results.    Hilda Mclaughlin RN on 10/28/2022 at 10:40 AM

## 2022-10-28 NOTE — PATIENT INSTRUCTIONS
Hydrogen Breath Test  1. You may experience diarrhea for the next 12-24 hours.  2. Resume a regular diet.  3. Follow-up with your referring doctor in clinic.  4. If you have questions call [phone] from 7:00am-5:00pm.

## 2022-11-07 ENCOUNTER — E-VISIT (OUTPATIENT)
Dept: FAMILY MEDICINE | Facility: CLINIC | Age: 32
End: 2022-11-07
Payer: COMMERCIAL

## 2022-11-07 DIAGNOSIS — F41.8 DEPRESSION WITH ANXIETY: Primary | ICD-10-CM

## 2022-11-07 PROCEDURE — 99421 OL DIG E/M SVC 5-10 MIN: CPT | Performed by: NURSE PRACTITIONER

## 2022-11-07 ASSESSMENT — ANXIETY QUESTIONNAIRES
8. IF YOU CHECKED OFF ANY PROBLEMS, HOW DIFFICULT HAVE THESE MADE IT FOR YOU TO DO YOUR WORK, TAKE CARE OF THINGS AT HOME, OR GET ALONG WITH OTHER PEOPLE?: VERY DIFFICULT
7. FEELING AFRAID AS IF SOMETHING AWFUL MIGHT HAPPEN: NOT AT ALL
GAD7 TOTAL SCORE: 6
5. BEING SO RESTLESS THAT IT IS HARD TO SIT STILL: NOT AT ALL
4. TROUBLE RELAXING: SEVERAL DAYS
2. NOT BEING ABLE TO STOP OR CONTROL WORRYING: NOT AT ALL
3. WORRYING TOO MUCH ABOUT DIFFERENT THINGS: NOT AT ALL
GAD7 TOTAL SCORE: 6
1. FEELING NERVOUS, ANXIOUS, OR ON EDGE: MORE THAN HALF THE DAYS
6. BECOMING EASILY ANNOYED OR IRRITABLE: NEARLY EVERY DAY
7. FEELING AFRAID AS IF SOMETHING AWFUL MIGHT HAPPEN: NOT AT ALL
GAD7 TOTAL SCORE: 6

## 2022-11-07 ASSESSMENT — PATIENT HEALTH QUESTIONNAIRE - PHQ9
10. IF YOU CHECKED OFF ANY PROBLEMS, HOW DIFFICULT HAVE THESE PROBLEMS MADE IT FOR YOU TO DO YOUR WORK, TAKE CARE OF THINGS AT HOME, OR GET ALONG WITH OTHER PEOPLE: VERY DIFFICULT
SUM OF ALL RESPONSES TO PHQ QUESTIONS 1-9: 6
SUM OF ALL RESPONSES TO PHQ QUESTIONS 1-9: 6

## 2022-11-08 RX ORDER — SERTRALINE HYDROCHLORIDE 25 MG/1
25 TABLET, FILM COATED ORAL DAILY
Qty: 30 TABLET | Refills: 1 | Status: SHIPPED | OUTPATIENT
Start: 2022-11-08 | End: 2022-12-13

## 2022-11-08 ASSESSMENT — ANXIETY QUESTIONNAIRES: GAD7 TOTAL SCORE: 6

## 2022-11-08 ASSESSMENT — PATIENT HEALTH QUESTIONNAIRE - PHQ9: SUM OF ALL RESPONSES TO PHQ QUESTIONS 1-9: 6

## 2022-11-08 NOTE — PATIENT INSTRUCTIONS
Thank you for choosing us for your care. I have placed an order for a prescription so that you can start treatment. View your full visit summary for details by clicking on the link below. Your pharmacist will able to address any questions you may have about the medication.      If you're not feeling better within 4-6 weeks please schedule an appointment.  You can schedule an appointment right here in [facility], or call [phone]  If the visit is for the same symptoms as your eVisit, we'll refund the cost of your eVisit if seen within seven days.      Using Antidepressants  Depression is a mood disorder that affects the way you think and feel. The most common symptom is a feeling of deep sadness. This feeling doesn't go away or get better on its own. But most types of depression can be helped with therapy and antidepressant medicines. (Note: This covers antidepressant use in adults only.)     What do antidepressants do?  Antidepressants restore the balance of certain chemicals in your brain to help ease your depression. You will likely feel better in 4 to 6 weeks. But you may continue taking antidepressants for a year or more to keep your symptoms from coming back. Some people with depression need to take antidepressants for life. There are several types of antidepressants. The main types are described below.   Selective serotonin reuptake inhibitors (SSRIs)  SSRIs are effective medicines for the treatment of depression. They tend to have fewer side effects than other antidepressants. Possible side effects include anxiety, trouble sleeping, nausea, diarrhea, sexual dysfunction, and headaches. In rare cases, they may make you more depressed. SSRIs shouldn t be mixed with certain other medicines. Talk with your healthcare provider about all the medicines, herbs, and supplements you are taking.   Tricyclic antidepressants  Tricyclics help severe or long-term depression. They have been used for many years with good  results. Possible side effects include blurred vision, dry mouth, and constipation.   Monoamine oxidase inhibitors (MAOIs)  If you don t respond to tricyclics or SSRIs, your healthcare provider may prescribe MAOIs. These medicines can be very effective. But people taking MAOIs must stay away from certain foods and medicines. Your healthcare provider can tell you more.   Lithium  If you have bipolar disorder, you may take a medicine called lithium. This medicine helps even out your mood. Possible side effects are weight gain, trembling, loose stool, and nausea. Lithium is also used:     For people who have unipolar depression and have not responded to other antidepressants    For people who have a sudden (acute) episode of unipolar depression    As a maintenance medicine to prevent unipolar depression from happening again  Things to stay away from if you are taking MAOIs   If you are taking MAOIs, don t have any of the following:     Beans    Aged cheese    Chocolate    Red wine    Most cold medicines    Certain medicines (ask your healthcare provider)  To reduce the risk of lithium poisoning  You can reduce the risk of lithium poisoning by following this advice:    Take only the prescribed amount of lithium. If your depressive symptoms get worse, contact your healthcare provider. Never increase or decrease your medicine on your own.    Drink plenty of fluids other than coffee, tea, and soda.    Limit salt in your diet.    Before using other prescribed medicines or over-the-counter medicines, check with your pharmacist. This is to be sure the medicines won t interact with the lithium.    Never share your medicines or use another person's medicines, even if it is the same medicine and dose.    Keep follow-up appointments.    Have your lithium blood level checked as advised. You will need blood work more often when symptoms are not under control.  If you have side effects  The side effects of antidepressants are  usually mild. But if you have troubling side effects, call your healthcare provider. Changing the dosage or type of medicine may help. Never stop taking medicines on your own.   [employer]ili last reviewed this educational content on 9/1/2019 2000-2021 [employer]. All rights reserved. This information is not intended as a substitute for professional medical care. Always follow your healthcare professional's instructions.

## 2022-11-08 NOTE — TELEPHONE ENCOUNTER
Provider E-Visit time total (minutes): 8   Pt is being discharged on 2/1/2021from [facility]. Pt was in for knee replacement and cellulitis.

## 2022-11-09 ENCOUNTER — MYC MEDICAL ADVICE (OUTPATIENT)
Dept: GASTROENTEROLOGY | Facility: CLINIC | Age: 32
End: 2022-11-09

## 2022-11-09 DIAGNOSIS — R19.7 DIARRHEA, UNSPECIFIED TYPE: Primary | ICD-10-CM

## 2022-11-10 NOTE — TELEPHONE ENCOUNTER
Verbal order per Dr. Nix repeat HBT patient did not stop nitroFURantoin macrocrystal 100 MG  Daily  prior to test.  Jeeves message sent to patient.

## 2022-11-16 ENCOUNTER — VIRTUAL VISIT (OUTPATIENT)
Dept: NEUROLOGY | Facility: CLINIC | Age: 32
End: 2022-11-16
Payer: COMMERCIAL

## 2022-11-16 DIAGNOSIS — G43.709 CHRONIC MIGRAINE WITHOUT AURA WITHOUT STATUS MIGRAINOSUS, NOT INTRACTABLE: ICD-10-CM

## 2022-11-16 PROCEDURE — 99213 OFFICE O/P EST LOW 20 MIN: CPT | Mod: 95 | Performed by: PSYCHIATRY & NEUROLOGY

## 2022-11-16 RX ORDER — SUMATRIPTAN 100 MG/1
100 TABLET, FILM COATED ORAL
Qty: 18 TABLET | Refills: 11 | Status: SHIPPED | OUTPATIENT
Start: 2022-11-16 | End: 2023-11-08

## 2022-11-16 RX ORDER — METOCLOPRAMIDE 10 MG/1
10 TABLET ORAL 3 TIMES DAILY PRN
Qty: 20 TABLET | Refills: 11 | Status: SHIPPED | OUTPATIENT
Start: 2022-11-16 | End: 2023-11-08

## 2022-11-16 RX ORDER — TOPIRAMATE 100 MG/1
100 TABLET, FILM COATED ORAL EVERY EVENING
Qty: 30 TABLET | Refills: 11 | Status: SHIPPED | OUTPATIENT
Start: 2022-11-16 | End: 2023-02-06

## 2022-11-16 ASSESSMENT — HEADACHE IMPACT TEST (HIT 6)
WHEN YOU HAVE A HEADACHE HOW OFTEN DO YOU WISH YOU COULD LIE DOWN: ALWAYS
HOW OFTEN HAVE YOU FELT TOO TIRED TO WORK BECAUSE OF YOUR HEADACHES: RARELY
HIT6 TOTAL SCORE: 59
HOW OFTEN HAVE YOU FELT FED UP OR IRRITATED BECAUSE OF YOUR HEADACHES: RARELY
WHEN YOU HAVE HEADACHES HOW OFTEN IS THE PAIN SEVERE: VERY OFTEN
HOW OFTEN DO HEADACHES LIMIT YOUR DAILY ACTIVITIES: VERY OFTEN
HOW OFTEN DID HEADACHS LIMIT CONCENTRATION ON WORK OR DAILY ACTIVITY: RARELY

## 2022-11-16 ASSESSMENT — MIGRAINE DISABILITY ASSESSMENT (MIDAS)
TOTAL SCORE: 4
ON A SCALE FROM 0-10 ON AVERAGE HOW PAINFUL WERE HEADACHES: 5
HOW OFTEN WERE SOCIAL ACTIVITIES MISSED DUE TO HEADACHES: 0
HOW MANY DAYS WAS HOUSEWORK PRODUCTIVITY CUT IN HALF DUE TO HEADACHES: 2
HOW MANY DAYS WAS YOUR PRODUCTIVITY CUT IN HALF BECAUSE OF HEADACHES: 0
HOW MANY DAYS DID YOU MISS WORK OR SCHOOL BECAUSE OF HEADACHES: 0
HOW MANY DAYS IN THE PAST 3 MONTHS HAVE YOU HAD A HEADACHE: 2
HOW MANY DAYS DID YOU NOT DO HOUSEWORK BECAUSE OF HEADACHES: 2

## 2022-11-16 NOTE — LETTER
11/16/2022       RE: Analia Jerry  [address]  [place] [facility] [place] [address]     Dear Colleague,    Thank you for referring your patient, Analia Jerry, to the [facility]. Please see a copy of my visit note below.    [facility]    Headache Neurology Progress Note  November 16, 2022    Subjective:    Analia Jerry returns for follow up of chronic migraine without aura, history of lumbar puncture headache.  At her last visit, I recommended a trial of topiramate for headache prevention and sumatriptan and metoclopramide for rescue.    Today, she reports that she is doing better. She reports that she is no longer having frequent headaches since starting topiramate. She is using her rescue medication rarely, only needing it a handful of times over the last 3 to 4 months.    She initially had word finding difficulty when she started topiramate.  This improved significantly after a week.  She would choose to continue taking it despite this side effect.    Sumatriptan and metoclopramide remain effective.  She typically takes 1 of each to treat an attack.    Objective:    Vitals: There were no vitals taken for this visit.  General: Cooperative, NAD  Neurologic:  Mental Status: Fully alert, attentive and oriented. Speech clear and fluent.   Cranial Nerves: Facial movements symmetric.   Motor: No abnormal movements.      Assessment/Plan:   Analia Jerry is a 31 year old who returns for follow-up of chronic migraine without aura and history of lumbar puncture headache.    She has had significant benefit with topiramate for migraine prevention.  We discussed continuing this at 100 mg nightly.  I will change her prescription to the 100 mg tablet for convenience.     We reviewed her symptomatic treatment strategy:  -For acute treatment of mild headache, she will continue ibuprofen as needed, not to  exceed more than 14 days/month to avoid medication overuse.  - For acute treatment of moderate to severe headache, I recommend sumatriptan 100 mg taken at the onset of headache, with a repeat dose in 2 hours if needed.  This should not exceed more than 9 days/month to avoid medication overuse.  - For headache related nausea, I recommend metoclopramide 10 mg as needed.     Her headache frequency and severity warrant prevention.    -I recommend she continue topiramate 100 mg nightly.  -If topiramate is not tolerated or not effective in the future, atenolol, botulinum toxin injections using a chronic migraine protocol every 12 weeks, or a CGRP inhibitor could be considered.  With her currently changing antidepressant regimen, we will avoid adding a tricyclic antidepressant; this may also be contraindicated due to her elevated pulse rate.    I will plan to see her back in 1 year, or sooner if needed.    Karissa Gonzalez MD  Neurology

## 2022-12-12 ASSESSMENT — PATIENT HEALTH QUESTIONNAIRE - PHQ9
SUM OF ALL RESPONSES TO PHQ QUESTIONS 1-9: 5
10. IF YOU CHECKED OFF ANY PROBLEMS, HOW DIFFICULT HAVE THESE PROBLEMS MADE IT FOR YOU TO DO YOUR WORK, TAKE CARE OF THINGS AT HOME, OR GET ALONG WITH OTHER PEOPLE: NOT DIFFICULT AT ALL
SUM OF ALL RESPONSES TO PHQ QUESTIONS 1-9: 5
SUM OF ALL RESPONSES TO PHQ QUESTIONS 1-9: 5
10. IF YOU CHECKED OFF ANY PROBLEMS, HOW DIFFICULT HAVE THESE PROBLEMS MADE IT FOR YOU TO DO YOUR WORK, TAKE CARE OF THINGS AT HOME, OR GET ALONG WITH OTHER PEOPLE: NOT DIFFICULT AT ALL
SUM OF ALL RESPONSES TO PHQ QUESTIONS 1-9: 5

## 2022-12-12 ASSESSMENT — ANXIETY QUESTIONNAIRES
GAD7 TOTAL SCORE: 3
6. BECOMING EASILY ANNOYED OR IRRITABLE: SEVERAL DAYS
4. TROUBLE RELAXING: SEVERAL DAYS
3. WORRYING TOO MUCH ABOUT DIFFERENT THINGS: NOT AT ALL
8. IF YOU CHECKED OFF ANY PROBLEMS, HOW DIFFICULT HAVE THESE MADE IT FOR YOU TO DO YOUR WORK, TAKE CARE OF THINGS AT HOME, OR GET ALONG WITH OTHER PEOPLE?: SOMEWHAT DIFFICULT
2. NOT BEING ABLE TO STOP OR CONTROL WORRYING: NOT AT ALL
5. BEING SO RESTLESS THAT IT IS HARD TO SIT STILL: NOT AT ALL
6. BECOMING EASILY ANNOYED OR IRRITABLE: SEVERAL DAYS
7. FEELING AFRAID AS IF SOMETHING AWFUL MIGHT HAPPEN: NOT AT ALL
GAD7 TOTAL SCORE: 3
5. BEING SO RESTLESS THAT IT IS HARD TO SIT STILL: NOT AT ALL
IF YOU CHECKED OFF ANY PROBLEMS ON THIS QUESTIONNAIRE, HOW DIFFICULT HAVE THESE PROBLEMS MADE IT FOR YOU TO DO YOUR WORK, TAKE CARE OF THINGS AT HOME, OR GET ALONG WITH OTHER PEOPLE: SOMEWHAT DIFFICULT
IF YOU CHECKED OFF ANY PROBLEMS ON THIS QUESTIONNAIRE, HOW DIFFICULT HAVE THESE PROBLEMS MADE IT FOR YOU TO DO YOUR WORK, TAKE CARE OF THINGS AT HOME, OR GET ALONG WITH OTHER PEOPLE: SOMEWHAT DIFFICULT
8. IF YOU CHECKED OFF ANY PROBLEMS, HOW DIFFICULT HAVE THESE MADE IT FOR YOU TO DO YOUR WORK, TAKE CARE OF THINGS AT HOME, OR GET ALONG WITH OTHER PEOPLE?: SOMEWHAT DIFFICULT
7. FEELING AFRAID AS IF SOMETHING AWFUL MIGHT HAPPEN: NOT AT ALL
GAD7 TOTAL SCORE: 3
7. FEELING AFRAID AS IF SOMETHING AWFUL MIGHT HAPPEN: NOT AT ALL
4. TROUBLE RELAXING: SEVERAL DAYS
3. WORRYING TOO MUCH ABOUT DIFFERENT THINGS: NOT AT ALL
7. FEELING AFRAID AS IF SOMETHING AWFUL MIGHT HAPPEN: NOT AT ALL
1. FEELING NERVOUS, ANXIOUS, OR ON EDGE: SEVERAL DAYS
1. FEELING NERVOUS, ANXIOUS, OR ON EDGE: SEVERAL DAYS
2. NOT BEING ABLE TO STOP OR CONTROL WORRYING: NOT AT ALL

## 2022-12-13 ENCOUNTER — VIRTUAL VISIT (OUTPATIENT)
Dept: PSYCHIATRY | Facility: CLINIC | Age: 32
End: 2022-12-13
Payer: COMMERCIAL

## 2022-12-13 ENCOUNTER — VIRTUAL VISIT (OUTPATIENT)
Dept: BEHAVIORAL HEALTH | Facility: CLINIC | Age: 32
End: 2022-12-13
Payer: COMMERCIAL

## 2022-12-13 DIAGNOSIS — F41.1 GENERALIZED ANXIETY DISORDER: ICD-10-CM

## 2022-12-13 DIAGNOSIS — F33.0 DEPRESSION, MAJOR, RECURRENT, MILD (H): Primary | ICD-10-CM

## 2022-12-13 DIAGNOSIS — F41.8 DEPRESSION WITH ANXIETY: ICD-10-CM

## 2022-12-13 DIAGNOSIS — F33.41 RECURRENT MAJOR DEPRESSION IN PARTIAL REMISSION (H): Primary | ICD-10-CM

## 2022-12-13 PROCEDURE — 90832 PSYTX W PT 30 MINUTES: CPT | Mod: 95 | Performed by: SOCIAL WORKER

## 2022-12-13 PROCEDURE — 99215 OFFICE O/P EST HI 40 MIN: CPT | Mod: 95 | Performed by: PSYCHIATRY & NEUROLOGY

## 2022-12-13 RX ORDER — BUPROPION HYDROCHLORIDE 150 MG/1
150 TABLET ORAL EVERY MORNING
Qty: 90 TABLET | Refills: 1 | Status: SHIPPED | OUTPATIENT
Start: 2022-12-13 | End: 2023-05-10

## 2022-12-13 RX ORDER — BUPROPION HYDROCHLORIDE 300 MG/1
300 TABLET ORAL EVERY MORNING
Qty: 90 TABLET | Refills: 1 | Status: SHIPPED | OUTPATIENT
Start: 2022-12-13 | End: 2023-05-10

## 2022-12-13 NOTE — PATIENT INSTRUCTIONS
Continue bupropion  mg daily.    Increase sertraline to 50 mg daily.    Use light therapy 20 to 30 minutes each morning until the spring equinox.     Continue all other medications per your primary care provider.    Schedule an appointment with me in 6 weeks or sooner as needed.  You may call [facility] at [phone] to schedule.    [place]:    Go to the Emergency Department as needed or call after hours crisis line at [phone].    To schedule individual or family therapy, call [facility] at [phone].   Follow up with primary care provider as planned or sooner for acute medical concerns.  Call the psychiatric nurse line with medication questions or concerns at [phone].  [employer] may be used to communicate with your provider, but this is not intended to be used for emergencies.    Community Resources:    National Suicide Prevention Lifeline: 173.631.5707 (TTY: 346.456.7867). Call anytime for help.  (www.suicidepreventionlifeline.org)  National [place] [employer]ness (www.irasema.org): 202.400.4528 or 540-333-7465.   Mental Health Association (www.mentalhealth.org): 273.726.7704 or 456-212-8808.  [place] Crisis Text Line: Text [place] to 496861  Suicide LifeLine Chat: suicidepreBrightkitlifeline.org/chat

## 2022-12-13 NOTE — PROGRESS NOTES
Telemedicine Visit: The patient's condition can be safely assessed and treated via synchronous audio and visual telemedicine encounter.      Reason for Telemedicine Visit: Services only offered telehealth    Originating Site (Patient Location): Patient's home    Distant Site (Provider Location): Provider Remote Setting- Home Office    Consent:  The patient/guardian has verbally consented to: the potential risks and benefits of telemedicine (video visit) versus in person care; bill my insurance or make self-payment for services provided; and responsibility for payment of non-covered services.     Mode of Communication:  Video Conference via [employer]    As the provider I attest to compliance with applicable laws and regulations related to telemedicine.      How would you like to obtain your AVS? MyChart  If the video visit is dropped, the invitation should be resent by: Text to cell phone: [phone]  Will anyone else be joining your video visit? No          Outpatient Psychiatric Progress Note    Name: Analia Jerry   : 1990                    Primary Care Provider: MERRY WELLS CNP   Therapist: Tayler Munoz        PHQ-9 scores:  PHQ-9 SCORE 2022   PHQ-9 Total Score - - -   PHQ-9 Total Score MyChart 6 (Mild depression) - 5 (Mild depression)   PHQ-9 Total Score 6 5 5   Some encounter information is confidential and restricted. Go to Review Flowsheets activity to see all data.       MAXIM-7 scores:  MAXIM-7 SCORE 2022   Total Score - - -   Total Score 6 (mild anxiety) - 3 (minimal anxiety)   Total Score 6 3 3   Some encounter information is confidential and restricted. Go to Review Flowsheets activity to see all data.   Answers for HPI/ROS submitted by the patient on 2022  If you checked off any problems, how difficult have these problems made it for you to do your work, take care of things at home, or get along with other  "people?: Not difficult at all  PHQ9 TOTAL SCORE: 5  MAXIM 7 TOTAL SCORE: 3      Patient Identification:  Analia is a 32 year old year old female  who presents for return visit with me.  Patient attended the session alone.     Interim History:  [facility]  Pt shares that she did meet with PCP last month and did get prescribed sertraline.  Was noticing increased sadness including \"crying out of nowhere\" and not being able brush things off and getting affected by small things.  Has been on for about 1 month and is noticing improvement in her symptoms.    Work is going well and feels like getting hang of things in her new role.  Saw neurologist and was started on topamax which has improved headaches quite a bit.  Did lose 40lbs and is not noticing sleep issues like she has been having.  Feels it is quite a bit better overall.  Noticing a bit of a problem with sleeping since started sertraline but not sure if related to medication or to one of her dogs who is more active then usual at night.  Continues to see therapist every 3 weeks.     Analia was last seen in July.  At the time, we had discontinued her Lexapro, and continued with bupropion  mg a day.  Unfortunately, since early September she has noticed decline in her mood.  She has been \"crying out of nowhere.\"  She and her therapist have been working together, and have not been able to identify any specific cause for her dysphoria.  She saw her primary care doctor in early November and was started on 25 mg daily of sertraline in addition to her bupropion.  She feels as though she has improved, but is still not where she would like to be mood wise.    She had been on Lexapro for a few years, and feels that it was contributing to restless legs.  Since she discontinued it, she has lost 40 pounds.  She is sleeping better.  So we will not retry Lexapro at this point.    We agreed to increase her sertraline slightly to 50 mg daily, she will watch her " weight, and we will follow-up again in about 6 weeks.  We reviewed the natural history of recurrent major depression, she was encouraged to stay on medications once we find something that is beneficial unless there are adverse side effects.    Analia went through an evaluation to determine if she could be a kidney donor for her mother.  Unfortunately that is not a possibility.  She has been referred to a kidney specialist herself because of some lab results.    She reports decreased interest and pleasure in her normal activities, low energy, some feelings of worthlessness.  She denies any suicidal ideation.    We reviewed medication alternatives for treatment of depression including individual therapy, regular exercise, mindfulness exercises, and light therapy.  She would like to try light therapy, a prescription was provided.      Vital Signs:   There were no vitals taken for this visit.    Labs:  Last Comprehensive Metabolic Panel:  Sodium   Date Value Ref Range Status   10/06/2022 139 136 - 145 mmol/L Final   08/20/2019 140 133 - 144 mmol/L Final     Potassium   Date Value Ref Range Status   10/06/2022 3.5 3.4 - 5.3 mmol/L Final   07/21/2022 3.8 3.4 - 5.3 mmol/L Final   08/20/2019 4.2 3.4 - 5.3 mmol/L Final     Chloride   Date Value Ref Range Status   10/06/2022 105 98 - 107 mmol/L Final   07/21/2022 112 (H) 94 - 109 mmol/L Final   08/20/2019 107 94 - 109 mmol/L Final     Carbon Dioxide   Date Value Ref Range Status   08/20/2019 27 20 - 32 mmol/L Final     Carbon Dioxide (CO2)   Date Value Ref Range Status   10/06/2022 21 (L) 22 - 29 mmol/L Final   07/21/2022 23 20 - 32 mmol/L Final     Anion Gap   Date Value Ref Range Status   10/06/2022 13 7 - 15 mmol/L Final   07/21/2022 7 3 - 14 mmol/L Final   08/20/2019 6 3 - 14 mmol/L Final     Glucose   Date Value Ref Range Status   10/06/2022 95 70 - 99 mg/dL Final   07/21/2022 99 70 - 99 mg/dL Final   08/20/2019 91 70 - 99 mg/dL Final     Comment:     Non Fasting     Urea  Nitrogen   Date Value Ref Range Status   10/06/2022 16.3 6.0 - 20.0 mg/dL Final   07/21/2022 12 7 - 30 mg/dL Final   08/20/2019 15 7 - 30 mg/dL Final     Creatinine   Date Value Ref Range Status   10/06/2022 1.11 (H) 0.51 - 0.95 mg/dL Final   08/20/2019 0.78 0.52 - 1.04 mg/dL Final     GFR Estimate   Date Value Ref Range Status   10/06/2022 67 >60 mL/min/1.73m2 Final     Comment:     Effective December 21, 2021 eGFRcr in adults is calculated using the 2021 CKD-EPI creatinine equation which includes age and gender (Trent et al., NEJ, DOI: 10.1056/HHQHvk2107539)   08/20/2019 >90 >60 mL/min/[1.73_m2] Final     Comment:     Non  GFR Calc  Starting 12/18/2018, serum creatinine based estimated GFR (eGFR) will be   calculated using the Chronic Kidney Disease Epidemiology Collaboration   (CKD-EPI) equation.       Calcium   Date Value Ref Range Status   10/06/2022 9.4 8.6 - 10.0 mg/dL Final   08/20/2019 9.1 8.5 - 10.1 mg/dL Final     Bilirubin Total   Date Value Ref Range Status   10/06/2022 0.3 <=1.2 mg/dL Final   04/23/2015 0.4 0.2 - 1.3 mg/dL Final     Alkaline Phosphatase   Date Value Ref Range Status   10/06/2022 63 35 - 104 U/L Final   04/23/2015 43 40 - 150 U/L Final     ALT   Date Value Ref Range Status   10/06/2022 26 10 - 35 U/L Final   04/23/2015 14 0 - 50 U/L Final     AST   Date Value Ref Range Status   10/06/2022 18 10 - 35 U/L Final   04/23/2015 8 0 - 45 U/L Final     Lab Results   Component Value Date    CHOL 189 10/06/2022    CHOL 190 05/29/2018     Lab Results   Component Value Date    HDL 38 10/06/2022    HDL 38 05/29/2018     Lab Results   Component Value Date    LDL 89 10/06/2022    LDL 89 05/29/2018     Lab Results   Component Value Date    TRIG 312 10/06/2022    TRIG 314 05/29/2018     Lab Results   Component Value Date    CEZAR 5.1 01/06/2011     Ref Range & Units 2 mo ago       Hemoglobin A1C <5.7 % 5.0    Comment: Normal <5.7%   Prediabetes 5.7-6.4%     Diabetes 6.5% or  higher          Current Medications:  Current Outpatient Medications   Medication     buPROPion (WELLBUTRIN XL) 150 MG 24 hr tablet     buPROPion (WELLBUTRIN XL) 300 MG 24 hr tablet     levothyroxine (SYNTHROID/LEVOTHROID) 200 MCG tablet     metoclopramide (REGLAN) 10 MG tablet     nitroFURantoin macrocrystal (MACRODANTIN) 100 MG capsule     norethindrone-ethinyl estradiol (ORTHO-NOVUM 1/35, 28,) 1-35 MG-MCG tablet     omeprazole (PRILOSEC) 20 MG DR capsule     rOPINIRole (REQUIP) 0.5 MG tablet     sertraline (ZOLOFT) 50 MG tablet     SUMAtriptan (IMITREX) 100 MG tablet     topiramate (TOPAMAX) 100 MG tablet     No current facility-administered medications for this visit.        Mental Status Examination:  Analia is a 32-year-old woman who appears her stated age and in no acute distress.  She is neatly groomed in casual clothing speech is clear and normal in rate and tone.  Eye contact is good over the video connection.  Affect is somewhat blunted.  Mood is dysphoric.  Thoughts are logical and spontaneous with no loose associations or flight of ideas.  Thought content shows no psychosis.  No suicidal thoughts.  She is alert and oriented x3.    Assessment and Plan:    ICD-10-CM    1. Recurrent major depression in partial remission (H)  F33.41 buPROPion (WELLBUTRIN XL) 150 MG 24 hr tablet     buPROPion (WELLBUTRIN XL) 300 MG 24 hr tablet     SAD Light, 10,000 Lux Order for DME - ONLY FOR DME      2. Depression with anxiety  F41.8 sertraline (ZOLOFT) 50 MG tablet          Medical comorbidities include:   Patient Active Problem List   Diagnosis     Familial hematuria     CARDIOVASCULAR SCREENING; LDL GOAL LESS THAN 160     Mechanical low back pain     Keloid scar     ASCUS of cervix with negative high risk HPV     Common wart     Hypothyroidism due to Hashimoto's thyroiditis     High triglycerides     Elevated liver enzymes     Class 1 obesity due to excess calories with body mass index (BMI) of 32.0 to 32.9 in adult,  unspecified whether serious comorbidity present     FRANK (obstructive sleep apnea) - mild (AHI 7)     Morbid obesity (H)     Moderate episode of recurrent major depressive disorder (H)     Fatigue, unspecified type     Posttraumatic stress disorder     Recurrent major depression in partial remission (H)     Transplant donor evaluation       Treatment Plan:  Patient Instructions   Continue bupropion  mg daily.    Increase sertraline to 50 mg daily.    Use light therapy 20 to 30 minutes each morning until the spring equinox.     Continue all other medications per your primary care provider.    Schedule an appointment with me in 6 weeks or sooner as needed.  You may call [facility] at [phone] to schedule.    [facility]:      Go to the Emergency Department as needed or call after hours crisis line at [phone].      To schedule individual or family therapy, call [facility] at [phone].     Follow up with primary care provider as planned or sooner for acute medical concerns.    Call the psychiatric nurse line with medication questions or concerns at [phone].    Cov[employer]ist may be used to communicate with your provider, but this is not intended to be used for emergencies.    Community Resources:      National Suicide Prevention Lifeline: 657.951.5620 (TTY: 845.849.1576). Call anytime for help.  (www.suicidepreventionlifeline.org)    National [place] on Mental Illness (www.irasema.org): 171.281.1863 or 832-086-8285.     Mental Health Association (www.mentalhealth.org): 590.929.1622 or 377-434-0024.    [place] Crisis Text Line: Text [place] to 894872    Suicide LifeLine Chat: suicidepreLev[employer] League.org/chat         Administrative Billing:   Video call duration: 31 minutes   Start: 10:23 AM   Stop: 10:54 AM  Total time spent, including chart review and documentation: 41 minutes    Patient Status:  Patient will continue to be seen for ongoing consultation and  stabilization.

## 2022-12-19 NOTE — PROGRESS NOTES
MH&A Post-Appointment Chart -check      Medications ordered this visit were e-scribed.  Verified by order class [x] yes  [] no    List Medications: Mailed out paper Script: SAD Light, 10,000 Lux Order for DME - ONLY FOR DME [DME79] (Order [id number])    buPROPion (WELLBUTRIN XL) 150 MG 24 hr tablet; buPROPion (WELLBUTRIN XL) 300 MG 24 hr tablet; sertraline (ZOLOFT) 50 MG tablet    Medication changes or discontinuations were communicated to patient's pharmacy: [] yes  [x] no    UA collected [] yes  [] no  [x] n/a-virtual     Outside referrals / labs, etc support staff to follow up: [] yes  [x] no    Future appointment was made: [] yes  [x] no  [] n/a RTC 6 weeks  Not scheduled   Future Appointments 12/19/2022 - 6/17/2023      Date Visit Type Length Department Provider     3/17/2023 10:00 AM [facility] HYDROGEN BREATH TEST 30 min [facility] GASTRO PROCEDURE NON INVASIVE Nurse, [facility]    Location Instructions:     Located in the Clinics and Surgery Center at [address] [facility] [address]. For parking options, enter the [facility] /arrival plaza from [facility] and attendants can assist you based on your needs.  parking is available for those with limited mobility M-F from 7 a.m. to 5 p.m. Due to short staffing, we are unable to offer  to all patients/visitors. Visit [email or web address] for more details.  Self-parking:&nbsp;  West Lot: Located across from the main entrance, this is a convenient option for patients. Enter on [address]. Parking attendants available most hours to assist.&nbsp;     [facility] Ramp: Enter at the [address] entrance (one block north of the [facility] main entrance). Do not enter the ramp from [facility] - this entrance is not staffed and is further from the [facility] main entrance.                   Dictation completed at time of chart check: [x] yes  [] no    I have checked the documentation for today s encounters and the above information has been  reviewed and completed.      Mayte Pedroza, CMA on December 19, 2022 at 8:48 AM

## 2023-01-04 DIAGNOSIS — F41.8 DEPRESSION WITH ANXIETY: ICD-10-CM

## 2023-01-04 NOTE — TELEPHONE ENCOUNTER
Refills remain on file. Refused.     Leanna Hughes, RN, BSN, PHN  [facility]: [place]

## 2023-02-06 DIAGNOSIS — G43.709 CHRONIC MIGRAINE WITHOUT AURA WITHOUT STATUS MIGRAINOSUS, NOT INTRACTABLE: ICD-10-CM

## 2023-02-06 RX ORDER — TOPIRAMATE 100 MG/1
100 TABLET, FILM COATED ORAL EVERY EVENING
Qty: 90 TABLET | Refills: 3 | Status: SHIPPED | OUTPATIENT
Start: 2023-02-06 | End: 2023-05-10

## 2023-02-06 ASSESSMENT — ANXIETY QUESTIONNAIRES
3. WORRYING TOO MUCH ABOUT DIFFERENT THINGS: SEVERAL DAYS
6. BECOMING EASILY ANNOYED OR IRRITABLE: NEARLY EVERY DAY
2. NOT BEING ABLE TO STOP OR CONTROL WORRYING: SEVERAL DAYS
7. FEELING AFRAID AS IF SOMETHING AWFUL MIGHT HAPPEN: NOT AT ALL
5. BEING SO RESTLESS THAT IT IS HARD TO SIT STILL: MORE THAN HALF THE DAYS
GAD7 TOTAL SCORE: 11
7. FEELING AFRAID AS IF SOMETHING AWFUL MIGHT HAPPEN: NOT AT ALL
4. TROUBLE RELAXING: MORE THAN HALF THE DAYS
8. IF YOU CHECKED OFF ANY PROBLEMS, HOW DIFFICULT HAVE THESE MADE IT FOR YOU TO DO YOUR WORK, TAKE CARE OF THINGS AT HOME, OR GET ALONG WITH OTHER PEOPLE?: VERY DIFFICULT
1. FEELING NERVOUS, ANXIOUS, OR ON EDGE: MORE THAN HALF THE DAYS
GAD7 TOTAL SCORE: 11
GAD7 TOTAL SCORE: 11
IF YOU CHECKED OFF ANY PROBLEMS ON THIS QUESTIONNAIRE, HOW DIFFICULT HAVE THESE PROBLEMS MADE IT FOR YOU TO DO YOUR WORK, TAKE CARE OF THINGS AT HOME, OR GET ALONG WITH OTHER PEOPLE: VERY DIFFICULT

## 2023-02-13 ENCOUNTER — VIRTUAL VISIT (OUTPATIENT)
Dept: BEHAVIORAL HEALTH | Facility: CLINIC | Age: 33
End: 2023-02-13
Payer: COMMERCIAL

## 2023-02-13 ENCOUNTER — VIRTUAL VISIT (OUTPATIENT)
Dept: PSYCHIATRY | Facility: CLINIC | Age: 33
End: 2023-02-13
Payer: COMMERCIAL

## 2023-02-13 DIAGNOSIS — F41.9 ANXIETY: ICD-10-CM

## 2023-02-13 DIAGNOSIS — F39 MOOD DISORDER (H): Primary | ICD-10-CM

## 2023-02-13 DIAGNOSIS — F41.1 GENERALIZED ANXIETY DISORDER: ICD-10-CM

## 2023-02-13 DIAGNOSIS — F33.0 DEPRESSION, MAJOR, RECURRENT, MILD (H): Primary | ICD-10-CM

## 2023-02-13 DIAGNOSIS — F43.10 POSTTRAUMATIC STRESS DISORDER: ICD-10-CM

## 2023-02-13 PROCEDURE — 99214 OFFICE O/P EST MOD 30 MIN: CPT | Mod: VID | Performed by: PSYCHIATRY & NEUROLOGY

## 2023-02-13 PROCEDURE — 90832 PSYTX W PT 30 MINUTES: CPT | Mod: VID | Performed by: SOCIAL WORKER

## 2023-02-13 ASSESSMENT — PATIENT HEALTH QUESTIONNAIRE - PHQ9: SUM OF ALL RESPONSES TO PHQ QUESTIONS 1-9: 13

## 2023-02-13 NOTE — PROGRESS NOTES
[facility] Care Psychiatry Service [place], [place]  2023      Behavioral Health Clinician Progress Note    Patient Name: Analia Jerry           Service Type:  Individual      Service Location:   MyChart / Email (patient reached)     Session Start Time: 10:31a   Session End Time: 10:54a      Session Length: 16 - 37      Attendees: Client     Service Modality:  Video Visit:      Provider verified identity through the following two step process.  Patient provided:  Patient photo and Patient     Telemedicine Visit: The patient's condition can be safely assessed and treated via synchronous audio and visual telemedicine encounter.      Reason for Telemedicine Visit: Services only offered telehealth    Originating Site (Patient Location): Patient's home    Distant Site (Provider Location): Provider Remote Setting- Home Office    Consent:  The patient/guardian has verbally consented to: the potential risks and benefits of telemedicine (video visit) versus in person care; bill my insurance or make self-payment for services provided; and responsibility for payment of non-covered services.     Patient would like the video invitation sent by:  My Chart    Mode of Communication:  Video Conference via [facility]    As the provider I attest to compliance with applicable laws and regulations related to telemedicine.    Visit Activities (Refresh list every visit): [facility] Only    Diagnostic Assessment Date: 3/15/22  Treatment Plan Review Date: 3/13/23  See Flowsheets for today's PHQ-9 and MAXIM-7 results  Previous PHQ-9:   PHQ-9 SCORE 2022   PHQ-9 Total Score - - -   PHQ-9 Total Score MyChart - 5 (Mild depression) -   PHQ-9 Total Score 5 5 13   Some encounter information is confidential and restricted. Go to Review Flowsheets activity to see all data.     Previous MAXIM-7:   MAXIM-7 SCORE 2022   Total Score - - -   Total Score - 3 (minimal anxiety) 11  "(moderate anxiety)   Total Score 3 3 11   Some encounter information is confidential and restricted. Go to Review Flowsheets activity to see all data.       KADE LEVEL:  KADE Score (Last Two) 8/15/2011   KADE Raw Score 37   Activation Score 49.9   KADE Level 2       DATA  Extended Session (60+ minutes): No  Interactive Complexity: No  Crisis: No  [facility] Patient: No    Treatment Objective(s) Addressed in This Session:  Target Behavior(s): depression symptoms    Depressed Mood: Increase interest, engagement, and pleasure in doing things    Current Stressors / Issues:  Feels that the past 2-3 weeks she has been spiraling and \"not feeling right\".  Has been having impulse issues the past couple of weeks.  Denies feeling suicidal but when driving does have thoughts to drive 90mph and catches herself driving fast.  Is also noticing impulsivity in the form of buying \"scratch offs\" which she has never really done in the past.  Feels that she has a \"shorter fuse\".  Having a harder time relaxing and finds self \"anxious cleaning\".  Recalls having some anger and impulsivity when on Viibryd and when stopped that medication the impulsivity stopped. She shares that in the past she met with a provider and the provider discussed bipolar with pt which pt admits she didn't want to talk about and admits that she was upset with provider.    Sleep has been \"decent\".  Wakes during the night more often.  Usually able to fall back asleep after a few minutes.    Having some struggles with memory and forgetting to do things.  Shares that her boss brought it up at work last week and pt says that she was \"devastated\".  Shares that things are going well at home.  Denies any SI.    Progress on Treatment Objective(s) / Homework:  Minimal progress - PREPARATION (Decided to change - considering how); Intervened by negotiating a change plan and determining options / strategies for behavior change, identifying triggers, exploring social supports, and working " towards setting a date to begin behavior change    Motivational Interviewing    MI Intervention: Supported Autonomy, Collaboration, Evocation, Permission to raise concern or advise, Open-ended questions and Reflections: simple and complex     Change Talk Expressed by the Patient: Activation Taking steps    Provider Response to Change Talk: E - Evoked more info from patient about behavior change, A - Affirmed patient's thoughts, decisions, or attempts at behavior change, R - Reflected patient's change talk and S - Summarized patient's change talk statements      Care Plan review completed: Yes    Medication Review:  Changes to psychiatric medications, see updated Medication List in EPIC.     Medication Compliance:  Yes    Changes in Health Issues:   None reported    Chemical Use Review:   Substance Use: Chemical use reviewed, no active concerns identified      Tobacco Use: No current tobacco use.      Assessment: Current Emotional / Mental Status (status of significant symptoms):  Risk status (Self / Other harm or suicidal ideation)  Patient has had a history of suicidal ideation: hx of SI several years ago.  denies current  Patient denies current fears or concerns for personal safety.  Patient denies current or recent suicidal ideation or behaviors.  Patient denies current or recent homicidal ideation or behaviors.  Patient denies current or recent self injurious behavior or ideation.  Patient denies other safety concerns.  A safety and risk management plan has not been developed at this time, however patient was encouraged to call [facility] should there be a change in any of these risk factors.    Appearance:   Appropriate   Eye Contact:   Good   Psychomotor Behavior: Normal   Attitude:   Cooperative   Orientation:   All  Speech   Rate / Production: Normal    Volume:  Normal   Mood:    Normal  Affect:    Appropriate   Thought Content:  Clear   Thought Form:  Coherent  Logical   Insight:    Good      Diagnoses:  1. Depression, major, recurrent, mild (H)    2. Generalized anxiety disorder        Collateral Reports Completed:  Communicated with: [facility] Psychiatrist    Plan: (Homework, other):  Patient was given information about behavioral services and encouraged to schedule a follow up appointment with the clinic [facility] as needed.  She was also given information about mental health symptoms and treatment options .  Has an individual therapist that sees regularly.  CD Recommendations: No indications of CD issues.  Azalea Moya, [facility], [facility]      ______________________________________________________________________    Integrated Primary Care Behavioral Health Treatment Plan    Patient's Name: Analia Jerry  YOB: 1990    Date of Creation: 5/23/22  Date Treatment Plan Last Reviewed/Revised: 12/23/22    DSM5 Diagnoses: 296.31 (F33.0) Major Depressive Disorder, Recurrent Episode, Mild _ and With mixed features or 300.02 (F41.1) Generalized Anxiety Disorder  Psychosocial / Contextual Factors: grief, job change  PROMIS (reviewed every 90 days): 25    Referral / Collaboration:  Referral to another professional/service is not indicated at this time. Pt has a long term therapist.    Anticipated number of session for this episode of care: 4-5  Anticipation frequency of session: Every 2 months  Anticipated Duration of each session: 16-37 minutes  Treatment plan will be reviewed in 90 days or when goals have been changed.       MeasurableTreatment Goal(s) related to diagnosis / functional impairment(s)  Goal 1: Patient will experience a reduction in depression symptom along with a corresponding increase in positive emotions and life satisfaction.      Objective #A (Patient Action)    Patient will Decrease frequency and intensity of feeling down, depressed, hopeless.  Status: New - Date: 12/13/22     Intervention(s)  Therapist will use cognitive-behavioral and acceptance and commitment therapy topics aimed to  help reduce anxiety and depression.        Patient has reviewed and agreed to the above plan.      Azalea Moya, JAMES, [facility], [facility] [place]  February 13, 2023

## 2023-02-13 NOTE — PROGRESS NOTES
MH&A Post-Appointment Chart -check      Medications ordered this visit were e-scribed.  Verified by order class [] yes  [x] no    List Medications:    Medication changes or discontinuations were communicated to patient's pharmacy: [] yes  [x] no    UA collected [] yes  [] no  [x] n/a-virtual     Outside referrals / labs, etc support staff to follow up: [] yes  [x] no    Future appointment was made: [] yes  [x] no  [] n/a  Future Appointments 2/13/2023 - 8/12/2023      Date Visit Type Length Department Provider     4/3/2023  1:30 PM [facility] ANNUAL PHYSICAL 30 min AN OBGYZuleyma Lopez APRN CNP                   Dictation completed at time of chart check: [] yes  [x] no    I have checked the documentation for today s encounters and the above information has been reviewed and completed.      Mayte Pedroza, AN on February 13, 2023 at 4:22 PM

## 2023-02-13 NOTE — PROGRESS NOTES
Telemedicine Visit: The patient's condition can be safely assessed and treated via synchronous audio and visual telemedicine encounter.      Reason for Telemedicine Visit: Services only offered telehealth    Originating Site (Patient Location): Patient's home    Distant Site (Provider Location): Provider Remote Setting- Home Office    Consent:  The patient/guardian has verbally consented to: the potential risks and benefits of telemedicine (video visit) versus in person care; bill my insurance or make self-payment for services provided; and responsibility for payment of non-covered services.     Mode of Communication:  Video Conference via Carbonated Content    As the provider I attest to compliance with applicable laws and regulations related to telemedicine.      How would you like to obtain your AVS? MyChart  If the video visit is dropped, the invitation should be resent by: Text to cell phone: [phone]  Will anyone else be joining your video visit? No          Outpatient Psychiatric Progress Note     Name: Analia Jerry   : 1990                    Primary Care Provider: MERRY WELLS CNP   Therapist: Tayler Munoz     PHQ-9 scores:  PHQ-9 SCORE 2022   PHQ-9 Total Score - - -   PHQ-9 Total Score MyChart - 5 (Mild depression) -   PHQ-9 Total Score 5 5 13   Some encounter information is confidential and restricted. Go to Review Flowsheets activity to see all data.       MAXIM-7 scores:  MAXIM-7 SCORE 2022   Total Score - - -   Total Score - 3 (minimal anxiety) 11 (moderate anxiety)   Total Score 3 3 11   Some encounter information is confidential and restricted. Go to Review Flowsheets activity to see all data.   Answers for HPI/ROS submitted by the patient on 2023  MAXIM 7 TOTAL SCORE: 11        Patient Identification:  Analia is a 32 year old year old female  who presents for return visit with me.  Patient attended the session alone.  "    Interim History:  Per Azalea Nita, [facility]:  Feels that the past 2-3 weeks she has been spiraling and \"not feeling right\".  Has been having impulse issues the past couple of weeks.  Denies feeling suicidal but when driving does have thoughts to drive 90mph and catches herself driving fast.  Is also noticing impulsivity in the form of buying \"scratch offs\" which she has never really done in the past.  Feels that she has a \"shorter fuse\".  Having a harder time relaxing and finds self \"anxious cleaning\".  Recalls having some anger and impulsivity when on Viibryd and when stopped that medication the impulsivity stopped. She shares that in the past she met with a provider and the provider discussed bipolar with pt which pt admits she didn't want to talk about and admits that she was upset with provider.    Sleep has been \"decent\".  Wakes during the night more often.  Usually able to fall back asleep after a few minutes.    Having some struggles with memory and forgetting to do things.  Shares that her boss brought it up at work last week and pt says that she was \"devastated\".  Shares that things are going well at home.  Denies any SI.    Analia reports that she has been having increased impulsivity over the last 3 to 4 weeks which has been worse over the last week.  She is been driving over the speed limit (between 85 and 90 mph), and has been spending about $100 on scratch tickets 3 or 4 times a week.  She feels anxious and cannot relax.  She denies any suicidal thoughts, and reports that her sleep has been okay.    She had a previous episode with increased anger that was similar to this when she was taking Viibryd.  At that time she was not sleeping, mainly because she was having nightmares.    This episode seems to coincide with starting the sertraline and increasing it to from 25 to 50 mg about 2 months ago.  She does have a family history of bipolar disorder in her brother who unfortunately committed suicide a " couple of years ago.  She has had other providers suggest the possibility of bipolar disorder in the past, but has been resistant to the diagnosis.    We agreed to discontinue her sertraline, and will also start lamotrigine, tapering up to 200 mg daily.  Risks and benefits were reviewed.  We may also want to decrease or discontinue her bupropion depending on her response to lamotrigine.    Vital Signs:       Current Medications:  Current Outpatient Medications   Medication     buPROPion (WELLBUTRIN XL) 150 MG 24 hr tablet     buPROPion (WELLBUTRIN XL) 300 MG 24 hr tablet     lamoTRIgine (LAMICTAL) 100 MG tablet     lamoTRIgine (LAMICTAL) 25 MG tablet     levothyroxine (SYNTHROID/LEVOTHROID) 200 MCG tablet     metoclopramide (REGLAN) 10 MG tablet     nitroFURantoin macrocrystal (MACRODANTIN) 100 MG capsule     norethindrone-ethinyl estradiol (ORTHO-NOVUM 1/35, 28,) 1-35 MG-MCG tablet     omeprazole (PRILOSEC) 20 MG DR capsule     rOPINIRole (REQUIP) 0.5 MG tablet     sertraline (ZOLOFT) 50 MG tablet     SUMAtriptan (IMITREX) 100 MG tablet     topiramate (TOPAMAX) 100 MG tablet     No current facility-administered medications for this visit.        The [place] Prescription Monitoring Program has been reviewed and there are no concerns about diversionary activity for controlled substances at this time.      Mental Status Examination:  Analia is a 32-year-old woman in mild emotional distress.  Speech is clear and normal in rate and tone.  Mood is dysphoric and impulsive.  Thoughts are logical and spontaneous with no loose associations or flight of ideas.  Thought content shows no psychosis.  She denies suicidal thoughts.  She is alert and oriented x3.      Assessment and Plan:    ICD-10-CM    1. Mood disorder (H)  F39 lamoTRIgine (LAMICTAL) 25 MG tablet     lamoTRIgine (LAMICTAL) 100 MG tablet      2. Posttraumatic stress disorder  F43.10       3. Anxiety  F41.9           Medical comorbidities include:   Patient Active  Problem List   Diagnosis     Familial hematuria     CARDIOVASCULAR SCREENING; LDL GOAL LESS THAN 160     Mechanical low back pain     Keloid scar     ASCUS of cervix with negative high risk HPV     Common wart     Hypothyroidism due to Hashimoto's thyroiditis     High triglycerides     Elevated liver enzymes     Class 1 obesity due to excess calories with body mass index (BMI) of 32.0 to 32.9 in adult, unspecified whether serious comorbidity present     Mood disorder (H)     FRANK (obstructive sleep apnea) - mild (AHI 7)     Morbid obesity (H)     Moderate episode of recurrent major depressive disorder (H)     Fatigue, unspecified type     Posttraumatic stress disorder     Recurrent major depression in partial remission (H)     Transplant donor evaluation     Anxiety       Treatment Plan:  Patient Instructions   Discontinue sertraline.    Continue bupropion  mg daily.    Start Lamotrigine 25 mg daily for one week, then 50 mg daily for one week, then 75 mg daily for one week, then 100 mg daily for one week, then 150 mg daily for one week, then 200 mg daily.    Continue individual therapy      Continue all other medications per your primary care provider.    Schedule an appointment with me in 4 weeks or sooner as needed.  You may call [facility] at [phone] to schedule.    [place] Resources:      Go to the Emergency Department as needed or call after hours crisis line at [phone].      To schedule individual or family therapy, call [facility] at [phone].     Follow up with primary care provider as planned or sooner for acute medical concerns.    Call the psychiatric nurse line with medication questions or concerns at [phone].    Seismo-Shelfhart may be used to communicate with your provider, but this is not intended to be used for emergencies.    Community Resources:      National Suicide Prevention Lifeline: 414.320.3437 (TTY: 502.157.7551). Call anytime for help.   (www.suicidepreventionlifeline.org)    National [place] on Mental Illness (www.irasema.org): 489-776-7990 or 371-888-1654.     Mental Health Association (www.mentalhealth.org): 392.410.1096 or 842-961-1005.    [place] Crisis Text [place]: Text [place] to 474865    Suicide LifeLine Chat: suicidepreSimplist.org/chat         Administrative Billing:   Analia was scheduled for a video conference, but I was having technical difficulties with my camera and microphone, so we met by telephone.  Telephone call duration: 28 minutes  Total time spent, including chart review and documentation: 28 minutes    Patient Status:  Patient will continue to be seen for ongoing consultation and stabilization.

## 2023-02-14 ENCOUNTER — MYC MEDICAL ADVICE (OUTPATIENT)
Dept: PSYCHIATRY | Facility: CLINIC | Age: 33
End: 2023-02-14
Payer: COMMERCIAL

## 2023-02-14 NOTE — TELEPHONE ENCOUNTER
Patient wanting to know if she can take lamotrigine and norerhindrone together. Also she needs the Lamotrigine sent in.     Sophie Thorpe RN on 2/14/2023 at 1:13 PM

## 2023-02-15 PROBLEM — F41.9 ANXIETY: Status: ACTIVE | Noted: 2023-02-15

## 2023-02-15 RX ORDER — LAMOTRIGINE 25 MG/1
TABLET ORAL
Qty: 42 TABLET | Refills: 0 | Status: SHIPPED | OUTPATIENT
Start: 2023-02-15 | End: 2023-05-10 | Stop reason: DRUGHIGH

## 2023-02-15 RX ORDER — LAMOTRIGINE 100 MG/1
TABLET ORAL
Qty: 60 TABLET | Refills: 1 | Status: SHIPPED | OUTPATIENT
Start: 2023-02-15 | End: 2023-05-10

## 2023-02-15 NOTE — PATIENT INSTRUCTIONS
Discontinue sertraline.    Continue bupropion  mg daily.    Start Lamotrigine 25 mg daily for one week, then 50 mg daily for one week, then 75 mg daily for one week, then 100 mg daily for one week, then 150 mg daily for one week, then 200 mg daily.    Continue individual therapy      Continue all other medications per your primary care provider.    Schedule an appointment with me in 4 weeks or sooner as needed.  You may call [facility] at [phone] to schedule.    [place] Resources:    Go to the Emergency Department as needed or call after hours crisis line at [phone].    To schedule individual or family therapy, call [facility] at [phone].   Follow up with primary care provider as planned or sooner for acute medical concerns.  Call the psychiatric nurse line with medication questions or concerns at [phone].  Teepixt may be used to communicate with your provider, but this is not intended to be used for emergencies.    Community Resources:    National Suicide Prevention Lifeline: 333.475.9801 (TTY: 630.403.4930). Call anytime for help.  (www.suicidepreventionlifeline.org)  National [place] on Mental Illness (www.irasema.org): 210.886.3345 or 149-655-4326.   Mental Health Association (www.mentalhealth.org): 498.129.2531 or 292-992-8569.  [place] Crisis Text [place]: Text [place] to 642930  Suicide LifeLine Chat: suicidepreRough Cut Filmsline.org/chat

## 2023-02-21 NOTE — TELEPHONE ENCOUNTER
Attempted to contact Analia by phone twice today, left VM each time.    Bette Babb MD  Collaborative Care Psychiatry  [facility]

## 2023-02-22 NOTE — TELEPHONE ENCOUNTER
Patient is saying the rash is getting better. She is also requesting a letter to reduce her work load if it is still recommended by you.     Sophie Thorpe RN on 2/22/2023 at 2:34 PM

## 2023-02-23 NOTE — TELEPHONE ENCOUNTER
A letter has been created.  Please print it out and mail to Analia.  She should also be able to access it through My Chart.    Thanks,  Bette Babb MD  Collaborative Care Psychiatry  [facility]

## 2023-03-03 ENCOUNTER — TELEPHONE (OUTPATIENT)
Dept: PSYCHIATRY | Facility: CLINIC | Age: 33
End: 2023-03-03
Payer: COMMERCIAL

## 2023-03-03 NOTE — TELEPHONE ENCOUNTER
RN received MyChart response from this patient indicating that she is requesting a letter drafted by Dr. Babb on 2/23/23 be sent tot her personal email address  [email or web address]     1.  RN phoned the patient and confirmed the email address as correct. RN instructed the patient it would be sent secure phi.       2.  RN emailed the requested letter to [email or web address] indicating secure phi.

## 2023-03-06 DIAGNOSIS — G25.81 RESTLESS LEGS SYNDROME (RLS): ICD-10-CM

## 2023-03-07 RX ORDER — ROPINIROLE 0.5 MG/1
TABLET, FILM COATED ORAL
Qty: 180 TABLET | Refills: 1 | Status: SHIPPED | OUTPATIENT
Start: 2023-03-07 | End: 2023-06-26

## 2023-03-22 ENCOUNTER — VIRTUAL VISIT (OUTPATIENT)
Dept: PSYCHIATRY | Facility: CLINIC | Age: 33
End: 2023-03-22
Payer: COMMERCIAL

## 2023-03-22 ENCOUNTER — VIRTUAL VISIT (OUTPATIENT)
Dept: BEHAVIORAL HEALTH | Facility: CLINIC | Age: 33
End: 2023-03-22
Payer: COMMERCIAL

## 2023-03-22 DIAGNOSIS — F41.9 ANXIETY: ICD-10-CM

## 2023-03-22 DIAGNOSIS — F39 MOOD DISORDER (H): Primary | ICD-10-CM

## 2023-03-22 DIAGNOSIS — F33.0 MILD EPISODE OF RECURRENT MAJOR DEPRESSIVE DISORDER (H): Primary | ICD-10-CM

## 2023-03-22 DIAGNOSIS — F43.10 POSTTRAUMATIC STRESS DISORDER: ICD-10-CM

## 2023-03-22 PROCEDURE — 90832 PSYTX W PT 30 MINUTES: CPT | Mod: VID | Performed by: SOCIAL WORKER

## 2023-03-22 PROCEDURE — 99215 OFFICE O/P EST HI 40 MIN: CPT | Mod: VID | Performed by: PSYCHIATRY & NEUROLOGY

## 2023-03-22 ASSESSMENT — ANXIETY QUESTIONNAIRES
4. TROUBLE RELAXING: SEVERAL DAYS
3. WORRYING TOO MUCH ABOUT DIFFERENT THINGS: SEVERAL DAYS
4. TROUBLE RELAXING: SEVERAL DAYS
7. FEELING AFRAID AS IF SOMETHING AWFUL MIGHT HAPPEN: NOT AT ALL
7. FEELING AFRAID AS IF SOMETHING AWFUL MIGHT HAPPEN: NOT AT ALL
2. NOT BEING ABLE TO STOP OR CONTROL WORRYING: SEVERAL DAYS
3. WORRYING TOO MUCH ABOUT DIFFERENT THINGS: SEVERAL DAYS
6. BECOMING EASILY ANNOYED OR IRRITABLE: MORE THAN HALF THE DAYS
7. FEELING AFRAID AS IF SOMETHING AWFUL MIGHT HAPPEN: NOT AT ALL
GAD7 TOTAL SCORE: 8
GAD7 TOTAL SCORE: 8
5. BEING SO RESTLESS THAT IT IS HARD TO SIT STILL: SEVERAL DAYS
8. IF YOU CHECKED OFF ANY PROBLEMS, HOW DIFFICULT HAVE THESE MADE IT FOR YOU TO DO YOUR WORK, TAKE CARE OF THINGS AT HOME, OR GET ALONG WITH OTHER PEOPLE?: SOMEWHAT DIFFICULT
6. BECOMING EASILY ANNOYED OR IRRITABLE: MORE THAN HALF THE DAYS
IF YOU CHECKED OFF ANY PROBLEMS ON THIS QUESTIONNAIRE, HOW DIFFICULT HAVE THESE PROBLEMS MADE IT FOR YOU TO DO YOUR WORK, TAKE CARE OF THINGS AT HOME, OR GET ALONG WITH OTHER PEOPLE: SOMEWHAT DIFFICULT
GAD7 TOTAL SCORE: 8
1. FEELING NERVOUS, ANXIOUS, OR ON EDGE: MORE THAN HALF THE DAYS
GAD7 TOTAL SCORE: 8
5. BEING SO RESTLESS THAT IT IS HARD TO SIT STILL: SEVERAL DAYS
8. IF YOU CHECKED OFF ANY PROBLEMS, HOW DIFFICULT HAVE THESE MADE IT FOR YOU TO DO YOUR WORK, TAKE CARE OF THINGS AT HOME, OR GET ALONG WITH OTHER PEOPLE?: SOMEWHAT DIFFICULT
IF YOU CHECKED OFF ANY PROBLEMS ON THIS QUESTIONNAIRE, HOW DIFFICULT HAVE THESE PROBLEMS MADE IT FOR YOU TO DO YOUR WORK, TAKE CARE OF THINGS AT HOME, OR GET ALONG WITH OTHER PEOPLE: SOMEWHAT DIFFICULT
GAD7 TOTAL SCORE: 8
1. FEELING NERVOUS, ANXIOUS, OR ON EDGE: MORE THAN HALF THE DAYS
GAD7 TOTAL SCORE: 8
7. FEELING AFRAID AS IF SOMETHING AWFUL MIGHT HAPPEN: NOT AT ALL
2. NOT BEING ABLE TO STOP OR CONTROL WORRYING: SEVERAL DAYS

## 2023-03-22 ASSESSMENT — PATIENT HEALTH QUESTIONNAIRE - PHQ9
10. IF YOU CHECKED OFF ANY PROBLEMS, HOW DIFFICULT HAVE THESE PROBLEMS MADE IT FOR YOU TO DO YOUR WORK, TAKE CARE OF THINGS AT HOME, OR GET ALONG WITH OTHER PEOPLE: SOMEWHAT DIFFICULT
SUM OF ALL RESPONSES TO PHQ QUESTIONS 1-9: 7
10. IF YOU CHECKED OFF ANY PROBLEMS, HOW DIFFICULT HAVE THESE PROBLEMS MADE IT FOR YOU TO DO YOUR WORK, TAKE CARE OF THINGS AT HOME, OR GET ALONG WITH OTHER PEOPLE: SOMEWHAT DIFFICULT
SUM OF ALL RESPONSES TO PHQ QUESTIONS 1-9: 7

## 2023-03-22 NOTE — PROGRESS NOTES
"Telemedicine Visit: The patient's condition can be safely assessed and treated via synchronous audio and visual telemedicine encounter.      Reason for Telemedicine Visit: Services only offered telehealth    Originating Site (Patient Location): Patient's home    Distant Site (Provider Location): Provider Remote Setting- Home Office    Consent:  The patient/guardian has verbally consented to: the potential risks and benefits of telemedicine (video visit) versus in person care; bill my insurance or make self-payment for services provided; and responsibility for payment of non-covered services.     Mode of Communication:  Video Conference via Spockly    As the provider I attest to compliance with applicable laws and regulations related to telemedicine.      How would you like to obtain your AVS? MyChart  If the video visit is dropped, the invitation should be resent by: Text to cell phone: [phone]  Will anyone else be joining your video visit? No            Outpatient Psychiatric Progress Note    Name: Analia Jerry   : 1990                    Primary Care Provider: MERRY WELLS CNP   Therapist: Tayler Munoz      PHQ-9 scores:  PHQ-9 SCORE 2023 3/22/2023 3/22/2023   PHQ-9 Total Score - - -   PHQ-9 Total Score MyChart - - 7 (Mild depression)   PHQ-9 Total Score 13 7 7   Some encounter information is confidential and restricted. Go to Review Flowsheets activity to see all data.       MAXIM-7 scores:  MAXIM-7 SCORE 2023 3/22/2023 3/22/2023   Total Score - - -   Total Score 11 (moderate anxiety) - 8 (mild anxiety)   Total Score 11 8 8   Some encounter information is confidential and restricted. Go to Review Flowsheets activity to see all data.       Patient Identification:  Analia is a 32 year old year old female  who presents for return visit with me.  Patient attended the session alone.     Interim History:  Per Miley Araujo, LICSW:  PT reports that she is doing \"good\". Pt " "reports she is trying to figure out if she is doing better because of new med (Lamotrigine) and off old med (Sertraline). PT reports that the memory issues reports last visit are still there, maybe slight improvement. PT wonders if Topamax effects her memory to. PT reports she feels that she is struggling at work- odd situations coming back to look like make mistakes or that she does not look like she knows what she is doing.  PT reports the impulsivity has stopped and not sure if the stopped meds or the started meds mentioned above are the reason. PT stated that this happened with starting and stopping Vybryd. Able to relax and is not anxious cleaning, no anger, but having crying spells. PT reports that she is sleeping. Pt reports anxiety is better and not as bad as before but easily overwhelmed and \"sad upset\". PT reports that depression is managed and not concerned and crying is not bad but a way to release emotions.  PT is coping by using her dogs and mindfulness.  PT reports no SI, SIB, HI , safety concerns. PT does not think she needs refills.    Analia reports that she is doing better.  She is not sure if it is because of discontinuing sertraline or because she has started lamotrigine.  She is currently taking 150 mg a day of lamotrigine, and is scheduled to increase to 200 mg later this week.  If she has been noticing some problems with cognitive functioning and memory issues.  These seem to have come on gradually and wax and wane to some extent.  She first noticed them in December of last year.  She does not feel they are related to her lamotrigine specifically.    She is on topiramate for migraine prophylaxis.  A drug interaction check revealed that topiramate and lamotrigine are not recommended in combination.  Topiramate can increase the lamotrigine's arrhythmic effects, and lamotrigine can increase the CNS depressant effect of topiramate.  We agreed to decrease her lamotrigine back to 100 mg daily.  I " will contact her neurologist for recommendations regarding topiramate versus lamotrigine.  If we can use one or the other for both her mood disorder and migraine prophylaxis, that would be preferable.    Rates her mood today as 8 out of 10 with 10 being the best.  She denies adverse side effects from lamotrigine.  She denies being depressed.  She does state that she feels a little bit more sensitive and finds that she is crying more easily.  She also reports that she is forgetting to do things and remember doing things but cannot remember where she put them.    Vital Signs:   There were no vitals taken for this visit.      Current Medications:  Current Outpatient Medications   Medication     buPROPion (WELLBUTRIN XL) 150 MG 24 hr tablet     buPROPion (WELLBUTRIN XL) 300 MG 24 hr tablet     lamoTRIgine (LAMICTAL) 100 MG tablet     lamoTRIgine (LAMICTAL) 25 MG tablet     levothyroxine (SYNTHROID/LEVOTHROID) 200 MCG tablet     metoclopramide (REGLAN) 10 MG tablet     nitroFURantoin macrocrystal (MACRODANTIN) 100 MG capsule     norethindrone-ethinyl estradiol (ORTHO-NOVUM 1/35, 28,) 1-35 MG-MCG tablet     omeprazole (PRILOSEC) 20 MG DR capsule     rOPINIRole (REQUIP) 0.5 MG tablet     sertraline (ZOLOFT) 50 MG tablet     SUMAtriptan (IMITREX) 100 MG tablet     topiramate (TOPAMAX) 100 MG tablet     No current facility-administered medications for this visit.        Mental Status Examination:  Analia is a 32-year-old woman in no acute distress.  Speech is clear and normal in rate and tone.  Eye contact is good over the video connection.  Affect is slightly blunted.  Mood is good.  Thoughts are logical and spontaneous with no loose associations or flight of ideas.  Thought content shows no psychosis.  No suicidal thoughts.  She is alert and oriented x3.    Assessment and Plan:    ICD-10-CM    1. Mood disorder (H)  F39       2. Anxiety  F41.9       3. Posttraumatic stress disorder  F43.10           Medical comorbidities  include:   Patient Active Problem List   Diagnosis     Familial hematuria     CARDIOVASCULAR SCREENING; LDL GOAL LESS THAN 160     Mechanical low back pain     Keloid scar     ASCUS of cervix with negative high risk HPV     Common wart     Hypothyroidism due to Hashimoto's thyroiditis     High triglycerides     Elevated liver enzymes     Class 1 obesity due to excess calories with body mass index (BMI) of 32.0 to 32.9 in adult, unspecified whether serious comorbidity present     Mood disorder (H)     FRANK (obstructive sleep apnea) - mild (AHI 7)     Morbid obesity (H)     Moderate episode of recurrent major depressive disorder (H)     Fatigue, unspecified type     Posttraumatic stress disorder     Recurrent major depression in partial remission (H)     Transplant donor evaluation     Anxiety       Treatment Plan:  Patient Instructions   Continue bupropion  mg daily.     Decrease lamotrigine to 100 mg daily.    I will contact your neurologist regarding recommendations about lamotrigine versus topiramate for migraine prevention.     Continue individual therapy.     Continue all other medications per your primary care provider.    Schedule an appointment with me in 6 weeks or sooner as needed.  You may call [facility] at [phone] to schedule.    [place] Resources:      Go to the Emergency Department as needed or call after hours crisis line at [phone].      To schedule individual or family therapy, call [facility] at [phone].     Follow up with primary care provider as planned or sooner for acute medical concerns.    Call the psychiatric nurse line with medication questions or concerns at [phone].    Vital Systemshart may be used to communicate with your provider, but this is not intended to be used for emergencies.    Community Resources:      National Suicide Prevention Lifeline: 320.886.7090 (TTY: 388.968.9540). Call anytime for help.   (www.suicidepreventionlifeline.org)    National [place] on Mental Illness (www.irasema.org): 546-139-9153 or 637-271-9025.     Mental Health Association (www.mentalhealth.org): 152.856.1657 or 905-297-4357.    [place] Crisis Text Line: Text [place] to 053973    Suicide LifeLine Chat: suicideHungrio.org/chat         Administrative Billing:   Video call duration: 30 minutes   Start: 3:03 PM   Stop: 3:33 PM  Total time spent, including chart review and documentation: 40 minutes    Patient Status:  The patient is being referred to long term community psychiatry care and provider will provide bridging until patient is established with new community provider.

## 2023-03-22 NOTE — PATIENT INSTRUCTIONS
Continue bupropion  mg daily.     Decrease lamotrigine to 100 mg daily.    I will contact your neurologist regarding recommendations about lamotrigine versus topiramate for migraine prevention.     Continue individual therapy.     Continue all other medications per your primary care provider.    Schedule an appointment with me in 6 weeks or sooner as needed.  You may call [facility] at [phone] to schedule.    [place] Resources:    Go to the Emergency Department as needed or call after hours crisis line at [phone].    To schedule individual or family therapy, call [facility] at [phone].   Follow up with primary care provider as planned or sooner for acute medical concerns.  Call the psychiatric nurse line with medication questions or concerns at [phone].  Scanbuy may be used to communicate with your provider, but this is not intended to be used for emergencies.    Community Resources:    National Suicide Prevention Lifeline: 883.806.6376 (TTY: 938.439.1134). Call anytime for help.  (www.suicidepreventionlifeline.org)  National [place] on Mental Illness (www.irasema.org): 733.869.4319 or 130-285-0702.   Mental Health Association (www.mentalhealth.org): 162.242.8747 or 555-044-4994.  [place] Crisis Text Line: Text [place] to 546888  Suicide LifeLine Chat: suicideED01line.org/chat     Principal Discharge DX:	Peripheral edema  Secondary Diagnosis:	Headache  Secondary Diagnosis:	Essential hypertension

## 2023-03-22 NOTE — Clinical Note
Iban Hancock,  I am seeing Analia for a mood disorder and started lamotrigine at her last appointment.  I see today that is it not a good plan to combine in with topiramate.  She reports some cognitive and memory problems prior to starting lamotrigine.  Would it be possible to use lamotrigine for both mood and migraines, or would you advise against it?  Thanks so much.  Call me if it would be easier.  My cell is [phone].  Bette Babb MD Collaborative Care Psychiatry [facility]

## 2023-03-22 NOTE — PROGRESS NOTES
[facility] Care Psychiatry Service [place], [place]  2023      Behavioral Health Clinician Progress Note    Patient Name: Analia Jerry           Service Type:  Individual      Service Location:   MyChart / Email (patient reached)     Session Start Time: 2:26p  Session End Time: 2:56p      Session Length: 16 - 37      Attendees: Client     Service Modality:  Video Visit:      Provider verified identity through the following two step process.  Patient provided:  Patient photo and Patient     Telemedicine Visit: The patient's condition can be safely assessed and treated via synchronous audio and visual telemedicine encounter.      Reason for Telemedicine Visit: Services only offered telehealth    Originating Site (Patient Location): Patient's home    Distant Site (Provider Location): Provider Remote Setting- Home Office    Consent:  The patient/guardian has verbally consented to: the potential risks and benefits of telemedicine (video visit) versus in person care; bill my insurance or make self-payment for services provided; and responsibility for payment of non-covered services.     Patient would like the video invitation sent by:  My Chart    Mode of Communication:  Video Conference via [facility]    As the provider I attest to compliance with applicable laws and regulations related to telemedicine.    Visit Activities (Refresh list every visit): [facility] Only    Diagnostic Assessment Date: 3/15/22  Treatment Plan Review Date: 3/13/23  See Flowsheets for today's PHQ-9 and MAXIM-7 results  Previous PHQ-9:   PHQ-9 SCORE 2023 3/22/2023 3/22/2023   PHQ-9 Total Score - - -   PHQ-9 Total Score MyChart - - 7 (Mild depression)   PHQ-9 Total Score 13 7 7   Some encounter information is confidential and restricted. Go to Review Flowsheets activity to see all data.     Previous MAXIM-7:   MAXIM-7 SCORE 2023 3/22/2023 3/22/2023   Total Score - - -   Total Score 11 (moderate anxiety) - 8 (mild anxiety)  "  Total Score 11 8 8   Some encounter information is confidential and restricted. Go to Review Flowsheets activity to see all data.       KADE LEVEL:  KADE Score (Last Two) 8/15/2011   KADE Raw Score 37   Activation Score 49.9   KADE Level 2       DATA  Extended Session (60+ minutes): No  Interactive Complexity: No  Crisis: No  [facility] Patient: No    Treatment Objective(s) Addressed in This Session:  Target Behavior(s): depression symptoms    Depressed Mood: Increase interest, engagement, and pleasure in doing things    Current Stressors / Issues:  PT reports that she is doing \"good\". Pt reports she is trying to figure out if she is doing better because of new med (Lamotrogen) and off old med (Sertraline). PT reports that the memory issues reports last visit are still there, maybe slight improvement. PT wonders if Topamax effects her memory to. PT reports she feels that she is struggling at work- odd situations coming back to look like make mistakes or that she does not look like she knows what she is doing. Ex. Her boss stated that she felt that something was going on, like she knew she had a mood disorder. Her supervisor blamed her for a mistake another person made. PT stated she spoke to her supervisor about the number of assessments and she spoke to others and can only do for a few months and PT is frustrated that she is talking to others about her MH and she cant say that if she gets a note from a doctor. PT is going thru HR vs. her supervisor and they are thinking about 6, which is the same as others with a new system and then 8 and then back to 9 like everyone. PT feels like not pursing this issue anymore because she feels like she is on a \"shit list\". PT reports the impulsivity has stopped and not sure if she stopped meds or started the above mentioned meds. PT stated that this happened with starting and stopping Vybryd. Able to relax and is not anxious cleaning, no anger, but having crying spells. No increase in " "depression. PT reports that she is sleeping. Pt reports anxiety is better and not as bad as before but easily overwhelmed and \"sad upset\". PT reports that depression is managed and not concerned and crying is not bad but a way to release emotions. PT reports relationship are good. No other stressors. PT is coping by using her dogs, mindfulness. PT is seeing a therapist but they don't have great schedules that match. PT reports no SI, SIB, HI , safety concerns. PT does not think she needs refills.   Progress on Treatment Objective(s) / Homework:  Minimal progress - PREPARATION (Decided to change - considering how); Intervened by negotiating a change plan and determining options / strategies for behavior change, identifying triggers, exploring social supports, and working towards setting a date to begin behavior change    Motivational Interviewing    MI Intervention: Expressed Empathy/Understanding, Supported Autonomy, Collaboration, Evocation, Permission to raise concern or advise, Open-ended questions and Reflections: simple and complex     Change Talk Expressed by the Patient: Desire to change Ability to change Reasons to change Need to change Committment to change Activation Taking steps    Provider Response to Change Talk: E - Evoked more info from patient about behavior change, A - Affirmed patient's thoughts, decisions, or attempts at behavior change and S - Summarized patient's change talk statements      Care Plan review completed: Yes    Medication Review:  No changes to current psychiatric medication(s)    Medication Compliance:  Yes    Changes in Health Issues:   None reported    Chemical Use Review:   Substance Use: Chemical use reviewed, no active concerns identified      Tobacco Use: No current tobacco use.      Assessment: Current Emotional / Mental Status (status of significant symptoms):  Risk status (Self / Other harm or suicidal ideation)  Patient has had a history of suicidal ideation: hx of SI " several years ago.  denies current  Patient denies current fears or concerns for personal safety.  Patient denies current or recent suicidal ideation or behaviors.  Patient denies current or recent homicidal ideation or behaviors.  Patient denies current or recent self injurious behavior or ideation.  Patient denies other safety concerns.  A safety and risk management plan has not been developed at this time, however patient was encouraged to call [place] [phone] should there be a change in any of these risk factors.    Appearance:   Appropriate   Eye Contact:   Good   Psychomotor Behavior: Normal   Attitude:   Cooperative   Orientation:   All  Speech   Rate / Production: Normal    Volume:  Normal   Mood:    Anxious  Sad   Affect:    Flat   Thought Content:  Clear   Thought Form:  Coherent  Logical   Insight:    Good     Diagnoses:  1. Mild episode of recurrent major depressive disorder (H)        Collateral Reports Completed:  Communicated with: [facility] Psychiatrist    Plan: (Homework, other):  Patient was given information about behavioral services and encouraged to schedule a follow up appointment with the clinic [facility] as needed.  She was also given information about mental health symptoms and treatment options .  Has an individual therapist that sees regularly.  CD Recommendations: No indications of CD issues.  Miley Araujo MSW, LICSW      ______________________________________________________________________    Integrated Primary Care Behavioral Health Treatment Plan    Patient's Name: Analia Jerry  YOB: 1990    Date of Creation: 5/23/22  Date Treatment Plan Last Reviewed/Revised: 12/23/22    DSM5 Diagnoses: 296.31 (F33.0) Major Depressive Disorder, Recurrent Episode, Mild _ and With mixed features or 300.02 (F41.1) Generalized Anxiety Disorder  Psychosocial / Contextual Factors: grief, job change  PROMIS (reviewed every 90 days): 25    Referral / Collaboration:  Referral to another  professional/service is not indicated at this time. Pt has a long term therapist.    Anticipated number of session for this episode of care: 4-5  Anticipation frequency of session: Every 2 months  Anticipated Duration of each session: 16-37 minutes  Treatment plan will be reviewed in 90 days or when goals have been changed.       MeasurableTreatment Goal(s) related to diagnosis / functional impairment(s)  Goal 1: Patient will experience a reduction in depression symptom along with a corresponding increase in positive emotions and life satisfaction.      Objective #A (Patient Action)    Patient will Decrease frequency and intensity of feeling down, depressed, hopeless.  Status: New - Date: 12/13/22     Intervention(s)  Therapist will use cognitive-behavioral and acceptance and commitment therapy topics aimed to help reduce anxiety and depression.        Patient has reviewed and agreed to the above plan.      Miley Araujo, LICSW, LICSW, [facility]  February 13, 2023  Answers for HPI/ROS submitted by the patient on 3/22/2023  If you checked off any problems, how difficult have these problems made it for you to do your work, take care of things at home, or get along with other people?: Somewhat difficult  PHQ9 TOTAL SCORE: 7  MAXIM 7 TOTAL SCORE: 8

## 2023-03-30 ENCOUNTER — TELEPHONE (OUTPATIENT)
Dept: NEUROLOGY | Facility: CLINIC | Age: 33
End: 2023-03-30
Payer: COMMERCIAL

## 2023-03-30 NOTE — TELEPHONE ENCOUNTER
[facility] for patient to schedule sooner follow up with Dr. Gonzalez for headaches.     Dr. Gonzalez would like to see pt sooner than November, after she starts the lamotrigine, to see how her headaches are doing.    Per chart review she started lamotrigine 2/15/23.     Elaina WOODALL RN, BSN  [facility]

## 2023-04-04 ASSESSMENT — HEADACHE IMPACT TEST (HIT 6)
WHEN YOU HAVE A HEADACHE HOW OFTEN DO YOU WISH YOU COULD LIE DOWN: RARELY
HOW OFTEN HAVE YOU FELT TOO TIRED TO WORK BECAUSE OF YOUR HEADACHES: NEVER
HOW OFTEN DID HEADACHS LIMIT CONCENTRATION ON WORK OR DAILY ACTIVITY: NEVER
HOW OFTEN DO HEADACHES LIMIT YOUR DAILY ACTIVITIES: RARELY
WHEN YOU HAVE HEADACHES HOW OFTEN IS THE PAIN SEVERE: RARELY
HIT6 TOTAL SCORE: 42
HOW OFTEN HAVE YOU FELT FED UP OR IRRITATED BECAUSE OF YOUR HEADACHES: NEVER

## 2023-04-04 ASSESSMENT — MIGRAINE DISABILITY ASSESSMENT (MIDAS)
HOW MANY DAYS DID YOU NOT DO HOUSEWORK BECAUSE OF HEADACHES: 0
TOTAL SCORE: 0
ON A SCALE FROM 0-10 ON AVERAGE HOW PAINFUL WERE HEADACHES: 3
HOW OFTEN WERE SOCIAL ACTIVITIES MISSED DUE TO HEADACHES: 0
HOW MANY DAYS WAS HOUSEWORK PRODUCTIVITY CUT IN HALF DUE TO HEADACHES: 0
HOW MANY DAYS DID YOU MISS WORK OR SCHOOL BECAUSE OF HEADACHES: 0
HOW MANY DAYS WAS YOUR PRODUCTIVITY CUT IN HALF BECAUSE OF HEADACHES: 0
HOW MANY DAYS IN THE PAST 3 MONTHS HAVE YOU HAD A HEADACHE: 1

## 2023-04-05 ENCOUNTER — TELEPHONE (OUTPATIENT)
Dept: NEUROLOGY | Facility: CLINIC | Age: 33
End: 2023-04-05

## 2023-04-05 ENCOUNTER — VIRTUAL VISIT (OUTPATIENT)
Dept: NEUROLOGY | Facility: CLINIC | Age: 33
End: 2023-04-05
Payer: COMMERCIAL

## 2023-04-05 DIAGNOSIS — G43.709 CHRONIC MIGRAINE WITHOUT AURA WITHOUT STATUS MIGRAINOSUS, NOT INTRACTABLE: Primary | ICD-10-CM

## 2023-04-05 PROCEDURE — 99214 OFFICE O/P EST MOD 30 MIN: CPT | Mod: VID | Performed by: PSYCHIATRY & NEUROLOGY

## 2023-04-05 RX ORDER — ATENOLOL 25 MG/1
25 TABLET ORAL 3 TIMES DAILY
Qty: 90 TABLET | Refills: 11 | Status: SHIPPED | OUTPATIENT
Start: 2023-04-05 | End: 2023-07-10

## 2023-04-05 RX ORDER — TOPIRAMATE 25 MG/1
TABLET, FILM COATED ORAL
Qty: 42 TABLET | Refills: 0 | Status: SHIPPED | OUTPATIENT
Start: 2023-04-05 | End: 2023-05-10

## 2023-04-05 NOTE — NURSING NOTE
Is the patient currently in the state of [place]? YES    Visit mode:VIDEO    If the visit is dropped, the patient can be reconnected by: TELEPHONE VISIT: Phone number: [phone]    Will anyone else be joining the visit? NO      How would you like to obtain your AVS? MyChart    Are changes needed to the allergy or medication list? NO    Reason for visit:

## 2023-04-05 NOTE — TELEPHONE ENCOUNTER
[facility] Call Center    Phone Message    May a detailed message be left on voicemail: yes     Reason for Call: Medication Question or concern regarding medication   Prescription Clarification  Name of Medication: topiramate (TOPAMAX) 25 MG tablet  Prescribing Provider: Dr. Gonzalez   Pharmacy: [id number] [address]     What on the order needs clarification? Angelique from [facility] pharmacy is calling because the Pt quantity currently on this medication is 42 but the pharmacy state that the quantity should be 70. Please call [facility] pharmacy to confirm          Action Taken: Message routed to:  [facility] Neurology    Travel Screening: Not Applicable

## 2023-04-05 NOTE — PROGRESS NOTES
"Virtual Visit Details    Type of service:  Video Visit   7:33-7:50 am    Originating Location (pt. Location): Home    Distant Location (provider location):  Off-site  Platform used for Video Visit: [facility]    Headache Neurology Progress Note  April 5, 2023    Subjective:    Analia Jerry returns for follow up of chronic migraine.    Today, she reports that headaches are \"great\". She has not had any migraine attacks this month, one in the last 3 months.    She started lamotrigine in February. She experienced memory difficulty with lamotrigine at higher doses, and settled at 100 mg. She still has some memory difficulty, but isn't having significant language problems anymore, like she did at higher doses.    She continues to take topiramate at 100 mg nightly.  Overall, she feels that this medication is significantly helpful for headache reduction, but she still having some memory difficulty when taking this medicine, which has worsened in combination with lamotrigine.    Sumatriptan remains effective for acute treatment.  She does not need refill today.    Objective:    Vitals: There were no vitals taken for this visit.  General: Cooperative, NAD  Neurologic:  Mental Status: Fully alert, attentive and oriented. Speech clear and fluent.   Cranial Nerves: Facial movements symmetric.   Motor: No abnormal movements.      Assessment/Plan:   Analia Jerry is a 31 year old who returns for follow-up of chronic migraine without aura and history of lumbar puncture headache.   While significantly beneficial for headache reduction, she is having intolerable side effects with topiramate, especially since adding lamotrigine for mood.  I recommend she change from topiramate to atenolol.     For acute treatment of headache, I recommend the following:  -For acute treatment of mild headache, she will continue ibuprofen as needed, not to exceed more than 14 days/month to avoid medication " overuse.  - For acute treatment of moderate to severe headache, I recommend sumatriptan 100 mg taken at the onset of headache, with a repeat dose in 2 hours if needed.  This should not exceed more than 9 days/month to avoid medication overuse.  - For headache related nausea, I recommend metoclopramide 10 mg as needed.     Her headache frequency and severity warrant prevention.   For preventative treatment of headache, I recommend the following:  -I recommend she taper topiramate by 25 mg each week until off.  I provided a new prescription of 25 mg tablets to facilitate this taper.  -As she tapers topiramate, if headaches return, we discussed using atenolol as an alternative headache preventative.  I recommend atenolol 25 mg daily for 1 week, increasing by 25 mg each week, as needed and tolerated, to a maximum dose of 25 mg 3 times daily.  Potential side effects were discussed.  -If atenolol is not tolerated or not effective in the future, botulinum toxin injections using a chronic migraine protocol every 12 weeks, or a CGRP inhibitor could be considered.  With her currently changing antidepressant regimen, we will avoid adding a tricyclic antidepressant; this may also be contraindicated due to her elevated pulse rate.    I will plan to see her back in 3 to 6 months to monitor her progress.  I have asked her to contact me if she has significant side effects with atenolol.    Karissa Gonzalez MD  Neurology

## 2023-04-05 NOTE — LETTER
"4/5/2023       RE: Analia Jerry  [address]  [place] [address]     Dear Colleague,    Thank you for referring your patient, Analia Jerry, to the [facility] NEUROLOGY CLINIC [place] [facility]. Please see a copy of my visit note below.    [facility]    Headache Neurology Progress Note  April 5, 2023    Subjective:    Analia Jerry returns for follow up of chronic migraine.    Today, she reports that headaches are \"great\". She has not had any migraine attacks this month, one in the last 3 months.    She started lamotrigine in February. She experienced memory difficulty with lamotrigine at higher doses, and settled at 100 mg. She still has some memory difficulty, but isn't having significant language problems anymore, like she did at higher doses.    She continues to take topiramate at 100 mg nightly.  Overall, she feels that this medication is significantly helpful for headache reduction, but she still having some memory difficulty when taking this medicine, which has worsened in combination with lamotrigine.    Sumatriptan remains effective for acute treatment.  She does not need refill today.    Objective:    Vitals: There were no vitals taken for this visit.  General: Cooperative, NAD  Neurologic:  Mental Status: Fully alert, attentive and oriented. Speech clear and fluent.   Cranial Nerves: Facial movements symmetric.   Motor: No abnormal movements.      Assessment/Plan:   Analia Jerry is a 31 year old who returns for follow-up of chronic migraine without aura and history of lumbar puncture headache.   While significantly beneficial for headache reduction, she is having intolerable side effects with topiramate, especially since adding lamotrigine for mood.  I recommend she change from topiramate to atenolol.     For acute treatment of headache, I recommend the following:  -For acute treatment of mild " headache, she will continue ibuprofen as needed, not to exceed more than 14 days/month to avoid medication overuse.  - For acute treatment of moderate to severe headache, I recommend sumatriptan 100 mg taken at the onset of headache, with a repeat dose in 2 hours if needed.  This should not exceed more than 9 days/month to avoid medication overuse.  - For headache related nausea, I recommend metoclopramide 10 mg as needed.     Her headache frequency and severity warrant prevention.   For preventative treatment of headache, I recommend the following:  -I recommend she taper topiramate by 25 mg each week until off.  I provided a new prescription of 25 mg tablets to facilitate this taper.  -As she tapers topiramate, if headaches return, we discussed using atenolol as an alternative headache preventative.  I recommend atenolol 25 mg daily for 1 week, increasing by 25 mg each week, as needed and tolerated, to a maximum dose of 25 mg 3 times daily.  Potential side effects were discussed.  -If atenolol is not tolerated or not effective in the future, botulinum toxin injections using a chronic migraine protocol every 12 weeks, or a CGRP inhibitor could be considered.  With her currently changing antidepressant regimen, we will avoid adding a tricyclic antidepressant; this may also be contraindicated due to her elevated pulse rate.    I will plan to see her back in 3 to 6 months to monitor her progress.  I have asked her to contact me if she has significant side effects with atenolol.          Again, thank you for allowing me to participate in the care of your patient.      Sincerely,    Karissa Gonzalez MD

## 2023-04-06 NOTE — TELEPHONE ENCOUNTER
Called pharmacy yesterday and spoke to pharmacist and informed him pt is to start the taper now, pt was taking 100mg per day, now to take 75mg a day for 7 days, then 50mg a day for 7 days, then 25mg for 7 days and the quantity 42 is correct and he verbally understood. Thanked him for checking with us.

## 2023-04-28 DIAGNOSIS — E06.3 HYPOTHYROIDISM DUE TO HASHIMOTO'S THYROIDITIS: ICD-10-CM

## 2023-05-01 RX ORDER — LEVOTHYROXINE SODIUM 200 UG/1
200 TABLET ORAL DAILY
Qty: 90 TABLET | Refills: 0 | Status: SHIPPED | OUTPATIENT
Start: 2023-05-01 | End: 2023-07-24

## 2023-05-06 DIAGNOSIS — Z30.41 ENCOUNTER FOR SURVEILLANCE OF CONTRACEPTIVE PILLS: ICD-10-CM

## 2023-05-08 ENCOUNTER — MYC MEDICAL ADVICE (OUTPATIENT)
Dept: OBGYN | Facility: CLINIC | Age: 33
End: 2023-05-08
Payer: COMMERCIAL

## 2023-05-08 NOTE — TELEPHONE ENCOUNTER
"Requested Prescriptions   Pending Prescriptions Disp Refills     norethindrone-ethinyl estradiol (ORTHO-NOVUM 1/35 (28)) 1-35 MG-MCG tablet 112 tablet 3     Sig: Continuously by skipping placebo pills       Contraceptives Protocol Failed - 5/6/2023  4:15 PM        Failed - Recent (12 mo) or future (30 days) visit within the authorizing provider's specialty     Patient has had an office visit with the authorizing provider or a provider within the authorizing providers department within the previous 12 mos or has a future within next 30 days. See \"Patient Info\" tab in inbasket, or \"Choose Columns\" in Meds & Orders section of the refill encounter.              Passed - Patient is not a current smoker if age is 35 or older        Passed - Medication is active on med list        Passed - No active pregnancy on record        Passed - No positive pregnancy test in past 12 months       Hormone Replacement Therapy Failed - 5/6/2023  4:15 PM        Failed - Recent (12 mo) or future (30 days) visit within the authorizing provider's specialty     Patient has had an office visit with the authorizing provider or a provider within the authorizing providers department within the previous 12 mos or has a future within next 30 days. See \"Patient Info\" tab in inbasket, or \"Choose Columns\" in Meds & Orders section of the refill encounter.              Passed - Blood pressure under 140/90 in past 12 months     BP Readings from Last 3 Encounters:   10/28/22 114/82   10/06/22 117/81   10/06/22 119/86                 Passed - Medication is active on med list        Passed - Patient is 18 years of age or older        Passed - No active pregnancy on record        Passed - No positive pregnancy test on record in past 12 months           Pt is scheduled for an annual with MERRY Schafer CNP, on 5/16.  Sending pt a [employer] message to see if she needs a refill before then.    Sera Briseno RN    "

## 2023-05-10 ENCOUNTER — VIRTUAL VISIT (OUTPATIENT)
Dept: PSYCHIATRY | Facility: CLINIC | Age: 33
End: 2023-05-10
Payer: COMMERCIAL

## 2023-05-10 DIAGNOSIS — F39 MOOD DISORDER (H): ICD-10-CM

## 2023-05-10 PROCEDURE — 99214 OFFICE O/P EST MOD 30 MIN: CPT | Mod: VID | Performed by: PSYCHIATRY & NEUROLOGY

## 2023-05-10 RX ORDER — LAMOTRIGINE 200 MG/1
200 TABLET ORAL AT BEDTIME
Qty: 90 TABLET | Refills: 1 | Status: SHIPPED | OUTPATIENT
Start: 2023-05-10 | End: 2023-10-23

## 2023-05-10 RX ORDER — BUPROPION HYDROCHLORIDE 150 MG/1
150 TABLET ORAL EVERY MORNING
Qty: 90 TABLET | Refills: 1 | Status: SHIPPED | OUTPATIENT
Start: 2023-05-10 | End: 2023-06-26 | Stop reason: DRUGHIGH

## 2023-05-10 RX ORDER — BUPROPION HYDROCHLORIDE 300 MG/1
300 TABLET ORAL EVERY MORNING
Qty: 90 TABLET | Refills: 1 | Status: SHIPPED | OUTPATIENT
Start: 2023-05-10 | End: 2023-10-23

## 2023-05-10 NOTE — PATIENT INSTRUCTIONS
Continue bupropion  mg daily.     Continue lamotrigine 200 mg daily.     Continue individual therapy.     Continue all other medications per your primary care provider.    Per our discussion, your care is being transferred to another Collaborative Care Psychiatry provider.  You should receive a phone call to schedule an appointment, or you may call [phone] to schedule.    [place] Resources:    Go to the Emergency Department as needed or call after hours crisis line at [phone].    To schedule individual or family therapy, call [facility] at [phone].   Follow up with primary care provider as planned or sooner for acute medical concerns.  Call the psychiatric nurse line with medication questions or concerns at [phone].  Candy Labt may be used to communicate with your provider, but this is not intended to be used for emergencies.    Community Resources:    National Suicide Prevention Lifeline: 435.273.7158 (TTY: 694.231.6121). Call anytime for help.  (www.suicidepreventionlifeline.org)  National [place] on Mental Illness (www.irasema.org): 290.887.9077 or 776-523-2949.   Mental Health Association (www.mentalhealth.org): 658.823.2012 or 234-875-7276.  [place] Crisis Text Line: Text [place] to 244598  Suicide LifeLine Chat: suicidepreventionlifeline.org/chat    Patient Education   Collaborative Care Psychiatry Service  What to Expect  Here's what to expect from your Collaborative Care Psychiatry Service (CCPS).   About CCPS  CCPS means 2 people work together to help you get better. You'll meet with a behavioral health clinician and a psychiatric doctor. A behavioral health clinician helps people with mental health problems by talking with them. A psychiatric doctor helps people by giving them medicine.  How it works  At every visit, you'll see the behavioral health clinician (BHC) first. They'll talk with you about how you're doing and teach you how to feel better.   Then you'll see  "the psychiatric doctor. This doctor can help you deal with troubling thoughts and feelings by giving you medicine. They'll make sure you know the plan for your care.   CCPS usually takes 3 to 6 visits. If you need more visits, we may have you start seeing a different psychiatric doctor for ongoing care.  If you have any questions or concerns, we'll be glad to talk with you.  About visits  Be open  At your visits, please talk openly about your problems. It may feel hard, but it's the best way for us to help you.  Cancelling visits  If you can't come to your visit, please call us right away at [phone]. If you don't cancel at least 24 hours (1 full day) before your visit, that's \"late cancellation.\"  Being late to visits  Being very late is the same as not showing up. You will be a \"no show\" if:  Your appointment starts with a [facility], and you're more than 15 minutes late for a 30-minute (half hour) visit. This will also cancel your appointment with the psychiatric doctor.  Your appointment is with a psychiatric doctor only, and you're more than 15 minutes late for a 30-minute (half hour) visit.  Your appointment is with a psychiatric doctor only, and you're more than 30 minutes late for a 60-minute (full hour) visit.  If you cancel late or don't show up 2 times within 6 months, we may end your care.   Getting help between visits  If you need help between visits, you can call us Monday to Friday from 8 a.m. to 4:30 p.m. at [phone].  Emergency care  Call 911 or go to the nearest emergency department if your life or someone else's life is in danger.  Call 988 anytime to reach the national Suicide and Crisis hotline.  Medicine refills  To refill your medicine, call your pharmacy. You can also call [facility]'s Behavioral Access at [phone], Monday to Friday, 8 a.m. to 4:30 p.m. It can take 1 to 3 business days to get a refill.   Forms, letters, and tests  You may have papers to fill out, like FMLA, " short-term disability, and workability. We can help you with these forms at your visits, but you must have an appointment. You may need more than 1 visit for this, to be in an intensive therapy program, or both.  Before we can give you medicine for ADHD, we may refer you to get tested for it or confirm it another way.  We may not be able to give you an emotional support animal letter.  We don't do mental health checks ordered by the court.   We don't do mental health testing, but we can refer you to get tested.   Thank you for choosing us for your care.  For informational purposes only. Not to replace the advice of your health care provider. Copyright   2022 [facility]. All rights reserved. [employer] [id number] - 12/22.

## 2023-05-10 NOTE — PROGRESS NOTES
Virtual Visit Details    Type of service:  Video Visit   Originating Location (pt. Location): Home  Distant Location (provider location):  On-site  Platform used for Video Visit: [facility]       Outpatient Psychiatric Progress Note    Name: Analia Jerry   : 1990                    Primary Care Provider: MERRY WELLS CNP   Therapist: Tayler Munoz       PHQ-9 scores:      2022     9:32 PM 2023    10:00 AM 3/22/2023    10:59 AM   PHQ-9 SCORE   PHQ-9 Total Score MyChart 5 (Mild depression)  7 (Mild depression)   PHQ-9 Total Score 5    5 13 7    7       MAXIM-7 scores:      2022     9:33 PM 2023    12:40 PM 3/22/2023    11:00 AM   MAXIM-7 SCORE   Total Score 3 (minimal anxiety) 11 (moderate anxiety) 8 (mild anxiety)   Total Score 3    3 11 8    8       Patient Identification:  Analia is a 32 year old year old female  who presents for return visit with me.  Patient attended the session alone.     Interim History:  Analia reports that she has completely tapered off Topamax and is taking 200 mg daily of lamotrigine.  She is possibly a little more irritable.  She has been having headaches daily since not being on the Topamax, but was recently started on atenolol for migraine prophylaxis by her neurologist.  She reports that her memory has improved.  She is having fewer problems with loss of words and is not as forgetful.  Her sleep may be a little more difficult, but she has dogs that are getting older and wake her in the night.    She rates her mood today is 8 out of 10 with 10 being the best.  She denies any impulsive behaviors such as driving recklessly or spending money on lottery tickets.    She would like to continue lamotrigine at her current dose for now and [facility] once I have left the [place] system.  We discussed the possibility that she could get by without bupropion, as antidepressants have been shown to increase the risk of rapid  cycling bipolar disorder.  We agreed to not make any changes at this point.    Vital Signs:   There were no vitals taken for this visit.      Current Medications:  Current Outpatient Medications   Medication     atenolol (TENORMIN) 25 MG tablet     buPROPion (WELLBUTRIN XL) 150 MG 24 hr tablet     buPROPion (WELLBUTRIN XL) 300 MG 24 hr tablet     lamoTRIgine (LAMICTAL) 200 MG tablet     levothyroxine (SYNTHROID/LEVOTHROID) 200 MCG tablet     metoclopramide (REGLAN) 10 MG tablet     nitroFURantoin macrocrystal (MACRODANTIN) 100 MG capsule     norethindrone-ethinyl estradiol (ORTHO-NOVUM 1/35, 28,) 1-35 MG-MCG tablet     omeprazole (PRILOSEC) 20 MG DR capsule     rOPINIRole (REQUIP) 0.5 MG tablet     SUMAtriptan (IMITREX) 100 MG tablet     No current facility-administered medications for this visit.        Mental Status Examination:  Analia is a 32-year-old woman in no acute distress.  Speech is clear and normal in rate and tone.  Eye contact is good over the video connection.  Affect is slightly blunted.  Mood is good.  Thoughts are logical and spontaneous with no loose associations or flight of ideas.  Thought content shows no psychosis.  No suicidal thoughts.  She is alert and oriented x3.    Assessment and Plan:    ICD-10-CM    1. Mood disorder (H)  F39 lamoTRIgine (LAMICTAL) 200 MG tablet     Adult Mental Health  Referral          Medical comorbidities include:   Patient Active Problem List   Diagnosis     Familial hematuria     CARDIOVASCULAR SCREENING; LDL GOAL LESS THAN 160     Mechanical low back pain     Keloid scar     ASCUS of cervix with negative high risk HPV     Common wart     Hypothyroidism due to Hashimoto's thyroiditis     High triglycerides     Elevated liver enzymes     Class 1 obesity due to excess calories with body mass index (BMI) of 32.0 to 32.9 in adult, unspecified whether serious comorbidity present     Mood disorder (H)     FRANK (obstructive sleep apnea) - mild (AHI 7)     Morbid  obesity (H)     Moderate episode of recurrent major depressive disorder (H)     Fatigue, unspecified type     Posttraumatic stress disorder     Recurrent major depression in partial remission (H)     Transplant donor evaluation     Anxiety       Treatment Plan:  Patient Instructions   Continue bupropion  mg daily.     Continue lamotrigine 200 mg daily.     Continue individual therapy.     Continue all other medications per your primary care provider.    Per our discussion, your care is being transferred to another Collaborative Care Psychiatry provider.  You should receive a phone call to schedule an appointment, or you may call [phone] to schedule.    [place] Resources:      Go to the Emergency Department as needed or call after hours crisis line at [phone].      To schedule individual or family therapy, call [facility] at [phone].     Follow up with primary care provider as planned or sooner for acute medical concerns.    Call the psychiatric nurse line with medication questions or concerns at [phone].    Local [employer] may be used to communicate with your provider, but this is not intended to be used for emergencies.    Community Resources:      National Suicide Prevention Lifeline: 522.760.8345 (TTY: 181.700.6548). Call anytime for help.  (www.suicidepreventionlifeline.org)    National [place] on Mental Illness (www.irasema.org): 762.677.2476 or 860-779-5772.     Mental Health Association (www.mentalhealth.org): 263.827.5341 or 006-881-7696.    [place] Crisis Text Line: Text [place] to 024737    Suicide LifeLine Chat: suicidepreventionlifeline.org/chat    Patient Education   Collaborative Care Psychiatry Service  What to Expect  Here's what to expect from your Collaborative Care Psychiatry Service (CCPS).   About CCPS  CCPS means 2 people work together to help you get better. You'll meet with a behavioral health clinician and a psychiatric doctor. A behavioral health clinician  "helps people with mental health problems by talking with them. A psychiatric doctor helps people by giving them medicine.  How it works  At every visit, you'll see the behavioral health clinician (BHC) first. They'll talk with you about how you're doing and teach you how to feel better.   Then you'll see the psychiatric doctor. This doctor can help you deal with troubling thoughts and feelings by giving you medicine. They'll make sure you know the plan for your care.   CCPS usually takes 3 to 6 visits. If you need more visits, we may have you start seeing a different psychiatric doctor for ongoing care.  If you have any questions or concerns, we'll be glad to talk with you.  About visits  Be open  At your visits, please talk openly about your problems. It may feel hard, but it's the best way for us to help you.  Cancelling visits  If you can't come to your visit, please call us right away at [phone]. If you don't cancel at least 24 hours (1 full day) before your visit, that's \"late cancellation.\"  Being late to visits  Being very late is the same as not showing up. You will be a \"no show\" if:    Your appointment starts with a BHC, and you're more than 15 minutes late for a 30-minute (half hour) visit. This will also cancel your appointment with the psychiatric doctor.    Your appointment is with a psychiatric doctor only, and you're more than 15 minutes late for a 30-minute (half hour) visit.    Your appointment is with a psychiatric doctor only, and you're more than 30 minutes late for a 60-minute (full hour) visit.  If you cancel late or don't show up 2 times within 6 months, we may end your care.   Getting help between visits  If you need help between visits, you can call us Monday to Friday from 8 a.m. to 4:30 p.m. at [phone].  Emergency care  Call 911 or go to the nearest emergency department if your life or someone else's life is in danger.  Call 458 anytime to reach the national Suicide and " Crisis hotline.  Medicine refills  To refill your medicine, call your pharmacy. You can also call [facility]'s Behavioral Access at [phone], Monday to Friday, 8 a.m. to 4:30 p.m. It can take 1 to 3 business days to get a refill.   Forms, letters, and tests  You may have papers to fill out, like FMLA, short-term disability, and workability. We can help you with these forms at your visits, but you must have an appointment. You may need more than 1 visit for this, to be in an intensive therapy program, or both.  Before we can give you medicine for ADHD, we may refer you to get tested for it or confirm it another way.  We may not be able to give you an emotional support animal letter.  We don't do mental health checks ordered by the court.   We don't do mental health testing, but we can refer you to get tested.   Thank you for choosing us for your care.  For informational purposes only. Not to replace the advice of your health care provider. Copyright   2022 [facility]. All rights reserved. [employer] [id number] - 12/22.        Administrative Billing:   Video call duration: 20 minutes   Start: 11:28 AM   Stop: 11:48 AM   Total time spent, including chart review and documentation: 30 minutes    Patient Status:  [facility].

## 2023-05-10 NOTE — NURSING NOTE
Is the patient currently in the state of [place]? YES    Visit mode:VIDEO    If the visit is dropped, the patient can be reconnected by: VIDEO VISIT: Text to cell phone: [phone]    Will anyone else be joining the visit? NO      How would you like to obtain your AVS? MyChart    Are changes needed to the allergy or medication list? YES: Please remove sertraline and topamax    Reason for visit: Video Visit        Care team has reviewed attendance agreement with patient. Patient advised that two failed appointments within 6 months may lead to termination of current episode of care.

## 2023-05-15 ASSESSMENT — ENCOUNTER SYMPTOMS
NAUSEA: 0
WEAKNESS: 0
CHILLS: 0
DIARRHEA: 0
CONSTIPATION: 0
PALPITATIONS: 0
EYE PAIN: 0
MYALGIAS: 0
DIZZINESS: 0
FREQUENCY: 1
SHORTNESS OF BREATH: 0
COUGH: 0
FEVER: 0
SORE THROAT: 0
DYSURIA: 0
ABDOMINAL PAIN: 0
HEADACHES: 1
HEMATURIA: 0
JOINT SWELLING: 0
PARESTHESIAS: 0
HEARTBURN: 0
ARTHRALGIAS: 0
HEMATOCHEZIA: 0
NERVOUS/ANXIOUS: 0
BREAST MASS: 0

## 2023-05-16 ENCOUNTER — OFFICE VISIT (OUTPATIENT)
Dept: OBGYN | Facility: CLINIC | Age: 33
End: 2023-05-16
Payer: COMMERCIAL

## 2023-05-16 VITALS
OXYGEN SATURATION: 100 % | DIASTOLIC BLOOD PRESSURE: 78 MMHG | HEART RATE: 91 BPM | BODY MASS INDEX: 32.55 KG/M2 | HEIGHT: 68 IN | SYSTOLIC BLOOD PRESSURE: 128 MMHG | WEIGHT: 214.8 LBS

## 2023-05-16 DIAGNOSIS — Z01.419 ENCOUNTER FOR GYNECOLOGICAL EXAMINATION WITHOUT ABNORMAL FINDING: Primary | ICD-10-CM

## 2023-05-16 DIAGNOSIS — D50.9 IRON DEFICIENCY ANEMIA, UNSPECIFIED IRON DEFICIENCY ANEMIA TYPE: ICD-10-CM

## 2023-05-16 DIAGNOSIS — Z30.41 ENCOUNTER FOR SURVEILLANCE OF CONTRACEPTIVE PILLS: ICD-10-CM

## 2023-05-16 DIAGNOSIS — E06.3 HYPOTHYROIDISM DUE TO HASHIMOTO'S THYROIDITIS: ICD-10-CM

## 2023-05-16 LAB
ERYTHROCYTE [DISTWIDTH] IN BLOOD BY AUTOMATED COUNT: 11.9 % (ref 10–15)
HCT VFR BLD AUTO: 36 % (ref 35–47)
HGB BLD-MCNC: 12.7 G/DL (ref 11.7–15.7)
MCH RBC QN AUTO: 31.3 PG (ref 26.5–33)
MCHC RBC AUTO-ENTMCNC: 35.3 G/DL (ref 31.5–36.5)
MCV RBC AUTO: 89 FL (ref 78–100)
PLATELET # BLD AUTO: 311 10E3/UL (ref 150–450)
RBC # BLD AUTO: 4.06 10E6/UL (ref 3.8–5.2)
WBC # BLD AUTO: 6.9 10E3/UL (ref 4–11)

## 2023-05-16 PROCEDURE — 85027 COMPLETE CBC AUTOMATED: CPT | Performed by: NURSE PRACTITIONER

## 2023-05-16 PROCEDURE — 99395 PREV VISIT EST AGE 18-39: CPT | Performed by: NURSE PRACTITIONER

## 2023-05-16 PROCEDURE — 84443 ASSAY THYROID STIM HORMONE: CPT | Performed by: NURSE PRACTITIONER

## 2023-05-16 PROCEDURE — 82728 ASSAY OF FERRITIN: CPT | Performed by: NURSE PRACTITIONER

## 2023-05-16 PROCEDURE — 36415 COLL VENOUS BLD VENIPUNCTURE: CPT | Performed by: NURSE PRACTITIONER

## 2023-05-16 NOTE — PROGRESS NOTES
SUBJECTIVE:   CC: Analia is an 32 year old who presents for preventive health visit.     Healthy Habits:     Getting at least 3 servings of Calcium per day:  NO    Bi-annual eye exam:  Yes    Dental care twice a year:  Yes    Sleep apnea or symptoms of sleep apnea:  None    Diet:  Regular (no restrictions)    Frequency of exercise:  None    Taking medications regularly:  Yes    Medication side effects:  Not applicable    PHQ-2 Total Score: 2    Additional concerns today:  No    Does continuous cycling with her oral contraceptive pills, generally does not take a break and no concerns with breakthrough bleeding.    Today's PHQ-2 Score:       5/15/2023     2:48 PM   PHQ-2 ( 1999 [employer])   Q1: Little interest or pleasure in doing things 1   Q2: Feeling down, depressed or hopeless 1   PHQ-2 Score 2   Q1: Little interest or pleasure in doing things Several days   Q2: Feeling down, depressed or hopeless Several days   PHQ-2 Score 2           Social History     Tobacco Use     Smoking status: Never     Smokeless tobacco: Never   Vaping Use     Vaping status: Not on file   Substance Use Topics     Alcohol use: Yes             5/15/2023     2:47 PM   Alcohol Use   Prescreen: >3 drinks/day or >7 drinks/week? Not Applicable       Reviewed orders with patient.  Reviewed health maintenance and updated orders accordingly - Yes  Patient Active Problem List   Diagnosis     Familial hematuria     CARDIOVASCULAR SCREENING; LDL GOAL LESS THAN 160     Mechanical low back pain     Keloid scar     ASCUS of cervix with negative high risk HPV     Common wart     Hypothyroidism due to Hashimoto's thyroiditis     High triglycerides     Elevated liver enzymes     Class 1 obesity due to excess calories with body mass index (BMI) of 32.0 to 32.9 in adult, unspecified whether serious comorbidity present     Mood disorder (H)     FRANK (obstructive sleep apnea) - mild (AHI 7)     Morbid obesity (H)     Moderate episode of recurrent major  depressive disorder (H)     Fatigue, unspecified type     Posttraumatic stress disorder     Recurrent major depression in partial remission (H)     Transplant donor evaluation     Anxiety     Past Surgical History:   Procedure Laterality Date     CHOLECYSTECTOMY  12/2019     COLONOSCOPY N/A 12/16/2015    Procedure: COLONOSCOPY;  Surgeon: Duane, William Charles, MD;  Location: [facility]     COLONOSCOPY N/A 08/11/2022    Procedure: COLONOSCOPY, WITH POLYPECTOMY AND BIOPSY;  Surgeon: Aurora Paez DO;  Location: [facility]     COLONOSCOPY WITH CO2 INSUFFLATION N/A 12/16/2015    Procedure: COLONOSCOPY WITH CO2 INSUFFLATION;  Surgeon: Duane, William Charles, MD;  Location: [facility]     COLONOSCOPY WITH CO2 INSUFFLATION N/A 08/11/2022    Procedure: COLONOSCOPY, WITH CO2 INSUFFLATION;  Surgeon: Aurora Paez DO;  Location: [facility]     COMBINED ESOPHAGOSCOPY, GASTROSCOPY, DUODENOSCOPY (EGD) WITH CO2 INSUFFLATION N/A 08/11/2022    Procedure: ESOPHAGOGASTRODUODENOSCOPY, WITH CO2 INSUFFLATION;  Surgeon: Aurora Paez DO;  Location: [facility]     ESOPHAGOSCOPY, GASTROSCOPY, DUODENOSCOPY (EGD), COMBINED N/A 08/11/2022    Procedure: ESOPHAGOGASTRODUODENOSCOPY, WITH BIOPSY;  Surgeon: Aurora Paez DO;  Location: [facility]       Social History     Tobacco Use     Smoking status: Never     Smokeless tobacco: Never   Vaping Use     Vaping status: Not on file   Substance Use Topics     Alcohol use: Yes     Family History   Problem Relation Age of Onset     Hyperlipidemia Mother      Thyroid Disease Mother      Kidney Disease Mother      Hyperlipidemia Father      Substance Abuse Sister      Substance Abuse Sister      Depression Brother      No Known Problems Maternal Grandmother      Myocardial Infarction Maternal Grandfather      No Known Problems Paternal Grandmother      No Known Problems Paternal Grandfather      Breast Cancer Cousin      C.A.D. No family hx of      Diabetes No family hx of      Breast Cancer No family hx of       Cancer - colorectal No family hx of      Anesthesia Reaction No family hx of      Blood Disease No family hx of            Breast Cancer Screening:    FHS-7:   Patient under 40 years of age: Routine Mammogram Screening not recommended.   Pertinent mammograms are reviewed under the imaging tab.    History of abnormal Pap smear: NO - age 30-65 PAP every 5 years with negative HPV co-testing recommended      Latest Ref Rng & Units 4/15/2022    12:25 PM 6/6/2019    12:56 PM 6/6/2019     8:30 AM   PAP / HPV   PAP  Negative for Intraepithelial Lesion or Malignancy (NILM)       PAP (Historical)   NIL      HPV 16 DNA Negative Negative    Negative     HPV 18 DNA Negative Negative    Negative     Other HR HPV Negative Negative    Negative       Reviewed and updated as needed this visit by clinical staff   Tobacco  Allergies  Meds   Med Hx  Surg Hx  Fam Hx  Soc Hx        Reviewed and updated as needed this visit by Provider                 Past Medical History:   Diagnosis Date     Acute gallstone pancreatitis 12/28/2019     Anxiety      ASCUS of cervix with negative high risk HPV 01/26/2017    ASCUS/neg HR HPV.     Juarez's esophagus determined by biopsy 2022     Depressive disorder      Gallstones 12/28/2019    [facility]     H/O colposcopy with cervical biopsy 03/23/2017    Bx & ECC - negative     Hashimoto's disease      Headache(784.0)      Hypothyroidism due to Hashimoto's thyroiditis      Papanicolaou smear of cervix with atypical squamous cells of undetermined significance (ASC-US) 12/03/2015      Past Surgical History:   Procedure Laterality Date     CHOLECYSTECTOMY  12/2019     COLONOSCOPY N/A 12/16/2015    Procedure: COLONOSCOPY;  Surgeon: Duane, William Charles, MD;  [facility]: [facility]     COLONOSCOPY N/A 08/11/2022    Procedure: COLONOSCOPY, WITH POLYPECTOMY AND BIOPSY;  Surgeon: Aurora Paez DO;  [facility]: [facility]     COLONOSCOPY WITH CO2 INSUFFLATION N/A 12/16/2015    Procedure:  "COLONOSCOPY WITH CO2 INSUFFLATION;  Surgeon: Duane, William Charles, MD;  [facility]     COLONOSCOPY WITH CO2 INSUFFLATION N/A 08/11/2022    Procedure: COLONOSCOPY, WITH CO2 INSUFFLATION;  Surgeon: Aurora Paez DO;  [facility]     COMBINED ESOPHAGOSCOPY, GASTROSCOPY, DUODENOSCOPY (EGD) WITH CO2 INSUFFLATION N/A 08/11/2022    Procedure: ESOPHAGOGASTRODUODENOSCOPY, WITH CO2 INSUFFLATION;  Surgeon: Aurora Paez DO;  [facility]     ESOPHAGOSCOPY, GASTROSCOPY, DUODENOSCOPY (EGD), COMBINED N/A 08/11/2022    Procedure: ESOPHAGOGASTRODUODENOSCOPY, WITH BIOPSY;  Surgeon: Aurora Paez DO;  Location: [facility] OR       Review of Systems  CONSTITUTIONAL: NEGATIVE for fever, chills, change in weight  INTEGUMENTARU/SKIN: NEGATIVE for worrisome rashes, moles or lesions  EYES: NEGATIVE for vision changes or irritation  ENT: NEGATIVE for ear, mouth and throat problems  RESP: NEGATIVE for significant cough or SOB  BREAST: NEGATIVE for masses, tenderness or discharge  CV: NEGATIVE for chest pain, palpitations or peripheral edema  GI: NEGATIVE for nausea, abdominal pain, heartburn, or change in bowel habits  : NEGATIVE for unusual urinary or vaginal symptoms. Periods are suppressed with oral contraceptive pill.  MUSCULOSKELETAL: NEGATIVE for significant arthralgias or myalgia  NEURO: NEGATIVE for weakness, dizziness or paresthesias  PSYCHIATRIC: NEGATIVE for changes in mood or affect     OBJECTIVE:   /78 (BP Location: Right arm, Patient Position: Sitting, Cuff Size: Adult Large)   Pulse 91   Ht 1.727 m (5' 8\")   Wt 97.4 kg (214 lb 12.8 oz)   SpO2 100%   BMI 32.66 kg/m    Physical Exam  GENERAL: healthy, alert and no distress  NECK: no adenopathy, no asymmetry, masses, or scars and thyroid normal to palpation  RESP: lungs clear to auscultation - no rales, rhonchi or wheezes  BREAST: normal without masses, tenderness or nipple discharge and no palpable axillary masses or adenopathy  CV: regular rate " "and rhythm, normal S1 S2, no S3 or S4, no murmur, click or rub, no peripheral edema and peripheral pulses strong  ABDOMEN: soft, nontender, no hepatosplenomegaly, no masses and bowel sounds normal   (female): normal female external genitalia, normal urethral meatus, vaginal mucosa pink, moist, well rugated, and normal cervix/adnexa/uterus without masses or discharge  MS: no gross musculoskeletal defects noted, no edema  SKIN: no suspicious lesions or rashes  NEURO: Normal strength and tone, mentation intact and speech normal  PSYCH: mentation appears normal, affect normal/bright    ASSESSMENT/PLAN:   (Z01.419) Encounter for gynecological examination without abnormal finding  (primary encounter diagnosis)  Comment: Health maintenance reviewed. Pap smear up to date.     (Z30.41) Encounter for surveillance of contraceptive pills  Comment: Refill x 1 year for extended cycling  Plan: norethindrone-ethinyl estradiol (ORTHO-NOVUM         1/35, 28,) 1-35 MG-MCG tablet        COUNSELING:  Reviewed preventive health counseling, as reflected in patient instructions  Special attention given to:        Regular exercise       Healthy diet/nutrition       Contraception      BMI:   Estimated body mass index is 32.66 kg/m  as calculated from the following:    Height as of this encounter: 1.727 m (5' 8\").    Weight as of this encounter: 97.4 kg (214 lb 12.8 oz).   Weight management plan: Discussed healthy diet and exercise guidelines      She reports that she has never smoked. She has never used smokeless tobacco.          MERRY Carrington CNP  [facility]  "

## 2023-05-16 NOTE — PATIENT INSTRUCTIONS
If you have any questions regarding your visit, Please contact your care team.     [facility]: [phone]  To Schedule an Appointment 24/7  Call: [phone]LEPHONE [facility]     Zuleyma Gross- APRN CNP      Aaron Rice-Surgery Scheduler  Lidia-Surgery Scheduler         Monday 7:30 am-5:00 pm    Tuesday 8:00 am-4:00 pm    Wednesday 7:30 am-4:00 pm    Thursday 8:00 am-11:00 am    Friday 7:30 am-4:00 pm [address] [address] [place] [address] [phone] ask for [facility]  [phone] Fax    Imaging Scheduling all locations  [phone]    [facility]  [address] [place] [address] Dr.  [place], [place] [id number] [phone]         Urgent Care locations:  [facility]   Monday-Friday  10 am - 8 pm  Saturday and Sunday   9 am - 5 pm     [phone]   If you need a medication refill, please contact your pharmacy. Please allow 3 business days for your refill to be completed.  As always, Thank you for trusting us with your healthcare needs!      see additional instructions from your care team below

## 2023-05-17 LAB
FERRITIN SERPL-MCNC: 28 NG/ML (ref 12–150)
TSH SERPL DL<=0.005 MIU/L-ACNC: 1.52 MU/L (ref 0.4–4)

## 2023-05-24 ENCOUNTER — MYC MEDICAL ADVICE (OUTPATIENT)
Dept: OBGYN | Facility: CLINIC | Age: 33
End: 2023-05-24
Payer: COMMERCIAL

## 2023-05-24 NOTE — TELEPHONE ENCOUNTER
Wouldn't they already be dispensing a generic? Is it that they are no longer able to get the medication or that it is not on the market any longer? If they have a generic form of the same pill available, they should just be able to switch it-that would be my first recommendation-to change to generic of the brand she is on. Zuleyma SOUSA CNP

## 2023-05-24 NOTE — TELEPHONE ENCOUNTER
norethindrone-ethinyl estradiol (ORTHO-NOVUM 1/35, 28,) 1-35 MG-MCG tablet was prescribed for pt by MERRY Schafer CNP, on 5/16/23.      Pt is writing in stating her pharmacy said the above BCP is no longer available and is wondering if they can switch it to the generic brand or send in a different rx.    Routing to Zuleyma to advise.    Sera Briseno RN

## 2023-06-02 DIAGNOSIS — G43.709 CHRONIC MIGRAINE WITHOUT AURA WITHOUT STATUS MIGRAINOSUS, NOT INTRACTABLE: Primary | ICD-10-CM

## 2023-06-16 DIAGNOSIS — G43.709 CHRONIC MIGRAINE WITHOUT AURA WITHOUT STATUS MIGRAINOSUS, NOT INTRACTABLE: Primary | ICD-10-CM

## 2023-06-16 RX ORDER — FREMANEZUMAB-VFRM 225 MG/1.5ML
1.5 INJECTION SUBCUTANEOUS
Qty: 1.5 ML | Refills: 11 | Status: SHIPPED | OUTPATIENT
Start: 2023-06-16 | End: 2023-11-08

## 2023-06-19 ENCOUNTER — TELEPHONE (OUTPATIENT)
Dept: NEUROLOGY | Facility: CLINIC | Age: 33
End: 2023-06-19
Payer: COMMERCIAL

## 2023-06-19 NOTE — TELEPHONE ENCOUNTER
Prior Authorization Retail Medication Request    Medication/Dose: Ajovy 225 mg every 30 days  ICD code (if different than what is on RX):  G43.709  Previously Tried and Failed:  will avoid adding a tricyclic antidepressant; this may also be contraindicated due to her elevated pulse rate.  Atenolol,topiramate,lamotrigine    Rationale:  Migraine    Insurance Name:  [employer]  Insurance ID:  [id number]     Pharmacy Information (if different than what is on RX)  Name:    Phone:

## 2023-06-22 NOTE — TELEPHONE ENCOUNTER
Prior Authorization Approval    Medication: AJOVY 225 MG/1.5ML SC SOAJ  Authorization Effective Date: 6/16/2023  Authorization Expiration Date: 6/16/2024  Approved Dose/Quantity:   Reference #:     Insurance Company: [employer] - Phone [phone] Fax [phone]  Expected CoPay:       CoPay Card Available:      Financial Assistance Needed:   Which Pharmacy is filling the prescription: [id number] - [place], [place] - [address]  Pharmacy Notified: Yes  Patient Notified: No-Pharmacy will notify patient when ready.

## 2023-06-23 NOTE — PROGRESS NOTES
"Virtual Visit Details    Type of service:  Video Visit     Originating Location (pt. Location): Home    Distant Location (provider location):  Off-site  Platform used for Video Visit: [facility]   PSYCHIATRIC  DIAGNOSTIC  EVALUATION                                                                    Name:  Analia Jerry  : 1990     Telemedicine Visit: The patient's condition can be safely assessed and treated via synchronous audio and visual telemedicine encounter.      Consent:  The patient/guardian has verbally consented to: the potential risks and benefits of telemedicine (video visit or phone) versus in person care; bill my insurance or make self-payment for services provided; and responsibility for payment of non-covered services.     As the provider I attest to compliance with applicable laws and regulations related to telemedicine.    Source of Referral:  Primary Care Provider: MERRY WELLS CNP   Current Psychotherapist: evan Anderson ([facility]).     PCP Clinical Question for referral: \"previously saw psychiatrist but worsening mental health again\" 2022    The [facility] psychiatry providers act as a specialty service for Primary Care Providers in the [facility] who seek to optimize medications for unstable patients. Once medications have been optimized, [facility] providers discharge the patient back to the referring Primary Care Provider for ongoing medication management. This type of system allows [facility] to serve a high volume of patients.     Identifying Data:  Patient is a 32 year old,  White Not  or  female  who presents for initial visit with me.    Patient prefers to be called: \"Analia\".  Patient is currently employed full time.     HPI  Patient presents for initial psychiatric evaluation with the [facility] ([facility]) for evaluation of possible bipolar disorder.      RECORDS AVAILABLE FOR REVIEW: EHR records through [facility] " " and previous psychiatric progress note.       Chief Complaint:  Analia is a 32F seen today for initial evaluation with this [facility] provider. Patient previously seen with [facility] provider YULIET Collins (2/2021-8/2021) and returned to PCP, then re-referred and started with Dr. Babb (03/2022-05/2023) for symptom exacerbation. At last appointment 2 month ago, continued lamotrigine 200 mg every day and bupropion  mg every day. Today she reports ADHERING to prescribed medication. She denies any known side effects from the medications. (Did have mild rash when started lamotrigine but resolved without issue, denies all now). When lamotrigine was added (02/2023) she noticed improvement in her mood lability, impulsive behaviors, and irritable / agitation. Over the last ~2 weeks she reports worsened again without clear trigger. Reports more crying spells \"randomly, easily\", more irritability and easily agitated, \"quick to anger really fast\". Reports feeling she goes from crying to angry \"super quick\" which had seemed better over the last few months. Does report throwing things during this time, but denies any physical aggression towards people, animals, no property destruction. She reports 2 panic attacks in the last 2 weeks, that occurred during these quick mood changes. She denies any decreased need for sleep, grandiosity, impulsive or reckless behavior outside of her quick anger toward people \"that doesn't make sense why upset or irritable\". Denies any SI/SIB/HI. No psychosis. Denies any substance use. Does have significant history of multiple traumas, previously worked with EMDR therapist. Of note, bc started injections for her migraines, stopped atenolol over last few weeks. Denies any notable changes without it, no history of side effects.       Current Medications:    Current Outpatient Medications:      buPROPion (WELLBUTRIN XL) 150 MG 24 hr tablet, Take 1 tablet (150 mg) by mouth every morning Take with the bupropion "  mg for total daily dose of 450 mg, Disp: 90 tablet, Rfl: 1     buPROPion (WELLBUTRIN XL) 300 MG 24 hr tablet, Take 1 tablet (300 mg) by mouth every morning, Disp: 90 tablet, Rfl: 1     Fremanezumab-vfrm (AJOVY) 225 MG/1.5ML SOAJ, Inject 1.5 mg Subcutaneous every 30 days, Disp: 1.5 mL, Rfl: 11     lamoTRIgine (LAMICTAL) 200 MG tablet, Take 1 tablet (200 mg) by mouth At Bedtime, Disp: 90 tablet, Rfl: 1     levothyroxine (SYNTHROID/LEVOTHROID) 200 MCG tablet, Take 1 tablet (200 mcg) by mouth daily, Disp: 90 tablet, Rfl: 0     metoclopramide (REGLAN) 10 MG tablet, Take 1 tablet (10 mg) by mouth 3 times daily as needed (nausea), Disp: 20 tablet, Rfl: 11     nitroFURantoin macrocrystal (MACRODANTIN) 100 MG capsule, Take 1 capsule (100 mg) by mouth At Bedtime, Disp: 90 capsule, Rfl: 3     norethindrone-ethinyl estradiol (ORTHO-NOVUM 1/35, 28,) 1-35 MG-MCG tablet, Continuously by skipping placebo pills, Disp: 112 tablet, Rfl: 3     omeprazole (PRILOSEC) 20 MG DR capsule, Take 1 capsule (20 mg) by mouth daily, Disp: 30 capsule, Rfl: 11     rOPINIRole (REQUIP) 0.5 MG tablet, Take 3 tablets (total of 1.5 mg) daily 1 to 3 hours before bedtime, Disp: 180 tablet, Rfl: 1     SUMAtriptan (IMITREX) 100 MG tablet, Take 1 tablet (100 mg) by mouth at onset of headache for migraine (repeat in 2 hours if needed), Disp: 18 tablet, Rfl: 11     atenolol (TENORMIN) 25 MG tablet, Take 1 tablet (25 mg) by mouth 3 times daily (Patient not taking: Reported on 6/26/2023), Disp: 90 tablet, Rfl: 11    Current Facility-Administered Medications:      Botulinum Toxin Type A (BOTOX) 200 units injection 150 Units, 150 Units, Intramuscular, See Admin Instructions, Karissa Gonzalez MD    Medication side effects: Denies    The [place] Prescription Monitoring Program has been reviewed and there are no concerns about diversionary activity for controlled substances at this time. No data for controlled substances over the last one  "year.    Psychiatric Review of Symptoms:  Depression: depressed mood, little interest / pleasure, sleep changes (insomnia or hypersomnia), fatigue of loss of energy and worthlessness or excessive guilt Self rates as 7/10, where 0 is none at all and 10 being severe depression.  SI/SIB/HI: denies all.   Anxiety: excessive worry, difficult to control, restlessness / feeling keyed up,  easily fatigued, irritability and sleep disturbances (difficulty falling / staying asleep, or restless / unsatisfying) Self rates as 7/10, where 0 is none at all and 10 being severe anxiety.  Sleep / changes: \"depends on the night\", but can have wakeups overnight mostly for bathroom. Denies significant insomnia, gets 8 hours but unrested in AM.  Energy: \"some days don't have energy to do things\", but unsure if more mood or physical energy.   Appetite or weight changes: denies any significant changes, \"nothing out of the ordinary\"  Irritability / mood swings: see HPI  Richa / impulsivity / racing thoughts / speech: see HPI  Panic (description): had 2 in the last 2 weeks, when going between moods and can't control it, can't breathe.   OCD: none  Psychosis/AH/VH/delusions: denies   Paranoia: denies  Eating DO: (none reported. requires further evaluation).  Trauma sx: intrusion and avoidance sx require further evaluate. negative emotional state, diminished interest and detachment from others, irritability, reckless behavior and  sleep disturbances    All other ROS negative.     PHQ-9 scores:       12/12/2022     9:32 PM 2/13/2023    10:00 AM 3/22/2023    10:59 AM   PHQ-9 SCORE   PHQ-9 Total Score MyChart 5 (Mild depression)  7 (Mild depression)   PHQ-9 Total Score 5    5 13 7    7       MAXIM-7 scores:        2/6/2023    12:40 PM 3/22/2023    11:00 AM 6/25/2023     2:58 PM   MAXIM-7 SCORE   Total Score 11 (moderate anxiety) 8 (mild anxiety) 14 (moderate anxiety)   Total Score 11 8    8 14             Medical Review of Systems:  10 systems " (general, cardiovascular, respiratory, eyes, ENT, endocrine, GI, , M/S, neurological) were reviewed. Most pertinent finding(s) is/are: frequent headache / migraines, ofte associated with nausea and at times vomting. Denies fever, malaise, skin changes or rash, chest pain / tightness / palpitations, short of breath / wheezing, diarrhea / constipation, tics / tremors / abnormal muscle mvmts. The remaining systems are all unremarkable.      Vitals:   There were no vitals taken for this visit.    Pulse Readings from Last 5 Encounters:   05/16/23 91   10/28/22 111   10/06/22 92   10/06/22 92   10/06/22 89     Wt Readings from Last 5 Encounters:   05/16/23 97.4 kg (214 lb 12.8 oz)   10/28/22 94.3 kg (208 lb)   10/06/22 99.3 kg (218 lb 14.7 oz)   10/06/22 99.3 kg (218 lb 14.7 oz)   10/06/22 99.3 kg (219 lb)     BP Readings from Last 5 Encounters:   05/16/23 128/78   10/28/22 114/82   10/06/22 117/81   10/06/22 119/86   10/06/22 114/82       Psychiatric History:   Hospitalizations: None  History of Commitment? No   Past Treatment: counseling, medication(s) from physician / PCP, primary care behavioral health provider and psychiatry  History of Suicidal Ideation: yes  Suicide Attempts: No   History of Violence/Aggression: No   Self-injurious Behavior: brief history of cutting as adolescent  Electroconvulsive Therapy (ECT) or Transcranial Magnetic Stimulation (TMS): No   GeneSight Genetic Testing: No     Past psychotropic medication trials include but are not limited to:   Escitalopram up to 20 mg   viibryd - significant SE      Substance Use History:  Current Use of Drugs/Alcohol: denies all  Past Use of Drugs/Alcohol: denies significant  Patient reports no problems as a result of their drinking / drug use.   Patient has not received chemical dependency treatment in the past  Recovery Programming Involvement: Not Applicable    Tobacco use: No  Caffeine:  Yes  1 sodas/day - cut down in last couple months.     Based on the  clinical interview, there  are not indications of drug or alcohol abuse. Continue to monitor.   Discussed effect of substance use on overall health.     Family History:   Patient reported family history includes:   Family History   Problem Relation Age of Onset     Hyperlipidemia Mother      Thyroid Disease Mother      Kidney Disease Mother      Hyperlipidemia Father      Substance Abuse Sister      Substance Abuse Sister      Depression Brother      No Known Problems Maternal Grandmother      Myocardial Infarction Maternal Grandfather      No Known Problems Paternal Grandmother      No Known Problems Paternal Grandfather      Breast Cancer Cousin      C.A.D. No family hx of      Diabetes No family hx of      Breast Cancer No family hx of      Cancer - colorectal No family hx of      Anesthesia Reaction No family hx of      Blood Disease No family hx of       Mental Illness History: Yes: Per EPIC Electronic Medical Record, siblings: anxiety, depression, bipolar disorder  Substance Abuse History: Yes: father: alcohol use  Suicide History: Yes: brother 2020  Medications that helped: Unknown     Social History:   Born in [place], [place] and raised in [place], [place].  Parents not  but still live together  Siblings:  Two half sisters, full biobrother   in October.  Childhood: Yes intact home with all current basic needs being met.  Highest education level was college graduate.   Employment Status: full time -  [employer].  Relationship status: Together for 9 years. Safe in relationship.  Current Living situation:  , and 2 stepsons are with them on weekends, longer in summer.  Feels safe at home.  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  Issues of possible neglect are not present.   Recommended that patient call 911 or go to the local ED should there be a change in any of these risk factors.    Past Medical History:  Reviewed per Electronic Medical Record Today    Past Medical History:   Diagnosis Date     Acute gallstone pancreatitis 12/28/2019     Anxiety      ASCUS of cervix with negative high risk HPV 01/26/2017    ASCUS/neg HR HPV.     Juarez's esophagus determined by biopsy 2022     Depressive disorder      Gallstones 12/28/2019    [facility]     H/O colposcopy with cervical biopsy 03/23/2017    Bx & ECC - negative     Hashimoto's disease      Headache(784.0)      Hypothyroidism due to Hashimoto's thyroiditis      Papanicolaou smear of cervix with atypical squamous cells of undetermined significance (ASC-US) 12/03/2015      Surgery:   Past Surgical History:   Procedure Laterality Date     CHOLECYSTECTOMY  12/2019     COLONOSCOPY N/A 12/16/2015    Procedure: COLONOSCOPY;  Surgeon: Duane, William Charles, MD;  Location: [facility] OR     COLONOSCOPY N/A 08/11/2022    Procedure: COLONOSCOPY, WITH POLYPECTOMY AND BIOPSY;  Surgeon: Aurora Paez DO;  Location: [facility]     COLONOSCOPY WITH CO2 INSUFFLATION N/A 12/16/2015    Procedure: COLONOSCOPY WITH CO2 INSUFFLATION;  Surgeon: Duane, William Charles, MD;  Location: [facility]     COLONOSCOPY WITH CO2 INSUFFLATION N/A 08/11/2022    Procedure: COLONOSCOPY, WITH CO2 INSUFFLATION;  Surgeon: Aurora Paez DO;  Location: [facility]     COMBINED ESOPHAGOSCOPY, GASTROSCOPY, DUODENOSCOPY (EGD) WITH CO2 INSUFFLATION N/A 08/11/2022    Procedure: ESOPHAGOGASTRODUODENOSCOPY, WITH CO2 INSUFFLATION;  Surgeon: Aurora Paez DO;  Location: [facility]     ESOPHAGOSCOPY, GASTROSCOPY, DUODENOSCOPY (EGD), COMBINED N/A 08/11/2022    Procedure:  ESOPHAGOGASTRODUODENOSCOPY, WITH BIOPSY;  Surgeon: Aurora Paez DO;  Location: [facility] OR     Food and Medicine Allergies:     Allergies   Allergen Reactions     Nkda [No Known Drug Allergy]      Seizures or Head Injury: denies  Diet: No Restrictions  Exercise: No regular exercise program reported  Supplements: none  Pregnant: denies - on continuous birth control.       Mental Status Exam:  Alertness: alert  and oriented   Appearance: adequately groomed  Behavior/Demeanor: cooperative, with good eye contact   Speech: normal and regular rate and rhythm  Language: intact and no problems  Psychomotor: normal or unremarkable  Mood: depressed and anxious  Affect: mildy restricted, tearful at times; was congruent to mood; was congruent to content  Thought Process/Associations: unremarkable  Thought Content:  Reports none;  Denies suicidal ideation, violent ideation and delusions  Perception:  Reports none;  Denies auditory hallucinations and visual hallucinations  Insight: intact  Judgment: intact  Cognition: does  appear grossly intact; formal cognitive testing was not done  Recent and Remote Memory: Intact to interview. Not formally assessed. No amnesia.  Attention Span and Concentration: normal  Fund of Knowledge:  appropriate  Gait and Station: unremarkable    Strengths and Opportunities:   Analia Jerry identified the following strengths or resources that may help she succeed in counseling: family support, insight and established therapeutic relationship.     Things that may interfere with the patient's success include:  none noted at this time.    Protective Factors: compliance with medication, healthy intimate relationships, restricted access to means, social support, access to care as needed, reasons for living and displaying help seeking behavior    Static Risk Factors: family history of suicide and history of MH diagnoses and/or treatment    Dynamic Risk Factors: emotional distress, anxiety, agitation and  dramatic changes in mood    There are no language or communication issues or need for modification in treatment.   There are no ethnic, cultural or Confucianist factors that may be relevant for therapy.  Client identified their preferred language to be English.  Client does not need the assistance of an  or other support involved in therapy.    Suicide Risk Assessment:  Based on review of above risk and protective factors and today's exam, pt is at low elevated risk of harm to self or others. She does not meet criteria for a 72 hr hold and remains appropriate for ongoing outpatient care. The patient convincingly denies suicidality today. There was no deceit detected, and the patient presented in a manner that was believable. Local community safety resources reviewed and sent to patient to use if needed.    Recommended that patient call 911 or go to the local ED should there be a change in any of these risk factors.    DSM5  Diagnosis:  F39 Mood disorder  300.02 (F41.1) Generalized Anxiety Disorder  309.81 (F43.10) Posttraumatic Stress Disorder (includes Posttraumatic Stress Disorder for Children 6 Years and Younger)  Without dissociative symptoms    Medical Comorbidities Include:   Patient Active Problem List    Diagnosis Date Noted     Anxiety 02/15/2023     Priority: Medium     Transplant donor evaluation 09/14/2022     Priority: Medium     Recurrent major depression in partial remission (H) 07/25/2022     Priority: Medium     Posttraumatic stress disorder 03/15/2022     Priority: Medium     Fatigue, unspecified type 04/05/2021     Priority: Medium     Moderate episode of recurrent major depressive disorder (H) 03/04/2021     Priority: Medium     Morbid obesity (H) 10/28/2020     Priority: Medium     FRANK (obstructive sleep apnea) - mild (AHI 7) 08/24/2020     Priority: Medium     8/10/2020 [place] [facility] Sleep Study (216.0 lbs) - scored with 3% rules -  AHI 7.1, RDI 7.1, Supine AHI 22.9, REM AHI -,  Low O2 91.2%, Time Spent ?88% 0 minutes / Time Spent ?89% 0 minutes.       Mood disorder (H)      Priority: Medium     Class 1 obesity due to excess calories with body mass index (BMI) of 32.0 to 32.9 in adult, unspecified whether serious comorbidity present 07/02/2020     Priority: Medium     Elevated liver enzymes 12/28/2019     Priority: Medium     High triglycerides 04/03/2018     Priority: Medium     ASCUS of cervix with negative high risk HPV 01/26/2017     Priority: Medium     4/1/11 (approx) normal - patient reported.   6/11/13 normal - patient reported  12/3/15 ASCUS pap. Plan: per provider, repeat pap in 1 year.  1/26/17 ASCUS/neg HR HPV. Plan: colp bef 4/26/17  3/23/17 [facility] Bx & ECC - negative. Plan cotest in 1 year.   3/8/18 ASCUS pap, neg HPV. Plan: colp.   5/10/18 [facility] Bx and ECC: negative. Plan: cotest in 1 yr.   6/6/19 NIL/neg HR HPV. Plan cotest in 3 years  4/15/22 NIL pap, neg HPV. 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         DIFFERENTIAL DIAGNOSIS: R/O major depressive disorder versus bipolar disorder     Medical comorbidities impacting or contributing to clinical picture: hypothyroidism due to Hashimotos thyroiditis, FRANK, migraines  Known issue that I take into account for their medical decisions, no current exacerbations or new concerns.    Impression:  Analia Jerry is a 32 year old White, female who presents for initial visit with [facility] ([facility]) for medication management.  Patient previously seen with [facility] provider YULIET Collins (2/2021-8/2021) and returned to PCP, then re-referred and started with  Dr. Babb (03/2022-05/2023) for symptom exacerbation. At last appointment 2 month ago, continued lamotrigine 200 mg every day and bupropion  mg every day after diagnosis changed to mood disorder (from [facility]) given reports of increased impulsivity, agitation during sertraline initiation which also occurred with past Viibryd trial. Over last few months, she reports improvement in mood lability and irritability, up to last ~2 weeks where they have increased again without clear trigger. Reports worsening of her depression, along with quick to anger, irritability. Denies other symptoms of manic episode at this time. Denies any SI/SIB/HI. No psychosis. Does report throwing things during this time, but denies any physical aggression towards people, animals, no property destruction.  Denies any substance use. Of note, bc started injections for her migraines, stopped atenolol over last few weeks. Denies any notable changes without it, no history of side effects. Discussed risks/benefits/ side effects of current medication, and possibility that bupropion is worsening mood stability as well as side effects of irritability for some. Will initially trial decreasing dose, continuing lamotrigine. Will also send trial of propranolol to be used as needed for anxiety, agitation. Continue with therapy. Follow-up with this provider in 2 weeks. Patient agreeable to plan.       Medication side effects and alternatives reviewed. Health promotion activities recommended and reviewed today. All questions addressed. Education and counseling completed regarding risks and benefits of medications and psychotherapy options. Recommend therapy for additional support.     Treatment Plan:    DECREASE TO bupropion  mg every day. No script needed.    START propranolol 10 mg (1/2-1 tablet) up to twice a day as needed for anxiety. Script sent for #30.    CONTINUE lamotrigine 200 mg per last provider. No script needed.    Continue all other  medications per primary care provider.     Continue therapy with established provider.    Safety plan reviewed. To the Emergency Department as needed or call after hours crisis line at [phone] or [phone]. [place] Crisis Text Line: Text [place] to 782221 or  Suicide LifeLine Chat: suicidepreventionlifeline.org/chat    Schedule an appointment with me in 2 weeks or sooner as needed.  Call [facility] at [phone] to schedule.    Follow up with primary care provider as planned or sooner if needed for acute medical concerns.    Call the psychiatric nurse line with medication questions or concerns at [phone].    MyChart may be used to communicate with your provider, but this is not intended to be used for emergencies.    Patient Education:  Medication side effects and alternatives reviewed. Health promotion activities recommended and reviewed today. All questions addressed. Education and counseling completed regarding risks and benefits of medications and psychotherapy options.  Consent provided by patient/guardian  Call the psychiatric nurse line with medication questions or concerns at [phone].  MyChart may be used to communicate with your provider, but this is not intended to be used for emergencies.  LAMOTRIGINE: Discussed risk of rash and instructed to stop taking the drug at the first sign of a rash regardless of its type or severity, and contact the nurse line at [phone]  MedlineZALORA.gov is information for patients.  It is run by the National Library of Medicine and it contains information about all disorders, diseases and all medications.       Discussed side effects of bupropion to include agitation and other activation issues, ^ BP or HR, insomnia, stomach upset, appetite loss, weight loss, risk for precipitation of divya, and increased risk for seizures.  Patient agreed to take Bupropion to treat low mood, lack of motivation and poor concentration. Patient verbalized  understanding of medication schedule, of side effects, avoidance of alcohol and marijuana due to increase risk for seizures.    Discussed use of propranolol as off label for anxiety, and side effects to monitor including low HR, BP, lightheadedness or dizziness.      Community Resources:    National Suicide Prevention Lifeline: 802.788.6235 (TTY: 308.821.4199). Call anytime for help.  (www.suicidepreventionlifeline.org)  National [place] on Mental Illness (www.irasema.org): 778.176.4281 or 439-316-5122.   Mental Health Association (www.mentalhealth.org): 700.353.2863 or 769-467-5317.  [place] Crisis Text Line: Text [place] to [phone]  Suicide LifeLine Chat: [email or web address]    Administrative Billing:     Level of Medical Decision Making:   - At least 1 chronic problem that is not stable  - Engaged in prescription drug management during visit (discussed any medication benefits, side effects, alternatives, etc.)             Patient Status:  CCPS MD/DO/NP/PA providers offer care a specialty service for Primary Care Providers in the [facility] that seek to optimize psychotropic medications for unstable patients.  Once medications have been optimized, our providers discharge the patient back to the referring Primary Care Provider for ongoing medication management.  This type of system allows our providers to serve a high volume of patients.   Patient will continue to be seen for ongoing consultation and stabilization.    Signed:   Josee Byrd, MSN, APRN, PMHNP-BC  Collaborative Care Psychiatry Service (CCPS)  [facility]    Chart documentation done in part with Dragon Voice Recognition software.  Although reviewed after completion, some word and grammatical errors may remain.

## 2023-06-24 DIAGNOSIS — G25.81 RESTLESS LEGS SYNDROME (RLS): ICD-10-CM

## 2023-06-25 ENCOUNTER — MYC MEDICAL ADVICE (OUTPATIENT)
Dept: FAMILY MEDICINE | Facility: CLINIC | Age: 33
End: 2023-06-25
Payer: COMMERCIAL

## 2023-06-25 ASSESSMENT — ANXIETY QUESTIONNAIRES
GAD7 TOTAL SCORE: 14
5. BEING SO RESTLESS THAT IT IS HARD TO SIT STILL: SEVERAL DAYS
IF YOU CHECKED OFF ANY PROBLEMS ON THIS QUESTIONNAIRE, HOW DIFFICULT HAVE THESE PROBLEMS MADE IT FOR YOU TO DO YOUR WORK, TAKE CARE OF THINGS AT HOME, OR GET ALONG WITH OTHER PEOPLE: EXTREMELY DIFFICULT
7. FEELING AFRAID AS IF SOMETHING AWFUL MIGHT HAPPEN: SEVERAL DAYS
7. FEELING AFRAID AS IF SOMETHING AWFUL MIGHT HAPPEN: SEVERAL DAYS
GAD7 TOTAL SCORE: 14
1. FEELING NERVOUS, ANXIOUS, OR ON EDGE: MORE THAN HALF THE DAYS
3. WORRYING TOO MUCH ABOUT DIFFERENT THINGS: MORE THAN HALF THE DAYS
4. TROUBLE RELAXING: NEARLY EVERY DAY
2. NOT BEING ABLE TO STOP OR CONTROL WORRYING: MORE THAN HALF THE DAYS
8. IF YOU CHECKED OFF ANY PROBLEMS, HOW DIFFICULT HAVE THESE MADE IT FOR YOU TO DO YOUR WORK, TAKE CARE OF THINGS AT HOME, OR GET ALONG WITH OTHER PEOPLE?: EXTREMELY DIFFICULT
GAD7 TOTAL SCORE: 14
6. BECOMING EASILY ANNOYED OR IRRITABLE: NEARLY EVERY DAY

## 2023-06-26 ENCOUNTER — VIRTUAL VISIT (OUTPATIENT)
Dept: PSYCHIATRY | Facility: CLINIC | Age: 33
End: 2023-06-26
Payer: COMMERCIAL

## 2023-06-26 DIAGNOSIS — F43.10 POSTTRAUMATIC STRESS DISORDER: ICD-10-CM

## 2023-06-26 DIAGNOSIS — F41.1 GENERALIZED ANXIETY DISORDER: Primary | ICD-10-CM

## 2023-06-26 DIAGNOSIS — F39 MOOD DISORDER (H): ICD-10-CM

## 2023-06-26 PROCEDURE — 99214 OFFICE O/P EST MOD 30 MIN: CPT | Mod: VID | Performed by: NURSE PRACTITIONER

## 2023-06-26 RX ORDER — ROPINIROLE 0.5 MG/1
TABLET, FILM COATED ORAL
Qty: 180 TABLET | Refills: 1 | Status: SHIPPED | OUTPATIENT
Start: 2023-06-26 | End: 2023-07-24

## 2023-06-26 RX ORDER — PROPRANOLOL HYDROCHLORIDE 10 MG/1
5-10 TABLET ORAL 2 TIMES DAILY PRN
Qty: 30 TABLET | Refills: 0 | Status: SHIPPED | OUTPATIENT
Start: 2023-06-26 | End: 2023-08-07

## 2023-06-26 NOTE — PATIENT INSTRUCTIONS
**For crisis resources, please see the information at the end of this document**     Thank you for coming to the [facility] MENTAL HEALTH & ADDICTION [place] [facility].    TREATMENT PLAN:  Medications:   DECREASE TO bupropion  mg every day. No script needed.  START propranolol 10 mg (1/2-1 tablet) up to twice a day as needed for anxiety. Script sent for #30.  CONTINUE lamotrigine 200 mg per last provider. No script needed.  Continue all other medications per primary care provider.     Consults / Referrals:   - Continue therapy with established provider.    Follow Up:  Schedule an appointment with me in 2 weeks or sooner as needed.  Call [facility] at [phone] to schedule.  Follow up with primary care provider as planned or sooner if needed for acute medical concerns.  Call the psychiatric nurse line with medication questions or concerns at [phone].  [employer] may be used to communicate with your provider, but this is not intended to be used for emergencies.    Psychoeducation:  Discussed side effects of bupropion to include agitation and other activation issues, ^ BP or HR, insomnia, stomach upset, appetite loss, weight loss, risk for precipitation of divya, and increased risk for seizures.  Patient agreed to take Bupropion to treat low mood, lack of motivation and poor concentration. Patient verbalized understanding of medication schedule, of side effects, avoidance of alcohol and marijuana due to increase risk for seizures.  Discussed use of propranolol as off label for anxiety, and side effects to monitor including low HR, BP, lightheadedness or dizziness.      Financial Assistance [phone]  [facility] Billing [phone]  [facility]: [phone]  [facility] Billing [phone]  Medical Records [phone]  [facility] Patient Bill of Rights [email or web address]       MENTAL HEALTH CRISIS  RESOURCES:  For a emergency help, please call 911 or go to the nearest Emergency Department.     Emergency Walk-In Options:   EmPATH Unit @ [facility] ([place]): [phone] - Specialized mental health emergency area designed to be calming  [facility] ([place]): [phone]  [facility] ([place]): [phone]  [facility]): [phone]    [facility] Crisis Information:   [place]: [phone]  [place]: [phone]  [place] (COPE) - Adult: [phone]     Child: [phone]  [place] - Adult: [phone]     Child: [phone]  [place]: [phone]  List of all [facility] resources:   https://[place].[facility]/dhs/people-we-serve/adults/health-care/mental-health/resources/crisis-contacts.jsp    National Crisis Information:   National Suicide & Crisis Lifeline: Call 648        For online chat options, visit [email or web address]/chat/  Poison Control Center: 5-018-741-4348  Poison Control Center: 0-387-468-6173  Trans Lifeline: 8-617-353-9716 - Hotline for transgender people of all ages  The [employer] Project: 1-486-478-7999 - Hotline for LGBT youth     For Non-Emergency Support:   Fast Tracker: Mental Health & Substance Use Disorder Resources -   https://www.fasttrackermn.org/       Again thank you for choosing [facility] MENTAL HEALTH & ADDICTION [facility] and please let us know how we can best partner with you to improve you and your family's health.    You may be receiving a survey regarding this appointment. We would love to have your feedback, both positive and negative. The survey is done by an external company, so your answers are anonymous.        Patient Education   Collaborative Care Psychiatry Service  What to Expect  Here's what to expect from your Collaborative Care Psychiatry Service (CCPS).   About CCPS  CCPS means 2 people work together to help you get better. You'll meet with a behavioral health clinician and  "a psychiatric doctor. A behavioral health clinician helps people with mental health problems by talking with them. A psychiatric doctor helps people by giving them medicine.  How it works  At every visit, you'll see the behavioral health clinician (BHC) first. They'll talk with you about how you're doing and teach you how to feel better.   Then you'll see the psychiatric doctor. This doctor can help you deal with troubling thoughts and feelings by giving you medicine. They'll make sure you know the plan for your care.   CCPS usually takes 3 to 6 visits. If you need more visits, we may have you start seeing a different psychiatric doctor for ongoing care.  If you have any questions or concerns, we'll be glad to talk with you.  About visits  Be open  At your visits, please talk openly about your problems. It may feel hard, but it's the best way for us to help you.  Cancelling visits  If you can't come to your visit, please call us right away at [phone]. If you don't cancel at least 24 hours (1 full day) before your visit, that's \"late cancellation.\"  Being late to visits  Being very late is the same as not showing up. You will be a \"no show\" if:  Your appointment starts with a BHC, and you're more than 15 minutes late for a 30-minute (half hour) visit. This will also cancel your appointment with the psychiatric doctor.  Your appointment is with a psychiatric doctor only, and you're more than 15 minutes late for a 30-minute (half hour) visit.  Your appointment is with a psychiatric doctor only, and you're more than 30 minutes late for a 60-minute (full hour) visit.  If you cancel late or don't show up 2 times within 6 months, we may end your care.   Getting help between visits  If you need help between visits, you can call us Monday to Friday from 8 a.m. to 4:30 p.m. at [phone].  Emergency care  Call 911 or go to the nearest emergency department if your life or someone else's life is in danger.  Call 348 " anytime to reach the national Suicide and Crisis hotline.  Medicine refills  To refill your medicine, call your pharmacy. You can also call [facility] [place]'s Behavioral Access at [phone], Monday to Friday, 8 a.m. to 4:30 p.m. It can take 1 to 3 business days to get a refill.   Forms, letters, and tests  You may have papers to fill out, like FMLA, short-term disability, and workability. We can help you with these forms at your visits, but you must have an appointment. You may need more than 1 visit for this, to be in an intensive therapy program, or both.  Before we can give you medicine for ADHD, we may refer you to get tested for it or confirm it another way.  We may not be able to give you an emotional support animal letter.  We don't do mental health checks ordered by the court.   We don't do mental health testing, but we can refer you to get tested.   Thank you for choosing us for your care.  For informational purposes only. Not to replace the advice of your health care provider. Copyright   2022 [facility]. All rights reserved. [employer] 419205 - 12/22.

## 2023-06-26 NOTE — NURSING NOTE
Is the patient currently in the state of [place]? YES    Visit mode:VIDEO    If the visit is dropped, the patient can be reconnected by: VIDEO VISIT: Text to cell phone: [phone]    Will anyone else be joining the visit? NO      How would you like to obtain your AVS? MyChart    Are changes needed to the allergy or medication list? NO    Reason for visit: DAVID Acosta VF

## 2023-07-10 ENCOUNTER — VIRTUAL VISIT (OUTPATIENT)
Dept: PSYCHIATRY | Facility: CLINIC | Age: 33
End: 2023-07-10
Payer: COMMERCIAL

## 2023-07-10 DIAGNOSIS — F43.10 POSTTRAUMATIC STRESS DISORDER: ICD-10-CM

## 2023-07-10 DIAGNOSIS — F41.1 GENERALIZED ANXIETY DISORDER: ICD-10-CM

## 2023-07-10 DIAGNOSIS — F39 MOOD DISORDER (H): Primary | ICD-10-CM

## 2023-07-10 PROCEDURE — 99214 OFFICE O/P EST MOD 30 MIN: CPT | Mod: VID | Performed by: NURSE PRACTITIONER

## 2023-07-10 NOTE — NURSING NOTE
Is the patient currently in the state of [place]? YES    Visit mode:VIDEO    If the visit is dropped, the patient can be reconnected by: VIDEO VISIT: Send to e-mail at: [email or web address]    Will anyone else be joining the visit? NO      How would you like to obtain your AVS? MyChart    Are changes needed to the allergy or medication list? NO    Reason for visit: RECHECK

## 2023-07-10 NOTE — PROGRESS NOTES
Virtual Visit Details    Type of service:  Video Visit     Originating Location (pt. Location): Home    Distant Location (provider location):  Off-site  Platform used for Video Visit: Amy

## 2023-07-10 NOTE — PATIENT INSTRUCTIONS
**For crisis resources, please see the information at the end of this document**     Thank you for coming to the [facility] M[facility] HEALTH & ADDICTION [place] [facility].    TREATMENT PLAN:  Medications:   CONTINUE bupropion  mg every day. No script needed.  CONTINUE propranolol 10 mg (1/2-1 tablet) up to twice a day as needed for anxiety. No script needed.  CONTINUE lamotrigine 200 mg per last provider. No script needed.  Continue all other medications per primary care provider.     Consults / Referrals:   - Continue therapy with established provider.    Follow Up:  Schedule an appointment with me in 4 weeks or sooner as needed.  Call [facility] at [phone] to schedule.  Follow up with primary care provider as planned or sooner if needed for acute medical concerns.  Call the psychiatric nurse line with medication questions or concerns at [phone].  [employer] Entertainment[employer] may be used to communicate with your provider, but this is not intended to be used for emergencies.    Psychoeducation:  Discussed side effects of bupropion to include agitation and other activation issues, ^ BP or HR, insomnia, stomach upset, appetite loss, weight loss, risk for precipitation of divya, and increased risk for seizures.  Patient agreed to take Bupropion to treat low mood, lack of motivation and poor concentration. Patient verbalized understanding of medication schedule, of side effects, avoidance of alcohol and marijuana due to increase risk for seizures.  Discussed use of propranolol as off label for anxiety, and side effects to monitor including low HR, BP, lightheadedness or dizziness.      Financial Assistance [phone]  [facility] Info Billing [phone]  [place] Billing Office, [facility]: [phone]  [place] Billing [phone]  Medical Records [phone]  [place] Patient Bill of Rights [email or web address]       MENTAL HEALTH CRISIS  RESOURCES:  For a emergency help, please call 911 or go to the nearest Emergency Department.     Emergency Walk-In Options:   EmPATH Unit @ [facility] ([place]): [phone] - Specialized mental health emergency area designed to be calming  [facility] ([place]): [phone]  [facility] ([place]): [phone]  [facility]): [phone]    [facility] Crisis Information:   [place]: [phone]  [place]: [phone]  [place] (COPE) - Adult: [phone]     Child: [phone]  [place] - Adult: [phone]     Child: [phone]  [place]: [phone]  List of all [facility] resources:   https://mn.[facility]/dhs/people-we-serve/adults/health-care/mental-health/resources/crisis-contacts.jsp    National Crisis Information:   National Suicide & Crisis Lifeline: Call 348        For online chat options, visit https://suicidepreventionlifeline.org/chat/  Poison Control Center: 9-393-190-6132  Poison Control Center: 4-731-264-8944  Trans Lifeline: 0-238-890-3937 - Hotline for transgender people of all ages  The [employer] Project: 9-949-668-4797 - Hotline for LGBT youth     For Non-Emergency Support:   Fast Tracker: Mental Health & Substance Use Disorder Resources -   https://www.fasttrackermn.org/       Again thank you for choosing [facility] MENTAL HEALTH & ADDICTION [facility] and please let us know how we can best partner with you to improve you and your family's health.    You may be receiving a survey regarding this appointment. We would love to have your feedback, both positive and negative. The survey is done by an external company, so your answers are anonymous.        Patient Education   Collaborative Care Psychiatry Service  What to Expect  Here's what to expect from your Collaborative Care Psychiatry Service (CCPS).   About CCPS  CCPS means 2 people work together to help you get better. You'll meet with a behavioral health clinician and  "a psychiatric doctor. A behavioral health clinician helps people with mental health problems by talking with them. A psychiatric doctor helps people by giving them medicine.  How it works  At every visit, you'll see the behavioral health clinician (BHC) first. They'll talk with you about how you're doing and teach you how to feel better.   Then you'll see the psychiatric doctor. This doctor can help you deal with troubling thoughts and feelings by giving you medicine. They'll make sure you know the plan for your care.   CCPS usually takes 3 to 6 visits. If you need more visits, we may have you start seeing a different psychiatric doctor for ongoing care.  If you have any questions or concerns, we'll be glad to talk with you.  About visits  Be open  At your visits, please talk openly about your problems. It may feel hard, but it's the best way for us to help you.  Cancelling visits  If you can't come to your visit, please call us right away at [phone]. If you don't cancel at least 24 hours (1 full day) before your visit, that's \"late cancellation.\"  Being late to visits  Being very late is the same as not showing up. You will be a \"no show\" if:  Your appointment starts with a BHC, and you're more than 15 minutes late for a 30-minute (half hour) visit. This will also cancel your appointment with the psychiatric doctor.  Your appointment is with a psychiatric doctor only, and you're more than 15 minutes late for a 30-minute (half hour) visit.  Your appointment is with a psychiatric doctor only, and you're more than 30 minutes late for a 60-minute (full hour) visit.  If you cancel late or don't show up 2 times within 6 months, we may end your care.   Getting help between visits  If you need help between visits, you can call us Monday to Friday from 8 a.m. to 4:30 p.m. at [phone].  Emergency care  Call 911 or go to the nearest emergency department if your life or someone else's life is in danger.  Call 548 " anytime to reach the national Suicide and Crisis hotline.  Medicine refills  To refill your medicine, call your pharmacy. You can also call [facility]'s Behavioral Access at [phone], Monday to Friday, 8 a.m. to 4:30 p.m. It can take 1 to 3 business days to get a refill.   Forms, letters, and tests  You may have papers to fill out, like FMLA, short-term disability, and workability. We can help you with these forms at your visits, but you must have an appointment. You may need more than 1 visit for this, to be in an intensive therapy program, or both.  Before we can give you medicine for ADHD, we may refer you to get tested for it or confirm it another way.  We may not be able to give you an emotional support animal letter.  We don't do mental health checks ordered by the court.   We don't do mental health testing, but we can refer you to get tested.   Thank you for choosing us for your care.  For informational purposes only. Not to replace the advice of your health care provider. Copyright   2022 [facility]. All rights reserved. [employer] 488258 - 12/22.

## 2023-07-10 NOTE — PROGRESS NOTES
"   PSYCHIATRIC MEDICATION FOLLOW UP APPT     Name: Analia Jerry   : 1990               Telemedicine Visit: The patient's condition can be safely assessed and treated via synchronous audio and visual telemedicine encounter.      Consent:  The patient/guardian has verbally consented to: the potential risks and benefits of telemedicine (video visit or phone) versus in person care; bill my insurance or make self-payment for services provided; and responsibility for payment of non-covered services.     As the provider I attest to compliance with applicable laws and regulations related to telemedicine.         Source of Referral / Care Team:  Primary Care Provider: MERRY WELLS CNP   Therapist: Monica ([facility]).      The [facility] psychiatry providers act as a specialty service for Primary Care Providers in the [facility] who seek to optimize medications for unstable patients. Once medications have been optimized, [facility] providers discharge the patient back to the referring Primary Care Provider for ongoing medication management. This type of system allows [facility] to serve a high volume of patients.     Patient Identification:  Patient is a 32 year old,   White Not  or  female  who presents for return visit with me.   Patient prefers to be called: \"Analia\".  Patient is currently employed full time.     Patient attended the session alone.    RECORDS AVAILABLE FOR REVIEW: EHR records through [employer] .       Interim History:  I last saw Analia Jerry for outpatient psychiatry Consultation 2 weeks ago on 23. During that appointment, we decreased to bupropion  mg, and started propranolol 10 mg PRN, and continued lamotrigine 200.  Patient reports ADHERING to prescribed medications. She does report mild improvement in mood \"quick fluctuations\", as well as anxiety mildly lower with reduced bupropion dose. She notes anxiety is still moderately high, and can lead to panic " "~2x / week. Has used propranolol 10 mg 4x and does feel it is helpful some with anxiety and feeling less on edge or irritable. Denies any side effects, no CV issues. Anxiety an still be associated with irritability and quick to frustration, but does feel maybe mildly improved. Denies any other symptoms of divya outside \"impulsive\" anger when very irritable. Does note mild increase in her low mood, depression over last couple weeks which she attributes to the lower bupropion dose. She reports feeling \"more neutral about things that would make me happy, and random things making me more sad\". Her energy does feel a little lower as well, denies significant change to her sleep. Denies any SI/SIB/HI.       Initial Impression:   6/26/23: Analia Jerry is a 32 year old White, female who presents for initial visit with [facility] ([facility] for medication management.  Patient previously seen with [facility] provider YULIET Collins (2/2021-8/2021) and returned to PCP, then re-referred and started with Dr. Babb (03/2022-05/2023) for symptom exacerbation. At last appointment 2 month ago, continued lamotrigine 200 mg every day and bupropion  mg every day after diagnosis changed to mood disorder (from [facility]) given reports of increased impulsivity, agitation during sertraline initiation which also occurred with past Viibryd trial. Over last few months, she reports improvement in mood lability and irritability, up to last ~2 weeks where they have increased again without clear trigger. Reports worsening of her depression, along with quick to anger, irritability. Denies other symptoms of manic episode at this time. Denies any SI/SIB/HI. No psychosis. Does report throwing things during this time, but denies any physical aggression towards people, animals, no property destruction.  Denies any substance use. Of note, bc started injections for her migraines, stopped atenolol over last few weeks. Denies any notable " changes without it, no history of side effects. Discussed risks/benefits/ side effects of current medication, and possibility that bupropion is worsening mood stability as well as side effects of irritability for some. Will initially trial decreasing dose, continuing lamotrigine. Will also send trial of propranolol to be used as needed for anxiety, agitation. Continue with therapy. Follow-up with this provider in 2 weeks. Patient agreeable to plan.       Current medications include:   Current Outpatient Medications   Medication Sig     atenolol (TENORMIN) 25 MG tablet Take 1 tablet (25 mg) by mouth 3 times daily (Patient not taking: Reported on 6/26/2023)     buPROPion (WELLBUTRIN XL) 300 MG 24 hr tablet Take 1 tablet (300 mg) by mouth every morning     Fremanezumab-vfrm (AJOVY) 225 MG/1.5ML SOAJ Inject 1.5 mg Subcutaneous every 30 days     lamoTRIgine (LAMICTAL) 200 MG tablet Take 1 tablet (200 mg) by mouth At Bedtime     levothyroxine (SYNTHROID/LEVOTHROID) 200 MCG tablet Take 1 tablet (200 mcg) by mouth daily     metoclopramide (REGLAN) 10 MG tablet Take 1 tablet (10 mg) by mouth 3 times daily as needed (nausea)     nitroFURantoin macrocrystal (MACRODANTIN) 100 MG capsule Take 1 capsule (100 mg) by mouth At Bedtime     norethindrone-ethinyl estradiol (ORTHO-NOVUM 1/35, 28,) 1-35 MG-MCG tablet Continuously by skipping placebo pills     omeprazole (PRILOSEC) 20 MG DR capsule Take 1 capsule (20 mg) by mouth daily     propranolol (INDERAL) 10 MG tablet Take 0.5-1 tablets (5-10 mg) by mouth 2 times daily as needed (anxiety)     rOPINIRole (REQUIP) 0.5 MG tablet Take 3 tablets (total of 1.5 mg) daily 1 to 3 hours before bedtime     SUMAtriptan (IMITREX) 100 MG tablet Take 1 tablet (100 mg) by mouth at onset of headache for migraine (repeat in 2 hours if needed)     Current Facility-Administered Medications   Medication     Botulinum Toxin Type A (BOTOX) 200 units injection 150 Units         Side effects: Denies    The  "[place] Prescription Monitoring Program has been reviewed and there are no concerns about diversionary activity for controlled substances at this time.     Psychiatric ROS:  Analia BERNABE Danilobismark reports mood has been: \"They've been going alright.\"  Depression has been: depressed mood, little interest / pleasure, appetite change or significant weight loss / gain, fatigue of loss of energy and worthlessness or excessive guilt self rates as 7/10, where 0 is none at all and 10 being severe depression. Was 7/10 at last appt.  SI/SIB/HI: denies all.   Anxiety has been: excessive worry, difficult to control, restlessness / feeling keyed up,  easily fatigued, irritability and sleep disturbances (difficulty falling / staying asleep, or restless / unsatisfying)  self rates as \"a little better\" 5-6/10, where 0 is none at all and 10 being severe anxiety. Was 7/10 at last appt.  Sleep has been: \"more tired, a lot going on lately - about the same otherwise\". Still some wakeups overnight. Getting ~8 hours most nights. Haven't napped as much during the day.   Energy has been: does feel a little lower, \"spurts where it's better\".   Appetite has been: I think more hungry, but monitoring weight and hasn't changed significantly.   Richa sxs: denies hyperelevated mood or energy, decreased need for sleep. Only impulsive behaviors were around irritability, does think maybe a little better.   Psychosis sxs: denies  ADHD/ADD sxs: none  PTSD sxs: intrusion and avoidance sx require further evaluate. negative emotional state, diminished interest and detachment from others, irritability, reckless behavior and  sleep disturbances    PHQ-9 scores:       12/12/2022     9:32 PM 2/13/2023    10:00 AM 3/22/2023    10:59 AM   PHQ-9 SCORE   PHQ-9 Total Score MyChart 5 (Mild depression)  7 (Mild depression)   PHQ-9 Total Score 5    5 13 7    7       MAXIM-7 scores:        2/6/2023    12:40 PM 3/22/2023    11:00 AM 6/25/2023     2:58 PM   MAXIM-7 SCORE   Total " Score 11 (moderate anxiety) 8 (mild anxiety) 14 (moderate anxiety)   Total Score 11 8    8 14         Current stressors include: Symptoms and Occupational Difficulties  Coping mechanisms and supports include: Therapy, Family and Friends    Vital Signs:   There were no vitals taken for this visit.    Review of Systems:  10 systems (general, cardiovascular, respiratory, eyes, ENT, endocrine, GI, , M/S, neurological) were reviewed. Most pertinent finding(s) is/are: Some nausea in PM - baseline per patient. Denies fever, chest pain / tightness / palpitations, vomiting / diarrhea / constipation, tics / tremors / abnormal muscle mvmts, skin changes / rash. The remaining systems are all unremarkable.    Labs:  Most recent laboratory results reviewed and no new labs.     Past Medical/Surgical History:  Past Medical History:   Diagnosis Date     Acute gallstone pancreatitis 12/28/2019     Anxiety      ASCUS of cervix with negative high risk HPV 01/26/2017    ASCUS/neg HR HPV.     Juarez's esophagus determined by biopsy 2022     Depressive disorder      Gallstones 12/28/2019    [facility]     H/O colposcopy with cervical biopsy 03/23/2017    Bx & ECC - negative     Hashimoto's disease      Headache(784.0)      Hypothyroidism due to Hashimoto's thyroiditis      Papanicolaou smear of cervix with atypical squamous cells of undetermined significance (ASC-US) 12/03/2015      has a past medical history of Acute gallstone pancreatitis (12/28/2019), Anxiety, ASCUS of cervix with negative high risk HPV (01/26/2017), Juarez's esophagus determined by biopsy (2022), Depressive disorder, Gallstones (12/28/2019), H/O colposcopy with cervical biopsy (03/23/2017), Hashimoto's disease, Headache(784.0), Hypothyroidism due to Hashimoto's thyroiditis, and Papanicolaou smear of cervix with atypical squamous cells of undetermined significance (ASC-US) (12/03/2015).    She has no past medical history of Arthritis, Cerebral  infarction (H), Congestive heart failure (H), COPD (chronic obstructive pulmonary disease) (H), Diabetes (H), Heart disease, History of blood transfusion, Hypertension, or Uncomplicated asthma.    Medication allergies:    Allergies   Allergen Reactions     Nkda [No Known Drug Allergy]        Social History:  Born in [place], [place] and raised in [place], [place].  Parents not  but still live together  Siblings:  Two half sisters, full biobrother   in October.  Childhood: Yes intact home with all current basic needs being met.  Highest education level was college graduate.   Employment Status: full time -  [employer].  Relationship status: Together for 9 years. Safe in relationship.  Current Living situation:  , and 2 stepsons are with them on weekends, longer in summer.  Feels safe at home.  Children: 15 and 10 year old stepsons  Firearms/Weapons Access: No: Patient denies   Service: No  Support: , friend, therapist    Current use of drugs or alcohol: Denies   Tobacco use: No    Mental Status Exam:  Alertness: alert  and oriented   Appearance: adequately groomed  Behavior/Demeanor: cooperative, pleasant and calm, with good eye contact   Speech: normal and regular rate and rhythm  Language: intact and no problems  Psychomotor: normal or unremarkable  Mood: depressed  Affect: mildly guarded but appropriate ; was congruent to mood; was congruent to content  Thought Process/Associations: unremarkable  Thought Content:  Reports none;  Denies suicidal ideation, violent ideation and delusions  Perception:  Reports none;  Denies auditory hallucinations and visual hallucinations  Insight: intact  Judgment: intact  Cognition: does  appear grossly intact; formal cognitive testing was not done  Recent and Remote Memory: Intact to interview. Not formally assessed. No amnesia.  Attention Span and Concentration: normal  Fund of Knowledge: appropriate  Gait and Station: unremarkable    Suicide  Risk Assessment:  Today Analia Jerry reports no suicidal ideation. In addition, there are notable risk factors for self-harm, including anxiety, family history, anger/rage and mood change. However, risk is mitigated by commitment to family, absence of past attempts, history of seeking help when needed, future oriented, no access to firearms or weapons and denies suicidal intent or plan. Therefore, based on all available evidence including the factors cited above, Analia Jerry does not appear to be at imminent risk for self-harm, does not meet criteria for a 72-hr hold, and therefore remains appropriate for ongoing outpatient level of care.  A thorough assessment of risk factors related to suicide and self-harm have been reviewed and are noted above. The patient convincingly denies suicidality on several occasions. Local community safety resources printed and reviewed for patient to use if needed. There was no deceit detected, and the patient presented in a manner that was believable.     Recommended that patient call 911 or go to the local ED should there be a change in any of these risk factors.    DSM5 Diagnosis:  F39 Mood disorder  300.02 (F41.1) Generalized Anxiety Disorder  309.81 (F43.10) Posttraumatic Stress Disorder (includes Posttraumatic Stress Disorder for Children 6 Years and Younger)  Without dissociative symptoms    Medical comorbidities include:   Patient Active Problem List    Diagnosis Date Noted     Anxiety 02/15/2023     Priority: Medium     Transplant donor evaluation 09/14/2022     Priority: Medium     Recurrent major depression in partial remission (H) 07/25/2022     Priority: Medium     Posttraumatic stress disorder 03/15/2022     Priority: Medium     Fatigue, unspecified type 04/05/2021     Priority: Medium     Moderate episode of recurrent major depressive disorder (H) 03/04/2021     Priority: Medium     Morbid obesity (H) 10/28/2020     Priority: Medium     FRANK (obstructive sleep apnea) -  mild (AHI 7) 08/24/2020     Priority: Medium     8/10/2020 [place] [facility] Sleep Study (216.0 lbs) - scored with 3% rules -  AHI 7.1, RDI 7.1, Supine AHI 22.9, REM AHI -, Low O2 91.2%, Time Spent ?88% 0 minutes / Time Spent ?89% 0 minutes.       Mood disorder (H)      Priority: Medium     Class 1 obesity due to excess calories with body mass index (BMI) of 32.0 to 32.9 in adult, unspecified whether serious comorbidity present 07/02/2020     Priority: Medium     Elevated liver enzymes 12/28/2019     Priority: Medium     High triglycerides 04/03/2018     Priority: Medium     ASCUS of cervix with negative high risk HPV 01/26/2017     Priority: Medium     4/1/11 (approx) normal - patient reported.   6/11/13 normal - patient reported  12/3/15 ASCUS pap. Plan: per provider, repeat pap in 1 year.  1/26/17 ASCUS/neg HR HPV. Plan: colp bef 4/26/17  3/23/17 [place] Bx & ECC - negative. Plan cotest in 1 year.   3/8/18 ASCUS pap, neg HPV. Plan: colp.   5/10/18 [place] Bx and ECC: negative. Plan: cotest in 1 yr.   6/6/19 NIL/neg HR HPV. Plan cotest in 3 years  4/15/22 NIL pap, neg HPV. 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         Psychosocial & Contextual Factors:  Occupational Difficulties    DIFFERENTIAL DIAGNOSIS: R/O major depressive disorder versus bipolar disorder     Medical comorbidities impacting or contributing to clinical picture: hypothyroidism due to Hashimotos thyroiditis, FRANK, migraines  Known issue that I take into account for their medical decisions, no current exacerbations or new concerns.    Impression:  Analia Jerry is a 32 year old  White, female who presents for return visit with  [facility] ([facility]) for medication management. At initial appointment with this provider 2 weeks ago, we decreased to bupropion  mg, and started propranolol 10 mg PRN, and continued lamotrigine 200. She does report a mild improvement in her anxiety and mood lability, irritability, and is tolerating the propranolol as needed with decent benefit. She does note her mood has been lower with mild decrease in energy since reducing the bupropion. Denies any SI/SIB/HI. She denies intolerable change at this time, and is open to continuing for another few weeks before additional changes. Continue in therapy. Follow-up with this provider in 4 weeks. Patient agreeable to plan.     Medication side effects and alternatives were reviewed. Health promotion activities recommended and reviewed today. All questions addressed. Education and counseling completed regarding risks and benefits of medications and psychotherapy options. Recommend therapy for additional support.       Treatment Plan:    CONTINUE bupropion  mg every day. No script needed.    CONTINUE propranolol 10 mg (1/2-1 tablet) up to twice a day as needed for anxiety. No script needed.    CONTINUE lamotrigine 200 mg per last provider. No script needed.    Continue all other medications per primary care provider.     Continue therapy with established provider.    Safety plan reviewed. To the Emergency Department as needed or call after hours crisis line at [phone] or [phone]. [place] Crisis Text Line. Text [place] to 384313 or Suicide LifeLine Chat: suicidepreventionlifeline.org/chat    Schedule an appointment with me in 4 weeks or sooner as needed. Call [facility] at [phone] to schedule.    Follow up with primary care provider as planned or for acute medical concerns.    Call the psychiatric nurse line with medication questions or concerns at  [phone].    MyChart may be used to communicate with your provider, but this is not intended to be used for emergencies.    Patient Education:  Medication side effects and alternatives reviewed. Health promotion activities recommended and reviewed today. All questions addressed. Education and counseling completed regarding risks and benefits of medications and psychotherapy options.  Consent provided by patient/guardian  Call the psychiatric nurse line with medication questions or concerns at [phone].  MyChart may be used to communicate with your provider, but this is not intended to be used for emergencies.  LAMOTRIGINE: Discussed risk of rash and instructed to stop taking the drug at the first sign of a rash regardless of its type or severity, and contact the nurse line at [phone]  Allmoxy.gov is information for patients.  It is run by the [employer] and it contains information about all disorders, diseases and all medications.      Discussed side effects of bupropion to include agitation and other activation issues, ^ BP or HR, insomnia, stomach upset, appetite loss, weight loss, risk for precipitation of divya, and increased risk for seizures.  Patient agreed to take Bupropion to treat low mood, lack of motivation and poor concentration. Patient verbalized understanding of medication schedule, of side effects, avoidance of alcohol and marijuana due to increase risk for seizures.    Community Resources:    National Suicide Prevention Lifeline: 430.916.7959 (TTY: 718.395.8508). Call anytime for help.  (www.suicidepreventionlifeline.org)  National [place] on Mental Illness (www.irasema.org): 935.858.3489 or 379-199-1632.   Mental Health Association (www.mentalhealth.org): 129.965.4297 or 673-799-6347.  [place] Crisis Text Line: Text [place] to 485852  Suicide LifeLine Chat: suicidePlaysino.org/chat    Administrative Billing:     Level of Medical Decision Making:   - At least  1 chronic problem that is not stable  - Engaged in prescription drug management during visit (discussed any medication benefits, side effects, alternatives, etc.)             Patient Status:  [facility] MD/DO/NP/PA providers offer care a specialty service for Primary Care Providers in the [facility] that seek to optimize psychotropic medications for unstable patients.  Once medications have been optimized, our providers discharge the patient back to the referring Primary Care Provider for ongoing medication management.  This type of system allows our providers to serve a high volume of patients.   Patient will continue to be seen for ongoing consultation and stabilization.    Signed:   Josee Byrd, MSN, APRN, PMHNP-BC  [facility] ([facility])  [facility]    Chart documentation done in part with Dragon Voice Recognition software.  Although reviewed after completion, some word and grammatical errors may remain.

## 2023-07-24 ENCOUNTER — VIRTUAL VISIT (OUTPATIENT)
Dept: FAMILY MEDICINE | Facility: CLINIC | Age: 33
End: 2023-07-24
Payer: COMMERCIAL

## 2023-07-24 DIAGNOSIS — E06.3 HYPOTHYROIDISM DUE TO HASHIMOTO'S THYROIDITIS: ICD-10-CM

## 2023-07-24 DIAGNOSIS — N39.0 RECURRENT UTI: ICD-10-CM

## 2023-07-24 DIAGNOSIS — G25.81 RESTLESS LEGS SYNDROME (RLS): ICD-10-CM

## 2023-07-24 PROCEDURE — 99213 OFFICE O/P EST LOW 20 MIN: CPT | Mod: VID | Performed by: NURSE PRACTITIONER

## 2023-07-24 RX ORDER — LEVOTHYROXINE SODIUM 200 UG/1
200 TABLET ORAL DAILY
Qty: 90 TABLET | Refills: 3 | Status: SHIPPED | OUTPATIENT
Start: 2023-07-24 | End: 2024-07-11

## 2023-07-24 RX ORDER — NITROFURANTOIN MACROCRYSTAL 100 MG
100 CAPSULE ORAL AT BEDTIME
Qty: 90 CAPSULE | Refills: 3 | Status: SHIPPED | OUTPATIENT
Start: 2023-07-24 | End: 2024-09-23

## 2023-07-24 RX ORDER — ROPINIROLE 0.5 MG/1
TABLET, FILM COATED ORAL
Qty: 180 TABLET | Refills: 1 | Status: SHIPPED | OUTPATIENT
Start: 2023-07-24 | End: 2024-03-04

## 2023-07-24 NOTE — PATIENT INSTRUCTIONS
At [facility], we strive to deliver an exceptional experience to you, every time we see you. If you receive a survey, please complete it as we do value your feedback.  If you have MyChart, you can expect to receive results automatically within 24 hours of their completion.  Your provider will send a note interpreting your results as well.   If you do not have MyChart, you should receive your results in about a week by mail.    Your care team:                            Family Medicine Internal Medicine   MD Fletcher Otero MD Shantel Branch-Fleming, MD Srinivasa Vaka, MD Katya Belousova, PAMERRY Zambrano CNP, MD ([facility]   Matthias Jenkins, MD Miley Dan MD Amelia Massimini APRN COCO Xie APRN MD Nilda Lu MD          Clinic hours: Monday - Thursday 7 am-6 pm; Fridays 7 am-5 pm.   Urgent care: Monday - Friday 10 am- 8 pm; Saturday and Sunday 9 am-5 pm.    Clinic: [phone]       [facility]: Monday - Thursday 8 am - 7 pm; Friday 8 am - 6 pm  [facility]: [phone]

## 2023-07-24 NOTE — PROGRESS NOTES
Analia is a 32 year old who is being evaluated via a billable video visit.      How would you like to obtain your AVS? MyChart  If the video visit is dropped, the invitation should be resent by: Text to cell phone: [phone]  Will anyone else be joining your video visit? No          Assessment & Plan     Hypothyroidism due to Hashimoto's thyroiditis  Refilled. Labs WNL.  - levothyroxine (SYNTHROID/LEVOTHROID) 200 MCG tablet; Take 1 tablet (200 mcg) by mouth daily    Recurrent UTI  Refilled. Previously followed with urology who has since turned over rx to primary care.  - nitroFURantoin macrocrystal (MACRODANTIN) 100 MG capsule; Take 1 capsule (100 mg) by mouth At Bedtime    Restless legs syndrome (RLS)  Refilled.  - rOPINIRole (REQUIP) 0.5 MG tablet; Take 3 tablets (total of 1.5 mg) daily 1 to 3 hours before bedtime             The benefits, risks and potential side effects were discussed in detail. Black box warnings discussed as relevant. All patient questions were answered. The patient was instructed to follow up immediately if any adverse reactions develop.    Return precautions discussed, including when to seek urgent/emergent care.    Patient verbalizes understanding and agrees with plan of care.   MERRY WELLS CNP  [facility]   Analia is a 32 year old, presenting for the following health issues:  Thyroid Disease      7/24/2023     3:25 PM   Additional Questions   Roomed by Kait     Thyroid Disease    History of Present Illness       Hypothyroidism:     Since last visit, patient describes the following symptoms::  None    She eats 2-3 servings of fruits and vegetables daily.She consumes 1 sweetened beverage(s) daily.She exercises with enough effort to increase her heart rate 9 or less minutes per day.  She exercises with enough effort to increase her heart rate 3 or less days per week.   She is taking medications regularly.         Hypothyroidism  Follow-up    Since last visit, patient describes the following symptoms: Weight stable, no hair loss, no skin changes, no constipation, no loose stools    Sees psychiatry for bupropion and lamictal    Takes macrobid nightly. Originally prescribed by urology who then asked that primary care care over. She trialed off but got UTI after 6-7 days.     Review of Systems   Constitutional, HEENT, cardiovascular, pulmonary, GI, , musculoskeletal, neuro, skin, endocrine and psych systems are negative, except as otherwise noted.      Objective           Vitals:  No vitals were obtained today due to virtual visit.    Physical Exam   GENERAL: Healthy, alert and no distress  EYES: Eyes grossly normal to inspection.  No discharge or erythema, or obvious scleral/conjunctival abnormalities.  RESP: No audible wheeze, cough, or visible cyanosis.  No visible retractions or increased work of breathing.    SKIN: Visible skin clear. No significant rash, abnormal pigmentation or lesions.  NEURO: Cranial nerves grossly intact.  Mentation and speech appropriate for age.  PSYCH: Mentation appears normal, affect normal/bright, judgement and insight intact, normal speech and appearance well-groomed.                Video-Visit Details    Type of service:  Video Visit     Originating Location (pt. Location): Home    Distant Location (provider location):  On-site  Platform used for Video Visit: [employer]

## 2023-08-06 ASSESSMENT — ANXIETY QUESTIONNAIRES
IF YOU CHECKED OFF ANY PROBLEMS ON THIS QUESTIONNAIRE, HOW DIFFICULT HAVE THESE PROBLEMS MADE IT FOR YOU TO DO YOUR WORK, TAKE CARE OF THINGS AT HOME, OR GET ALONG WITH OTHER PEOPLE: EXTREMELY DIFFICULT
2. NOT BEING ABLE TO STOP OR CONTROL WORRYING: MORE THAN HALF THE DAYS
1. FEELING NERVOUS, ANXIOUS, OR ON EDGE: NEARLY EVERY DAY
7. FEELING AFRAID AS IF SOMETHING AWFUL MIGHT HAPPEN: SEVERAL DAYS
GAD7 TOTAL SCORE: 13
6. BECOMING EASILY ANNOYED OR IRRITABLE: MORE THAN HALF THE DAYS
GAD7 TOTAL SCORE: 13
3. WORRYING TOO MUCH ABOUT DIFFERENT THINGS: MORE THAN HALF THE DAYS
5. BEING SO RESTLESS THAT IT IS HARD TO SIT STILL: SEVERAL DAYS
4. TROUBLE RELAXING: MORE THAN HALF THE DAYS

## 2023-08-07 ENCOUNTER — VIRTUAL VISIT (OUTPATIENT)
Dept: PSYCHIATRY | Facility: CLINIC | Age: 33
End: 2023-08-07
Payer: COMMERCIAL

## 2023-08-07 DIAGNOSIS — F41.1 GENERALIZED ANXIETY DISORDER: Primary | ICD-10-CM

## 2023-08-07 DIAGNOSIS — F39 MOOD DISORDER (H): ICD-10-CM

## 2023-08-07 DIAGNOSIS — F43.10 POSTTRAUMATIC STRESS DISORDER: ICD-10-CM

## 2023-08-07 PROCEDURE — 99214 OFFICE O/P EST MOD 30 MIN: CPT | Mod: VID | Performed by: NURSE PRACTITIONER

## 2023-08-07 RX ORDER — PROPRANOLOL HYDROCHLORIDE 10 MG/1
5-10 TABLET ORAL 2 TIMES DAILY PRN
Qty: 60 TABLET | Refills: 0 | Status: SHIPPED | OUTPATIENT
Start: 2023-08-07 | End: 2023-09-08

## 2023-08-07 ASSESSMENT — PATIENT HEALTH QUESTIONNAIRE - PHQ9
SUM OF ALL RESPONSES TO PHQ QUESTIONS 1-9: 6
10. IF YOU CHECKED OFF ANY PROBLEMS, HOW DIFFICULT HAVE THESE PROBLEMS MADE IT FOR YOU TO DO YOUR WORK, TAKE CARE OF THINGS AT HOME, OR GET ALONG WITH OTHER PEOPLE: SOMEWHAT DIFFICULT
SUM OF ALL RESPONSES TO PHQ QUESTIONS 1-9: 6

## 2023-08-07 NOTE — PATIENT INSTRUCTIONS
**For crisis resources, please see the information at the end of this document**     Thank you for coming to the [facility] [place] [facility].    TREATMENT PLAN:  Medications:   CONTINUE bupropion  mg every day. No script needed.  CONTINUE propranolol 10 mg (1/2-1 tablet) up to twice a day as needed for anxiety. Script sent for #60.  CONTINUE lamotrigine 200 mg per last provider. No script needed.  Continue all other medications per primary care provider.     Consults / Referrals:   - Continue therapy with established provider.    Follow Up:  Schedule an appointment with me in 4 weeks or sooner as needed.  Call [facility] at [phone] to schedule.  Follow up with primary care provider as planned or sooner if needed for acute medical concerns.  Call the psychiatric nurse line with medication questions or concerns at [phone].  Holographic Projection for [employer] may be used to communicate with your provider, but this is not intended to be used for emergencies.    Psychoeducation:  Discussed use of propranolol as off label for anxiety, and side effects to monitor including low HR, BP, lightheadedness or dizziness.    Discussed side effects of bupropion to include agitation and other activation issues, ^ BP or HR, insomnia, stomach upset, appetite loss, weight loss, risk for precipitation of divya, and increased risk for seizures.  Patient agreed to take Bupropion to treat low mood, lack of motivation and poor concentration. Patient verbalized understanding of medication schedule, of side effects, avoidance of alcohol and marijuana due to increase risk for seizures.  Discussed use of propranolol as off label for anxiety, and side effects to monitor including low HR, BP, lightheadedness or dizziness.      Financial Assistance [phone]  [facility] Billing [phone]  Central Billing Office, [place] [facility]: [phone]  [place] [facility]illing [phone]  Medical Records [phone]  [place]  Patient Bill of Rights [email or web address]       MENTAL HEALTH CRISIS RESOURCES:  For a emergency help, please call 911 or go to the nearest Emergency Department.     Emergency Walk-In Options:   [facility] Unit @ [facility] ([place]): [phone] - Specialized mental health emergency area designed to be calming  [facility] ([place]): [phone]  [facility] ([place]): [phone]  [facility] ([place]): [phone]    [facility] Crisis Information:   [place]: [phone]  [place]: [phone]  [place] ([place]E) - Adult: [phone]     Child: [phone]  [place] - Adult: [phone]     Child: [phone]  [place]: [phone]  List of all [facility] resources:   https://[place].gov/dhs/people-we-serve/adults/health-care/mental-health/resources/crisis-contacts.jsp    National Crisis Information:   National Suicide & Crisis Lifeline: Call 035        For online chat options, visit https://suicidepreventionlifeline.org/chat/  Poison Control Center: 4-455-963-9304  Poison Control Center: 4-899-583-0132  Trans Lifeline: 0-539-467-7739 - Hotline for transgender people of all ages  The [employer]: 3-087-836-0164 - Hotline for LGBT youth     For Non-Emergency Support:   Fast Tracker: Mental Health & Substance Use Disorder Resources -   https://www.fasttrackermn.org/       Again thank you for choosing [facility] [place] [facility] and please let us know how we can best partner with you to improve you and your family's health.    You may be receiving a survey regarding this appointment. We would love to have your feedback, both positive and negative. The survey is done by an external company, so your answers are anonymous.        Patient Education   Collaborative Care Psychiatry Service  What to Expect  Here's what to expect from your Collaborative Care Psychiatry  "Service (CCPS).   About CCPS  CCPS means 2 people work together to help you get better. You'll meet with a behavioral health clinician and a psychiatric doctor. A behavioral health clinician helps people with mental health problems by talking with them. A psychiatric doctor helps people by giving them medicine.  How it works  At every visit, you'll see the behavioral health clinician (BHC) first. They'll talk with you about how you're doing and teach you how to feel better.   Then you'll see the psychiatric doctor. This doctor can help you deal with troubling thoughts and feelings by giving you medicine. They'll make sure you know the plan for your care.   CCPS usually takes 3 to 6 visits. If you need more visits, we may have you start seeing a different psychiatric doctor for ongoing care.  If you have any questions or concerns, we'll be glad to talk with you.  About visits  Be open  At your visits, please talk openly about your problems. It may feel hard, but it's the best way for us to help you.  Cancelling visits  If you can't come to your visit, please call us right away at [phone]. If you don't cancel at least 24 hours (1 full day) before your visit, that's \"late cancellation.\"  Being late to visits  Being very late is the same as not showing up. You will be a \"no show\" if:  Your appointment starts with a BHC, and you're more than 15 minutes late for a 30-minute (half hour) visit. This will also cancel your appointment with the psychiatric doctor.  Your appointment is with a psychiatric doctor only, and you're more than 15 minutes late for a 30-minute (half hour) visit.  Your appointment is with a psychiatric doctor only, and you're more than 30 minutes late for a 60-minute (full hour) visit.  If you cancel late or don't show up 2 times within 6 months, we may end your care.   Getting help between visits  If you need help between visits, you can call us Monday to Friday from 8 a.m. to 4:30 p.m. at " [phone].  Emergency care  Call 911 or go to the nearest emergency department if your life or someone else's life is in danger.  Call 418 anytime to reach the national Suicide and Crisis hotline.  Medicine refills  To refill your medicine, call your pharmacy. You can also call [facility]'s Behavioral Access at [phone], Monday to Friday, 8 a.m. to 4:30 p.m. It can take 1 to 3 business days to get a refill.   Forms, letters, and tests  You may have papers to fill out, like FMLA, short-term disability, and workability. We can help you with these forms at your visits, but you must have an appointment. You may need more than 1 visit for this, to be in an intensive therapy program, or both.  Before we can give you medicine for ADHD, we may refer you to get tested for it or confirm it another way.  We may not be able to give you an emotional support animal letter.  We don't do mental health checks ordered by the court.   We don't do mental health testing, but we can refer you to get tested.   Thank you for choosing us for your care.  For informational purposes only. Not to replace the advice of your health care provider. Copyright   2022 [facility]. All rights reserved. [employer] [id number] - 12/22.

## 2023-08-07 NOTE — PROGRESS NOTES
"Virtual Visit Details    Type of service:  Video Visit     Originating Location (pt. Location): Home    Distant Location (provider location):  Off-site  Platform used for Video Visit: Whistlestop         PSYCHIATRIC MEDICATION FOLLOW UP APPT     Name: Analia Jerry   : 1990               Telemedicine Visit: The patient's condition can be safely assessed and treated via synchronous audio and visual telemedicine encounter.     Consent:  The patient/guardian has verbally consented to: the potential risks and benefits of telemedicine (video visit or phone) versus in person care; bill my insurance or make self-payment for services provided; and responsibility for payment of non-covered services.    As the provider I attest to compliance with applicable laws and regulations related to telemedicine.         Source of Referral / Care Team:  Primary Care Provider: MERRY WELLS CNP   Therapist: Monica ([facility]).       The [facility] psychiatry providers act as a specialty service for Primary Care Providers in the [facility] who seek to optimize medications for unstable patients. Once medications have been optimized, [facility] providers discharge the patient back to the referring Primary Care Provider for ongoing medication management. This type of system allows [facility] to serve a high volume of patients.     Patient Identification:  Patient is a 32 year old,   White Not  or  female  who presents for return visit with me.   Patient prefers to be called: \"Analia\".  Patient is currently employed full time.     Patient attended the session alone.    RECORDS AVAILABLE FOR REVIEW: EHR records through [employer] .       Interim History:  I last saw Analia Jerry for outpatient psychiatry Return Visit 4 weeks ago on 7/10/23. During that appointment, we continued recently decreased bupropion  mg, and lamotrigine 200 mg every day, with propranolol 10 mg as needed for anxiety . Patient reports " "ADHERING to prescribed medications. She reports continuing to tolerate the medication well, no notable side effects. She does feel feeling better with the lower bupropion dose now, denies worsening of depression which she actually reports as low today. Does report mild fatigue remains, but she attributes to poor sleep due to higher stress lately (closing on new home / moving this month). Denies Suicidal ideation/SIB/HI. Reports overall does feel the lower dose has been helpful for her irritability, quick temper, but with increase in psychosocial stressors, does feel higher again, particularly irritable while packing. Denies any other symptoms of divya, mood lability (\"pretty leveled out right now\"). With higher anxiety, she has continued to take propranolol more regularly, daily at this point. Does feel it is helpful, and denies any notable Side effects. Has been working with therapist to identify when to take it, as it doesn't help that much once anxiety or agitation is already very high.       Initial Impression:   7/10/23: At initial appointment with this provider 2 weeks ago, we decreased to bupropion  mg, and started propranolol 10 mg PRN, and continued lamotrigine 200. She does report a mild improvement in her anxiety and mood lability, irritability, and is tolerating the propranolol as needed with decent benefit. She does note her mood has been lower with mild decrease in energy since reducing the bupropion. Denies any SI/SIB/HI. She denies intolerable change at this time, and is open to continuing for another few weeks before additional changes. Continue in therapy. Follow-up with this provider in 4 weeks. Patient agreeable to plan.     6/26/23: Analia Jerry is a 32 year old White, female who presents for initial visit with this provider at [facility] ([facility]) for medication management.  Patient previously seen with [facility] provider YULIET Collins (2/2021-8/2021) and returned to PCP, " then re-referred and started with Dr. Babb (03/2022-05/2023) for symptom exacerbation. At last appointment 2 month ago, continued lamotrigine 200 mg every day and bupropion  mg every day after diagnosis changed to mood disorder (from [facility]D) given reports of increased impulsivity, agitation during sertraline initiation which also occurred with past Viibryd trial. Over last few months, she reports improvement in mood lability and irritability, up to last ~2 weeks where they have increased again without clear trigger. Reports worsening of her depression, along with quick to anger, irritability. Denies other symptoms of manic episode at this time. Denies any SI/SIB/HI. No psychosis. Does report throwing things during this time, but denies any physical aggression towards people, animals, no property destruction.  Denies any substance use. Of note, bc started injections for her migraines, stopped atenolol over last few weeks. Denies any notable changes without it, no history of side effects. Discussed risks/benefits/ side effects of current medication, and possibility that bupropion is worsening mood stability as well as side effects of irritability for some. Will initially trial decreasing dose, continuing lamotrigine. Will also send trial of propranolol to be used as needed for anxiety, agitation. Continue with therapy. Follow-up with this provider in 2 weeks. Patient agreeable to plan.        Current medications include:   Current Outpatient Medications   Medication Sig    buPROPion (WELLBUTRIN XL) 300 MG 24 hr tablet Take 1 tablet (300 mg) by mouth every morning    Fremanezumab-vfrm (AJOVY) 225 MG/1.5ML SOAJ Inject 1.5 mg Subcutaneous every 30 days    lamoTRIgine (LAMICTAL) 200 MG tablet Take 1 tablet (200 mg) by mouth At Bedtime    levothyroxine (SYNTHROID/LEVOTHROID) 200 MCG tablet Take 1 tablet (200 mcg) by mouth daily    metoclopramide (REGLAN) 10 MG tablet Take 1 tablet (10 mg) by mouth 3 times daily as  "needed (nausea)    nitroFURantoin macrocrystal (MACRODANTIN) 100 MG capsule Take 1 capsule (100 mg) by mouth At Bedtime    norethindrone-ethinyl estradiol (ORTHO-NOVUM 1/35, 28,) 1-35 MG-MCG tablet Continuously by skipping placebo pills    omeprazole (PRILOSEC) 20 MG DR capsule Take 1 capsule (20 mg) by mouth daily    propranolol (INDERAL) 10 MG tablet Take 0.5-1 tablets (5-10 mg) by mouth 2 times daily as needed (anxiety)    rOPINIRole (REQUIP) 0.5 MG tablet Take 3 tablets (total of 1.5 mg) daily 1 to 3 hours before bedtime    SUMAtriptan (IMITREX) 100 MG tablet Take 1 tablet (100 mg) by mouth at onset of headache for migraine (repeat in 2 hours if needed)     Current Facility-Administered Medications   Medication    Botulinum Toxin Type A (BOTOX) 200 units injection 150 Units         Side effects: Denies    The [place] Prescription Monitoring Program has been reviewed and there are no concerns about diversionary activity for controlled substances at this time.     Psychiatric ROS:  Analia Jerry reports mood has been: \"Um, they've been going pretty good.\"  Depression has been: little interest / pleasure, appetite change or significant weight loss / gain, sleep changes (insomnia or hypersomnia), and fatigue of loss of energy self rates as 2/10, where 0 is none at all and 10 being severe depression. Was 7/10 at last appt.  SI/SIB/HI: denies all.   Anxiety has been: excessive worry, difficult to control, restlessness / feeling keyed up,  easily fatigued,  difficulty concentrating or mind going blank, irritability, muscle tension, and sleep disturbances (difficulty falling / staying asleep, or restless / unsatisfying)  self rates as 7-8/10, where 0 is none at all and 10 being severe anxiety. Was 5-6/10 at last appt.  Sleep has been: continues to be disrupted, typically to be ~10:30 - 11, then up around 2-3A, and unable to fall back asleep until alarm at 6. \"Spiral on the most random things\".   Energy has been: " "improved since initial decrease in bupropion.  Appetite has been: eating more, \"stress eater\" and a lot building up. Does think increased ~5 lb often fluctuating.   Richa sxs: \"pretty leveled out right now\". Denies hyperelevated mood or energy, decreased need for sleep, grandiosity, impulsive or reckless behaviors, increase goal activity.    Psychosis sxs: none  ADHD/ADD sxs: none  PTSD sxs: intrusion and avoidance sx require further evaluate. negative emotional state, diminished interest and detachment from others, irritability, reckless behavior and  sleep disturbances    PHQ9 and GAD7 scores were reviewed today.   PHQ-9 scores:       3/22/2023    10:59 AM 7/24/2023    10:40 AM 8/7/2023    10:21 AM   PHQ-9 SCORE   PHQ-9 Total Score MyChart 7 (Mild depression) 5 (Mild depression) 6 (Mild depression)   PHQ-9 Total Score 7    7 5 6       MAXIM-7 scores:        3/22/2023    11:00 AM 6/25/2023     2:58 PM 8/6/2023     8:26 PM   MAXIM-7 SCORE   Total Score 8 (mild anxiety) 14 (moderate anxiety) 13 (moderate anxiety)   Total Score 8    8 14 13         Current stressors include: Symptoms, Occupational Difficulties, and upcoming move / house purchase  Coping mechanisms and supports include: Therapy, Family and Friends      Vital Signs:   There were no vitals taken for this visit.    Review of Systems:  10 systems (general, cardiovascular, respiratory, eyes, ENT, endocrine, GI, , M/S, neurological) were reviewed. Most pertinent finding(s) is/are: nausea in PM (improving), denies vomiting. Denies fever, chest pain tightness / palpitations, dizziness / syncope, tics / tremors / abnormal muscle mvmts. The remaining systems are all unremarkable.      Labs:  Most recent laboratory results reviewed and no new pertinent labs.     Past Medical/Surgical History:  Past Medical History:   Diagnosis Date    Acute gallstone pancreatitis 12/28/2019    Anxiety     ASCUS of cervix with negative high risk HPV 01/26/2017    ASCUS/neg HR HPV. "    Juarez's esophagus determined by biopsy     Depressive disorder     Gallstones 2019    [facility]    H/O colposcopy with cervical biopsy 2017    Bx & ECC - negative    Hashimoto's disease     Headache(784.0)     Hypothyroidism due to Hashimoto's thyroiditis     Papanicolaou smear of cervix with atypical squamous cells of undetermined significance (ASC-US) 2015      has a past medical history of Acute gallstone pancreatitis (2019), Anxiety, ASCUS of cervix with negative high risk HPV (2017), Juarez's esophagus determined by biopsy (), Depressive disorder, Gallstones (2019), H/O colposcopy with cervical biopsy (2017), Hashimoto's disease, Headache(784.0), Hypothyroidism due to Hashimoto's thyroiditis, and Papanicolaou smear of cervix with atypical squamous cells of undetermined significance (ASC-US) (2015).    She has no past medical history of Arthritis, Cerebral infarction (H), Congestive heart failure (H), COPD (chronic obstructive pulmonary disease) (H), Diabetes (H), Heart disease, History of blood transfusion, Hypertension, or Uncomplicated asthma.    Medication allergies:    Allergies   Allergen Reactions    Nkda [No Known Drug Allergy]        Social History:  Born in [place], [place] and raised in [place], [place].  Parents not  but still live together  Siblings:  Two half sisters, full biobrother   in October.  Childhood: Yes intact home with all current basic needs being met.  Highest education level was college graduate.   Employment Status: full time -  [employer].  Relationship status: Together for 9 years. Safe in relationship.  Current Living situation:  , and 2 stepsons are with them on weekends, longer in summer.  Feels safe at home.  Children: 15 and 10 year old stepsons  Firearms/Weapons Access: No: Patient denies   Service: No  Support: , friend, therapist     Current use of drugs or  alcohol: Denies   Tobacco use: No    Mental Status Exam:  Alertness: alert  and oriented   Appearance: adequately groomed  Behavior/Demeanor: cooperative and pleasant, with good eye contact   Speech: normal and regular rate and rhythm  Language: intact and no problems  Psychomotor: normal or unremarkable  Mood: anxious  Affect: full range and appropriate; was congruent to mood; was congruent to content  Thought Process/Associations: unremarkable  Thought Content:  Reports none;  Denies suicidal ideation, violent ideation, and delusions  Perception:  Reports none;  Denies auditory hallucinations and visual hallucinations  Insight: intact  Judgment: intact  Cognition: does  appear grossly intact; formal cognitive testing was not done  Recent and Remote Memory: Intact to interview. Not formally assessed. No amnesia.  Attention Span and Concentration: normal  Fund of Knowledge: appropriate  Gait and Station: unremarkable    Suicide Risk Assessment:  Today Analia Jerry reports no suicidal ideation . There are notable risk factors for self-harm, including anxiety, family history, anger/rage, and mood change. However, risk is mitigated by commitment to family, absence of past attempts, history of seeking help when needed, future oriented, no access to firearms or weapons, and denies suicidal intent or plan. Therefore, based on all available evidence including the factors cited above, Analia Jerry does not appear to be at imminent risk for self-harm, does not meet criteria for a 72-hr hold, and therefore remains appropriate for ongoing outpatient level of care.  A thorough assessment of risk factors related to suicide and self-harm have been reviewed and are noted above. The patient convincingly denies suicidality on several occasions. Local community safety resources printed and reviewed for patient to use if needed. There was no deceit detected, and the patient presented in a manner that was believable.     Recommended  that patient call 911 or go to the local ED should there be a change in any of these risk factors.    DSM5 Diagnosis:  F39 Mood disorder  300.02 (F41.1) Generalized Anxiety Disorder  309.81 (F43.10) Posttraumatic Stress Disorder (includes Posttraumatic Stress Disorder for Children 6 Years and Younger)  Without dissociative symptoms    Medical comorbidities include:   Patient Active Problem List    Diagnosis Date Noted    Generalized anxiety disorder 07/10/2023     Priority: Medium    Anxiety 02/15/2023     Priority: Medium    Transplant donor evaluation 09/14/2022     Priority: Medium    Recurrent major depression in partial remission (H) 07/25/2022     Priority: Medium    Posttraumatic stress disorder 03/15/2022     Priority: Medium    Fatigue, unspecified type 04/05/2021     Priority: Medium    Moderate episode of recurrent major depressive disorder (H) 03/04/2021     Priority: Medium    Morbid obesity (H) 10/28/2020     Priority: Medium    FRANK (obstructive sleep apnea) - mild (AHI 7) 08/24/2020     Priority: Medium     8/10/2020 [place] Diagnostic Sleep Study (216.0 lbs) - scored with 3% rules -  AHI 7.1, RDI 7.1, Supine AHI 22.9, REM AHI -, Low O2 91.2%, Time Spent ?88% 0 minutes / Time Spent ?89% 0 minutes.      Mood disorder (H)      Priority: Medium    Class 1 obesity due to excess calories with body mass index (BMI) of 32.0 to 32.9 in adult, unspecified whether serious comorbidity present 07/02/2020     Priority: Medium    Elevated liver enzymes 12/28/2019     Priority: Medium    High triglycerides 04/03/2018     Priority: Medium    ASCUS of cervix with negative high risk HPV 01/26/2017     Priority: Medium     4/1/11 (approx) normal - patient reported.   6/11/13 normal - patient reported  12/3/15 ASCUS pap. Plan: per provider, repeat pap in 1 year.  1/26/17 ASCUS/neg HR HPV. Plan: colp bef 4/26/17  3/23/17 [place] Bx & ECC - negative. Plan cotest in 1 year.   3/8/18 ASCUS pap, neg HPV. Plan: colp.   5/10/18  [place] Bx and ECC: negative. Plan: cotest in 1 yr.   6/6/19 NIL/neg HR HPV. Plan cotest in 3 years  4/15/22 NIL pap, neg HPV. 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         Psychosocial & Contextual Factors:  Occupational Difficulties     DIFFERENTIAL DIAGNOSIS: R/O major depressive disorder versus bipolar disorder     Medical comorbidities impacting or contributing to clinical picture: hypothyroidism due to Hashimotos thyroiditis, FRANK, migraines  Known issue that I take into account for their medical decisions, no current exacerbations or new concerns    Impression:  Analia Jerry is a 32 year old White, female who presents for return visit with  [facility] ([facility] for medication management. At last appointment 4 weeks ago, we continued recently decreased bupropion  mg, and lamotrigine 200 mg every day, with propranolol 10 mg as needed for anxiety. She reports continuing to tolerate the medication well, no notable side effects. Overall reports good stability in mood with lower bupropion dose, no worsening of depression which remains low, and denies significant mood lability. Noted benefit to irritability, although recently increased again with stressors of house move. Anxiety moderately well controlled with propranolol as needed. Given notable precipitant, recommending continuing current medication and follow-up in 1 month to assess for medication change needed. Discussed buspirone as alternative to SSRI. Continue therapy. Follow-up with this provider in 1 month. Patient agreeable to plan.    Medication side effects and  alternatives were reviewed. Health promotion activities recommended and reviewed today. All questions addressed. Education and counseling completed regarding risks and benefits of medications and psychotherapy options. Recommend therapy for additional support.       Treatment Plan:  CONTINUE bupropion  mg every day. No script needed.  CONTINUE propranolol 10 mg (1/2-1 tablet) up to twice a day as needed for anxiety. Script sent for #60.  CONTINUE lamotrigine 200 mg per last provider. No script needed.  Continue all other medications per primary care provider.   Continue therapy with established provider.  Safety plan reviewed. To the Emergency Department as needed or call after hours crisis line at [phone] or [phone]. [place] Crisis Text Line. Text [place] to 858779 or Suicide LifeLine Chat: suicidepreventionOpenGammaline.org/chat  Schedule an appointment with me in 4 weeks or sooner as needed. Call [facility] at [phone] to schedule.  Follow up with primary care provider as planned or for acute medical concerns.  Call the psychiatric nurse line with medication questions or concerns at [phone].  2Ut may be used to communicate with your provider, but this is not intended to be used for emergencies.    Patient Education:  Medication side effects and alternatives reviewed. Health promotion activities recommended and reviewed today. All questions addressed. Education and counseling completed regarding risks and benefits of medications and psychotherapy options.  Consent provided by patient/guardian  Call the psychiatric nurse line with medication questions or concerns at [phone].  ModClothhart may be used to communicate with your provider, but this is not intended to be used for emergencies.  Medlineplus.gov is information for patients.  It is run by the [employer] and it contains information about all disorders, diseases and all medications.      Discussed side  effects of bupropion to include agitation and other activation issues, ^ BP or HR, insomnia, stomach upset, appetite loss, weight loss, risk for precipitation of divya, and increased risk for seizures.  Patient agreed to take Bupropion to treat low mood, lack of motivation and poor concentration. Patient verbalized understanding of medication schedule, of side effects, avoidance of alcohol and marijuana due to increase risk for seizures.     Discussed use of propranolol as off label for anxiety, and side effects to monitor including low HR, BP, lightheadedness or dizziness.    Community Resources:    National Suicide Prevention Lifeline: 351.722.8094 (TTY: 867.558.6124). Call anytime for help.  (www.suicidepreventionlifeline.org)  National [place] on Mental Illness (www.irasema.org): 569.741.5534 or 159-812-8850.   Mental Health Association (www.mentalhealth.org): 280.510.2525 or 290-715-0662.  [place] Crisis Text Line: Text [place] to 745261  Suicide LifeLine Chat: LawPath.org/chat    Administrative Billing:     Level of Medical Decision Making:   - At least 1 chronic problem that is not stable  - Engaged in prescription drug management during visit (discussed any medication benefits, side effects, alternatives, etc.)             Patient Status:  [facility] MD/DO/NP/PA providers offer care a specialty service for Primary Care Providers in the [facility] that seek to optimize psychotropic medications for unstable patients.  Once medications have been optimized, our providers discharge the patient back to the referring Primary Care Provider for ongoing medication management.  This type of system allows our providers to serve a high volume of patients.   Patient will continue to be seen for ongoing consultation and stabilization.    Signed:   Josee Byrd, MSN, APRN, PMHNP-BC  [facility] ([facility])  [facility]    Chart documentation done in part with Dragon Voice  Recognition software.  Although reviewed after completion, some word and grammatical errors may remain.

## 2023-08-07 NOTE — NURSING NOTE
Is the patient currently in the state of [place]? YES    Visit mode:VIDEO    If the visit is dropped, the patient can be reconnected by: VIDEO VISIT: Text to cell phone: [phone]    Will anyone else be joining the visit? NO      How would you like to obtain your AVS? MyChart    Are changes needed to the allergy or medication list? NO    Patient reviewed allergies and medications during e-checkin.    Reason for visit: RECHECK    Lora Santillan VF

## 2023-09-06 ASSESSMENT — ANXIETY QUESTIONNAIRES
7. FEELING AFRAID AS IF SOMETHING AWFUL MIGHT HAPPEN: SEVERAL DAYS
3. WORRYING TOO MUCH ABOUT DIFFERENT THINGS: SEVERAL DAYS
6. BECOMING EASILY ANNOYED OR IRRITABLE: MORE THAN HALF THE DAYS
GAD7 TOTAL SCORE: 9
2. NOT BEING ABLE TO STOP OR CONTROL WORRYING: SEVERAL DAYS
IF YOU CHECKED OFF ANY PROBLEMS ON THIS QUESTIONNAIRE, HOW DIFFICULT HAVE THESE PROBLEMS MADE IT FOR YOU TO DO YOUR WORK, TAKE CARE OF THINGS AT HOME, OR GET ALONG WITH OTHER PEOPLE: VERY DIFFICULT
GAD7 TOTAL SCORE: 9
4. TROUBLE RELAXING: MORE THAN HALF THE DAYS
5. BEING SO RESTLESS THAT IT IS HARD TO SIT STILL: NOT AT ALL
1. FEELING NERVOUS, ANXIOUS, OR ON EDGE: MORE THAN HALF THE DAYS

## 2023-09-07 ENCOUNTER — VIRTUAL VISIT (OUTPATIENT)
Dept: PSYCHIATRY | Facility: CLINIC | Age: 33
End: 2023-09-07
Payer: COMMERCIAL

## 2023-09-07 DIAGNOSIS — F41.1 GENERALIZED ANXIETY DISORDER: ICD-10-CM

## 2023-09-07 DIAGNOSIS — F39 MOOD DISORDER (H): Primary | ICD-10-CM

## 2023-09-07 DIAGNOSIS — F43.10 POSTTRAUMATIC STRESS DISORDER: ICD-10-CM

## 2023-09-07 PROCEDURE — 99214 OFFICE O/P EST MOD 30 MIN: CPT | Mod: VID | Performed by: NURSE PRACTITIONER

## 2023-09-07 RX ORDER — LURASIDONE HYDROCHLORIDE 20 MG/1
20 TABLET, FILM COATED ORAL EVERY EVENING
Qty: 30 TABLET | Refills: 0 | Status: SHIPPED | OUTPATIENT
Start: 2023-09-07 | End: 2023-10-23 | Stop reason: DRUGHIGH

## 2023-09-07 NOTE — NURSING NOTE
Is the patient currently in the state of [place]? YES    Visit mode:VIDEO    If the visit is dropped, the patient can be reconnected by: VIDEO VISIT: Text to cell phone:   Telephone Information:   Mobile [phone]       Will anyone else be joining the visit? NO  (If patient encounters technical issues they should call [phone])    How would you like to obtain your AVS? MyChart    Are changes needed to the allergy or medication list? Pt stated no changes to allergies and Pt stated no med changes    Reason for visit: DAVID LOVETT

## 2023-09-07 NOTE — PROGRESS NOTES
"  Virtual Visit Details    Type of service:  Video Visit     Originating Location (pt. Location): Home    Distant Location (provider location):  Off-site  Platform used for Video Visit: DivvyCloud       PSYCHIATRIC MEDICATION FOLLOW UP APPT     Name: Analia Jerry   : 1990               Telemedicine Visit: The patient's condition can be safely assessed and treated via synchronous audio and visual telemedicine encounter.      Consent:  The patient/guardian has verbally consented to: the potential risks and benefits of telemedicine (video visit or phone) versus in person care; bill my insurance or make self-payment for services provided; and responsibility for payment of non-covered services.    As the provider I attest to compliance with applicable laws and regulations related to telemedicine.         Source of Referral / Care Team:  Primary Care Provider: MERRY WELLS CNP   Therapist: Monica ([facility]).        The [facility] psychiatry providers act as a specialty service for Primary Care Providers in the [facility] who seek to optimize medications for unstable patients. Once medications have been optimized, [facility] providers discharge the patient back to the referring Primary Care Provider for ongoing medication management. This type of system allows [facility] to serve a high volume of patients.     Patient Identification:  Patient is a 32 year old,   White Not  or  female  who presents for return visit with me.   Patient prefers to be called: \"Analia\".  Patient is currently employed full time.     Patient attended the session alone.    RECORDS AVAILABLE FOR REVIEW: EHR records through [employer] .       Interim History:  I last saw Analia Jerry for outpatient psychiatry Return Visit 1 month ago on 23. During that appointment, we continued bupropion  mg, lamotrigine 200 mg every day, with propranolol 10 mg as needed for anxiety. Patient reports ADHERING to prescribed " "medications. Over last few weeks, does report her anxiety has improved some as situational stressors (house purchase / move) resolved. She does still report mod anxiety (\"calmed down a little bit, but still there\" - 5/10 today), and recently noticed worse anxiety associated with work which is newer. Most distressing to patient is increase in her depression, which seemed to have been much lower over last few months. She reports the high anxiety was likely preventing noticing of the low mood. Reports low mood, anhedonia, irritable. Does not report clear symptoms of divya (no clear distinct periods of hyperelevated mood, decreased need for sleep, grandiosity, impulsive or reckless behaviors), but does report some hyper-energy and increased goal-directed activities, when \"feeling not able to calm self and stop doing things\" (e.g. around the house). No psychosis. Denies any SI/SIB/HI.       Initial Impression:   8/7/23: At last appointment 4 weeks ago, we continued recently decreased bupropion  mg, and lamotrigine 200 mg every day, with propranolol 10 mg as needed for anxiety. She reports continuing to tolerate the medication well, no notable side effects. Overall reports good stability in mood with lower bupropion dose, no worsening of depression which remains low, and denies significant mood lability. Noted benefit to irritability, although recently increased again with stressors of house move. Anxiety moderately well controlled with propranolol as needed. Given notable precipitant, recommending continuing current medication and follow-up in 1 month to assess for medication change needed. Discussed buspirone as alternative to SSRI. Continue therapy. Follow-up with this provider in 1 month. Patient agreeable to plan.     7/10/23: At initial appointment with this provider 2 weeks ago, we decreased to bupropion  mg, and started propranolol 10 mg PRN, and continued lamotrigine 200. She does report a mild " improvement in her anxiety and mood lability, irritability, and is tolerating the propranolol as needed with decent benefit. She does note her mood has been lower with mild decrease in energy since reducing the bupropion. Denies any SI/SIB/HI. She denies intolerable change at this time, and is open to continuing for another few weeks before additional changes. Continue in therapy. Follow-up with this provider in 4 weeks. Patient agreeable to plan.      6/26/23: Analia Jerry is a 32 year old White, female who presents for initial visit with this provider at [facility] ([facility]) for medication management.  Patient previously seen with [facility] provider YULIET Collins (2/2021-8/2021) and returned to PCP, then re-referred and started with Dr. Babb (03/2022-05/2023) for symptom exacerbation. At last appointment 2 month ago, continued lamotrigine 200 mg every day and bupropion  mg every day after diagnosis changed to mood disorder (from MDD) given reports of increased impulsivity, agitation during sertraline initiation which also occurred with past Viibryd trial. Over last few months, she reports improvement in mood lability and irritability, up to last ~2 weeks where they have increased again without clear trigger. Reports worsening of her depression, along with quick to anger, irritability. Denies other symptoms of manic episode at this time. Denies any SI/SIB/HI. No psychosis. Does report throwing things during this time, but denies any physical aggression towards people, animals, no property destruction.  Denies any substance use. Of note, bc started injections for her migraines, stopped atenolol over last few weeks. Denies any notable changes without it, no history of side effects. Discussed risks/benefits/ side effects of current medication, and possibility that bupropion is worsening mood stability as well as side effects of irritability for some. Will initially trial decreasing dose,  "continuing lamotrigine. Will also send trial of propranolol to be used as needed for anxiety, agitation. Continue with therapy. Follow-up with this provider in 2 weeks. Patient agreeable to plan.       Current medications include:   Current Outpatient Medications   Medication Sig    buPROPion (WELLBUTRIN XL) 300 MG 24 hr tablet Take 1 tablet (300 mg) by mouth every morning    Fremanezumab-vfrm (AJOVY) 225 MG/1.5ML SOAJ Inject 1.5 mg Subcutaneous every 30 days    lamoTRIgine (LAMICTAL) 200 MG tablet Take 1 tablet (200 mg) by mouth At Bedtime    levothyroxine (SYNTHROID/LEVOTHROID) 200 MCG tablet Take 1 tablet (200 mcg) by mouth daily    metoclopramide (REGLAN) 10 MG tablet Take 1 tablet (10 mg) by mouth 3 times daily as needed (nausea)    nitroFURantoin macrocrystal (MACRODANTIN) 100 MG capsule Take 1 capsule (100 mg) by mouth At Bedtime    norethindrone-ethinyl estradiol (ORTHO-NOVUM 1/35, 28,) 1-35 MG-MCG tablet Continuously by skipping placebo pills    omeprazole (PRILOSEC) 20 MG DR capsule Take 1 capsule (20 mg) by mouth daily    propranolol (INDERAL) 10 MG tablet Take 0.5-1 tablets (5-10 mg) by mouth 2 times daily as needed (anxiety)    rOPINIRole (REQUIP) 0.5 MG tablet Take 3 tablets (total of 1.5 mg) daily 1 to 3 hours before bedtime    SUMAtriptan (IMITREX) 100 MG tablet Take 1 tablet (100 mg) by mouth at onset of headache for migraine (repeat in 2 hours if needed)     Current Facility-Administered Medications   Medication    Botulinum Toxin Type A (BOTOX) 200 units injection 150 Units         Side effects: Denies      Psychiatric ROS:  Analia Jerry reports mood has been: \"I've been doing ok\"  Depression has been: depressed mood, little interest / pleasure, appetite change or significant weight loss / gain, fatigue of loss of energy, worthlessness or excessive guilt, and difficulty concentrating or indecisiveness self rates as 7-8/10, where 0 is none at all and 10 being severe depression. Was 2/10 at last " "appt.  SI/SIB/HI: denies all.   Anxiety has been: excessive worry, difficult to control, restlessness / feeling keyed up,  easily fatigued,  difficulty concentrating or mind going blank, irritability, muscle tension, and sleep disturbances (difficulty falling / staying asleep, or restless / unsatisfying)  self rates as 5-6/10, where 0 is none at all and 10 being severe anxiety. Was 7-8/10 at last appt.  Sleep has been: no significant issue reported  Energy has been: no significant issue reported  Appetite has been: has been higher with high stress.   Richa sxs: Reports some hyper-energy and increased goal-directed activities, when \"feeling not able to calm self and stop doing things\" (e.g. around the house). Denies hyperelevated mood, decreased need for sleep, grandiosity, impulsive or reckless behaviors.  Psychosis sxs: none   ADHD/ADD sxs: none  PTSD sxs: intrusion and avoidance sx require further evaluate. negative emotional state, diminished interest and detachment from others, irritability, reckless behavior and  sleep disturbances    GAD7 scores were reviewed today.   PHQ-9 scores:       3/22/2023    10:59 AM 7/24/2023    10:40 AM 8/7/2023    10:21 AM   PHQ-9 SCORE   PHQ-9 Total Score MyChart 7 (Mild depression) 5 (Mild depression) 6 (Mild depression)   PHQ-9 Total Score 7    7 5 6       MAXIM-7 scores:        6/25/2023     2:58 PM 8/6/2023     8:26 PM 9/6/2023    11:40 AM   MAXIM-7 SCORE   Total Score 14 (moderate anxiety) 13 (moderate anxiety) 9 (mild anxiety)   Total Score 14 13 9       Current stressors include: Symptoms, Occupational Difficulties  Coping mechanisms and supports include: Therapy, Family and Friends    Vital Signs:   There were no vitals taken for this visit.    Review of Systems:  10 systems (general, cardiovascular, respiratory, eyes, ENT, endocrine, GI, , M/S, neurological) were reviewed. Most pertinent finding(s) is/are: denies fever, chest pain / tightness / palpitations, nausea " (improving) / vomiting / diarrhea / constipation, tics / tremors / abnormal muscle mvmts, skin changes or rash. The remaining systems are all unremarkable.    Labs:  Most recent laboratory results reviewed and no new labs.     Past Medical/Surgical History:  Past Medical History:   Diagnosis Date    Acute gallstone pancreatitis 2019    Anxiety     ASCUS of cervix with negative high risk HPV 2017    ASCUS/neg HR HPV.    Juarez's esophagus determined by biopsy     Depressive disorder     Gallstones 2019    [facility] [place]    H/O colposcopy with cervical biopsy 2017    Bx & ECC - negative    Hashimoto's disease     Headache(784.0)     Hypothyroidism due to Hashimoto's thyroiditis     Papanicolaou smear of cervix with atypical squamous cells of undetermined significance (ASC-US) 2015      has a past medical history of Acute gallstone pancreatitis (2019), Anxiety, ASCUS of cervix with negative high risk HPV (2017), Juarez's esophagus determined by biopsy (), Depressive disorder, Gallstones (2019), H/O colposcopy with cervical biopsy (2017), Hashimoto's disease, Headache(784.0), Hypothyroidism due to Hashimoto's thyroiditis, and Papanicolaou smear of cervix with atypical squamous cells of undetermined significance (ASC-US) (2015).    She has no past medical history of Arthritis, Cerebral infarction (H), Congestive heart failure (H), COPD (chronic obstructive pulmonary disease) (H), Diabetes (H), Heart disease, History of blood transfusion, Hypertension, or Uncomplicated asthma.    Medication allergies:    Allergies   Allergen Reactions    Nkda [No Known Drug Allergy]        Social History:  Born in [place], [place] and raised in [place], [place].  Parents not  but still live together  Siblings:  Two half sisters, full biobrother   in October.  Childhood: Yes intact home with all current basic needs being met.  Highest education level was  college graduate.   Employment Status: full time -  [employer].  Relationship status: Together for 9 years. Safe in relationship.  Current Living situation:  , and 2 stepsons are with them on weekends, longer in summer.  Feels safe at home.  Children: 15 and 10 year old kelley  Firearms/Weapons Access: No: Patient denies   Service: No  Support: , friend, therapist     Current use of drugs or alcohol: Denies   Tobacco use: No    Mental Status Exam:  Alertness: alert  and oriented   Appearance: adequately groomed  Behavior/Demeanor: cooperative and pleasant, with good eye contact   Speech: normal and regular rate and rhythm  Language: intact and no problems  Psychomotor: normal or unremarkable  Mood: depressed and anxious  Affect:  mildly flat but appropriate ; was congruent to mood; was congruent to content  Thought Process/Associations: unremarkable  Thought Content:  Reports none;  Denies suicidal ideation, violent ideation, and delusions  Perception:  Reports none;  Denies auditory hallucinations and visual hallucinations  Insight: intact  Judgment: intact  Cognition: does  appear grossly intact; formal cognitive testing was not done  Recent and Remote Memory: Intact to interview. Not formally assessed. No amnesia.  Attention Span and Concentration: normal  Fund of Knowledge: appropriate  Gait and Station: unremarkable    Suicide Risk Assessment:  Today Analia Jerry reports no suicidal ideation. There are notable risk factors for self-harm, including anxiety, family history, anger/rage, and mood change. However, risk is mitigated by commitment to family, absence of past attempts, history of seeking help when needed, future oriented, no access to firearms or weapons, and denies suicidal intent or plan. Therefore, based on all available evidence including the factors cited above, Analia Jerry does not appear to be at imminent risk for self-harm, does not meet criteria for a  72-hr hold, and therefore remains appropriate for ongoing outpatient level of care.  A thorough assessment of risk factors related to suicide and self-harm have been reviewed and are noted above. The patient convincingly denies suicidality on several occasions. Local community safety resources printed and reviewed for patient to use if needed. There was no deceit detected, and the patient presented in a manner that was believable.     Recommended that patient call 911 or go to the local ED should there be a change in any of these risk factors.    DSM5 Diagnosis:  F39 Mood disorder  300.02 (F41.1) Generalized Anxiety Disorder  309.81 (F43.10) Posttraumatic Stress Disorder (includes Posttraumatic Stress Disorder for Children 6 Years and Younger)  Without dissociative symptoms    Medical comorbidities include:   Patient Active Problem List    Diagnosis Date Noted    Generalized anxiety disorder 07/10/2023     Priority: Medium    Anxiety 02/15/2023     Priority: Medium    Transplant donor evaluation 09/14/2022     Priority: Medium    Recurrent major depression in partial remission (H) 07/25/2022     Priority: Medium    Posttraumatic stress disorder 03/15/2022     Priority: Medium    Fatigue, unspecified type 04/05/2021     Priority: Medium    Moderate episode of recurrent major depressive disorder (H) 03/04/2021     Priority: Medium    Morbid obesity (H) 10/28/2020     Priority: Medium    FRANK (obstructive sleep apnea) - mild (AHI 7) 08/24/2020     Priority: Medium     8/10/2020 [place] [facility] Sleep Study (216.0 lbs) - scored with 3% rules -  AHI 7.1, RDI 7.1, Supine AHI 22.9, REM AHI -, Low O2 91.2%, Time Spent ?88% 0 minutes / Time Spent ?89% 0 minutes.      Mood disorder (H)      Priority: Medium    Class 1 obesity due to excess calories with body mass index (BMI) of 32.0 to 32.9 in adult, unspecified whether serious comorbidity present 07/02/2020     Priority: Medium    Elevated liver enzymes 12/28/2019      Priority: Medium    High triglycerides 04/03/2018     Priority: Medium    ASCUS of cervix with negative high risk HPV 01/26/2017     Priority: Medium     4/1/11 (approx) normal - patient reported.   6/11/13 normal - patient reported  12/3/15 ASCUS pap. Plan: per provider, repeat pap in 1 year.  1/26/17 ASCUS/neg HR HPV. Plan: colp bef 4/26/17  3/23/17 [place] Bx & ECC - negative. Plan cotest in 1 year.   3/8/18 ASCUS pap, neg HPV. Plan: colp.   5/10/18 [place] Bx and ECC: negative. Plan: cotest in 1 yr.   6/6/19 NIL/neg HR HPV. Plan cotest in 3 years  4/15/22 NIL pap, neg HPV. 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         Psychosocial & Contextual Factors:  Occupational Difficulties     DIFFERENTIAL DIAGNOSIS: R/O major depressive disorder versus bipolar disorder     Medical comorbidities impacting or contributing to clinical picture: hypothyroidism due to Hashimotos thyroiditis, FRANK, migraines  Known issue that I take into account for their medical decisions, no current exacerbations or new concerns    Impression:  Analia Jerry is a 32 year old White, female who presents for return visit with  [facility] for medication management. At last appointment 1 month ago, we continued bupropion  mg, lamotrigine 200 mg every day, with propranolol 10 mg as needed for anxiety. Over last few weeks, does report her anxiety has improved some as situational stressors although remains moderately high. Most distressing to patient is increase in her depression, which seemed to have been much lower over last few months and continues to  "fluctuate significantly. Does not report distinct periods of divya, but does report some hyper-energy and increased goal-directed activities, when \"feeling not able to calm self and stop doing things\" (e.g. around the house). No psychosis. Denies any SI/SIB/HI. Discussed risks / benefits of medication options for depression, and risk for ongoing bupropion medication for mood lability. At this time, we will trial adding lurasidone for bipolar depression, while monitoring closely for side effects. If continuing medication, will send for baseline metabolic labs at next appointment. Continue other medication at this time. Continue with therapist. Follow-up with this provider in 2 weeks or sooner as needed. Patient agreeable to plan.     Medication side effects and alternatives were reviewed. Health promotion activities recommended and reviewed today. All questions addressed. Education and counseling completed regarding risks and benefits of medications and psychotherapy options. Recommend therapy for additional support.       Treatment Plan:  START Latuda (lurasidone) 20 mg every PM with food. Script sent for #30.  CONTINUE bupropion  mg every day. No script needed.  CONTINUE propranolol 10 mg - 20 mg (1-2 tablets) up to twice a day as needed for anxiety. No script needed.  CONTINUE lamotrigine 200 mg per last provider. No script needed.  Continue all other medications per primary care provider.   Continue therapy with established provider.  Safety plan reviewed. To the Emergency Department as needed or call after hours crisis line at [phone] or [phone]. [place] Crisis Text Line. Text [place] to 315057 or Suicide LifeLine Chat: suicidepreventionlifeline.org/chat  Schedule an appointment with me in 2 weeks or sooner as needed. Call [facility] at [phone] to schedule.  Follow up with primary care provider as planned or for acute medical concerns.  Call the psychiatric nurse line with " medication questions or concerns at [phone].  MyChart may be used to communicate with your provider, but this is not intended to be used for emergencies.    Patient Education:  Medication side effects and alternatives reviewed. Health promotion activities recommended and reviewed today. All questions addressed. Education and counseling completed regarding risks and benefits of medications and psychotherapy options.  Consent provided by patient/guardian  Call the psychiatric nurse line with medication questions or concerns at [phone].  MyChart may be used to communicate with your provider, but this is not intended to be used for emergencies.  FIRST GENERATION ANTIPSYCHOTIC/ SECOND GENERATION ANTIPSYCHOTIC USE:  Atypical need for cardiometabolic monitoring with medication- B/P, weight, blood sugar, cholesterol.  Need to monitor for abnormal movements taught  [employer].gov is information for patients.  It is run by the [employer] and it contains information about all disorders, diseases and all medications.      Community Resources:    National Suicide Prevention Lifeline: 650.909.3784 (TTY: 787.602.5622). Call anytime for help.  (www.suicidepreventionlifeline.org)  National [place] on Mental Illness (www.irasema.org): 849.467.1158 or 422-914-4657.   Mental Health Association (www.mentalhealth.org): 788.407.7562 or 644-273-4401.  [place] Crisis Text Line: Text [place] to 938526  Suicide LifeLine Chat: suicidepreOrchestrate Orthodontic Technologiesline.org/chat    Administrative Billing:     Level of Medical Decision Making:   - At least 1 chronic problem that is not stable  - Engaged in prescription drug management during visit (discussed any medication benefits, side effects, alternatives, etc.)             Patient Status:  [facility] MD/DO/NP/PA providers offer care a specialty service for Primary Care Providers in the [facility] that seek to optimize psychotropic medications for unstable patients.  Once medications  have been optimized, our providers discharge the patient back to the referring Primary Care Provider for ongoing medication management.  This type of system allows our providers to serve a high volume of patients.   Patient will continue to be seen for ongoing consultation and stabilization.    Signed:   Josee Byrd, MSN, APRN, PMHNP-BC  [facility]  [facility]    Chart documentation done in part with Dragon Voice Recognition software.  Although reviewed after completion, some word and grammatical errors may remain.

## 2023-09-07 NOTE — PATIENT INSTRUCTIONS
**For crisis resources, please see the information at the end of this document**     Thank you for coming to the [facility] [place] [facility].    TREATMENT PLAN:  Medications:   START Latuda (lurasidone) 20 mg every PM with food. Script sent for #30.  CONTINUE bupropion  mg every day. No script needed.  CONTINUE propranolol 10 mg - 20 mg (1-2 tablets) up to twice a day as needed for anxiety. No script needed.  CONTINUE lamotrigine 200 mg per last provider. No script needed.  Continue all other medications per primary care provider.      Consults / Referrals:   - Continue therapy with established provider.    Follow Up:  Schedule an appointment with me in 2 weeks or sooner as needed.  Call [facility] at [phone] to schedule.  Follow up with primary care provider as planned or sooner if needed for acute medical concerns.  Call the psychiatric nurse line with medication questions or concerns at [phone].  SpectraLineart may be used to communicate with your provider, but this is not intended to be used for emergencies.    Psychoeducation:  FIRST GENERATION ANTIPSYCHOTIC/ SECOND GENERATION ANTIPSYCHOTIC USE:  Atypical need for cardiometabolic monitoring with medication- B/P, weight, blood sugar, cholesterol.  Need to monitor for abnormal movements taught    Discussed use of propranolol as off label for anxiety, and side effects to monitor including low HR, BP, lightheadedness or dizziness.    Discussed side effects of bupropion to include agitation and other activation issues, ^ BP or HR, insomnia, stomach upset, appetite loss, weight loss, risk for precipitation of divya, and increased risk for seizures.  Patient agreed to take Bupropion to treat low mood, lack of motivation and poor concentration. Patient verbalized understanding of medication schedule, of side effects, avoidance of alcohol and marijuana due to increase risk for seizures.  Discussed use of  propranolol as off label for anxiety, and side effects to monitor including low HR, BP, lightheadedness or dizziness.      Financial Assistance [phone]  [facility] Billing [phone]  Central Billing Office, [facility]: [phone]  [facility] Billing [phone]  Medical Records [phone]  [facility] Patient Bill of Rights [email or web address]       MENTAL HEALTH CRISIS RESOURCES:  For a emergency help, please call 911 or go to the nearest Emergency Department.     Emergency Walk-In Options:   EmPATH Unit @ [facility] ([place]): [phone] - Specialized mental health emergency area designed to be calming  [facility] ([place]): [phone]  [facility] [place] [facility] ([place]): [phone]  [facility]): [phone]    [facility] Crisis Information:   [place]: [phone]  [place]: [phone]  [place] ([place]ILL[place]) - Adult: [phone]     Child: [phone]  [place] - Adult: [phone]     Child: [phone]  [place]: [phone]  List of all [facility] resources:   https://[place].gov/dhs/people-we-serve/adults/health-care/mental-health/resources/crisis-contacts.jsp    National Crisis Information:   National Suicide & Crisis Lifeline: Call 148        For online chat options, visit https://suicidepreventionlifeline.org/chat/  Poison Control Center: 1-528-867-6412  Poison Control Center: 9-048-079-9720  Trans Lifeline: 1-242.148.8558 - Hotline for transgender people of all ages  The [employer]: 2-142-257-7236 - Hotline for LGBT youth     For Non-Emergency Support:   Fast Tracker: Mental Health & Substance Use Disorder Resources -   https://www.[employer].org/       Again thank you for choosing [facility] and please let us know how we can best partner with you to improve you and your family's health.    You may be receiving a survey  "regarding this appointment. We would love to have your feedback, both positive and negative. The survey is done by an external company, so your answers are anonymous.        Patient Education   Collaborative Care Psychiatry Service  What to Expect  Here's what to expect from your Collaborative Care Psychiatry Service (CCPS).   About CCPS  CCPS means 2 people work together to help you get better. You'll meet with a behavioral health clinician and a psychiatric doctor. A behavioral health clinician helps people with mental health problems by talking with them. A psychiatric doctor helps people by giving them medicine.  How it works  At every visit, you'll see the behavioral health clinician (BHC) first. They'll talk with you about how you're doing and teach you how to feel better.   Then you'll see the psychiatric doctor. This doctor can help you deal with troubling thoughts and feelings by giving you medicine. They'll make sure you know the plan for your care.   CCPS usually takes 3 to 6 visits. If you need more visits, we may have you start seeing a different psychiatric doctor for ongoing care.  If you have any questions or concerns, we'll be glad to talk with you.  About visits  Be open  At your visits, please talk openly about your problems. It may feel hard, but it's the best way for us to help you.  Cancelling visits  If you can't come to your visit, please call us right away at [phone]. If you don't cancel at least 24 hours (1 full day) before your visit, that's \"late cancellation.\"  Being late to visits  Being very late is the same as not showing up. You will be a \"no show\" if:  Your appointment starts with a BHC, and you're more than 15 minutes late for a 30-minute (half hour) visit. This will also cancel your appointment with the psychiatric doctor.  Your appointment is with a psychiatric doctor only, and you're more than 15 minutes late for a 30-minute (half hour) visit.  Your appointment is with a " psychiatric doctor only, and you're more than 30 minutes late for a 60-minute (full hour) visit.  If you cancel late or don't show up 2 times within 6 months, we may end your care.   Getting help between visits  If you need help between visits, you can call us Monday to Friday from 8 a.m. to 4:30 p.m. at [phone].  Emergency care  Call 911 or go to the nearest emergency department if your life or someone else's life is in danger.  Call 988 anytime to reach the national Suicide and Crisis hotline.  Medicine refills  To refill your medicine, call your pharmacy. You can also call [facility]'s Behavioral Access at [phone], Monday to Friday, 8 a.m. to 4:30 p.m. It can take 1 to 3 business days to get a refill.   Forms, letters, and tests  You may have papers to fill out, like FMLA, short-term disability, and workability. We can help you with these forms at your visits, but you must have an appointment. You may need more than 1 visit for this, to be in an intensive therapy program, or both.  Before we can give you medicine for ADHD, we may refer you to get tested for it or confirm it another way.  We may not be able to give you an emotional support animal letter.  We don't do mental health checks ordered by the court.   We don't do mental health testing, but we can refer you to get tested.   Thank you for choosing us for your care.  For informational purposes only. Not to replace the advice of your health care provider. Copyright   2022 [facility]. All rights reserved. [employer] [id number] - 12/22.

## 2023-09-07 NOTE — Clinical Note
Please call to schedule patient for follow-up in 2 weeks with me only (no [facility]).  Thank you!   MERRY Pruitt, RAYMUNDO-BC Collaborative Care Psychiatry Service (CCPS) [facility]

## 2023-09-08 ENCOUNTER — MYC REFILL (OUTPATIENT)
Dept: PSYCHIATRY | Facility: CLINIC | Age: 33
End: 2023-09-08
Payer: COMMERCIAL

## 2023-09-08 DIAGNOSIS — F41.1 GENERALIZED ANXIETY DISORDER: ICD-10-CM

## 2023-09-08 RX ORDER — PROPRANOLOL HYDROCHLORIDE 10 MG/1
10-20 TABLET ORAL 2 TIMES DAILY PRN
Qty: 60 TABLET | Refills: 0 | Status: SHIPPED | OUTPATIENT
Start: 2023-09-08 | End: 2023-10-23

## 2023-09-08 NOTE — TELEPHONE ENCOUNTER
Date of Last Office Visit: 09/07/2023  Date of Next Office Visit: none  No shows since last visit: 0  Cancellations since last visit: 0    Medication requested: propranolol (INDERAL) 10 MG tablet  Date last ordered: 08/07/2023 Qty: 60 Refills: 0     Review of [place] ?: N/A  Lapse in medication adherence greater than 5 days?: NO  Medication refill request verified as identical to current order?: YES  Result of Last DAM, VPA, Li+ Level, CBC, or Carbamazepine Level (at or since last visit): N/A    Last visit treatment plan:   Return in about 2 weeks (around 9/21/2023) for using a video visit.  TREATMENT PLAN:  Medications:   START Latuda (lurasidone) 20 mg every PM with food. Script sent for #30.  CONTINUE bupropion  mg every day. No script needed.  CONTINUE propranolol 10 mg - 20 mg (1-2 tablets) up to twice a day as needed for anxiety. No script needed.  CONTINUE lamotrigine 200 mg per last provider. No script needed.          []Medication refilled per  Medication Refill in Ambulatory Care  policy.  []Medication unable to be refilled by RN due to criteria not met as indicated below:    []Eligibility - not seen in the last year   [x]Supervision - no future appointment   []Compliance - no shows, cancellations or lapse in therapy   []Verification - order discrepancy   []Controlled medication   [x]Medication not included in policy   []90-day supply request   [x]Other

## 2023-09-11 ENCOUNTER — MYC MEDICAL ADVICE (OUTPATIENT)
Dept: PSYCHIATRY | Facility: CLINIC | Age: 33
End: 2023-09-11
Payer: COMMERCIAL

## 2023-09-11 NOTE — TELEPHONE ENCOUNTER
Patient hasn't been able to get the Latuda yet from the [facility] pharmacy. Called [facility] and they said they were out of stock but it came in today and they were getting it ready for her. Explained to patient the situation and if she wants it transferred to [facility] pharmacy she could call them to transfer it.     Last visit:   START Latuda (lurasidone) 20 mg every PM with food. Script sent for #30.  CONTINUE bupropion  mg every day. No script needed.  CONTINUE propranolol 10 mg - 20 mg (1-2 tablets) up to twice a day as needed for anxiety. No script needed.  CONTINUE lamotrigine 200 mg per last provider. No script needed.      Sophie Thorpe RN on 9/11/2023 at 3:04 PM

## 2023-09-12 NOTE — TELEPHONE ENCOUNTER
RN called [facility] pharmacy - [place]. They state they sent all the prescriptions they had unless the script had no refills. The pharmacy specifically confirmed that lurasidone (LATUDA) 20 MG TABS tablets was one of the scripts that was fax transferred over. They did state that if [facility] did not get the fax, [place] can call them and they will verbally transfer the script.     RN called [place] pharmacy in [facility] at [phone]. They state they may still be receiving faxes for this patients medication. RN reviewed that [facility] did fax the script and they can call [facility] to have the script verbally transferred over. RN reviewed that patient has been trying to get the medication since last week and requested patient be able to fill  medication today. They agreed to call [facility] pharmacy if they do not receive the fax in the next 30 minutes. They did state they may have to order the medication in depending on the form sent.     RN updated patient via MyC message of the above.     Janeth Alcantar RN on 9/12/2023 at 3:18 PM

## 2023-09-25 ENCOUNTER — VIRTUAL VISIT (OUTPATIENT)
Dept: PSYCHIATRY | Facility: CLINIC | Age: 33
End: 2023-09-25
Payer: COMMERCIAL

## 2023-09-25 DIAGNOSIS — F43.10 POSTTRAUMATIC STRESS DISORDER: ICD-10-CM

## 2023-09-25 DIAGNOSIS — F41.1 GENERALIZED ANXIETY DISORDER: ICD-10-CM

## 2023-09-25 DIAGNOSIS — F39 MOOD DISORDER (H): Primary | ICD-10-CM

## 2023-09-25 PROCEDURE — 99214 OFFICE O/P EST MOD 30 MIN: CPT | Mod: VID | Performed by: NURSE PRACTITIONER

## 2023-09-25 RX ORDER — ARIPIPRAZOLE 2 MG/1
TABLET ORAL
Qty: 15 TABLET | Refills: 0 | Status: SHIPPED | OUTPATIENT
Start: 2023-09-25 | End: 2023-10-23 | Stop reason: DRUGHIGH

## 2023-09-25 RX ORDER — ARIPIPRAZOLE 5 MG/1
5 TABLET ORAL DAILY
Qty: 30 TABLET | Refills: 0 | Status: SHIPPED | OUTPATIENT
Start: 2023-09-25 | End: 2023-10-23

## 2023-09-25 NOTE — NURSING NOTE
Is the patient currently in the state of [place]? YES    Visit mode:VIDEO    If the visit is dropped, the patient can be reconnected by: VIDEO VISIT: Text to cell phone:   Telephone Information:   Mobile [phone]       Will anyone else be joining the visit? NO  (If patient encounters technical issues they should call [phone])    How would you like to obtain your AVS? MyChart    Are changes needed to the allergy or medication list? No    Reason for visit: RECHECK    Stephanie LOVETT

## 2023-09-25 NOTE — PROGRESS NOTES
"Virtual Visit Details    Type of service:  Video Visit     Originating Location (pt. Location): Home    Distant Location (provider location):  On-site  Platform used for Video Visit: Tapjoy       PSYCHIATRIC MEDICATION FOLLOW UP APPT     Name: Analia Jerry   : 1990               Telemedicine Visit: The patient's condition can be safely assessed and treated via synchronous audio and visual telemedicine encounter.      Consent:  The patient/guardian has verbally consented to: the potential risks and benefits of telemedicine (video visit or phone) versus in person care; bill my insurance or make self-payment for services provided; and responsibility for payment of non-covered services.    As the provider I attest to compliance with applicable laws and regulations related to telemedicine.         Source of Referral / Care Team:  Primary Care Provider: MERRY WELLS CNP   Therapist: Monica ([facility]).       The [facility] psychiatry providers act as a specialty service for Primary Care Providers in the [facility] who seek to optimize medications for unstable patients. Once medications have been optimized, [facility] providers discharge the patient back to the referring Primary Care Provider for ongoing medication management. This type of system allows [facility] to serve a high volume of patients.     Patient Identification:  Patient is a 32 year old,   White Not  or  female  who presents for return visit with me.   Patient prefers to be called: \"Analia\".  Patient is currently employed full time.      Patient attended the session alone.    RECORDS AVAILABLE FOR REVIEW: EHR records through [employer] .       Interim History:  I last saw Analia Jerry for outpatient psychiatry Return Visit 2.5 weeks ago on 23. During that appointment, we added Latuda 20 mg QPM, and continued bupropion  mg, lamotrigine 200 mg every day, with propranolol 10 mg as needed for anxiety. In interim, " "patient contacted provider as Latuda was exacerbating RLS, and typical treatment (heat/cold/movement) not improving. Denies any other tics / tremors / akathisia or other sx of NMS. Recommended stopping medication, which she did after 3 days and reports now back to baseline. No significant change in symptoms at this time - anxiety remains moderately well managed, especially with propranolol (rates as 4/10 today) which she is tolerating well. Denies panic. Reports depression still remains elevated, associated with low mood, anhedonia, irritability. Denies sx of hypo/divya at this time, no hyperelevated mood or energy. Denies SI/SIB/HI.       Initial Impression:   9/7/23: At last appointment 1 month ago, we continued bupropion  mg, lamotrigine 200 mg every day, with propranolol 10 mg as needed for anxiety. Over last few weeks, does report her anxiety has improved some as situational stressors although remains moderately high. Most distressing to patient is increase in her depression, which seemed to have been much lower over last few months and continues to fluctuate significantly. Does not report distinct periods of divya, but does report some hyper-energy and increased goal-directed activities, when \"feeling not able to calm self and stop doing things\" (e.g. around the house). No psychosis. Denies any SI/SIB/HI. Discussed risks / benefits of medication options for depression, and risk for ongoing bupropion medication for mood lability. At this time, we will trial adding lurasidone for bipolar depression, while monitoring closely for side effects. If continuing medication, will send for baseline metabolic labs at next appointment. Continue other medication at this time. Continue with therapist. Follow-up with this provider in 2 weeks or sooner as needed. Patient agreeable to plan.      8/7/23: At last appointment 4 weeks ago, we continued recently decreased bupropion  mg, and lamotrigine 200 mg every day, " with propranolol 10 mg as needed for anxiety. She reports continuing to tolerate the medication well, no notable side effects. Overall reports good stability in mood with lower bupropion dose, no worsening of depression which remains low, and denies significant mood lability. Noted benefit to irritability, although recently increased again with stressors of house move. Anxiety moderately well controlled with propranolol as needed. Given notable precipitant, recommending continuing current medication and follow-up in 1 month to assess for medication change needed. Discussed buspirone as alternative to SSRI. Continue therapy. Follow-up with this provider in 1 month. Patient agreeable to plan.      7/10/23: At initial appointment with this provider 2 weeks ago, we decreased to bupropion  mg, and started propranolol 10 mg PRN, and continued lamotrigine 200. She does report a mild improvement in her anxiety and mood lability, irritability, and is tolerating the propranolol as needed with decent benefit. She does note her mood has been lower with mild decrease in energy since reducing the bupropion. Denies any SI/SIB/HI. She denies intolerable change at this time, and is open to continuing for another few weeks before additional changes. Continue in therapy. Follow-up with this provider in 4 weeks. Patient agreeable to plan.      6/26/23: Analia Jerry is a 32 year old White, female who presents for initial visit with this provider at [facility] for medication management.  Patient previously seen with [facility] provider YULIET Collins (2/2021-8/2021) and returned to PCP, then re-referred and started with Dr. Babb (03/2022-05/2023) for symptom exacerbation. At last appointment 2 month ago, continued lamotrigine 200 mg every day and bupropion  mg every day after diagnosis changed to mood disorder (from [facility]) given reports of increased impulsivity, agitation during sertraline initiation which also occurred with past Viibryd  trial. Over last few months, she reports improvement in mood lability and irritability, up to last ~2 weeks where they have increased again without clear trigger. Reports worsening of her depression, along with quick to anger, irritability. Denies other symptoms of manic episode at this time. Denies any SI/SIB/HI. No psychosis. Does report throwing things during this time, but denies any physical aggression towards people, animals, no property destruction.  Denies any substance use. Of note, bc started injections for her migraines, stopped atenolol over last few weeks. Denies any notable changes without it, no history of side effects. Discussed risks/benefits/ side effects of current medication, and possibility that bupropion is worsening mood stability as well as side effects of irritability for some. Will initially trial decreasing dose, continuing lamotrigine. Will also send trial of propranolol to be used as needed for anxiety, agitation. Continue with therapy. Follow-up with this provider in 2 weeks. Patient agreeable to plan.      Current medications include:   Current Outpatient Medications   Medication Sig    buPROPion (WELLBUTRIN XL) 300 MG 24 hr tablet Take 1 tablet (300 mg) by mouth every morning    Fremanezumab-vfrm (AJOVY) 225 MG/1.5ML SOAJ Inject 1.5 mg Subcutaneous every 30 days    lamoTRIgine (LAMICTAL) 200 MG tablet Take 1 tablet (200 mg) by mouth At Bedtime    levothyroxine (SYNTHROID/LEVOTHROID) 200 MCG tablet Take 1 tablet (200 mcg) by mouth daily    lurasidone (LATUDA) 20 MG TABS tablet Take 1 tablet (20 mg) by mouth every evening with food.    metoclopramide (REGLAN) 10 MG tablet Take 1 tablet (10 mg) by mouth 3 times daily as needed (nausea)    nitroFURantoin macrocrystal (MACRODANTIN) 100 MG capsule Take 1 capsule (100 mg) by mouth At Bedtime    norethindrone-ethinyl estradiol (ORTHO-NOVUM 1/35, 28,) 1-35 MG-MCG tablet Continuously by skipping placebo pills    omeprazole (PRILOSEC) 20 MG   "capsule Take 1 capsule (20 mg) by mouth daily    propranolol (INDERAL) 10 MG tablet Take 1-2 tablets (10-20 mg) by mouth 2 times daily as needed (anxiety)    rOPINIRole (REQUIP) 0.5 MG tablet Take 3 tablets (total of 1.5 mg) daily 1 to 3 hours before bedtime    SUMAtriptan (IMITREX) 100 MG tablet Take 1 tablet (100 mg) by mouth at onset of headache for migraine (repeat in 2 hours if needed)     Current Facility-Administered Medications   Medication    Botulinum Toxin Type A (BOTOX) 200 units injection 150 Units         Side effects: Yes: exacerbation in restless leg syndrome / restlessness with Latuda 20 mg.    The [place] Prescription Monitoring Program has been reviewed and there are no concerns about diversionary activity for controlled substances at this time.     Psychiatric ROS:  Analia Jerry reports mood has been: \"I've been doing ok.\"  Depression has been: depressed mood, little interest / pleasure, appetite change or significant weight loss / gain, fatigue of loss of energy, worthlessness or excessive guilt, and difficulty concentrating or indecisiveness self rates as 6-7/ 10, where 0 is none at all and 10 being severe depression. Was 7-8/10 at last appt.  SI/SIB/HI: denies all.  Anxiety has been: excessive worry, difficult to control, restlessness / feeling keyed up,  easily fatigued,  difficulty concentrating or mind going blank, irritability, and muscle tension  self rates as 4/10, where 0 is none at all and 10 being severe anxiety. Was 5-6/10 at last appt.  Sleep has been: \"not having as much trouble falling asleep\", but tired in AM. \"Always tired when getting up.\"   Energy has been: once gets going, \"usually fine, some days a little lazier earlier.\" Fluctuates a bit.  Appetite has been: \"more hungry lately\", don't really know why\" but does tend to increase with low mood or higher stress. Reports  \"Trying to not overeat.\"   Richa sxs: denies any hyperelevated mood, energy. No impulsive, reckless " behaviors. + irritability.  Psychosis sxs: denies.   ADHD/ADD sxs: none  PTSD sxs: intrusion and avoidance sx require further evaluate. negative emotional state, diminished interest and detachment from others, irritability, reckless behavior and  sleep disturbances     PHQ-9 scores:       3/22/2023    10:59 AM 7/24/2023    10:40 AM 8/7/2023    10:21 AM   PHQ-9 SCORE   PHQ-9 Total Score MyChart 7 (Mild depression) 5 (Mild depression) 6 (Mild depression)   PHQ-9 Total Score 7    7 5 6       MAXIM-7 scores:        6/25/2023     2:58 PM 8/6/2023     8:26 PM 9/6/2023    11:40 AM   MAXIM-7 SCORE   Total Score 14 (moderate anxiety) 13 (moderate anxiety) 9 (mild anxiety)   Total Score 14 13 9         Current stressors include: Symptoms and Occupational Difficulties  Coping mechanisms and supports include: Therapy, Family, and Friends      Vital Signs:   There were no vitals taken for this visit.    Review of Systems:  10 systems (general, cardiovascular, respiratory, eyes, ENT, endocrine, GI, , M/S, neurological) were reviewed. Most pertinent finding(s) is/are:  No acute distress; no wheezing / short of breath / increased work of breathing; denies chest pain / tightness / palpitations; no dizziness or syncope; no nausea / vomiting / abdominal pain; no tics / tremors / abnormal muscle mvmts; no visible skin changes / rashes. The remaining systems are all unremarkable.    Labs:  Most recent laboratory results reviewed and no new labs.     Past Medical/Surgical History:  Past Medical History:   Diagnosis Date    Acute gallstone pancreatitis 12/28/2019    Anxiety     ASCUS of cervix with negative high risk HPV 01/26/2017    ASCUS/neg HR HPV.    Juarez's esophagus determined by biopsy 2022    Depressive disorder     Gallstones 12/28/2019    [facility]    H/O colposcopy with cervical biopsy 03/23/2017    Bx & ECC - negative    Hashimoto's disease     Headache(784.0)     Hypothyroidism due to Hashimoto's  thyroiditis     Papanicolaou smear of cervix with atypical squamous cells of undetermined significance (ASC-US) 2015      has a past medical history of Acute gallstone pancreatitis (2019), Anxiety, ASCUS of cervix with negative high risk HPV (2017), Juarez's esophagus determined by biopsy (), Depressive disorder, Gallstones (2019), H/O colposcopy with cervical biopsy (2017), Hashimoto's disease, Headache(784.0), Hypothyroidism due to Hashimoto's thyroiditis, and Papanicolaou smear of cervix with atypical squamous cells of undetermined significance (ASC-US) (2015).    She has no past medical history of Arthritis, Cerebral infarction (H), Congestive heart failure (H), COPD (chronic obstructive pulmonary disease) (H), Diabetes (H), Heart disease, History of blood transfusion, Hypertension, or Uncomplicated asthma.    Medication allergies:    Allergies   Allergen Reactions    Nkda [No Known Drug Allergy]        Past psychotropic medication trials include but are not limited to:   Escitalopram up to 20 mg   viibryd - significant SE, ?activation  Sertraline - ?activation  Latuda - exacerbated restless leg syndrome      Social History:  Born in [place], [place] and raised in [place], [place].  Parents not  but still live together  Siblings:  Two half sisters, full biobrother   in October.  Childhood: Yes intact home with all current basic needs being met.  Highest education level was college graduate.   Employment Status: full time -  [employer].  Relationship status: Together for 9 years. Safe in relationship.  Current Living situation:  , and 2 stepsons are with them on weekends, longer in summer.  Feels safe at home.  Children: 15 and 10 year old stepsons  Firearms/Weapons Access: No: Patient denies   Service: No  Support: , friend, therapist     Current use of drugs or alcohol: Denies   Tobacco use: No    Mental Status Exam:  Alertness:  alert  and oriented   Appearance: adequately groomed  Behavior/Demeanor: cooperative, pleasant, and calm, with good eye contact   Speech: normal and regular rate and rhythm  Language: intact and no problems  Psychomotor: normal or unremarkable  Mood: depressed  Affect: full range; was congruent to mood; was congruent to content  Thought Process/Associations: unremarkable  Thought Content:  Reports none;  Denies suicidal ideation, violent ideation, and delusions  Perception:  Reports none;  Denies auditory hallucinations and visual hallucinations  Insight: intact  Judgment: intact  Cognition: does  appear grossly intact; formal cognitive testing was not done  Recent and Remote Memory: Intact to interview. Not formally assessed. No amnesia.  Attention Span and Concentration: normal  Fund of Knowledge: appropriate  Gait and Station: unremarkable    Suicide Risk Assessment:  Today Analia Jerry reports no suicidal ideation. There are notable risk factors for self-harm, including anxiety, family history, and mood change. However, risk is mitigated by commitment to family, absence of past attempts, history of seeking help when needed, future oriented, no access to firearms or weapons, and denies suicidal intent or plan. Therefore, based on all available evidence including the factors cited above, Analia Jerry does not appear to be at imminent risk for self-harm, does not meet criteria for a 72-hr hold, and therefore remains appropriate for ongoing outpatient level of care.  A thorough assessment of risk factors related to suicide and self-harm have been reviewed and are noted above. The patient convincingly denies suicidality on several occasions. Local community safety resources printed and reviewed for patient to use if needed. There was no deceit detected, and the patient presented in a manner that was believable.     Recommended that patient call 911 or go to the local ED should there be a change in any of these risk  factors.    DSM5 Diagnosis:  F39 Mood disorder  300.02 (F41.1) Generalized Anxiety Disorder  309.81 (F43.10) Posttraumatic Stress Disorder (includes Posttraumatic Stress Disorder for Children 6 Years and Younger)  Without dissociative symptoms    Medical comorbidities include:   Patient Active Problem List    Diagnosis Date Noted    Generalized anxiety disorder 07/10/2023     Priority: Medium    Anxiety 02/15/2023     Priority: Medium    Transplant donor evaluation 09/14/2022     Priority: Medium    Recurrent major depression in partial remission (H) 07/25/2022     Priority: Medium    Posttraumatic stress disorder 03/15/2022     Priority: Medium    Fatigue, unspecified type 04/05/2021     Priority: Medium    Moderate episode of recurrent major depressive disorder (H) 03/04/2021     Priority: Medium    Morbid obesity (H) 10/28/2020     Priority: Medium    FRANK (obstructive sleep apnea) - mild (AHI 7) 08/24/2020     Priority: Medium     8/10/2020 [facility] Sleep Study (216.0 lbs) - scored with 3% rules -  AHI 7.1, RDI 7.1, Supine AHI 22.9, REM AHI -, Low O2 91.2%, Time Spent ?88% 0 minutes / Time Spent ?89% 0 minutes.      Mood disorder (H)      Priority: Medium    Class 1 obesity due to excess calories with body mass index (BMI) of 32.0 to 32.9 in adult, unspecified whether serious comorbidity present 07/02/2020     Priority: Medium    Elevated liver enzymes 12/28/2019     Priority: Medium    High triglycerides 04/03/2018     Priority: Medium    ASCUS of cervix with negative high risk HPV 01/26/2017     Priority: Medium     4/1/11 (approx) normal - patient reported.   6/11/13 normal - patient reported  12/3/15 ASCUS pap. Plan: per provider, repeat pap in 1 year.  1/26/17 ASCUS/neg HR HPV. Plan: colp bef 4/26/17  3/23/17 [place] Bx & ECC - negative. Plan cotest in 1 year.   3/8/18 ASCUS pap, neg HPV. Plan: colp.   5/10/18 [place] Bx and ECC: negative. Plan: cotest in 1 yr.   6/6/19 NIL/neg HR HPV. Plan cotest in 3  years  4/15/22 NIL pap, neg HPV. 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         Psychosocial & Contextual Factors:  Occupational Difficulties     DIFFERENTIAL DIAGNOSIS: R/O major depressive disorder versus bipolar disorder     Medical comorbidities impacting or contributing to clinical picture: hypothyroidism due to Hashimotos thyroiditis, FRANK, migraines  Known issue that I take into account for their medical decisions, no current exacerbations or new concerns    Impression:  Analia Jerry is a 32 year old White, female who presents for return visit with  [facility] ([facility]) for medication management. At last appointment 2.5 weeks ago, we added Latuda 20 mg QPM for bipolar depression, and continued bupropion  mg, lamotrigine 200 mg every day, with propranolol 10 mg as needed for anxiety. In interim, patient reported unable to tolerate medication due to exacerbation in RLS. Denied any other tics / tremors / akathisia or other sx of NMS. Recommended stopping medication, which she did after 3 days and reports now back to baseline. No significant change in symptoms at this time - anxiety remains moderately well managed, especially with propranolol (rates as 4/10 today) which she is tolerating well. Reports depression still remains elevated, associated with low mood, anhedonia, irritability. Denies other sx of hypo/divya at this time, no hyperelevated mood or energy. Denies SI/SIB/HI. Again discussed risks / benefits of medication options for depression, and risk for ongoing bupropion medication for mood lability. We will  attempt to start very low dose abilify at this time for bipolar depression, while monitoring closely for side effects. If continuing medication, will send for baseline metabolic labs at next appointment. Continue other medication at this time. Continue with therapist. Follow-up with this provider in 4 weeks or sooner as needed. Patient agreeable to plan.      Medication side effects and alternatives were reviewed. Health promotion activities recommended and reviewed today. All questions addressed. Education and counseling completed regarding risks and benefits of medications and psychotherapy options. Recommend therapy for additional support.       Treatment Plan:  DISCONTINUE Latuda (lurasidone) 20 mg every PM with food. (Patient discontinued 1 week ago).  START aripiprazole 1 mg (1/2 tablet) for 4 days. Day 5: INCREASE to aripiprazole 2 mg every day for 13 days. Day 18: INCREASE to aripiprazole 5 mg every day. 2 prescriptions sent.  CONTINUE bupropion  mg every day. No script needed.  CONTINUE propranolol 10 mg - 20 mg (1-2 tablets) up to twice a day as needed for anxiety. No script needed.  CONTINUE lamotrigine 200 mg per last provider. No script needed.  Continue all other medications per primary care provider.   Continue therapy with established provider.  Safety plan reviewed. To the Emergency Department as needed or call after hours crisis line at [phone] or [phone]. [place] Crisis Text Line. Text [place] to 618111 or Suicide LifeLine Chat: suicidepreventionlifeline.org/chat  Schedule an appointment with me in 4 weeks or sooner as needed. Call [facility] at [phone] to schedule.  Follow up with primary care provider as planned or for acute medical concerns.  Call the psychiatric nurse line with medication questions or concerns at [phone].  MyChart may be used to communicate with your provider, but this is not intended to be used for emergencies.    Patient  Education:  Medication side effects and alternatives reviewed. Health promotion activities recommended and reviewed today. All questions addressed. Education and counseling completed regarding risks and benefits of medications and psychotherapy options.  Consent provided by patient/guardian  Call the psychiatric nurse line with medication questions or concerns at [phone].  [email or web address] may be used to communicate with your provider, but this is not intended to be used for emergencies.  LAMOTRIGINE: Discussed risk of rash and instructed to stop taking the drug at the first sign of a rash regardless of its type or severity, and contact the nurse line at [phone]  FIRST GENERATION ANTIPSYCHOTIC/ SECOND GENERATION ANTIPSYCHOTIC USE:  Atypical need for cardiometabolic monitoring with medication- B/P, weight, blood sugar, cholesterol.  Need to monitor for abnormal movements taught  LoveThis.gov is information for patients.  It is run by the [employer] and it contains information about all disorders, diseases and all medications.      Community Resources:    National Suicide Prevention Lifeline: 375.245.1128 (TTY: 347.569.2777). Call anytime for help.  (www.suicidepreventionlifeline.org)  National [place] on Mental Illness (www.irasema.org): 815.151.1084 or 548-562-7918.   Mental Health Association (www.mentalhealth.org): 739.457.5142 or 416-113-7058.  [place] Crisis Text Line: Text [place] to 519546  Suicide LifeLine Chat: suicideBullionVaultline.org/chat    Administrative Billing:     Level of Medical Decision Making:   - At least 1 chronic problem that is not stable  - Engaged in prescription drug management during visit (discussed any medication benefits, side effects, alternatives, etc.)             Patient Status:  [facility] MD/DO/NP/PA providers offer care a specialty service for Primary Care Providers in the [facility] that seek to optimize psychotropic medications for unstable patients.  Once  medications have been optimized, our providers discharge the patient back to the referring Primary Care Provider for ongoing medication management.  This type of system allows our providers to serve a high volume of patients.   Patient will continue to be seen for ongoing consultation and stabilization.    Signed:   Josee Byrd, MSN, APRN, PMHNP-BC  [facility] ([facility])  [facility]    Chart documentation done in part with Dragon Voice Recognition software.  Although reviewed after completion, some word and grammatical errors may remain.

## 2023-09-25 NOTE — PATIENT INSTRUCTIONS
**For crisis resources, please see the information at the end of this document**     Thank you for coming to the [facility] HEALTH [facility] [employer] [facility].    TREATMENT PLAN:  Medications:   DISCONTINUE Latuda (lurasidone) 20 mg every PM with food. (Patient discontinued 1 week ago).  START aripiprazole 1 mg (1/2 tablet) for 4 days. Day 5: INCREASE to aripiprazole 2 mg every day for 13 days. Day 18: INCREASE to aripiprazole 5 mg every day. 2 prescriptions sent.  CONTINUE bupropion  mg every day. No script needed.  CONTINUE propranolol 10 mg - 20 mg (1-2 tablets) up to twice a day as needed for anxiety. No script needed.  CONTINUE lamotrigine 200 mg per last provider. No script needed.  Continue all other medications per primary care provider.     Consults / Referrals:   - Continue therapy with established provider.    Follow Up:  Schedule an appointment with me in 4 weeks or sooner as needed.  Call [facility] at [phone] to schedule.  Follow up with primary care provider as planned or sooner if needed for acute medical concerns.  Call the psychiatric nurse line with medication questions or concerns at [phone].  VersionEye may be used to communicate with your provider, but this is not intended to be used for emergencies.    Psychoeducation:  FIRST GENERATION ANTIPSYCHOTIC/ SECOND GENERATION ANTIPSYCHOTIC USE:  Atypical need for cardiometabolic monitoring with medication- B/P, weight, blood sugar, cholesterol.  Need to monitor for abnormal movements taught    Discussed use of propranolol as off label for anxiety, and side effects to monitor including low HR, BP, lightheadedness or dizziness.    Discussed side effects of bupropion to include agitation and other activation issues, ^ BP or HR, insomnia, stomach upset, appetite loss, weight loss, risk for precipitation of divya, and increased risk for seizures.  Patient agreed to take Bupropion to treat low mood, lack of  motivation and poor concentration. Patient verbalized understanding of medication schedule, of side effects, avoidance of alcohol and marijuana due to increase risk for seizures.  Discussed use of propranolol as off label for anxiety, and side effects to monitor including low HR, BP, lightheadedness or dizziness.      Financial Assistance [phone]  [facility] Billing [phone]  Central Billing Office, [facility]: [phone]  [facility]illing [phone]  Medical Records [phone]  [facility] Patient Bill of Rights [email or web address]       MENTAL HEALTH CRISIS RESOURCES:  For a emergency help, please call 911 or go to the nearest Emergency Department.     Emergency Walk-In Options:   Em[facility] Unit @ [facility] ([place]): [phone] - Specialized mental health emergency area designed to be calming  [facility] ([place]): [phone]  [facility] ([place]): [phone]  [facility]): [phone]    [facility] Crisis Information:   [place]: [phone]  [place]: [phone]  [place] ([place]ILL[place]) - Adult: [phone]     Child: [phone]  [place] - Adult: [phone]     Child: [phone]  [place]: [phone]  List of all [facility] resources:   https://[place].gov/dhs/people-we-serve/adults/health-care/mental-health/resources/crisis-contacts.jsp    National Crisis Information:   National Suicide & Crisis Lifeline: Call 988        For online chat options, visit https://suicidepreventionlifeline.org/chat/  Poison Control Center: 1-127-448-5208  Poison Control Center: 4-052-292-7306  Trans Lifeline: 1-118.984.6654 - Hotline for transgender people of all ages  [employer]: 5-897-524-2393 - Hotline for LGBT youth     For Non-Emergency Support:   Fast Tracker: Mental Health & Substance Use Disorder Resources -   https://www.[employer].org/       Again thank you for  "[facility] MENTAL HEALTH & ADDICTION [facility] and please let us know how we can best partner with you to improve you and your family's health.    You may be receiving a survey regarding this appointment. We would love to have your feedback, both positive and negative. The survey is done by an external company, so your answers are anonymous.        Patient Education   Collaborative Care Psychiatry Service  What to Expect  Here's what to expect from your Collaborative Care Psychiatry Service (CCPS).   About CCPS  CCPS means 2 people work together to help you get better. You'll meet with a behavioral health clinician and a psychiatric doctor. A behavioral health clinician helps people with mental health problems by talking with them. A psychiatric doctor helps people by giving them medicine.  How it works  At every visit, you'll see the behavioral health clinician (BHC) first. They'll talk with you about how you're doing and teach you how to feel better.   Then you'll see the psychiatric doctor. This doctor can help you deal with troubling thoughts and feelings by giving you medicine. They'll make sure you know the plan for your care.   CCPS usually takes 3 to 6 visits. If you need more visits, we may have you start seeing a different psychiatric doctor for ongoing care.  If you have any questions or concerns, we'll be glad to talk with you.  About visits  Be open  At your visits, please talk openly about your problems. It may feel hard, but it's the best way for us to help you.  Cancelling visits  If you can't come to your visit, please call us right away at [phone]. If you don't cancel at least 24 hours (1 full day) before your visit, that's \"late cancellation.\"  Being late to visits  Being very late is the same as not showing up. You will be a \"no show\" if:  Your appointment starts with a BHC, and you're more than 15 minutes late for a 30-minute (half hour) visit. This will also cancel " your appointment with the psychiatric doctor.  Your appointment is with a psychiatric doctor only, and you're more than 15 minutes late for a 30-minute (half hour) visit.  Your appointment is with a psychiatric doctor only, and you're more than 30 minutes late for a 60-minute (full hour) visit.  If you cancel late or don't show up 2 times within 6 months, we may end your care.   Getting help between visits  If you need help between visits, you can call us Monday to Friday from 8 a.m. to 4:30 p.m. at [phone].  Emergency care  Call 911 or go to the nearest emergency department if your life or someone else's life is in danger.  Call 818 anytime to reach the national Suicide and Crisis hotline.  Medicine refills  To refill your medicine, call your pharmacy. You can also call [facility]'s Behavioral Access at [phone], Monday to Friday, 8 a.m. to 4:30 p.m. It can take 1 to 3 business days to get a refill.   Forms, letters, and tests  You may have papers to fill out, like FMLA, short-term disability, and workability. We can help you with these forms at your visits, but you must have an appointment. You may need more than 1 visit for this, to be in an intensive therapy program, or both.  Before we can give you medicine for ADHD, we may refer you to get tested for it or confirm it another way.  We may not be able to give you an emotional support animal letter.  We don't do mental health checks ordered by the court.   We don't do mental health testing, but we can refer you to get tested.   Thank you for choosing us for your care.  For informational purposes only. Not to replace the advice of your health care provider. Copyright   2022 [facility]. All rights reserved. [employer] 237780 - 12/22.

## 2023-09-29 ENCOUNTER — OFFICE VISIT (OUTPATIENT)
Dept: URGENT CARE | Facility: URGENT CARE | Age: 33
End: 2023-09-29
Payer: COMMERCIAL

## 2023-09-29 VITALS
SYSTOLIC BLOOD PRESSURE: 129 MMHG | BODY MASS INDEX: 32.23 KG/M2 | DIASTOLIC BLOOD PRESSURE: 88 MMHG | RESPIRATION RATE: 18 BRPM | WEIGHT: 212 LBS | TEMPERATURE: 97.7 F | OXYGEN SATURATION: 97 % | HEART RATE: 126 BPM

## 2023-09-29 DIAGNOSIS — G89.29 CHRONIC MIDLINE LOW BACK PAIN WITHOUT SCIATICA: ICD-10-CM

## 2023-09-29 DIAGNOSIS — M54.50 CHRONIC MIDLINE LOW BACK PAIN WITHOUT SCIATICA: ICD-10-CM

## 2023-09-29 DIAGNOSIS — M54.42 ACUTE BILATERAL LOW BACK PAIN WITH LEFT-SIDED SCIATICA: Primary | ICD-10-CM

## 2023-09-29 PROCEDURE — 99214 OFFICE O/P EST MOD 30 MIN: CPT | Performed by: PHYSICIAN ASSISTANT

## 2023-09-29 RX ORDER — DICLOFENAC SODIUM 75 MG/1
75 TABLET, DELAYED RELEASE ORAL 2 TIMES DAILY
Qty: 20 TABLET | Refills: 0 | Status: SHIPPED | OUTPATIENT
Start: 2023-09-29 | End: 2023-10-09

## 2023-09-29 RX ORDER — CYCLOBENZAPRINE HCL 5 MG
TABLET ORAL
Qty: 40 TABLET | Refills: 0 | Status: SHIPPED | OUTPATIENT
Start: 2023-09-29

## 2023-09-29 ASSESSMENT — PAIN SCALES - GENERAL: PAINLEVEL: SEVERE PAIN (7)

## 2023-09-29 NOTE — PROGRESS NOTES
"Assessment & Plan     Acute bilateral low back pain with left-sided sciatica  - diclofenac (VOLTAREN) 75 MG EC tablet; Take 1 tablet (75 mg) by mouth 2 times daily for 10 days  - cyclobenzaprine (FLEXERIL) 5 MG tablet; Take 1-2 tablets every 8 hours as needed for muscle spasm     Chronic midline low back pain without sciatica    Acute exacerbation of chronic low back pain. We discussed continuing exercises, heat and ice.  Replace ibuprofen with diclofenac and add Flexeril as needed.  Follow-up to see PCP if symptoms worsen or fail to improve.    Return in about 1 week (around 10/6/2023) for visit with primary care provider if not improving.     Niki Wilkes PA-C  [facility]    Subjective   Analia Jerry is a 32 year old who presents for the following health issues     Patient presents with:  Urgent Care  Musculoskeletal Problem: Per patient states symptoms started on Tuesday bilateral low back pain that radiates only on left side down buttocks to upper thigh then stops.    LAKEISHA Helms presents clinic today for evaluation of low back pain.  She states that she has a history significant for this for many years.  She has been seen by physical therapy and has stretches to do at home.  This time, symptoms first began 4 days ago.  There was no provoking injury.  She states that she started to feel pain in the evening and woke up the next morning with more severe and consistent pain.  Pain is located in her lumbar spine and lateral to her sacral spine.  She does have some pain in her left leg in the upper thigh.  In some positions, pain is very low and manageable but other movements trigger significant pain and she states that she \"gets stuck \"when trying to do certain movements because of the pain.  She has tried doing her physical therapy exercises, applying ice and heat and taking ibuprofen without significant relief.    Review of Systems   ROS negative except as stated above.    "   Objective    /88   Pulse (!) 126   Temp 97.7  F (36.5  C) (Tympanic)   Resp 18   Wt 96.2 kg (212 lb)   SpO2 97%   BMI 32.23 kg/m    Physical Exam   GENERAL: healthy, alert and no distress  Comprehensive back pain exam: positional changes for comfort, left lateral flexion at rest. Tenderness to palpation of lumbar and sacral spine.  Left lateral rotation and back extension reduced secondary to pain, mild other range of motion full without significant pain.  Forward flexion reduces pain.  Straight leg raise negative bilaterally     No results found for any visits on 09/29/23.

## 2023-10-22 DIAGNOSIS — N39.0 RECURRENT UTI: ICD-10-CM

## 2023-10-23 ENCOUNTER — VIRTUAL VISIT (OUTPATIENT)
Dept: PSYCHIATRY | Facility: CLINIC | Age: 33
End: 2023-10-23
Payer: COMMERCIAL

## 2023-10-23 VITALS — WEIGHT: 211 LBS | BODY MASS INDEX: 32.08 KG/M2

## 2023-10-23 DIAGNOSIS — F41.1 GENERALIZED ANXIETY DISORDER: ICD-10-CM

## 2023-10-23 DIAGNOSIS — F43.10 POSTTRAUMATIC STRESS DISORDER: ICD-10-CM

## 2023-10-23 DIAGNOSIS — Z91.89 AT RISK FOR SIDE EFFECT OF MEDICATION: ICD-10-CM

## 2023-10-23 DIAGNOSIS — F39 MOOD DISORDER (H): Primary | ICD-10-CM

## 2023-10-23 PROCEDURE — 99214 OFFICE O/P EST MOD 30 MIN: CPT | Mod: VID | Performed by: NURSE PRACTITIONER

## 2023-10-23 RX ORDER — LAMOTRIGINE 200 MG/1
200 TABLET ORAL AT BEDTIME
Qty: 90 TABLET | Refills: 0 | Status: SHIPPED | OUTPATIENT
Start: 2023-10-23 | End: 2023-12-18

## 2023-10-23 RX ORDER — BUPROPION HYDROCHLORIDE 300 MG/1
300 TABLET ORAL EVERY MORNING
Qty: 90 TABLET | Refills: 0 | Status: SHIPPED | OUTPATIENT
Start: 2023-10-23 | End: 2023-12-18

## 2023-10-23 RX ORDER — PROPRANOLOL HYDROCHLORIDE 10 MG/1
10-20 TABLET ORAL 2 TIMES DAILY PRN
Qty: 60 TABLET | Refills: 0 | Status: SHIPPED | OUTPATIENT
Start: 2023-10-23 | End: 2023-11-24

## 2023-10-23 RX ORDER — ARIPIPRAZOLE 5 MG/1
5 TABLET ORAL DAILY
Qty: 90 TABLET | Refills: 0 | Status: SHIPPED | OUTPATIENT
Start: 2023-10-23 | End: 2023-12-18

## 2023-10-23 ASSESSMENT — PAIN SCALES - GENERAL: PAINLEVEL: MILD PAIN (3)

## 2023-10-23 NOTE — PROGRESS NOTES
"Virtual Visit Details    Type of service:  Video Visit     Originating Location (pt. Location): Home    Distant Location (provider location):  Off-site  Platform used for Video Visit: iWarda     PSYCHIATRIC MEDICATION FOLLOW UP APPT     Name: Analia Jerry   : 1990               Telemedicine Visit: The patient's condition can be safely assessed and treated via synchronous audio and visual telemedicine encounter.      Consent:  The patient/guardian has verbally consented to: the potential risks and benefits of telemedicine (video visit or phone) versus in person care; bill my insurance or make self-payment for services provided; and responsibility for payment of non-covered services.    As the provider I attest to compliance with applicable laws and regulations related to telemedicine.         Source of Referral / Care Team:  Primary Care Provider: MERRY WELLS CNP   Therapist: Monica ([facility]).        The [facility] psychiatry providers act as a specialty service for Primary Care Providers in the [facility] who seek to optimize medications for unstable patients. Once medications have been optimized, [facility] providers discharge the patient back to the referring Primary Care Provider for ongoing medication management. This type of system allows [facility] to serve a high volume of patients.     Patient Identification:  Patient is a 32 year old,   White Not  or  female  who presents for return visit with me.   Patient prefers to be called: \"Analia\".  Patient is currently employed full time.    Patient attended the session alone.    RECORDS AVAILABLE FOR REVIEW: EHR records through [employer] .       Interim History:  I last saw Analia Jerry for outpatient psychiatry Return Visit 4 weeks ago on 23. During that appointment, we discontinued latuda due to side effects, and started titration to abilify 5 mg, continued bupropion  mg, lamotrigine 200 mg every day, with " "propranolol 10 mg as needed for anxiety.  Patient reports ADHERING to prescribed medications. She reports tolerating the move to abilify well, denies any intolerable side effects at this time, including no worsening of RLS, any akathisia / tics / tremors / abnormal muscle mvmts. She did move to medication to at bedtime as it was leaving her tired in AM, and sleep has improved slightly actually - with better energy in AM and throughout day. Does note fleeting lightheadedness when up out of bed overnight. Denies syncope / falls / no other CV sx. She denies any worsening of anxiety, only improved as well as irritability is lower. Anxiety currently mostly focused on upcoming travel to [place]. Still using propranolol 20 mg as needed for anxiety with good relief and no side effects. Reports good improvement in depression over last few weeks and rates low today (2/10), and feels mood \"taken a really big shift in a good way, feeling more normal.\" Denies any sx of hypo/manic period, and irritability / \"lashing out\" is low per patient and  told her as well.  Denies any SI/SIB/HI.      Initial Impression:   9/25/23: At last appointment 2.5 weeks ago, we added Latuda 20 mg QPM for bipolar depression, and continued bupropion  mg, lamotrigine 200 mg every day, with propranolol 10 mg as needed for anxiety. In interim, patient reported unable to tolerate medication due to exacerbation in RLS. Denied any other tics / tremors / akathisia or other sx of NMS. Recommended stopping medication, which she did after 3 days and reports now back to baseline. No significant change in symptoms at this time - anxiety remains moderately well managed, especially with propranolol (rates as 4/10 today) which she is tolerating well. Reports depression still remains elevated, associated with low mood, anhedonia, irritability. Denies other sx of hypo/divya at this time, no hyperelevated mood or energy. Denies SI/SIB/HI. Again discussed " "risks / benefits of medication options for depression, and risk for ongoing bupropion medication for mood lability. We will attempt to start very low dose abilify at this time for bipolar depression, while monitoring closely for side effects. If continuing medication, will send for baseline metabolic labs at next appointment. Continue other medication at this time. Continue with therapist. Follow-up with this provider in 4 weeks or sooner as needed. Patient agreeable to plan.     9/7/23: At last appointment 1 month ago, we continued bupropion  mg, lamotrigine 200 mg every day, with propranolol 10 mg as needed for anxiety. Over last few weeks, does report her anxiety has improved some as situational stressors although remains moderately high. Most distressing to patient is increase in her depression, which seemed to have been much lower over last few months and continues to fluctuate significantly. Does not report distinct periods of divya, but does report some hyper-energy and increased goal-directed activities, when \"feeling not able to calm self and stop doing things\" (e.g. around the house). No psychosis. Denies any SI/SIB/HI. Discussed risks / benefits of medication options for depression, and risk for ongoing bupropion medication for mood lability. At this time, we will trial adding lurasidone for bipolar depression, while monitoring closely for side effects. If continuing medication, will send for baseline metabolic labs at next appointment. Continue other medication at this time. Continue with therapist. Follow-up with this provider in 2 weeks or sooner as needed. Patient agreeable to plan.       8/7/23: At last appointment 4 weeks ago, we continued recently decreased bupropion  mg, and lamotrigine 200 mg every day, with propranolol 10 mg as needed for anxiety. She reports continuing to tolerate the medication well, no notable side effects. Overall reports good stability in mood with lower " bupropion dose, no worsening of depression which remains low, and denies significant mood lability. Noted benefit to irritability, although recently increased again with stressors of house move. Anxiety moderately well controlled with propranolol as needed. Given notable precipitant, recommending continuing current medication and follow-up in 1 month to assess for medication change needed. Discussed buspirone as alternative to SSRI. Continue therapy. Follow-up with this provider in 1 month. Patient agreeable to plan.      7/10/23: At initial appointment with this provider 2 weeks ago, we decreased to bupropion  mg, and started propranolol 10 mg PRN, and continued lamotrigine 200. She does report a mild improvement in her anxiety and mood lability, irritability, and is tolerating the propranolol as needed with decent benefit. She does note her mood has been lower with mild decrease in energy since reducing the bupropion. Denies any SI/SIB/HI. She denies intolerable change at this time, and is open to continuing for another few weeks before additional changes. Continue in therapy. Follow-up with this provider in 4 weeks. Patient agreeable to plan.      6/26/23: Analia Jerry is a 32 year old White, female who presents for initial visit with this provider at [facility] for medication management.  Patient previously seen with [facility] provider YULIET Collins (2/2021-8/2021) and returned to PCP, then re-referred and started with Dr. Babb (03/2022-05/2023) for symptom exacerbation. At last appointment 2 month ago, continued lamotrigine 200 mg every day and bupropion  mg every day after diagnosis changed to mood disorder (from [facility]) given reports of increased impulsivity, agitation during sertraline initiation which also occurred with past Viibryd trial. Over last few months, she reports improvement in mood lability and irritability, up to last ~2 weeks where they have increased again without clear trigger. Reports  worsening of her depression, along with quick to anger, irritability. Denies other symptoms of manic episode at this time. Denies any SI/SIB/HI. No psychosis. Does report throwing things during this time, but denies any physical aggression towards people, animals, no property destruction.  Denies any substance use. Of note, bc started injections for her migraines, stopped atenolol over last few weeks. Denies any notable changes without it, no history of side effects. Discussed risks/benefits/ side effects of current medication, and possibility that bupropion is worsening mood stability as well as side effects of irritability for some. Will initially trial decreasing dose, continuing lamotrigine. Will also send trial of propranolol to be used as needed for anxiety, agitation. Continue with therapy. Follow-up with this provider in 2 weeks. Patient agreeable to plan.      Current medications include:   Current Outpatient Medications   Medication Sig    ARIPiprazole (ABILIFY) 2 MG tablet Take 0.5 tablets (1 mg) by mouth daily for 4 days, THEN 1 tablet (2 mg) daily for 13 days.    ARIPiprazole (ABILIFY) 5 MG tablet Take 1 tablet (5 mg) by mouth daily after finishing aripiprazole 2 mg prescription.    buPROPion (WELLBUTRIN XL) 300 MG 24 hr tablet Take 1 tablet (300 mg) by mouth every morning    cyclobenzaprine (FLEXERIL) 5 MG tablet Take 1-2 tablets every 8 hours as needed for muscle spasm    diclofenac (VOLTAREN) 75 MG EC tablet Take 1 tablet (75 mg) by mouth 2 times daily for 10 days    Fremanezumab-vfrm (AJOVY) 225 MG/1.5ML SOAJ Inject 1.5 mg Subcutaneous every 30 days    lamoTRIgine (LAMICTAL) 200 MG tablet Take 1 tablet (200 mg) by mouth At Bedtime    levothyroxine (SYNTHROID/LEVOTHROID) 200 MCG tablet Take 1 tablet (200 mcg) by mouth daily    lurasidone (LATUDA) 20 MG TABS tablet Take 1 tablet (20 mg) by mouth every evening with food. (Patient not taking: Reported on 9/29/2023)    metoclopramide (REGLAN) 10 MG  "tablet Take 1 tablet (10 mg) by mouth 3 times daily as needed (nausea)    nitroFURantoin macrocrystal (MACRODANTIN) 100 MG capsule Take 1 capsule (100 mg) by mouth At Bedtime    norethindrone-ethinyl estradiol (ORTHO-NOVUM 1/35, 28,) 1-35 MG-MCG tablet Continuously by skipping placebo pills    omeprazole (PRILOSEC) 20 MG DR capsule Take 1 capsule (20 mg) by mouth daily    propranolol (INDERAL) 10 MG tablet Take 1-2 tablets (10-20 mg) by mouth 2 times daily as needed (anxiety)    rOPINIRole (REQUIP) 0.5 MG tablet Take 3 tablets (total of 1.5 mg) daily 1 to 3 hours before bedtime    SUMAtriptan (IMITREX) 100 MG tablet Take 1 tablet (100 mg) by mouth at onset of headache for migraine (repeat in 2 hours if needed)     Current Facility-Administered Medications   Medication    Botulinum Toxin Type A (BOTOX) 200 units injection 150 Units         Side effects: Yes: possible increase in appetite, overnight lightheadedness (?orthostatic hypotension) with abilify    The [place] Prescription Monitoring Program has been reviewed and there are no concerns about diversionary activity for controlled substances at this time.     Psychiatric ROS:  Analia Jerry reports mood has been: \"taken a really big shift in a good way, feeling more normal.\"  Depression has been: appetite change or significant weight loss / gain, fatigue of loss of energy, and worthlessness or excessive guilt self rates as 2/10, where 0 is none at all and 10 being severe depression. Was 6-7/10 at last appt.  SI/SIB/HI: denies all  Anxiety has been: excessive worry, difficult to control and irritability  self rates as 3/10, where 0 is none at all and 10 being severe anxiety. Was 4/10 at last appt.  Sleep has been: denies any worsening with abilify qhs, actually sleeping better. No issues falling asleep. Waking a little easier.   Energy has been: a little better.  Appetite has been: noticed she is more hungry / hungry more often, but no change in weight or diet. " "Reports \"doesn't seem unmanageable at this point\".  Richa sxs: irritability improving. denies any hyperelevated mood, energy. No impulsive, reckless behaviors.   Psychosis sxs: denies  ADHD/ADD sxs: none  PTSD sxs: intrusion and avoidance sx require further evaluate. negative emotional state, diminished interest and detachment from others, irritability, reckless behavior and  sleep disturbances    PHQ2 (0) and GAD2 (1) scores were reviewed today.   PHQ-9 scores:       3/22/2023    10:59 AM 7/24/2023    10:40 AM 8/7/2023    10:21 AM   PHQ-9 SCORE   PHQ-9 Total Score MyChart 7 (Mild depression) 5 (Mild depression) 6 (Mild depression)   PHQ-9 Total Score 7    7 5 6       MAXIM-7 scores:        6/25/2023     2:58 PM 8/6/2023     8:26 PM 9/6/2023    11:40 AM   MAXIM-7 SCORE   Total Score 14 (moderate anxiety) 13 (moderate anxiety) 9 (mild anxiety)   Total Score 14 13 9         Current stressors include: Symptoms, Occupational Difficulties, and upcoming travel  Coping mechanisms and supports include: Therapy, Family, and Friends      Vital Signs:   Wt 95.7 kg (211 lb)   BMI 32.08 kg/m      Review of Systems:  10 systems (general, cardiovascular, respiratory, eyes, ENT, endocrine, GI, , M/S, neurological) were reviewed. Most pertinent finding(s) is/are:  No acute distress; no wheezing / short of breath / increased work of breathing; denies chest pain / tightness / palpitations; reduced appetite and recent weight loss; no nausea / vomiting / abdominal pain; no tics / tremors / abnormal muscle mvmts; no visible skin changes / rashes . The remaining systems are all unremarkable.        Labs:  Most recent laboratory results reviewed and no new labs.       Past Medical/Surgical History:  Past Medical History:   Diagnosis Date    Acute gallstone pancreatitis 12/28/2019    Anxiety     ASCUS of cervix with negative high risk HPV 01/26/2017    ASCUS/neg HR HPV.    Juarez's esophagus determined by biopsy 2022    Depressive disorder  "    Gallstones 2019    [facility]    H/O colposcopy with cervical biopsy 2017    Bx & ECC - negative    Hashimoto's disease     Headache(784.0)     Hypothyroidism due to Hashimoto's thyroiditis     Papanicolaou smear of cervix with atypical squamous cells of undetermined significance (ASC-US) 2015      has a past medical history of Acute gallstone pancreatitis (2019), Anxiety, ASCUS of cervix with negative high risk HPV (2017), Juarez's esophagus determined by biopsy (), Depressive disorder, Gallstones (2019), H/O colposcopy with cervical biopsy (2017), Hashimoto's disease, Headache(784.0), Hypothyroidism due to Hashimoto's thyroiditis, and Papanicolaou smear of cervix with atypical squamous cells of undetermined significance (ASC-US) (2015).    She has no past medical history of Arthritis, Cerebral infarction (H), Congestive heart failure (H), COPD (chronic obstructive pulmonary disease) (H), Diabetes (H), Heart disease, History of blood transfusion, Hypertension, or Uncomplicated asthma.    Medication allergies:    Allergies   Allergen Reactions    Nkda [No Known Drug Allergy]        Social History:  Born in [place], [place] and raised in [place], [place].  Parents not  but still live together  Siblings:  Two half sisters, full biobrother   in October.  Childhood: Yes intact home with all current basic needs being met.  Highest education level was college graduate.   Employment Status: full time -  [employer].  Relationship status: Together for 9 years. Safe in relationship.  Current Living situation:  , and 2 stepsons are with them on weekends, longer in summer.  Feels safe at home.  Children: 15 and 10 year old stepsons  Firearms/Weapons Access: No: Patient denies   Service: No  Support: , friend, therapist     Current use of drugs or alcohol: Denies   Tobacco use: No    Mental Status Exam:  Alertness:  alert  and oriented   Appearance: adequately groomed  Behavior/Demeanor: cooperative, pleasant, and calm, with good eye contact   Speech: normal and regular rate and rhythm  Language: intact and no problems  Psychomotor: normal or unremarkable  Mood: depressed and anxious  Affect: full range; was congruent to mood; was congruent to content  Thought Process/Associations: unremarkable  Thought Content:  Reports none;  Denies suicidal ideation, violent ideation, and delusions  Perception:  Reports none;  Denies auditory hallucinations and visual hallucinations  Insight: intact  Judgment: intact  Cognition: does  appear grossly intact; formal cognitive testing was not done  Recent and Remote Memory: Intact to interview. Not formally assessed. No amnesia.  Attention Span and Concentration: normal  Fund of Knowledge: appropriate  Gait and Station: unremarkable    Suicide Risk Assessment:  Today Analia Jerry reports no suicidal ideation. There are notable risk factors for self-harm, including anxiety, family history, and mood change. However, risk is mitigated by commitment to family, absence of past attempts, history of seeking help when needed, future oriented, no access to firearms or weapons, and denies suicidal intent or plan. Therefore, based on all available evidence including the factors cited above, Analia Jerry does not appear to be at imminent risk for self-harm, does not meet criteria for a 72-hr hold, and therefore remains appropriate for ongoing outpatient level of care.  A thorough assessment of risk factors related to suicide and self-harm have been reviewed and are noted above. The patient convincingly denies suicidality on several occasions. Local community safety resources printed and reviewed for patient to use if needed. There was no deceit detected, and the patient presented in a manner that was believable.     Recommended that patient call 911 or go to the local ED should there be a change in any  of these risk factors.    DSM5 Diagnosis:  F39 Mood disorder  300.02 (F41.1) Generalized Anxiety Disorder  309.81 (F43.10) Posttraumatic Stress Disorder (includes Posttraumatic Stress Disorder for Children 6 Years and Younger)  Without dissociative symptoms    Medical comorbidities include:   Patient Active Problem List    Diagnosis Date Noted    Generalized anxiety disorder 07/10/2023     Priority: Medium    Anxiety 02/15/2023     Priority: Medium    Transplant donor evaluation 09/14/2022     Priority: Medium    Recurrent major depression in partial remission (H24) 07/25/2022     Priority: Medium    Posttraumatic stress disorder 03/15/2022     Priority: Medium    Fatigue, unspecified type 04/05/2021     Priority: Medium    Moderate episode of recurrent major depressive disorder (H) 03/04/2021     Priority: Medium    Morbid obesity (H) 10/28/2020     Priority: Medium    FRANK (obstructive sleep apnea) - mild (AHI 7) 08/24/2020     Priority: Medium     8/10/2020 [facility] Sleep Study (216.0 lbs) - scored with 3% rules -  AHI 7.1, RDI 7.1, Supine AHI 22.9, REM AHI -, Low O2 91.2%, Time Spent ?88% 0 minutes / Time Spent ?89% 0 minutes.      Mood disorder (H24)      Priority: Medium    Class 1 obesity due to excess calories with body mass index (BMI) of 32.0 to 32.9 in adult, unspecified whether serious comorbidity present 07/02/2020     Priority: Medium    Elevated liver enzymes 12/28/2019     Priority: Medium    High triglycerides 04/03/2018     Priority: Medium    ASCUS of cervix with negative high risk HPV 01/26/2017     Priority: Medium     4/1/11 (approx) normal - patient reported.   6/11/13 normal - patient reported  12/3/15 ASCUS pap. Plan: per provider, repeat pap in 1 year.  1/26/17 ASCUS/neg HR HPV. Plan: colp bef 4/26/17  3/23/17 Withams Bx & ECC - negative. Plan cotest in 1 year.   3/8/18 ASCUS pap, neg HPV. Plan: colp.   5/10/18 Withams Bx and ECC: negative. Plan: cotest in 1 yr.   6/6/19 NIL/neg HR HPV.  Plan cotest in 3 years  4/15/22 NIL pap, neg HPV. 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          Psychosocial & Contextual Factors:  Occupational Difficulties     DIFFERENTIAL DIAGNOSIS: R/O major depressive disorder versus bipolar disorder     Medical comorbidities impacting or contributing to clinical picture: hypothyroidism due to Hashimotos thyroiditis, FRANK, migraines  Known issue that I take into account for their medical decisions, no current exacerbations or new concerns    Impression:  Analia Jerry is a 32 year old White, female who presents for return visit with  [facility] for medication management. At last appointment 4 weeks ago, we discontinued latuda due to side effects, and started titration to abilify 5 mg, continued bupropion  mg, lamotrigine 200 mg every day, with propranolol 10 mg as needed for anxiety.  Patient reports tolerating the abilify well, no ongoing abnormal movements / worsening of RLS. She does report notable improvement in depression over last few weeks which she is hopeful about, as well as less irritability. Denies any sx of hypo/divya, no SI/SIB/HI. Anxiety has been low as well, although mildly increased with upcoming travel. Given good tolerability and recent increase of medication, recommending continuing current mediation at this time and patient is agreeable to plan. Follow-up with this provider in 1 month. Discussed with patient upcoming medical leave for this provider. If at that time patient has continued improvement and stability, will plan to return care to PCP,  otherwise will have her [facility] (or discussed long term provider if needed). Patient agreeable to plan. Will send metabolic labs today if continuing abilify, however patient may wait to complete until est with new PCP in a few weeks.     Medication side effects and alternatives were reviewed. Health promotion activities recommended and reviewed today. All questions addressed. Education and counseling completed regarding risks and benefits of medications and psychotherapy options. Recommend therapy for additional support.       Treatment Plan:  CONTINUE aripiprazole 5 mg every day. Sent script for #90.  CONTINUE bupropion  mg every day. Sent script for #90.  CONTINUE lamotrigine 200 mg per last provider. Sent script for #90.  CONTINUE propranolol 10 mg - 20 mg (1-2 tablets) up to twice a day as needed for anxiety. Sent script for #60.  Continue all other medications per primary care provider.   Continue therapy with established provider.  Labs to be completed at PCP clinic (BMP, lipids, HGBA1c). May wait to complete until new PCP appointment.  Safety plan reviewed. To the Emergency Department as needed or call after hours crisis line at [phone] or [phone]. [place] Crisis Text Line. Text [place] to 003666 or Suicide LifeLine Chat: suicidepreventionlifeline.org/chat  Schedule an appointment with me in 4 weeks or sooner as needed. Call [facility] at [phone] to schedule.  Follow up with primary care provider as planned or for acute medical concerns.  Call the psychiatric nurse line with medication questions or concerns at [phone].  Viewsyhart may be used to communicate with your provider, but this is not intended to be used for emergencies.    Patient Education:  Medication side effects and alternatives reviewed. Health promotion activities recommended and reviewed today. All questions addressed. Education and counseling completed regarding risks and  benefits of medications and psychotherapy options.  Consent provided by patient/guardian  Call the psychiatric nurse line with medication questions or concerns at [phone].  LifePicshart may be used to communicate with your provider, but this is not intended to be used for emergencies.  LAMOTRIGINE: Discussed risk of rash and instructed to stop taking the drug at the first sign of a rash regardless of its type or severity, and contact the nurse line at [phone]  FIRST GENERATION ANTIPSYCHOTIC/ SECOND GENERATION ANTIPSYCHOTIC USE:  Atypical need for cardiometabolic monitoring with medication- B/P, weight, blood sugar, cholesterol.  Need to monitor for abnormal movements taught  Medlineplus.gov is information for patients.  It is run by the [employer] and it contains information about all disorders, diseases and all medications.      I have informed the patient and parent of the risk for metabolic syndrome; hyperglycemia, dyslipidemia and change in body weight as well as potential electrolyte imbalance and/or other medical complications. I further, discussed information regarding risk of akathisia risk/TD and stroke/cerebrovascular adverse events, diabetes.  We discussed the need for blood test to monitor for these potential effects.  The patient verbalized understanding and gives verbal consent to use of antipsychotic.       Community Resources:    National Suicide Prevention Lifeline: 765.815.4848 (TTY: 692.250.9259). Call anytime for help.  (www.suicidepreventionlifeline.org)  National [place] on Mental Illness (www.irasema.org): 798.974.2151 or 772-428-2416.   Mental Health Association (www.mentalhealth.org): 654.104.6262 or 787-375-1323.  [place] Crisis Text Line: Text [place] to 720618  Suicide LifeLine Chat: suicidepreventionIndotradingline.org/chat    Administrative Billing:     Level of Medical Decision Making:   - At least 1 chronic problem that is not stable  - Engaged in prescription drug  management during visit (discussed any medication benefits, side effects, alternatives, etc.)             Patient Status:  CCPS MD/DO/NP/PA providers offer care a specialty service for Primary Care Providers in the [facility] that seek to optimize psychotropic medications for unstable patients.  Once medications have been optimized, our providers discharge the patient back to the referring Primary Care Provider for ongoing medication management.  This type of system allows our providers to serve a high volume of patients.   Patient will continue to be seen for ongoing consultation and stabilization.    Signed:   BRENDEN Pruitt, APRN, PMHNP-BC  Collaborative Care Psychiatry Service (CCPS)  [facility]    Chart documentation done in part with Dragon Voice Recognition software.  Although reviewed after completion, some word and grammatical errors may remain.

## 2023-10-23 NOTE — NURSING NOTE
Is the patient currently in the state of [place]? YES    Visit mode:VIDEO    If the visit is dropped, the patient can be reconnected by: VIDEO VISIT:  Send e-mail to at [email or web address]    Will anyone else be joining the visit? No  (If patient encounters technical issues they should call [phone])    How would you like to obtain your AVS? MyChart    Are changes needed to the allergy or medication list? Yes pt flagged med for removal    Rooming Documentation: Assigned questionnaire(s) completed .    Reason for visit: RECHECK     RACHELL Tirado

## 2023-10-23 NOTE — PATIENT INSTRUCTIONS
**For crisis resources, please see the information at the end of this document**     Thank you for coming to the [facility] [place] [facility].    TREATMENT PLAN:  Medications:   CONTINUE aripiprazole 5 mg every day. Sent script for #90.  CONTINUE bupropion  mg every day. Sent script for #90.  CONTINUE lamotrigine 200 mg per last provider. Sent script for #90.  CONTINUE propranolol 10 mg - 20 mg (1-2 tablets) up to twice a day as needed for anxiety. Sent script for #60.  Continue all other medications per primary care provider.     Consults / Referrals:   - Continue therapy with established provider.    Follow Up:  Schedule an appointment with me in 4 weeks or sooner as needed.  Call [facility] at [phone] to schedule.  Follow up with primary care provider as planned or sooner if needed for acute medical concerns.  Call the psychiatric nurse line with medication questions or concerns at [phone].  Normal may be used to communicate with your provider, but this is not intended to be used for emergencies.    Labs:  - Labs to be completed at PCP clinic (BMP, lipids, HGBA1c). May wait to complete until new PCP appointment.    LABS/PROCEDURES:   Please call your [place] clinic and ask for a lab only appointment at your earliest convenience.  If your results are reassuring or normal they will be mailed to you or sent through Normal within 7 days. If the lab tests need quick action we will call you with the results. The phone number we will call with results is # [phone].    Psychoeducation:  FIRST GENERATION ANTIPSYCHOTIC/ SECOND GENERATION ANTIPSYCHOTIC USE:  Atypical need for cardiometabolic monitoring with medication- B/P, weight, blood sugar, cholesterol.  Need to monitor for abnormal movements taught.  I have informed the patient and parent of the risk for metabolic syndrome; hyperglycemia, dyslipidemia and change in body weight as well as potential  electrolyte imbalance and/or other medical complications. I further, discussed information regarding risk of akathisia risk/TD and stroke/cerebrovascular adverse events, diabetes.  We discussed the need for blood test to monitor for these potential effects.  The patient verbalized understanding and gives verbal consent to use of antipsychotic.     Discussed use of propranolol as off label for anxiety, and side effects to monitor including low HR, BP, lightheadedness or dizziness.    Discussed side effects of bupropion to include agitation and other activation issues, ^ BP or HR, insomnia, stomach upset, appetite loss, weight loss, risk for precipitation of divya, and increased risk for seizures.  Patient agreed to take Bupropion to treat low mood, lack of motivation and poor concentration. Patient verbalized understanding of medication schedule, of side effects, avoidance of alcohol and marijuana due to increase risk for seizures.      Financial Assistance [phone]  [facility] Billing [phone]  Central Billing Office, [facility]: [phone]  [facility] Billing [phone]  Medical Records [phone]  [facility] Patient Bill of Rights [email or web address]       MENTAL HEALTH CRISIS RESOURCES:  For a emergency help, please call 911 or go to the nearest Emergency Department.     Emergency Walk-In Options:   EmPATH Unit @ [facility] ([place]): [phone] - Specialized mental health emergency area designed to be calming  [facility] ([place]): [phone]  [facility] ([place]): [phone]  [facility]): [phone]    County Crisis Information:   [place]: [phone]  [place]: [phone]  [place] (WILLIE) - Adult: [phone]     Child: [phone]  [place] - Adult: [phone]     Child: [phone]  [place]: [phone]  List of all [employer] resources:    https://[place].gov/dhs/people-we-serve/adults/health-care/mental-health/resources/crisis-contacts.jsp    National Crisis Information:   National Suicide & Crisis Lifeline: Call 988        For online chat options, visit https://suicidepreventionlifeline.org/chat/  Poison Control Center: 9-948-160-4745  Poison Control Center: 2-237-497-5752  Trans Lifeline: 1-200.428.4438 - Hotline for transgender people of all ages  The [employer]: 7-657-256-7484 - Hotline for LGBT youth     For Non-Emergency Support:   Fast Tracker: Mental Health & Substance Use Disorder Resources -   https://www.[employer].org/       Again thank you for choosing [facility] & [facility] [place] [facility] and please let us know how we can best partner with you to improve you and your family's health.    You may be receiving a survey regarding this appointment. We would love to have your feedback, both positive and negative. The survey is done by an external company, so your answers are anonymous.        Patient Education   Collaborative Care Psychiatry Service  What to Expect  Here's what to expect from your Collaborative Care Psychiatry Service (CCPS).   About CCPS  CCPS means 2 people work together to help you get better. You'll meet with a behavioral health clinician and a psychiatric doctor. A behavioral health clinician helps people with mental health problems by talking with them. A psychiatric doctor helps people by giving them medicine.  How it works  At every visit, you'll see the behavioral health clinician (BHC) first. They'll talk with you about how you're doing and teach you how to feel better.   Then you'll see the psychiatric doctor. This doctor can help you deal with troubling thoughts and feelings by giving you medicine. They'll make sure you know the plan for your care.   CCPS usually takes 3 to 6 visits. If you need more visits, we may have you start seeing a different psychiatric doctor for ongoing care.  If  "you have any questions or concerns, we'll be glad to talk with you.  About visits  Be open  At your visits, please talk openly about your problems. It may feel hard, but it's the best way for us to help you.  Cancelling visits  If you can't come to your visit, please call us right away at [phone]. If you don't cancel at least 24 hours (1 full day) before your visit, that's \"late cancellation.\"  Being late to visits  Being very late is the same as not showing up. You will be a \"no show\" if:  Your appointment starts with a C, and you're more than 15 minutes late for a 30-minute (half hour) visit. This will also cancel your appointment with the psychiatric doctor.  Your appointment is with a psychiatric doctor only, and you're more than 15 minutes late for a 30-minute (half hour) visit.  Your appointment is with a psychiatric doctor only, and you're more than 30 minutes late for a 60-minute (full hour) visit.  If you cancel late or don't show up 2 times within 6 months, we may end your care.   Getting help between visits  If you need help between visits, you can call us Monday to Friday from 8 a.m. to 4:30 p.m. at [phone].  Emergency care  Call 911 or go to the nearest emergency department if your life or someone else's life is in danger.  Call 988 anytime to reach the national Suicide and Crisis hotline.  Medicine refills  To refill your medicine, call your pharmacy. You can also call [facility]'s [facility] at [phone], Monday to Friday, 8 a.m. to 4:30 p.m. It can take 1 to 3 business days to get a refill.   Forms, letters, and tests  You may have papers to fill out, like FMLA, short-term disability, and workability. We can help you with these forms at your visits, but you must have an appointment. You may need more than 1 visit for this, to be in an intensive therapy program, or both.  Before we can give you medicine for ADHD, we may refer you to get tested for it or confirm it " another way.  We may not be able to give you an emotional support animal letter.  We don't do mental health checks ordered by the court.   We don't do mental health testing, but we can refer you to get tested.   Thank you for choosing us for your care.  For informational purposes only. Not to replace the advice of your health care provider. Copyright   2022 [facility]. All rights reserved. [employer] [id number] - 12/22.

## 2023-10-30 RX ORDER — NITROFURANTOIN MACROCRYSTAL 100 MG
100 CAPSULE ORAL AT BEDTIME
Qty: 90 CAPSULE | Refills: 3 | OUTPATIENT
Start: 2023-10-30

## 2023-11-02 ENCOUNTER — OFFICE VISIT (OUTPATIENT)
Dept: FAMILY MEDICINE | Facility: CLINIC | Age: 33
End: 2023-11-02
Payer: COMMERCIAL

## 2023-11-02 VITALS
DIASTOLIC BLOOD PRESSURE: 83 MMHG | OXYGEN SATURATION: 100 % | WEIGHT: 215.2 LBS | HEIGHT: 68 IN | RESPIRATION RATE: 18 BRPM | BODY MASS INDEX: 32.61 KG/M2 | TEMPERATURE: 98.7 F | SYSTOLIC BLOOD PRESSURE: 119 MMHG | HEART RATE: 98 BPM

## 2023-11-02 DIAGNOSIS — E78.1 HIGH TRIGLYCERIDES: Chronic | ICD-10-CM

## 2023-11-02 DIAGNOSIS — E06.3 HYPOTHYROIDISM DUE TO HASHIMOTO'S THYROIDITIS: Primary | Chronic | ICD-10-CM

## 2023-11-02 DIAGNOSIS — F33.41 RECURRENT MAJOR DEPRESSION IN PARTIAL REMISSION (H): ICD-10-CM

## 2023-11-02 PROBLEM — E66.01 MORBID OBESITY (H): Status: RESOLVED | Noted: 2020-10-28 | Resolved: 2023-11-02

## 2023-11-02 PROCEDURE — 99214 OFFICE O/P EST MOD 30 MIN: CPT | Performed by: FAMILY MEDICINE

## 2023-11-02 ASSESSMENT — ENCOUNTER SYMPTOMS
JOINT SWELLING: 0
HEADACHES: 1
EYE PAIN: 0
HEMATURIA: 0
NERVOUS/ANXIOUS: 1
SHORTNESS OF BREATH: 0
COUGH: 0
HEARTBURN: 0
DYSURIA: 0
FEVER: 0
ARTHRALGIAS: 0
BREAST MASS: 0
CONSTIPATION: 0
DIARRHEA: 0
WEAKNESS: 0
ABDOMINAL PAIN: 0
PALPITATIONS: 0
SORE THROAT: 0
MYALGIAS: 0
PARESTHESIAS: 0
HEMATOCHEZIA: 0
DIZZINESS: 0
FREQUENCY: 0
NAUSEA: 0
CHILLS: 0

## 2023-11-02 ASSESSMENT — PATIENT HEALTH QUESTIONNAIRE - PHQ9
SUM OF ALL RESPONSES TO PHQ QUESTIONS 1-9: 3
10. IF YOU CHECKED OFF ANY PROBLEMS, HOW DIFFICULT HAVE THESE PROBLEMS MADE IT FOR YOU TO DO YOUR WORK, TAKE CARE OF THINGS AT HOME, OR GET ALONG WITH OTHER PEOPLE: SOMEWHAT DIFFICULT
SUM OF ALL RESPONSES TO PHQ QUESTIONS 1-9: 3

## 2023-11-02 ASSESSMENT — PAIN SCALES - GENERAL: PAINLEVEL: NO PAIN (0)

## 2023-11-02 NOTE — PROGRESS NOTES
"ASSESSMENT / PLAN:  (E03.8,  E06.3) Hypothyroidism due to Hashimoto's thyroiditis  (primary encounter diagnosis)  Comment: stable clinically  Plan: continue current dosage and Recheck in 6 months  Sooner if symptoms. [place]    (E78.1) High triglycerides  Comment: high in past  Plan: lower carb diet and exercise regualrly. Blood pressure ok and non-smoker. Fasting labs next week    (F33.41) Recurrent major depression in partial remission (H24)  Comment: stale  Plan: continue current meds/exercise and avoid ALCOHOL. Recheck in 6 months  Sooner if worse. If SUICIAL IDEATION OR HOMOCIDAL IDEATION OR FRANKY TO ER   Start vitaminD    Betzy Helms is a 32 year old, presenting for the following health issues:  Patient new to myself.  History depression/anxiety/ptsd, hypothyroidism,migraines (seeing neurology) astrid, obesity, mood disorder and low back pain. Seen GI for fatty liver and liver hemangiomas.   Seeing ob/gyn for pap/ocp.   Seeing mental health.   . Home ok. 2 step kids (15,10).. Work ok.   Sleep overall ok. Abilify helpful with sleep.   Exercise - needs more. Started new program. No cardiac/lung issues.  Parents alive. Mom with kidney issues? Source.   No urine changes or std concerns.   Continuous ocp - no menses.   No vitaminD.   No nausea, vomiting or diarrhea or black/bloody stools. No abdominal pain.  No ALCOHOL. Caffeine. Diet pop - 2 can/day. Water intake ok.   No mole changes or rashes. No major burning.     Establish Care      11/2/2023    12:19 PM   Additional Questions   Roomed by SHASHANK [facility]       HPI         Objective    /83   Pulse 98   Temp 98.7  F (37.1  C) (Oral)   Resp 18   Ht 1.727 m (5' 8\")   Wt 97.6 kg (215 lb 3.2 oz)   SpO2 100%   BMI 32.72 kg/m    Body mass index is 32.72 kg/m .  Physical Exam   GENERAL: healthy, alert and no distress  EYES: Eyes grossly normal to inspection, PERRL and conjunctivae and sclerae normal  NECK: no adenopathy, no asymmetry, masses, or " scars and thyroid normal to palpation  RESP: lungs clear to auscultation - no rales, rhonchi or wheezes  CV: regular rate and rhythm, normal S1 S2, no S3 or S4, no murmur, click or rub, no peripheral edema and peripheral pulses strong  ABDOMEN: soft, nontender, no hepatosplenomegaly, no masses and bowel sounds normal  MS: no gross musculoskeletal defects noted, no edema  PSYCH: mentation appears normal, affect normal/bright

## 2023-11-07 ENCOUNTER — LAB (OUTPATIENT)
Dept: LAB | Facility: CLINIC | Age: 33
End: 2023-11-07
Payer: COMMERCIAL

## 2023-11-07 DIAGNOSIS — Z91.89 AT RISK FOR SIDE EFFECT OF MEDICATION: ICD-10-CM

## 2023-11-07 DIAGNOSIS — F41.1 GENERALIZED ANXIETY DISORDER: ICD-10-CM

## 2023-11-07 LAB
ANION GAP SERPL CALCULATED.3IONS-SCNC: 11 MMOL/L (ref 7–15)
BUN SERPL-MCNC: 15.9 MG/DL (ref 6–20)
CALCIUM SERPL-MCNC: 9.5 MG/DL (ref 8.6–10)
CHLORIDE SERPL-SCNC: 107 MMOL/L (ref 98–107)
CHOLEST SERPL-MCNC: 209 MG/DL
CREAT SERPL-MCNC: 1.01 MG/DL (ref 0.51–0.95)
DEPRECATED HCO3 PLAS-SCNC: 23 MMOL/L (ref 22–29)
EGFRCR SERPLBLD CKD-EPI 2021: 75 ML/MIN/1.73M2
GLUCOSE SERPL-MCNC: 84 MG/DL (ref 70–99)
HBA1C MFR BLD: 4.9 % (ref 0–5.6)
HDLC SERPL-MCNC: 49 MG/DL
LDLC SERPL CALC-MCNC: 117 MG/DL
NONHDLC SERPL-MCNC: 160 MG/DL
POTASSIUM SERPL-SCNC: 4.8 MMOL/L (ref 3.4–5.3)
SODIUM SERPL-SCNC: 141 MMOL/L (ref 135–145)
TRIGL SERPL-MCNC: 214 MG/DL

## 2023-11-07 PROCEDURE — 80048 BASIC METABOLIC PNL TOTAL CA: CPT

## 2023-11-07 PROCEDURE — 83036 HEMOGLOBIN GLYCOSYLATED A1C: CPT

## 2023-11-07 PROCEDURE — 80061 LIPID PANEL: CPT

## 2023-11-07 PROCEDURE — 36415 COLL VENOUS BLD VENIPUNCTURE: CPT

## 2023-11-07 RX ORDER — PROPRANOLOL HYDROCHLORIDE 10 MG/1
10-20 TABLET ORAL 2 TIMES DAILY PRN
Qty: 60 TABLET | Refills: 0 | OUTPATIENT
Start: 2023-11-07

## 2023-11-07 ASSESSMENT — HEADACHE IMPACT TEST (HIT 6)
WHEN YOU HAVE HEADACHES HOW OFTEN IS THE PAIN SEVERE: RARELY
HOW OFTEN DO HEADACHES LIMIT YOUR DAILY ACTIVITIES: RARELY
HOW OFTEN HAVE YOU FELT FED UP OR IRRITATED BECAUSE OF YOUR HEADACHES: NEVER
HOW OFTEN DID HEADACHS LIMIT CONCENTRATION ON WORK OR DAILY ACTIVITY: NEVER
WHEN YOU HAVE A HEADACHE HOW OFTEN DO YOU WISH YOU COULD LIE DOWN: VERY OFTEN
HIT6 TOTAL SCORE: 45
HOW OFTEN HAVE YOU FELT TOO TIRED TO WORK BECAUSE OF YOUR HEADACHES: NEVER

## 2023-11-07 NOTE — TELEPHONE ENCOUNTER
Just sent 10/23/23 and patient reports less frequent use - will refill at next follow-up appointment as needed (scheduled 11/24/23).

## 2023-11-08 ENCOUNTER — VIRTUAL VISIT (OUTPATIENT)
Dept: NEUROLOGY | Facility: CLINIC | Age: 33
End: 2023-11-08
Payer: COMMERCIAL

## 2023-11-08 DIAGNOSIS — G43.709 CHRONIC MIGRAINE WITHOUT AURA WITHOUT STATUS MIGRAINOSUS, NOT INTRACTABLE: ICD-10-CM

## 2023-11-08 PROCEDURE — 99213 OFFICE O/P EST LOW 20 MIN: CPT | Mod: VID | Performed by: PSYCHIATRY & NEUROLOGY

## 2023-11-08 RX ORDER — SUMATRIPTAN 100 MG/1
100 TABLET, FILM COATED ORAL
Qty: 18 TABLET | Refills: 11 | Status: SHIPPED | OUTPATIENT
Start: 2023-11-08

## 2023-11-08 RX ORDER — METOCLOPRAMIDE 10 MG/1
10 TABLET ORAL 3 TIMES DAILY PRN
Qty: 20 TABLET | Refills: 11 | Status: SHIPPED | OUTPATIENT
Start: 2023-11-08

## 2023-11-08 RX ORDER — FREMANEZUMAB-VFRM 225 MG/1.5ML
1.5 INJECTION SUBCUTANEOUS
Qty: 1.5 ML | Refills: 11 | Status: SHIPPED | OUTPATIENT
Start: 2023-11-08 | End: 2024-07-03

## 2023-11-08 NOTE — PROGRESS NOTES
Virtual Visit Details    Type of service:  Video Visit     Originating Location (pt. Location): Home    Distant Location (provider location):  Off-site  Platform used for Video Visit: [facility]    Headache Neurology Progress Note  November 8, 2023    Subjective:    Analia Jerry returns for follow up of chronic migraine.  Since her last visit, she stopped topiramate, trialed atenolol, stopped atenolol, and trialed Ajovy.    Ajovy is helping. She reports no migraine attacks.     She started Abilify recently, with some headaches. She reports a mild headache every few days.  She explains that she was told to expect this as she starts the medication or increases the dose.  These are not particularly bothersome.    For acute treatment, she uses a cold pack. She hasn't needed an emergency medication at all.  If she were to have a more severe headache, she continues to use sumatriptan and metoclopramide as needed.    Objective:    Vitals: There were no vitals taken for this visit.  General: Cooperative, NAD  Neurologic:  Mental Status: Fully alert, attentive and oriented. Speech clear and fluent.   Cranial Nerves: Facial movements symmetric.   Motor: No abnormal movements.      Assessment/Plan:   Analia Jerry is a 32 year old who returns for follow-up of chronic migraine without aura and history of lumbar puncture headache.  Headaches are well controlled with Ajovy subcutaneous injection every 30 days.  We will continue this.     For acute treatment of headache, I recommend the following:  -For acute treatment of mild headache, she will continue ibuprofen as needed, not to exceed more than 14 days/month to avoid medication overuse.  - For acute treatment of moderate to severe headache, I recommend sumatriptan 100 mg taken at the onset of headache, with a repeat dose in 2 hours if needed.  This should not exceed more than 9 days/month to avoid medication overuse.  - For headache  related nausea, I recommend metoclopramide 10 mg as needed.     Her headache frequency and severity warrant prevention.   For preventative treatment of headache, I recommend the following:  -I recommend she continue Ajovy subcutaneous injection every 30 days.  I sent this to a new pharmacy at her request.   -In the future, if other treatment options are needed, botulinum toxin injections using a chronic migraine protocol every 12 weeks, or an alternative CGRP inhibitor could be considered.  With her currently changing antidepressant regimen, we will avoid adding a tricyclic antidepressant; this is also be contraindicated due to her elevated pulse rate.     I will plan to see her in 1 year, or sooner if needed.    Karissa Gonzalez MD  Neurology

## 2023-11-08 NOTE — NURSING NOTE
Is the patient currently in the state of [place]? YES    Visit mode:VIDEO    If the visit is dropped, the patient can be reconnected by: TELEPHONE VISIT: Phone number:   Telephone Information:   Mobile [phone]       Will anyone else be joining the visit? NO  (If patient encounters technical issues they should call [phone])    How would you like to obtain your AVS? MyChart    Are changes needed to the allergy or medication list? Pt stated no med changes    Reason for visit: Video Visit (Follow-up )    Karyn LOVETT

## 2023-11-08 NOTE — LETTER
11/8/2023       RE: Analia Jerry  [address]  [place] [address]     Dear Colleague,    Thank you for referring your patient, Analia Jerry, to the [facility]. Please see a copy of my visit note below.      [facility]    Headache Neurology Progress Note  November 8, 2023    Subjective:    Analia Jerry returns for follow up of chronic migraine.  Since her last visit, she stopped topiramate, trialed atenolol, stopped atenolol, and trialed Ajovy.    Ajovy is helping. She reports no migraine attacks.     She started Abilify recently, with some headaches. She reports a mild headache every few days.  She explains that she was told to expect this as she starts the medication or increases the dose.  These are not particularly bothersome.    For acute treatment, she uses a cold pack. She hasn't needed an emergency medication at all.  If she were to have a more severe headache, she continues to use sumatriptan and metoclopramide as needed.    Objective:    Vitals: There were no vitals taken for this visit.  General: Cooperative, NAD  Neurologic:  Mental Status: Fully alert, attentive and oriented. Speech clear and fluent.   Cranial Nerves: Facial movements symmetric.   Motor: No abnormal movements.      Assessment/Plan:   Analia Jerry is a 32 year old who returns for follow-up of chronic migraine without aura and history of lumbar puncture headache.  Headaches are well controlled with Ajovy subcutaneous injection every 30 days.  We will continue this.     For acute treatment of headache, I recommend the following:  -For acute treatment of mild headache, she will continue ibuprofen as needed, not to exceed more than 14 days/month to avoid medication overuse.  - For acute treatment of moderate to severe headache, I recommend sumatriptan 100 mg taken at the onset of headache, with a repeat  dose in 2 hours if needed.  This should not exceed more than 9 days/month to avoid medication overuse.  - For headache related nausea, I recommend metoclopramide 10 mg as needed.     Her headache frequency and severity warrant prevention.   For preventative treatment of headache, I recommend the following:  -I recommend she continue Ajovy subcutaneous injection every 30 days.  I sent this to a new pharmacy at her request.   -In the future, if other treatment options are needed, botulinum toxin injections using a chronic migraine protocol every 12 weeks, or an alternative CGRP inhibitor could be considered.  With her currently changing antidepressant regimen, we will avoid adding a tricyclic antidepressant; this is also be contraindicated due to her elevated pulse rate.     I will plan to see her in 1 year, or sooner if needed.      Again, thank you for allowing me to participate in the care of your patient.      Sincerely,    Karissa Gonzalez MD

## 2023-11-14 NOTE — PATIENT INSTRUCTIONS
Patient Education     Chest Wall Pain: Costochondritis    The chest pain that you have had today is caused by costochondritis. This condition is caused by an inflammation of the cartilage joining your ribs to your breastbone. It is not caused by heart or lung problems. Your healthcare team has made sure that the chest pain you feel is not from a life threatening cause of chest pain such as heart attack, collapsed lung, blood clot in the lung, tear in the aorta, or esophageal rupture. The inflammation may have been brought on by a blow to the chest, lifting heavy objects, intense exercise, or an illness that made you cough and sneeze a lot. It often occurs during times of emotional stress. It can be painful, but it is not dangerous. It usually goes away in 1 to 2 weeks. But it may happen again. Rarely, a more serious condition may cause symptoms similar to costochondritis. That s why it s important to watch for the warning signs listed below.  Home care  Follow these guidelines when caring for yourself at home:    If you feel that emotional stress is a cause of your condition, try to figure out the sources of that stress. It may not be obvious. Learn ways to deal with the stress in your life. This can include regular exercise, muscle relaxation, meditation, or simply taking time out for yourself.    You may use acetaminophen, ibuprofen, or naproxen to control pain, unless another pain medicine was prescribed. If you have liver or kidney disease or ever had a stomach ulcer, talk with your healthcare provider before using these medicines.    You can also help ease pain by using a hot, wet compress or heating pad. Use this with or without a medicated skin cream that helps relieves pain.    Do stretching exercise as advised by your provider.    Take any prescribed medicines as directed.  Follow-up care  Follow up with your healthcare provider, or as advised, if you do not start to get better in the next 2 days.  When  to seek medical advice  Call your healthcare provider right away if any of these occur:    A change in the type of pain. Call if it feels different, becomes more serious, lasts longer, or spreads into your shoulder, arm, neck, jaw, or back.    Shortness of breath or pain gets worse when you breathe    Weakness, dizziness, or fainting    Cough with dark-colored sputum (phlegm) or blood    Abdominal pain    Dark red or black stools    Fever of 100.4 F (38 C) or higher, or as directed by your healthcare provider  Date Last Reviewed: 12/1/2016 2000-2018 [employer]. [address] [place] [address]. All rights reserved. This information is not intended as a substitute for professional medical care. Always follow your healthcare professional's instructions.            Continue Regimen: TriLuma prn Detail Level: Zone

## 2023-11-23 ASSESSMENT — ANXIETY QUESTIONNAIRES
6. BECOMING EASILY ANNOYED OR IRRITABLE: MORE THAN HALF THE DAYS
4. TROUBLE RELAXING: MORE THAN HALF THE DAYS
1. FEELING NERVOUS, ANXIOUS, OR ON EDGE: MORE THAN HALF THE DAYS
3. WORRYING TOO MUCH ABOUT DIFFERENT THINGS: SEVERAL DAYS
5. BEING SO RESTLESS THAT IT IS HARD TO SIT STILL: MORE THAN HALF THE DAYS
7. FEELING AFRAID AS IF SOMETHING AWFUL MIGHT HAPPEN: SEVERAL DAYS
GAD7 TOTAL SCORE: 11
IF YOU CHECKED OFF ANY PROBLEMS ON THIS QUESTIONNAIRE, HOW DIFFICULT HAVE THESE PROBLEMS MADE IT FOR YOU TO DO YOUR WORK, TAKE CARE OF THINGS AT HOME, OR GET ALONG WITH OTHER PEOPLE: VERY DIFFICULT
GAD7 TOTAL SCORE: 11
2. NOT BEING ABLE TO STOP OR CONTROL WORRYING: SEVERAL DAYS

## 2023-11-24 ENCOUNTER — VIRTUAL VISIT (OUTPATIENT)
Dept: PSYCHIATRY | Facility: CLINIC | Age: 33
End: 2023-11-24
Payer: COMMERCIAL

## 2023-11-24 DIAGNOSIS — F39 MOOD DISORDER (H): Primary | ICD-10-CM

## 2023-11-24 DIAGNOSIS — F43.10 POSTTRAUMATIC STRESS DISORDER: ICD-10-CM

## 2023-11-24 DIAGNOSIS — F41.1 GENERALIZED ANXIETY DISORDER: ICD-10-CM

## 2023-11-24 PROCEDURE — 99214 OFFICE O/P EST MOD 30 MIN: CPT | Mod: VID | Performed by: NURSE PRACTITIONER

## 2023-11-24 RX ORDER — PROPRANOLOL HYDROCHLORIDE 10 MG/1
TABLET ORAL
Qty: 60 TABLET | Refills: 0 | Status: SHIPPED | OUTPATIENT
Start: 2023-11-24 | End: 2023-12-18

## 2023-11-24 ASSESSMENT — PAIN SCALES - GENERAL: PAINLEVEL: NO PAIN (0)

## 2023-11-24 NOTE — PROGRESS NOTES
"Virtual Visit Details    Type of service:  Video Visit     Originating Location (pt. Location): Home    Distant Location (provider location):  Off-site  Platform used for Video Visit: Aqdot         PSYCHIATRIC MEDICATION FOLLOW UP APPT     Name: Analia Jerry   : 1990               Telemedicine Visit: The patient's condition can be safely assessed and treated via synchronous audio and visual telemedicine encounter.      Consent:  The patient/guardian has verbally consented to: the potential risks and benefits of telemedicine (video visit or phone) versus in person care; bill my insurance or make self-payment for services provided; and responsibility for payment of non-covered services.    As the provider I attest to compliance with applicable laws and regulations related to telemedicine.         Source of Referral / Care Team:  Primary Care Provider: Pranav Ware MD   Therapist: Monica ([facility]).         The [facility] psychiatry providers act as a specialty service for Primary Care Providers in the [facility] who seek to optimize medications for unstable patients. Once medications have been optimized, [facility] providers discharge the patient back to the referring Primary Care Provider for ongoing medication management. This type of system allows [facility] to serve a high volume of patients.      Patient Identification:  Patient is a 32 year old,   White Not  or  female  who presents for return visit with me.   Patient prefers to be called: \"Analia\".  Patient is currently employed full time.    Patient attended the session alone.    RECORDS AVAILABLE FOR REVIEW: EHR records through [employer] , including recent labs.     Interim History:  I last saw Analia Jerry for outpatient psychiatry Return Visit one month ago on 10/23/23. During that appointment, we continued recently added abilify 5 mg, and continued bupropion  mg, lamotrigine 200 mg every day, with propranolol 10 mg " "as needed for anxiety.  In interim, patient initially reported via [employer] some concern for lower energy, less motivatation, and some increase in agitation, restlessness and some high speed driving which she has previously had with past SSRIs. Recommended continuing current medications initially, using propranolol regularly twice a day and monitoring for improvement. Today, patient reports ADHERING to prescribed medications. She did start the propranolol 5 mg twice a day and feels it has been helpful for the restlessness, possible akathisia but now does not seem to be as helpful for anxiety when taken during the day. She denies any tics / tremors / abnormal muscle mvmts. She is still taking abilify QHS and continues to feel it has been helpful for sleep, her energy now seems back to baseline (\"ok, learning to manage a little better\") and denies worse with medication. Does think the abilify addition has been helpful overall for her mood, depression. Does report feeling easily irritable, but denies any other sx of divya and is unsure if it is worse with the abilify. Denies any SI/SIB/HI, no psychosis.       Initial Impression:   10/23/23: At last appointment 4 weeks ago, we discontinued latuda due to side effects, and started titration to abilify 5 mg, continued bupropion  mg, lamotrigine 200 mg every day, with propranolol 10 mg as needed for anxiety.  Patient reports tolerating the abilify well, no ongoing abnormal movements / worsening of RLS. She does report notable improvement in depression over last few weeks which she is hopeful about, as well as less irritability. Denies any sx of hypo/divya, no SI/SIB/HI. Anxiety has been low as well, although mildly increased with upcoming travel. Given good tolerability and recent increase of medication, recommending continuing current mediation at this time and patient is agreeable to plan. Follow-up with this provider in 1 month. Discussed with patient upcoming " medical leave for this provider. If at that time patient has continued improvement and stability, will plan to return care to PCP, otherwise will have her follow-up with another [facility] provider (or discussed long term provider if needed). Patient agreeable to plan. Will send metabolic labs today if continuing abilify, however patient may wait to complete until est with new PCP in a few weeks.      9/25/23: At last appointment 2.5 weeks ago, we added Latuda 20 mg QPM for bipolar depression, and continued bupropion  mg, lamotrigine 200 mg every day, with propranolol 10 mg as needed for anxiety. In interim, patient reported unable to tolerate medication due to exacerbation in RLS. Denied any other tics / tremors / akathisia or other sx of NMS. Recommended stopping medication, which she did after 3 days and reports now back to baseline. No significant change in symptoms at this time - anxiety remains moderately well managed, especially with propranolol (rates as 4/10 today) which she is tolerating well. Reports depression still remains elevated, associated with low mood, anhedonia, irritability. Denies other sx of hypo/divya at this time, no hyperelevated mood or energy. Denies SI/SIB/HI. Again discussed risks / benefits of medication options for depression, and risk for ongoing bupropion medication for mood lability. We will attempt to start very low dose abilify at this time for bipolar depression, while monitoring closely for side effects.      9/7/23: At last appointment 1 month ago, we continued bupropion  mg, lamotrigine 200 mg every day, with propranolol 10 mg as needed for anxiety. Over last few weeks, does report her anxiety has improved some as situational stressors although remains moderately high. Most distressing to patient is increase in her depression, which seemed to have been much lower over last few months and continues to fluctuate significantly. Does not report distinct periods of divya,  "but does report some hyper-energy and increased goal-directed activities, when \"feeling not able to calm self and stop doing things\" (e.g. around the house). No psychosis. Denies any SI/SIB/HI. Discussed risks / benefits of medication options for depression, and risk for ongoing bupropion medication for mood lability. At this time, we will trial adding lurasidone for bipolar depression, while monitoring closely for side effects. If continuing medication, will send for baseline metabolic labs at next appointment.      8/7/23: At last appointment 4 weeks ago, we continued recently decreased bupropion  mg, and lamotrigine 200 mg every day, with propranolol 10 mg as needed for anxiety. She reports continuing to tolerate the medication well, no notable side effects. Overall reports good stability in mood with lower bupropion dose, no worsening of depression which remains low, and denies significant mood lability. Noted benefit to irritability, although recently increased again with stressors of house move. Anxiety moderately well controlled with propranolol as needed. Given notable precipitant, recommending continuing current medication and follow-up in 1 month to assess for medication change needed. Discussed buspirone as alternative to SSRI.      7/10/23: At initial appointment with this provider 2 weeks ago, we decreased to bupropion  mg, and started propranolol 10 mg PRN, and continued lamotrigine 200. She does report a mild improvement in her anxiety and mood lability, irritability, and is tolerating the propranolol as needed with decent benefit. She does note her mood has been lower with mild decrease in energy since reducing the bupropion. Denies any SI/SIB/HI. She denies intolerable change at this time, and is open to continuing for another few weeks before additional changes.      6/26/23: Analia Jerry is a 32 year old White, female who presents for initial visit with this provider at [facility] for " medication management.  Patient previously seen with [facility] provider YULIET Collins (2/2021-8/2021) and returned to PCP, then re-referred and started with Dr. Babb (03/2022-05/2023) for symptom exacerbation. At last appointment 2 month ago, continued lamotrigine 200 mg every day and bupropion  mg every day after diagnosis changed to mood disorder (from [facility]D) given reports of increased impulsivity, agitation during sertraline initiation which also occurred with past Viibryd trial. Over last few months, she reports improvement in mood lability and irritability, up to last ~2 weeks where they have increased again without clear trigger. Reports worsening of her depression, along with quick to anger, irritability. Denies other symptoms of manic episode at this time. Denies any SI/SIB/HI. No psychosis. Does report throwing things during this time, but denies any physical aggression towards people, animals, no property destruction.  Denies any substance use. Of note, bc started injections for her migraines, stopped atenolol over last few weeks. Denies any notable changes without it, no history of side effects. Discussed risks/benefits/ side effects of current medication, and possibility that bupropion is worsening mood stability as well as side effects of irritability for some. Will initially trial decreasing dose, continuing lamotrigine. Will also send trial of propranolol to be used as needed for anxiety, agitation.     Current medications include:   Current Outpatient Medications   Medication Sig    ARIPiprazole (ABILIFY) 5 MG tablet Take 1 tablet (5 mg) by mouth daily    buPROPion (WELLBUTRIN XL) 300 MG 24 hr tablet Take 1 tablet (300 mg) by mouth every morning    cyclobenzaprine (FLEXERIL) 5 MG tablet Take 1-2 tablets every 8 hours as needed for muscle spasm    diclofenac (VOLTAREN) 75 MG EC tablet Take 1 tablet (75 mg) by mouth 2 times daily for 10 days    Fremanezumab-vfrm (AJOVY) 225 MG/1.5ML SOAJ Inject 1.5 mg  "Subcutaneous every 30 days    lamoTRIgine (LAMICTAL) 200 MG tablet Take 1 tablet (200 mg) by mouth at bedtime    levothyroxine (SYNTHROID/LEVOTHROID) 200 MCG tablet Take 1 tablet (200 mcg) by mouth daily    metoclopramide (REGLAN) 10 MG tablet Take 1 tablet (10 mg) by mouth 3 times daily as needed (nausea)    nitroFURantoin macrocrystal (MACRODANTIN) 100 MG capsule Take 1 capsule (100 mg) by mouth At Bedtime    norethindrone-ethinyl estradiol (ORTHO-NOVUM 1/35, 28,) 1-35 MG-MCG tablet Continuously by skipping placebo pills    omeprazole (PRILOSEC) 20 MG DR capsule Take 1 capsule (20 mg) by mouth daily (Patient not taking: Reported on 11/2/2023)    propranolol (INDERAL) 10 MG tablet Take 1-2 tablets (10-20 mg) by mouth 2 times daily as needed (anxiety)    rOPINIRole (REQUIP) 0.5 MG tablet Take 3 tablets (total of 1.5 mg) daily 1 to 3 hours before bedtime    SUMAtriptan (IMITREX) 100 MG tablet Take 1 tablet (100 mg) by mouth at onset of headache for migraine (repeat in 2 hours if needed)     Current Facility-Administered Medications   Medication    Botulinum Toxin Type A (BOTOX) 200 units injection 150 Units         Side effects: Yes: possible increase in appetite, overnight lightheadedness (?orthostatic hypotension) with abilify     The [place] Prescription Monitoring Program: not reviewed today    Psychiatric ROS:  Analia Jerry reports mood has been: \"I've been doing ok.\"  Depression has been: depressed mood, little interest / pleasure, fatigue of loss of energy, worthlessness or excessive guilt, and difficulty concentrating or indecisiveness self rates as ~2/ 10, where 0 is none at all and 10 being severe depression. Was 2/10 at last appt.  SI/SIB/HI: denies all  Anxiety has been: excessive worry, difficult to control, restlessness / feeling keyed up,  easily fatigued,  difficulty concentrating or mind going blank, irritability, muscle tension, and sleep disturbances (difficulty falling / staying asleep, or " restless / unsatisfying)  self rates as ~4-5/ 10, where 0 is none at all and 10 being severe anxiety. Was 3/10 at last appt.  Sleep has been: continues to be better with abilify QHS  Energy has been: low, but denies worsening now with abilify.  Appetite has been: increased with +abilify, denies significant change to diet or weight at this time.  Richa sxs: increased irritability. Denies hyperelevated mood, energy. No impulsive, reckless behaviors since 1x high speed driving ~1 mo ago.    Psychosis sxs: denies  ADHD/ADD sxs: none  PTSD sxs: intrusion and avoidance sx require further evaluate. negative emotional state, diminished interest and detachment from others, irritability, reckless behavior and  sleep disturbances     PHQ2 (2) and GAD7 scores were reviewed today:   PHQ-9 scores:       7/24/2023    10:40 AM 8/7/2023    10:21 AM 11/2/2023    11:25 AM   PHQ-9 SCORE   PHQ-9 Total Score MyChart 5 (Mild depression) 6 (Mild depression) 3 (Minimal depression)   PHQ-9 Total Score 5 6 3       MAXIM-7 scores:        8/6/2023     8:26 PM 9/6/2023    11:40 AM 11/23/2023     9:42 PM   MAXIM-7 SCORE   Total Score 13 (moderate anxiety) 9 (mild anxiety) 11 (moderate anxiety)   Total Score 13 9 11         Current stressors include: Symptoms, Occupational Difficulties  Coping mechanisms and supports include: Therapy, Family, and Friends    Vital Signs:   There were no vitals taken for this visit.    Review of Systems:  10 systems (general, cardiovascular, respiratory, eyes, ENT, endocrine, GI, , M/S, neurological) were reviewed. Most pertinent finding(s) is/are:  No acute distress; no wheezing / short of breath / increased work of breathing; denies chest pain / tightness / palpitations; reduced appetite and recent weight loss; no nausea / vomiting / abdominal pain; no tics / tremors / abnormal muscle mvmts; no visible skin changes / rashes . The remaining systems are all unremarkable.    Labs:  Most recent laboratory results  reviewed and the pertinent results include:     Recent Labs   Lab Test 05/16/23  1433 08/05/19  1214 11/17/15  1537   WBC 6.9   < > 7.9   HGB 12.7   < > 12.6   HCT 36.0   < > 35.9   MCV 89   < > 87      < > 248   ANEU  --   --  4.0    < > = values in this interval not displayed.     Recent Labs   Lab Test 11/07/23  1056 10/06/22  0710    139   POTASSIUM 4.8 3.5   CHLORIDE 107 105   CO2 23 21*   GLC 84 95   KIKI 9.5 9.4   BUN 15.9 16.3   CR 1.01* 1.11*   GFRESTIMATED 75 67   ALBUMIN  --  4.4   PROTTOTAL  --  7.4   AST  --  18   ALT  --  26   ALKPHOS  --  63   BILITOTAL  --  0.3     Recent Labs   Lab Test 11/07/23  1056   CHOL 209*   *   HDL 49*   TRIG 214*   A1C 4.9     Recent Labs   Lab Test 05/16/23  1433 10/11/21  1205 07/07/21  1304   TSH 1.52   < > 0.03*   T4  --   --  1.26    < > = values in this interval not displayed.     25 OH Vit D2   Date Value Ref Range Status   10/22/2014 <5 ug/L Final     25 OH Vit D3   Date Value Ref Range Status   10/22/2014 29 ug/L Final     25 OH Vit D total   Date Value Ref Range Status   10/22/2014  30 - 75 ug/L Final    <34  Season, race, dietary intake, and treatment affect the concentration of   25-hydroxy-Vitamin D. Values may decrease during winter months and increase   during summer months. Values less than 30 ug/L may indicate Vitamin D   deficiency.          Past Medical/Surgical History:  Past Medical History:   Diagnosis Date    Acute gallstone pancreatitis 12/28/2019    Anxiety     ASCUS of cervix with negative high risk HPV 01/26/2017    ASCUS/neg HR HPV.    Juarez's esophagus determined by biopsy 2022    Depressive disorder     Gallstones 12/28/2019    [facility] [place]    H/O colposcopy with cervical biopsy 03/23/2017    Bx & ECC - negative    Hashimoto's disease     Headache(784.0)     Hypothyroidism due to Hashimoto's thyroiditis     Papanicolaou smear of cervix with atypical squamous cells of undetermined significance (ASC-US)  2015      has a past medical history of Acute gallstone pancreatitis (2019), Anxiety, ASCUS of cervix with negative high risk HPV (2017), Juarez's esophagus determined by biopsy (), Depressive disorder, Gallstones (2019), H/O colposcopy with cervical biopsy (2017), Hashimoto's disease, Headache(784.0), Hypothyroidism due to Hashimoto's thyroiditis, and Papanicolaou smear of cervix with atypical squamous cells of undetermined significance (ASC-US) (2015).    She has no past medical history of Arthritis, Cancer (H), Cerebral infarction (H), Congestive heart failure (H), COPD (chronic obstructive pulmonary disease) (H), Diabetes (H), Heart disease, History of blood transfusion, Hypertension, or Uncomplicated asthma.    Medication allergies:    Allergies   Allergen Reactions    Nkda [No Known Drug Allergy]        Social History:  Born in [place], [place] and raised in [place], [place].  Parents not  but still live together  Siblings:  Two half sisters, full biobrother   in October.  Childhood: Yes intact home with all current basic needs being met.  Highest education level was college graduate.   Employment Status: full time -  [employer], [place] by [employer].  Relationship status: Together for 9 years. Safe in relationship.  Current Living situation:  , and 2 stepsons are with them on weekends, longer in summer.  Feels safe at home.  Children: 15 and 10 year old stepsons  Firearms/Weapons Access: No: Patient denies   Service: No  Support: , friend, therapist     Current use of drugs or alcohol: Denies   Tobacco use: No    Mental Status Exam:  Alertness: alert  and oriented   Appearance: adequately groomed  Behavior/Demeanor: cooperative and pleasant, with good eye contact   Speech: normal and regular rate and rhythm  Language: intact and no problems  Psychomotor: normal or unremarkable  Mood: anxious  Affect: full range and appropriate; was congruent to mood;  was congruent to content  Thought Process/Associations: unremarkable  Thought Content:  Reports none;  Denies suicidal ideation, violent ideation, and delusions  Perception:  Reports none;  Denies auditory hallucinations and visual hallucinations  Insight: intact  Judgment: intact  Cognition: does  appear grossly intact; formal cognitive testing was not done  Recent and Remote Memory: Intact to interview. Not formally assessed. No amnesia.  Attention Span and Concentration: normal  Fund of Knowledge: appropriate  Gait and Station: unremarkable    Suicide Risk Assessment:  Today Analia Jerry reports no suicidal ideation.  There are notable risk factors for self-harm, including anxiety, family history, and mood change. However, risk is mitigated by commitment to family, absence of past attempts, history of seeking help when needed, future oriented, no access to firearms or weapons, and denies suicidal intent or plan. Therefore, based on all available evidence including the factors cited above, Analia Jerry does not appear to be at imminent risk for self-harm, does not meet criteria for a 72-hr hold, and therefore remains appropriate for ongoing outpatient level of care.  A thorough assessment of risk factors related to suicide and self-harm have been reviewed and are noted above. The patient convincingly denies suicidality on several occasions. Local community safety resources printed and reviewed for patient to use if needed. There was no deceit detected, and the patient presented in a manner that was believable.     Recommended that patient call 911 or go to the local ED should there be a change in any of these risk factors.    DSM5 Diagnosis:  F39 Mood disorder  300.02 (F41.1) Generalized Anxiety Disorder  309.81 (F43.10) Posttraumatic Stress Disorder (includes Posttraumatic Stress Disorder for Children 6 Years and Younger)  Without dissociative symptoms    Medical comorbidities include:   Patient Active Problem  List    Diagnosis Date Noted    Generalized anxiety disorder 07/10/2023     Priority: Medium    Anxiety 02/15/2023     Priority: Medium    Transplant donor evaluation 09/14/2022     Priority: Medium    Recurrent major depression in partial remission (H24) 07/25/2022     Priority: Medium    Posttraumatic stress disorder 03/15/2022     Priority: Medium    Fatigue, unspecified type 04/05/2021     Priority: Medium    Moderate episode of recurrent major depressive disorder (H) 03/04/2021     Priority: Medium    FRANK (obstructive sleep apnea) - mild (AHI 7) 08/24/2020     Priority: Medium     8/10/2020 [facility] Diagnostic Sleep Study (216.0 lbs) - scored with 3% rules -  AHI 7.1, RDI 7.1, Supine AHI 22.9, REM AHI -, Low O2 91.2%, Time Spent ?88% 0 minutes / Time Spent ?89% 0 minutes.      Mood disorder (H24)      Priority: Medium    Class 1 obesity due to excess calories with body mass index (BMI) of 32.0 to 32.9 in adult, unspecified whether serious comorbidity present 07/02/2020     Priority: Medium    Elevated liver enzymes 12/28/2019     Priority: Medium    High triglycerides 04/03/2018     Priority: Medium    ASCUS of cervix with negative high risk HPV 01/26/2017     Priority: Medium     4/1/11 (approx) normal - patient reported.   6/11/13 normal - patient reported  12/3/15 ASCUS pap. Plan: per provider, repeat pap in 1 year.  1/26/17 ASCUS/neg HR HPV. Plan: colp bef 4/26/17  3/23/17 [place] Bx & ECC - negative. Plan cotest in 1 year.   3/8/18 ASCUS pap, neg HPV. Plan: colp.   5/10/18 [place] Bx and ECC: negative. Plan: cotest in 1 yr.   6/6/19 NIL/neg HR HPV. Plan cotest in 3 years  4/15/22 NIL pap, neg HPV. 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         Psychosocial & Contextual Factors:  Occupational Difficulties     DIFFERENTIAL DIAGNOSIS: R/O major depressive disorder versus bipolar disorder     Medical comorbidities impacting or contributing to clinical picture: hypothyroidism due to Hashimotos thyroiditis, FRANK, migraines  Known issue that I take into account for their medical decisions, no current exacerbations or new concerns    Impression:  Analia Jerry is a 32 year old White, female who presents for return visit with  [facility] ([facility] for medication management. At last appointment one month ago, we continued recently added abilify 5 mg, and continued bupropion  mg, lamotrigine 200 mg every day, with propranolol 1YE0 mg as needed for anxiety. Patient reported some concern for increased restlessness, irritability and started propranolol daily (vs as needed), which has been helpful for restlessness but now less effective for anxiety. Denies any tics / tremors / abnormal muscle movements and none noted on camera. She does report the abilify has been helpful for her mood and depression, although monitoring increased irritability and high speed driving she had previously with past SSRI trial. No other symptoms of divya. Denies any SI/SIB/HI, no psychosis. Recommending small increase to propranolol, continuing all other medications at this time. Recommending follow-up in 4 weeks, which patient is aware will be with alternative [facility] provider given upcoming medical leave for this provider. Office should be calling to schedule follow-up, otherwise patient aware to call for scheduling. If at that appointment, symptoms and medication are stable may plan to return care to PCP (or discussed long term provider if needed).     Medication side effects and alternatives were reviewed. Health promotion activities recommended and reviewed  today. All questions addressed. Education and counseling completed regarding risks and benefits of medications and psychotherapy options. Recommend therapy for additional support.       Treatment Plan:  START propranolol 10 mg every AM and 5 mg every PM, with additional 5-10 mg as needed for anxiety. Script sent for #60.  CONTINUE aripiprazole 5 mg every day. Sent script previously.  CONTINUE bupropion  mg every day. Sent script previously.  CONTINUE lamotrigine 200 mg per last provider. Sent script previously.  Continue all other medications per primary care provider.   Continue therapy with established provider.  Safety plan reviewed. To the Emergency Department as needed or call after hours crisis line at [phone] or [phone]. [place] Crisis Text Line. Text [place] to 530744 or Suicide LifeLine Chat: suicidepreventionTizaroline.org/chat  Schedule an appointment with CCPS in 4 weeks or sooner as needed. The office should be calling to schedule, or call [facility] at [phone] to schedule.  Follow up with primary care provider as planned or for acute medical concerns.  Call the psychiatric nurse line with medication questions or concerns at [phone].  MyChart may be used to communicate with your provider, but this is not intended to be used for emergencies.    Patient Education:  Medication side effects and alternatives reviewed. Health promotion activities recommended and reviewed today. All questions addressed. Education and counseling completed regarding risks and benefits of medications and psychotherapy options.  Consent provided by patient/guardian  Call the psychiatric nurse line with medication questions or concerns at [phone].  MyChart may be used to communicate with your provider, but this is not intended to be used for emergencies.  LAMOTRIGINE: Discussed risk of rash and instructed to stop taking the drug at the first sign of a rash regardless of its type or  severity, and contact the nurse line at [phone]  FIRST GENERATION ANTIPSYCHOTIC/ SECOND GENERATION ANTIPSYCHOTIC USE:  Atypical need for cardiometabolic monitoring with medication- B/P, weight, blood sugar, cholesterol.  Need to monitor for abnormal movements taught  Medlineplus.gov is information for patients.  It is run by the [employer] and it contains information about all disorders, diseases and all medications.      Community Resources:    National Suicide Prevention Lifeline: 647.906.4689 (TTY: 508.438.6011). Call anytime for help.  (www.suicidepreventionlifeline.org)  National [place] on Mental Illness (www.irasema.org): 697.323.7350 or 281-803-3387.   Mental Health Association (www.mentalhealth.org): 446.245.2284 or 263-018-3852.  [place] Crisis Text Line: Text [place] to 099444  Suicide LifeLine Chat: Agavideo.org/chat    Administrative Billing:     Level of Medical Decision Making:   - At least 1 chronic problem that is not stable  - Engaged in prescription drug management during visit (discussed any medication benefits, side effects, alternatives, etc.)             Patient Status:  CCPS MD/DO/NP/PA providers offer care a specialty service for Primary Care Providers in the [facility] that seek to optimize psychotropic medications for unstable patients.  Once medications have been optimized, our providers discharge the patient back to the referring Primary Care Provider for ongoing medication management.  This type of system allows our providers to serve a high volume of patients.   Patient will continue to be seen for ongoing consultation and stabilization.    Signed:   Josee Byrd, MSN, APRN, PMHNP-BC  Collaborative Care Psychiatry Service (CCPS)  [facility]    Chart documentation done in part with Dragon Voice Recognition software.  Although reviewed after completion, some word and grammatical errors may remain.

## 2023-11-24 NOTE — NURSING NOTE
Is the patient currently in the state of [place]? YES    Visit mode:VIDEO    If the visit is dropped, the patient can be reconnected by: VIDEO VISIT: Text to cell phone:   Telephone Information:   Mobile [phone]       Will anyone else be joining the visit? NO  (If patient encounters technical issues they should call [phone]    How would you like to obtain your AVS? MyChart    Are changes needed to the allergy or medication list? Pt stated no changes to allergies and Pt stated no med changes    Reason for visit: DAVID LOVETT

## 2023-11-27 NOTE — PATIENT INSTRUCTIONS
**For crisis resources, please see the information at the end of this document**     Thank you for coming to the [facility] [place] [facility].    TREATMENT PLAN:  Medications:   START propranolol 10 mg every AM and 5 mg every PM, with additional 5-10 mg as needed for anxiety. Script sent for #60.  CONTINUE aripiprazole 5 mg every day. Sent script previously.  CONTINUE bupropion  mg every day. Sent script previously.  CONTINUE lamotrigine 200 mg per last provider. Sent script previously.  Continue all other medications per primary care provider.     Consults / Referrals:   - Continue therapy with established provider.    Follow Up:  START propranolol 10 mg every AM and 5 mg every PM, with additional 5-10 mg as needed for anxiety. Script sent for #60.  CONTINUE aripiprazole 5 mg every day. Sent script previously.  CONTINUE bupropion  mg every day. Sent script previously.  CONTINUE lamotrigine 200 mg per last provider. Sent script previously.  Continue all other medications per primary care provider.     Psychoeducation:  FIRST GENERATION ANTIPSYCHOTIC/ SECOND GENERATION ANTIPSYCHOTIC USE:  Atypical need for cardiometabolic monitoring with medication- B/P, weight, blood sugar, cholesterol.  Need to monitor for abnormal movements taught.  I have informed the patient and parent of the risk for metabolic syndrome; hyperglycemia, dyslipidemia and change in body weight as well as potential electrolyte imbalance and/or other medical complications. I further, discussed information regarding risk of akathisia risk/TD and stroke/cerebrovascular adverse events, diabetes.  We discussed the need for blood test to monitor for these potential effects.  The patient verbalized understanding and gives verbal consent to use of antipsychotic.     Discussed use of propranolol as off label for anxiety, and side effects to monitor including low HR, BP, lightheadedness or dizziness.    Discussed  side effects of bupropion to include agitation and other activation issues, ^ BP or HR, insomnia, stomach upset, appetite loss, weight loss, risk for precipitation of divya, and increased risk for seizures.  Patient agreed to take Bupropion to treat low mood, lack of motivation and poor concentration. Patient verbalized understanding of medication schedule, of side effects, avoidance of alcohol and marijuana due to increase risk for seizures.      Financial Assistance [phone]  [facility] Billing [phone]  Central Billing Office, ealth: [phone]  [facility] Billing [phone]  Medical Records [phone]  [place] Patient Bill of Rights [email or web address]       MENTAL HEALTH CRISIS RESOURCES:  For a emergency help, please call 911 or go to the nearest Emergency Department.     Emergency Walk-In Options:   EmPATH Unit @ [facility] ([place]): [phone] - Specialized mental health emergency area designed to be calming  [facility] ([place]): [phone]  [facility] ([place]): [phone]  [facility] ([place]): [phone]    [facility] Crisis Information:   [place]: [phone]  [place]: [phone]  [place] (WILLIE) - Adult: [phone]     Child: [phone]  [place] - Adult: [phone]     Child: [phone]  [place]: [phone]  List of all [facility] resources:   https://[place].gov/dhs/people-we-serve/adults/health-care/mental-health/resources/crisis-contacts.jsp    National Crisis Information:   National Suicide & Crisis Lifeline: Call 368        For online chat options, visit https://suicidepreventionlifeline.org/chat/  Poison Control Center: 7-860-256-1713  Poison Control Center: 4-602-483-4869  Trans Lifeline: 1-316.876.3763 - Hotline for transgender people of all ages  The [employer] Project: 8-076-168-4052 - Hotline for LGBT youth     For Non-Emergency Support:  "  Fast Tracker: Mental Health & Substance Use Disorder Resources -   https://www.fasttrackermn.org/       Again thank you for choosing [facility] MENTAL HEALTH & ADDICTION [facility] and please let us know how we can best partner with you to improve you and your family's health.    You may be receiving a survey regarding this appointment. We would love to have your feedback, both positive and negative. The survey is done by an external company, so your answers are anonymous.        Patient Education   Collaborative Care Psychiatry Service  What to Expect  Here's what to expect from your Collaborative Care Psychiatry Service (CCPS).   About CCPS  CCPS means 2 people work together to help you get better. You'll meet with a behavioral health clinician and a psychiatric doctor. A behavioral health clinician helps people with mental health problems by talking with them. A psychiatric doctor helps people by giving them medicine.  How it works  At every visit, you'll see the behavioral health clinician (BHC) first. They'll talk with you about how you're doing and teach you how to feel better.   Then you'll see the psychiatric doctor. This doctor can help you deal with troubling thoughts and feelings by giving you medicine. They'll make sure you know the plan for your care.   CCPS usually takes 3 to 6 visits. If you need more visits, we may have you start seeing a different psychiatric doctor for ongoing care.  If you have any questions or concerns, we'll be glad to talk with you.  About visits  Be open  At your visits, please talk openly about your problems. It may feel hard, but it's the best way for us to help you.  Cancelling visits  If you can't come to your visit, please call us right away at [phone]. If you don't cancel at least 24 hours (1 full day) before your visit, that's \"late cancellation.\"  Being late to visits  Being very late is the same as not showing up. You will be a \"no show\" if:  Your " appointment starts with a C, and you're more than 15 minutes late for a 30-minute (half hour) visit. This will also cancel your appointment with the psychiatric doctor.  Your appointment is with a psychiatric doctor only, and you're more than 15 minutes late for a 30-minute (half hour) visit.  Your appointment is with a psychiatric doctor only, and you're more than 30 minutes late for a 60-minute (full hour) visit.  If you cancel late or don't show up 2 times within 6 months, we may end your care.   Getting help between visits  If you need help between visits, you can call us Monday to Friday from 8 a.m. to 4:30 p.m. at [phone].  Emergency care  Call 911 or go to the nearest emergency department if your life or someone else's life is in danger.  Call 398 anytime to reach the national Suicide and Crisis hotline.  Medicine refills  To refill your medicine, call your pharmacy. You can also call [facility] [place] at [phone], Monday to Friday, 8 a.m. to 4:30 p.m. It can take 1 to 3 business days to get a refill.   Forms, letters, and tests  You may have papers to fill out, like FMLA, short-term disability, and workability. We can help you with these forms at your visits, but you must have an appointment. You may need more than 1 visit for this, to be in an intensive therapy program, or both.  Before we can give you medicine for ADHD, we may refer you to get tested for it or confirm it another way.  We may not be able to give you an emotional support animal letter.  We don't do mental health checks ordered by the court.   We don't do mental health testing, but we can refer you to get tested.   Thank you for choosing us for your care.  For informational purposes only. Not to replace the advice of your health care provider. Copyright   2022 [facility]. All rights reserved. [employer] [id number] - 12/22.

## 2023-12-18 ENCOUNTER — VIRTUAL VISIT (OUTPATIENT)
Dept: PSYCHIATRY | Facility: CLINIC | Age: 33
End: 2023-12-18
Payer: COMMERCIAL

## 2023-12-18 DIAGNOSIS — F39 MOOD DISORDER (H): Primary | ICD-10-CM

## 2023-12-18 DIAGNOSIS — F41.1 GENERALIZED ANXIETY DISORDER: ICD-10-CM

## 2023-12-18 DIAGNOSIS — F43.10 POSTTRAUMATIC STRESS DISORDER: ICD-10-CM

## 2023-12-18 PROCEDURE — 99215 OFFICE O/P EST HI 40 MIN: CPT | Mod: 95 | Performed by: NURSE PRACTITIONER

## 2023-12-18 RX ORDER — BUPROPION HYDROCHLORIDE 150 MG/1
150 TABLET ORAL EVERY MORNING
Qty: 30 TABLET | Refills: 0 | Status: SHIPPED | OUTPATIENT
Start: 2023-12-18 | End: 2024-01-15

## 2023-12-18 RX ORDER — BUPROPION HYDROCHLORIDE 300 MG/1
300 TABLET ORAL EVERY MORNING
Qty: 90 TABLET | Refills: 0 | Status: SHIPPED | OUTPATIENT
Start: 2023-12-18 | End: 2024-01-29

## 2023-12-18 RX ORDER — LAMOTRIGINE 200 MG/1
200 TABLET ORAL AT BEDTIME
Qty: 90 TABLET | Refills: 0 | Status: SHIPPED | OUTPATIENT
Start: 2023-12-18 | End: 2024-01-29

## 2023-12-18 RX ORDER — PROPRANOLOL HYDROCHLORIDE 10 MG/1
TABLET ORAL
Qty: 60 TABLET | Refills: 0 | Status: SHIPPED | OUTPATIENT
Start: 2023-12-18 | End: 2024-01-15

## 2023-12-18 RX ORDER — ARIPIPRAZOLE 5 MG/1
5 TABLET ORAL DAILY
Qty: 90 TABLET | Refills: 0 | Status: SHIPPED | OUTPATIENT
Start: 2023-12-18 | End: 2024-03-11

## 2023-12-18 ASSESSMENT — PAIN SCALES - GENERAL: PAINLEVEL: NO PAIN (0)

## 2023-12-18 ASSESSMENT — ENCOUNTER SYMPTOMS
RESPIRATORY NEGATIVE: 1
MUSCULOSKELETAL NEGATIVE: 1
SEIZURES: 0
HEARTBURN: 0
CONSTIPATION: 0
DIARRHEA: 0
DYSURIA: 0
SORE THROAT: 0
CARDIOVASCULAR NEGATIVE: 1

## 2023-12-18 NOTE — PATIENT INSTRUCTIONS
Treatment plan today   Follow up in 4 weeks (or sooner as needed)  Medication changes   INCREASE Wellbutrin XL to 450mg once a day   Abilify (aripriprazole) 5mg a day   Lamictal 200mg a day   CHANGE Propranolol 10mg tab, take 1 tab every morning, Take 1-2 tabs up to twice daily AS NEEDED anxiety   Communication  Call the psychiatric nurse line with medication questions or concerns at [phone] or [phone].  Paperless Transaction Managementhart may be used to communicate with your care team, but this is not intended to be used for emergencies.  You can call the above number to make appointments, leave a message with our nursing team, and inquire about any mental health referrals I have placed.  Please call your pharmacy to request a refill of your medications listed above if needed between appointments.   Safety Plan - see below for crisis resources   Call or text 988 for mental health crisis.   Call 911 or use ER for potentially life-threatening situations.     MERRY Ortega, CNP, PMHNP  Collaborative Care Psychiatry Service (CCPS)    [facility]         RESOURCES     Crisis Resources  For emergency help, please call 911 or go to the nearest Emergency Department.     Emergency Walk-In Options:   EmPATH Unit @ [facility] ([place]): [phone] - Specialized mental health emergency area designed to be calming  [facility] ([place]): [phone]  [facility] ([place]): [phone]  [facility]): [phone]    County Crisis Information:   [place]: [phone]  [place]: [phone]  [place] (COPE) - Adult: [phone]     Child: [phone]  [place] - Adult: [phone]     Child: [phone]  [place]: [phone]  List of all [facility] resources:   https://[place].gov/dhs/people-we-serve/adults/health-care/mental-health/resources/crisis-contacts.jsp    National Crisis Information:   National Suicide & Crisis Lifeline: Call 538   For online chat  options, visit https://suicidepreventionlifeline.org/chat/  Poison Control Center: 1-070-669-3452  Poison Control Center: 4-575-113-9823  Trans Lifeline: 1-649.558.2677 - Hotline for transgender people of all ages  The Leoncio Project: 3-869-517-5167 - Hotline for LGBT youth     For Non-Emergency Support:   Fast Tracker: Mental Health & Substance Use Disorder Resources -   https://www.MamboCar.org/    Additional Resources  Financial Assistance [phone]  [facility] Billing [phone]  Central Billing Office, ealth: [phone]  [place] Billing [phone]  Medical Records [phone]  [place] Patient Bill of Rights [email or web address]         Patient Education   Collaborative Care Psychiatry Service  What to Expect  Here's what to expect from your Collaborative Care Psychiatry Service (CCPS).   About CCPS  CCPS means 2 people work together to help you get better. You'll meet with a behavioral health clinician and a psychiatric doctor. A behavioral health clinician helps people with mental health problems by talking with them. A psychiatric doctor helps people by giving them medicine.  How it works  At every visit, you'll see the behavioral health clinician (BHC) first. They'll talk with you about how you're doing and teach you how to feel better.   Then you'll see the psychiatric doctor. This doctor can help you deal with troubling thoughts and feelings by giving you medicine. They'll make sure you know the plan for your care.   CCPS usually takes 3 to 6 visits. If you need more visits, we may have you start seeing a different psychiatric doctor for ongoing care.  If you have any questions or concerns, we'll be glad to talk with you.  About visits  Be open  At your visits, please talk openly about your problems. It may feel hard, but it's the best way for us to help you.  Cancelling visits  If you can't come to your visit, please call us  "right away at [phone]. If you don't cancel at least 24 hours (1 full day) before your visit, that's \"late cancellation.\"  Being late to visits  Being very late is the same as not showing up. You will be a \"no show\" if:  Your appointment starts with a [facility], and you're more than 15 minutes late for a 30-minute (half hour) visit. This will also cancel your appointment with the psychiatric doctor.  Your appointment is with a psychiatric doctor only, and you're more than 15 minutes late for a 30-minute (half hour) visit.  Your appointment is with a psychiatric doctor only, and you're more than 30 minutes late for a 60-minute (full hour) visit.  If you cancel late or don't show up 2 times within 6 months, we may end your care.   Getting help between visits  If you need help between visits, you can call us Monday to Friday from 8 a.m. to 4:30 p.m. at [phone].  Emergency care  Call 911 or go to the nearest emergency department if your life or someone else's life is in danger.  Call 988 anytime to reach the national Suicide and Crisis hotline.  Medicine refills  To refill your medicine, call your pharmacy. You can also call [facility]'s Behavioral Access at [phone], Monday to Friday, 8 a.m. to 4:30 p.m. It can take 1 to 3 business days to get a refill.   Forms, letters, and tests  You may have papers to fill out, like FMLA, short-term disability, and workability. We can help you with these forms at your visits, but you must have an appointment. You may need more than 1 visit for this, to be in an intensive therapy program, or both.  Before we can give you medicine for ADHD, we may refer you to get tested for it or confirm it another way.  We may not be able to give you an emotional support animal letter.  We don't do mental health checks ordered by the court.   We don't do mental health testing, but we can refer you to get tested.   Thank you for choosing us for your care.  For informational " purposes only. Not to replace the advice of your health care provider. Copyright   2022 Health system. All rights reserved. [place] [id number] - 12/22.

## 2023-12-18 NOTE — PROGRESS NOTES
"Virtual Visit Details    Type of service:  Video Visit     Originating Location (pt. Location): {video visit patient location:403059::\"Home\"}  {PROVIDER LOCATION On-site should be selected for visits conducted from your clinic location or adjoining [facility] hospital, academic office, or other nearby [facility] building. Off-site should be selected for all other provider locations, including home:817245}  Distant Location (provider location):  {virtual location provider:908917}  Platform used for Video Visit: {Virtual Visit Platforms:458938::\"mo9 (moKredit)\"}  "

## 2023-12-18 NOTE — NURSING NOTE
Is the patient currently in the state of [place]? YES    Visit mode:VIDEO    If the visit is dropped, the patient can be reconnected by: VIDEO VISIT: Text to cell phone:   Telephone Information:   Mobile [phone]       Will anyone else be joining the visit? NO  (If patient encounters technical issues they should call [phone]    How would you like to obtain your AVS? MyChart    Are changes needed to the allergy or medication list? No    Reason for visit: RECHECK    Татьяна LOVETT

## 2023-12-18 NOTE — PROGRESS NOTES
Virtual Visit Details    Type of service:  Video Visit     Originating Location (pt. Location): Home    Distant Location (provider location):  Off-site  Platform used for Video Visit: [facility] [place] [address], [place], [address]  [place] [address]  [phone]  [phone]   2023  MERRY Shane CNP  Mode of Visit: Telemedicine Visit: The patient's condition can be safely assessed and treated via synchronous audio and visual telemedicine encounter.     [facility] ([facility])  Psychiatric Evaluation    IDENTIFYING DATA   Name: Analia Jerry   Preferred name: Analia    : 1990 / 33 year old    [facility] Care Psychiatry Service ([facility]) short term psychiatric stabilization model of care reviewed with patient/guardian who verbalized understanding.    Analia is a , White, female who currently lives in [place] [address] with Spouse/Partner and 2 step sons . 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   Attended the session alone.     PCP: Pranav Ware MD   Psychotherapist: [facility]   CHIEF COMPLAINT    [facility]PS Transfer of Care - Josee SOUSA  HISTORY OF PRESENT ILLNESS   Patient has a history of Mood disorder, MAXIM and PTSD. They were last seen by Provider Josee Byrd 2023 and changes included Propranolol taken 10mg once every morning and 5mg in the afternoon with additional 5-10mg as needed for anxiety (for akathisia and restlessness). She continued Abilify (aripriprazole) 5mg once a day, Wellbutrin XL 300mg a day, Lamictal 200mg once a day.     She reports the side effects are better with the propranolol. She was using propranolol for anxiety and it was helping and now that she is taking it for side effects doesn't seen as effective. Trying to use skills and is still struggling with feelings of breathing. She had been on a lower dose of  abilify, but was bumped up, they had considered decreasing the dose because of side effects, but improved with propranolol. Irrational thoughts, thinking about the future. History of trauma and left job she was in 1 year ago from [employer] to assessments.    In discussing trauma versus MAXIM, versus seasonal impact on mood., she is having irrational thoughts about people dying. Random feelings of fear of someone dying and can't breathe in that moment. Mom is not well, she doesn't keep Analia very updated on her kidney disease. Also worried about her dogs health.    Sleeps: No issues falling asleep, waking up and having trouble falling back asleep, 2 times a night, often random or from dogs. She had sleep apnea diagnosed, had lost weight.   Appetite:comfort/stress eating   Suicidal Ideation: Denies  Medication side effects:  akathisia   Medication adherence: Reports good med adherence.        11/2/2023    11:25 AM 8/7/2023    10:21 AM 7/24/2023    10:40 AM   PHQ   PHQ-9 Total Score 3 6 5   Q9: Thoughts of better off dead/self-harm past 2 weeks Not at all Not at all Not at all         11/23/2023     9:42 PM 9/6/2023    11:40 AM 8/6/2023     8:26 PM   MAXIM-7 SCORE   Total Score 11 (moderate anxiety) 9 (mild anxiety) 13 (moderate anxiety)   Total Score 11 9 13     The following assessments were completed by patient for this visit:  PROMIS 10-Global Health (only subscores and total score):       3/14/2022    12:57 PM 7/21/2022     9:33 AM 2/6/2023    12:41 PM 5/9/2023     8:57 PM 9/6/2023    11:43 AM 12/18/2023     1:21 PM   PROMIS-10 Scores Only   Global Mental Health Score 6    6 12 10 11 8 9   Global Physical Health Score 8    8 13 12 15 15 15   PROMIS TOTAL - SUBSCORES 14    14 25 22 26 23 24     MEDICATIONS   Medications Prior to Appointment  Current Outpatient Medications   Medication    ARIPiprazole (ABILIFY) 5 MG tablet    buPROPion (WELLBUTRIN XL) 300 MG 24 hr tablet    cyclobenzaprine (FLEXERIL) 5 MG tablet     Fremanezumab-vfrm (AJOVY) 225 MG/1.5ML SOAJ    lamoTRIgine (LAMICTAL) 200 MG tablet    levothyroxine (SYNTHROID/LEVOTHROID) 200 MCG tablet    metoclopramide (REGLAN) 10 MG tablet    nitroFURantoin macrocrystal (MACRODANTIN) 100 MG capsule    norethindrone-ethinyl estradiol (ORTHO-NOVUM 1/35, 28,) 1-35 MG-MCG tablet    propranolol (INDERAL) 10 MG tablet    rOPINIRole (REQUIP) 0.5 MG tablet    SUMAtriptan (IMITREX) 100 MG tablet    diclofenac (VOLTAREN) 75 MG EC tablet    omeprazole (PRILOSEC) 20 MG DR capsule     Current Facility-Administered Medications   Medication    Botulinum Toxin Type A (BOTOX) 200 units injection 150 Units        Psychotropic medications at evaluation 12/18/2023   Abilify (aripriprazole) 5mg once a day   Propranolol 10mg take 1/2-1 tab 2 times daily as needed   Wellbutrin XL 300mg a day   Lamictal 200mg once a day     Past Psychotropic Medication Trials  Escitalopram up to 20 mg   viibryd - significant SE  Lurasidone  (Latuda) - akathisia   Wellbutrin XL 450mg (was taking with lamictal)       reviewed - no record of controlled substances prescribed  REVIEW OF SYMPTOMS   Review of Systems   Constitutional:  Positive for malaise/fatigue.   HENT:  Negative for hearing loss and sore throat.    Eyes:         Wears glasses   Respiratory: Negative.     Cardiovascular: Negative.    Gastrointestinal:  Negative for constipation, diarrhea and heartburn.   Genitourinary: Negative.  Negative for dysuria and urgency.   Musculoskeletal: Negative.    Neurological:  Negative for seizures.   Endo/Heme/Allergies:  Positive for environmental allergies.   Head Injuries: No  Contraception: No LMP recorded. (Menstrual status: Birth Control).  Pregnancy status: Not pregnant   PAST MEDICAL HISTORY      Active Ambulatory Problems     Diagnosis Date Noted    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    CARDIOVASCULAR SCREENING; LDL GOAL LESS THAN 160 10/31/2010    Mechanical low back pain 06/16/2012    Keloid scar 09/20/2012     ASCUS of cervix with negative high risk HPV 01/26/2017    Common wart 06/02/2016    Hypothyroidism due to Hashimoto's thyroiditis 01/26/2017    High triglycerides 04/03/2018    Elevated liver enzymes 12/28/2019    Class 1 obesity due to excess calories with body mass index (BMI) of 32.0 to 32.9 in adult, unspecified whether serious comorbidity present 07/02/2020    Mood disorder (H24)     FRANK (obstructive sleep apnea) - mild (AHI 7) 08/24/2020    Moderate episode of recurrent major depressive disorder (H) 03/04/2021    Fatigue, unspecified type 04/05/2021    Posttraumatic stress disorder 03/15/2022    Recurrent major depression in partial remission (H24) 07/25/2022    Transplant donor evaluation 09/14/2022    Anxiety 02/15/2023    Generalized anxiety disorder 07/10/2023     Resolved Ambulatory Problems     Diagnosis Date Noted    Insomnia 03/31/2010    Moderate major depression (H) 03/31/2010    Major depressive disorder, single episode, moderate (H) 08/26/2010    Mild major depression (H) 08/15/2011    Low back pain 06/28/2012    Adjustment disorder with mixed anxiety and depressed mood 06/11/2015    Depression with anxiety 11/06/2015    Plantar warts 06/02/2016    Acute left-sided low back pain with left-sided sciatica 03/22/2019    Acute gallstone pancreatitis 12/28/2019    Morbid obesity (H) 10/28/2020     Past Medical History:   Diagnosis Date    Juarez's esophagus determined by biopsy 2022    Depressive disorder     Gallstones 12/28/2019    H/O colposcopy with cervical biopsy 03/23/2017    Hashimoto's disease     Headache(784.0)     Papanicolaou smear of cervix with atypical squamous cells of undetermined significance (ASC-US) 12/03/2015      Past Surgical History:   Procedure Laterality Date    CHOLECYSTECTOMY  12/2019    COLONOSCOPY N/A 12/16/2015    Procedure: COLONOSCOPY;  Surgeon: Duane, William Charles, MD;  Location: [facility] OR    COLONOSCOPY N/A 08/11/2022    Procedure: COLONOSCOPY, WITH POLYPECTOMY AND  BIOPSY;  Surgeon: Aurora Paez DO;  Location: [facility]    COLONOSCOPY WITH CO2 INSUFFLATION N/A 12/16/2015    Procedure: COLONOSCOPY WITH CO2 INSUFFLATION;  Surgeon: Duane, William Charles, MD;  Location: [facility]    COLONOSCOPY WITH CO2 INSUFFLATION N/A 08/11/2022    Procedure: COLONOSCOPY, WITH CO2 INSUFFLATION;  Surgeon: Aurora Paez DO;  Location: [facility]    COMBINED ESOPHAGOSCOPY, GASTROSCOPY, DUODENOSCOPY (EGD) WITH CO2 INSUFFLATION N/A 08/11/2022    Procedure: ESOPHAGOGASTRODUODENOSCOPY, WITH CO2 INSUFFLATION;  Surgeon: Aurora Paez DO;  Location: [facility]    ESOPHAGOSCOPY, GASTROSCOPY, DUODENOSCOPY (EGD), COMBINED N/A 08/11/2022    Procedure: ESOPHAGOGASTRODUODENOSCOPY, WITH BIOPSY;  Surgeon: Aurora Paez DO;  Location: [facility]     Allergies:   Allergies   Allergen Reactions    Nkda [No Known Drug Allergy]      SOCIAL HISTORY   Information Per Josee SOUSA Psychiatric Evaluation Dated 6/26/2023  Psychiatric History:   Hospitalizations: None  History of Commitment? No   Past Treatment: counseling, medication(s) from physician / PCP, primary care behavioral health provider and psychiatry  History of Suicidal Ideation: yes  Suicide Attempts: No   History of Violence/Aggression: No   Self-injurious Behavior: brief history of cutting as adolescent  Electroconvulsive Therapy (ECT) or Transcranial Magnetic Stimulation (TMS): No   GeneSight Genetic Testing: No      Past psychotropic medication trials include but are not limited to:   Escitalopram up to 20 mg   viibryd - significant SE        Substance Use History:  Current Use of Drugs/Alcohol: denies all  Past Use of Drugs/Alcohol: denies significant  Patient reports no problems as a result of their drinking / drug use.   Patient has not received chemical dependency treatment in the past  Recovery Programming Involvement: Not Applicable     Tobacco use: No  Caffeine:  Yes  1 sodas/day - cut down in last couple months.      Based on the clinical  interview, there  are not indications of drug or alcohol abuse. Continue to monitor.   Discussed effect of substance use on overall health.           Social History:   Born in [place], [place] and raised in [place], [place].  Parents not  but still live together  Siblings:  Two half sisters, full biobrother   in October.  Childhood: Yes intact home with all current basic needs being met.  Highest education level was college graduate.   Employment Status: full time -  [employer].  Relationship status: Together for 9 years. Safe in relationship.  Current Living situation:  , and 2 stepsons are with them on weekends, longer in summer.  Feels safe at home.  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    Family History:   Patient reported family history includes:   Family History     Mental Illness History: Yes: Per EPIC Electronic Medical Record, siblings: anxiety, depression, bipolar disorder  Substance Abuse History: Yes: father: alcohol use  Suicide History: Yes: brother 2020  Medications that helped: Unknown   Family History   Problem Relation Age of Onset    Hyperlipidemia Mother     Thyroid Disease Mother     Kidney Disease Mother     Hyperlipidemia Father     Substance Abuse Sister     Substance Abuse Sister     Depression Brother     No Known Problems Maternal Grandmother     Myocardial Infarction Maternal Grandfather     No Known Problems Paternal Grandmother     No Known Problems Paternal Grandfather     Breast Cancer Cousin     C.A.D. No family hx of     Diabetes No family hx of     Breast  "Cancer No family hx of     Cancer - colorectal No family hx of     Anesthesia Reaction No family hx of     Blood Disease No family hx of      VITALS / LABS   There were no vitals taken for this visit.   Most recent laboratory results reviewed and pertinent results include:   Recent Labs   Lab Test 05/16/23  1433 08/05/19  1214 11/17/15  1537   WBC 6.9   < > 7.9   HGB 12.7   < > 12.6   HCT 36.0   < > 35.9   MCV 89   < > 87      < > 248   ANEU  --   --  4.0    < > = values in this interval not displayed.     Recent Labs   Lab Test 11/07/23  1056 10/06/22  0710    139   POTASSIUM 4.8 3.5   CHLORIDE 107 105   CO2 23 21*   GLC 84 95   KIKI 9.5 9.4   BUN 15.9 16.3   CR 1.01* 1.11*   GFRESTIMATED 75 67   ALBUMIN  --  4.4   PROTTOTAL  --  7.4   AST  --  18   ALT  --  26   ALKPHOS  --  63   BILITOTAL  --  0.3     Recent Labs   Lab Test 11/07/23  1056   CHOL 209*   *   HDL 49*   TRIG 214*   A1C 4.9     Recent Labs   Lab Test 05/16/23  1433 10/11/21  1205 07/07/21  1304   TSH 1.52   < > 0.03*   T4  --   --  1.26    < > = values in this interval not displayed.     Recent Labs   Lab Test 05/16/23  1433 03/23/22  1147   PRATIMA 28 19   IRON  --  131   B12  --  280       MENTAL STATUS EXAMINATION   Appearance: Awake, alert, adequately groomed, appeared stated age  Behavior: cooperative and pleasant  Eye Contact:  intact  Gait and motor coordination: normal   Psychomotor Behavior:  no evidence of tardive dyskinesia, dystonia, or tics  Attention and Concentration: Good  Speech: Clear, coherent, regular rate, rhythm and volume  Mood:  \"anxious\"  Affect:  blunted, tearful  Associations:  no loose associations  Orientation: oriented to time, place and person  Thought process:  logical, linear, and goal-directed  Thought content: no evidence of suicidal ideation or homicidal ideation  Does not appear to be responding to internal stimuli.    Memory: Grossly intact as assessed by interview. Not formally assessed  Fund of " knowledge: Average  Insight: Good  Judgement: Good   DIAGNOSIS   DSM    Generalized anxiety disorder  Mood disorder (H24)  Posttraumatic stress disorder     Medical Comorbidities: has Familial hematuria; CARDIOVASCULAR SCREENING; LDL GOAL LESS THAN 160; Mechanical low back pain; Keloid scar; ASCUS of cervix with negative high risk HPV; Common wart; Hypothyroidism due to Hashimoto's thyroiditis; High triglycerides; Elevated liver enzymes; Class 1 obesity due to excess calories with body mass index (BMI) of 32.0 to 32.9 in adult, unspecified whether serious comorbidity present; Mood disorder (H24); FRANK (obstructive sleep apnea) - mild (AHI 7); Moderate episode of recurrent major depressive disorder (H); Fatigue, unspecified type; Posttraumatic stress disorder; Recurrent major depression in partial remission (H24); Transplant donor evaluation; Anxiety; and Generalized anxiety disorder on their problem list.  ASSESSMENT   Safety: Today Analia reports NO Si. In addition, they have notable risk factors for self-harm, including anxiety. However, risk is mitigated by A positive relationship with his/her clinical medical and/or mental health providers, commitment to family. Therefore, based on all available evidence including the factors cited above, Analia does not appear to be at imminent risk for self-harm, does not meet criteria for a 72-hr hold, and therefore remains appropriate for ongoing outpatient level of care. Local community safety resources reviewed as needed and/or attached to AVS for patient to use if needed.  Psychoeducation:   Medication side effects and alternatives reviewed.   Medication Education: FIRST GENERATION ANTIPSYCHOTIC/ SECOND GENERATION ANTIPSYCHOTIC USE:  Atypical need for cardiometabolic monitoring with medication- B/P, weight, blood sugar, cholesterol.  Monitor for abnormal movements you cannot control (neck twisting, legs jerking, mouth movements) and call nurse line for updates  [phone].  Reviewed recommended treatment, risks, benefits, and alternatives, coordination of care with other clinicians, prognosis, and medication education. Health promotion activities recommended and reviewed today, abstaining from substance use.   All questions addressed.  Assent for medications was provided by patient/guardian today.     Formulation/MDM: Today Analia presents to [facility] with higher anxiety and depression symptoms for the last few months. Unclear trigger and may be related to trauma or seasons. In reviewing history. Wellbutrin XL was decreased in Aug, the next two months noted worsening depression. Lurasidone (Latuda) was tried for 1 week, then abilify was started and was partially helpful. We discussed returning back to Wellbutrin XL 450mg a day for anxiety and depression and offering higher dose of Propranolol for AS NEEDED anxiety. Patient is interested in plan. Discussed goals of returning to PCP and she has reviewed with her new PCP the abilify and they would be comfortable to take them back for further management.   PLAN   Follow up in 4 weeks  Patient Status: Patient will continue to be seen for ongoing consultation and stabilization.  Medications  INCREASE Wellbutrin XL to 450mg once a day   Abilify (aripriprazole) 5mg a day   Lamictal 200mg a day   CHANGE Propranolol 10mg tab, take 1 tab every morning, Take 1-2 tabs up to twice daily AS NEEDED anxiety   Referrals: Continue therapy   Labs/Orders: None at this time  Continue all other treatments (including medications) per primary care provider and/or specialists, follow up with primary care provider as planned or for acute medical concerns.    SIGNED  MERRY Ortega, CNP, PMHNP  [facility] Psych[facility]ry Service ([facility])  ADMINISTRATIVE BILLING   40 minutes were spent performing chart review, patient assessment, documentation, and case management on the date of service.    Video/Phone Start Time: 6703   Video/Phone End Time: 5757    Episode  Disclaimer: This note consists of symbols derived from keyboarding, dictation and/or voice recognition software. As a result, there may be errors in the script that have gone undetected. Please consider this when interpreting information found in this chart.

## 2024-01-29 ENCOUNTER — VIRTUAL VISIT (OUTPATIENT)
Dept: PSYCHIATRY | Facility: CLINIC | Age: 34
End: 2024-01-29
Payer: COMMERCIAL

## 2024-01-29 DIAGNOSIS — F41.1 GENERALIZED ANXIETY DISORDER: ICD-10-CM

## 2024-01-29 DIAGNOSIS — F39 MOOD DISORDER (H): ICD-10-CM

## 2024-01-29 PROCEDURE — 99215 OFFICE O/P EST HI 40 MIN: CPT | Mod: 95 | Performed by: NURSE PRACTITIONER

## 2024-01-29 RX ORDER — BUPROPION HYDROCHLORIDE 300 MG/1
300 TABLET ORAL EVERY MORNING
Qty: 90 TABLET | Refills: 0 | Status: SHIPPED | OUTPATIENT
Start: 2024-01-29 | End: 2024-03-11

## 2024-01-29 RX ORDER — LAMOTRIGINE 200 MG/1
200 TABLET ORAL AT BEDTIME
Qty: 90 TABLET | Refills: 0 | Status: SHIPPED | OUTPATIENT
Start: 2024-01-29 | End: 2024-03-11

## 2024-01-29 RX ORDER — PROPRANOLOL HYDROCHLORIDE 10 MG/1
TABLET ORAL
Qty: 180 TABLET | Refills: 0 | Status: SHIPPED | OUTPATIENT
Start: 2024-01-29 | End: 2024-03-11

## 2024-01-29 RX ORDER — ARIPIPRAZOLE 5 MG/1
7.5 TABLET ORAL DAILY
Qty: 45 TABLET | Refills: 1 | Status: SHIPPED | OUTPATIENT
Start: 2024-01-29 | End: 2024-03-11

## 2024-01-29 ASSESSMENT — ANXIETY QUESTIONNAIRES
4. TROUBLE RELAXING: MORE THAN HALF THE DAYS
GAD7 TOTAL SCORE: 11
7. FEELING AFRAID AS IF SOMETHING AWFUL MIGHT HAPPEN: SEVERAL DAYS
2. NOT BEING ABLE TO STOP OR CONTROL WORRYING: MORE THAN HALF THE DAYS
7. FEELING AFRAID AS IF SOMETHING AWFUL MIGHT HAPPEN: SEVERAL DAYS
8. IF YOU CHECKED OFF ANY PROBLEMS, HOW DIFFICULT HAVE THESE MADE IT FOR YOU TO DO YOUR WORK, TAKE CARE OF THINGS AT HOME, OR GET ALONG WITH OTHER PEOPLE?: VERY DIFFICULT
GAD7 TOTAL SCORE: 11
1. FEELING NERVOUS, ANXIOUS, OR ON EDGE: MORE THAN HALF THE DAYS
IF YOU CHECKED OFF ANY PROBLEMS ON THIS QUESTIONNAIRE, HOW DIFFICULT HAVE THESE PROBLEMS MADE IT FOR YOU TO DO YOUR WORK, TAKE CARE OF THINGS AT HOME, OR GET ALONG WITH OTHER PEOPLE: VERY DIFFICULT
5. BEING SO RESTLESS THAT IT IS HARD TO SIT STILL: SEVERAL DAYS
GAD7 TOTAL SCORE: 11
3. WORRYING TOO MUCH ABOUT DIFFERENT THINGS: MORE THAN HALF THE DAYS
6. BECOMING EASILY ANNOYED OR IRRITABLE: SEVERAL DAYS

## 2024-01-29 NOTE — PROGRESS NOTES
Virtual Visit Details    Type of service:  Video Visit     Originating Location (pt. Location): Home    Distant Location (provider location):  Off-site  Platform used for Video Visit: [facility] [place] [address], [place], [address]  [place] [address]  [phone]     Mode of Visit: Telemedicine Visit: The patient's condition can be safely assessed and treated via synchronous audio and visual telemedicine encounter.     Collaborative Care Psychiatry Service (CCPS)  Psychiatric Progress Note    IDENTIFYING DATA   Name: Analia Jerry   Preferred name: Analia    : 1990 / 33 year old    Collaborative Care Psychiatry Service (CCPS) short term psychiatric stabilization model of care reviewed with patient/guardian who verbalized understanding.    Analia is a , White, female who currently lives in [place] [address] with Spouse/Partner and 2 step sons . 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   Attended the session alone.     PCP: Pranav Ware MD   Psychotherapist: [facility]   [facility]IE[facility] COMPLAINT    [facility]  HISTORY OF PRESENT ILLNESS   Last seen 23 increased Bupropion XL to 450mg a day, continued abilify, lamictal and propranolol  Anxiety has improved and feels she is doing much better.   Depression has been better for a little but then feels like it leveled up.    had noted she has had a flat affect since the medication change.   Energy level was better then now back to where it was, low drive.   Propranolol is taken 10mg in the am and 10mg in the evening, sometimes will have an additional tab as needed thorough the day.   Restlessness has been better  Sleep has been good, difficulty getting up in the am.   Less irrational thoughts.   Unsure if this is a seasonal depression.   Denies SI  Work is going well.   Suicidal Ideation:  Denies  Medication side effects:  akathisia   Medication adherence: Reports good med adherence.        11/2/2023    11:25 AM 8/7/2023    10:21 AM 7/24/2023    10:40 AM   PHQ   PHQ-9 Total Score 3 6 5   Q9: Thoughts of better off dead/self-harm past 2 weeks Not at all Not at all Not at all         1/29/2024     9:40 AM 11/23/2023     9:42 PM 9/6/2023    11:40 AM   MAXIM-7 SCORE   Total Score 11 (moderate anxiety) 11 (moderate anxiety) 9 (mild anxiety)   Total Score 11 11 9     The following assessments were completed by patient for this visit:  PROMIS 10-Global Health (only subscores and total score):       3/14/2022    12:57 PM 7/21/2022     9:33 AM 2/6/2023    12:41 PM 5/9/2023     8:57 PM 9/6/2023    11:43 AM 12/18/2023     1:21 PM   PROMIS-10 Scores Only   Global Mental Health Score 6    6 12 10 11 8 9   Global Physical Health Score 8    8 13 12 15 15 15   PROMIS TOTAL - SUBSCORES 14    14 25 22 26 23 24     MEDICATIONS   Medications Prior to Appointment  Current Outpatient Medications   Medication    ARIPiprazole (ABILIFY) 5 MG tablet    buPROPion (WELLBUTRIN XL) 150 MG 24 hr tablet    buPROPion (WELLBUTRIN XL) 300 MG 24 hr tablet    cyclobenzaprine (FLEXERIL) 5 MG tablet    diclofenac (VOLTAREN) 75 MG EC tablet    Fremanezumab-vfrm (AJOVY) 225 MG/1.5ML SOAJ    lamoTRIgine (LAMICTAL) 200 MG tablet    levothyroxine (SYNTHROID/LEVOTHROID) 200 MCG tablet    metoclopramide (REGLAN) 10 MG tablet    nitroFURantoin macrocrystal (MACRODANTIN) 100 MG capsule    norethindrone-ethinyl estradiol (ORTHO-NOVUM 1/35, 28,) 1-35 MG-MCG tablet    omeprazole (PRILOSEC) 20 MG DR capsule    propranolol (INDERAL) 10 MG tablet    rOPINIRole (REQUIP) 0.5 MG tablet    SUMAtriptan (IMITREX) 100 MG tablet     Current Facility-Administered Medications   Medication    Botulinum Toxin Type A (BOTOX) 200 units injection 150 Units      Psychotropic medications at evaluation 12/18/2023   Abilify (aripriprazole) 5mg once a day   Propranolol 10mg  take 1/2-1 tab 2 times daily as needed   Wellbutrin XL 300mg a day   Lamictal 200mg once a day     Past Psychotropic Medication Trials  Escitalopram up to 20 mg   viibryd - significant SE  Lurasidone  (Latuda) - akathisia   Wellbutrin XL 450mg (was taking with lamictal)       reviewed - no record of controlled substances prescribed  PAST MEDICAL HISTORY      Active Ambulatory Problems     Diagnosis Date Noted    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    CARDIOVASCULAR SCREENING; LDL GOAL LESS THAN 160 10/31/2010    Mechanical low back pain 06/16/2012    Keloid scar 09/20/2012    ASCUS of cervix with negative high risk HPV 01/26/2017    Common wart 06/02/2016    Hypothyroidism due to Hashimoto's thyroiditis 01/26/2017    High triglycerides 04/03/2018    Elevated liver enzymes 12/28/2019    Class 1 obesity due to excess calories with body mass index (BMI) of 32.0 to 32.9 in adult, unspecified whether serious comorbidity present 07/02/2020    Mood disorder (H24)     FRANK (obstructive sleep apnea) - mild (AHI 7) 08/24/2020    Moderate episode of recurrent major depressive disorder (H) 03/04/2021    Fatigue, unspecified type 04/05/2021    Posttraumatic stress disorder 03/15/2022    Recurrent major depression in partial remission (H24) 07/25/2022    Transplant donor evaluation 09/14/2022    Anxiety 02/15/2023    Generalized anxiety disorder 07/10/2023     Resolved Ambulatory Problems     Diagnosis Date Noted    Insomnia 03/31/2010    Moderate major depression (H) 03/31/2010    Major depressive disorder, single episode, moderate (H) 08/26/2010    Mild major depression (H) 08/15/2011    Low back pain 06/28/2012    Adjustment disorder with mixed anxiety and depressed mood 06/11/2015    Depression with anxiety 11/06/2015    Plantar warts 06/02/2016    Acute left-sided low back pain with left-sided sciatica 03/22/2019    Acute gallstone pancreatitis 12/28/2019    Morbid obesity (H) 10/28/2020     Past Medical History:   Diagnosis  "Date    Juarez's esophagus determined by biopsy 2022    Depressive disorder     Gallstones 12/28/2019    H/O colposcopy with cervical biopsy 03/23/2017    Hashimoto's disease     Headache(784.0)     Papanicolaou smear of cervix with atypical squamous cells of undetermined significance (ASC-US) 12/03/2015     Allergies:   Allergies   Allergen Reactions    Nkda [No Known Drug Allergy]        VITALS / LABS   There were no vitals taken for this visit.   Most recent laboratory results reviewed and pertinent results include:   Recent Labs   Lab Test 05/16/23  1433 08/05/19  1214 11/17/15  1537   WBC 6.9   < > 7.9   HGB 12.7   < > 12.6   HCT 36.0   < > 35.9   MCV 89   < > 87      < > 248   ANEU  --   --  4.0    < > = values in this interval not displayed.     Recent Labs   Lab Test 11/07/23  1056 10/06/22  0710    139   POTASSIUM 4.8 3.5   CHLORIDE 107 105   CO2 23 21*   GLC 84 95   KIKI 9.5 9.4   BUN 15.9 16.3   CR 1.01* 1.11*   GFRESTIMATED 75 67   ALBUMIN  --  4.4   PROTTOTAL  --  7.4   AST  --  18   ALT  --  26   ALKPHOS  --  63   BILITOTAL  --  0.3     Recent Labs   Lab Test 11/07/23  1056   CHOL 209*   *   HDL 49*   TRIG 214*   A1C 4.9     Recent Labs   Lab Test 05/16/23  1433 10/11/21  1205 07/07/21  1304   TSH 1.52   < > 0.03*   T4  --   --  1.26    < > = values in this interval not displayed.     Recent Labs   Lab Test 05/16/23  1433 03/23/22  1147   PRATIMA 28 19   IRON  --  131   B12  --  280       MENTAL STATUS EXAMINATION   Appearance: Awake, alert, adequately groomed, appeared stated age  Behavior: cooperative and pleasant  Eye Contact:  intact  Gait and motor coordination: normal   Psychomotor Behavior:  no evidence of tardive dyskinesia, dystonia, or tics  Attention and Concentration: Good  Speech: Clear, coherent, regular rate, rhythm and volume  Mood:  \"depressed\"  Affect:  flat, mood congruent  Associations:  no loose associations  Orientation: oriented to time, place and person  Thought " process:  logical, linear, and goal-directed  Thought content: no evidence of suicidal ideation or homicidal ideation  Does not appear to be responding to internal stimuli.    Memory: Grossly intact as assessed by interview. Not formally assessed  Fund of knowledge: Average  Insight: Good  Judgement: Good   DIAGNOSIS   DSM    Generalized anxiety disorder  Mood disorder (H24)  Posttraumatic stress disorder     Medical Comorbidities: has Familial hematuria; CARDIOVASCULAR SCREENING; LDL GOAL LESS THAN 160; Mechanical low back pain; Keloid scar; ASCUS of cervix with negative high risk HPV; Common wart; Hypothyroidism due to Hashimoto's thyroiditis; High triglycerides; Elevated liver enzymes; Class 1 obesity due to excess calories with body mass index (BMI) of 32.0 to 32.9 in adult, unspecified whether serious comorbidity present; Mood disorder (H24); FRANK (obstructive sleep apnea) - mild (AHI 7); Moderate episode of recurrent major depressive disorder (H); Fatigue, unspecified type; Posttraumatic stress disorder; Recurrent major depression in partial remission (H24); Transplant donor evaluation; Anxiety; and Generalized anxiety disorder on their problem list.  ASSESSMENT   Safety: Today Analia reports NO Si. In addition, they have notable risk factors for self-harm, including anxiety. However, risk is mitigated by A positive relationship with his/her clinical medical and/or mental health providers, commitment to family. Therefore, based on all available evidence including the factors cited above, Analia does not appear to be at imminent risk for self-harm, does not meet criteria for a 72-hr hold, and therefore remains appropriate for ongoing outpatient level of care. Local community safety resources reviewed as needed and/or attached to AVS for patient to use if needed.  Psychoeducation:   Medication side effects and alternatives reviewed.   Medication Education: FIRST GENERATION ANTIPSYCHOTIC/ SECOND GENERATION  ANTIPSYCHOTIC USE:  Atypical need for cardiometabolic monitoring with medication- B/P, weight, blood sugar, cholesterol.  Monitor for abnormal movements you cannot control (neck twisting, legs jerking, mouth movements) and call nurse line for updates [phone].  Reviewed recommended treatment, risks, benefits, and alternatives, coordination of care with other clinicians, prognosis, and medication education. Health promotion activities recommended and reviewed today, abstaining from substance use.   All questions addressed.  Assent for medications was provided by patient/guardian today.     Formulation/MDM: Today Analia presents to CCPS with higher anxiety and depression symptoms for the last few months. Unclear trigger and may be related to trauma or seasons. Propranolol has been helpful for anxiety. Ongoing depression, flat affect even with increased dose of Wellbutrin XL, will decrease dose as this may be the factor, but also reviewed mood stabiliziers and that she may be going through a depression cycle and to consider increasing abilify for lamictal. We decided on abilify as this is a lower therapuetic dose and has now toelrated side effects better with the increased propranolol.  She would like to go back to to PCP for the future, we will reassess and see if needed for long term psychiatry or if returning to [facility] when returning from [place].   PLAN   Follow up in 4 weeks  Patient Status: Patient will continue to be seen for ongoing consultation and stabilization.   Medications  DECREASE Wellbutrin XL to 300mg once a day   INCREASE Abilify (aripiprazole) to 7.5mg once a day   Lamictal 200mg a day   Propranolol 10mg tab, take 1 tab every morning, Take 1-2 tabs up to twice daily AS NEEDED anxiety   Referrals: Continue therapy   Labs/Orders: None at this time  Continue all other treatments (including medications) per primary care provider and/or specialists, follow up with primary care provider as planned or  for acute medical concerns.    SIGNED  MERRY Ortega, CNP, PMHNP  Collaborative Care Psychiatry Service (CCPS)  ADMINISTRATIVE BILLING   40 minutes were spent performing chart review, patient assessment, documentation, and case management on the date of service.    Video/Phone Start Time: 1336   Video/Phone End Time: 1405   Episode x  Disclaimer: This note consists of symbols derived from keyboarding, dictation and/or voice recognition software. As a result, there may be errors in the script that have gone undetected. Please consider this when interpreting information found in this chart.    Addendum: updated title to progress note and interval history of - 'last seen' and medication changes that occurred for further clarification. MERRY Shane CNP on 3/11/2024 at 12:24 PM

## 2024-01-29 NOTE — PROGRESS NOTES
"Virtual Visit Details    Type of service:  Video Visit     Originating Location (pt. Location): {video visit patient location:129207::\"Home\"}  {PROVIDER LOCATION On-site should be selected for visits conducted from your clinic location or adjoining [facility] hospital, academic office, or other nearby [facility] building. Off-site should be selected for all other provider locations, including home:[address]}  Distant Location (provider location):  {virtual location provider:783569}  Platform used for Video Visit: {Virtual Visit Platforms:513970::\"Pombai\"}    "

## 2024-01-29 NOTE — NURSING NOTE
Is the patient currently in the state of [place]? YES    Visit mode:VIDEO    If the visit is dropped, the patient can be reconnected by: VIDEO VISIT: Text to cell phone:   Telephone Information:   Mobile [phone]       Will anyone else be joining the visit? No  (If patient encounters technical issues they should call [phone])    How would you like to obtain your AVS? MyChart    Are changes needed to the allergy or medication list? No    Rooming Documentation: Assigned questionnaire(s) completed .    Reason for visit: RECHRACHELL Richards

## 2024-03-01 DIAGNOSIS — G25.81 RESTLESS LEGS SYNDROME (RLS): ICD-10-CM

## 2024-03-04 ENCOUNTER — MYC MEDICAL ADVICE (OUTPATIENT)
Dept: FAMILY MEDICINE | Facility: CLINIC | Age: 34
End: 2024-03-04
Payer: COMMERCIAL

## 2024-03-04 DIAGNOSIS — G25.81 RESTLESS LEGS SYNDROME (RLS): ICD-10-CM

## 2024-03-04 RX ORDER — ROPINIROLE 0.5 MG/1
TABLET, FILM COATED ORAL
Qty: 360 TABLET | Refills: 0 | Status: SHIPPED | OUTPATIENT
Start: 2024-03-04 | End: 2024-04-29

## 2024-03-04 RX ORDER — ROPINIROLE 0.5 MG/1
TABLET, FILM COATED ORAL
Qty: 360 TABLET | Refills: 0 | Status: SHIPPED | OUTPATIENT
Start: 2024-03-04 | End: 2024-03-04

## 2024-03-04 NOTE — TELEPHONE ENCOUNTER
Provider: see Callisiont message. Do you want to manage this medication for the patient? Refilled today, through other clinic, which patient is no longer seen at.     If you want to manage the medication, please resend the medication.     rOPINIRole (REQUIP) 0.5 MG pended along with pharmacy below.    Jenna Willis RN

## 2024-03-04 NOTE — LETTER
March 4, 2024    Analia Jerry  [address] [place] [address]    Dear Analia,     We recently received a refill request for Requip 0.5 mg.  We have refilled this for a one time *** day supply only because you are due for a:    *** office visit      Please call at your earliest convenience so that there will not be a delay with your future refills.          Thank you,   Your [facility] Team/***  [phone]

## 2024-03-11 ENCOUNTER — VIRTUAL VISIT (OUTPATIENT)
Dept: PSYCHIATRY | Facility: CLINIC | Age: 34
End: 2024-03-11
Payer: COMMERCIAL

## 2024-03-11 DIAGNOSIS — F41.1 GENERALIZED ANXIETY DISORDER: ICD-10-CM

## 2024-03-11 DIAGNOSIS — F39 MOOD DISORDER (H): ICD-10-CM

## 2024-03-11 PROCEDURE — 99214 OFFICE O/P EST MOD 30 MIN: CPT | Mod: 95 | Performed by: NURSE PRACTITIONER

## 2024-03-11 RX ORDER — LAMOTRIGINE 200 MG/1
200 TABLET ORAL AT BEDTIME
Qty: 90 TABLET | Refills: 0 | Status: SHIPPED | OUTPATIENT
Start: 2024-03-11 | End: 2024-05-13

## 2024-03-11 RX ORDER — ARIPIPRAZOLE 5 MG/1
5 TABLET ORAL DAILY
Qty: 90 TABLET | Refills: 0 | Status: SHIPPED | OUTPATIENT
Start: 2024-03-11 | End: 2024-05-13

## 2024-03-11 RX ORDER — PROPRANOLOL HYDROCHLORIDE 10 MG/1
TABLET ORAL
Qty: 180 TABLET | Refills: 0 | Status: SHIPPED | OUTPATIENT
Start: 2024-03-11 | End: 2024-05-13

## 2024-03-11 RX ORDER — BUPROPION HYDROCHLORIDE 300 MG/1
300 TABLET ORAL EVERY MORNING
Qty: 90 TABLET | Refills: 0 | Status: SHIPPED | OUTPATIENT
Start: 2024-03-11 | End: 2024-05-13

## 2024-03-11 ASSESSMENT — ANXIETY QUESTIONNAIRES
IF YOU CHECKED OFF ANY PROBLEMS ON THIS QUESTIONNAIRE, HOW DIFFICULT HAVE THESE PROBLEMS MADE IT FOR YOU TO DO YOUR WORK, TAKE CARE OF THINGS AT HOME, OR GET ALONG WITH OTHER PEOPLE: SOMEWHAT DIFFICULT
7. FEELING AFRAID AS IF SOMETHING AWFUL MIGHT HAPPEN: SEVERAL DAYS
GAD7 TOTAL SCORE: 10
1. FEELING NERVOUS, ANXIOUS, OR ON EDGE: MORE THAN HALF THE DAYS
5. BEING SO RESTLESS THAT IT IS HARD TO SIT STILL: NOT AT ALL
3. WORRYING TOO MUCH ABOUT DIFFERENT THINGS: MORE THAN HALF THE DAYS
GAD7 TOTAL SCORE: 10
2. NOT BEING ABLE TO STOP OR CONTROL WORRYING: MORE THAN HALF THE DAYS
6. BECOMING EASILY ANNOYED OR IRRITABLE: SEVERAL DAYS

## 2024-03-11 ASSESSMENT — PATIENT HEALTH QUESTIONNAIRE - PHQ9
5. POOR APPETITE OR OVEREATING: MORE THAN HALF THE DAYS
SUM OF ALL RESPONSES TO PHQ QUESTIONS 1-9: 5

## 2024-03-11 NOTE — PATIENT INSTRUCTIONS
Treatment plan today   Follow up in 2 months (or sooner as needed) with Josee Byrd  Medications  Wellbutrin XL to 300mg once a day   DECREASE Abilify (aripiprazole) to 5mg once a day   Lamictal 200mg a day   Propranolol 10mg tab, take 1 tab every morning, Take 1-2 tabs up to twice daily AS NEEDED anxiety   Communication  Call the psychiatric nurse line with medication questions or concerns at [phone] or [phone].  Chemayihart may be used to communicate with your care team, but this is not intended to be used for emergencies.  You can call the above number to make appointments, leave a message with our nursing team, and inquire about any mental health referrals I have placed.  Please call your pharmacy to request a refill of your medications listed above if needed between appointments.   Safety Plan - see below for crisis resources   Call or text [phone] for mental health crisis.   Call 911 or use ER for potentially life-threatening situations.     MERRY Ortega, CNP, PMHNP  Collaborative Care Psychiatry Service (CCPS)    [facility]         RESOURCES     Crisis Resources  For emergency help, please call 911 or go to the nearest Emergency Department.     Emergency Walk-In Options:   EmPATH Unit @ [facility] ([place]): [phone] - Specialized mental health emergency area designed to be calming  [facility] ([place]): [phone]  [facility] ([place]): [phone]  [facility]): [phone]    County Crisis Information:   [place]: [phone]  [place]: [phone]  [place] (COPE) - Adult: [phone]     Child: [phone]  [place] - Adult: [phone]     Child: [phone]  [place]: [phone]  List of all [facility] resources:   https://[place].gov/dhs/people-we-serve/adults/health-care/mental-health/resources/crisis-contacts.jsp    National Crisis Information:   National Suicide & Crisis Lifeline: Call 988   For online  chat options, visit https://suicidepreventionlifeline.org/chat/  Poison Control Center: 1-975-585-8352  Poison Control Center: 4-042-637-4213  Trans Lifeline: 1-158.225.8648 - Hotline for transgender people of all ages  The [employer]: 6-656-135-9189 - Hotline for LGBT youth     For Non-Emergency Support:   Fast Tracker: Mental Health & Substance Use Disorder Resources -   https://www.[employer].org/    Additional Resources  Financial Assistance [phone]  [facility] Billing [phone]  Central Billing Office, ealth: [phone]  [facility] Billing [phone]  Medical Records [phone]  [facility] Patient Bill of Rights [email or web address]         Patient Education   Collaborative Care Psychiatry Service  What to Expect  Here's what to expect from your Collaborative Care Psychiatry Service (CCPS).   About CCPS  CCPS means 2 people work together to help you get better. You'll meet with a behavioral health clinician and a psychiatric doctor. A behavioral health clinician helps people with mental health problems by talking with them. A psychiatric doctor helps people by giving them medicine.  How it works  At every visit, you'll see the behavioral health clinician (BHC) first. They'll talk with you about how you're doing and teach you how to feel better.   Then you'll see the psychiatric doctor. This doctor can help you deal with troubling thoughts and feelings by giving you medicine. They'll make sure you know the plan for your care.   CCPS usually takes 3 to 6 visits. If you need more visits, we may have you start seeing a different psychiatric doctor for ongoing care.  If you have any questions or concerns, we'll be glad to talk with you.  About visits  Be open  At your visits, please talk openly about your problems. It may feel hard, but it's the best way for us to help you.  Cancelling visits  If you can't come to your visit, please  "call us right away at [phone]. If you don't cancel at least 24 hours (1 full day) before your visit, that's \"late cancellation.\"  Being late to visits  Being very late is the same as not showing up. You will be a \"no show\" if:  Your appointment starts with a [facility], and you're more than 15 minutes late for a 30-minute (half hour) visit. This will also cancel your appointment with the psychiatric doctor.  Your appointment is with a psychiatric doctor only, and you're more than 15 minutes late for a 30-minute (half hour) visit.  Your appointment is with a psychiatric doctor only, and you're more than 30 minutes late for a 60-minute (full hour) visit.  If you cancel late or don't show up 2 times within 6 months, we may end your care.   Getting help between visits  If you need help between visits, you can call us Monday to Friday from 8 a.m. to 4:30 p.m. at [phone].  Emergency care  Call 911 or go to the nearest emergency department if your life or someone else's life is in danger.  Call 988 anytime to reach the national Suicide and Crisis hotline.  Medicine refills  To refill your medicine, call your pharmacy. You can also call [facility]'s Behavioral Access at [phone], Monday to Friday, 8 a.m. to 4:30 p.m. It can take 1 to 3 business days to get a refill.   Forms, letters, and tests  You may have papers to fill out, like FMLA, short-term disability, and workability. We can help you with these forms at your visits, but you must have an appointment. You may need more than 1 visit for this, to be in an intensive therapy program, or both.  Before we can give you medicine for ADHD, we may refer you to get tested for it or confirm it another way.  We may not be able to give you an emotional support animal letter.  We don't do mental health checks ordered by the court.   We don't do mental health testing, but we can refer you to get tested.   Thank you for choosing us for your care.  For " informational purposes only. Not to replace the advice of your health care provider. Copyright   2022 [facility]. All rights reserved. [employer] 571634 - 12/22.

## 2024-03-11 NOTE — PROGRESS NOTES
Virtual Visit Details    Type of service:  Video Visit     Originating Location (pt. Location): Home    Distant Location (provider location):  Off-site  Platform used for Video Visit: [facility] [place] [address], [place], [address]  [place] [address]  [phone]     Mode of Visit: Telemedicine Visit: The patient's condition can be safely assessed and treated via synchronous audio and visual telemedicine encounter.     [facility]aborative Care Psychiatry Service ([facility])  Psychiatric Progress Note    IDENTIFYING DATA   Name: Analia Jerry   Preferred name: Analia    : 1990 / 33 year old    Collaborative Care Psychiatry Service ([facility]PS) short term psychiatric stabilization model of care reviewed with patient/guardian who verbalized understanding.    Analia is a , White, female who currently lives in [place] [address] with Spouse/Partner and 2 step sons . 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   Attended the session alone.     PCP: Pranav Ware MD   Psychotherapist: [facility]   CHIEF COMPLAINT   [facility]  HISTORY OF PRESENT ILLNESS   Last seen 24 at appointment decreased Bupropion XL to 150mg and increased abilify to 7.5mg once a day.   Mood is less flat.   Requests returning to abilify 5mg  Sometimes staring off with higher dose of ability.   Sleep has been good, still difficult getting up in the am.   Work is going well   Higher anxiety around work and getting into work.   Fear of messing things up at work and more second guessing self.   Home life is good.   Akathisia has been low, manageable with propranolol   Medication adherence: Reports good med adherence.        3/11/2024    12:07 PM 2023    11:25 AM 2023    10:21 AM   PHQ   PHQ-9 Total Score 5 3 6   Q9: Thoughts of better off dead/self-harm past 2 weeks  Not at all Not at all Not at all         3/11/2024    12:07 PM 1/29/2024     9:40 AM 11/23/2023     9:42 PM   MAXIM-7 SCORE   Total Score  11 (moderate anxiety) 11 (moderate anxiety)   Total Score 10 11 11     The following assessments were completed by patient for this visit:  PROMIS 10-Global Health (only subscores and total score):       3/14/2022    12:57 PM 7/21/2022     9:33 AM 2/6/2023    12:41 PM 5/9/2023     8:57 PM 9/6/2023    11:43 AM 12/18/2023     1:21 PM   PROMIS-10 Scores Only   Global Mental Health Score 6    6 12 10 11 8 9   Global Physical Health Score 8    8 13 12 15 15 15   PROMIS TOTAL - SUBSCORES 14    14 25 22 26 23 24     MEDICATIONS   Medications Prior to Appointment  Current Outpatient Medications   Medication    ARIPiprazole (ABILIFY) 5 MG tablet    ARIPiprazole (ABILIFY) 5 MG tablet    buPROPion (WELLBUTRIN XL) 300 MG 24 hr tablet    cyclobenzaprine (FLEXERIL) 5 MG tablet    Fremanezumab-vfrm (AJOVY) 225 MG/1.5ML SOAJ    lamoTRIgine (LAMICTAL) 200 MG tablet    levothyroxine (SYNTHROID/LEVOTHROID) 200 MCG tablet    metoclopramide (REGLAN) 10 MG tablet    nitroFURantoin macrocrystal (MACRODANTIN) 100 MG capsule    norethindrone-ethinyl estradiol (ORTHO-NOVUM 1/35, 28,) 1-35 MG-MCG tablet    omeprazole (PRILOSEC) 20 MG DR capsule    propranolol (INDERAL) 10 MG tablet    rOPINIRole (REQUIP) 0.5 MG tablet    SUMAtriptan (IMITREX) 100 MG tablet    diclofenac (VOLTAREN) 75 MG EC tablet     Current Facility-Administered Medications   Medication    Botulinum Toxin Type A (BOTOX) 200 units injection 150 Units      Psychotropic medications at evaluation 12/18/2023   Abilify (aripriprazole) 5mg once a day   Propranolol 10mg take 1/2-1 tab 2 times daily as needed   Wellbutrin XL 300mg a day   Lamictal 200mg once a day     Past Psychotropic Medication Trials  Escitalopram up to 20 mg   viibryd - significant SE  Lurasidone  (Latuda) - akathisia   Wellbutrin XL 450mg (was taking with lamictal) flat  affect      reviewed - no record of controlled substances prescribed  PAST MEDICAL HISTORY      Active Ambulatory Problems     Diagnosis Date Noted    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    CARDIOVASCULAR SCREENING; LDL GOAL LESS THAN 160 10/31/2010    Mechanical low back pain 06/16/2012    Keloid scar 09/20/2012    ASCUS of cervix with negative high risk HPV 01/26/2017    Common wart 06/02/2016    Hypothyroidism due to Hashimoto's thyroiditis 01/26/2017    High triglycerides 04/03/2018    Elevated liver enzymes 12/28/2019    Class 1 obesity due to excess calories with body mass index (BMI) of 32.0 to 32.9 in adult, unspecified whether serious comorbidity present 07/02/2020    Mood disorder (H24)     FRANK (obstructive sleep apnea) - mild (AHI 7) 08/24/2020    Moderate episode of recurrent major depressive disorder (H) 03/04/2021    Fatigue, unspecified type 04/05/2021    Posttraumatic stress disorder 03/15/2022    Recurrent major depression in partial remission (H24) 07/25/2022    Transplant donor evaluation 09/14/2022    Anxiety 02/15/2023    Generalized anxiety disorder 07/10/2023     Resolved Ambulatory Problems     Diagnosis Date Noted    Insomnia 03/31/2010    Moderate major depression (H) 03/31/2010    Major depressive disorder, single episode, moderate (H) 08/26/2010    Mild major depression (H) 08/15/2011    Low back pain 06/28/2012    Adjustment disorder with mixed anxiety and depressed mood 06/11/2015    Depression with anxiety 11/06/2015    Plantar warts 06/02/2016    Acute left-sided low back pain with left-sided sciatica 03/22/2019    Acute gallstone pancreatitis 12/28/2019    Morbid obesity (H) 10/28/2020     Past Medical History:   Diagnosis Date    Juarez's esophagus determined by biopsy 2022    Depressive disorder     Gallstones 12/28/2019    H/O colposcopy with cervical biopsy 03/23/2017    Hashimoto's disease     Headache(784.0)     Papanicolaou smear of cervix with atypical squamous cells of  "undetermined significance (ASC-US) 12/03/2015     Allergies:   Allergies   Allergen Reactions    Nkda [No Known Drug Allergy]        VITALS / LABS   There were no vitals taken for this visit.   Most recent laboratory results reviewed and pertinent results include:   Recent Labs   Lab Test 05/16/23  1433   WBC 6.9   HGB 12.7   HCT 36.0   MCV 89        Recent Labs   Lab Test 11/07/23  1056 10/06/22  0710    139   POTASSIUM 4.8 3.5   CHLORIDE 107 105   CO2 23 21*   GLC 84 95   KIKI 9.5 9.4   BUN 15.9 16.3   CR 1.01* 1.11*   GFRESTIMATED 75 67   ALBUMIN  --  4.4   PROTTOTAL  --  7.4   AST  --  18   ALT  --  26   ALKPHOS  --  63   BILITOTAL  --  0.3     Recent Labs   Lab Test 11/07/23  1056   CHOL 209*   *   HDL 49*   TRIG 214*   A1C 4.9     Recent Labs   Lab Test 05/16/23  1433 10/11/21  1205 07/07/21  1304   TSH 1.52   < > 0.03*   T4  --   --  1.26    < > = values in this interval not displayed.     Recent Labs   Lab Test 05/16/23  1433 03/23/22  1147   PRATIMA 28 19   IRON  --  131   B12  --  280       MENTAL STATUS EXAMINATION   Appearance: Awake, alert, adequately groomed, appeared stated age  Behavior: cooperative and pleasant  Eye Contact:  intact  Gait and motor coordination: normal   Psychomotor Behavior:  no evidence of tardive dyskinesia, dystonia, or tics  Attention and Concentration: Good  Speech: Clear, coherent, regular rate, rhythm and volume  Mood:  \"okay\"  Affect:  neutral, mood congruent  Associations:  no loose associations  Orientation: oriented to time, place and person  Thought process:  logical, linear, and goal-directed  Thought content: no evidence of suicidal ideation or homicidal ideation  Does not appear to be responding to internal stimuli.    Memory: Grossly intact as assessed by interview. Not formally assessed  Fund of knowledge: Average  Insight: Good  Judgement: Good   DIAGNOSIS   DSM    Generalized anxiety disorder  Mood disorder (H24)  Posttraumatic stress disorder "     Medical Comorbidities: has Familial hematuria; CARDIOVASCULAR SCREENING; LDL GOAL LESS THAN 160; Mechanical low back pain; Keloid scar; ASCUS of cervix with negative high risk HPV; Common wart; Hypothyroidism due to Hashimoto's thyroiditis; High triglycerides; Elevated liver enzymes; Class 1 obesity due to excess calories with body mass index (BMI) of 32.0 to 32.9 in adult, unspecified whether serious comorbidity present; Mood disorder (H24); FRANK (obstructive sleep apnea) - mild (AHI 7); Moderate episode of recurrent major depressive disorder (H); Fatigue, unspecified type; Posttraumatic stress disorder; Recurrent major depression in partial remission (H24); Transplant donor evaluation; Anxiety; and Generalized anxiety disorder on their problem list.  ASSESSMENT   Safety:  Low risk for harm towards self or others. Safety plan reviewed as indicated. Local community safety resources in AVS for patient to use and reviewed with pt on an as needed basis.     Psychoeducation:   Medication side effects and alternatives reviewed.   Medication Education: FIRST GENERATION ANTIPSYCHOTIC/ SECOND GENERATION ANTIPSYCHOTIC USE:  Atypical need for cardiometabolic monitoring with medication- B/P, weight, blood sugar, cholesterol.  Monitor for abnormal movements you cannot control (neck twisting, legs jerking, mouth movements) and call nurse line for updates [phone].  Reviewed recommended treatment, risks, benefits, and alternatives, coordination of care with other clinicians, prognosis, and medication education. Health promotion activities recommended and reviewed today, abstaining from substance use.   All questions addressed.  Assent for medications was provided by patient/guardian today.     Formulation/MDM: Today Analia presents to CCPS with higher anxiety and depression symptoms for the last few months. Unclear trigger and may be related to trauma or seasons. Propranolol has been helpful for anxiety. Ongoing depression,  flat affect even with increased dose of Wellbutrin XL, will decrease dose as this may be the factor, but also reviewed mood stabiliziers and that she may be going through a depression cycle and to consider increasing abilify for lamictal. We decided on abilify as this is a lower therapuetic dose and has now toelrated side effects better with the increased propranolol.  She would like to go back to to PCP for the future, we will reassess and see if needed for long term psychiatry or if returning to [facility] when returning from [place].     3/11/24: We discussed buspirone, pt would prefer to return to abilify 5mg and declines trying buspirone at this time. Transferring back to [facility].   PLAN   Follow up in 2 months with Santos Byrd  Patient Status: Patient will continue to be seen for ongoing consultation and stabilization.   Medications  Wellbutrin XL to 300mg once a day   DECREASE Abilify (aripiprazole) to 5mg once a day   Lamictal 200mg a day   Propranolol 10mg tab, take 1 tab every morning, Take 1-2 tabs up to twice daily AS NEEDED anxiety   Referrals: Continue therapy   Labs/Orders: Antipsychotic monitoring labs ordered: CBC diff, CMP, A1C, Fasting lipids  Due for DISCUS/AIMS   Continue all other treatments (including medications) per primary care provider and/or specialists, follow up with primary care provider as planned or for acute medical concerns.    SIGNED  MERRY Ortega, CNP, PMHNP  Collaborative Care Psychiatry Service (CCPS)  ADMINISTRATIVE BILLING   Level of Medical Decision Making:   - At least 1 chronic problem that is not stable  - Engaged in prescription drug management during visit (discussed any medication benefits, side effects, alternatives, etc.)            Disclaimer: This note consists of symbols derived from keyboarding, dictation and/or voice recognition software. As a result, there may be errors in the script that have gone undetected. Please consider this when interpreting  information found in this chart.

## 2024-03-11 NOTE — Clinical Note
Iban Tripp, Not much progress with Analia. We tried higher Bupropion and didn't tolerate that. Also tried abilify 7.5mg and requested to return to 5mg. Ordered labs today. I offered buspirone but she declined.

## 2024-03-11 NOTE — NURSING NOTE
Is the patient currently in the state of [place]? YES    Visit mode:VIDEO    If the visit is dropped, the patient can be reconnected by: VIDEO VISIT: Text to cell phone:   Telephone Information:   Mobile [phone]       Will anyone else be joining the visit? NO  (If patient encounters technical issues they should call [phone])    How would you like to obtain your AVS? MyChart    Are changes needed to the allergy or medication list? No    Reason for visit: No chief complaint on file.    Maira GATESF

## 2024-03-21 ENCOUNTER — LAB (OUTPATIENT)
Dept: LAB | Facility: CLINIC | Age: 34
End: 2024-03-21
Payer: COMMERCIAL

## 2024-03-21 DIAGNOSIS — F39 MOOD DISORDER (H): ICD-10-CM

## 2024-03-21 DIAGNOSIS — G25.81 RESTLESS LEGS SYNDROME (RLS): ICD-10-CM

## 2024-03-21 LAB
ALBUMIN SERPL BCG-MCNC: 4.3 G/DL (ref 3.5–5.2)
ALP SERPL-CCNC: 45 U/L (ref 40–150)
ALT SERPL W P-5'-P-CCNC: 16 U/L (ref 0–50)
ANION GAP SERPL CALCULATED.3IONS-SCNC: 12 MMOL/L (ref 7–15)
AST SERPL W P-5'-P-CCNC: 12 U/L (ref 0–45)
BASOPHILS # BLD AUTO: 0 10E3/UL (ref 0–0.2)
BASOPHILS NFR BLD AUTO: 0 %
BILIRUB DIRECT SERPL-MCNC: <0.2 MG/DL (ref 0–0.3)
BILIRUB SERPL-MCNC: 0.4 MG/DL
BUN SERPL-MCNC: 17.3 MG/DL (ref 6–20)
CALCIUM SERPL-MCNC: 9.5 MG/DL (ref 8.6–10)
CHLORIDE SERPL-SCNC: 106 MMOL/L (ref 98–107)
CHOLEST SERPL-MCNC: 200 MG/DL
CREAT SERPL-MCNC: 1.05 MG/DL (ref 0.51–0.95)
DEPRECATED HCO3 PLAS-SCNC: 21 MMOL/L (ref 22–29)
EGFRCR SERPLBLD CKD-EPI 2021: 71 ML/MIN/1.73M2
EOSINOPHIL # BLD AUTO: 0.2 10E3/UL (ref 0–0.7)
EOSINOPHIL NFR BLD AUTO: 2 %
ERYTHROCYTE [DISTWIDTH] IN BLOOD BY AUTOMATED COUNT: 11.5 % (ref 10–15)
FASTING STATUS PATIENT QL REPORTED: YES
GLUCOSE SERPL-MCNC: 91 MG/DL (ref 70–99)
HBA1C MFR BLD: 5.3 % (ref 0–5.6)
HCT VFR BLD AUTO: 39.4 % (ref 35–47)
HDLC SERPL-MCNC: 45 MG/DL
HGB BLD-MCNC: 13.3 G/DL (ref 11.7–15.7)
IMM GRANULOCYTES # BLD: 0 10E3/UL
IMM GRANULOCYTES NFR BLD: 0 %
LDLC SERPL CALC-MCNC: 117 MG/DL
LYMPHOCYTES # BLD AUTO: 2.2 10E3/UL (ref 0.8–5.3)
LYMPHOCYTES NFR BLD AUTO: 24 %
MCH RBC QN AUTO: 30.2 PG (ref 26.5–33)
MCHC RBC AUTO-ENTMCNC: 33.8 G/DL (ref 31.5–36.5)
MCV RBC AUTO: 90 FL (ref 78–100)
MONOCYTES # BLD AUTO: 0.5 10E3/UL (ref 0–1.3)
MONOCYTES NFR BLD AUTO: 6 %
NEUTROPHILS # BLD AUTO: 6.2 10E3/UL (ref 1.6–8.3)
NEUTROPHILS NFR BLD AUTO: 68 %
NONHDLC SERPL-MCNC: 155 MG/DL
PLATELET # BLD AUTO: 303 10E3/UL (ref 150–450)
POTASSIUM SERPL-SCNC: 4.2 MMOL/L (ref 3.4–5.3)
PROT SERPL-MCNC: 6.8 G/DL (ref 6.4–8.3)
RBC # BLD AUTO: 4.4 10E6/UL (ref 3.8–5.2)
SODIUM SERPL-SCNC: 139 MMOL/L (ref 135–145)
TRIGL SERPL-MCNC: 189 MG/DL
WBC # BLD AUTO: 9.1 10E3/UL (ref 4–11)

## 2024-03-21 PROCEDURE — 85025 COMPLETE CBC W/AUTO DIFF WBC: CPT

## 2024-03-21 PROCEDURE — 36415 COLL VENOUS BLD VENIPUNCTURE: CPT

## 2024-03-21 PROCEDURE — 80061 LIPID PANEL: CPT

## 2024-03-21 PROCEDURE — 82248 BILIRUBIN DIRECT: CPT

## 2024-03-21 PROCEDURE — 83036 HEMOGLOBIN GLYCOSYLATED A1C: CPT

## 2024-03-21 PROCEDURE — 80053 COMPREHEN METABOLIC PANEL: CPT

## 2024-04-03 DIAGNOSIS — Z30.41 ENCOUNTER FOR SURVEILLANCE OF CONTRACEPTIVE PILLS: ICD-10-CM

## 2024-04-03 RX ORDER — NORETHINDRONE AND ETHINYL ESTRADIOL 1 MG-35MCG
KIT ORAL
Qty: 196 TABLET | Refills: 0 | Status: SHIPPED | OUTPATIENT
Start: 2024-04-03 | End: 2024-05-15

## 2024-04-03 NOTE — TELEPHONE ENCOUNTER
Requested Prescriptions   Pending Prescriptions Disp Refills    ALAYCEN 1/35 1-35 MG-MCG tablet [Pharmacy Med Name: Alyacen 1/35 (28) 1 mg-35 mcg tablet] 196 tablet 0     Sig: Take ONE tablet by mouth once daily. Continuously by skipping placebo pills       Contraceptives Protocol Passed - 4/3/2024 10:08 AM        Passed - Patient is not a current smoker if age is 35 or older        Passed - Recent (12 mo) or future (30 days) visit within the authorizing provider's specialty     The patient must have completed an in-person or virtual visit within the past 12 months or has a future visit scheduled within the next 90 days with the authorizing provider s specialty.  Urgent care and e-visits do not quality as an office visit for this protocol.          Passed - Medication is active on med list        Passed - No active pregnancy on record        Passed - No positive pregnancy test in past 12 months       Hormone Replacement Therapy Passed - 4/3/2024 10:08 AM        Passed - Blood pressure under 140/90 in past 12 months     BP Readings from Last 3 Encounters:   11/02/23 119/83   09/29/23 129/88   05/16/23 128/78       No data recorded            Passed - Recent (12 mo) or future (30 days) visit within the authorizing provider's specialty     The patient must have completed an in-person or virtual visit within the past 12 months or has a future visit scheduled within the next 90 days with the authorizing provider s specialty.  Urgent care and e-visits do not quality as an office visit for this protocol.          Passed - Medication is active on med list        Passed - Patient is 18 years of age or older        Passed - No active pregnancy on record        Passed - No positive pregnancy test on record in past 12 months           Prescription approved per [facility] Refill Protocol.    Sera Briseno RN

## 2024-04-16 ENCOUNTER — PATIENT OUTREACH (OUTPATIENT)
Dept: CARE COORDINATION | Facility: CLINIC | Age: 34
End: 2024-04-16
Payer: COMMERCIAL

## 2024-04-29 ENCOUNTER — VIRTUAL VISIT (OUTPATIENT)
Dept: FAMILY MEDICINE | Facility: CLINIC | Age: 34
End: 2024-04-29
Payer: COMMERCIAL

## 2024-04-29 DIAGNOSIS — G25.81 RESTLESS LEGS SYNDROME (RLS): ICD-10-CM

## 2024-04-29 DIAGNOSIS — F33.41 RECURRENT MAJOR DEPRESSION IN PARTIAL REMISSION (H): Primary | ICD-10-CM

## 2024-04-29 DIAGNOSIS — E06.3 HYPOTHYROIDISM DUE TO HASHIMOTO'S THYROIDITIS: ICD-10-CM

## 2024-04-29 PROCEDURE — 99214 OFFICE O/P EST MOD 30 MIN: CPT | Mod: 95 | Performed by: FAMILY MEDICINE

## 2024-04-29 RX ORDER — ROPINIROLE 0.5 MG/1
TABLET, FILM COATED ORAL
Qty: 360 TABLET | Refills: 3 | Status: SHIPPED | OUTPATIENT
Start: 2024-04-29

## 2024-04-29 ASSESSMENT — PATIENT HEALTH QUESTIONNAIRE - PHQ9
SUM OF ALL RESPONSES TO PHQ QUESTIONS 1-9: 6
5. POOR APPETITE OR OVEREATING: SEVERAL DAYS

## 2024-04-29 ASSESSMENT — ANXIETY QUESTIONNAIRES
2. NOT BEING ABLE TO STOP OR CONTROL WORRYING: MORE THAN HALF THE DAYS
1. FEELING NERVOUS, ANXIOUS, OR ON EDGE: SEVERAL DAYS
7. FEELING AFRAID AS IF SOMETHING AWFUL MIGHT HAPPEN: NOT AT ALL
3. WORRYING TOO MUCH ABOUT DIFFERENT THINGS: SEVERAL DAYS
5. BEING SO RESTLESS THAT IT IS HARD TO SIT STILL: NOT AT ALL
IF YOU CHECKED OFF ANY PROBLEMS ON THIS QUESTIONNAIRE, HOW DIFFICULT HAVE THESE PROBLEMS MADE IT FOR YOU TO DO YOUR WORK, TAKE CARE OF THINGS AT HOME, OR GET ALONG WITH OTHER PEOPLE: SOMEWHAT DIFFICULT
6. BECOMING EASILY ANNOYED OR IRRITABLE: NOT AT ALL
GAD7 TOTAL SCORE: 5
GAD7 TOTAL SCORE: 5

## 2024-04-29 ASSESSMENT — ENCOUNTER SYMPTOMS: NERVOUS/ANXIOUS: 1

## 2024-04-29 NOTE — PROGRESS NOTES
"    Instructions Relayed to Patient by Virtual Roomer:     Patient is active on [employer]:   Relayed following to patient: \"It looks like you are active on [employer], are you able to join the visit this way? If not, do you need us to send you a link now or would you like your provider to send a link via text or email when they are ready to initiate the visit?\"      Patient Confirmed they will join visit via: Cloud[employer]  Reminded patient to ensure they were logged on to virtual visit by arrival time listed.   Asked if patient has flexibility to initiate visit sooner than arrival time: patient stated yes, documented in appointment notes availability to initiate visit earlier than arrival time     If pediatric virtual visit, ensured pediatric patient along with parent/guardian will be present for video visit.     Patient offered the website [email or web address] and/or phone number to [employer] Help line: [phone]    Analia is a 33 year old who is being evaluated via a billable video visit.    How would you like to obtain your AVS? CloudOn  If the video visit is dropped, the invitation should be resent by: Text to cell phone: [phone]  Will anyone else be joining your video visit? No        ASSESSMENT / PLAN:  (F33.41) Recurrent major depression in partial remission (H24)  (primary encounter diagnosis)  Comment: stable  Plan: per psych. Exercise and low carb diet. Fiber supplement. Call/email with questions/concerns  If SUICIAL IDEATION OR HOMOCIDAL IDEATION OR FRANKY TO ER     (G25.81) Restless legs syndrome (RLS)  Comment: stabl  Plan: rOPINIRole (REQUIP) 0.5 MG tablet        Reveiwed risks and side effects of medication  Neurology if worse    (E03.8,  E06.3) Hypothyroidism due to Hashimoto's thyroiditis  Comment: under treatment per symptoms?  Plan: TSH with free T4 reflex        Await labs. Adjust if needed.       Subjective   Analia is a 33 year old, presenting for the following health issues:  History " depression/anxiety/ptsd, hypothyroidism,migraines (seeing neurology) astrid, obesity, mood disorder (seing psych) and low back pain. Seen GI for fatty liver and liver hemangiomas.   Seeing ob/gyn for pap/ocp.   Seeing mental health.   Needs labs for thyroid. More cold. No diarrhea but some loose stools. Water intake good. No prolonged.   Some dry skin.   Home and work - going ok. Walking dogs.   No taking prilosec.   Depression, Anxiety, and Thyroid Problem      4/29/2024     9:38 AM   Additional Questions   Roomed by maggie   Accompanied by self     Anxiety    History of Present Illness       Reason for visit:  Routine Visit. Provider requested an appointment be scheduled.    She eats 2-3 servings of fruits and vegetables daily.She consumes 0 sweetened beverage(s) daily.She exercises with enough effort to increase her heart rate 9 or less minutes per day.  She exercises with enough effort to increase her heart rate 3 or less days per week.   She is taking medications regularly.       Depression and Anxiety   How are you doing with your depression since your last visit? No change  How are you doing with your anxiety since your last visit?  No change  Are you having other symptoms that might be associated with depression or anxiety? No  Have you had a significant life event? No   Do you have any concerns with your use of alcohol or other drugs? No    Social History     Tobacco Use    Smoking status: Never    Smokeless tobacco: Never   Vaping Use    Vaping status: Never Used   Substance Use Topics    Alcohol use: Not Currently    Drug use: No         11/2/2023    11:25 AM 3/11/2024    12:07 PM 4/29/2024     9:39 AM   PHQ   PHQ-9 Total Score 3 5 6   Q9: Thoughts of better off dead/self-harm past 2 weeks Not at all Not at all Not at all         1/29/2024     9:40 AM 3/11/2024    12:07 PM 4/29/2024     9:39 AM   MAXIM-7 SCORE   Total Score 11 (moderate anxiety)     Total Score 11 10 5         Suicide Assessment Five-step  Evaluation and Treatment (SAFE-T)    Hypothyroidism Follow-up    Since last visit, patient describes the following symptoms: hair loss              Objective           Vitals:  No vitals were obtained today due to virtual visit.    Physical Exam   GENERAL: alert and no distress  EYES: Eyes grossly normal to inspection.  No discharge or erythema, or obvious scleral/conjunctival abnormalities.  RESP: No audible wheeze, cough, or visible cyanosis.    SKIN: Visible skin clear. No significant rash, abnormal pigmentation or lesions.  NEURO: Cranial nerves grossly intact.  Mentation and speech appropriate for age.  PSYCH: Appropriate affect, tone, and pace of words          Video-Visit Details    Type of service:  Video Visit   Originating Location (pt. Location): Home    Distant Location (provider location):  On-site  Platform used for Video Visit: Amy  Signed Electronically by: Pranav Ware MD

## 2024-04-30 ENCOUNTER — PATIENT OUTREACH (OUTPATIENT)
Dept: CARE COORDINATION | Facility: CLINIC | Age: 34
End: 2024-04-30
Payer: COMMERCIAL

## 2024-05-12 ASSESSMENT — ANXIETY QUESTIONNAIRES
1. FEELING NERVOUS, ANXIOUS, OR ON EDGE: MORE THAN HALF THE DAYS
8. IF YOU CHECKED OFF ANY PROBLEMS, HOW DIFFICULT HAVE THESE MADE IT FOR YOU TO DO YOUR WORK, TAKE CARE OF THINGS AT HOME, OR GET ALONG WITH OTHER PEOPLE?: VERY DIFFICULT
IF YOU CHECKED OFF ANY PROBLEMS ON THIS QUESTIONNAIRE, HOW DIFFICULT HAVE THESE PROBLEMS MADE IT FOR YOU TO DO YOUR WORK, TAKE CARE OF THINGS AT HOME, OR GET ALONG WITH OTHER PEOPLE: VERY DIFFICULT
7. FEELING AFRAID AS IF SOMETHING AWFUL MIGHT HAPPEN: SEVERAL DAYS
2. NOT BEING ABLE TO STOP OR CONTROL WORRYING: MORE THAN HALF THE DAYS
GAD7 TOTAL SCORE: 9
5. BEING SO RESTLESS THAT IT IS HARD TO SIT STILL: NOT AT ALL
6. BECOMING EASILY ANNOYED OR IRRITABLE: NOT AT ALL
GAD7 TOTAL SCORE: 9
GAD7 TOTAL SCORE: 9
3. WORRYING TOO MUCH ABOUT DIFFERENT THINGS: MORE THAN HALF THE DAYS
4. TROUBLE RELAXING: MORE THAN HALF THE DAYS
7. FEELING AFRAID AS IF SOMETHING AWFUL MIGHT HAPPEN: SEVERAL DAYS

## 2024-05-13 ENCOUNTER — VIRTUAL VISIT (OUTPATIENT)
Dept: PSYCHIATRY | Facility: CLINIC | Age: 34
End: 2024-05-13
Payer: COMMERCIAL

## 2024-05-13 VITALS — BODY MASS INDEX: 27.74 KG/M2 | HEIGHT: 68 IN | WEIGHT: 183 LBS

## 2024-05-13 DIAGNOSIS — F41.1 GENERALIZED ANXIETY DISORDER: Primary | ICD-10-CM

## 2024-05-13 DIAGNOSIS — F39 MOOD DISORDER (H): ICD-10-CM

## 2024-05-13 PROCEDURE — 99214 OFFICE O/P EST MOD 30 MIN: CPT | Mod: 95 | Performed by: NURSE PRACTITIONER

## 2024-05-13 RX ORDER — BUPROPION HYDROCHLORIDE 300 MG/1
300 TABLET ORAL EVERY MORNING
Qty: 90 TABLET | Refills: 0 | Status: SHIPPED | OUTPATIENT
Start: 2024-05-13 | End: 2024-08-05

## 2024-05-13 RX ORDER — BUSPIRONE HYDROCHLORIDE 15 MG/1
TABLET ORAL
Qty: 60 TABLET | Refills: 1 | Status: SHIPPED | OUTPATIENT
Start: 2024-05-13 | End: 2024-06-24

## 2024-05-13 RX ORDER — ARIPIPRAZOLE 5 MG/1
5 TABLET ORAL DAILY
Qty: 90 TABLET | Refills: 0 | Status: SHIPPED | OUTPATIENT
Start: 2024-05-13 | End: 2024-08-05

## 2024-05-13 RX ORDER — LAMOTRIGINE 200 MG/1
200 TABLET ORAL AT BEDTIME
Qty: 90 TABLET | Refills: 0 | Status: SHIPPED | OUTPATIENT
Start: 2024-05-13 | End: 2024-08-05

## 2024-05-13 RX ORDER — PROPRANOLOL HYDROCHLORIDE 10 MG/1
TABLET ORAL
Qty: 180 TABLET | Refills: 0 | Status: SHIPPED | OUTPATIENT
Start: 2024-05-13 | End: 2024-07-29

## 2024-05-13 ASSESSMENT — PAIN SCALES - GENERAL: PAINLEVEL: NO PAIN (0)

## 2024-05-13 NOTE — PROGRESS NOTES
"Virtual Visit Details    Type of service:  Video Visit     Originating Location (pt. Location): Home    Distant Location (provider location):  Off-site  Platform used for Video Visit: [employer]         PSYCHIATRIC MEDICATION FOLLOW UP APPT     Name: Analia Jerry   : 1990               Telemedicine Visit: The patient's condition can be safely assessed and treated via synchronous audio and visual telemedicine encounter.     Consent:  The patient/guardian has verbally consented to: the potential risks and benefits of telemedicine (video visit or phone) versus in person care; bill my insurance or make self-payment for services provided; and responsibility for payment of non-covered services.     As the provider I attest to compliance with applicable laws and regulations related to telemedicine.         Source of Referral / Care Team:  Primary Care Provider: Pranav Ware MD   Therapist: Monica ([facility]).         The [facility] psychiatry providers act as a specialty service for Primary Care Providers in the [facility] who seek to optimize medications for unstable patients. Once medications have been optimized, [facility] providers discharge the patient back to the referring Primary Care Provider for ongoing medication management. This type of system allows [facility] to serve a high volume of patients.     Patient Identification:  Patient is a 33 year old,   White Not  or  female  who presents for return visit with me.   Patient prefers to be called: \"Anlaia\".  Patient is currently employed full time.    Patient attended the session alone.    RECORDS AVAILABLE FOR REVIEW: EHR records through [employer]  and previous psychiatric progress note, recent labs.       Interim History:  I last saw Analia Jerry for outpatient psychiatry Return Visit 5 months ago on 23. In interim, covered by Natasha Downing and trialed higher wellbutrin as well as higher abilify, both reduced back due to side " "effects. At last appointment 2 months ago, continued wellbutrin 300 mg, abilify 5 mg, lamictal 200 mg, and propranolol 10 mg 1 QAM and 1-2 up to twice a day PRN. Patient reports ADHERING to prescribed medications. She reports currently tolerating medications well. She is using propranolol 10 mg QAM and QPM, and taking 1 dose during afternoon PRN 1/2 days. Does help some with anxiety (\"calm down for a bit but then it comes back\", denies any side effects. She does note her general anxiety, rumination, worry about the future or making mistakes remains high (5/10 today). Her depression has been low and feels well managed with current medications. Denies any symptoms of divya. Denies any SI/SIB/HI, psychosis.      Initial Impression:   3/11/24 per Natasha Downing: We discussed buspirone, pt would prefer to return to abilify 5mg and declines trying buspirone at this time. Transferring back to [facility].     1/29/24: Today Analia presents to [facility] with higher anxiety and depression symptoms for the last few months. Unclear trigger and may be related to trauma or seasons. Propranolol has been helpful for anxiety. Ongoing depression, flat affect even with increased dose of Wellbutrin XL, will decrease dose as this may be the factor, but also reviewed mood stabiliziers and that she may be going through a depression cycle and to consider increasing abilify for lamictal. We decided on abilify as this is a lower therapuetic dose and has now toelrated side effects better with the increased propranolol. She would like to go back to to PCP for the future, we will reassess and see if needed for long term psychiatry or if returning to [facility] when returning from [place].       Current medications include:   Current Outpatient Medications   Medication Sig Dispense Refill    ALAYCEN 1/35 1-35 MG-MCG tablet Take ONE tablet by mouth once daily. Continuously by skipping placebo pills 196 tablet 0    ARIPiprazole (ABILIFY) 5 MG tablet Take 1 " "tablet (5 mg) by mouth daily 90 tablet 0    buPROPion (WELLBUTRIN XL) 300 MG 24 hr tablet Take 1 tablet (300 mg) by mouth every morning 90 tablet 0    cyclobenzaprine (FLEXERIL) 5 MG tablet Take 1-2 tablets every 8 hours as needed for muscle spasm 40 tablet 0    diclofenac (VOLTAREN) 75 MG EC tablet Take 1 tablet (75 mg) by mouth 2 times daily for 10 days 20 tablet 0    Fremanezumab-vfrm (AJOVY) 225 MG/1.5ML SOAJ Inject 1.5 mg Subcutaneous every 30 days 1.5 mL 11    lamoTRIgine (LAMICTAL) 200 MG tablet Take 1 tablet (200 mg) by mouth at bedtime 90 tablet 0    levothyroxine (SYNTHROID/LEVOTHROID) 200 MCG tablet Take 1 tablet (200 mcg) by mouth daily 90 tablet 3    metoclopramide (REGLAN) 10 MG tablet Take 1 tablet (10 mg) by mouth 3 times daily as needed (nausea) 20 tablet 11    nitroFURantoin macrocrystal (MACRODANTIN) 100 MG capsule Take 1 capsule (100 mg) by mouth At Bedtime 90 capsule 3    omeprazole (PRILOSEC) 20 MG DR capsule Take 1 capsule (20 mg) by mouth daily 30 capsule 11    propranolol (INDERAL) 10 MG tablet Take 1 tablet (10 mg) by mouth 2 times daily. May also take 1-2 tablets (10-20 mg) daily as needed (anxiety/ Side effects). 180 tablet 0    rOPINIRole (REQUIP) 0.5 MG tablet Take 3 tablets (total of 1.5mg) by mouth daily 1 to 3 hours before bedtime 360 tablet 3    SUMAtriptan (IMITREX) 100 MG tablet Take 1 tablet (100 mg) by mouth at onset of headache for migraine (repeat in 2 hours if needed) 18 tablet 11     Current Facility-Administered Medications   Medication Dose Route Frequency Provider Last Rate Last Admin    Botulinum Toxin Type A (BOTOX) 200 units injection 150 Units  150 Units Intramuscular See Admin Instructions Karissa Gonzalez MD             Side effects: Denies    The [place] Prescription Monitoring Program : not reviewed today    Psychiatric ROS:  Analia Jerry reports mood has been: \"Been going ok\"  Depression has been: depressed mood, little interest / pleasure, appetite change " "or significant weight loss / gain, sleep changes (insomnia or hypersomnia), worthlessness or excessive guilt, and difficulty concentrating or indecisiveness self rates as 1-2/10, where 0 is none at all and 10 being severe depression.   SI/SIB/HI: denies all  Anxiety has been: excessive worry, difficult to control, restlessness / feeling keyed up,  easily fatigued,  difficulty concentrating or mind going blank, irritability, muscle tension, and sleep disturbances (difficulty falling / staying asleep, or restless / unsatisfying)  self rates as 5/10, where 0 is none at all and 10 being severe anxiety.   Sleep has been: \"fine, no issues.\"  Energy has been: \"wanting to sleep longer,\" but thinks has been improving with the weather going up.  Appetite has been: denies any changes  Richa sxs: denies any, nothing recently  Psychosis sxs: none  ADHD/ADD sxs: none  PTSD sxs: intrusion and avoidance sx require further evaluate. negative emotional state, diminished interest and detachment from others, irritability, reckless behavior and  sleep disturbances     Most recent PHQ9 and GAD7 scores were reviewed today.   PHQ-9 scores:       11/2/2023    11:25 AM 3/11/2024    12:07 PM 4/29/2024     9:39 AM   PHQ-9 SCORE   PHQ-9 Total Score MyChart 3 (Minimal depression)     PHQ-9 Total Score 3 5 6       MAXIM-7 scores:        3/11/2024    12:07 PM 4/29/2024     9:39 AM 5/12/2024    12:25 PM   MAXIM-7 SCORE   Total Score   9 (mild anxiety)   Total Score 10 5 9         Current stressors include: Symptoms, Occupational Difficulties  Coping mechanisms and supports include: Therapy, Family, and Friends    Vital Signs:   Ht 1.727 m (5' 8\")   Wt 83 kg (183 lb)   BMI 27.83 kg/m      Review of Systems:  10 systems (general, cardiovascular, respiratory, eyes, ENT, endocrine, GI, , M/S, neurological) were reviewed. Most pertinent finding(s) is/are:  No acute distress; no wheezing / short of breath / increased work of breathing; denies chest pain " / tightness / palpitations; reduced appetite and recent weight loss; no nausea / vomiting / abdominal pain; no tics / tremors / abnormal muscle mvmts; no visible skin changes / rashes . The remaining systems are all unremarkable.    Labs:  Most recent laboratory results reviewed and the pertinent results include:       Recent Labs   Lab Test 03/21/24  1146   WBC 9.1   HGB 13.3   HCT 39.4   MCV 90        Recent Labs   Lab Test 03/21/24  1146      POTASSIUM 4.2   CHLORIDE 106   CO2 21*   GLC 91   KIKI 9.5   BUN 17.3   CR 1.05*   GFRESTIMATED 71   ALBUMIN 4.3   PROTTOTAL 6.8   AST 12   ALT 16   ALKPHOS 45   BILITOTAL 0.4     Recent Labs   Lab Test 03/21/24  1146   CHOL 200*   *   HDL 45*   TRIG 189*   A1C 5.3     Recent Labs   Lab Test 05/16/23  1433 10/11/21  1205 07/07/21  1304   TSH 1.52   < > 0.03*   T4  --   --  1.26    < > = values in this interval not displayed.     25 OH Vit D2   Date Value Ref Range Status   10/22/2014 <5 ug/L Final     25 OH Vit D3   Date Value Ref Range Status   10/22/2014 29 ug/L Final     25 OH Vit D total   Date Value Ref Range Status   10/22/2014  30 - 75 ug/L Final    <34  Season, race, dietary intake, and treatment affect the concentration of   25-hydroxy-Vitamin D. Values may decrease during winter months and increase   during summer months. Values less than 30 ug/L may indicate Vitamin D   deficiency.          Past Medical/Surgical History:  Past Medical History:   Diagnosis Date    Acute gallstone pancreatitis 12/28/2019    Anxiety     ASCUS of cervix with negative high risk HPV 01/26/2017    ASCUS/neg HR HPV.    Juarez's esophagus determined by biopsy 2022    Depressive disorder     Gallstones 12/28/2019    [facility]    H/O colposcopy with cervical biopsy 03/23/2017    Bx & ECC - negative    Hashimoto's disease     Headache(784.0)     Hypothyroidism due to Hashimoto's thyroiditis     Papanicolaou smear of cervix with atypical squamous cells of  undetermined significance (ASC-US) 2015      has a past medical history of Acute gallstone pancreatitis (2019), Anxiety, ASCUS of cervix with negative high risk HPV (2017), Juarez's esophagus determined by biopsy (), Depressive disorder, Gallstones (2019), H/O colposcopy with cervical biopsy (2017), Hashimoto's disease, Headache(784.0), Hypothyroidism due to Hashimoto's thyroiditis, and Papanicolaou smear of cervix with atypical squamous cells of undetermined significance (ASC-US) (2015).    She has no past medical history of Arthritis, Cancer (H), Cerebral infarction (H), Congestive heart failure (H), COPD (chronic obstructive pulmonary disease) (H), Diabetes (H), Heart disease, History of blood transfusion, Hypertension, or Uncomplicated asthma.    Medication allergies:    Allergies   Allergen Reactions    Nkda [No Known Drug Allergy]        Social History:  Born in [place], [place] and raised in [place], [place].  Parents not  but still live together  Siblings:  Two half sisters, full biobrother   in October.  Childhood: Yes intact home with all current basic needs being met.  Highest education level was college graduate.   Employment Status: full time -  [employer].  Relationship status: Together for 9 years. Safe in relationship.  Current Living situation:  , and 2 stepsons are with them on weekends, longer in summer.  Feels safe at home.  Children: 15 and 10 year old stepsons  Firearms/Weapons Access: No: Patient denies   Service: No  Support: , friend, therapist     Current use of drugs or alcohol: Denies   Tobacco use: No    Mental Status Exam:  Alertness: alert  and oriented   Appearance: adequately groomed  Behavior/Demeanor: cooperative and pleasant, with good eye contact   Speech: normal and regular rate and rhythm  Language: intact and no problems  Psychomotor: normal or unremarkable  Mood: anxious  Affect: full range and  appropriate; was congruent to mood; was congruent to content  Thought Process/Associations: unremarkable  Thought Content:  Reports none;  Denies suicidal ideation, violent ideation, and delusions  Perception:  Reports none;  Denies auditory hallucinations and visual hallucinations  Insight: intact  Judgment: intact  Cognition: does  appear grossly intact; formal cognitive testing was not done  Recent and Remote Memory: Intact to interview. Not formally assessed. No amnesia.  Attention Span and Concentration: normal  Fund of Knowledge: appropriate  Gait and Station: unremarkable via video    Suicide Risk Assessment:  Today Analia Jerry reports no suicidal ideation. There are notable risk factors for self-harm, including anxiety, family history, and mood change. However, risk is mitigated by commitment to family, absence of past attempts, history of seeking help when needed, future oriented, no access to firearms or weapons, and denies suicidal intent or plan.  Therefore, based on all available evidence including the factors cited above, Analia Jerry does not appear to be at imminent risk for self-harm, does not meet criteria for a 72-hr hold, and therefore remains appropriate for ongoing outpatient level of care.  A thorough assessment of risk factors related to suicide and self-harm have been reviewed and are noted above. The patient convincingly denies suicidality on several occasions. Local community safety resources printed and reviewed for patient to use if needed. There was no deceit detected, and the patient presented in a manner that was believable.     Recommended that patient call 911 or go to the local ED should there be a change in any of these risk factors.    DSM5 Diagnosis:  F39 Mood disorder  300.02 (F41.1) Generalized Anxiety Disorder  309.81 (F43.10) Posttraumatic Stress Disorder (includes Posttraumatic Stress Disorder for Children 6 Years and Younger)  Without dissociative symptoms    Medical  comorbidities include:   Patient Active Problem List    Diagnosis Date Noted    Generalized anxiety disorder 07/10/2023     Priority: Medium    Anxiety 02/15/2023     Priority: Medium    Transplant donor evaluation 09/14/2022     Priority: Medium    Recurrent major depression in partial remission (H24) 07/25/2022     Priority: Medium    Posttraumatic stress disorder 03/15/2022     Priority: Medium    Fatigue, unspecified type 04/05/2021     Priority: Medium    Moderate episode of recurrent major depressive disorder (H) 03/04/2021     Priority: Medium    FRANK (obstructive sleep apnea) - mild (AHI 7) 08/24/2020     Priority: Medium     8/10/2020 [place] Diagnostic Sleep Study (216.0 lbs) - scored with 3% rules -  AHI 7.1, RDI 7.1, Supine AHI 22.9, REM AHI -, Low O2 91.2%, Time Spent ?88% 0 minutes / Time Spent ?89% 0 minutes.      Mood disorder (H24)      Priority: Medium    Class 1 obesity due to excess calories with body mass index (BMI) of 32.0 to 32.9 in adult, unspecified whether serious comorbidity present 07/02/2020     Priority: Medium    Elevated liver enzymes 12/28/2019     Priority: Medium    High triglycerides 04/03/2018     Priority: Medium    ASCUS of cervix with negative high risk HPV 01/26/2017     Priority: Medium     4/1/11 (approx) normal - patient reported.   6/11/13 normal - patient reported  12/3/15 ASCUS pap. Plan: per provider, repeat pap in 1 year.  1/26/17 ASCUS/neg HR HPV. Plan: colp bef 4/26/17  3/23/17 [place] Bx & ECC - negative. Plan cotest in 1 year.   3/8/18 ASCUS pap, neg HPV. Plan: colp.   5/10/18 [place] Bx and ECC: negative. Plan: cotest in 1 yr.   6/6/19 NIL/neg HR HPV. Plan cotest in 3 years  4/15/22 NIL pap, neg HPV. 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         Psychosocial & Contextual Factors:  Occupational Difficulties     DIFFERENTIAL DIAGNOSIS: R/O major depressive disorder versus bipolar disorder     Medical comorbidities impacting or contributing to clinical picture: hypothyroidism due to Hashimotos thyroiditis, FRANK, migraines  Known issue that I take into account for their medical decisions, no current exacerbations or new concerns    Impression:  Analia Jerry is a 33 year old White, female who presents for return visit with  [facility] ([facility]) for medication management.Patient most recently seen with [facility] by Natasha Downing and trialed higher wellbutrin as well as higher abilify, both reduced back due to side effects. At last appointment 2 months ago, continued wellbutrin 300 mg, abilify 5 mg, lamictal 200 mg, and propranolol 10 mg 1 QAM and 1-2 up to twice a day PRN. Patient reports currently tolerating medications well. She is using propranolol 10 mg QAM and QPM, and taking 1 dose during afternoon PRN 1/2 days with some benefit, denies any side effects. She does note her general anxiety remains high. Her depression has been low and feels well managed with current medications. Denies any symptoms of divya. Denies any SI/SIB/HI, psychosis. Discussed risks / benefits of medication changes, and patient is open to trial of buspirone augment as previously discussed. We will continue other medications. Follow-up in 6 weeks or sooner as needed. Patient agreeable to plan.    Medication side effects and alternatives were reviewed. Health promotion activities recommended and reviewed today. All questions addressed. Education and counseling completed regarding risks and benefits of medications and psychotherapy options. Recommend therapy for additional support.       Treatment Plan:  START  buspirone: Day 1-3: Take 1/2 tablet (7.5 mg) twice a day. Day 4-7: Take 1 tablet (15 mg) in AM and 1/2 tablet in PM. Day 8 on: Take 1 tablet twice a day. Sent #60.  CONTINUE propranolol 10 mg twice a day, with additional 10 mg as needed in afternoon. Script sent for #180  CONTINUE aripiprazole 5 mg every day. Sent script for #90.  CONTINUE bupropion  mg every day. Sent script for #90.  CONTINUE lamotrigine 200 mg per last provider. Sent script for #90.  Continue all other medications per primary care provider.   Continue therapy with established provider.  Safety plan reviewed. To the Emergency Department as needed or call after hours crisis line at [phone] or [phone]. [place] Crisis Text Line. Text [place] to 103262 or Suicide LifeLine Chat: suicidepreventionlifeline.org/chat  Schedule an appointment with me in 6-8 weeks or sooner as needed. Call [facility] at [phone] to schedule.  Follow up with primary care provider as planned or for acute medical concerns.  Call the psychiatric nurse line with medication questions or concerns at [phone].  MyChart may be used to communicate with your provider, but this is not intended to be used for emergencies.    Patient Education:  Medication side effects and alternatives reviewed. Health promotion activities recommended and reviewed today. All questions addressed. Education and counseling completed regarding risks and benefits of medications and psychotherapy options.  Consent provided by patient/guardian  Call the psychiatric nurse line with medication questions or concerns at [phone].  MyChart may be used to communicate with your provider, but this is not intended to be used for emergencies.  LAMOTRIGINE: Discussed risk of rash and instructed to stop taking the drug at the first sign of a rash regardless of its type or severity, and contact the nurse line at [phone]  FIRST GENERATION ANTIPSYCHOTIC/ SECOND GENERATION  ANTIPSYCHOTIC USE:  Atypical need for cardiometabolic monitoring with medication- B/P, weight, blood sugar, cholesterol.  Need to monitor for abnormal movements taught  Medlineplus.gov is information for patients.  It is run by the [employer] and it contains information about all disorders, diseases and all medications.      Community Resources:    National Suicide Prevention Lifeline: 515.747.7295 (TTY: 661.752.6563). Call anytime for help.  (www.suicidepreventionlifeline.org)  National [place] on Mental Illness (www.irasema.org): 753.803.4271 or 019-952-6580.   Mental Health Association (www.mentalhealth.org): 466.269.2683 or 030-796-4820.  [place] Crisis Text Line: Text [place] to 635761  Suicide LifeLine Chat: [employer].com.org/chat    Administrative Billing:     Level of Medical Decision Making:   - At least 1 chronic problem that is not stable  - Engaged in prescription drug management during visit (discussed any medication benefits, side effects, alternatives, etc.)             Patient Status:  [facility] MD/DO/NP/PA providers offer care a specialty service for Primary Care Providers in the [facility] that seek to optimize psychotropic medications for unstable patients.  Once medications have been optimized, our providers discharge the patient back to the referring Primary Care Provider for ongoing medication management.  This type of system allows our providers to serve a high volume of patients.   Patient will continue to be seen for ongoing consultation and stabilization.    Signed:   Josee Byrd, MSN, APRN, PMHNP-BC  [facility] ([facility])  [facility]    Chart documentation done in part with Dragon Voice Recognition software.  Although reviewed after completion, some word and grammatical errors may remain.

## 2024-05-13 NOTE — NURSING NOTE
Is the patient currently in the state of [place]? YES    Visit mode:VIDEO    If the visit is dropped, the patient can be reconnected by: VIDEO VISIT: Text to cell phone:   Telephone Information:   Mobile [phone]       Will anyone else be joining the visit? NO  (If patient encounters technical issues they should call [phone]    How would you like to obtain your AVS? MyChart    Are changes needed to the allergy or medication list? No    Are refills needed on medications prescribed by this physician? YES  PROPRANOLOL    Reason for visit: RECHECK    Stephanie LOVETT

## 2024-05-13 NOTE — PATIENT INSTRUCTIONS
**For crisis resources, please see the information at the end of this document**     Thank you for coming to the [facility] [place] [facility].    TREATMENT PLAN:  Medications:   START buspirone: Day 1-3: Take 1/2 tablet (7.5 mg) twice a day. Day 4-7: Take 1 tablet (15 mg) in AM and 1/2 tablet in PM. Day 8 on: Take 1 tablet twice a day. Sent #60.  CONTINUE propranolol 10 mg twice a day, with additional 10 mg as needed in afternoon. Script sent for #180  CONTINUE aripiprazole 5 mg every day. Sent script for #90.  CONTINUE bupropion  mg every day. Sent script for #90.  CONTINUE lamotrigine 200 mg per last provider. Sent script for #90.  Continue all other medications per primary care provider.     Consults / Referrals:   - Continue therapy with established provider.    Follow Up:  Schedule an appointment with me in 6-8 weeks or sooner as needed. Call [facility] at [phone] to schedule.  Follow up with primary care provider as planned or for acute medical concerns.  Call the psychiatric nurse line with medication questions or concerns at [phone].  [employer] may be used to communicate with your provider, but this is not intended to be used for emergencies.    Psychoeducation:  FIRST GENERATION ANTIPSYCHOTIC/ SECOND GENERATION ANTIPSYCHOTIC USE:  Atypical need for cardiometabolic monitoring with medication- B/P, weight, blood sugar, cholesterol.  Need to monitor for abnormal movements taught.  I have informed the patient and parent of the risk for metabolic syndrome; hyperglycemia, dyslipidemia and change in body weight as well as potential electrolyte imbalance and/or other medical complications. I further, discussed information regarding risk of akathisia risk/TD and stroke/cerebrovascular adverse events, diabetes.  We discussed the need for blood test to monitor for these potential effects.  The patient verbalized understanding and gives verbal consent to use  of antipsychotic.     Discussed use of propranolol as off label for anxiety, and side effects to monitor including low HR, BP, lightheadedness or dizziness.    Discussed side effects of bupropion to include agitation and other activation issues, ^ BP or HR, insomnia, stomach upset, appetite loss, weight loss, risk for precipitation of divya, and increased risk for seizures.  Patient agreed to take Bupropion to treat low mood, lack of motivation and poor concentration. Patient verbalized understanding of medication schedule, of side effects, avoidance of alcohol and marijuana due to increase risk for seizures.      Financial Assistance [phone]  [facility] Billing [phone]  Central Billing Office, [facility]: [phone]  [place] Billing [phone]  Medical Records [phone]  [place] Patient Bill of Rights [email or web address]       MENTAL HEALTH CRISIS RESOURCES:  For a emergency help, please call 911 or go to the nearest Emergency Department.     Emergency Walk-In Options:   EmPATH Unit @ [facility] ([place]): [phone] - Specialized mental health emergency area designed to be calming  [facility] ([place]): [phone]  [facility] ([place]): [phone]  [facility] ([place]): [phone]    [facility] Crisis Information:   [place]: [phone]  [place]: [phone]  [place] (WILLIE) - Adult: [phone]     Child: [phone]  [place] - Adult: [phone]     Child: [phone]  [place]: [phone]  List of all [facility] resources:   https://[place].gov/dhs/people-we-serve/adults/health-care/mental-health/resources/crisis-contacts.jsp    National Crisis Information:   National Suicide & Crisis Lifeline: Call 988        For online chat options, visit https://suicidepreventionlifeline.org/chat/  Poison Control Center: 1-160.324.6683  Poison Control Center:  4-783-799-9066  Trans Lifeline: 0-556-702-6850 - Hotline for transgender people of all ages  The [employer]: 8-880-162-6042 - Hotline for LGBT youth     For Non-Emergency Support:   Fast Tracker: Mental Health & Substance Use Disorder Resources -   https://www.Curious.comckermn.org/       Again thank you for choosing [facility] MENTAL HEALTH & ADDICTION [facility] and please let us know how we can best partner with you to improve you and your family's health.    You may be receiving a survey regarding this appointment. We would love to have your feedback, both positive and negative. The survey is done by an external company, so your answers are anonymous.        Patient Education   Collaborative Care Psychiatry Service  What to Expect  Here's what to expect from your Collaborative Care Psychiatry Service (CCPS).   About CCPS  CCPS means 2 people work together to help you get better. You'll meet with a behavioral health clinician and a psychiatric doctor. A behavioral health clinician helps people with mental health problems by talking with them. A psychiatric doctor helps people by giving them medicine.  How it works  At every visit, you'll see the behavioral health clinician (BHC) first. They'll talk with you about how you're doing and teach you how to feel better.   Then you'll see the psychiatric doctor. This doctor can help you deal with troubling thoughts and feelings by giving you medicine. They'll make sure you know the plan for your care.   CCPS usually takes 3 to 6 visits. If you need more visits, we may have you start seeing a different psychiatric doctor for ongoing care.  If you have any questions or concerns, we'll be glad to talk with you.  About visits  Be open  At your visits, please talk openly about your problems. It may feel hard, but it's the best way for us to help you.  Cancelling visits  If you can't come to your visit, please call us right away at [phone]. If you don't cancel  "at least 24 hours (1 full day) before your visit, that's \"late cancellation.\"  Being late to visits  Being very late is the same as not showing up. You will be a \"no show\" if:  Your appointment starts with a C, and you're more than 15 minutes late for a 30-minute (half hour) visit. This will also cancel your appointment with the psychiatric doctor.  Your appointment is with a psychiatric doctor only, and you're more than 15 minutes late for a 30-minute (half hour) visit.  Your appointment is with a psychiatric doctor only, and you're more than 30 minutes late for a 60-minute (full hour) visit.  If you cancel late or don't show up 2 times within 6 months, we may end your care.   Getting help between visits  If you need help between visits, you can call us Monday to Friday from 8 a.m. to 4:30 p.m. at [phone].  Emergency care  Call 911 or go to the nearest emergency department if your life or someone else's life is in danger.  Call 178 anytime to reach the national Suicide and Crisis hotline.  Medicine refills  To refill your medicine, call your pharmacy. You can also call [facility]'s Behavioral Access at [phone], Monday to Friday, 8 a.m. to 4:30 p.m. It can take 1 to 3 business days to get a refill.   Forms, letters, and tests  You may have papers to fill out, like FMLA, short-term disability, and workability. We can help you with these forms at your visits, but you must have an appointment. You may need more than 1 visit for this, to be in an intensive therapy program, or both.  Before we can give you medicine for ADHD, we may refer you to get tested for it or confirm it another way.  We may not be able to give you an emotional support animal letter.  We don't do mental health checks ordered by the court.   We don't do mental health testing, but we can refer you to get tested.   Thank you for choosing us for your care.  For informational purposes only. Not to replace the advice of your health " care provider. Copyright   2022 [facility]. All rights reserved. [employer] [id number] - 12/22.

## 2024-05-16 ENCOUNTER — OFFICE VISIT (OUTPATIENT)
Dept: OBGYN | Facility: CLINIC | Age: 34
End: 2024-05-16
Payer: COMMERCIAL

## 2024-05-16 VITALS
DIASTOLIC BLOOD PRESSURE: 77 MMHG | SYSTOLIC BLOOD PRESSURE: 109 MMHG | HEART RATE: 80 BPM | WEIGHT: 185.6 LBS | BODY MASS INDEX: 28.13 KG/M2 | OXYGEN SATURATION: 99 % | HEIGHT: 68 IN

## 2024-05-16 DIAGNOSIS — E06.3 HYPOTHYROIDISM DUE TO HASHIMOTO'S THYROIDITIS: ICD-10-CM

## 2024-05-16 DIAGNOSIS — Z30.41 ENCOUNTER FOR SURVEILLANCE OF CONTRACEPTIVE PILLS: ICD-10-CM

## 2024-05-16 DIAGNOSIS — Z01.419 ENCOUNTER FOR GYNECOLOGICAL EXAMINATION WITHOUT ABNORMAL FINDING: Primary | ICD-10-CM

## 2024-05-16 PROCEDURE — 36415 COLL VENOUS BLD VENIPUNCTURE: CPT | Performed by: NURSE PRACTITIONER

## 2024-05-16 PROCEDURE — 99459 PELVIC EXAMINATION: CPT | Performed by: NURSE PRACTITIONER

## 2024-05-16 PROCEDURE — 84439 ASSAY OF FREE THYROXINE: CPT | Performed by: NURSE PRACTITIONER

## 2024-05-16 PROCEDURE — 84443 ASSAY THYROID STIM HORMONE: CPT | Performed by: NURSE PRACTITIONER

## 2024-05-16 PROCEDURE — 99395 PREV VISIT EST AGE 18-39: CPT | Performed by: NURSE PRACTITIONER

## 2024-05-16 RX ORDER — NORETHINDRONE AND ETHINYL ESTRADIOL 1 MG-35MCG
1 KIT ORAL DAILY
Qty: 112 TABLET | Refills: 3 | Status: SHIPPED | OUTPATIENT
Start: 2024-05-16

## 2024-05-16 NOTE — PROGRESS NOTES
Preventive Care Visit  [facility]  ZuleymaMERRY Maldonado CNP, OB/Gyn  May 16, 2024       SUBJECTIVE:   Analia is a 33 year old, presenting for the following:  Physical    HPI    Due for TSH-orders placed  by PCP.    Social History     Tobacco Use    Smoking status: Never    Smokeless tobacco: Never   Substance Use Topics    Alcohol use: Not Currently         11/2/2023    11:27 AM   Alcohol Use   Prescreen: >3 drinks/day or >7 drinks/week? No     Reviewed orders with patient.  Reviewed health maintenance and updated orders accordingly - Yes  Patient Active Problem List   Diagnosis    Familial hematuria    CARDIOVASCULAR SCREENING; LDL GOAL LESS THAN 160    Mechanical low back pain    Keloid scar    ASCUS of cervix with negative high risk HPV    Common wart    Hypothyroidism due to Hashimoto's thyroiditis    High triglycerides    Elevated liver enzymes    Class 1 obesity due to excess calories with body mass index (BMI) of 32.0 to 32.9 in adult, unspecified whether serious comorbidity present    Mood disorder (H24)    FRANK (obstructive sleep apnea) - mild (AHI 7)    Moderate episode of recurrent major depressive disorder (H)    Fatigue, unspecified type    Posttraumatic stress disorder    Recurrent major depression in partial remission (H24)    Transplant donor evaluation    Anxiety    Generalized anxiety disorder     Past Surgical History:   Procedure Laterality Date    CHOLECYSTECTOMY  12/2019    COLONOSCOPY N/A 12/16/2015    Procedure: COLONOSCOPY;  Surgeon: Duane, William Charles, MD;  Location: [facility] OR    COLONOSCOPY N/A 08/11/2022    Procedure: COLONOSCOPY, WITH POLYPECTOMY AND BIOPSY;  Surgeon: Aurora Paez DO;  Location: [facility] OR    COLONOSCOPY WITH CO2 INSUFFLATION N/A 12/16/2015    Procedure: COLONOSCOPY WITH CO2 INSUFFLATION;  Surgeon: Duane, William Charles, MD;  Location: [facility]    COLONOSCOPY WITH CO2 INSUFFLATION N/A 08/11/2022    Procedure: COLONOSCOPY, WITH CO2 INSUFFLATION;   Surgeon: Aurora Paez DO;  Location: [facility]    COMBINED ESOPHAGOSCOPY, GASTROSCOPY, DUODENOSCOPY (EGD) WITH CO2 INSUFFLATION N/A 08/11/2022    Procedure: ESOPHAGOGASTRODUODENOSCOPY, WITH CO2 INSUFFLATION;  Surgeon: Aurora Paez DO;  Location: [facility]    ESOPHAGOSCOPY, GASTROSCOPY, DUODENOSCOPY (EGD), COMBINED N/A 08/11/2022    Procedure: ESOPHAGOGASTRODUODENOSCOPY, WITH BIOPSY;  Surgeon: Aurora Paez DO;  Location: [facility]       Social History     Tobacco Use    Smoking status: Never    Smokeless tobacco: Never   Substance Use Topics    Alcohol use: Not Currently     Family History   Problem Relation Age of Onset    Hyperlipidemia Mother     Thyroid Disease Mother     Kidney Disease Mother     Hyperlipidemia Father     Substance Abuse Sister     Substance Abuse Sister     Depression Brother     No Known Problems Maternal Grandmother     Myocardial Infarction Maternal Grandfather     No Known Problems Paternal Grandmother     No Known Problems Paternal Grandfather     Breast Cancer Cousin     C.A.D. No family hx of     Diabetes No family hx of     Breast Cancer No family hx of     Cancer - colorectal No family hx of     Anesthesia Reaction No family hx of     Blood Disease No family hx of            Breast Cancer Screening:    Mammogram Screening - Patient under 40 years of age: Routine Mammogram Screening not recommended.   Pertinent mammograms are reviewed under the imaging tab.      History of abnormal Pap smear: YES - reflected in Problem List and Health Maintenance accordingly        Latest Ref Rng & Units 4/15/2022    12:25 PM 6/6/2019    12:56 PM 6/6/2019     8:30 AM   PAP / HPV   PAP  Negative for Intraepithelial Lesion or Malignancy (NILM)      PAP (Historical)   NIL     HPV 16 DNA Negative Negative   Negative    HPV 18 DNA Negative Negative   Negative    Other HR HPV Negative Negative   Negative      Reviewed and updated as needed this visit by Provider   Tobacco  Allergies  Meds  Problems   Med Hx  Surg Hx  Fam Hx  Soc   Hx Sexual Activity        Past Medical History:   Diagnosis Date    Acute gallstone pancreatitis 12/28/2019    Anxiety     ASCUS of cervix with negative high risk HPV 01/26/2017    ASCUS/neg HR HPV.    Juarez's esophagus determined by biopsy 2022    Depressive disorder     Gallstones 12/28/2019    [facility]    H/O colposcopy with cervical biopsy 03/23/2017    Bx & ECC - negative    Hashimoto's disease     Headache(784.0)     Hypothyroidism due to Hashimoto's thyroiditis     Papanicolaou smear of cervix with atypical squamous cells of undetermined significance (ASC-US) 12/03/2015      Past Surgical History:   Procedure Laterality Date    CHOLECYSTECTOMY  12/2019    COLONOSCOPY N/A 12/16/2015    Procedure: COLONOSCOPY;  Surgeon: Duane, William Charles, MD;  [facility]    COLONOSCOPY N/A 08/11/2022    Procedure: COLONOSCOPY, WITH POLYPECTOMY AND BIOPSY;  Surgeon: Aurora Paez DO;  [facility]    COLONOSCOPY WITH CO2 INSUFFLATION N/A 12/16/2015    Procedure: COLONOSCOPY WITH CO2 INSUFFLATION;  Surgeon: Duane, William Charles, MD;  [facility]    COLONOSCOPY WITH CO2 INSUFFLATION N/A 08/11/2022    Procedure: COLONOSCOPY, WITH CO2 INSUFFLATION;  Surgeon: Aurora Paez DO;  [facility]    COMBINED ESOPHAGOSCOPY, GASTROSCOPY, DUODENOSCOPY (EGD) WITH CO2 INSUFFLATION N/A 08/11/2022    Procedure: ESOPHAGOGASTRODUODENOSCOPY, WITH CO2 INSUFFLATION;  Surgeon: Aurora Paez DO;  [facility]    ESOPHAGOSCOPY, GASTROSCOPY, DUODENOSCOPY (EGD), COMBINED N/A 08/11/2022    Procedure: ESOPHAGOGASTRODUODENOSCOPY, WITH BIOPSY;  Surgeon: Aurora Paez DO;  Location: [facility]     Review of Systems  Constitutional, neuro, ENT, endocrine, pulmonary, cardiac, gastrointestinal, genitourinary, musculoskeletal, integument and psychiatric systems are negative, except as otherwise noted.    OBJECTIVE:   /77 (BP Location: Right arm, Patient Position:  "Sitting, Cuff Size: Adult Regular)   Pulse 80   Ht 1.727 m (5' 8\")   Wt 84.2 kg (185 lb 9.6 oz)   SpO2 99%   BMI 28.22 kg/m     Estimated body mass index is 28.22 kg/m  as calculated from the following:    Height as of this encounter: 1.727 m (5' 8\").    Weight as of this encounter: 84.2 kg (185 lb 9.6 oz).  Physical Exam  GENERAL: alert and no distress  NECK: no adenopathy, no asymmetry, masses, or scars  RESP: lungs clear to auscultation - no rales, rhonchi or wheezes  BREAST: normal without masses, tenderness or nipple discharge and no palpable axillary masses or adenopathy  CV: regular rate and rhythm, normal S1 S2, no S3 or S4, no murmur, click or rub, no peripheral edema  ABDOMEN: soft, nontender, no hepatosplenomegaly, no masses and bowel sounds normal   (female) w/bimanual: normal female external genitalia, normal urethral meatus, normal vaginal mucosa, and normal cervix/adnexa/uterus without masses or discharge  MS: no gross musculoskeletal defects noted, no edema  SKIN: no suspicious lesions or rashes  NEURO: Normal strength and tone, mentation intact and speech normal  PSYCH: mentation appears normal, affect normal/bright    ASSESSMENT/PLAN:   Encounter for gynecological examination without abnormal finding  Health Maintenance reviewed.  - GA PELVIC EXAMINATION    Encounter for surveillance of contraceptive pills  Refill x 1 year  - norethindrone-ethinyl estradiol (ALAYCEN 1/35) 1-35 [facility] tablet; Take 1 tablet by mouth daily Continuously by skipping placebo pills    Hypothyroidism due to Hashimoto's thyroiditis  - TSH with free T4 reflex    Counseling  Reviewed preventive health counseling, as reflected in patient instructions  Special attention given to:        Regular exercise       Healthy diet/nutrition       Contraception      She reports that she has never smoked. She has never used smokeless tobacco.          Signed Electronically by: MERRY Carrington CNP  "

## 2024-05-16 NOTE — PATIENT INSTRUCTIONS
If you have any questions regarding your visit, Please contact your care team.     [employer] [facility]: [phone]  To Schedule an Appointment 24/7  [facility]: [phone]     Zuleyma Gross- APRN CNP      Aaron Rice-Surgery Scheduler  Lidia-Surgery Scheduler         Monday 7:30am-2:00pm    Tuesday 7:30am-4:00pm    Wednesday 7:30am-2:00pm    Thursday 7:30am-11:00am    Friday 7:30am-2:00pm [address] [address]  Phone- [phone]   Fax- [phone]     Imaging Scheduling all locations  [phone]    [facility]  [address] [place] [address]   [place] [address] [phone]         Urgent Care locations:  [facility]   Monday-Friday  10 am - 8 pm  Saturday and Sunday   9 am - 5 pm     [phone]   If you need a medication refill, please contact your pharmacy. Please allow 3 business days for your refill to be completed.  As always, Thank you for trusting us with your healthcare needs!      see additional instructions from your care team below

## 2024-05-17 LAB
T4 FREE SERPL-MCNC: 1.84 NG/DL (ref 0.9–1.7)
TSH SERPL DL<=0.005 MIU/L-ACNC: 0.15 UIU/ML (ref 0.3–4.2)

## 2024-05-18 ENCOUNTER — MYC MEDICAL ADVICE (OUTPATIENT)
Dept: FAMILY MEDICINE | Facility: CLINIC | Age: 34
End: 2024-05-18
Payer: COMMERCIAL

## 2024-05-20 NOTE — TELEPHONE ENCOUNTER
Routing to provider to review and advise, see [employer] message below.  Pt viewed provider results message from 5/16/24 labs.  Please clarify instructions for Levothyroxine.

## 2024-05-20 NOTE — TELEPHONE ENCOUNTER
Take one tablet (200mcg) daily except take 1/2 (100mcg) tablet daily every sunday. Pranav Ware MD

## 2024-06-23 ENCOUNTER — MYC MEDICAL ADVICE (OUTPATIENT)
Dept: NEUROLOGY | Facility: CLINIC | Age: 34
End: 2024-06-23
Payer: COMMERCIAL

## 2024-06-23 DIAGNOSIS — G43.709 CHRONIC MIGRAINE WITHOUT AURA WITHOUT STATUS MIGRAINOSUS, NOT INTRACTABLE: ICD-10-CM

## 2024-06-24 ENCOUNTER — TELEPHONE (OUTPATIENT)
Dept: NEUROLOGY | Facility: CLINIC | Age: 34
End: 2024-06-24
Payer: COMMERCIAL

## 2024-06-24 ENCOUNTER — VIRTUAL VISIT (OUTPATIENT)
Dept: PSYCHIATRY | Facility: CLINIC | Age: 34
End: 2024-06-24
Payer: COMMERCIAL

## 2024-06-24 VITALS — BODY MASS INDEX: 29.04 KG/M2 | WEIGHT: 191 LBS

## 2024-06-24 DIAGNOSIS — F39 MOOD DISORDER (H): ICD-10-CM

## 2024-06-24 DIAGNOSIS — F41.1 GENERALIZED ANXIETY DISORDER: Primary | ICD-10-CM

## 2024-06-24 DIAGNOSIS — F43.10 POSTTRAUMATIC STRESS DISORDER: ICD-10-CM

## 2024-06-24 PROCEDURE — 99214 OFFICE O/P EST MOD 30 MIN: CPT | Mod: 95 | Performed by: NURSE PRACTITIONER

## 2024-06-24 RX ORDER — BUSPIRONE HYDROCHLORIDE 15 MG/1
15 TABLET ORAL 2 TIMES DAILY
Qty: 180 TABLET | Refills: 0 | Status: SHIPPED | OUTPATIENT
Start: 2024-06-24 | End: 2024-08-05

## 2024-06-24 ASSESSMENT — PAIN SCALES - GENERAL: PAINLEVEL: NO PAIN (0)

## 2024-06-24 NOTE — TELEPHONE ENCOUNTER
Prior Authorization Retail Medication Request-RENEWAL     Medication/Dose: Ajovy 225 mg every 30 days  ICD code (if different than what is on RX):  G43.709  Previously Tried and Failed:  will avoid adding a tricyclic antidepressant; this may also be contraindicated due to her elevated pulse rate.  Atenolol,topiramate,lamotrigine     Rationale:  Migraine     Insurance Name:  [facility]  Insurance ID:  [id number]      Pharmacy Information (if different than what is on RX)  Name:    Phone:

## 2024-06-24 NOTE — PATIENT INSTRUCTIONS
**For crisis resources, please see the information at the end of this document**     Thank you for coming to the [facility] [place] [facility].    TREATMENT PLAN:  Medications:   CONTINUE buspirone 15 mg twice a day. Script sent for #180.  CONTINUE propranolol 10 mg twice a day, with additional 10 mg as needed in afternoon. No script needed.  CONTINUE aripiprazole 5 mg every day. No script needed.  CONTINUE bupropion  mg every day. No script needed.  CONTINUE lamotrigine 200 mg per last provider. No script needed.  Continue all other medications per primary care provider.     Consults / Referrals:   - Continue therapy with established provider.    Follow Up:  Schedule an appointment with me in 6 weeks or sooner as needed. Call [facility] at [phone] to schedule.  Follow up with primary care provider as planned or for acute medical concerns.  Call the psychiatric nurse line with medication questions or concerns at [phone].  Hypiost may be used to communicate with your provider, but this is not intended to be used for emergencies.    Psychoeducation:  FIRST GENERATION ANTIPSYCHOTIC/ SECOND GENERATION ANTIPSYCHOTIC USE:  Atypical need for cardiometabolic monitoring with medication- B/P, weight, blood sugar, cholesterol.  Need to monitor for abnormal movements taught.  I have informed the patient and parent of the risk for metabolic syndrome; hyperglycemia, dyslipidemia and change in body weight as well as potential electrolyte imbalance and/or other medical complications. I further, discussed information regarding risk of akathisia risk/TD and stroke/cerebrovascular adverse events, diabetes.  We discussed the need for blood test to monitor for these potential effects.  The patient verbalized understanding and gives verbal consent to use of antipsychotic.     Discussed use of propranolol as off label for anxiety, and side effects to monitor including low HR,  BP, lightheadedness or dizziness.    Discussed side effects of bupropion to include agitation and other activation issues, ^ BP or HR, insomnia, stomach upset, appetite loss, weight loss, risk for precipitation of divya, and increased risk for seizures.  Patient agreed to take Bupropion to treat low mood, lack of motivation and poor concentration. Patient verbalized understanding of medication schedule, of side effects, avoidance of alcohol and marijuana due to increase risk for seizures.      Financial Assistance [phone]  [facility] Billing [phone]  Central Billing Office, ealth: [phone]  [facility] Billing [phone]  Medical Records [phone]  [facility] Patient Bill of Rights [email or web address]       MENTAL HEALTH CRISIS RESOURCES:  For a emergency help, please call 911 or go to the nearest Emergency Department.     Emergency Walk-In Options:   EmPATH Unit @ [facility] ([place]): [phone] - Specialized mental health emergency area designed to be calming  [facility] ([place]): [phone]  [facility] ([place]): [phone]  [facility]): [phone]    [facility] Crisis Information:   [place]: [phone]  [place]: [phone]  [place] (WILLIE) - Adult: [phone]     Child: [phone]  [place] - Adult: [phone]     Child: [phone]  [place]: [phone]  List of all [facility] resources:   https://[place].gov/dhs/people-we-serve/adults/health-care/mental-health/resources/crisis-contacts.jsp    National Crisis Information:   National Suicide & Crisis Lifeline: Call 958        For online chat options, visit https://suicidepreventionlifeline.org/chat/  Poison Control Center: 2-485-417-2322  Poison Control Center: 2-893-832-7527  Trans Lifeline: 1-252.588.6202 - Hotline for transgender people of all ages  The [employer]: 4-217-320-3746 - Hotline  "for LGBT youth     For Non-Emergency Support:   Fast Tracker: Mental Health & Substance Use Disorder Resources -   https://www.fasttrackermn.org/       Again thank you for choosing [facility] MENT[facility] & ADDICTION [facility] and please let us know how we can best partner with you to improve you and your family's health.    You may be receiving a survey regarding this appointment. We would love to have your feedback, both positive and negative. The survey is done by an external company, so your answers are anonymous.        Patient Education   Collaborative Care Psychiatry Service  What to Expect  Here's what to expect from your Collaborative Care Psychiatry Service (CCPS).   About CCPS  CCPS means 2 people work together to help you get better. You'll meet with a behavioral health clinician and a psychiatric doctor. A behavioral health clinician helps people with mental health problems by talking with them. A psychiatric doctor helps people by giving them medicine.  How it works  At every visit, you'll see the behavioral health clinician (BHC) first. They'll talk with you about how you're doing and teach you how to feel better.   Then you'll see the psychiatric doctor. This doctor can help you deal with troubling thoughts and feelings by giving you medicine. They'll make sure you know the plan for your care.   CCPS usually takes 3 to 6 visits. If you need more visits, we may have you start seeing a different psychiatric doctor for ongoing care.  If you have any questions or concerns, we'll be glad to talk with you.  About visits  Be open  At your visits, please talk openly about your problems. It may feel hard, but it's the best way for us to help you.  Cancelling visits  If you can't come to your visit, please call us right away at [phone]. If you don't cancel at least 24 hours (1 full day) before your visit, that's \"late cancellation.\"  Being late to visits  Being very late is the same as not " "showing up. You will be a \"no show\" if:  Your appointment starts with a [facility], and you're more than 15 minutes late for a 30-minute (half hour) visit. This will also cancel your appointment with the psychiatric doctor.  Your appointment is with a psychiatric doctor only, and you're more than 15 minutes late for a 30-minute (half hour) visit.  Your appointment is with a psychiatric doctor only, and you're more than 30 minutes late for a 60-minute (full hour) visit.  If you cancel late or don't show up 2 times within 6 months, we may end your care.   Getting help between visits  If you need help between visits, you can call us Monday to Friday from 8 a.m. to 4:30 p.m. at [phone].  Emergency care  Call 911 or go to the nearest emergency department if your life or someone else's life is in danger.  Call 988 anytime to reach the national Suicide and Crisis hotline.  Medicine refills  To refill your medicine, call your pharmacy. You can also call [facility]'s Behavioral Access at [phone], Monday to Friday, 8 a.m. to 4:30 p.m. It can take 1 to 3 business days to get a refill.   Forms, letters, and tests  You may have papers to fill out, like FMLA, short-term disability, and workability. We can help you with these forms at your visits, but you must have an appointment. You may need more than 1 visit for this, to be in an intensive therapy program, or both.  Before we can give you medicine for ADHD, we may refer you to get tested for it or confirm it another way.  We may not be able to give you an emotional support animal letter.  We don't do mental health checks ordered by the court.   We don't do mental health testing, but we can refer you to get tested.   Thank you for choosing us for your care.  For informational purposes only. Not to replace the advice of your health care provider. Copyright   2022 [facility]. All rights reserved. [employer] 455594 - 12/22.  "

## 2024-06-24 NOTE — PROGRESS NOTES
Virtual Visit Details    Type of service:  Video Visit     Originating Location (pt. Location): Home    Distant Location (provider location):  On-site  Platform used for Video Visit: Amy

## 2024-06-24 NOTE — PROGRESS NOTES
"   PSYCHIATRIC MEDICATION FOLLOW UP APPT     Name: Analia Jerry   : 1990               Telemedicine Visit: The patient's condition can be safely assessed and treated via synchronous audio and visual telemedicine encounter.      Consent:  The patient/guardian has verbally consented to: the potential risks and benefits of telemedicine (video visit or phone) versus in person care; bill my insurance or make self-payment for services provided; and responsibility for payment of non-covered services.    As the provider I attest to compliance with applicable laws and regulations related to telemedicine.         Source of Referral / Care Team:  Primary Care Provider: Pranav Ware MD   Therapist: Monica ([facility]).         The [facility] psychiatry providers act as a specialty service for Primary Care Providers in the [facility] who seek to optimize medications for unstable patients. Once medications have been optimized, [facility] providers discharge the patient back to the referring Primary Care Provider for ongoing medication management. This type of system allows [facility] to serve a high volume of patients.     Patient Identification:  Patient is a 33 year old,   White Not  or  female  who presents for return visit with me.   Patient prefers to be called: \"Analia\".  Patient is currently employed full time.    Patient attended the session alone.    RECORDS AVAILABLE FOR REVIEW: EHR records through [employer] .       Interim History:  I last saw Analia Jerry for outpatient psychiatry Return Visit six weeks ago on 24. During that appointment, we added buspirone 15 mg BID, continued wellbutrin 300 mg, abilify 5 mg, lamictal 200 mg, and propranolol 10 mg 1 QAM and 1-2 up to twice a day PRN . Patient reports ADHERING to prescribed medications. She reports overall tolerating the buspirone addition well, but does note side effects: increased appetite (denies significant weight change), and " some increased restlessness at bedtime (in arms and legs now). Denies currently or frequently, denies any tics / tremors / abnormal movements. Denies any change in mood lability, feels generally mood has been stable, low irritability, and no impulsive or reckless behaviors. Denies significant depressed mood recently. Denies any SI/SIB/HI.     Initial Impression / MREs:   5/13/24: Patient most recently seen with [facility] by Natasha Downing and trialed higher wellbutrin as well as higher abilify, both reduced back due to side effects. At last appointment 2 months ago, continued wellbutrin 300 mg, abilify 5 mg, lamictal 200 mg, and propranolol 10 mg 1 QAM and 1-2 up to twice a day PRN. Patient reports currently tolerating medications well. She is using propranolol 10 mg QAM and QPM, and taking 1 dose during afternoon PRN 1/2 days with some benefit, denies any side effects. She does note her general anxiety remains high. Her depression has been low and feels well managed with current medications. Denies any symptoms of divya. Denies any SI/SIB/HI, psychosis. Discussed risks / benefits of medication changes, and patient is open to trial of buspirone augment as previously discussed. We will continue other medications. Follow-up in 6 weeks or sooner as needed. Patient agreeable to plan.     1/29/24: Today Analia presents to [facility] with higher anxiety and depression symptoms for the last few months. Unclear trigger and may be related to trauma or seasons. Propranolol has been helpful for anxiety. Ongoing depression, flat affect even with increased dose of Wellbutrin XL, will decrease dose as this may be the factor, but also reviewed mood stabiliziers and that she may be going through a depression cycle and to consider increasing abilify for lamictal. We decided on abilify as this is a lower therapuetic dose and has now toelrated side effects better with the increased propranolol. She would like to go back to to PCP for the future, we  will reassess and see if needed for long term psychiatry or if returning to [facility] when returning from [place].     6/26/23: Analia Jerry is a 32 year old White, female who presents for initial visit with this provider at [facility] for medication management.  Patient previously seen with [facility] provider YULIET Collins (2/2021-8/2021) and returned to PCP, then re-referred and started with Dr. Babb (03/2022-05/2023) for symptom exacerbation. At last appointment 2 month ago, continued lamotrigine 200 mg every day and bupropion  mg every day after diagnosis changed to mood disorder (from MDD) given reports of increased impulsivity, agitation during sertraline initiation which also occurred with past Viibryd trial. Over last few months, she reports improvement in mood lability and irritability, up to last ~2 weeks where they have increased again without clear trigger. Reports worsening of her depression, along with quick to anger, irritability. Denies other symptoms of manic episode at this time. Denies any SI/SIB/HI. No psychosis. Does report throwing things during this time, but denies any physical aggression towards people, animals, no property destruction.  Denies any substance use. Of note, bc started injections for her migraines, stopped atenolol over last few weeks. Denies any notable changes without it, no history of side effects. Discussed risks/benefits/ side effects of current medication, and possibility that bupropion is worsening mood stability as well as side effects of irritability for some. Will initially trial decreasing dose, continuing lamotrigine. Will also send trial of propranolol to be used as needed for anxiety, agitation.     Current medications include:   Current Outpatient Medications   Medication Sig Dispense Refill    ARIPiprazole (ABILIFY) 5 MG tablet Take 1 tablet (5 mg) by mouth daily 90 tablet 0    buPROPion (WELLBUTRIN XL) 300 MG 24 hr tablet Take 1 tablet (300 mg) by mouth every morning  90 tablet 0    busPIRone (BUSPAR) 15 MG tablet Day 1-3: Take 1/2 tablet (7.5 mg) twice a day. Day 4-7: Take 1 tablet (15 mg) in AM and 1/2 tablet in PM. Day 8 on: Take 1 tablet twice a day. 60 tablet 1    cyclobenzaprine (FLEXERIL) 5 MG tablet Take 1-2 tablets every 8 hours as needed for muscle spasm 40 tablet 0    diclofenac (VOLTAREN) 75 MG EC tablet Take 1 tablet (75 mg) by mouth 2 times daily for 10 days 20 tablet 0    Fremanezumab-vfrm (AJOVY) 225 MG/1.5ML SOAJ Inject 1.5 mg Subcutaneous every 30 days 1.5 mL 11    lamoTRIgine (LAMICTAL) 200 MG tablet Take 1 tablet (200 mg) by mouth at bedtime 90 tablet 0    levothyroxine (SYNTHROID/LEVOTHROID) 200 MCG tablet Take 1 tablet (200 mcg) by mouth daily 90 tablet 3    metoclopramide (REGLAN) 10 MG tablet Take 1 tablet (10 mg) by mouth 3 times daily as needed (nausea) 20 tablet 11    nitroFURantoin macrocrystal (MACRODANTIN) 100 MG capsule Take 1 capsule (100 mg) by mouth At Bedtime 90 capsule 3    norethindrone-ethinyl estradiol (ALAYCEN 1/35) 1-35 MG-MCG tablet Take 1 tablet by mouth daily Continuously by skipping placebo pills 112 tablet 3    omeprazole (PRILOSEC) 20 MG DR capsule Take 1 capsule (20 mg) by mouth daily 30 capsule 11    propranolol (INDERAL) 10 MG tablet Take 1 tablet (10 mg) by mouth 2 times daily. May also take 1-2 tablets (10-20 mg) daily as needed (anxiety/ Side effects). 180 tablet 0    rOPINIRole (REQUIP) 0.5 MG tablet Take 3 tablets (total of 1.5mg) by mouth daily 1 to 3 hours before bedtime 360 tablet 3    SUMAtriptan (IMITREX) 100 MG tablet Take 1 tablet (100 mg) by mouth at onset of headache for migraine (repeat in 2 hours if needed) 18 tablet 11     Current Facility-Administered Medications   Medication Dose Route Frequency Provider Last Rate Last Admin    Botulinum Toxin Type A (BOTOX) 200 units injection 150 Units  150 Units Intramuscular See Admin Instructions Karissa Gonzalez MD             Side effects: Yes: restlessness QHS at  "times    The [place] Prescription Monitoring Program : not reviewed    Psychiatric ROS:  Analiabismark Jerry reports mood has been: \"Good\"  Depression has been: depressed mood, little interest / pleasure, and appetite change or significant weight loss / gain self rates as 2-3/ 10, where 0 is none at all and 10 being severe depression. Was 1-2/10 at last appt.  SI/SIB/HI: denies all.   Anxiety has been: excessive worry, difficult to control, restlessness / feeling keyed up, and irritability  self rates as 3-4/ 10, where 0 is none at all and 10 being severe anxiety. Was 5/10 at last appt.  Sleep has been: no significant change or issue reported  Energy has been: no significant change or issue reported  Appetite has been: more hungry since + buspirone, more snacking throughout day. Reports fluctuating some but about the same.   Richa sxs: denies any, no change in mood lability with +buspirone  Psychosis sxs: none  ADHD/ADD sxs: none  PTSD sxs: intrusion and avoidance sx require further evaluate. negative emotional state, diminished interest and detachment from others, irritability, reckless behavior and  sleep disturbances     GAD2 scores were reviewed today:2   PHQ-9 scores:       11/2/2023    11:25 AM 3/11/2024    12:07 PM 4/29/2024     9:39 AM   PHQ-9 SCORE   PHQ-9 Total Score MyChart 3 (Minimal depression)     PHQ-9 Total Score 3 5 6       MAXIM-7 scores:        3/11/2024    12:07 PM 4/29/2024     9:39 AM 5/12/2024    12:25 PM   MAXIM-7 SCORE   Total Score   9 (mild anxiety)   Total Score 10 5 9         Current stressors include: Symptoms, Occupational Difficulties  Coping mechanisms and supports include: Therapy, Family, and Friends    Vital Signs:   There were no vitals taken for this visit.    Review of Systems:  10 systems (general, cardiovascular, respiratory, eyes, ENT, endocrine, GI, , M/S, neurological) were reviewed. Most pertinent finding(s) is/are:  No acute distress; no wheezing / short of breath / increased " work of breathing; denies chest pain / tightness / palpitations; reduced appetite and recent weight loss; no nausea / vomiting / abdominal pain; no tics / tremors / abnormal muscle mvmts + restlessness QHS; no visible skin changes / rashes . The remaining systems are all unremarkable.    Labs:  Most recent laboratory results reviewed and the pertinent results include:     Recent Labs   Lab Test 03/21/24  1146   WBC 9.1   HGB 13.3   HCT 39.4   MCV 90        Recent Labs   Lab Test 03/21/24  1146      POTASSIUM 4.2   CHLORIDE 106   CO2 21*   GLC 91   KIKI 9.5   BUN 17.3   CR 1.05*   GFRESTIMATED 71   ALBUMIN 4.3   PROTTOTAL 6.8   AST 12   ALT 16   ALKPHOS 45   BILITOTAL 0.4     Recent Labs   Lab Test 03/21/24  1146   CHOL 200*   *   HDL 45*   TRIG 189*   A1C 5.3     Recent Labs   Lab Test 05/16/24  1022   TSH 0.15*   T4 1.84*     25 OH Vit D2   Date Value Ref Range Status   10/22/2014 <5 ug/L Final     25 OH Vit D3   Date Value Ref Range Status   10/22/2014 29 ug/L Final     25 OH Vit D total   Date Value Ref Range Status   10/22/2014  30 - 75 ug/L Final    <34  Season, race, dietary intake, and treatment affect the concentration of   25-hydroxy-Vitamin D. Values may decrease during winter months and increase   during summer months. Values less than 30 ug/L may indicate Vitamin D   deficiency.          Past Medical/Surgical History:  Past Medical History:   Diagnosis Date    Acute gallstone pancreatitis 12/28/2019    Anxiety     ASCUS of cervix with negative high risk HPV 01/26/2017    ASCUS/neg HR HPV.    Juarez's esophagus determined by biopsy 2022    Depressive disorder     Gallstones 12/28/2019    [facility]    H/O colposcopy with cervical biopsy 03/23/2017    Bx & ECC - negative    Hashimoto's disease     Headache(784.0)     Hypothyroidism due to Hashimoto's thyroiditis     Papanicolaou smear of cervix with atypical squamous cells of undetermined significance (ASC-US)  2015      has a past medical history of Acute gallstone pancreatitis (2019), Anxiety, ASCUS of cervix with negative high risk HPV (2017), Juarez's esophagus determined by biopsy (), Depressive disorder, Gallstones (2019), H/O colposcopy with cervical biopsy (2017), Hashimoto's disease, Headache(784.0), Hypothyroidism due to Hashimoto's thyroiditis, and Papanicolaou smear of cervix with atypical squamous cells of undetermined significance (ASC-US) (2015).    She has no past medical history of Arthritis, Cancer (H), Cerebral infarction (H), Congestive heart failure (H), COPD (chronic obstructive pulmonary disease) (H), Diabetes (H), Heart disease, History of blood transfusion, Hypertension, or Uncomplicated asthma.    Medication allergies:    Allergies   Allergen Reactions    Nkda [No Known Drug Allergy]        Social History:  Born in [place], [place] and raised in [place], [place].  Parents not  but still live together  Siblings:  Two half sisters, full biobrother   in October.  Childhood: Yes intact home with all current basic needs being met.  Highest education level was college graduate.   Employment Status: full time -  [employer].  Relationship status: Together for 9 years. Safe in relationship.  Current Living situation:  , and 2 stepsons are with them on weekends, longer in summer.  Feels safe at home.  Children: 15 and 10 year old stepsons  Firearms/Weapons Access: No: Patient denies   Service: No  Support: , friend, therapist     Current use of drugs or alcohol: Denies   Tobacco use: No    Mental Status Exam:  Alertness: alert  and oriented   Appearance: well groomed  Behavior/Demeanor: cooperative, pleasant, and calm, with good eye contact   Speech: normal and regular rate and rhythm  Language: intact and no problems  Psychomotor: normal or unremarkable  Mood: description consistent with euthymia  Affect: full range and  appropriate; was congruent to mood; was congruent to content  Thought Process/Associations: unremarkable  Thought Content:  Reports none;  Denies suicidal ideation, violent ideation, and delusions  Perception:  Reports none;  Denies auditory hallucinations and visual hallucinations  Insight: intact  Judgment: intact  Cognition: does  appear grossly intact; formal cognitive testing was not done  Recent and Remote Memory: Intact to interview. Not formally assessed. No amnesia.  Attention Span and Concentration: normal  Fund of Knowledge: appropriate  Gait and Station: unremarkable    Suicide Risk Assessment:  Today Analia Jerry reports no suicidal ideation. There are notable risk factors for self-harm, including anxiety, family history, and mood change. However, risk is mitigated by commitment to family, absence of past attempts, history of seeking help when needed, future oriented, no access to firearms or weapons, and denies suicidal intent or plan.   Therefore, based on all available evidence including the factors cited above, Analia Jerry does not appear to be at imminent risk for self-harm, does not meet criteria for a 72-hr hold, and therefore remains appropriate for ongoing outpatient level of care.  A thorough assessment of risk factors related to suicide and self-harm have been reviewed and are noted above. The patient convincingly denies suicidality on several occasions. Local community safety resources printed and reviewed for patient to use if needed. There was no deceit detected, and the patient presented in a manner that was believable.     Recommended that patient call 911 or go to the local ED should there be a change in any of these risk factors.    DSM5 Diagnosis:  F39 Mood disorder  300.02 (F41.1) Generalized Anxiety Disorder  309.81 (F43.10) Posttraumatic Stress Disorder (includes Posttraumatic Stress Disorder for Children 6 Years and Younger)  Without dissociative symptoms    Medical  comorbidities include:   Patient Active Problem List    Diagnosis Date Noted    Generalized anxiety disorder 07/10/2023     Priority: Medium    Anxiety 02/15/2023     Priority: Medium    Transplant donor evaluation 09/14/2022     Priority: Medium    Recurrent major depression in partial remission (H24) 07/25/2022     Priority: Medium    Posttraumatic stress disorder 03/15/2022     Priority: Medium    Fatigue, unspecified type 04/05/2021     Priority: Medium    Moderate episode of recurrent major depressive disorder (H) 03/04/2021     Priority: Medium    FRANK (obstructive sleep apnea) - mild (AHI 7) 08/24/2020     Priority: Medium     8/10/2020 [place] Diagnostic Sleep Study (216.0 lbs) - scored with 3% rules -  AHI 7.1, RDI 7.1, Supine AHI 22.9, REM AHI -, Low O2 91.2%, Time Spent ?88% 0 minutes / Time Spent ?89% 0 minutes.      Mood disorder (H24)      Priority: Medium    Class 1 obesity due to excess calories with body mass index (BMI) of 32.0 to 32.9 in adult, unspecified whether serious comorbidity present 07/02/2020     Priority: Medium    Elevated liver enzymes 12/28/2019     Priority: Medium    High triglycerides 04/03/2018     Priority: Medium    ASCUS of cervix with negative high risk HPV 01/26/2017     Priority: Medium     4/1/11 (approx) normal - patient reported.   6/11/13 normal - patient reported  12/3/15 ASCUS pap. Plan: per provider, repeat pap in 1 year.  1/26/17 ASCUS/neg HR HPV. Plan: colp bef 4/26/17  3/23/17 [place] Bx & ECC - negative. Plan cotest in 1 year.   3/8/18 ASCUS pap, neg HPV. Plan: colp.   5/10/18 [place] Bx and ECC: negative. Plan: cotest in 1 yr.   6/6/19 NIL/neg HR HPV. Plan cotest in 3 years  4/15/22 NIL pap, neg HPV. 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         Psychosocial & Contextual Factors:  Occupational Difficulties     DIFFERENTIAL DIAGNOSIS: R/O major depressive disorder versus bipolar disorder     Medical comorbidities impacting or contributing to clinical picture: hypothyroidism due to Hashimotos thyroiditis, FRANK, migraines  Known issue that I take into account for their medical decisions, no current exacerbations or new concerns    Impression:  Analai Jerry is a 33 year old White, female who presents for return visit with  [facility] ([facility] for medication management. At last appointment 6 weeks ago, we added buspirone 15 mg BID, continued wellbutrin 300 mg, abilify 5 mg, lamictal 200 mg, and propranolol 10 mg 1 QAM and 1-2 up to twice a day PRN . Patient reports overall tolerating the buspirone addition well, but does note side effects: increased appetite (denies significant weight change), and some increased restlessness at bedtime (in arms and legs now). Denies currently or frequently, denies any tics / tremors / abnormal movements and none noted on camera. Denies any change in mood lability, feels generally mood has been stable, low irritability, and no impulsive or reckless behaviors. Denies significant depressed mood recently. Denies any SI/SIB/HI. Recommending continuing current medications, but will trial moving buspirone earlier in PM (away from bedtime) to see if improves restlessness QHS. Follow-up with this provider in 6 weeks or sooner as needed. If ongoing stability in symptoms / medications at that time will consider returning care to PCP. Patient agreeable to plan.     Medication side effects and alternatives were reviewed. Health promotion activities recommended and reviewed today. All questions addressed. Education and counseling completed regarding risks and  benefits of medications and psychotherapy options. Recommend therapy for additional support.       Treatment Plan:  CONTINUE buspirone 15 mg twice a day. Script sent for #180.  CONTINUE propranolol 10 mg twice a day, with additional 10 mg as needed in afternoon. No script needed.  CONTINUE aripiprazole 5 mg every day. No script needed.  CONTINUE bupropion  mg every day. No script needed.  CONTINUE lamotrigine 200 mg per last provider. No script needed.  Continue all other medications per primary care provider.   Continue therapy with established provider.  Safety plan reviewed. To the Emergency Department as needed or call after hours crisis line at [phone] or [phone]. [place] Crisis Text Line. Text [place] to 527469 or Suicide LifeLine Chat: suicidepreventionShopAdvisorline.org/chat  Schedule an appointment with me in 6 weeks or sooner as needed. Call [facility] at [phone] to schedule.  Follow up with primary care provider as planned or for acute medical concerns.  Call the psychiatric nurse line with medication questions or concerns at [phone].  JEDI MINDt may be used to communicate with your provider, but this is not intended to be used for emergencies.    Patient Education:  Medication side effects and alternatives reviewed. Health promotion activities recommended and reviewed today. All questions addressed. Education and counseling completed regarding risks and benefits of medications and psychotherapy options.  Consent provided by patient/guardian  Call the psychiatric nurse line with medication questions or concerns at [phone].  JEDI MINDt may be used to communicate with your provider, but this is not intended to be used for emergencies.  LAMOTRIGINE: Discussed risk of rash and instructed to stop taking the drug at the first sign of a rash regardless of its type or severity, and contact the nurse line at [phone]  FIRST GENERATION ANTIPSYCHOTIC/ SECOND GENERATION  ANTIPSYCHOTIC USE:  Atypical need for cardiometabolic monitoring with medication- B/P, weight, blood sugar, cholesterol.  Need to monitor for abnormal movements taught  Medlineplus.gov is information for patients.  It is run by the [employer] and it contains information about all disorders, diseases and all medications.      Discussed use of propranolol as off label for anxiety, and side effects to monitor including low HR, BP, lightheadedness or dizziness.    Community Resources:    National Suicide Prevention Lifeline: 910.985.2544 (TTY: 428.962.2431). Call anytime for help.  (www.suicidepreventionlifeline.org)  National [place] on Mental Illness (www.irasema.org): 927.684.4048 or 458-250-1864.   Mental Health Association (www.mentalhealth.org): 656.202.8829 or 394-293-8814.  [place] Crisis Text Line: Text [place] to 250926  Suicide LifeLine Chat: "[employer].org/chat    Administrative Billing:     Level of Medical Decision Making:   - At least 1 chronic problem that is not stable  - Engaged in prescription drug management during visit (discussed any medication benefits, side effects, alternatives, etc.)             Patient Status:  [facility] MD/DO/NP/PA providers offer care a specialty service for Primary Care Providers in the [facility] that seek to optimize psychotropic medications for unstable patients.  Once medications have been optimized, our providers discharge the patient back to the referring Primary Care Provider for ongoing medication management.  This type of system allows our providers to serve a high volume of patients.   Patient will continue to be seen for ongoing consultation and stabilization.    Signed:   Josee Byrd, MSN, APRN, PMHNP-BC  [facility] ([facility])  [facility]    Chart documentation done in part with Dragon Voice Recognition software.  Although reviewed after completion, some word and grammatical errors may  remain.

## 2024-06-27 NOTE — TELEPHONE ENCOUNTER
Prior Authorization Approval    Medication: AJOVY 225 MG/1.5ML SC SOAJ  Authorization Effective Date: 5/28/2024  Authorization Expiration Date: 6/27/2025  Approved Dose/Quantity: 1 per 30 days  Reference #: [facility]   Insurance Company: [employer] [place] - Phone [phone] Fax [phone]  Expected CoPay: $    CoPay Card Available:      Financial Assistance Needed: Unknown  [facility] [place] [place] - [address]  Pharmacy Notified: Faxed approval

## 2024-06-27 NOTE — TELEPHONE ENCOUNTER
PA Initiation    Medication: AJOVY 225 MG/1.5ML SC SOAJ  Insurance Company: [employer] [place] - Phone [phone] Fax [phone]  Pharmacy Filling the Rx: [facility] - [place], [place] - [address]  Filling Pharmacy Phone:    Filling Pharmacy Fax:    Start Date: 6/27/2024    BUPUPERY        Will complete with full approval once back.

## 2024-07-03 RX ORDER — FREMANEZUMAB-VFRM 225 MG/1.5ML
1.5 INJECTION SUBCUTANEOUS
Qty: 1.5 ML | Refills: 11 | Status: SHIPPED | OUTPATIENT
Start: 2024-07-03

## 2024-07-11 DIAGNOSIS — E06.3 HYPOTHYROIDISM DUE TO HASHIMOTO'S THYROIDITIS: ICD-10-CM

## 2024-07-11 RX ORDER — LEVOTHYROXINE SODIUM 200 UG/1
200 TABLET ORAL DAILY
Qty: 90 TABLET | Refills: 1 | Status: SHIPPED | OUTPATIENT
Start: 2024-07-11

## 2024-07-11 NOTE — TELEPHONE ENCOUNTER
Pending Prescriptions:                       Disp   Refills    levothyroxine (SYNTHROID/LEVOTHROID) 200 *90 tab*3            Sig: Take 1 tablet (200 mcg) by mouth daily

## 2024-07-29 ENCOUNTER — MYC MEDICAL ADVICE (OUTPATIENT)
Dept: PSYCHIATRY | Facility: CLINIC | Age: 34
End: 2024-07-29
Payer: COMMERCIAL

## 2024-07-29 DIAGNOSIS — F41.1 GENERALIZED ANXIETY DISORDER: ICD-10-CM

## 2024-07-29 RX ORDER — PROPRANOLOL HYDROCHLORIDE 10 MG/1
TABLET ORAL
Qty: 180 TABLET | Refills: 0 | Status: SHIPPED | OUTPATIENT
Start: 2024-07-29 | End: 2024-09-16

## 2024-07-29 NOTE — TELEPHONE ENCOUNTER
Date of Last Office Visit: 6/24/2024  Date of Next Office Visit:  8/5/2024  No shows since last visit: No  More than one patient-initiated cancellation (with reschedule) since last seen in clinic? No    []Medication refilled per  Medication Refill in Ambulatory Care  policy.  [x]Medication unable to be refilled by RN due to criteria not met as indicated below:    []Eligibility: has not had a provider visit within last 6 months   []Supervision: no future appointment; < 7 days before next appointment   []Compliance: no shows; cancellations; lapse in therapy   []Verification: order discrepancy; may need modification...   [x] > 30-day supply request   []Advanced refill request: > 7 days before refill date   []Controlled medication   []Medication not included in policy   []Review: new med; med adjusted ? 30 days; safety alert; requires lab monitoring...   []Scope of Practice: refill request processed by LPN/MA   []Other:      Medication(s) requested:   -  propranolol (INDERAL) 10 MG tablet   Date last ordered: 5/13/2024  Qty: 180  (45-60 day supply depending on what the patient is taking per day) Refills: 0  1 tablet (10 mg) by mouth 2 times daily. May also take 1-2 tablets (10-20 mg) daily as needed (anxiety/ Side effects).     Appropriate for refill? Yes      Any Controlled Substance(s)? No      Requested medication(s) verified as identical to current order? Yes    Any lapse in adherence to medication(s) greater than 5 days? N/A     Additional action taken? routed encounter to provider for review.      Last visit treatment plan:   Impression:  Analia Jerry is a 33 year old White, female who presents for return visit with  [facility] for medication management. At last appointment 6 weeks ago, we added buspirone 15 mg BID, continued wellbutrin 300 mg, abilify 5 mg, lamictal 200 mg, and propranolol 10 mg 1 QAM and 1-2 up to twice a day PRN . Patient reports overall tolerating the  buspirone addition well, but does note side effects: increased appetite (denies significant weight change), and some increased restlessness at bedtime (in arms and legs now). Denies currently or frequently, denies any tics / tremors / abnormal movements and none noted on camera. Denies any change in mood lability, feels generally mood has been stable, low irritability, and no impulsive or reckless behaviors. Denies significant depressed mood recently. Denies any SI/SIB/HI. Recommending continuing current medications, but will trial moving buspirone earlier in PM (away from bedtime) to see if improves restlessness QHS. Follow-up with this provider in 6 weeks or sooner as needed. If ongoing stability in symptoms / medications at that time will consider returning care to PCP. Patient agreeable to plan.      Medication side effects and alternatives were reviewed. Health promotion activities recommended and reviewed today. All questions addressed. Education and counseling completed regarding risks and benefits of medications and psychotherapy options. Recommend therapy for additional support.         Treatment Plan:  CONTINUE buspirone 15 mg twice a day. Script sent for #180.  CONTINUE propranolol 10 mg twice a day, with additional 10 mg as needed in afternoon. No script needed.  CONTINUE aripiprazole 5 mg every day. No script needed.  CONTINUE bupropion  mg every day. No script needed.  CONTINUE lamotrigine 200 mg per last provider. No script needed.  Continue all other medications per primary care provider.   Continue therapy with established provider.  Safety plan reviewed. To the Emergency Department as needed or call after hours crisis line at [phone] or [phone]. [place] Crisis Text Line. Text [place] to 966197 or Suicide LifeLine Chat: suicidepreventionlifeline.org/chat  Schedule an appointment with me in 6 weeks or sooner as needed. Call [facility] at [phone] to schedule.  Follow  up with primary care provider as planned or for acute medical concerns.  Call the psychiatric nurse line with medication questions or concerns at [phone].  Proximagenhart may be used to communicate with your provider, but this is not intended to be used for emergencies.       Any medication(s) require lab monitoring? No

## 2024-08-05 ENCOUNTER — VIRTUAL VISIT (OUTPATIENT)
Dept: PSYCHIATRY | Facility: CLINIC | Age: 34
End: 2024-08-05
Payer: COMMERCIAL

## 2024-08-05 DIAGNOSIS — F41.1 GENERALIZED ANXIETY DISORDER: ICD-10-CM

## 2024-08-05 DIAGNOSIS — F33.1 MODERATE EPISODE OF RECURRENT MAJOR DEPRESSIVE DISORDER (H): Primary | Chronic | ICD-10-CM

## 2024-08-05 DIAGNOSIS — F39 MOOD DISORDER (H): ICD-10-CM

## 2024-08-05 PROCEDURE — 99214 OFFICE O/P EST MOD 30 MIN: CPT | Mod: 95 | Performed by: NURSE PRACTITIONER

## 2024-08-05 RX ORDER — LAMOTRIGINE 200 MG/1
200 TABLET ORAL AT BEDTIME
Qty: 90 TABLET | Refills: 0 | Status: SHIPPED | OUTPATIENT
Start: 2024-08-05

## 2024-08-05 RX ORDER — BUPROPION HYDROCHLORIDE 300 MG/1
300 TABLET ORAL EVERY MORNING
Qty: 90 TABLET | Refills: 0 | Status: SHIPPED | OUTPATIENT
Start: 2024-08-05

## 2024-08-05 RX ORDER — ARIPIPRAZOLE 5 MG/1
5 TABLET ORAL DAILY
Qty: 90 TABLET | Refills: 0 | Status: SHIPPED | OUTPATIENT
Start: 2024-08-05

## 2024-08-05 RX ORDER — BUSPIRONE HYDROCHLORIDE 15 MG/1
15 TABLET ORAL 2 TIMES DAILY
Qty: 180 TABLET | Refills: 0 | Status: SHIPPED | OUTPATIENT
Start: 2024-08-05

## 2024-08-05 ASSESSMENT — PAIN SCALES - GENERAL: PAINLEVEL: NO PAIN (0)

## 2024-08-05 NOTE — PROGRESS NOTES
"Virtual Visit Details    Type of service:  Video Visit     Originating Location (pt. Location): Home    Distant Location (provider location):  On-site  Platform used for Video Visit: [employer]Saint       PSYCHIATRIC MEDICATION FOLLOW UP APPT     Name: Analia Jerry   : 1990               Telemedicine Visit: The patient's condition can be safely assessed and treated via synchronous audio and visual telemedicine encounter.      Consent:  The patient/guardian has verbally consented to: the potential risks and benefits of telemedicine (video visit or phone) versus in person care; bill my insurance or make self-payment for services provided; and responsibility for payment of non-covered services.     As the provider I attest to compliance with applicable laws and regulations related to telemedicine.         Source of Referral / Care Team:  Primary Care Provider: Pranav Ware MD   Therapist: Monica ([facility]).         The [facility] psychiatry providers act as a specialty service for Primary Care Providers in the [facility] who seek to optimize medications for unstable patients. Once medications have been optimized, [facility] providers discharge the patient back to the referring Primary Care Provider for ongoing medication management. This type of system allows [facility] to serve a high volume of patients.     Patient Identification:  Patient is a 33 year old,   White Not  or  female  who presents for return visit with me.   Patient prefers to be called: \"Analia\".  Patient is currently employed full time.    Patient attended the session alone.    RECORDS AVAILABLE FOR REVIEW: EHR records through [employer] .       Interim History:  I last saw Analia Jerry for outpatient psychiatry Return Visit six weeks ago on 24. During that appointment, we continued buspirone 15 mg BID, wellbutrin 300 mg, abilify 5 mg, lamictal 200 mg, and propranolol 10 mg 1 QAM and 1-2 up to twice a day PRN . Patient " "reports ADHERING to prescribed medications. No new side effects from medications, does note improvement in increased appetite (back to baseline) as well as restlessness QHS with second buspirone dose. She is taking ~8PM now, only notes restlessness \"here and there\" and improves after PM propranolol + ropinirole. She continues to feel anxiety is doing well with current medications, although returning to PCP has her somewhat anxious today. Reports depression remains low, no symptoms concerning for divya / activation. Denies any SI/SIB/HI. Continues in therapy.       Initial Impression  / MREs:   6/24/24: At last appointment 6 weeks ago, we added buspirone 15 mg BID, continued wellbutrin 300 mg, abilify 5 mg, lamictal 200 mg, and propranolol 10 mg 1 QAM and 1-2 up to twice a day PRN . Patient reports overall tolerating the buspirone addition well, but does note side effects: increased appetite (denies significant weight change), and some increased restlessness at bedtime (in arms and legs now). Denies currently or frequently, denies any tics / tremors / abnormal movements and none noted on camera. Denies any change in mood lability, feels generally mood has been stable, low irritability, and no impulsive or reckless behaviors. Denies significant depressed mood recently. Denies any SI/SIB/HI. Recommending continuing current medications, but will trial moving buspirone earlier in PM (away from bedtime) to see if improves restlessness QHS. Follow-up with this provider in 6 weeks or sooner as needed. If ongoing stability in symptoms / medications at that time will consider returning care to PCP. Patient agreeable to plan.     6/26/23: Analia Jerry is a 32 year old White, female who presents for initial visit with this provider at [facility] for medication management.  Patient previously seen with [facility] provider YULIET Collins (2/2021-8/2021) and returned to PCP, then re-referred and started with Dr. Babb (03/2022-05/2023) for " symptom exacerbation. At last appointment 2 month ago, continued lamotrigine 200 mg every day and bupropion  mg every day after diagnosis changed to mood disorder (from MDD) given reports of increased impulsivity, agitation during sertraline initiation which also occurred with past Viibryd trial. Over last few months, she reports improvement in mood lability and irritability, up to last ~2 weeks where they have increased again without clear trigger. Reports worsening of her depression, along with quick to anger, irritability. Denies other symptoms of manic episode at this time. Denies any SI/SIB/HI. No psychosis. Does report throwing things during this time, but denies any physical aggression towards people, animals, no property destruction.  Denies any substance use. Of note, bc started injections for her migraines, stopped atenolol over last few weeks. Denies any notable changes without it, no history of side effects. Discussed risks/benefits/ side effects of current medication, and possibility that bupropion is worsening mood stability as well as side effects of irritability for some. Will initially trial decreasing dose, continuing lamotrigine. Will also send trial of propranolol to be used as needed for anxiety, agitation.     Current medications include:   Current Outpatient Medications   Medication Sig Dispense Refill    ARIPiprazole (ABILIFY) 5 MG tablet Take 1 tablet (5 mg) by mouth daily 90 tablet 0    buPROPion (WELLBUTRIN XL) 300 MG 24 hr tablet Take 1 tablet (300 mg) by mouth every morning 90 tablet 0    busPIRone (BUSPAR) 15 MG tablet Take 1 tablet (15 mg) by mouth 2 times daily 180 tablet 0    cyclobenzaprine (FLEXERIL) 5 MG tablet Take 1-2 tablets every 8 hours as needed for muscle spasm 40 tablet 0    diclofenac (VOLTAREN) 75 MG EC tablet Take 1 tablet (75 mg) by mouth 2 times daily for 10 days 20 tablet 0    Fremanezumab-vfrm (AJOVY) 225 MG/1.5ML SOAJ Inject 1.5 mg Subcutaneous every 30 days  "1.5 mL 11    lamoTRIgine (LAMICTAL) 200 MG tablet Take 1 tablet (200 mg) by mouth at bedtime 90 tablet 0    levothyroxine (SYNTHROID/LEVOTHROID) 200 MCG tablet Take 1 tablet (200 mcg) by mouth daily 90 tablet 1    metoclopramide (REGLAN) 10 MG tablet Take 1 tablet (10 mg) by mouth 3 times daily as needed (nausea) 20 tablet 11    nitroFURantoin macrocrystal (MACRODANTIN) 100 MG capsule Take 1 capsule (100 mg) by mouth At Bedtime 90 capsule 3    norethindrone-ethinyl estradiol (ALAYCEN 1/35) 1-35 MG-MCG tablet Take 1 tablet by mouth daily Continuously by skipping placebo pills 112 tablet 3    omeprazole (PRILOSEC) 20 MG DR capsule Take 1 capsule (20 mg) by mouth daily 30 capsule 11    propranolol (INDERAL) 10 MG tablet Take 1 tablet (10 mg) by mouth 2 times daily. May also take 1-2 tablets (10-20 mg) daily as needed (anxiety/ Side effects). 180 tablet 0    rOPINIRole (REQUIP) 0.5 MG tablet Take 3 tablets (total of 1.5mg) by mouth daily 1 to 3 hours before bedtime 360 tablet 3    SUMAtriptan (IMITREX) 100 MG tablet Take 1 tablet (100 mg) by mouth at onset of headache for migraine (repeat in 2 hours if needed) 18 tablet 11     Current Facility-Administered Medications   Medication Dose Route Frequency Provider Last Rate Last Admin    Botulinum Toxin Type A (BOTOX) 200 units injection 150 Units  150 Units Intramuscular See Admin Instructions Karissa Gonzalez MD             Side effects: yes: restlessness QHS at times (improved)    The [place] Prescription Monitoring Program : not reviewed    Psychiatric ROS:  Analia Jerry reports mood has been: \"been good\"  Depression has been: denies (\"pretty good\"). self rates as ~2/ 10, where 0 is none at all and 10 being severe depression. Was 2-3/10 at last appt.  SI/SIB/HI: denies all.  Anxiety has been: excessive worry, difficult to control, restlessness / feeling keyed up, muscle tension, and sleep disturbances (difficulty falling / staying asleep, or restless / " unsatisfying)  self rates as ~3/ 10, where 0 is none at all and 10 being severe anxiety. Was 3-4/10 at last appt.  Sleep has been: denies significant change  Energy has been: denies significant change  Appetite has been: back to baseline, normal  Richa sxs: denies  Psychosis sxs: denies  ADHD/ADD sxs: none  PTSD sxs: intrusion and avoidance sx require further evaluate. negative emotional state, diminished interest and detachment from others, irritability, reckless behavior and  sleep disturbances     PHQ2 (2) and GAD2 (2) scores were reviewed today.   PHQ-9 scores:       11/2/2023    11:25 AM 3/11/2024    12:07 PM 4/29/2024     9:39 AM   PHQ-9 SCORE   PHQ-9 Total Score MyChart 3 (Minimal depression)     PHQ-9 Total Score 3 5 6       MAXIM-7 scores:        3/11/2024    12:07 PM 4/29/2024     9:39 AM 5/12/2024    12:25 PM   MAXIM-7 SCORE   Total Score   9 (mild anxiety)   Total Score 10 5 9         Current stressors include: Symptoms, Occupational Difficulties  Coping mechanisms and supports include: Therapy, Family, and Friends    Vital Signs:   There were no vitals taken for this visit.    Review of Systems:  10 systems (general, cardiovascular, respiratory, eyes, ENT, endocrine, GI, , M/S, neurological) were reviewed. Most pertinent finding(s) is/are:  No acute distress; no wheezing / short of breath / increased work of breathing; denies chest pain / tightness / palpitations; reduced appetite and recent weight loss; no nausea / vomiting / abdominal pain; no tics / tremors / abnormal muscle mvmts; no visible skin changes / rashes . The remaining systems are all unremarkable.    Labs:  Most recent laboratory results reviewed and no new labs.     Past Medical/Surgical History:  Past Medical History:   Diagnosis Date    Acute gallstone pancreatitis 12/28/2019    Anxiety     ASCUS of cervix with negative high risk HPV 01/26/2017    ASCUS/neg HR HPV.    Juarez's esophagus determined by biopsy 2022    Depressive disorder      Gallstones 2019    [facility]    H/O colposcopy with cervical biopsy 2017    Bx & ECC - negative    Hashimoto's disease     Headache(784.0)     Hypothyroidism due to Hashimoto's thyroiditis     Papanicolaou smear of cervix with atypical squamous cells of undetermined significance (ASC-US) 2015      has a past medical history of Acute gallstone pancreatitis (2019), Anxiety, ASCUS of cervix with negative high risk HPV (2017), Juarez's esophagus determined by biopsy (), Depressive disorder, Gallstones (2019), H/O colposcopy with cervical biopsy (2017), Hashimoto's disease, Headache(784.0), Hypothyroidism due to Hashimoto's thyroiditis, and Papanicolaou smear of cervix with atypical squamous cells of undetermined significance (ASC-US) (2015).    She has no past medical history of Arthritis, Cancer (H), Cerebral infarction (H), Congestive heart failure (H), COPD (chronic obstructive pulmonary disease) (H), Diabetes (H), Heart disease, History of blood transfusion, Hypertension, or Uncomplicated asthma.    Medication allergies:    Allergies   Allergen Reactions    Nkda [No Known Drug Allergy]        Social History:  Born in [place], [place] and raised in [place], [place].  Parents not  but still live together  Siblings:  Two half sisters, full biobrother   in October.  Childhood: Yes intact home with all current basic needs being met.  Highest education level was college graduate.   Employment Status: full time -  [employer].  Relationship status: Together for 9 years. Safe in relationship.  Current Living situation:  , and 2 stepsons are with them on weekends, longer in summer.  Feels safe at home.  Children: 15 and 10 year old stepsons  Firearms/Weapons Access: No: Patient denies   Service: No  Support: , friend, therapist     Current use of drugs or alcohol: Denies   Tobacco use: No    Mental Status  "Exam:  Alertness: alert  and oriented   Appearance: well groomed  Behavior/Demeanor: cooperative and pleasant, with good eye contact   Speech: normal and regular rate and rhythm  Language: intact and no problems  Psychomotor: normal or unremarkable  Mood:  \"been good\"  Affect: full range and appropriate; was congruent to mood; was congruent to content  Thought Process/Associations: unremarkable  Thought Content:  Reports none;  Denies suicidal ideation, violent ideation, and delusions  Perception:  Reports none;  Denies auditory hallucinations and visual hallucinations  Insight: intact  Judgment: intact  Cognition: does  appear grossly intact; formal cognitive testing was not done  Recent and Remote Memory: Intact to interview. Not formally assessed. No amnesia.  Attention Span and Concentration: normal  Fund of Knowledge: appropriate  Gait and Station: unremarkable    Suicide Risk Assessment:  Today Analia Jerry reports no suicidal ideation. There are notable risk factors for self-harm, including anxiety, family history, and mood change. However, risk is mitigated by commitment to family, absence of past attempts, history of seeking help when needed, future oriented, no access to firearms or weapons, and denies suicidal intent or plan.  Therefore, based on all available evidence including the factors cited above, Analia Jerry does not appear to be at imminent risk for self-harm, does not meet criteria for a 72-hr hold, and therefore remains appropriate for ongoing outpatient level of care.  A thorough assessment of risk factors related to suicide and self-harm have been reviewed and are noted above. The patient convincingly denies suicidality on several occasions. Local community safety resources printed and reviewed for patient to use if needed. There was no deceit detected, and the patient presented in a manner that was believable.     Recommended that patient call 911 or go to the local ED should there be a " change in any of these risk factors.    DSM5 Diagnosis:  296.31 (F33.0) Major Depressive Disorder, Recurrent Episode, Mild _  300.02 (F41.1) Generalized Anxiety Disorder  309.81 (F43.10) Posttraumatic Stress Disorder (includes Posttraumatic Stress Disorder for Children 6 Years and Younger)  Without dissociative symptoms    Medical comorbidities include:   Patient Active Problem List    Diagnosis Date Noted    Generalized anxiety disorder 07/10/2023     Priority: Medium    Anxiety 02/15/2023     Priority: Medium    Transplant donor evaluation 09/14/2022     Priority: Medium    Recurrent major depression in partial remission (H24) 07/25/2022     Priority: Medium    Posttraumatic stress disorder 03/15/2022     Priority: Medium    Fatigue, unspecified type 04/05/2021     Priority: Medium    Moderate episode of recurrent major depressive disorder (H) 03/04/2021     Priority: Medium    FRANK (obstructive sleep apnea) - mild (AHI 7) 08/24/2020     Priority: Medium     8/10/2020 [facility] Sleep Study (216.0 lbs) - scored with 3% rules -  AHI 7.1, RDI 7.1, Supine AHI 22.9, REM AHI -, Low O2 91.2%, Time Spent ?88% 0 minutes / Time Spent ?89% 0 minutes.      Mood disorder (H24)      Priority: Medium    Class 1 obesity due to excess calories with body mass index (BMI) of 32.0 to 32.9 in adult, unspecified whether serious comorbidity present 07/02/2020     Priority: Medium    Elevated liver enzymes 12/28/2019     Priority: Medium    High triglycerides 04/03/2018     Priority: Medium    ASCUS of cervix with negative high risk HPV 01/26/2017     Priority: Medium     4/1/11 (approx) normal - patient reported.   6/11/13 normal - patient reported  12/3/15 ASCUS pap. Plan: per provider, repeat pap in 1 year.  1/26/17 ASCUS/neg HR HPV. Plan: colp bef 4/26/17  3/23/17 [place] Bx & ECC - negative. Plan cotest in 1 year.   3/8/18 ASCUS pap, neg HPV. Plan: colp.   5/10/18 [place] Bx and ECC: negative. Plan: cotest in 1 yr.   6/6/19  NIL/neg HR HPV. Plan cotest in 3 years  4/15/22 NIL pap, neg HPV. 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         Psychosocial & Contextual Factors:  Occupational Difficulties     DIFFERENTIAL DIAGNOSIS: R/O bipolar disorder vs major depressive disorder      Medical comorbidities impacting or contributing to clinical picture: hypothyroidism due to Hashimotos thyroiditis, FRANK, migraines  Known issue that I take into account for their medical decisions, no current exacerbations or new concerns    Impression:  Analia Jerry is a 33 year old White, female who presents for return visit with  [facility] ([facility]) for medication management. At last appointment six weeks ago, we continued buspirone 15 mg BID, wellbutrin 300 mg, abilify 5 mg, lamictal 200 mg, and propranolol 10 mg 1 QAM and 1-2 up to twice a day PRN . Patient reports tolerating medications well, and continues to feel anxiety well controlled and mood stable at this time. Denies significant depression, no SI/SIB/HI, no evidence of psychosis, divya. At this time, no clear history of diyva to warrant a bipolar disorder diagnosis but recommending continued monitoring. She is anxious about return to PCP but feels stable in mood and medication and OK for return at this time. Discussed referral to long term psychiatry in future as alternative to [facility] given multiple provider history. Recommending follow-up with PCP in 3 months for mental health check. Continue in therapy. Patient agreeable to plan.     Medication side effects and alternatives were reviewed. Health promotion  activities recommended and reviewed today. All questions addressed. Education and counseling completed regarding risks and benefits of medications and psychotherapy options. Recommend therapy for additional support.       Treatment Plan:  CONTINUE buspirone 15 mg twice a day. Script sent for #180.  CONTINUE propranolol 10 mg twice a day, with additional 10 mg as needed in afternoon. Script sent for #180 last week.  CONTINUE aripiprazole 5 mg every day. Script sent for #90.  CONTINUE bupropion  mg every day. Script sent for #90.  CONTINUE lamotrigine 200 mg per last provider. Script sent for #90.  Continue all other medications per primary care provider.   Continue therapy with established provider.  Safety plan reviewed. To the Emergency Department as needed or call after hours crisis line at [phone] or [phone]. [place] Crisis Text Line. Text [place] to 078061 or Suicide LifeLine Chat: suicidepreventionlifeline.org/chat  No follow-up planned with [facility].  Follow up with primary care provider as planned or for acute medical concerns, and in 3 months for mental health check in.  Call the psychiatric nurse line with medication questions or concerns at [phone].  TranslationExchanget may be used to communicate with your provider, but this is not intended to be used for emergencies.    Patient Education:  Medication side effects and alternatives reviewed. Health promotion activities recommended and reviewed today. All questions addressed. Education and counseling completed regarding risks and benefits of medications and psychotherapy options.  Consent provided by patient/guardian  Call the psychiatric nurse line with medication questions or concerns at [phone].  "[employer]hart may be used to communicate with your provider, but this is not intended to be used for emergencies.  LAMOTRIGINE: Discussed risk of rash and instructed to stop taking the drug at the first sign of a rash regardless of its type or severity, and  contact the nurse line at [phone]  FIRST GENERATION ANTIPSYCHOTIC/ SECOND GENERATION ANTIPSYCHOTIC USE:  Atypical need for cardiometabolic monitoring with medication- B/P, weight, blood sugar, cholesterol.  Need to monitor for abnormal movements taught  Medlineplus.gov is information for patients.  It is run by the [employer] of Medicine and it contains information about all disorders, diseases and all medications.      Discussed use of propranolol as off label for anxiety, and side effects to monitor including low HR, BP, lightheadedness or dizziness.     Community Resources:    National Suicide Prevention Lifeline: 110.430.4982 (TTY: 520.929.6490). Call anytime for help.  (www.suicidepreventionlifeline.org)  National [place] on Mental Illness (www.irasema.org): 863.182.3800 or 402-313-4306.   Mental Health Association (www.mentalhealth.org): 267.160.8033 or 496-568-9211.  [place] Crisis Text Line: Text [place] to 979046  Suicide LifeLine Chat: [employer].org/chat    Administrative Billing:     Level of Medical Decision Making:   - At least 2 stable chronic problems  - Engaged in prescription drug management during visit (discussed any medication benefits, side effects, alternatives, etc.)             Patient Status:  [facility] MD/DO/NP/PA providers offer care a specialty service for Primary Care Providers in the [facility] that seek to optimize psychotropic medications for unstable patients.  Once medications have been optimized, our providers discharge the patient back to the referring Primary Care Provider for ongoing medication management.  This type of system allows our providers to serve a high volume of patients.   The patient is being returned to the referring provider for ongoing care and medication prescribing.  The patient can be referred back to this service for further consultation as needed.    Signed:   Josee Byrd, MSN, APRN, PMHNP-BC  [facility]  ([facility])  [facility]    Chart documentation done in part with Dragon Voice Recognition software.  Although reviewed after completion, some word and grammatical errors may remain.  RETURN TO REFERRING PROVIDER FINAL TREATMENT PLAN  The Psychiatric provider and/or Behavioral health clinician (BHC) have completed consultation with the patient and are returning to referring provider care for continued care. For worsening symptoms/condition recommendations include:    Medication Recommendations   - Could consider increase to abilify up to 10 mg for treatment resistant depression, 15 mg if concern for activation or divya.  - Monitor metabolic labs yearly with abilify.  - For ongoing anxiety concerns, could increase buspirone up to 30 mg twice a day as tolerated.    Behavioral Recommendations  - Encourage ongoing therapy . Consider referral to DBT or IOP if significant increase in symptoms.   - Avoid mood altering substances    Consider pharmacologic and nonpharmacologic recommendations, if the patient has followed through on recommendations/referrals and any other helpful pertinent information (e.g., recent stressors, med adherence, enough time on the medication or high enough dose etc.)      If post consult recommendations fail, use any of the following options   Reach out to the [facility] provider through Epic staff message for guidance   Order an Adult Behavioral Health eConsult (XLRA801) or Pediatric eConsult ([id number])   Refer for another [facility] consultation (after 6 months) or refer to Psychiatry/Long-term management (Mental Health Referral 9069, [facility]/Med management and Long-term management)

## 2024-08-05 NOTE — PATIENT INSTRUCTIONS
Moving from: Collaborative Care Psychiatry Service (CCPS)    Moving to: Referring Provider        We are returning your care back to your Referring Provider.      We will update your Referring Provider/Clinic that you've completed your care with [facility]. This way, they can help you build on the progress you've made in your mental health.        Here's what happens next:    Within the next 3 months: Please set up a visit with your referring provider. Ask for a mental health check-in.  Stay on your current medications--do not change your doses. Your symptoms could get worse if you quickly stop or decrease your medicine.  We've refilled your mental health medications for the next 3 months (90 days). Future refills or dose changes should come from your Referring Provider Clinic.    If you're in therapy, keep it up! If you're not but would like to start, ask your Referring Provider clinic for a referral to therapy, or call [facility] ([phone]) to set up a visit with a therapist.        It's been a pleasure to work with you! If you and your clinic decide that you should return to [facility] in the future, we remain ready to serve you. Ask your clinic for a new referral if needed.

## 2024-08-05 NOTE — NURSING NOTE
Current patient location: [address] [place], [place] [address] [place] [address]    Is the patient currently in the state of [place]? YES    Visit mode:VIDEO    If the visit is dropped, the patient can be reconnected by: VIDEO VISIT: Text to cell phone:   Telephone Information:   Mobile [phone]    and VIDEO VISIT: Send to e-mail at: [email or web address]    Will anyone else be joining the visit? NO  (If patient encounters technical issues they should call [phone])    How would you like to obtain your AVS? MyChart    Are changes needed to the allergy or medication list? No    Are refills needed on medications prescribed by this physician? NO    Rooming Documentation:  Assigned questionnaire(s) completed      Reason for visit: RECHECK    Татьяна LOVETT

## 2024-09-15 ASSESSMENT — ANXIETY QUESTIONNAIRES
GAD7 TOTAL SCORE: 1
GAD7 TOTAL SCORE: 1
8. IF YOU CHECKED OFF ANY PROBLEMS, HOW DIFFICULT HAVE THESE MADE IT FOR YOU TO DO YOUR WORK, TAKE CARE OF THINGS AT HOME, OR GET ALONG WITH OTHER PEOPLE?: NOT DIFFICULT AT ALL
GAD7 TOTAL SCORE: 1
7. FEELING AFRAID AS IF SOMETHING AWFUL MIGHT HAPPEN: NOT AT ALL

## 2024-09-16 ENCOUNTER — VIRTUAL VISIT (OUTPATIENT)
Dept: FAMILY MEDICINE | Facility: CLINIC | Age: 34
End: 2024-09-16
Payer: COMMERCIAL

## 2024-09-16 ENCOUNTER — TELEPHONE (OUTPATIENT)
Dept: FAMILY MEDICINE | Facility: CLINIC | Age: 34
End: 2024-09-16

## 2024-09-16 DIAGNOSIS — E06.3 HYPOTHYROIDISM DUE TO HASHIMOTO'S THYROIDITIS: ICD-10-CM

## 2024-09-16 DIAGNOSIS — F39 MOOD DISORDER (H): ICD-10-CM

## 2024-09-16 DIAGNOSIS — F41.1 GENERALIZED ANXIETY DISORDER: ICD-10-CM

## 2024-09-16 PROCEDURE — 99214 OFFICE O/P EST MOD 30 MIN: CPT | Mod: 95 | Performed by: FAMILY MEDICINE

## 2024-09-16 PROCEDURE — G2211 COMPLEX E/M VISIT ADD ON: HCPCS | Mod: 95 | Performed by: FAMILY MEDICINE

## 2024-09-16 RX ORDER — PROPRANOLOL HYDROCHLORIDE 10 MG/1
TABLET ORAL
Qty: 270 TABLET | Refills: 3 | Status: SHIPPED | OUTPATIENT
Start: 2024-09-16

## 2024-09-16 NOTE — PROGRESS NOTES
Analia is a 33 year old who is being evaluated via a billable video visit.    How would you like to obtain your AVS? MyChart  If the video visit is dropped, the invitation should be resent by: Send to e-mail at: [email or web address]  Will anyone else be joining your video visit? No      ASSESSMENT / PLAN:  (F41.1) Generalized anxiety disorder  Comment: stable  Plan: propranolol (INDERAL) 10 MG tablet        Continue meds/therapist and limit caffeine/ ALCOHOL.   Increase exercise.if SUICIAL IDEATION OR HOMOCIDAL IDEATION OR FRANKY TO ER.     (E03.8,  E06.3) Hypothyroidism due to Hashimoto's thyroiditis  Comment: stalbe clinically  Plan: Recheck in 6 months with previsit fasting labs.     (F39) Mood disorder (H24)  Comment: stable  Plan: will continue meds but will need to see psych again if worsening. If SUICIAL IDEATION OR HOMOCIDAL IDEATION OR FRANKY TO ER. Exercise and limit carbs.     The longitudinal plan of care for the diagnosis(es)/condition(s) as documented were addressed during this visit. Due to the added complexity in care, I will continue to support Analia in the subsequent management and with ongoing continuity of care.    Subjective   Analia is a 33 year old, presenting for the following health issues:  medications management and Refill Request  Needs psych meds refill  History depression/anxiety/ptsd/bipolar, hypothyroidism,migraines (seeing neurology) astrid, obesity, mood disorder (seing psych) and low back pain. Seen GI for fatty liver and liver hemangiomas.   Seeing ob/gyn for pap/ocp    Macrobid daily prophrolactically - no uti's since.   Water intake good.   Marriage going ok. Work - ok.   Step-kids 10,17yo.   Sleep overall ok. Caffeine - one diet pop/day. ALCOHOL- rare.   No SUICIAL IDEATION OR HOMOCIDAL IDEATION OR FRANKY.  Exercise- walking dog.  RLS - stable on reguip.  Propranolol BID and prn  Seeing therapist currently.  Stable on currently on meds for past year  Gerd stable.         9/16/2024     10:37 AM   Additional Questions   Roomed by Erich HORVATH MA     History of Present Illness       Mental Health Follow-up:  Patient presents to follow-up on Depression & Anxiety.Patient's depression since last visit has been:  Good  The patient is not having other symptoms associated with depression.  Patient's anxiety since last visit has been:  Good  The patient is not having other symptoms associated with anxiety.  Any significant life events: No  Patient is not feeling anxious or having panic attacks.  Patient has no concerns about alcohol or drug use.    She eats 0-1 servings of fruits and vegetables daily.She consumes 0 sweetened beverage(s) daily.She exercises with enough effort to increase her heart rate 9 or less minutes per day.  She exercises with enough effort to increase her heart rate 3 or less days per week.   She is taking medications regularly.                   Objective           Vitals:  No vitals were obtained today due to virtual visit.    Physical Exam   GENERAL: alert and no distress  EYES: Eyes grossly normal to inspection.  No discharge or erythema, or obvious scleral/conjunctival abnormalities.  RESP: No audible wheeze, cough, or visible cyanosis.    SKIN: Visible skin clear. No significant rash, abnormal pigmentation or lesions.  NEURO: Cranial nerves grossly intact.  Mentation and speech appropriate for age.  PSYCH: Appropriate affect, tone, and pace of words          Video-Visit Details    Type of service:  Video Visit   Originating Location (pt. Location): Home    Distant Location (provider location):  On-site  Platform used for Video Visit: Amy  Signed Electronically by: Pranav Ware MD

## 2024-09-16 NOTE — TELEPHONE ENCOUNTER
Left messge for pt to call back for her check in and chart review. Tali Carver CMA on 9/16/2024 at 10:22 AM

## 2024-09-21 ENCOUNTER — MYC MEDICAL ADVICE (OUTPATIENT)
Dept: FAMILY MEDICINE | Facility: CLINIC | Age: 34
End: 2024-09-21
Payer: COMMERCIAL

## 2024-09-21 DIAGNOSIS — N39.0 RECURRENT UTI: ICD-10-CM

## 2024-09-23 RX ORDER — NITROFURANTOIN MACROCRYSTAL 100 MG
100 CAPSULE ORAL AT BEDTIME
Qty: 90 CAPSULE | Refills: 1 | Status: SHIPPED | OUTPATIENT
Start: 2024-09-23

## 2024-09-23 NOTE — TELEPHONE ENCOUNTER
Per the office visit on 9/16/24: macrobid daily prophrolactically- no uti's since. Macrobid pended below with pharmacy.    See [employer] message.    Routing to provider to advise.    Jenna Willis RN

## 2024-10-22 ASSESSMENT — HEADACHE IMPACT TEST (HIT 6)
WHEN YOU HAVE HEADACHES HOW OFTEN IS THE PAIN SEVERE: RARELY
HOW OFTEN DID HEADACHS LIMIT CONCENTRATION ON WORK OR DAILY ACTIVITY: NEVER
HOW OFTEN HAVE YOU FELT TOO TIRED TO WORK BECAUSE OF YOUR HEADACHES: NEVER
WHEN YOU HAVE A HEADACHE HOW OFTEN DO YOU WISH YOU COULD LIE DOWN: RARELY
HOW OFTEN HAVE YOU FELT FED UP OR IRRITATED BECAUSE OF YOUR HEADACHES: NEVER
HOW OFTEN DO HEADACHES LIMIT YOUR DAILY ACTIVITIES: NEVER
HIT6 TOTAL SCORE: 40

## 2024-10-22 ASSESSMENT — MIGRAINE DISABILITY ASSESSMENT (MIDAS)
TOTAL SCORE: 0
HOW MANY DAYS WAS YOUR PRODUCTIVITY CUT IN HALF BECAUSE OF HEADACHES: 0
HOW MANY DAYS WAS HOUSEWORK PRODUCTIVITY CUT IN HALF DUE TO HEADACHES: 0
HOW MANY DAYS DID YOU NOT DO HOUSEWORK BECAUSE OF HEADACHES: 0
HOW MANY DAYS DID YOU MISS WORK OR SCHOOL BECAUSE OF HEADACHES: 0
HOW OFTEN WERE SOCIAL ACTIVITIES MISSED DUE TO HEADACHES: 0
HOW MANY DAYS IN THE PAST 3 MONTHS HAVE YOU HAD A HEADACHE: 3
ON A SCALE FROM 0-10 ON AVERAGE HOW PAINFUL WERE HEADACHES: 3

## 2024-10-23 ENCOUNTER — VIRTUAL VISIT (OUTPATIENT)
Dept: NEUROLOGY | Facility: CLINIC | Age: 34
End: 2024-10-23
Payer: COMMERCIAL

## 2024-10-23 DIAGNOSIS — G43.709 CHRONIC MIGRAINE WITHOUT AURA WITHOUT STATUS MIGRAINOSUS, NOT INTRACTABLE: ICD-10-CM

## 2024-10-23 PROCEDURE — 99213 OFFICE O/P EST LOW 20 MIN: CPT | Mod: 95 | Performed by: PSYCHIATRY & NEUROLOGY

## 2024-10-23 PROCEDURE — G2211 COMPLEX E/M VISIT ADD ON: HCPCS | Performed by: PSYCHIATRY & NEUROLOGY

## 2024-10-23 RX ORDER — FREMANEZUMAB-VFRM 225 MG/1.5ML
1.5 INJECTION SUBCUTANEOUS
Qty: 1.5 ML | Refills: 11 | Status: SHIPPED | OUTPATIENT
Start: 2024-10-23

## 2024-10-23 RX ORDER — SUMATRIPTAN SUCCINATE 100 MG/1
100 TABLET ORAL
Qty: 18 TABLET | Refills: 11 | Status: SHIPPED | OUTPATIENT
Start: 2024-10-23

## 2024-10-23 NOTE — LETTER
10/23/2024       RE: Analia Jerry  [address]  [place] [address]     Dear Colleague,    Thank you for referring your patient, Analia Jerry, to the [facility] [place] at [facility]. Please see a copy of my visit note below.    Virtual Visit Details    Type of service:  Video Visit     Originating Location (pt. Location): Home    Distant Location (provider location):  Off-site  Platform used for Video Visit: [facility]    Headache Neurology Progress Note  October 23, 2024      Assessment/Plan:   Analia Jerry is a 33 year old woman who returns for follow-up of chronic migraine without aura and history of lumbar puncture headache.  Headaches remain well controlled with Ajovy subcutaneous injection every 30 days.       For acute treatment of headache, I recommend the following:  -For acute treatment of mild headache, she will continue ibuprofen as needed, not to exceed more than 14 days/month to avoid medication overuse.  - For acute treatment of moderate to severe headache, I recommend sumatriptan 100 mg taken at the onset of headache, with a repeat dose in 2 hours if needed.  This should not exceed more than 9 days/month to avoid medication overuse.  - For headache related nausea, she would not choose to use an anti-emetic at this time.     Her headache frequency and severity warrant prevention.   For preventative treatment of headache, I recommend the following:  -I recommend she continue Ajovy subcutaneous injection every 30 days.  Refilled.  -In the future, if other treatment options are needed, botulinum toxin injections using a chronic migraine protocol every 12 weeks, or an alternative CGRP inhibitor could be considered.  She currently takes other antidepressants, and we will avoid adding a tricyclic antidepressant.    The longitudinal plan of care for Analia was addressed during this  visit. Due to the added complexity in care, I will continue to support Analia in the subsequent management of this condition(s) and with the ongoing continuity of care of this condition(s). I will see her back in 1 year.    Karissa Gonzalez MD  Neurology     Subjective:    Analia Jerry returns for follow up of chronic migraine.    Ajovy continues to be effective. No side effects.    Breakthrough severe migraine attacks are non-existent. Two milder headache days every 3 months.     Treats acutely with laying down or taking ibuprofen. Nausea occasionally, but does not choose to treat this.    Has sumatriptan available but not needing it.    Objective:    Vitals: There were no vitals taken for this visit.  General: Cooperative, NAD  Neurologic:  Mental Status: Fully alert, attentive and oriented. Speech clear and fluent.   Cranial Nerves: Facial movements symmetric.   Motor: No abnormal movements.      Pertinent Investigations:          4/4/2023     9:20 PM 11/7/2023     9:12 AM 10/22/2024    12:30 PM   HIT-6   When you have headaches, how often is the pain severe 8  8  8    How often do headaches limit your ability to do usual daily activities including household work, work, school, or social activities? 8  8  6    When you have a headache, how often do you wish you could lie down? 8  11  8    In the past 4 weeks, how often have you felt too tired to do work or daily activities because of your headaches 6  6  6    In the past 4 weeks, how often have you felt fed up or irritated because of your headaches 6  6  6    In the past 4 weeks, how often did headaches limit your ability to concentrate on work or daily activities 6  6  6    HIT-6 Total Score 42 45 40       Patient-reported           4/4/2023     9:22 PM 11/7/2023     9:13 AM 10/22/2024    12:31 PM   MIDAS - in the past three months:   On how many days did you miss work or school because of your headaches? 0 0 0   How many days was your productivity at work or  school reduced by half or more because of your headaches? 0 0 0   On how many days did you not do household work because of your headaches? 0 1 0   How many days was your productivity in household work reduced by half or more because of your headaches? 0 1 0   On how many days did you miss family, social, or leisure activities because of your headaches? 0 0 0   On how many days did you have a headache? 1 3 3   On a scale of 0-10, on average how painful were these headaches? 3 5 3   MIDAS Score 0 (I - Little or No Disability) 2 (I - Little or No Disability) 0 (I - Little or No Disability)          Again, thank you for allowing me to participate in the care of your patient.      Sincerely,    Karissa Gonzalez MD

## 2024-10-23 NOTE — NURSING NOTE
Current patient location: [address] [place] [address]    Is the patient currently in the state of [place]? YES    Visit mode:VIDEO    If the visit is dropped, the patient can be reconnected by: TELEPHONE VISIT: Phone number:   Telephone Information:   Mobile [phone]       Will anyone else be joining the visit? NO  (If patient encounters technical issues they should call [phone])    Are changes needed to the allergy or medication list? Pt stated no med changes    Are refills needed on medications prescribed by this physician? NO    Rooming Documentation:  Questionnaire(s) completed    Reason for visit: No chief complaint on file.    Karyn LOVETT

## 2024-10-23 NOTE — PROGRESS NOTES
Virtual Visit Details    Type of service:  Video Visit     Originating Location (pt. Location): Home    Distant Location (provider location):  Off-site  Platform used for Video Visit: [facility]    Headache Neurology Progress Note  October 23, 2024      Assessment/Plan:   Analia Jerry is a 33 year old woman who returns for follow-up of chronic migraine without aura and history of lumbar puncture headache.  Headaches remain well controlled with Ajovy subcutaneous injection every 30 days.       For acute treatment of headache, I recommend the following:  -For acute treatment of mild headache, she will continue ibuprofen as needed, not to exceed more than 14 days/month to avoid medication overuse.  - For acute treatment of moderate to severe headache, I recommend sumatriptan 100 mg taken at the onset of headache, with a repeat dose in 2 hours if needed.  This should not exceed more than 9 days/month to avoid medication overuse.  - For headache related nausea, she would not choose to use an anti-emetic at this time.     Her headache frequency and severity warrant prevention.   For preventative treatment of headache, I recommend the following:  -I recommend she continue Ajovy subcutaneous injection every 30 days.  Refilled.  -In the future, if other treatment options are needed, botulinum toxin injections using a chronic migraine protocol every 12 weeks, or an alternative CGRP inhibitor could be considered.  She currently takes other antidepressants, and we will avoid adding a tricyclic antidepressant.    The longitudinal plan of care for Analia was addressed during this visit. Due to the added complexity in care, I will continue to support Analia in the subsequent management of this condition(s) and with the ongoing continuity of care of this condition(s). I will see her back in 1 year.    Karissa Gonzalez MD  Neurology     Subjective:    Analia Jerry returns for follow up of chronic  migraine.    Ajovy continues to be effective. No side effects.    Breakthrough severe migraine attacks are non-existent. Two milder headache days every 3 months.     Treats acutely with laying down or taking ibuprofen. Nausea occasionally, but does not choose to treat this.    Has sumatriptan available but not needing it.    Objective:    Vitals: There were no vitals taken for this visit.  General: Cooperative, NAD  Neurologic:  Mental Status: Fully alert, attentive and oriented. Speech clear and fluent.   Cranial Nerves: Facial movements symmetric.   Motor: No abnormal movements.      Pertinent Investigations:          4/4/2023     9:20 PM 11/7/2023     9:12 AM 10/22/2024    12:30 PM   HIT-6   When you have headaches, how often is the pain severe 8  8  8    How often do headaches limit your ability to do usual daily activities including household work, work, school, or social activities? 8  8  6    When you have a headache, how often do you wish you could lie down? 8  11  8    In the past 4 weeks, how often have you felt too tired to do work or daily activities because of your headaches 6  6  6    In the past 4 weeks, how often have you felt fed up or irritated because of your headaches 6  6  6    In the past 4 weeks, how often did headaches limit your ability to concentrate on work or daily activities 6  6  6    HIT-6 Total Score 42 45 40       Patient-reported           4/4/2023     9:22 PM 11/7/2023     9:13 AM 10/22/2024    12:31 PM   MIDAS - in the past three months:   On how many days did you miss work or school because of your headaches? 0 0 0   How many days was your productivity at work or school reduced by half or more because of your headaches? 0 0 0   On how many days did you not do household work because of your headaches? 0 1 0   How many days was your productivity in household work reduced by half or more because of your headaches? 0 1 0   On how many days did you miss family, social, or leisure  activities because of your headaches? 0 0 0   On how many days did you have a headache? 1 3 3   On a scale of 0-10, on average how painful were these headaches? 3 5 3   MIDAS Score 0 (I - Little or No Disability) 2 (I - Little or No Disability) 0 (I - Little or No Disability)

## 2024-11-19 NOTE — LETTER
9/4/2020         RE: Analia Jerry  [address]  [facility] [address]        Dear Colleague,    Thank you for referring your patient, Analia Jerry, to the [facility]. Please see a copy of my visit note below.    Subjective:    Pt is seen today w/ the c/c of a painful ingrown right great nail lateral border.  This has been problematic for 1 month(s).  negativehistory of drainage from the site. This is slowly getting worse.  Aggravated by activity and relieved by rest.  Has tried soaking which has not helped.   denies history of trauma to the area.  Had temporary removal done here 1 year ago.  denies fever and chill, denies numbness and tingling, denies erythema on dorsum of foot.  Also slight pain left hallux lateral border.  Had permanent done here last year.    ROS:  A 10-point review of systems was performed and is positive for that noted in the HPI and as seen below.  All other areas are negative.     Past Medical History:   Diagnosis Date     Acute gallstone pancreatitis 12/28/2019     Anxiety      ASCUS of cervix with negative high risk HPV 1/26/17    ASCUS/neg HR HPV.     Depressive disorder      Gallstones 12/28/2019    [facility]     H/O colposcopy with cervical biopsy 03/23/2017    Bx & ECC - negative     Hashimoto's disease      Headache(784.0)      Hypothyroidism due to Hashimoto's thyroiditis      Papanicolaou smear of cervix with atypical squamous cells of undetermined significance (ASC-US) 12/03/2015       Past Surgical History:   Procedure Laterality Date     COLONOSCOPY N/A 12/16/2015    Procedure: COLONOSCOPY;  Surgeon: Duane, William Charles, MD;  Location: [facility] OR     COLONOSCOPY WITH CO2 INSUFFLATION N/A 12/16/2015    Procedure: COLONOSCOPY WITH CO2 INSUFFLATION;  Surgeon: Duane, William Charles, MD;  Location: [facility] OR     NO HISTORY OF SURGERY         Family History   Problem Relation Age of Onset     Depression Brother         depression      Preventive Care Visit  [facility] BENTON Marrero MD, Internal Medicine - Pediatrics  Nov 19, 2024    Assessment & Plan   2 year old 0 month old, here for preventive care.      ICD-10-CM    1. Encounter for routine child health examination w/o abnormal findings  Z00.129 SCREENING, VISUAL ACUITY, QUANTITATIVE, BILAT     sodium fluoride (VANISH) 5% white varnish 1 packet     ID APPLICATION TOPICAL FLUORIDE VARNISH BY PHS/QHP        Overall doing well    Growth      Normal height and weight      Immunizations   No vaccines given today.  Offered influenza and COVID, declined    Anticipatory Guidance    Reviewed age appropriate anticipatory guidance.       Referrals/Ongoing Specialty Care  None  Verbal Dental Referral: Verbal dental referral was given  Dental Fluoride Varnish: Yes, fluoride varnish application risks and benefits were discussed, and verbal consent was received.      Nancy Woodward is presenting for the following:  Well Child      Here for [facility]. Doing well. No acute concerns.         11/19/2024    10:37 AM   Additional Questions   Accompanied by Parents   Questions for today's visit No   Surgery, major illness, or injury since last physical No           11/19/2024   Social   Lives with Parent(s)    Sibling(s)   Who takes care of your child? Parent(s)    Grandparent(s)        Nanny/   Recent potential stressors (!) PARENT JOB CHANGE   History of trauma No   Family Hx mental health challenges No   Lack of transportation has limited access to appts/meds No   Do you have housing? (Housing is defined as stable permanent housing and does not include staying ouside in a car, in a tent, in an abandoned building, in an overnight shelter, or couch-surfing.) Yes   Are you worried about losing your housing? No       Multiple values from one day are sorted in reverse-chronological order         11/19/2024     6:36 AM   Health Risks/Safety   What type of car seat does your child  Hyperlipidemia Mother      Thyroid Disease Mother      Hyperlipidemia Father      Breast Cancer Cousin      Depression Brother      Substance Abuse Sister      Substance Abuse Sister      C.A.D. No family hx of      Diabetes No family hx of      Breast Cancer No family hx of      Cancer - colorectal No family hx of      Anesthesia Reaction No family hx of      Blood Disease No family hx of        Social History     Tobacco Use     Smoking status: Never Smoker     Smokeless tobacco: Never Used   Substance Use Topics     Alcohol use: Yes     Comment: socially         Current Outpatient Medications:      albuterol (PROAIR HFA/PROVENTIL HFA/VENTOLIN HFA) 108 (90 Base) MCG/ACT inhaler, Inhale 2 puffs into the lungs every 6 hours as needed for shortness of breath / dyspnea or wheezing, Disp: 1 Inhaler, Rfl: 0     buPROPion (WELLBUTRIN XL) 300 MG 24 hr tablet, Take 1 tablet (300 mg) by mouth every morning, Disp: 90 tablet, Rfl: 1     cyclobenzaprine (FLEXERIL) 10 MG tablet, Take 1 tablet (10 mg) by mouth 3 times daily as needed for muscle spasms, Disp: 30 tablet, Rfl: 1     escitalopram (LEXAPRO) 20 MG tablet, Take 1 tablet (20 mg) by mouth daily, Disp: 90 tablet, Rfl: 1     levothyroxine (SYNTHROID/LEVOTHROID) 175 MCG tablet, TAKE ONE TABLET BY MOUTH ONE TIME DAILY AS DIRECTED, Disp: 90 tablet, Rfl: 3     norethindrone-ethinyl estradiol (ORTHO-NOVUM 1/35, 28,) 1-35 MG-MCG tablet, Take one active tablet a day for 21 days.  Discard the inactive pills and start new pack on day 22., Disp: 112 tablet, Rfl: 0       Allergies   Allergen Reactions     Nkda [No Known Drug Allergies]        BP (!) 140/80   Wt 95.3 kg (210 lb)   BMI 31.93 kg/m  .      Constitutional/ general:  Pt is in no apparent distress, appears well-nourished.  Cooperative with history and physical exam.     Psych:  The patient answered questions appropriately.  Normal affect.  Seems to have reasonable expectations, in terms of treatment.     Eyes:  Visual  use? Car seat with harness   Is your child's car seat forward or rear facing? Forward facing   Where does your child sit in the car?  Back seat   Do you use space heaters, wood stove, or a fireplace in your home? (!) YES   Are poisons/cleaning supplies and medications kept out of reach? Yes   Do you have a swimming pool? No   Helmet use? Yes         11/19/2024     6:36 AM   TB Screening   Was your child born outside of the [place]? No         11/19/2024     6:36 AM   TB Screening: Consider immunosuppression as a risk factor for TB   Recent TB infection or positive TB test in family/close contacts No   Recent travel outside [place] (child/family/close contacts) (!) YES   Which country? [place]   For how long?  2 weeks   Recent residence in high-risk group setting (correctional facility/health care facility/homeless shelter/refugee camp) No         11/19/2024     6:36 AM   Dental Screening   Has your child seen a dentist? (!) NO   Has your child had cavities in the last 2 years? No   Have parents/caregivers/siblings had cavities in the last 2 years? No         11/19/2024   Diet   Do you have questions about feeding your child? No   What does your child regularly drink? Water    Cow's Milk    (!) MILK ALTERNATIVE (EG: SOY, ALMOND, RIPPLE)    (!) JUICE    (!) OTHER   What type of milk?  Whole    Lactose free   What type of water? (!) BOTTLED   Please specify: Tea is deank daily   How often does your family eat meals together? Every day   How many snacks does your child eat per day 5   Are there types of foods your child won't eat? (!) YES   Please specify: Loves fruit, veggies are a struggle   In past 12 months, concerned food might run out No   In past 12 months, food has run out/couldn't afford more No       Multiple values from one day are sorted in reverse-chronological order         11/19/2024     6:36 AM   Elimination   Bowel or bladder concerns? No concerns   Toilet training status: Potty trained urine only  "        11/19/2024   Activity   Days per week of moderate/strenuous exercise 7 days   On average, how many minutes do you engage in exercise at this level? 60 min   What does your child do for exercise?  Run, swim class            11/19/2024     6:36 AM   Media Use   Hours per day of screen time (for entertainment) 5   Screen in bedroom (!) YES         11/19/2024     6:36 AM   Sleep   Do you have any concerns about your child's sleep?  (!) EARLY AWAKENING         11/19/2024     6:36 AM   School   Early childhood screen complete Not yet done   Grade in school    Current school [employer]         11/19/2024     6:36 AM   Vision/Hearing   Vision or hearing concerns No concerns         11/19/2024     6:36 AM   Development/ Social-Emotional Screen   Developmental concerns No   Does your child receive any special services? No     Development    Screening tool used, reviewed with parent/guardian:   ASQ 3 Y Communication Gross Motor Fine Motor Problem Solving Personal-social   Score 50 55 40 40 50   Cutoff 30.99 36.99 18.07 30.29 35.33   Result Passed Passed Passed Passed Passed              Objective     Exam  Pulse 107   Temp 98.6  F (37  C) (Temporal)   Resp 22   Ht 0.94 m (3' 1.01\")   Wt 16.5 kg (36 lb 4.8 oz)   HC 52 cm (20.47\")   SpO2 98%   BMI 18.63 kg/m    40 %ile (Z= -0.25) based on CDC (Boys, 2-20 Years) Stature-for-age data based on Stature recorded on 11/19/2024.  88 %ile (Z= 1.19) based on CDC (Boys, 2-20 Years) weight-for-age data using data from 11/19/2024.  96 %ile (Z= 1.73) based on CDC (Boys, 2-20 Years) BMI-for-age based on BMI available on 11/19/2024.  No blood pressure reading on file for this encounter.    Vision Screen    Vision Screen Details  Reason Vision Screen Not Completed: Screening Recommend: Patient/Guardian Declined      Physical Exam  GENERAL: Active, alert, in no acute distress.  SKIN: Clear. No significant rash, abnormal pigmentation or lesions  HEAD: " scanning/ tracking without deficit.     Ears:  Response to auditory stimuli is normal.  No  hearing aid devices.  Auricles in proper alignment.     Lymphatic:  Popliteal lymph nodes not enlarged.     Lungs:  Non labored breathing, non labored speech. No cough.  No audible wheezing. Even, quiet breathing.       Vascular:  Pedal pulses are palpable bilaterally for both the DP and PT arteries.  CFT < 3 sec.  No ankle varicosities or edema.  Pedal hair growth noted.     Neuro:  Alert and oriented x 3. Coordinated gait.  Light touch sensation is intact to the L4, L5, S1 distributions. No obvious deficits.  No evidence of neurological-based weakness, spasticity, or contracture in the lower extremities.     Derm: Normal texture and turgor.  No ecchymosis, or cyanosis.  Hair growth noted.        Musculoskeletal:     Patient is ambulatory without an assistive device or brace.  No gross deformities.  Normal arch with weightbearing.  No forefoot or rear foot deformities noted.  MS 5/5 all compartments.  Normal ROM all fore foot and rearfoot joints.  No equinus.  right great toe nail lateral border shows soft tissue impingement with localized erythema.   negative active drainage/purulence at this time.  No sinus tracts.  No nailbed masses or exostosis.  No pain with range of motion of IPJ or MTPJ.  No ascending cellulitis.  Debris left hallux lateral border.  We remove this and underlying border healthy    ASSESSMENT:    Onychocryptosis with paronychia right toe.    Discussed etiology and treatment options in detail w/ the pt.  The potential causes and nature of an ingrown toenail were discussed with the patient.  We reviewed the natural history/prognosis of the condition and potential risks if no treatment is provided.      Treatment options discussed included conservative management (oral antibiotics, soaking of foot, adequate width shoes)  as well as surgical management (partial or total nail removal).  The pros and cons of  Normocephalic.  EYES:  Symmetric light reflex and no eye movement on cover/uncover test. Normal conjunctivae.  EARS: Normal canals. Tympanic membranes are normal; gray and translucent.  NOSE: Normal without discharge.  MOUTH/THROAT: Clear. No oral lesions. Teeth without obvious abnormalities.  NECK: Supple, no masses.  No thyromegaly.  LYMPH NODES: No adenopathy  LUNGS: Clear. No rales, rhonchi, wheezing or retractions  HEART: Regular rhythm. Normal S1/S2. No murmurs. Normal pulses.  ABDOMEN: Soft, non-tender, not distended, no masses or hepatosplenomegaly. Bowel sounds normal.   GENITALIA: Normal male external genitalia. Jani stage I,  both testes descended, no hernia or hydrocele.    EXTREMITIES: Full range of motion, no deformities  NEUROLOGIC: No focal findings. Cranial nerves grossly intact: DTR's normal. Normal gait, strength and tone      Signed Electronically by: Cesar Marrero MD   both forms of treatment were reviewed.  Handout given to patient.      After thorough discussion and answering all questions, the patient elected to have nail avulsion.  Obtained consent, used 3cc of 1% lidocaine plain to block right first toe.  Sterile prep, then avulsed the affected border(s).  No evidence of deep abscess noted.  Pt tolerated procedure well.  Sterile bandage placed, gave wound care instruction.  Discussed permanent removal if this reoccurs.  Remove debris from left hallux lateral border.  She will watch this and keep this clean.  Return to clinic prn.    Charles Stapleton DPM DPM, FACFAS      Again, thank you for allowing me to participate in the care of your patient.        Sincerely,        Charles Stapleton DPM

## 2024-12-16 DIAGNOSIS — F39 MOOD DISORDER (H): ICD-10-CM

## 2024-12-16 DIAGNOSIS — F41.1 GENERALIZED ANXIETY DISORDER: ICD-10-CM

## 2024-12-16 RX ORDER — BUSPIRONE HYDROCHLORIDE 15 MG/1
15 TABLET ORAL 2 TIMES DAILY
Qty: 180 TABLET | Refills: 0 | Status: SHIPPED | OUTPATIENT
Start: 2024-12-16

## 2024-12-16 RX ORDER — ARIPIPRAZOLE 5 MG/1
5 TABLET ORAL DAILY
Qty: 90 TABLET | Refills: 0 | Status: SHIPPED | OUTPATIENT
Start: 2024-12-16

## 2024-12-16 NOTE — TELEPHONE ENCOUNTER
"Spoke with patient per refill team request. Patient had virtual visit with PCP and had discussed at that time having him manage medications.     Per chart review:    \"(F39) Mood disorder (H24)  Comment: stable  Plan: will continue meds but will need to see psych again if worsening. If SUICIAL IDEATION OR HOMOCIDAL IDEATION OR FRANKY TO ER. Exercise and limit carbs.      The longitudinal plan of care for the diagnosis(es)/condition(s) as documented were addressed during this visit. Due to the added complexity in care, I will continue to support Analia in the subsequent management and with ongoing continuity of care.\"      Routing to provider for review of medications. Please advise. Bryan Bradley, RN, BSN    "

## 2024-12-16 NOTE — TELEPHONE ENCOUNTER
Clinic RN: Please investigate patient's chart or contact patient if the information cannot be found because  last prescribed by specialty. See message below, is patient asking for PCP to take over prescribing? Was this previously discussed?

## 2025-01-03 ENCOUNTER — MYC MEDICAL ADVICE (OUTPATIENT)
Dept: FAMILY MEDICINE | Facility: CLINIC | Age: 35
End: 2025-01-03
Payer: COMMERCIAL

## 2025-01-03 DIAGNOSIS — Z13.6 CARDIOVASCULAR SCREENING; LDL GOAL LESS THAN 160: Chronic | ICD-10-CM

## 2025-01-03 DIAGNOSIS — Z00.00 PREVENTATIVE HEALTH CARE: ICD-10-CM

## 2025-01-03 DIAGNOSIS — E06.3 HYPOTHYROIDISM DUE TO HASHIMOTO'S THYROIDITIS: Primary | Chronic | ICD-10-CM

## 2025-01-03 NOTE — TELEPHONE ENCOUNTER
Please place lab orders and route back to TC.    Patient is scheduled with you for a med check 3/3/25    Mela MCBRIDE [facility]

## 2025-01-14 DIAGNOSIS — E06.3 HYPOTHYROIDISM DUE TO HASHIMOTO'S THYROIDITIS: ICD-10-CM

## 2025-01-14 RX ORDER — LEVOTHYROXINE SODIUM 200 UG/1
TABLET ORAL
Qty: 90 TABLET | Refills: 0 | Status: SHIPPED | OUTPATIENT
Start: 2025-01-14

## 2025-01-16 DIAGNOSIS — F39 MOOD DISORDER: ICD-10-CM

## 2025-01-16 RX ORDER — BUPROPION HYDROCHLORIDE 300 MG/1
300 TABLET ORAL EVERY MORNING
Qty: 90 TABLET | Refills: 0 | Status: SHIPPED | OUTPATIENT
Start: 2025-01-16

## 2025-01-29 NOTE — PROGRESS NOTES
He can discuss during the office visit  with me .   [facility] Collaborative Care Psychiatry Service   2022      Behavioral Health Clinician Progress Note    Patient Name: Analia Jerry           Service Type:  Individual      Service Location:   MyChart / Email (patient reached)     Session Start Time: 9:55a   Session End Time: 10:18a      Session Length: 16 - 37      Attendees: Client     Service Modality:  Video Visit:      Provider verified identity through the following two step process.  Patient provided:  Patient photo and Patient     Telemedicine Visit: The patient's condition can be safely assessed and treated via synchronous audio and visual telemedicine encounter.      Reason for Telemedicine Visit: Services only offered telehealth    Originating Site (Patient Location): Patient's home    Distant Site (Provider Location): Provider Remote Setting- Home Office    Consent:  The patient/guardian has verbally consented to: the potential risks and benefits of telemedicine (video visit) versus in person care; bill my insurance or make self-payment for services provided; and responsibility for payment of non-covered services.     Patient would like the video invitation sent by:  My Chart    Mode of Communication:  Video Conference via [employer]well    As the provider I attest to compliance with applicable laws and regulations related to telemedicine.    Visit Activities (Refresh list every visit): [facility] Only    Diagnostic Assessment Date: 3/15/22  Treatment Plan Review Date: 3/13/23  See Flowsheets for today's PHQ-9 and MAXIM-7 results  Previous PHQ-9:   PHQ-9 SCORE 2022   PHQ-9 Total Score - - -   PHQ-9 Total Score MyChart 6 (Mild depression) - 5 (Mild depression)   PHQ-9 Total Score 6 5 5   Some encounter information is confidential and restricted. Go to Review Flowsheets activity to see all data.     Previous MAXIM-7:   MAXIM-7 SCORE 2022   Total Score - - -   Total Score 6 (mild anxiety) - 3  "(minimal anxiety)   Total Score 6 3 3   Some encounter information is confidential and restricted. Go to Review Flowsheets activity to see all data.       KADE LEVEL:  KADE Score (Last Two) 8/15/2011   KADE Raw Score 37   Activation Score 49.9   KADE Level 2       DATA  Extended Session (60+ minutes): No  Interactive Complexity: No  Crisis: No  [facility] Patient: No    Treatment Objective(s) Addressed in This Session:  Target Behavior(s): depression symptoms    Depressed Mood: Increase interest, engagement, and pleasure in doing things    Current Stressors / Issues:  Pt shares that she did meet with PCP last month and did get prescribed sertraline.  Was noticing increased sadness including \"crying out of nowhere\" and not being able brush things off and getting affected by small things.  Has been on for about 1 month and is noticing improvement in her symptoms.    Work is going well and feels like getting hang of things in her new role.  Saw neurologist and was started on topamax which has improved headaches quite a bit.  Did lose 40lbs and is not noticing sleep issues like she has been having.  Feels it is quite a bit better overall.  Noticing a bit of a problem with sleeping since started sertraline but not sure if related to medication or to one of her dogs who is more active then usual at night.  Continues to see therapist every 3 weeks.        Progress on Treatment Objective(s) / Homework:  Satisfactory progress - MAINTENANCE (Working to maintain change, with risk of relapse); Intervened by continuing to positively reinforce healthy behavior choice     Motivational Interviewing    MI Intervention: Supported Autonomy, Collaboration, Evocation, Permission to raise concern or advise, Open-ended questions and Reflections: simple and complex     Change Talk Expressed by the Patient: Activation Taking steps    Provider Response to Change Talk: E - Evoked more info from patient about behavior change, A - Affirmed patient's " thoughts, decisions, or attempts at behavior change, R - Reflected patient's change talk and S - Summarized patient's change talk statements      Care Plan review completed: Yes    Medication Review:  Changes to psychiatric medications, see updated Medication List in EPIC.     Medication Compliance:  Yes    Changes in Health Issues:   None reported    Chemical Use Review:   Substance Use: Chemical use reviewed, no active concerns identified      Tobacco Use: No current tobacco use.      Assessment: Current Emotional / Mental Status (status of significant symptoms):  Risk status (Self / Other harm or suicidal ideation)  Patient has had a history of suicidal ideation: hx of SI several years ago.  denies current  Patient denies current fears or concerns for personal safety.  Patient denies current or recent suicidal ideation or behaviors.  Patient denies current or recent homicidal ideation or behaviors.  Patient denies current or recent self injurious behavior or ideation.  Patient denies other safety concerns.  A safety and risk management plan has not been developed at this time, however patient was encouraged to call [facility] should there be a change in any of these risk factors.    Appearance:   Appropriate   Eye Contact:   Good   Psychomotor Behavior: Normal   Attitude:   Cooperative   Orientation:   All  Speech   Rate / Production: Normal    Volume:  Normal   Mood:    Normal  Affect:    Appropriate   Thought Content:  Clear   Thought Form:  Coherent  Logical   Insight:    Good     Diagnoses:  1. Depression, major, recurrent, mild (H)    2. Generalized anxiety disorder        Collateral Reports Completed:  [facility]    Plan: (Homework, other):  Patient was given information about behavioral services and encouraged to schedule a follow up appointment with the clinic [facility] as needed.  She was also given information about mental health symptoms and treatment options .  Has an  individual therapist that sees regularly.  CD Recommendations: No indications of CD issues.  Azalea Moya, RICHARD, [facility]      ______________________________________________________________________    Integrated Primary Care Behavioral Health Treatment Plan    Patient's Name: Analia Jerry  YOB: 1990    Date of Creation: 5/23/22  Date Treatment Plan Last Reviewed/Revised: 12/23/22    DSM5 Diagnoses: 296.31 (F33.0) Major Depressive Disorder, Recurrent Episode, Mild _ and With mixed features or 300.02 (F41.1) Generalized Anxiety Disorder  Psychosocial / Contextual Factors: grief, job change  PROMIS (reviewed every 90 days): 25    Referral / Collaboration:  Referral to another professional/service is not indicated at this time. Pt has a long term therapist.    Anticipated number of session for this episode of care: 4-5  Anticipation frequency of session: Every 2 months  Anticipated Duration of each session: 16-37 minutes  Treatment plan will be reviewed in 90 days or when goals have been changed.       MeasurableTreatment Goal(s) related to diagnosis / functional impairment(s)  Goal 1: Patient will experience a reduction in depression symptom along with a corresponding increase in positive emotions and life satisfaction.      Objective #A (Patient Action)    Patient will Decrease frequency and intensity of feeling down, depressed, hopeless.  Status: New - Date: 12/13/22     Intervention(s)  Therapist will use cognitive-behavioral and acceptance and commitment therapy topics aimed to help reduce anxiety and depression.        Patient has reviewed and agreed to the above plan.      Azalea Moya, MSGILBERT, RICHARD, [facility]  December 13, 2022

## 2025-02-10 DIAGNOSIS — F39 MOOD DISORDER: ICD-10-CM

## 2025-02-10 RX ORDER — LAMOTRIGINE 200 MG/1
200 TABLET ORAL AT BEDTIME
Qty: 90 TABLET | Refills: 0 | OUTPATIENT
Start: 2025-02-10

## 2025-02-12 DIAGNOSIS — F39 MOOD DISORDER: ICD-10-CM

## 2025-02-12 RX ORDER — LAMOTRIGINE 200 MG/1
200 TABLET ORAL AT BEDTIME
Qty: 90 TABLET | Refills: 0 | Status: SHIPPED | OUTPATIENT
Start: 2025-02-12

## 2025-02-20 ENCOUNTER — LAB (OUTPATIENT)
Dept: LAB | Facility: CLINIC | Age: 35
End: 2025-02-20
Payer: COMMERCIAL

## 2025-02-20 DIAGNOSIS — E06.3 HYPOTHYROIDISM DUE TO HASHIMOTO'S THYROIDITIS: Chronic | ICD-10-CM

## 2025-02-20 DIAGNOSIS — Z00.00 PREVENTATIVE HEALTH CARE: ICD-10-CM

## 2025-02-20 DIAGNOSIS — Z13.6 CARDIOVASCULAR SCREENING; LDL GOAL LESS THAN 160: Chronic | ICD-10-CM

## 2025-02-20 LAB
ALBUMIN SERPL BCG-MCNC: 4.2 G/DL (ref 3.5–5.2)
ALP SERPL-CCNC: 39 U/L (ref 40–150)
ALT SERPL W P-5'-P-CCNC: 6 U/L (ref 0–50)
ANION GAP SERPL CALCULATED.3IONS-SCNC: 14 MMOL/L (ref 7–15)
AST SERPL W P-5'-P-CCNC: 12 U/L (ref 0–45)
BILIRUB SERPL-MCNC: 0.5 MG/DL
BUN SERPL-MCNC: 16.2 MG/DL (ref 6–20)
CALCIUM SERPL-MCNC: 9.5 MG/DL (ref 8.8–10.4)
CHLORIDE SERPL-SCNC: 106 MMOL/L (ref 98–107)
CHOLEST SERPL-MCNC: 246 MG/DL
CREAT SERPL-MCNC: 1.12 MG/DL (ref 0.51–0.95)
EGFRCR SERPLBLD CKD-EPI 2021: 65 ML/MIN/1.73M2
ERYTHROCYTE [DISTWIDTH] IN BLOOD BY AUTOMATED COUNT: 11.8 % (ref 10–15)
FASTING STATUS PATIENT QL REPORTED: YES
FASTING STATUS PATIENT QL REPORTED: YES
GLUCOSE SERPL-MCNC: 97 MG/DL (ref 70–99)
HCO3 SERPL-SCNC: 21 MMOL/L (ref 22–29)
HCT VFR BLD AUTO: 36.7 % (ref 35–47)
HDLC SERPL-MCNC: 53 MG/DL
HGB BLD-MCNC: 12.5 G/DL (ref 11.7–15.7)
LDLC SERPL CALC-MCNC: 143 MG/DL
MCH RBC QN AUTO: 30.7 PG (ref 26.5–33)
MCHC RBC AUTO-ENTMCNC: 34.1 G/DL (ref 31.5–36.5)
MCV RBC AUTO: 90 FL (ref 78–100)
NONHDLC SERPL-MCNC: 193 MG/DL
PLATELET # BLD AUTO: 310 10E3/UL (ref 150–450)
POTASSIUM SERPL-SCNC: 4.3 MMOL/L (ref 3.4–5.3)
PROT SERPL-MCNC: 6.9 G/DL (ref 6.4–8.3)
RBC # BLD AUTO: 4.07 10E6/UL (ref 3.8–5.2)
SODIUM SERPL-SCNC: 141 MMOL/L (ref 135–145)
TRIGL SERPL-MCNC: 252 MG/DL
TSH SERPL DL<=0.005 MIU/L-ACNC: 2.27 UIU/ML (ref 0.3–4.2)
WBC # BLD AUTO: 7 10E3/UL (ref 4–11)

## 2025-02-28 ASSESSMENT — ANXIETY QUESTIONNAIRES
2. NOT BEING ABLE TO STOP OR CONTROL WORRYING: NOT AT ALL
GAD7 TOTAL SCORE: 0
5. BEING SO RESTLESS THAT IT IS HARD TO SIT STILL: NOT AT ALL
7. FEELING AFRAID AS IF SOMETHING AWFUL MIGHT HAPPEN: NOT AT ALL
6. BECOMING EASILY ANNOYED OR IRRITABLE: NOT AT ALL
GAD7 TOTAL SCORE: 0
3. WORRYING TOO MUCH ABOUT DIFFERENT THINGS: NOT AT ALL
7. FEELING AFRAID AS IF SOMETHING AWFUL MIGHT HAPPEN: NOT AT ALL
4. TROUBLE RELAXING: NOT AT ALL
GAD7 TOTAL SCORE: 0
1. FEELING NERVOUS, ANXIOUS, OR ON EDGE: NOT AT ALL

## 2025-03-03 ENCOUNTER — MYC MEDICAL ADVICE (OUTPATIENT)
Dept: FAMILY MEDICINE | Facility: CLINIC | Age: 35
End: 2025-03-03

## 2025-03-03 ENCOUNTER — TELEPHONE (OUTPATIENT)
Dept: FAMILY MEDICINE | Facility: CLINIC | Age: 35
End: 2025-03-03

## 2025-03-03 ENCOUNTER — VIRTUAL VISIT (OUTPATIENT)
Dept: FAMILY MEDICINE | Facility: CLINIC | Age: 35
End: 2025-03-03
Payer: COMMERCIAL

## 2025-03-03 DIAGNOSIS — E06.3 HYPOTHYROIDISM DUE TO HASHIMOTO'S THYROIDITIS: ICD-10-CM

## 2025-03-03 DIAGNOSIS — N39.0 RECURRENT UTI: ICD-10-CM

## 2025-03-03 DIAGNOSIS — F33.2 SEVERE EPISODE OF RECURRENT MAJOR DEPRESSIVE DISORDER, WITHOUT PSYCHOTIC FEATURES (H): ICD-10-CM

## 2025-03-03 DIAGNOSIS — F41.1 GENERALIZED ANXIETY DISORDER: ICD-10-CM

## 2025-03-03 DIAGNOSIS — R79.89 ELEVATED SERUM CREATININE: Primary | ICD-10-CM

## 2025-03-03 DIAGNOSIS — G25.81 RESTLESS LEGS SYNDROME (RLS): ICD-10-CM

## 2025-03-03 DIAGNOSIS — F39 MOOD DISORDER: ICD-10-CM

## 2025-03-03 PROCEDURE — 98006 SYNCH AUDIO-VIDEO EST MOD 30: CPT | Performed by: FAMILY MEDICINE

## 2025-03-03 RX ORDER — LEVOTHYROXINE SODIUM 200 UG/1
200 TABLET ORAL DAILY
Qty: 90 TABLET | Refills: 3 | Status: SHIPPED | OUTPATIENT
Start: 2025-03-03

## 2025-03-03 RX ORDER — ROPINIROLE 0.5 MG/1
TABLET, FILM COATED ORAL
Qty: 360 TABLET | Refills: 3 | Status: SHIPPED | OUTPATIENT
Start: 2025-03-03

## 2025-03-03 RX ORDER — NITROFURANTOIN MACROCRYSTALS 100 MG/1
100 CAPSULE ORAL AT BEDTIME
Qty: 90 CAPSULE | Refills: 1 | Status: SHIPPED | OUTPATIENT
Start: 2025-03-03

## 2025-03-03 RX ORDER — BUPROPION HYDROCHLORIDE 300 MG/1
300 TABLET ORAL EVERY MORNING
Qty: 90 TABLET | Refills: 1 | Status: SHIPPED | OUTPATIENT
Start: 2025-03-03

## 2025-03-03 RX ORDER — BUSPIRONE HYDROCHLORIDE 15 MG/1
15 TABLET ORAL 2 TIMES DAILY
Qty: 180 TABLET | Refills: 1 | Status: SHIPPED | OUTPATIENT
Start: 2025-03-03

## 2025-03-03 RX ORDER — ARIPIPRAZOLE 5 MG/1
5 TABLET ORAL DAILY
Qty: 90 TABLET | Refills: 1 | Status: SHIPPED | OUTPATIENT
Start: 2025-03-03

## 2025-03-03 NOTE — TELEPHONE ENCOUNTER
Needs follow-up appointment in office with previsit NON-fasting labs. Sent [employer] message, with links to set up these appointments.Mela Mcdonnell [facility]

## 2025-03-03 NOTE — PROGRESS NOTES
"Analia is a 34 year old who is being evaluated via a billable video visit.    How would you like to obtain your AVS? MyChart  If the video visit is dropped, the invitation should be resent by: Text to cell phone: [phone]  Will anyone else be joining your video visit? No      Assessment & Plan     ASSESSMENT / PLAN:  (R79.89) Elevated serum creatinine  (primary encounter diagnosis)  Comment: borderline high. No symptoms   Plan: continue avoid NSAIDS. Good water intake. Reveiwed risks and side effects of medication  Nephrology if worse. Recheck in 6 months - non-fasting    (G25.81) Restless legs syndrome (RLS)  Comment: stable  Plan: rOPINIRole (REQUIP) 0.5 MG tablet        Expected course and warning signs reviewed.     (E06.3) Hypothyroidism due to Hashimoto's thyroiditis  Comment: stable  Plan: levothyroxine (SYNTHROID/LEVOTHROID) 200 MCG         tablet         Recheck one year    (N39.0) Recurrent UTI  Comment: stable  Plan: nitroFURantoin macrocrystal (MACRODANTIN) 100         MG capsule        Urology if worse.     (F41.1) Generalized anxiety disorder  Comment: stable  Plan: busPIRone (BUSPAR) 15 MG tablet        If SUICIAL IDEATION OR HOMOCIDAL IDEATION OR FRANKY TO ER.     (F39) Mood disorder  Comment: stable  Plan: buPROPion (WELLBUTRIN XL) 300 MG 24 hr tablet,         ARIPiprazole (ABILIFY) 5 MG tablet        Back to psych if worse. Continue exercise.     (F33.2) Severe episode of recurrent major depressive disorder, without psychotic features (H)  Plan: stable. Call/email with questions/concerns              BMI  Estimated body mass index is 29.04 kg/m  as calculated from the following:    Height as of 5/16/24: 1.727 m (5' 8\").    Weight as of 6/24/24: 86.6 kg (191 lb).   Weight management plan: Discussed healthy diet and exercise guidelines          Subjective   Analia is a 34 year old, presenting for the following health issues:    History depression/anxiety/ptsd/bipolar, hypothyroidism,migraines " (seeing neurology) astrid, obesity, mood disorder (seing psych) and low back pain. Seen GI for fatty liver and liver hemangiomas.   Seeing ob/gyn for pap/ocp  Macrobid daily prophrolactically - no uti's since.   Water intake good.   Marriage going ok. Work - ok.   Step-kids 10,17yo. Home/work.   Sleep overall ok. Caffeine - one diet pop/day. ALCOHOL- rare.   No SUICIAL IDEATION OR HOMOCIDAL IDEATION OR FRANKY.  Exercise- walking dog.   RLS - stable on reguip.  Propranolol BID and prn  Seeing therapist currently.  Gerd stable - no symptoms.   No regular nsaids. More tylenol.   No hematuria or dysuria. No flank pain.   Walk day before. No leg swelling.   No family history htn. Parents alive - mom with kidney disease- nephritis. Patient had work out.     Medication Request      3/3/2025     9:18 AM   Additional Questions   Roomed by Tali   Accompanied by self         3/3/2025     9:18 AM   Patient Reported Additional Medications   Patient reports taking the following new medications n/a     History of Present Illness       Mental Health Follow-up:  Patient presents to follow-up on Depression & Anxiety.Patient's depression since last visit has been:  Good  The patient is not having other symptoms associated with depression.  Patient's anxiety since last visit has been:  Good  The patient is not having other symptoms associated with anxiety.  Any significant life events: No  Patient is not feeling anxious or having panic attacks.  Patient has no concerns about alcohol or drug use.    Reason for visit:  Reivew labs. Medication refills.    She eats 0-1 servings of fruits and vegetables daily.She consumes 1 sweetened beverage(s) daily.She exercises with enough effort to increase her heart rate 9 or less minutes per day.  She exercises with enough effort to increase her heart rate 3 or less days per week.   She is taking medications regularly.                    Objective           Vitals:  No vitals were obtained today due to  virtual visit.    Physical Exam   GENERAL: alert and no distress  EYES: Eyes grossly normal to inspection.  No discharge or erythema, or obvious scleral/conjunctival abnormalities.  RESP: No audible wheeze, cough, or visible cyanosis.    SKIN: Visible skin clear. No significant rash, abnormal pigmentation or lesions.  NEURO: Cranial nerves grossly intact.  Mentation and speech appropriate for age.  PSYCH: Appropriate affect, tone, and pace of words          Video-Visit Details    Type of service:  Video Visit   Originating Location (pt. Location): Home    Distant Location (provider location):  On-site  Platform used for Video Visit: Amy  Signed Electronically by: Pranav Ware MD

## 2025-04-16 ENCOUNTER — PATIENT OUTREACH (OUTPATIENT)
Dept: CARE COORDINATION | Facility: CLINIC | Age: 35
End: 2025-04-16
Payer: COMMERCIAL

## 2025-04-30 ENCOUNTER — PATIENT OUTREACH (OUTPATIENT)
Dept: CARE COORDINATION | Facility: CLINIC | Age: 35
End: 2025-04-30
Payer: COMMERCIAL

## 2025-06-09 ENCOUNTER — OFFICE VISIT (OUTPATIENT)
Dept: OBGYN | Facility: CLINIC | Age: 35
End: 2025-06-09
Payer: COMMERCIAL

## 2025-06-09 VITALS
OXYGEN SATURATION: 94 % | WEIGHT: 188.2 LBS | HEIGHT: 68 IN | DIASTOLIC BLOOD PRESSURE: 76 MMHG | SYSTOLIC BLOOD PRESSURE: 109 MMHG | BODY MASS INDEX: 28.52 KG/M2 | HEART RATE: 106 BPM

## 2025-06-09 DIAGNOSIS — Z01.419 ENCOUNTER FOR ANNUAL ROUTINE GYNECOLOGICAL EXAMINATION: Primary | ICD-10-CM

## 2025-06-09 DIAGNOSIS — Z30.41 ENCOUNTER FOR SURVEILLANCE OF CONTRACEPTIVE PILLS: ICD-10-CM

## 2025-06-09 DIAGNOSIS — R87.610 ASCUS OF CERVIX WITH NEGATIVE HIGH RISK HPV: ICD-10-CM

## 2025-06-09 LAB
HPV HR 12 DNA CVX QL NAA+PROBE: NEGATIVE
HPV16 DNA CVX QL NAA+PROBE: NEGATIVE
HPV18 DNA CVX QL NAA+PROBE: NEGATIVE
HUMAN PAPILLOMA VIRUS FINAL DIAGNOSIS: NORMAL

## 2025-06-09 PROCEDURE — 99459 PELVIC EXAMINATION: CPT | Performed by: NURSE PRACTITIONER

## 2025-06-09 PROCEDURE — 87624 HPV HI-RISK TYP POOLED RSLT: CPT | Performed by: NURSE PRACTITIONER

## 2025-06-09 PROCEDURE — 3074F SYST BP LT 130 MM HG: CPT | Performed by: NURSE PRACTITIONER

## 2025-06-09 PROCEDURE — G0145 SCR C/V CYTO,THINLAYER,RESCR: HCPCS | Performed by: NURSE PRACTITIONER

## 2025-06-09 PROCEDURE — 3078F DIAST BP <80 MM HG: CPT | Performed by: NURSE PRACTITIONER

## 2025-06-09 PROCEDURE — 99395 PREV VISIT EST AGE 18-39: CPT | Performed by: NURSE PRACTITIONER

## 2025-06-09 RX ORDER — NORETHINDRONE AND ETHINYL ESTRADIOL 1 MG-35MCG
1 KIT ORAL DAILY
Qty: 112 TABLET | Refills: 3 | Status: SHIPPED | OUTPATIENT
Start: 2025-06-09

## 2025-06-09 NOTE — PROGRESS NOTES
SUBJECTIVE:   Analia Jerry  is a 34 year old, presenting for the following health issues:  Physical  HPI     Labs updated by PCP.        10/23/2024    10:48 AM 8/5/2024    11:18 AM   PHQ-2 ( 1999 [employer])   Q1: Little interest or pleasure in doing things 0 0   Q2: Feeling down, depressed or hopeless 0 0   PHQ-2 Score 0 0         Reviewed orders with patient.  Reviewed health maintenance and updated orders accordingly - Yes  Patient Active Problem List   Diagnosis    Familial hematuria    CARDIOVASCULAR SCREENING; LDL GOAL LESS THAN 160    Mechanical low back pain    Keloid scar    ASCUS of cervix with negative high risk HPV    Common wart    Hypothyroidism due to Hashimoto's thyroiditis    High triglycerides    Elevated liver enzymes    Class 1 obesity due to excess calories with body mass index (BMI) of 32.0 to 32.9 in adult, unspecified whether serious comorbidity present    Mood disorder    FRANK (obstructive sleep apnea) - mild (AHI 7)    Moderate episode of recurrent major depressive disorder (H)    Fatigue, unspecified type    Posttraumatic stress disorder    Recurrent major depression in partial remission    Transplant donor evaluation    Anxiety    Generalized anxiety disorder     Past Surgical History:   Procedure Laterality Date    CHOLECYSTECTOMY  12/2019    COLONOSCOPY N/A 12/16/2015    Procedure: COLONOSCOPY;  Surgeon: Duane, William Charles, MD;  Location: [facility]    COLONOSCOPY N/A 08/11/2022    Procedure: COLONOSCOPY, WITH POLYPECTOMY AND BIOPSY;  Surgeon: Aurora Paez DO;  Location: [facility]    COLONOSCOPY WITH CO2 INSUFFLATION N/A 12/16/2015    Procedure: COLONOSCOPY WITH CO2 INSUFFLATION;  Surgeon: Duane, William Charles, MD;  Location: [facility]    COLONOSCOPY WITH CO2 INSUFFLATION N/A 08/11/2022    Procedure: COLONOSCOPY, WITH CO2 INSUFFLATION;  Surgeon: Aurora Paez DO;  Location: [facility]    COMBINED ESOPHAGOSCOPY, GASTROSCOPY, DUODENOSCOPY (EGD) WITH CO2 INSUFFLATION N/A 08/11/2022     Procedure: ESOPHAGOGASTRODUODENOSCOPY, WITH CO2 INSUFFLATION;  Surgeon: Aurora Paez DO;  Location: [facility]    ESOPHAGOSCOPY, GASTROSCOPY, DUODENOSCOPY (EGD), COMBINED N/A 08/11/2022    Procedure: ESOPHAGOGASTRODUODENOSCOPY, WITH BIOPSY;  Surgeon: Aurora Paez DO;  Location: [facility]       Social History     Tobacco Use    Smoking status: Never    Smokeless tobacco: Never   Substance Use Topics    Alcohol use: Not Currently     Family History   Problem Relation Age of Onset    Hyperlipidemia Mother     Thyroid Disease Mother     Kidney Disease Mother     Hyperlipidemia Father     Substance Abuse Sister     Substance Abuse Sister     Depression Brother     No Known Problems Maternal Grandmother     Myocardial Infarction Maternal Grandfather     No Known Problems Paternal Grandmother     No Known Problems Paternal Grandfather     Breast Cancer Cousin     C.A.D. No family hx of     Diabetes No family hx of     Breast Cancer No family hx of     Cancer - colorectal No family hx of     Anesthesia Reaction No family hx of     Blood Disease No family hx of            Breast Cancer Screening:   Mammogram Screening - Patient under 40 years of age: Routine Mammogram Screening not recommended.   Pertinent mammograms are reviewed under the imaging tab.    History of abnormal Pap smear: YES - reflected in Problem List and Health Maintenance accordingly    Past Medical History:   Diagnosis Date    Acute gallstone pancreatitis 12/28/2019    Anxiety     ASCUS of cervix with negative high risk HPV 01/26/2017    ASCUS/neg HR HPV.    Juarez's esophagus determined by biopsy 2022    Depressive disorder     Gallstones 12/28/2019    [facility]    H/O colposcopy with cervical biopsy 03/23/2017    Bx & ECC - negative    Hashimoto's disease     Headache(784.0)     Hypothyroidism due to Hashimoto's thyroiditis     Papanicolaou smear of cervix with atypical squamous cells of undetermined significance (ASC-US) 12/03/2015  "     Past Surgical History:   Procedure Laterality Date    CHOLECYSTECTOMY  12/2019    COLONOSCOPY N/A 12/16/2015    Procedure: COLONOSCOPY;  Surgeon: Duane, William Charles, MD;  [facility]    COLONOSCOPY N/A 08/11/2022    Procedure: COLONOSCOPY, WITH POLYPECTOMY AND BIOPSY;  Surgeon: Aurora Paez DO;  [facility]    COLONOSCOPY WITH CO2 INSUFFLATION N/A 12/16/2015    Procedure: COLONOSCOPY WITH CO2 INSUFFLATION;  Surgeon: Duane, William Charles, MD;  [facility]    COLONOSCOPY WITH CO2 INSUFFLATION N/A 08/11/2022    Procedure: COLONOSCOPY, WITH CO2 INSUFFLATION;  Surgeon: Aurora Paez DO;  [facility]    COMBINED ESOPHAGOSCOPY, GASTROSCOPY, DUODENOSCOPY (EGD) WITH CO2 INSUFFLATION N/A 08/11/2022    Procedure: ESOPHAGOGASTRODUODENOSCOPY, WITH CO2 INSUFFLATION;  Surgeon: Aurora Paez DO;  [facility]    ESOPHAGOSCOPY, GASTROSCOPY, DUODENOSCOPY (EGD), COMBINED N/A 08/11/2022    Procedure: ESOPHAGOGASTRODUODENOSCOPY, WITH BIOPSY;  Surgeon: Aurora Paez DO;  Location: [facility]         Review of Systems  Constitutional, neuro, ENT, endocrine, pulmonary, cardiac, gastrointestinal, genitourinary, musculoskeletal, integument and psychiatric systems are negative, except as otherwise noted.    OBJECTIVE:   /76 (BP Location: Right arm, Patient Position: Sitting, Cuff Size: Adult Regular)   Pulse 106   Ht 1.727 m (5' 8\")   Wt 85.4 kg (188 lb 3.2 oz)   SpO2 94%   BMI 28.62 kg/m    Physical Exam   GENERAL: alert and no distress  RESP: lungs clear to auscultation - no rales, rhonchi or wheezes  BREAST: normal without masses, tenderness or nipple discharge and no palpable axillary masses or adenopathy  CV: regular rate and rhythm, normal S1 S2, no S3 or S4, no murmur, click or rub, no peripheral edema  ABDOMEN: soft, nontender, no hepatosplenomegaly, no masses and bowel sounds normal   (female) w/bimanual: normal female external genitalia, normal urethral meatus, normal vaginal " mucosa, and normal cervix/adnexa/uterus without masses or discharge  MS: no gross musculoskeletal defects noted, no edema  SKIN: no suspicious lesions or rashes  NEURO: Normal strength and tone, mentation intact and speech normal  PSYCH: mentation appears normal, affect normal/bright      ASSESSMENT/PLAN:   Encounter for annual routine gynecological examination  Health Maintenance reviewed.    ASCUS of cervix with negative high risk HPV  Follow up based on result  - HPV and Gynecologic Cytology Panel    Encounter for surveillance of contraceptive pills  Refill x 1 year-does well with continuous cycling.  - norethindrone-ethinyl estradiol (NORTREL 1/35, 28,) 1-35 MG-MCG tablet; Take 1 tablet by mouth daily. Continuously    Counseling  Reviewed preventive health counseling, as reflected in patient instructions  Special attention given to:        Regular exercise       Healthy diet/nutrition       Contraception      Signed Electronically by: MERRY Carrington CNP

## 2025-06-09 NOTE — PATIENT INSTRUCTIONS
If you have any questions regarding your visit, Please contact your care team.     [employer] Services: [phone]  To Schedule an Appointment 24/7  Call: [phone]     Zuleyma Ginny- APRN COCO      Aaron Christopher-MAREK                   Monday 7:30am-2:00pm    Tuesday 7:30am-4:00pm    Wednesday 7:30am-2:00pm    Thursday 7:30am-11:30am    Friday 7:30am-12:00pm [address] [place] [place] [address]  Phone- [phone]   Fax- [phone]     Imaging Scheduling all locations  [phone]    [facility]  [address] [place] [address] [place] [address] [phone]         Urgent Care locations:  [facility]   Monday-Friday  10 am - 8 pm  Saturday and Sunday   9 am - 5 pm     [phone]   If you need a medication refill, please contact your pharmacy. Please allow 3 business days for your refill to be completed.  As always, Thank you for trusting us with your healthcare needs!      see additional instructions from your care team below    Patient Education   Preventive Care Advice   This is general advice given by our system to help you stay healthy. However, your care team may have specific advice just for you. Please talk to your care team about your preventive care needs.  Nutrition  Eat 5 or more servings of fruits and vegetables each day.  Try wheat bread, brown rice and whole grain pasta (instead of white bread, rice, and pasta).  Get enough calcium and vitamin D. Check the label on foods and aim for 100% of the RDA (recommended daily allowance).  Lifestyle  Exercise at least 150 minutes each week  (30 minutes a day, 5 days a week).  Do muscle strengthening activities 2 days a week. These help control your weight and prevent disease.  No smoking.  Wear sunscreen to prevent skin  cancer.  Have a dental exam and cleaning every 6 months.  Yearly exams  See your health care team every year to talk about:  Any changes in your health.  Any medicines your care team has prescribed.  Preventive care, family planning, and ways to prevent chronic diseases.  Shots (vaccines)   HPV shots (up to age 26), if you've never had them before.  Hepatitis B shots (up to age 59), if you've never had them before.  COVID-19 shot: Get this shot when it's due.  Flu shot: Get a flu shot every year.  Tetanus shot: Get a tetanus shot every 10 years.  Pneumococcal, hepatitis A, and RSV shots: Ask your care team if you need these based on your risk.  Shingles shot (for age 50 and up)  General health tests  Diabetes screening:  Starting at age 35, Get screened for diabetes at least every 3 years.  If you are younger than age 35, ask your care team if you should be screened for diabetes.  Cholesterol test: At age 39, start having a cholesterol test every 5 years, or more often if advised.  Bone density scan (DEXA): At age 50, ask your care team if you should have this scan for osteoporosis (brittle bones).  Hepatitis C: Get tested at least once in your life.  STIs (sexually transmitted infections)  Before age 24: Ask your care team if you should be screened for STIs.  After age 24: Get screened for STIs if you're at risk. You are at risk for STIs (including HIV) if:  You are sexually active with more than one person.  You don't use condoms every time.  You or a partner was diagnosed with a sexually transmitted infection.  If you are at risk for HIV, ask about PrEP medicine to prevent HIV.  Get tested for HIV at least once in your life, whether you are at risk for HIV or not.  Cancer screening tests  Cervical cancer screening: If you have a cervix, begin getting regular cervical cancer screening tests starting at age 21.  Breast cancer scan (mammogram): If you've ever had breasts, begin having regular mammograms starting at  age 40. This is a scan to check for breast cancer.  Colon cancer screening: It is important to start screening for colon cancer at age 45.  Have a colonoscopy test every 10 years (or more often if you're at risk) Or, ask your provider about stool tests like a FIT test every year or Cologuard test every 3 years.  To learn more about your testing options, visit:   .  For help making a decision, visit:   https://bit.ly/dl34816.  Prostate cancer screening test: If you have a prostate, ask your care team if a prostate cancer screening test (PSA) at age 55 is right for you.  Lung cancer screening: If you are a current or former smoker ages 50 to 80, ask your care team if ongoing lung cancer screenings are right for you.    For informational purposes only. Not to replace the advice of your health care provider. Copyright   2023 [employer]. All rights reserved. Clinically reviewed by the [facility]. [employer] 341821 - REV 01/24.

## 2025-06-10 ENCOUNTER — RESULTS FOLLOW-UP (OUTPATIENT)
Dept: OBGYN | Facility: CLINIC | Age: 35
End: 2025-06-10

## 2025-06-12 LAB
BKR AP ASSOCIATED HPV REPORT: NORMAL
BKR LAB AP GYN ADEQUACY: NORMAL
BKR LAB AP GYN INTERPRETATION: NORMAL
BKR LAB AP PREVIOUS ABNORMAL: NORMAL
PATH REPORT.COMMENTS IMP SPEC: NORMAL
PATH REPORT.COMMENTS IMP SPEC: NORMAL
PATH REPORT.RELEVANT HX SPEC: NORMAL

## (undated) DEVICE — PREP CHLORAPREP 26ML TINTED ORANGE  260815

## (undated) DEVICE — PAD CHUX UNDERPAD 23X24" 7136

## (undated) DEVICE — KIT ENDO FIRST STEP DISINFECTANT 200ML W/POUCH EP-4

## (undated) RX ORDER — FENTANYL CITRATE 50 UG/ML
INJECTION, SOLUTION INTRAMUSCULAR; INTRAVENOUS
Status: DISPENSED
Start: 2022-08-11

## (undated) RX ORDER — DIPHENHYDRAMINE HYDROCHLORIDE 50 MG/ML
INJECTION INTRAMUSCULAR; INTRAVENOUS
Status: DISPENSED
Start: 2022-08-11